# Patient Record
Sex: MALE | Race: WHITE | NOT HISPANIC OR LATINO | Employment: FULL TIME | URBAN - METROPOLITAN AREA
[De-identification: names, ages, dates, MRNs, and addresses within clinical notes are randomized per-mention and may not be internally consistent; named-entity substitution may affect disease eponyms.]

---

## 2017-05-04 ENCOUNTER — HOSPITAL ENCOUNTER (OUTPATIENT)
Dept: RADIOLOGY | Facility: MEDICAL CENTER | Age: 72
End: 2017-05-04
Attending: NEUROLOGICAL SURGERY
Payer: COMMERCIAL

## 2017-05-04 ENCOUNTER — HOSPITAL ENCOUNTER (OUTPATIENT)
Dept: RADIOLOGY | Facility: MEDICAL CENTER | Age: 72
End: 2017-05-04
Attending: NURSE PRACTITIONER
Payer: COMMERCIAL

## 2017-05-04 DIAGNOSIS — M54.5 LOW BACK PAIN, UNSPECIFIED BACK PAIN LATERALITY, UNSPECIFIED CHRONICITY, WITH SCIATICA PRESENCE UNSPECIFIED: ICD-10-CM

## 2017-05-04 PROCEDURE — 72148 MRI LUMBAR SPINE W/O DYE: CPT

## 2017-05-04 PROCEDURE — 72110 X-RAY EXAM L-2 SPINE 4/>VWS: CPT

## 2017-08-25 ENCOUNTER — HOSPITAL ENCOUNTER (OUTPATIENT)
Dept: LAB | Facility: MEDICAL CENTER | Age: 72
End: 2017-08-25
Attending: FAMILY MEDICINE
Payer: COMMERCIAL

## 2017-08-25 ENCOUNTER — OFFICE VISIT (OUTPATIENT)
Dept: INTERNAL MEDICINE | Facility: MEDICAL CENTER | Age: 72
End: 2017-08-25
Payer: COMMERCIAL

## 2017-08-25 VITALS
OXYGEN SATURATION: 98 % | WEIGHT: 189.9 LBS | BODY MASS INDEX: 27.19 KG/M2 | HEART RATE: 72 BPM | DIASTOLIC BLOOD PRESSURE: 92 MMHG | HEIGHT: 70 IN | TEMPERATURE: 97.5 F | SYSTOLIC BLOOD PRESSURE: 128 MMHG

## 2017-08-25 DIAGNOSIS — I10 ESSENTIAL HYPERTENSION: ICD-10-CM

## 2017-08-25 DIAGNOSIS — E78.5 DYSLIPIDEMIA: ICD-10-CM

## 2017-08-25 DIAGNOSIS — M48.061 BILATERAL STENOSIS OF LATERAL RECESS OF LUMBAR SPINE: ICD-10-CM

## 2017-08-25 DIAGNOSIS — M54.16 CHRONIC LUMBAR RADICULOPATHY: ICD-10-CM

## 2017-08-25 DIAGNOSIS — E06.3 HYPOTHYROIDISM DUE TO HASHIMOTO'S THYROIDITIS: ICD-10-CM

## 2017-08-25 DIAGNOSIS — E03.8 HYPOTHYROIDISM DUE TO HASHIMOTO'S THYROIDITIS: ICD-10-CM

## 2017-08-25 DIAGNOSIS — N52.9 IMPOTENCE: ICD-10-CM

## 2017-08-25 DIAGNOSIS — E78.00 PURE HYPERCHOLESTEROLEMIA: ICD-10-CM

## 2017-08-25 DIAGNOSIS — E03.9 ACQUIRED HYPOTHYROIDISM: ICD-10-CM

## 2017-08-25 DIAGNOSIS — Z12.5 SCREENING FOR PROSTATE CANCER: ICD-10-CM

## 2017-08-25 DIAGNOSIS — N28.1 RENAL CYST: ICD-10-CM

## 2017-08-25 DIAGNOSIS — Z00.00 PREVENTATIVE HEALTH CARE: ICD-10-CM

## 2017-08-25 LAB
25(OH)D3 SERPL-MCNC: 35 NG/ML (ref 30–100)
APPEARANCE UR: CLEAR
BASOPHILS # BLD AUTO: 0.7 % (ref 0–1.8)
BASOPHILS # BLD: 0.05 K/UL (ref 0–0.12)
BILIRUB UR QL STRIP.AUTO: NEGATIVE
COLOR UR: YELLOW
EOSINOPHIL # BLD AUTO: 0.09 K/UL (ref 0–0.51)
EOSINOPHIL NFR BLD: 1.2 % (ref 0–6.9)
ERYTHROCYTE [DISTWIDTH] IN BLOOD BY AUTOMATED COUNT: 39.8 FL (ref 35.9–50)
GLUCOSE UR STRIP.AUTO-MCNC: NEGATIVE MG/DL
HCT VFR BLD AUTO: 49.4 % (ref 42–52)
HGB BLD-MCNC: 17 G/DL (ref 14–18)
IMM GRANULOCYTES # BLD AUTO: 0.04 K/UL (ref 0–0.11)
IMM GRANULOCYTES NFR BLD AUTO: 0.6 % (ref 0–0.9)
KETONES UR STRIP.AUTO-MCNC: NEGATIVE MG/DL
LEUKOCYTE ESTERASE UR QL STRIP.AUTO: NEGATIVE
LYMPHOCYTES # BLD AUTO: 1.81 K/UL (ref 1–4.8)
LYMPHOCYTES NFR BLD: 24.9 % (ref 22–41)
MCH RBC QN AUTO: 30 PG (ref 27–33)
MCHC RBC AUTO-ENTMCNC: 34.4 G/DL (ref 33.7–35.3)
MCV RBC AUTO: 87.3 FL (ref 81.4–97.8)
MICRO URNS: NORMAL
MONOCYTES # BLD AUTO: 0.57 K/UL (ref 0–0.85)
MONOCYTES NFR BLD AUTO: 7.9 % (ref 0–13.4)
NEUTROPHILS # BLD AUTO: 4.7 K/UL (ref 1.82–7.42)
NEUTROPHILS NFR BLD: 64.7 % (ref 44–72)
NITRITE UR QL STRIP.AUTO: NEGATIVE
NRBC # BLD AUTO: 0 K/UL
NRBC BLD AUTO-RTO: 0 /100 WBC
PH UR STRIP.AUTO: 6 [PH]
PLATELET # BLD AUTO: 283 K/UL (ref 164–446)
PMV BLD AUTO: 10.1 FL (ref 9–12.9)
PROT UR QL STRIP: NEGATIVE MG/DL
PSA SERPL-MCNC: 2.86 NG/ML (ref 0–4)
RBC # BLD AUTO: 5.66 M/UL (ref 4.7–6.1)
RBC UR QL AUTO: NEGATIVE
SP GR UR STRIP.AUTO: 1.03
TSH SERPL DL<=0.005 MIU/L-ACNC: 2.07 UIU/ML (ref 0.3–3.7)
UROBILINOGEN UR STRIP.AUTO-MCNC: 0.2 MG/DL
WBC # BLD AUTO: 7.3 K/UL (ref 4.8–10.8)

## 2017-08-25 PROCEDURE — 80053 COMPREHEN METABOLIC PANEL: CPT

## 2017-08-25 PROCEDURE — 84443 ASSAY THYROID STIM HORMONE: CPT

## 2017-08-25 PROCEDURE — 81003 URINALYSIS AUTO W/O SCOPE: CPT

## 2017-08-25 PROCEDURE — 36415 COLL VENOUS BLD VENIPUNCTURE: CPT

## 2017-08-25 PROCEDURE — 99214 OFFICE O/P EST MOD 30 MIN: CPT | Mod: GC | Performed by: INTERNAL MEDICINE

## 2017-08-25 PROCEDURE — 85025 COMPLETE CBC W/AUTO DIFF WBC: CPT

## 2017-08-25 PROCEDURE — 82306 VITAMIN D 25 HYDROXY: CPT

## 2017-08-25 PROCEDURE — 84153 ASSAY OF PSA TOTAL: CPT

## 2017-08-25 RX ORDER — HYDROCHLOROTHIAZIDE 12.5 MG/1
12.5 TABLET ORAL DAILY
Qty: 90 TAB | Refills: 3 | Status: SHIPPED | OUTPATIENT
Start: 2017-08-25 | End: 2018-10-07 | Stop reason: SDUPTHER

## 2017-08-25 RX ORDER — ATORVASTATIN CALCIUM 10 MG/1
10 TABLET, FILM COATED ORAL DAILY
Qty: 90 TAB | Refills: 3 | Status: SHIPPED | OUTPATIENT
Start: 2017-08-25 | End: 2018-08-06

## 2017-08-25 RX ORDER — TADALAFIL 20 MG/1
20 TABLET ORAL PRN
Qty: 10 TAB | Refills: 11 | Status: SHIPPED | OUTPATIENT
Start: 2017-08-25 | End: 2017-09-12 | Stop reason: SDUPTHER

## 2017-08-25 RX ORDER — LEVOTHYROXINE SODIUM 137 UG/1
137 TABLET ORAL
Qty: 90 TAB | Refills: 3 | Status: SHIPPED | OUTPATIENT
Start: 2017-08-25 | End: 2018-10-10 | Stop reason: SDUPTHER

## 2017-08-25 ASSESSMENT — ENCOUNTER SYMPTOMS
WHEEZING: 0
BLURRED VISION: 0
HEADACHES: 0
BACK PAIN: 1
WEAKNESS: 0
COUGH: 0
NAUSEA: 0
DIAPHORESIS: 0
DEPRESSION: 0
FALLS: 0
EYE PAIN: 0
PHOTOPHOBIA: 0
SHORTNESS OF BREATH: 0
CONSTIPATION: 0
VOMITING: 0
DIARRHEA: 0
SORE THROAT: 0
INSOMNIA: 0
FOCAL WEAKNESS: 0
CHILLS: 0
DOUBLE VISION: 0
MYALGIAS: 0
FEVER: 0
NERVOUS/ANXIOUS: 0
WEIGHT LOSS: 0
DIZZINESS: 0
PALPITATIONS: 0

## 2017-08-25 NOTE — MR AVS SNAPSHOT
"        Amadou Graves Iglesia   2017 8:00 AM   Office Visit   MRN: [id number]    Department:  [facility]   Dept Phone:  [phone]    Description:  Male : 1945   Provider:  Rodger Lau D.O.           Reason for Visit     Annual Exam Refill all meds       Allergies as of 2017     No Known Allergies      You were diagnosed with     Bilateral stenosis of lateral recess of lumbar spine   [6784653]       Chronic lumbar radiculopathy   [335426]       Essential hypertension   [2223391]       Pure hypercholesterolemia   [272.0.ICD-9-CM]       Preventative health care   [823091]       Acquired hypothyroidism   [3575828]       Hypothyroidism due to Hashimoto's thyroiditis   [8555152]       Dyslipidemia   [379580]       Impotence   [452730]       Screening for prostate cancer   [293884]       Renal cyst   [113226]         Vital Signs     Blood Pressure Pulse Temperature Height Weight Body Mass Index    128/92 mmHg 72 36.4 °C (97.5 °F) 1.778 m (5' 10\") 86.138 kg (189 lb 14.4 oz) 27.25 kg/m2    Oxygen Saturation Smoking Status                98% Never Smoker           Basic Information     Date Of Birth Sex Race Ethnicity Preferred Language    1945 Male White Non- English      Problem List              ICD-10-CM Priority Class Noted - Resolved    Bilateral stenosis of lateral recess of lumbar spine M48.06   3/12/2016 - Present    Chronic infection of sinus J32.9   2016 - Present    Essential hypertension I10   2016 - Present    Thyroid activity decreased E03.9   2016 - Present    Hyperlipidemia E78.5   2016 - Present    Impotence N52.9   2016 - Present    Impaired renal function N28.9   2016 - Present    Chronic lumbar radiculopathy M54.16   2016 - Present    Lumbar radiculopathy M54.16   10/6/2016 - Present      Health Maintenance        Date Due Completion Dates    COLONOSCOPY 1995 ---    IMM ZOSTER VACCINE 2005 ---    IMM PNEUMOCOCCAL 65+ " (ADULT) LOW/MEDIUM RISK SERIES (2 of 2 - PPSV23) 10/7/2016 10/7/2015, 2/26/2015    IMM INFLUENZA (1) 9/1/2017 9/20/2016    IMM DTaP/Tdap/Td Vaccine (2 - Td) 2/26/2025 2/26/2015            Current Immunizations     13-VALENT PCV PREVNAR 10/7/2015, 2/26/2015    Influenza Vaccine Quad Inj (Preserved) 9/20/2016    Tdap Vaccine 2/26/2015      Below and/or attached are the medications your provider expects you to take. Review all of your home medications and newly ordered medications with your provider and/or pharmacist. Follow medication instructions as directed by your provider and/or pharmacist. Please keep your medication list with you and share with your provider. Update the information when medications are discontinued, doses are changed, or new medications (including over-the-counter products) are added; and carry medication information at all times in the event of emergency situations     Allergies:  No Known Allergies          Medications  Valid as of: August 25, 2017 - 10:33 AM    Generic Name Brand Name Tablet Size Instructions for use    Aspirin (Tablet Delayed Response) aspirin 81 MG Take  by mouth.        Atorvastatin Calcium (Tab) LIPITOR 10 MG Take 1 Tab by mouth every day.        B Complex Vitamins (Tab) b complex vitamins  Take 1 Tab by mouth every day.        Calcium Citrate-Vitamin D   Take  by mouth every day.        HydroCHLOROthiazide (Tab) HYDRODIURIL 12.5 MG Take 1 Tab by mouth every day.        Ibuprofen (Tab) MOTRIN 200 MG Take 200 mg by mouth every 6 hours as needed.        Levothyroxine Sodium (Tab) SYNTHROID 137 MCG Take 1 Tab by mouth Every morning on an empty stomach.        Multiple Vitamin (Tab) THERAGRAN  Take 1 Tab by mouth every day.        Omega-3 Fatty Acids (Cap) Omega 3 1000 MG Take  by mouth every day.        Tadalafil (Tab) CIALIS 20 MG Take 1 Tab by mouth as needed for Erectile Dysfunction.        .                 Medicines prescribed today were sent to:     [facility] #  [address] [place] [address], [place] - [id number] [facility] [place] [address]    Phone: [phone] Fax: [phone]    Open 24 Hours?: No      Medication refill instructions:       If your prescription bottle indicates you have medication refills left, it is not necessary to call your provider’s office. Please contact your pharmacy and they will refill your medication.    If your prescription bottle indicates you do not have any refills left, you may request refills at any time through one of the following ways: The online [employer] system (except Urgent Care), by calling your provider’s office, or by asking your pharmacy to contact your provider’s office with a refill request. Medication refills are processed only during regular business hours and may not be available until the next business day. Your provider may request additional information or to have a follow-up visit with you prior to refilling your medication.   *Please Note: Medication refills are assigned a new Rx number when refilled electronically. Your pharmacy may indicate that no refills were authorized even though a new prescription for the same medication is available at the pharmacy. Please request the medicine by name with the pharmacy before contacting your provider for a refill.        Your To Do List     Future Labs/Procedures Complete By Expires    CBC WITH DIFFERENTIAL  As directed 8/25/2018    COMP METABOLIC PANEL  As directed 8/25/2018    TSH WITH REFLEX TO FT4  As directed 8/25/2018    URINALYSIS  As directed 8/25/2018    VITAMIN D,25 HYDROXY  As directed 8/25/2018         [employer] Access Code: [email or web address]  Expires: 9/16/2017  9:40 AM    [employer]  A secure, online tool to manage your health information     [employer]® is a secure, online tool that connects you to your personalized health information from the privacy of your home -- day or night - making it very easy for you to manage your healthcare. Once the  activation process is completed, you can even access your medical information using the [employer] celso, which is available for free in the [employer] store or Google Play store.     [employer] provides the following levels of access (as shown below):   My Chart Features   [facility] Doctor [facility]  Specialists [facility]  Urgent  Care Non-[facility]  Primary Care  Doctor   Email your healthcare team securely and privately 24/7 X X X    Manage appointments: schedule your next appointment; view details of past/upcoming appointments X      Request prescription refills. X      View recent personal medical records, including lab and immunizations X X X X   View health record, including health history, allergies, medications X X X X   Read reports about your outpatient visits, procedures, consult and ER notes X X X X   See your discharge summary, which is a recap of your hospital and/or ER visit that includes your diagnosis, lab results, and care plan. X X       How to register for [employer]:  1. Go to  [email or web address].  2. Click on the Sign Up Now box, which takes you to the New Member Sign Up page. You will need to provide the following information:  a. Enter your [employer] Access Code exactly as it appears at the top of this page. (You will not need to use this code after you’ve completed the sign-up process. If you do not sign up before the expiration date, you must request a new code.)   b. Enter your date of birth.   c. Enter your home email address.   d. Click Submit, and follow the next screen’s instructions.  3. Create a [employer] ID. This will be your [employer] login ID and cannot be changed, so think of one that is secure and easy to remember.  4. Create a [employer] password. You can change your password at any time.  5. Enter your Password Reset Question and Answer. This can be used at a later time if you forget your password.   6. Enter your e-mail address. This allows you to receive e-mail notifications when new  information is available in "[employer].  7. Click Sign Up. You can now view your health information.    For assistance activating your "[employer] account, call [phone]

## 2017-08-25 NOTE — PATIENT INSTRUCTIONS
"Prostate Screening  The prostate gland is part of the reproductive system of men. A normal prostate is about the size and shape of a walnut. The prostate may grow as a man ages. The gland makes a fluid that is mixed with sperm to make semen. This gland is located in front of the rectum and just below the bladder, where urine is stored. The prostate surrounds the urethra. The urethra is the tube through which urine passes out of the body.  COMMON PROSTATE PROBLEMS  · Prostatitis   · The most common prostate problem in men under 50 is inflammation of the prostate gland (prostatitis).   · This is generally an infection that enters the prostate from the urethra.   · It may be sexually transmitted.   · It could be caused by a slow growing cancer.   · If not caused by cancer, treatment with antibiotics is usually very effective. In some cases, symptoms may be slow to go away and may come back. This condition is commonly called chronic prostatitis.   · Benign Prostatic Hypertrophy (BPH)   · BPH is an enlarged prostate.   · There is no cancer present with this condition.   · The exact cause is not known; but it is one of the most common problems for men over age 50.   · If your caregiver finds BPH, but there are no symptoms or mild symptoms, you may need examinations once or twice a year.   · Prostate cancer   · Symptoms of prostate cancer will vary depending on how big the tumor is and whether it has spread beyond the prostate. If it has spread, your caregiver must find out how far it has spread.   · Prostate cancer is treated by various combinations of surgery, radiation therapy, hormonal therapy, and chemotherapy. Sometimes the prostate cancer is just observed to determine the need for treatment.   · Some men with enlarged prostates have no symptoms at all.   · Symptoms vary a lot. They are usually referred to as \"lower urinary tract symptoms\" (LUTS).   SYMPTOMS   · Frequent urination.   · Getting up often during the " night to urinate.   · A feeling that you still have a full bladder after passing your urine.   · A weak stream or dribbling after urination.   · Having to push or strain to pass your urine.   · Fever.   · Pain in the low back or groin.   · Blood in the urine.   · Discharge from the penis.   · Weight loss.   · There may be visible enlargement of the bladder.   · In severe cases, you may not be able to empty your bladder and there is severe pain. This is an emergency and requires immediate medical care. If this occurs many times, you can develop permanent damage to the bladder and kidneys.   · Different prostate problems may have similar symptoms. In the early stages, there may be no symptoms at all.   DIAGNOSIS   · If you have urination problems, or a digital rectal exam (ANNAMARIE) or prostate-specific antigen (PSA) test indicates that you might have a problem, additional tests will be suggested.   · Ask your caregiver if any special preparations are needed before your diagnostic tests.   TREATMENT   · If your caregiver finds BPH and you have bothersome symptoms, medications can be taken by mouth to help.   · If medications are not helpful, surgery may be advised. Different procedures use different methods to heat, destroy, and remove a small amount of the prostate tissue. These methods include the use of:   · Microwaves.   · High frequency sound waves.   · A laser.   · An electric charge.   · Other surgeries are available. All of these are preceded by appropriate anesthesia.   · The surgeon scrapes away part of the inside of the prostate using a small scope put into the urethra. This reduces the squeezing on the urethra.   · The surgeon makes small cuts in the prostate to reduce the squeezing pressure on the urethra.   · Removal of the entire prostate is carried out through a small incision.   · Removal of the entire prostate through a larger incision may occur in situations where the surgeon feels the other operations  are not appropriate.   TYPES OF TESTS  ANNAMARIE  · You may be asked to bend over a table or to lie on your side holding your knees close to your chest. Your caregiver advances a gloved, lubricated finger into the rectum and feels the part of the prostate that lies next to it. You may find the ANNAMARIE slightly uncomfortable, but it is very brief.   · This exam tells the caregiver whether the gland has any bumps, irregularities, or soft or hard spots that require additional tests.   · If a prostate infection is suspected, the caregiver might press the prostate during the ANNAMARIE to obtain fluid for examination under a microscope.   PSA Blood Test  · The amount of PSA, a protein produced by prostate cells, is often higher in the blood of men who have prostate cancer. However, an elevated level of PSA does not necessarily mean you have cancer.   · Healthy men should no longer receive PSA blood tests as part of routine cancer screening. Consult with your caregiver about prostate cancer screening.   Urinalysis  · This can help find an infection if one is present.   Transrectal Ultrasound and Prostate Biopsy  · If prostate cancer is suspected, your caregiver may recommend a transrectal ultrasound.   · A probe is inserted into the rectum. The probe directs high frequency sound waves at the prostate, and the created image is visible on a monitor screen.   · The image shows the size of the prostate and any abnormalities. This cannot clearly identify tumors.   · To determine whether an abnormality is a tumor, the caregiver may use the probe and the ultrasound images to guide a biopsy needle to the abnormality. Prostate tissue samples will be collected for examination under a microscope. A specialist will look at the tissue samples to see if cancer is present.   MRI and CT Scans  · MRI and CT scans both use computers to create images of internal organs.   · These tests can help identify abnormal structures.   · They cannot show a difference  between cancerous tumors and noncancerous growths.   · If a biopsy confirms cancer, your caregiver might use these imaging techniques to determine how far the cancer has spread. In most cases, these tests are not required. Your caregiver will discuss the need for these tests if he or she feels they are indicated.   Urodynamic Tests  · A weak stream of urine and difficulty emptying the bladder fully may be signs of urine blockage caused by an enlarged prostate that is squeezing the urethra.   · If your problem appears to be related to a blockage, your caregiver may recommend tests that measure bladder pressure and urine flow rate.   · You may be asked to urinate into a device that measures how quickly the urine is flowing. It will record how many seconds it takes for the peak flow rate to be reached.   · Another test measures post-void residual. This is the amount of urine left in your bladder when you have finished passing urine.   Intravenous Pyelogram (IVP)   · IVP is an X-ray of the urinary tract.   · In this test, a fluid (contrast) is injected into a vein. X-ray pictures are taken at different times to see the progression of contrast through the kidney and ureter.   · The contrast makes the urine visible on the X-ray and shows any narrowing or blockage in the urinary tract.   · This procedure can help show problems in the kidneys, ureters, or bladder that may have come from urine retention or backup.   Abdominal Ultrasound  · For an abdominal ultrasound exam, gel will be applied to your lower abdomen. A handheld device will be moved across the lower abdomen to record a picture of your entire urinary tract.   · An abdominal ultrasound can show damage in the upper urinary tract that comes from urine blockage.   Cystoscopy  · A solution will numb the inside of the penis. A small tube (cystoscope) is inserted through the urethral opening at the tip of the penis.   · The tube allows your caregiver to see the inside  of the urethra and bladder.   · The caregiver can determine the location and amount of the obstruction causing problems.   Finding out the results of your test  Not all test results are available during your visit. If your test results are not back during the visit, make an appointment with your caregiver to find out the results. Do not assume everything is normal if you have not heard from your caregiver or the medical facility. It is important for you to follow up on all of your test results.  HOME CARE INSTRUCTIONS   Home care instructions after diagnostic testing will vary dependent upon the procedure performed.  · Care after urodynamic tests or a cystoscopy:   · You may have mild discomfort for a few hours.   · Drinking two, 8 ounce (240 mL) glasses of water each hour, for 2 hours should help.   · Ask your caregiver whether you can take a warm bath. If not, you may be able to hold a warm, damp washcloth over the urethral opening to relieve the discomfort.   · Care after a prostate biopsy:   · A prostate biopsy may produce pain in the area of the rectum and the area between the rectum and the scrotum (the perineum).   · Only take over-the-counter or prescription medications for pain, discomfort, or fever as directed by your caregiver.   · You may be given antibiotics to prevent an infection.   SEEK MEDICAL CARE IF:  · You have any sign of an infection including pain with urination, chills, or fever.   FOR MORE INFORMATION  American Foundation for Urologic Disease: www.urologyhealth.org  National Cancer [place] (NCI): www.nci.nih.gov  The National [place] of Diabetes and Digestive and Kidney Diseases (NIDDK): www.niddk.nih.gov  The Prostatitis Foundation: www.prostatitis.org  Document Released: 10/14/2008 Document Revised: 03/11/2013 Document Reviewed: 07/02/2010  ExitCare® Patient Information ©2013 [employer].

## 2017-08-26 LAB
ALBUMIN SERPL BCP-MCNC: 4.3 G/DL (ref 3.2–4.9)
ALBUMIN/GLOB SERPL: 1.6 G/DL
ALP SERPL-CCNC: 60 U/L (ref 30–99)
ALT SERPL-CCNC: 32 U/L (ref 2–50)
ANION GAP SERPL CALC-SCNC: 11 MMOL/L (ref 0–11.9)
AST SERPL-CCNC: 25 U/L (ref 12–45)
BILIRUB SERPL-MCNC: 0.7 MG/DL (ref 0.1–1.5)
BUN SERPL-MCNC: 27 MG/DL (ref 8–22)
CALCIUM SERPL-MCNC: 9.6 MG/DL (ref 8.5–10.5)
CHLORIDE SERPL-SCNC: 104 MMOL/L (ref 96–112)
CO2 SERPL-SCNC: 26 MMOL/L (ref 20–33)
CREAT SERPL-MCNC: 1.21 MG/DL (ref 0.5–1.4)
GFR SERPL CREATININE-BSD FRML MDRD: 59 ML/MIN/1.73 M 2
GLOBULIN SER CALC-MCNC: 2.7 G/DL (ref 1.9–3.5)
GLUCOSE SERPL-MCNC: 98 MG/DL (ref 65–99)
POTASSIUM SERPL-SCNC: 4.6 MMOL/L (ref 3.6–5.5)
PROT SERPL-MCNC: 7 G/DL (ref 6–8.2)
SODIUM SERPL-SCNC: 141 MMOL/L (ref 135–145)

## 2017-09-08 ENCOUNTER — TELEPHONE (OUTPATIENT)
Dept: INTERNAL MEDICINE | Facility: MEDICAL CENTER | Age: 72
End: 2017-09-08

## 2017-09-08 NOTE — TELEPHONE ENCOUNTER
1. Caller Name: Pt                      Call Back Number: [phone] (home)     2. Message: Patient called and left a message stating he was told by Dr. Lau if his rx for Cialis didn't go through with his insurance Dr. Lau would re-write order in a different way.    3. Patient approves office to leave a detailed voicemail/MyChart message: N\A

## 2017-09-12 DIAGNOSIS — N52.9 IMPOTENCE: ICD-10-CM

## 2017-09-12 RX ORDER — TADALAFIL 20 MG/1
20 TABLET ORAL PRN
Qty: 96 TAB | Refills: 0 | Status: SHIPPED
Start: 2017-09-12 | End: 2017-09-19

## 2017-09-19 ENCOUNTER — HOSPITAL ENCOUNTER (OUTPATIENT)
Dept: RADIOLOGY | Facility: MEDICAL CENTER | Age: 72
DRG: 455 | End: 2017-09-19
Attending: NEUROLOGICAL SURGERY | Admitting: NEUROLOGICAL SURGERY
Payer: COMMERCIAL

## 2017-09-19 DIAGNOSIS — Z01.810 PRE-OPERATIVE CARDIOVASCULAR EXAMINATION: ICD-10-CM

## 2017-09-19 DIAGNOSIS — Z01.812 PRE-OPERATIVE LABORATORY EXAMINATION: ICD-10-CM

## 2017-09-19 DIAGNOSIS — Z01.811 PRE-OPERATIVE RESPIRATORY EXAMINATION: ICD-10-CM

## 2017-09-19 LAB
ANION GAP SERPL CALC-SCNC: 8 MMOL/L (ref 0–11.9)
APPEARANCE UR: CLEAR
APTT PPP: 22.8 SEC (ref 24.7–36)
BASOPHILS # BLD AUTO: 0.7 % (ref 0–1.8)
BASOPHILS # BLD: 0.05 K/UL (ref 0–0.12)
BILIRUB UR QL STRIP.AUTO: NEGATIVE
BUN SERPL-MCNC: 25 MG/DL (ref 8–22)
CALCIUM SERPL-MCNC: 9.3 MG/DL (ref 8.5–10.5)
CHLORIDE SERPL-SCNC: 104 MMOL/L (ref 96–112)
CO2 SERPL-SCNC: 25 MMOL/L (ref 20–33)
COLOR UR: YELLOW
CREAT SERPL-MCNC: 1.09 MG/DL (ref 0.5–1.4)
CULTURE IF INDICATED INDCX: NO UA CULTURE
EKG IMPRESSION: NORMAL
EOSINOPHIL # BLD AUTO: 0.11 K/UL (ref 0–0.51)
EOSINOPHIL NFR BLD: 1.5 % (ref 0–6.9)
ERYTHROCYTE [DISTWIDTH] IN BLOOD BY AUTOMATED COUNT: 39.2 FL (ref 35.9–50)
GFR SERPL CREATININE-BSD FRML MDRD: >60 ML/MIN/1.73 M 2
GLUCOSE SERPL-MCNC: 101 MG/DL (ref 65–99)
GLUCOSE UR STRIP.AUTO-MCNC: NEGATIVE MG/DL
HCT VFR BLD AUTO: 47.1 % (ref 42–52)
HGB BLD-MCNC: 15.9 G/DL (ref 14–18)
IMM GRANULOCYTES # BLD AUTO: 0.04 K/UL (ref 0–0.11)
IMM GRANULOCYTES NFR BLD AUTO: 0.5 % (ref 0–0.9)
INR PPP: 0.98 (ref 0.87–1.13)
KETONES UR STRIP.AUTO-MCNC: NEGATIVE MG/DL
LEUKOCYTE ESTERASE UR QL STRIP.AUTO: NEGATIVE
LYMPHOCYTES # BLD AUTO: 1.85 K/UL (ref 1–4.8)
LYMPHOCYTES NFR BLD: 24.6 % (ref 22–41)
MCH RBC QN AUTO: 29 PG (ref 27–33)
MCHC RBC AUTO-ENTMCNC: 33.8 G/DL (ref 33.7–35.3)
MCV RBC AUTO: 85.9 FL (ref 81.4–97.8)
MICRO URNS: NORMAL
MONOCYTES # BLD AUTO: 0.5 K/UL (ref 0–0.85)
MONOCYTES NFR BLD AUTO: 6.6 % (ref 0–13.4)
NEUTROPHILS # BLD AUTO: 4.98 K/UL (ref 1.82–7.42)
NEUTROPHILS NFR BLD: 66.1 % (ref 44–72)
NITRITE UR QL STRIP.AUTO: NEGATIVE
NRBC # BLD AUTO: 0 K/UL
NRBC BLD AUTO-RTO: 0 /100 WBC
PH UR STRIP.AUTO: 5 [PH]
PLATELET # BLD AUTO: 260 K/UL (ref 164–446)
PMV BLD AUTO: 9.9 FL (ref 9–12.9)
POTASSIUM SERPL-SCNC: 4.1 MMOL/L (ref 3.6–5.5)
PROT UR QL STRIP: NEGATIVE MG/DL
PROTHROMBIN TIME: 13.3 SEC (ref 12–14.6)
RBC # BLD AUTO: 5.48 M/UL (ref 4.7–6.1)
RBC UR QL AUTO: NEGATIVE
SODIUM SERPL-SCNC: 137 MMOL/L (ref 135–145)
SP GR UR STRIP.AUTO: 1.02
UROBILINOGEN UR STRIP.AUTO-MCNC: 0.2 MG/DL
WBC # BLD AUTO: 7.5 K/UL (ref 4.8–10.8)

## 2017-09-19 PROCEDURE — 93005 ELECTROCARDIOGRAM TRACING: CPT

## 2017-09-19 PROCEDURE — 71020 DX-CHEST-2 VIEWS: CPT

## 2017-09-19 PROCEDURE — 85730 THROMBOPLASTIN TIME PARTIAL: CPT

## 2017-09-19 PROCEDURE — 93010 ELECTROCARDIOGRAM REPORT: CPT | Performed by: INTERNAL MEDICINE

## 2017-09-19 PROCEDURE — 85025 COMPLETE CBC W/AUTO DIFF WBC: CPT

## 2017-09-19 PROCEDURE — 80048 BASIC METABOLIC PNL TOTAL CA: CPT

## 2017-09-19 PROCEDURE — 85610 PROTHROMBIN TIME: CPT

## 2017-09-19 PROCEDURE — 81003 URINALYSIS AUTO W/O SCOPE: CPT

## 2017-09-19 PROCEDURE — 36415 COLL VENOUS BLD VENIPUNCTURE: CPT

## 2017-10-03 ENCOUNTER — OFFICE VISIT (OUTPATIENT)
Dept: BEHAVIORAL HEALTH | Facility: PHYSICIAN GROUP | Age: 72
End: 2017-10-03
Payer: COMMERCIAL

## 2017-10-03 DIAGNOSIS — Z63.0 RELATIONSHIP PROBLEM BETWEEN PARTNERS: ICD-10-CM

## 2017-10-03 DIAGNOSIS — F41.8 SITUATIONAL ANXIETY: ICD-10-CM

## 2017-10-03 PROCEDURE — 90791 PSYCH DIAGNOSTIC EVALUATION: CPT | Performed by: SOCIAL WORKER

## 2017-10-03 RX ORDER — TADALAFIL 20 MG/1
20 TABLET ORAL PRN
Status: ON HOLD | COMMUNITY
End: 2017-10-05

## 2017-10-03 SDOH — SOCIAL STABILITY - SOCIAL INSECURITY: PROBLEMS IN RELATIONSHIP WITH SPOUSE OR PARTNER: Z63.0

## 2017-10-03 NOTE — BH THERAPY
"[facility]  INITIAL ASSESSMENT    Name: Amadou Parekh  MRN: [id number]  : 1945  Age: 72 y.o.  Date of assessment: 10/3/2017  PCP: Rodger Lau D.O.  Persons in attendance: Patient  Total session time: 40 minutes      CHIEF COMPLAINT AND HISTORY OF PRESENTING PROBLEM:  (as stated by Patient):  Amadou Parekh is a 72 y.o., White male referred for assessment by No ref. provider found.  Primary presenting issue includes   Chief Complaint   Patient presents with   • Anxiety     Situational surrounding relationship issues   Client reported he was generally very high functioning. However, in the past, he has engaged in two extra-marital affairs, with the same woman. He indicated the affairs were over, and he has been attending couples counseling with his wife. He feels their relationship is strained, has some anxiety around the future of his relationship. He would like to use counseling to address his concerns about himself,\"I am the fly in the ointment of my relationship,\" and manage his relationship concerns in a healthy manner.    FAMILY/SOCIAL HISTORY  Current living situation/household members: Client lives with his wife.  Relevant family history/structure/dynamics: Client had an affair, and is working to repair his relationship with his wife.  Current family/social stressors: Working through situation with his wife.  Quality/quantity of current family and/or social support: Client endorses good family support.  Does patient/parent report a family history of behavioral health issues, diagnoses, or treatment? No  Family History   Problem Relation Age of Onset   • Cancer Father      Leukemia    • Heart Disease Father    • Diabetes Father    • Stroke Father    • Cancer Mother      Liver   • Heart Disease Mother    • Diabetes Mother         BEHAVIORAL HEALTH TREATMENT HISTORY  Does patient/parent report a history of prior behavioral health treatment for patient? Had couples counseling x2    History " of untreated behavioral health issues identified? No    MEDICAL HISTORY  Primary care behavioral health screenings: Patient Health Questionaire    If depressive symptoms identified deferred to follow up visit unless specifically addressed in assesment and plan.    Interpretation of PHQ-9 Total Score   Score Severity   1-4 No Depression   5-9 Mild Depression   10-14 Moderate Depression   15-19 Moderately Severe Depression   20-27 Severe Depression       Past medical/surgical history:   Past Medical History:   Diagnosis Date   • Decreased renal function    • Disorder of thyroid    • High cholesterol    • Hyperlipidemia, mixed    • Hypertension    • Hypertension, essential    • Hypothyroidism    • Impotence    • Lower back pain    • Preventative health care       Past Surgical History:   Procedure Laterality Date   • PB INJ DX/THER AGNT PARAVERT FACET JOINT, BRITT* Right 10/6/2016    Procedure: INJ PARA FACET L/S 1 LVL W/IG - L3-4, L4-5, L5-S1;  Surgeon: Eugenio Camara M.D.;  Location: [facility];  Service: Pain Management   • PB INJ DX/THER AGNT PARAVERT FACET JOINT, BRITT*  10/6/2016    Procedure: INJ PARA FACET L/S 2D LVL W/IG;  Surgeon: Eugenio Camara M.D.;  Location: [facility];  Service: Pain Management   • PB INJ DX/THER AGNT PARAVERT FACET JOINT, BRITT*  10/6/2016    Procedure: INJ PARA FACET L/S 3D LVL W/IG;  Surgeon: Eugenio Camara M.D.;  Location: [facility];  Service: Pain Management   • PB INJ,FORAMEN,L/S,1 LEVEL Right 8/18/2016    Procedure: INJ-FORAMEN EPI LUM/SAC SNGL - L5-S1;  Surgeon: Eugenio Camara M.D.;  Location: [facility];  Service: Pain Management   • LUMBAR LAMINECTOMY DISKECTOMY Right 3/12/2016    Procedure: LUMBAR HemiLAMINECTOMY Micro DISKECTOMY posterior Redo L3-4 ;  Surgeon: Be Webber M.D.;  Location: [facility];  Service:    • FORAMINOTOMY Right 3/12/2016    Procedure: FORAMINOTOMY;  Surgeon: Be Webber M.D.;   Location: [facility];  Service:    • CERVICAL LAMINECTOMY POSTERIOR  2011   • OTHER      nasal surgery 2015   • OTHER NEUROLOGICAL SURG  2006, 2010, 2012, 2014, 2016    low back surgery x 5        Medication Allergies:  Review of patient's allergies indicates no known allergies.   Medical history provided by patient during current evaluation: Back pain, going in for surgery in two days.    Patient reports last physical exam: 8/17  Does patient/parent report any history of or current developmental concerns? No  Does patient/parent report nutritional concerns? No  Does patient/parent report change in appetite or weight loss/gain? No  Does patient/parent report history of eating disorder symptoms? No  Does patient/parent report dental problem? No  Does patient/parent report physical pain? Yes   Indicate if pain is acute or chronic, and location: Back pain, has had several surgeries   Pain scale rating:       Does patient/parent report functional impact of medical, developmental, or pain issues?   no    EDUCATIONAL/LEARNING HISTORY  Is patient currently enrolled in a school/educational program?   Yes:   Current grade level/year: Master's Degree  School:  [employer]  Typical grades/performance:  Above Average  Does the patient/parent identify impact of presenting issue on school functioning?  no  Special Education services/IEP/504 Plan past or current? no  Other relevant school functioning:  n/a        EMPLOYMENT/RESOURCES  Is the patient currently employed? Yes, he is an  and   Does the patient/parent report adequate financial resources? Yes  Does patient identify impact of presenting issue on work functioning? No  Work or income-related stressors:  n/a     HISTORY:  Does patient report current or past enlistment? Yes, years during [place] Era    [If yes, complete below items]  Does patient report history of exposure to combat? No  Does patient report history of   sexual trauma? No  Does patient report other -related stressors? Yes, was medic at [employer]    SPIRITUAL/CULTURAL/IDENTITY:  What are the patient’s/family’s spiritual beliefs or practices? Bahai  What is the patient’s cultural or ethnic background/identity? White  How does the patient identify their sexual orientation? Straight  How does the patient identify their gender? Male  Does the patient identify any spiritual/cultural/identity factors as relevant to the presenting issue? No    LEGAL HISTORY  Has the patient ever been involved with juvenile, adult, or family legal systems? No   [If yes, trigger section below:]  Does patient report ever being a victim of a crime?  No  Does patient report involvement in any current legal issues?  No  Does patient report ever being arrested or committing a crime? No  Does patient report any current agency (parole/probation/CPS/) involvement? No    ABUSE/NEGLECT/TRAUMA SCREENING  Does patient report feeling “unsafe” in his/her home, or afraid of anyone? No  Does patient report any history of physical, sexual, or emotional abuse? No  Does parent or significant other report any of the above? n/a  Is there evidence of neglect by self? No  Is there evidence of neglect by a caregiver? No  Does the patient report any history of CPS/APS/police involvement related to suspected abuse/neglect or domestic violence? No  Does the patient report any other history of potentially traumatic life events? No  Based on the information provided during the current assessment, is a mandated report of suspected abuse/neglect being made?  No     SAFETY ASSESSMENT - SELF  Does patient acknowledge current or past symptoms of dangerousness to self? No  Does parent/significant other report patient has current or past symptoms of dangerousness to self? No      Recent change in frequency/specificity/intensity of suicidal thoughts or self-harm behavior? No  Current access to  firearms, medications, or other identified means of suicide/self-harm? n/a  If yes, willing to restrict access to means of suicide/self-harm? n/a  Protective factors present: Future-oriented, Good impulse control, Hopefulness, Optimism, Positive coping skills, Strong family connections and Strong socia/community connections    Current Suicide Risk: Low  Crisis Safety Plan completed and copy given to patient: No    SAFETY ASSESSMENT - OTHERS  Does paor past symptoms of aggressive behavior or risk to others? No  Does parent/significant othtient acknowledge current or past symptoms of aggressive behavior or risk to others? No  Does parent/significant other report patient has current or past symptoms of aggressive behavior or risk to others? n/a    Recent change in frequency/specificity/intensity of thoughts or threats to harm others? No  Current access to firearms/other identified means of harm? No  If yes, willing to restrict access to weapons/means of harm? n/a  Protective factors present: Good frustration tolerance, Moral/spiritual prohibition, Well-developed sense of empathy, Positive impulse-control, Stable relationships, Stable employment and Low rumination/obsession    Current Homicide Risk:  Not applicable  Crisis Safety Plan completed and copy given to patient? No  Based on information provided during the current assessment, is a mandated “duty to warn” being exercised? No    SUBSTANCE USE/ADDICTION HISTORY  [] Not applicable - patient 10 years of age or younger    Is there a family history of substance use/addiction? No  Does patient acknowledge or parent/significant other report use of/dependence on substances? No  Last time patient used alcohol: n/a  Within the past week? No  Last time patient used marijuana: n/a  Within the past month? No  Any other street drugs ever tried even once? No  Any use of prescription medications/pills without a prescription, or for reasons others than originally prescribed?   No  Any other addictive behavior reported (gambling, shopping, sex)? no    Drug History:  Amphetamine: Denied    Cannibis: Denied    Cocaine: Denied    Ecstasy: Denied    Hallucinogen: Denied    Inhalant: Denied    Opiate: Denied    Other: Denied    Sedative: Denied      What consequences does the patient associate with any of the above substance use and or addictive behaviors? None    Patient’s motivation/readiness for change: No substance use reported.     [x] Patient denies use of any substance/addictive behaviors    STRENGTHS/ASSETS  Strengths Identified by interviewer: Self-awareness, Family suppport, Social support, Stable relationships, Problem-solving skills and Social skills  Strengths Identified by patient: Successful in career, does well in school, proud father    MENTAL STATUS/OBSERVATIONS   Participation: Active verbal participation, Engaged and Guarded  Grooming: Good and Neat  Orientation:Alert and Fully Oriented   Behavior: Tense  Eye contact: Indirect   Mood:Anxious  Affect:Anxious  Thought process: Logical and Goal-directed  Thought content:  Within normal limits  Speech: Rate within normal limits and Volume within normal limits  Perception: Within normal limits  Memory: No gross evidence of memory deficits  Insight: Limited  Judgment:  Adequate      RESULTS OF SCREENING MEASURES:  [x] Not applicable  Measure:   Score:     Measure:   Score:       CLINICAL FORMULATION: Client, Amadou Parekh, presented for an initial behavioral health evaluation to address concerns he has about his marriage. He has been in couple counseling twice, with Dr. Melisa Lyons and Dr. Lacy Noriega. Hari Olivas suggested client get individual counseling to help him understand why he continues to have affairs (2 thus far, with the same woman), and to help him process what is occurring in his relationship. Client seemed nervous to be in session, he had limited eye contact and was slightly jumpy at noises in the hallway. He  seemed open in session. Endorsed worry about his relationship and what may happen if he doesn't get help. Client reported he does want to maintain his marriage and stop his affairs. May benefit from ACT, supportive talk therapy, and uncovering motivation for affairs.       DIAGNOSTIC IMPRESSION(S):  1. Relationship problem between partners    2. Situational anxiety          IDENTIFIED NEEDS/PLAN:  [If any of these marked, trigger DISPOSITION list]  Mood/anxiety and Family/Couples conflict  Refer to [facility]: Outpatient Therapy    Does patient express agreement with the above plan? Yes     Referral appointment(s) scheduled? Yes       Andria Cisneros

## 2017-10-04 NOTE — OR NURSING
History and Physical reviewed by , incomplete Dated:9/22/17, needs MD signature, conservative therapy inadequate

## 2017-10-05 ENCOUNTER — APPOINTMENT (OUTPATIENT)
Dept: RADIOLOGY | Facility: MEDICAL CENTER | Age: 72
DRG: 455 | End: 2017-10-05
Attending: NEUROLOGICAL SURGERY
Payer: COMMERCIAL

## 2017-10-05 ENCOUNTER — HOSPITAL ENCOUNTER (INPATIENT)
Facility: MEDICAL CENTER | Age: 72
LOS: 2 days | DRG: 455 | End: 2017-10-07
Attending: NEUROLOGICAL SURGERY | Admitting: NEUROLOGICAL SURGERY
Payer: COMMERCIAL

## 2017-10-05 DIAGNOSIS — M48.061 BILATERAL STENOSIS OF LATERAL RECESS OF LUMBAR SPINE: ICD-10-CM

## 2017-10-05 DIAGNOSIS — M43.06 SPONDYLOLYSIS, LUMBAR REGION: ICD-10-CM

## 2017-10-05 PROCEDURE — 700111 HCHG RX REV CODE 636 W/ 250 OVERRIDE (IP)

## 2017-10-05 PROCEDURE — 4A10X4G MONITORING OF CENTRAL NERVOUS ELECTRICAL ACTIVITY, INTRAOPERATIVE, EXTERNAL APPROACH: ICD-10-PCS | Performed by: NEUROLOGICAL SURGERY

## 2017-10-05 PROCEDURE — A9270 NON-COVERED ITEM OR SERVICE: HCPCS

## 2017-10-05 PROCEDURE — 160002 HCHG RECOVERY MINUTES (STAT): Performed by: NEUROLOGICAL SURGERY

## 2017-10-05 PROCEDURE — 160036 HCHG PACU - EA ADDL 30 MINS PHASE I: Performed by: NEUROLOGICAL SURGERY

## 2017-10-05 PROCEDURE — 0QS004Z REPOSITION LUMBAR VERTEBRA WITH INTERNAL FIXATION DEVICE, OPEN APPROACH: ICD-10-PCS | Performed by: NEUROLOGICAL SURGERY

## 2017-10-05 PROCEDURE — 160048 HCHG OR STATISTICAL LEVEL 1-5: Performed by: NEUROLOGICAL SURGERY

## 2017-10-05 PROCEDURE — A4314 CATH W/DRAINAGE 2-WAY LATEX: HCPCS | Performed by: NEUROLOGICAL SURGERY

## 2017-10-05 PROCEDURE — 502240 HCHG MISC OR SUPPLY RC 0272: Performed by: NEUROLOGICAL SURGERY

## 2017-10-05 PROCEDURE — 72100 X-RAY EXAM L-S SPINE 2/3 VWS: CPT

## 2017-10-05 PROCEDURE — 700102 HCHG RX REV CODE 250 W/ 637 OVERRIDE(OP)

## 2017-10-05 PROCEDURE — 0SG1071 FUSION OF 2 OR MORE LUMBAR VERTEBRAL JOINTS WITH AUTOLOGOUS TISSUE SUBSTITUTE, POSTERIOR APPROACH, POSTERIOR COLUMN, OPEN APPROACH: ICD-10-PCS | Performed by: NEUROLOGICAL SURGERY

## 2017-10-05 PROCEDURE — 160035 HCHG PACU - 1ST 60 MINS PHASE I: Performed by: NEUROLOGICAL SURGERY

## 2017-10-05 PROCEDURE — 700111 HCHG RX REV CODE 636 W/ 250 OVERRIDE (IP): Performed by: NURSE PRACTITIONER

## 2017-10-05 PROCEDURE — 95940 IONM IN OPERATNG ROOM 15 MIN: CPT | Performed by: NEUROLOGICAL SURGERY

## 2017-10-05 PROCEDURE — A6222 GAUZE <=16 IN NO W/SAL W/O B: HCPCS | Performed by: NEUROLOGICAL SURGERY

## 2017-10-05 PROCEDURE — 0SB20ZZ EXCISION OF LUMBAR VERTEBRAL DISC, OPEN APPROACH: ICD-10-PCS | Performed by: NEUROLOGICAL SURGERY

## 2017-10-05 PROCEDURE — A9270 NON-COVERED ITEM OR SERVICE: HCPCS | Performed by: NURSE PRACTITIONER

## 2017-10-05 PROCEDURE — 0SG00AJ FUSION OF LUMBAR VERTEBRAL JOINT WITH INTERBODY FUSION DEVICE, POSTERIOR APPROACH, ANTERIOR COLUMN, OPEN APPROACH: ICD-10-PCS | Performed by: NEUROLOGICAL SURGERY

## 2017-10-05 PROCEDURE — 500105 HCHG BONE MILL BM200: Performed by: NEUROLOGICAL SURGERY

## 2017-10-05 PROCEDURE — 4A11X4G MONITORING OF PERIPHERAL NERVOUS ELECTRICAL ACTIVITY, INTRAOPERATIVE, EXTERNAL APPROACH: ICD-10-PCS | Performed by: NEUROLOGICAL SURGERY

## 2017-10-05 PROCEDURE — 500367 HCHG DRAIN KIT, HEMOVAC: Performed by: NEUROLOGICAL SURGERY

## 2017-10-05 PROCEDURE — 501838 HCHG SUTURE GENERAL: Performed by: NEUROLOGICAL SURGERY

## 2017-10-05 PROCEDURE — 700112 HCHG RX REV CODE 229: Performed by: NURSE PRACTITIONER

## 2017-10-05 PROCEDURE — 700101 HCHG RX REV CODE 250

## 2017-10-05 PROCEDURE — 770001 HCHG ROOM/CARE - MED/SURG/GYN PRIV*

## 2017-10-05 PROCEDURE — 00NY0ZZ RELEASE LUMBAR SPINAL CORD, OPEN APPROACH: ICD-10-PCS | Performed by: NEUROLOGICAL SURGERY

## 2017-10-05 PROCEDURE — 700102 HCHG RX REV CODE 250 W/ 637 OVERRIDE(OP): Performed by: NURSE PRACTITIONER

## 2017-10-05 PROCEDURE — 502000 HCHG MISC OR IMPLANTS RC 0278: Performed by: NEUROLOGICAL SURGERY

## 2017-10-05 PROCEDURE — 500885 HCHG PACK, JACKSON TABLE: Performed by: NEUROLOGICAL SURGERY

## 2017-10-05 PROCEDURE — 160042 HCHG SURGERY MINUTES - EA ADDL 1 MIN LEVEL 5: Performed by: NEUROLOGICAL SURGERY

## 2017-10-05 PROCEDURE — L0637 LSO SC R ANT/POS PNL PRE CST: HCPCS

## 2017-10-05 PROCEDURE — 502626 HCHG SURGIFLO HEMOSTATIC MATRIX 6ML: Performed by: NEUROLOGICAL SURGERY

## 2017-10-05 PROCEDURE — 160031 HCHG SURGERY MINUTES - 1ST 30 MINS LEVEL 5: Performed by: NEUROLOGICAL SURGERY

## 2017-10-05 PROCEDURE — A4450 NON-WATERPROOF TAPE: HCPCS | Performed by: NEUROLOGICAL SURGERY

## 2017-10-05 PROCEDURE — 700101 HCHG RX REV CODE 250: Performed by: NURSE PRACTITIONER

## 2017-10-05 PROCEDURE — 500114 HCHG BONE, CHIPS CANCELLOUS 30CC: Performed by: NEUROLOGICAL SURGERY

## 2017-10-05 PROCEDURE — 01NB0ZZ RELEASE LUMBAR NERVE, OPEN APPROACH: ICD-10-PCS | Performed by: NEUROLOGICAL SURGERY

## 2017-10-05 PROCEDURE — 160009 HCHG ANES TIME/MIN: Performed by: NEUROLOGICAL SURGERY

## 2017-10-05 DEVICE — SCREW MAS  SOLERA 6.5 X 45MM (1TCX16+3TCX8=40): Type: IMPLANTABLE DEVICE | Status: FUNCTIONAL

## 2017-10-05 DEVICE — SCREW MAS  SOLERA 6.5 X 50MM (1TCX16+3TCX8=40): Type: IMPLANTABLE DEVICE | Status: FUNCTIONAL

## 2017-10-05 DEVICE — BONE CHIPS CANC 4-10MM 30CC - CRUSHED  FREEZE DRIED ONLY: Type: IMPLANTABLE DEVICE | Status: FUNCTIONAL

## 2017-10-05 DEVICE — SCREW SOLERA SET SCREW (1TCX40+3TCX21+2TCX10=123): Type: IMPLANTABLE DEVICE | Status: FUNCTIONAL

## 2017-10-05 DEVICE — IMPLANTABLE DEVICE: Type: IMPLANTABLE DEVICE | Status: FUNCTIONAL

## 2017-10-05 RX ORDER — LIDOCAINE AND PRILOCAINE 25; 25 MG/G; MG/G
1 CREAM TOPICAL
Status: COMPLETED | OUTPATIENT
Start: 2017-10-05 | End: 2017-10-05

## 2017-10-05 RX ORDER — GABAPENTIN 300 MG/1
CAPSULE ORAL
Status: COMPLETED
Start: 2017-10-05 | End: 2017-10-05

## 2017-10-05 RX ORDER — BACITRACIN 50000 [IU]/1
INJECTION, POWDER, FOR SOLUTION INTRAMUSCULAR
Status: DISCONTINUED | OUTPATIENT
Start: 2017-10-05 | End: 2017-10-05 | Stop reason: HOSPADM

## 2017-10-05 RX ORDER — POLYETHYLENE GLYCOL 3350 17 G/17G
1 POWDER, FOR SOLUTION ORAL 2 TIMES DAILY PRN
Status: DISCONTINUED | OUTPATIENT
Start: 2017-10-05 | End: 2017-10-07 | Stop reason: HOSPADM

## 2017-10-05 RX ORDER — ACETAMINOPHEN 500 MG
TABLET ORAL
Status: DISPENSED
Start: 2017-10-05 | End: 2017-10-06

## 2017-10-05 RX ORDER — SODIUM CHLORIDE AND POTASSIUM CHLORIDE 150; 900 MG/100ML; MG/100ML
INJECTION, SOLUTION INTRAVENOUS CONTINUOUS
Status: DISCONTINUED | OUTPATIENT
Start: 2017-10-05 | End: 2017-10-07 | Stop reason: HOSPADM

## 2017-10-05 RX ORDER — ENEMA 19; 7 G/133ML; G/133ML
1 ENEMA RECTAL
Status: DISCONTINUED | OUTPATIENT
Start: 2017-10-05 | End: 2017-10-07 | Stop reason: HOSPADM

## 2017-10-05 RX ORDER — VANCOMYCIN HYDROCHLORIDE 500 MG/10ML
INJECTION, POWDER, LYOPHILIZED, FOR SOLUTION INTRAVENOUS
Status: DISCONTINUED | OUTPATIENT
Start: 2017-10-05 | End: 2017-10-05 | Stop reason: HOSPADM

## 2017-10-05 RX ORDER — CEFAZOLIN SODIUM 2 G/100ML
2 INJECTION, SOLUTION INTRAVENOUS EVERY 8 HOURS
Status: COMPLETED | OUTPATIENT
Start: 2017-10-05 | End: 2017-10-06

## 2017-10-05 RX ORDER — LIDOCAINE HYDROCHLORIDE 10 MG/ML
INJECTION, SOLUTION INFILTRATION; PERINEURAL
Status: COMPLETED
Start: 2017-10-05 | End: 2017-10-05

## 2017-10-05 RX ORDER — OXYCODONE HCL 5 MG/5 ML
SOLUTION, ORAL ORAL
Status: COMPLETED
Start: 2017-10-05 | End: 2017-10-05

## 2017-10-05 RX ORDER — DIPHENHYDRAMINE HCL 25 MG
25 TABLET ORAL EVERY 6 HOURS PRN
Status: DISCONTINUED | OUTPATIENT
Start: 2017-10-05 | End: 2017-10-07 | Stop reason: HOSPADM

## 2017-10-05 RX ORDER — ATORVASTATIN CALCIUM 10 MG/1
10 TABLET, FILM COATED ORAL
Status: DISCONTINUED | OUTPATIENT
Start: 2017-10-05 | End: 2017-10-07 | Stop reason: HOSPADM

## 2017-10-05 RX ORDER — BISACODYL 10 MG
10 SUPPOSITORY, RECTAL RECTAL
Status: DISCONTINUED | OUTPATIENT
Start: 2017-10-05 | End: 2017-10-07 | Stop reason: HOSPADM

## 2017-10-05 RX ORDER — ONDANSETRON 4 MG/1
4 TABLET, ORALLY DISINTEGRATING ORAL EVERY 4 HOURS PRN
Status: DISCONTINUED | OUTPATIENT
Start: 2017-10-05 | End: 2017-10-07 | Stop reason: HOSPADM

## 2017-10-05 RX ORDER — ONDANSETRON 2 MG/ML
4 INJECTION INTRAMUSCULAR; INTRAVENOUS EVERY 4 HOURS PRN
Status: DISCONTINUED | OUTPATIENT
Start: 2017-10-05 | End: 2017-10-07 | Stop reason: HOSPADM

## 2017-10-05 RX ORDER — OXYCODONE HCL 10 MG/1
TABLET, FILM COATED, EXTENDED RELEASE ORAL
Status: DISPENSED
Start: 2017-10-05 | End: 2017-10-06

## 2017-10-05 RX ORDER — HYDROCHLOROTHIAZIDE 25 MG/1
12.5 TABLET ORAL DAILY
Status: DISCONTINUED | OUTPATIENT
Start: 2017-10-06 | End: 2017-10-07 | Stop reason: HOSPADM

## 2017-10-05 RX ORDER — TIZANIDINE 4 MG/1
2 TABLET ORAL 3 TIMES DAILY PRN
Status: DISCONTINUED | OUTPATIENT
Start: 2017-10-05 | End: 2017-10-06

## 2017-10-05 RX ORDER — OXYCODONE HCL 10 MG/1
TABLET, FILM COATED, EXTENDED RELEASE ORAL
Status: COMPLETED
Start: 2017-10-05 | End: 2017-10-05

## 2017-10-05 RX ORDER — GABAPENTIN 300 MG/1
CAPSULE ORAL
Status: DISPENSED
Start: 2017-10-05 | End: 2017-10-06

## 2017-10-05 RX ORDER — LEVOTHYROXINE SODIUM 137 UG/1
137 TABLET ORAL
Status: DISCONTINUED | OUTPATIENT
Start: 2017-10-06 | End: 2017-10-07 | Stop reason: HOSPADM

## 2017-10-05 RX ORDER — ACETAMINOPHEN 500 MG
TABLET ORAL
Status: COMPLETED
Start: 2017-10-05 | End: 2017-10-05

## 2017-10-05 RX ORDER — DIPHENHYDRAMINE HYDROCHLORIDE 50 MG/ML
25 INJECTION INTRAMUSCULAR; INTRAVENOUS EVERY 6 HOURS PRN
Status: DISCONTINUED | OUTPATIENT
Start: 2017-10-05 | End: 2017-10-07 | Stop reason: HOSPADM

## 2017-10-05 RX ORDER — SODIUM CHLORIDE, SODIUM LACTATE, POTASSIUM CHLORIDE, AND CALCIUM CHLORIDE .6; .31; .03; .02 G/100ML; G/100ML; G/100ML; G/100ML
IRRIGANT IRRIGATION
Status: DISCONTINUED | OUTPATIENT
Start: 2017-10-05 | End: 2017-10-05 | Stop reason: HOSPADM

## 2017-10-05 RX ORDER — DOCUSATE SODIUM 100 MG/1
100 CAPSULE, LIQUID FILLED ORAL 2 TIMES DAILY
Status: DISCONTINUED | OUTPATIENT
Start: 2017-10-05 | End: 2017-10-07 | Stop reason: HOSPADM

## 2017-10-05 RX ORDER — SODIUM CHLORIDE, SODIUM LACTATE, POTASSIUM CHLORIDE, CALCIUM CHLORIDE 600; 310; 30; 20 MG/100ML; MG/100ML; MG/100ML; MG/100ML
INJECTION, SOLUTION INTRAVENOUS CONTINUOUS
Status: DISCONTINUED | OUTPATIENT
Start: 2017-10-05 | End: 2017-10-07 | Stop reason: HOSPADM

## 2017-10-05 RX ORDER — LIDOCAINE HYDROCHLORIDE 10 MG/ML
0.5 INJECTION, SOLUTION INFILTRATION; PERINEURAL
Status: COMPLETED | OUTPATIENT
Start: 2017-10-05 | End: 2017-10-05

## 2017-10-05 RX ORDER — BUPIVACAINE HYDROCHLORIDE AND EPINEPHRINE 5; 5 MG/ML; UG/ML
INJECTION, SOLUTION EPIDURAL; INTRACAUDAL; PERINEURAL
Status: DISCONTINUED | OUTPATIENT
Start: 2017-10-05 | End: 2017-10-05 | Stop reason: HOSPADM

## 2017-10-05 RX ORDER — AMOXICILLIN 250 MG
1 CAPSULE ORAL
Status: DISCONTINUED | OUTPATIENT
Start: 2017-10-05 | End: 2017-10-07 | Stop reason: HOSPADM

## 2017-10-05 RX ADMIN — FENTANYL CITRATE 50 MCG: 50 INJECTION, SOLUTION INTRAMUSCULAR; INTRAVENOUS at 18:00

## 2017-10-05 RX ADMIN — ATORVASTATIN CALCIUM 10 MG: 10 TABLET, FILM COATED ORAL at 22:34

## 2017-10-05 RX ADMIN — GABAPENTIN 300 MG: 300 CAPSULE ORAL at 13:45

## 2017-10-05 RX ADMIN — Medication: at 19:13

## 2017-10-05 RX ADMIN — POTASSIUM CHLORIDE AND SODIUM CHLORIDE: 900; 150 INJECTION, SOLUTION INTRAVENOUS at 21:15

## 2017-10-05 RX ADMIN — Medication: at 20:42

## 2017-10-05 RX ADMIN — OXYCODONE HYDROCHLORIDE 10 MG: 10 TABLET, FILM COATED, EXTENDED RELEASE ORAL at 13:45

## 2017-10-05 RX ADMIN — ACETAMINOPHEN 1000 MG: 500 TABLET, FILM COATED ORAL at 13:45

## 2017-10-05 RX ADMIN — DOCUSATE SODIUM 100 MG: 100 CAPSULE ORAL at 22:34

## 2017-10-05 RX ADMIN — LIDOCAINE HYDROCHLORIDE 0.5 ML: 10 INJECTION, SOLUTION INFILTRATION; PERINEURAL at 11:50

## 2017-10-05 RX ADMIN — CEFAZOLIN SODIUM 2 G: 2 INJECTION, SOLUTION INTRAVENOUS at 22:34

## 2017-10-05 RX ADMIN — OXYCODONE HYDROCHLORIDE 10 MG: 5 SOLUTION ORAL at 18:00

## 2017-10-05 RX ADMIN — SODIUM CHLORIDE, SODIUM LACTATE, POTASSIUM CHLORIDE, CALCIUM CHLORIDE: 600; 310; 30; 20 INJECTION, SOLUTION INTRAVENOUS at 11:50

## 2017-10-05 ASSESSMENT — PAIN SCALES - GENERAL
PAINLEVEL_OUTOF10: 8
PAINLEVEL_OUTOF10: 0
PAINLEVEL_OUTOF10: 3
PAINLEVEL_OUTOF10: 6
PAINLEVEL_OUTOF10: ASSUMED PAIN PRESENT
PAINLEVEL_OUTOF10: 0

## 2017-10-06 LAB
ANION GAP SERPL CALC-SCNC: 7 MMOL/L (ref 0–11.9)
BUN SERPL-MCNC: 22 MG/DL (ref 8–22)
CALCIUM SERPL-MCNC: 8.1 MG/DL (ref 8.5–10.5)
CHLORIDE SERPL-SCNC: 104 MMOL/L (ref 96–112)
CO2 SERPL-SCNC: 26 MMOL/L (ref 20–33)
CREAT SERPL-MCNC: 1.1 MG/DL (ref 0.5–1.4)
ERYTHROCYTE [DISTWIDTH] IN BLOOD BY AUTOMATED COUNT: 39.7 FL (ref 35.9–50)
GFR SERPL CREATININE-BSD FRML MDRD: >60 ML/MIN/1.73 M 2
GLUCOSE SERPL-MCNC: 137 MG/DL (ref 65–99)
HCT VFR BLD AUTO: 38.1 % (ref 42–52)
HGB BLD-MCNC: 13.3 G/DL (ref 14–18)
MCH RBC QN AUTO: 30.2 PG (ref 27–33)
MCHC RBC AUTO-ENTMCNC: 34.9 G/DL (ref 33.7–35.3)
MCV RBC AUTO: 86.6 FL (ref 81.4–97.8)
PLATELET # BLD AUTO: 210 K/UL (ref 164–446)
PMV BLD AUTO: 10.1 FL (ref 9–12.9)
POTASSIUM SERPL-SCNC: 4.6 MMOL/L (ref 3.6–5.5)
RBC # BLD AUTO: 4.4 M/UL (ref 4.7–6.1)
SODIUM SERPL-SCNC: 137 MMOL/L (ref 135–145)
WBC # BLD AUTO: 11.3 K/UL (ref 4.8–10.8)

## 2017-10-06 PROCEDURE — 700112 HCHG RX REV CODE 229: Performed by: NURSE PRACTITIONER

## 2017-10-06 PROCEDURE — 80048 BASIC METABOLIC PNL TOTAL CA: CPT

## 2017-10-06 PROCEDURE — A9270 NON-COVERED ITEM OR SERVICE: HCPCS | Performed by: NURSE PRACTITIONER

## 2017-10-06 PROCEDURE — 97162 PT EVAL MOD COMPLEX 30 MIN: CPT

## 2017-10-06 PROCEDURE — G8988 SELF CARE GOAL STATUS: HCPCS | Mod: CI

## 2017-10-06 PROCEDURE — 700102 HCHG RX REV CODE 250 W/ 637 OVERRIDE(OP): Performed by: NURSE PRACTITIONER

## 2017-10-06 PROCEDURE — 36415 COLL VENOUS BLD VENIPUNCTURE: CPT

## 2017-10-06 PROCEDURE — 700111 HCHG RX REV CODE 636 W/ 250 OVERRIDE (IP): Performed by: NURSE PRACTITIONER

## 2017-10-06 PROCEDURE — 770001 HCHG ROOM/CARE - MED/SURG/GYN PRIV*

## 2017-10-06 PROCEDURE — G8978 MOBILITY CURRENT STATUS: HCPCS | Mod: CJ

## 2017-10-06 PROCEDURE — 85027 COMPLETE CBC AUTOMATED: CPT

## 2017-10-06 PROCEDURE — 97165 OT EVAL LOW COMPLEX 30 MIN: CPT

## 2017-10-06 PROCEDURE — 51798 US URINE CAPACITY MEASURE: CPT

## 2017-10-06 PROCEDURE — 700101 HCHG RX REV CODE 250: Performed by: NURSE PRACTITIONER

## 2017-10-06 PROCEDURE — 97535 SELF CARE MNGMENT TRAINING: CPT

## 2017-10-06 PROCEDURE — G8979 MOBILITY GOAL STATUS: HCPCS | Mod: CI

## 2017-10-06 PROCEDURE — G8987 SELF CARE CURRENT STATUS: HCPCS | Mod: CJ

## 2017-10-06 RX ORDER — TIZANIDINE 4 MG/1
2 TABLET ORAL EVERY 6 HOURS
Status: DISCONTINUED | OUTPATIENT
Start: 2017-10-06 | End: 2017-10-07 | Stop reason: HOSPADM

## 2017-10-06 RX ADMIN — POTASSIUM CHLORIDE AND SODIUM CHLORIDE: 900; 150 INJECTION, SOLUTION INTRAVENOUS at 08:33

## 2017-10-06 RX ADMIN — LEVOTHYROXINE SODIUM 137 MCG: 137 TABLET ORAL at 05:06

## 2017-10-06 RX ADMIN — POTASSIUM CHLORIDE AND SODIUM CHLORIDE: 900; 150 INJECTION, SOLUTION INTRAVENOUS at 17:46

## 2017-10-06 RX ADMIN — ATORVASTATIN CALCIUM 10 MG: 10 TABLET, FILM COATED ORAL at 20:06

## 2017-10-06 RX ADMIN — Medication: at 15:24

## 2017-10-06 RX ADMIN — CEFAZOLIN SODIUM 2 G: 2 INJECTION, SOLUTION INTRAVENOUS at 05:06

## 2017-10-06 RX ADMIN — DOCUSATE SODIUM 100 MG: 100 CAPSULE ORAL at 20:06

## 2017-10-06 ASSESSMENT — LIFESTYLE VARIABLES
HAVE PEOPLE ANNOYED YOU BY CRITICIZING YOUR DRINKING: NO
ALCOHOL_USE: YES
EVER_SMOKED: NEVER
HOW MANY TIMES IN THE PAST YEAR HAVE YOU HAD 5 OR MORE DRINKS IN A DAY: 0
TOTAL SCORE: 0
ON A TYPICAL DAY WHEN YOU DRINK ALCOHOL HOW MANY DRINKS DO YOU HAVE: 1
HAVE YOU EVER FELT YOU SHOULD CUT DOWN ON YOUR DRINKING: NO
TOTAL SCORE: 0
EVER FELT BAD OR GUILTY ABOUT YOUR DRINKING: NO
TOTAL SCORE: 0
EVER HAD A DRINK FIRST THING IN THE MORNING TO STEADY YOUR NERVES TO GET RID OF A HANGOVER: NO
AVERAGE NUMBER OF DAYS PER WEEK YOU HAVE A DRINK CONTAINING ALCOHOL: 0
CONSUMPTION TOTAL: NEGATIVE

## 2017-10-06 ASSESSMENT — COGNITIVE AND FUNCTIONAL STATUS - GENERAL
SUGGESTED CMS G CODE MODIFIER MOBILITY: CK
DAILY ACTIVITIY SCORE: 22
CLIMB 3 TO 5 STEPS WITH RAILING: A LITTLE
DRESSING REGULAR LOWER BODY CLOTHING: A LITTLE
TURNING FROM BACK TO SIDE WHILE IN FLAT BAD: A LITTLE
MOBILITY SCORE: 18
MOVING FROM LYING ON BACK TO SITTING ON SIDE OF FLAT BED: A LITTLE
HELP NEEDED FOR BATHING: A LITTLE
MOVING TO AND FROM BED TO CHAIR: A LITTLE
STANDING UP FROM CHAIR USING ARMS: A LITTLE
WALKING IN HOSPITAL ROOM: A LITTLE
SUGGESTED CMS G CODE MODIFIER DAILY ACTIVITY: CJ

## 2017-10-06 ASSESSMENT — PAIN SCALES - GENERAL
PAINLEVEL_OUTOF10: 7
PAINLEVEL_OUTOF10: 6
PAINLEVEL_OUTOF10: 6
PAINLEVEL_OUTOF10: 5
PAINLEVEL_OUTOF10: 4
PAINLEVEL_OUTOF10: 4

## 2017-10-06 ASSESSMENT — PATIENT HEALTH QUESTIONNAIRE - PHQ9
SUM OF ALL RESPONSES TO PHQ9 QUESTIONS 1 AND 2: 0
1. LITTLE INTEREST OR PLEASURE IN DOING THINGS: NOT AT ALL
2. FEELING DOWN, DEPRESSED, IRRITABLE, OR HOPELESS: NOT AT ALL
SUM OF ALL RESPONSES TO PHQ QUESTIONS 1-9: 0

## 2017-10-06 ASSESSMENT — GAIT ASSESSMENTS
GAIT LEVEL OF ASSIST: STAND BY ASSIST
DEVIATION: OTHER (COMMENT);DECREASED BASE OF SUPPORT
DISTANCE (FEET): 150
ASSISTIVE DEVICE: FRONT WHEEL WALKER

## 2017-10-06 ASSESSMENT — ACTIVITIES OF DAILY LIVING (ADL): TOILETING: INDEPENDENT

## 2017-10-06 NOTE — CARE PLAN
Problem: Safety  Goal: Will remain free from falls  Amadou Graves Iglesia educated about fall risk. Amadou will remain free from injury or falls. Appropriate side rails up. Bed in low position, call light and possions within reach, bed alarm in use. Hourly rounding in place.      Problem: Pain Management  Goal: Pain level will decrease to patient's comfort goal  Outcome: PROGRESSING AS EXPECTED  Following pain management plan, patient reports pain on 0-10 scale  Patient utilizing PCA

## 2017-10-06 NOTE — PROGRESS NOTES
LSO brace delivered and placed at bedside in PACU..If you have any questions or if equipment adjustments are needed please call the traction department at ext [phone].

## 2017-10-06 NOTE — OP REPORT
DATE OF SERVICE:  10/05/2017    PREOPERATIVE DIAGNOSES:  1.  Status post L3-L4, L4-L5 hemilaminectomy, medial facetectomy and   foraminotomy - remote.  2.  Recurrent lumbar stenosis at L3-L4, L4-L5 with severe L3-L4 radiculopathy.  3.  L4-L5 spondylolisthesis with again recurrent foraminal stenosis and   central canal stenosis.    POSTOPERATIVE DIAGNOSES:  1.  Status post L3-L4, L4-L5 hemilaminectomy, medial facetectomy and   foraminotomy - remote.  2.  Recurrent lumbar stenosis at L3-L4, L4-L5 with severe L3-L4 radiculopathy.  3.  L4-L5 spondylolisthesis with again recurrent foraminal stenosis and   central canal stenosis.    PROCEDURES PERFORMED:  1.  Decompressive lumbar laminectomy at L3, L4, L5.  2.  Medial facetectomy and foraminotomies at L3-L4, L4-L5 with decompressing   respectively of L3, L4, and L5 nerve roots.  3.  Left L4-L5 transforaminal lumbar interbody fusion with use of Globus Rise   cage 10-17 mm with 15-degree lordosis.  4.  Instrumented stabilization with reduction of spondylolisthesis at L3-L4,   L4-L5 with use of [employer] instrumentation.  5.  Posterolateral arthrodesis with use of locally harvested bone graft and   allograft chips reconstituted in the patient's own blood.  6.  Utilization of the [employer] navigational system, for placement of pedicle   screws.    SURGEON:  Be Webber MD    ASSISTANT:  NOREEN Johnson    ANESTHESIA:  General anesthetic.    ANESTHESIOLOGIST:  Dr. Leonides Carpenter.    PREPARATION:  Routine.    DESCRIPTION OF PROCEDURE:  Patient was brought to the operating room and   following induction, patient was intubated and placed under general   anesthetic.  Tate catheter was sterilely placed.  Patient was rolled prone   onto the operating room table on chest, hip, thigh pads.  All pressure points   were padded.  Sequential stockings were started.    After sterile prep and drape of the back, proposed incision was marked out and   time out was  done.    We injected with Marcaine 0.5% with epinephrine 20 mL and made a midline   incision from L2 down through L5 and carried it down through the subcutaneous   tissue.  We were able to dissect over the L2 lamina, but over L3, L4, and L5,   it was somewhat tenuous as we had a lot of scar tissue.  We were able to   dissect down to the facet joints and then the transverse processes of 3, 4,   and 5 were dissected free and decorticated.  We collected blood from the   decortication here at 3, 4, and 5 transverse processes and reconstituted 30 mL   of allograft chips.  These areas were packed off.  On the left hand side, the   facet joint at L4-L5 was about 3x the normal size.  We had to drill this   hypertrophied facet down, to get out to the transverse process and also to   further decompression.    We took the spinous processes of 3, 4, and 5.  We were then able to identify   the remaining lamina of 3, 4, and 5 and drilled the junction of the lamina   facet to a thin shelf.  Center portion of the bone was removed in piecemeal   fashion with _____.  We were able to curette the thecal sac free at L4 and   perform a complete laminectomy here.  Dissecting the thecal sac free at 3, 4,   we were able to undercut the facet joints and carry out a decompression to L2.    The bone was extremely thickened, and hypertrophied especially in the   midline.  We had to drill this to a thin shelf for common use by 4 and 5   Kerrisons.  We decompressed widely around the L3 pedicle, and at L3-L4 on the   right, there was marked facet hypertrophy and marked ligamentum enfolding with   calcification.  This was removed to decompress the lateral recess at L4-L5;   again, there was a spondylolisthesis and the foramen was tight.  We were able   to decompress this widely.  On the left hand side, again the facet joint was   markedly hypertrophied.  We were able to decompress down to the facet, but the   superior facet of L5 was in-driven into  the nerve root and surprisingly he   was not more symptomatic here.    I was able to open the disk space at this time by drilling the medial facet to   a thin shelf on the left identifying the disk space and the subluxation in   opening the disk with 11 bladed scalpel after coagulating epidural vessels.    We prepared the endplates with paddle yanelis starting with a #7, moving to a   #12.  We were able to release the anterolisthesis.    At this time, locally harvested bone graft and allograft chips ground together   were then put into the lateral gutters.  Done bilaterally, we had good solid   purchase and good contact with the decorticated margins.  On the left, we were   able to identify the superior, inferior, and medial aspect of each L3, L4,   and L5 pedicle.  We drilled down the axis of the pedicle with the use of the   Midas Ten followed by the [employer] navigation system and 5.5 tap.  We were   able to place a 6.5x45 mm screw into L3 and a 6.5x50 mm screw into L4 followed   by a 6.5x45 mm screw into L5, 70 mm baldev was placed into the multiaxial heads   of L3 and L5 due to the anterolisthesis of L4.  Securing these into the   multiaxial heads tightly, we were able to use the [place] power to reduce L4   into the baldev and reduced the spondylolisthesis further.  Preparing the   endplates, we were able to use locally harvested bone graft, packed this   anteriorly followed by the placement of the Globus Rise cage measuring 10-17   mm and 30 mm in length with 15-degree lordosis, placing this very anterior in   the disk space, we were able to open this to approximately 15 mm.    Going to the contralateral side, we were able to again identify the superior,   inferior, medial aspect of the pedicles.  We drilled down the pedicle with the   Midas Ten followed by the [employer] navigational system and a 5.5 tap with   placement of 6.5x50 mm screws in the side with medial placement.  We bent the   baldev into lordosis,  measuring 70 mm and then placed into the multiaxial heads   securing them with appropriate torque.  Intraoperative x-ray confirmed good   location in the AP and lateral position and also confirmed good reduction of   the spondylolisthesis.    At this time, meticulous hemostasis was obtained.  We were able to place a   medium size Hemovac and closed the fascia with #1 Vicryl suture, subcutaneous   tissue with 2-0 Vicryl, subcuticular with 3-0 Vicryl, skin with Steri-Strips.    All sponge and needle counts were correct.    ESTIMATED BLOOD LOSS:  200 mL       ____________________________________     MD GANGA JEREZ / JUANA    DD:  10/05/2017 19:44:34  DT:  10/05/2017 20:35:28    D#:  [id number]  Job#:  [id number]

## 2017-10-06 NOTE — PROGRESS NOTES
Last documented VS: /80   Pulse (!) 101   Temp 36.3 °C (97.3 °F)   Resp 16   Ht 1.829 m (6')   Wt 88.3 kg (194 lb 11.2 oz)   SpO2 97%   BMI 26.41 kg/m²       no signs of acute distress, patient appears to be in stable condition. Amadou Parekh assessed after assuming care.       Fall precautions in place. Hourly rounding in place and explained to Amadou. Educated Amadou on call light use as well as phone instructions to call RN or CNA directly. Possessions within patient's reach, fall precautions in place. Pressure ulcer prevention techniques in use, pressure points assessed and pressure relieved from vulnerable areas. Pillows uses copiously for support and positioning where applicable.    All prudent patient safety measures and applicable nursing interventions taken.    Amadou educated on Pain, Position, Potty, Priorities and instructed to notify RN if anything additional can be done to make stay more comfortable including bathing options, linen change, and toiletries.    PMHx on file (click to expand)  Past Medical History:   Diagnosis Date   • Decreased renal function    • Disorder of thyroid    • High cholesterol    • Hyperlipidemia, mixed    • Hypertension    • Hypertension, essential    • Hypothyroidism    • Impotence    • Lower back pain    • Preventative health care           Care plan:     Problem: Safety   Goal: Will remain free from falls   Outcome: PROGRESSING as EXPECTED   Amadou educated about fall risk. Will remain free from injury or falls.  Appropriate side rails up. Bed in low position, call light and possions within  reach, bed alarm in use. Hourly rounding in place.     Problem: Pain Management   Goal: Pain level will decrease to patient’s comfort goal   Outcome: PROGRESSING as EXPECTED   Following pain management plan, Amadou reports pain on 0-10 scale    *addendum to follow below at end or throughout shift if significant events occurred: if blank n/a    OVERNIGHT SIGNIFICANT  EVENTS:    0540 Patient straight cath #1 of 2 per active orders. Bladder scan showed >300 present in bladder pre-procedure. 500 drained. Patient tolerated well. Patient must void by 1145  .

## 2017-10-06 NOTE — THERAPY
"Occupational Therapy Evaluation completed.   Functional Status:  SBA supine>sit with use of guard rail, pt c/o significant dizziness once seated EOB. Min A for LB dressing and CGA sit>stand w/FWW. Seated EOB, max A to don LSO. Spv sit>stand second trial, reamained standing for approx 5 minutes w/ pt self report of decreased dizziness. Min A with verbal cues to doff LSO seated EOB, SBA BTB. Pt in bed with bed alarm on, call light and tray table in reach and RN aware of session.  Plan of Care: Will benefit from Occupational Therapy 4 times per week  Discharge Recommendations:  Equipment: Will Continue to Assess for Equipment Needs. Post-acute therapy TBD    73 y/o male s/p lumbar laminectomy and fusion d/t BLE numbness and pain, but mostly in RLE. Pt has PMH of several spinal surgeries. Pt self reports he experiences dizziness when on pain medications, and c/o dizziness and light-headedness when changing positions such as supine to sitting EOB. Also demonstrates some difficulty with balance and weight shift when initially standing. Pt requires mostly min A for seated ADLs, and is up to CGA for fxl transfers d/t dizziness. Pt could benefit from OT to practice completing txf's and ADLs safely, and to review donning/doffing LSO.    See \"Rehab Therapy-Acute\" Patient Summary Report for complete documentation.    "

## 2017-10-06 NOTE — PROGRESS NOTES
Neurosurgery Progress Note    Subjective:  Pt in bed, still having similar symptoms from preop but thinks they may be a little better.  +pca, rios removed? And had to be str cathed x1, poor appetitie    Exam:  AAO, cooperative, df/pf- 5/5, incision with drsg, hvac- 240ml    BP  Min: 96/59  Max: 163/78  Pulse  Av.5  Min: 72  Max: 116  Resp  Avg: 15.1  Min: 11  Max: 18  Temp  Av.4 °C (97.5 °F)  Min: 36.2 °C (97.1 °F)  Max: 36.8 °C (98.3 °F)  SpO2  Av %  Min: 95 %  Max: 99 %    No Data Recorded    Recent Labs      10/06/17   0402   WBC  11.3*   RBC  4.40*   HEMOGLOBIN  13.3*   HEMATOCRIT  38.1*   MCV  86.6   MCH  30.2   MCHC  34.9   RDW  39.7   PLATELETCT  210   MPV  10.1     Recent Labs      10/06/17   0402   SODIUM  137   POTASSIUM  4.6   CHLORIDE  104   CO2  26   GLUCOSE  137*   BUN  22               Intake/Output       10/05/17 0700 - 10/06/17 0659 10/06/17 0700 - 10/07/17 0659      0283-9067 2599-2748 Total 9016-5812 9671-6087 Total       Intake    P.O.  15  60 75  --  -- --    P.O. 15 60 75 -- -- --    I.V.  1500  28.3 1528.3  --  -- --    Crystalloid Intake 1500 -- 1500 -- -- --    PCA End of Shift Total Volume (ml) -- 28.3 28.3 -- -- --    Other  10  -- 10  --  -- --    Medications (P.O./ Enteral Liquids) 10 - 10 -- -- --    Total Intake 1525 88.3 1613.3 -- -- --       Output    Urine  --  125 125  --  -- --    Indwelling Cathether -- 125 125 -- -- --    Drains  --  305 305  --  -- --    Hemovac 1 -- 305 305 -- -- --    Blood  200  -- 200  --  -- --    Est. Blood Loss (mL) 200 -- 200 -- -- --    Total Output 200 430 630 -- -- --       Net I/O     1325 -341.7 983.3 -- -- --            Intake/Output Summary (Last 24 hours) at 10/06/17 0835  Last data filed at 10/06/17 0655   Gross per 24 hour   Intake           1613.3 ml   Output              630 ml   Net            983.3 ml       Bladder Scan Results (ml): 538    • LR   Continuous   • atorvastatin  10 mg QHS   • hydrochlorothiazide  12.5 mg  DAILY   • levothyroxine  137 mcg AM ES   • MD ALERT...Do not administer NSAIDS or ASPIRIN unless ORDERED By Neurosurgery  1 Each PRN   • docusate sodium  100 mg BID   • senna-docusate  1 Tab Q24HRS PRN   • polyethylene glycol/lytes  1 Packet BID PRN   • magnesium hydroxide  30 mL QDAY PRN   • bisacodyl  10 mg Q24HRS PRN   • fleet  1 Each Once PRN   • 0.9 % NaCl with KCl 20 mEq 1,000 mL   Continuous   • HYDROmorphone   Continuous   • diphenhydrAMINE  25 mg Q6HRS PRN    Or   • diphenhydrAMINE  25 mg Q6HRS PRN   • ondansetron  4 mg Q4HRS PRN   • ondansetron  4 mg Q4HRS PRN   • tizanidine  2 mg TID PRN       Assessment and Plan:  POD #1 s/p L3-5 decompression and TLIF  Prophylactic anticoagulation: no         Start date/time: n/a  NM as above  Continue pca- hx of problems with narcotics, consider tramadol after pca  Bladder protocol- may need cath placed if voiding remains problem  Unsure when rios came out- no orders or notes  Monitor drain  PT/OT- brace on when oob >5 min

## 2017-10-06 NOTE — OR SURGEON
Operative Report    PreOp Diagnosis: sp L34, L45 hemilaminectomies and foraminotomies- remote  Recurrent L34, L45 stenosis with spondylolisthesis at the L45 leve.      PostOp Diagnosis: same    Procedure(s):  POSTERIOR TRANSFORAMINAL INTERBODY FUSION AT LUMBAR 4 - 5 - Wound Class: Clean with Drain  POSTERIOR LUMBAR 3-4,  4-5 DECOMPRESSION AND FUSION - Wound Class: Clean with Drain    Surgeon(s):  Be Webber M.D.    Anesthesiologist/Type of Anesthesia:  Anesthesiologist: Leonides Carpenter D.O./General    Surgical Staff:  Assistant: ALAN Armstrong  Circulator: Sirisha Gorman R.N.  Relief Circulator: Madeline Murphy R.N.  Scrub Person: Leilani Mendoza  Radiology Technologist: Karen Church    Specimens:  * No specimens in log *    Estimated Blood Loss: 200cc    Findings: as above. See dictation for further information    Complications:  none        10/5/2017 7:32 PM Be Webber

## 2017-10-06 NOTE — CARE PLAN
Problem: Communication  Goal: The ability to communicate needs accurately and effectively will improve  Outcome: PROGRESSING AS EXPECTED  Call light within reach; uses appropriately. Will continue to monitor.     Problem: Urinary Elimination:  Goal: Ability to reestablish a normal urinary elimination pattern will improve  Outcome: PROGRESSING SLOWER THAN EXPECTED  Patient straight cath this AM. Will try to void after meal. Will continue to monitor.

## 2017-10-07 VITALS
BODY MASS INDEX: 26.37 KG/M2 | RESPIRATION RATE: 18 BRPM | TEMPERATURE: 97.3 F | HEIGHT: 72 IN | OXYGEN SATURATION: 96 % | HEART RATE: 72 BPM | DIASTOLIC BLOOD PRESSURE: 54 MMHG | SYSTOLIC BLOOD PRESSURE: 91 MMHG | WEIGHT: 194.7 LBS

## 2017-10-07 LAB
ANION GAP SERPL CALC-SCNC: 4 MMOL/L (ref 0–11.9)
BUN SERPL-MCNC: 16 MG/DL (ref 8–22)
CALCIUM SERPL-MCNC: 8.2 MG/DL (ref 8.5–10.5)
CHLORIDE SERPL-SCNC: 105 MMOL/L (ref 96–112)
CO2 SERPL-SCNC: 28 MMOL/L (ref 20–33)
CREAT SERPL-MCNC: 0.98 MG/DL (ref 0.5–1.4)
ERYTHROCYTE [DISTWIDTH] IN BLOOD BY AUTOMATED COUNT: 41.7 FL (ref 35.9–50)
GFR SERPL CREATININE-BSD FRML MDRD: >60 ML/MIN/1.73 M 2
GLUCOSE SERPL-MCNC: 98 MG/DL (ref 65–99)
HCT VFR BLD AUTO: 34.5 % (ref 42–52)
HGB BLD-MCNC: 11.5 G/DL (ref 14–18)
MCH RBC QN AUTO: 29.6 PG (ref 27–33)
MCHC RBC AUTO-ENTMCNC: 33.3 G/DL (ref 33.7–35.3)
MCV RBC AUTO: 88.7 FL (ref 81.4–97.8)
PLATELET # BLD AUTO: 183 K/UL (ref 164–446)
PMV BLD AUTO: 10.1 FL (ref 9–12.9)
POTASSIUM SERPL-SCNC: 4.4 MMOL/L (ref 3.6–5.5)
RBC # BLD AUTO: 3.89 M/UL (ref 4.7–6.1)
SODIUM SERPL-SCNC: 137 MMOL/L (ref 135–145)
WBC # BLD AUTO: 8.9 K/UL (ref 4.8–10.8)

## 2017-10-07 PROCEDURE — A9270 NON-COVERED ITEM OR SERVICE: HCPCS | Performed by: NURSE PRACTITIONER

## 2017-10-07 PROCEDURE — 700102 HCHG RX REV CODE 250 W/ 637 OVERRIDE(OP): Performed by: NURSE PRACTITIONER

## 2017-10-07 PROCEDURE — 85027 COMPLETE CBC AUTOMATED: CPT

## 2017-10-07 PROCEDURE — A9270 NON-COVERED ITEM OR SERVICE: HCPCS | Performed by: NEUROLOGICAL SURGERY

## 2017-10-07 PROCEDURE — 700102 HCHG RX REV CODE 250 W/ 637 OVERRIDE(OP): Performed by: NEUROLOGICAL SURGERY

## 2017-10-07 PROCEDURE — 36415 COLL VENOUS BLD VENIPUNCTURE: CPT

## 2017-10-07 PROCEDURE — 700112 HCHG RX REV CODE 229: Performed by: NURSE PRACTITIONER

## 2017-10-07 PROCEDURE — 80048 BASIC METABOLIC PNL TOTAL CA: CPT

## 2017-10-07 RX ORDER — TRAMADOL HYDROCHLORIDE 50 MG/1
50 TABLET ORAL EVERY 6 HOURS PRN
Status: DISCONTINUED | OUTPATIENT
Start: 2017-10-07 | End: 2017-10-07

## 2017-10-07 RX ORDER — TRAMADOL HYDROCHLORIDE 50 MG/1
50 TABLET ORAL EVERY 4 HOURS PRN
Status: DISCONTINUED | OUTPATIENT
Start: 2017-10-07 | End: 2017-10-07 | Stop reason: HOSPADM

## 2017-10-07 RX ORDER — TRAMADOL HYDROCHLORIDE 50 MG/1
50 TABLET ORAL EVERY 4 HOURS PRN
Qty: 60 TAB | Refills: 0 | Status: SHIPPED | OUTPATIENT
Start: 2017-10-07 | End: 2018-06-27

## 2017-10-07 RX ORDER — TIZANIDINE 2 MG/1
2 TABLET ORAL EVERY 6 HOURS PRN
Qty: 40 TAB | Refills: 0 | Status: SHIPPED | OUTPATIENT
Start: 2017-10-07 | End: 2018-06-27

## 2017-10-07 RX ADMIN — TRAMADOL HYDROCHLORIDE 50 MG: 50 TABLET, COATED ORAL at 09:36

## 2017-10-07 RX ADMIN — HYDROCHLOROTHIAZIDE 12.5 MG: 25 TABLET ORAL at 08:49

## 2017-10-07 RX ADMIN — LEVOTHYROXINE SODIUM 137 MCG: 137 TABLET ORAL at 05:32

## 2017-10-07 RX ADMIN — TIZANIDINE 2 MG: 4 TABLET ORAL at 05:32

## 2017-10-07 RX ADMIN — DOCUSATE SODIUM 100 MG: 100 CAPSULE ORAL at 08:49

## 2017-10-07 RX ADMIN — TIZANIDINE 2 MG: 4 TABLET ORAL at 11:14

## 2017-10-07 ASSESSMENT — PAIN SCALES - GENERAL
PAINLEVEL_OUTOF10: 4
PAINLEVEL_OUTOF10: 4
PAINLEVEL_OUTOF10: 5
PAINLEVEL_OUTOF10: 4

## 2017-10-07 NOTE — PROGRESS NOTES
Pt. A/Ox4, states pain is a 4/10. PCA in place and verified. Incision clean and dry. IV intact. Pt. O2 saturation dropping to 89% when resting in bed, pt. Given incentive spirometer and educated on use. Poc discussed, bed alarm on, call light within reach.

## 2017-10-07 NOTE — CARE PLAN
"Problem: Safety  Goal: Will remain free from injury  Outcome: PROGRESSING AS EXPECTED  Pt. States he fells as though his pain medication makes him \"a little groggy\", and has weakness in his legs from this. While mobilizing to the bathroom pt. [place] as though he would drop to his knees from leg weakness. Pt. Instructed on sitting at edge of bed before standing and on use of the call light when assistance is needed so that staff is present for his ambulation.     Problem: Pain Management  Goal: Pain level will decrease to patient's comfort goal  Outcome: PROGRESSING AS EXPECTED  Pt. States that pain is well managed with PCA. Asked about side effects of the scheduled muscle relaxer and refused this medication at this time, pt. Doesn't think he needs another pain medication.     Problem: Mobility  Goal: Risk for activity intolerance will decrease  Outcome: PROGRESSING AS EXPECTED  Pt. Educated on use of brace and instructed against bending, lifting, or twisting until the physician clears him to do so. Pt. Continues to have weakness in lower extremities intermittently while mobilizing.       "

## 2017-10-07 NOTE — DISCHARGE INSTRUCTIONS
Ambulate as much as comfortable   Ok to shower tomorrow, pat incision dry, leave open to air- no dressing   No Aspirin or non-steroidal anti-inflammatory medications (aleve, motrin, ibuprofen, celebrex)   No driving for 2 weeks/ No driving while on narcotic medication   Over the counter stool softeners daily while on narcotics   No lifting greater than 15 pounds, no repetitive bending or twisting   Follow up at [facility] 2 weeks after surgery    Discharge Instructions    Discharged to home by car with relative. Discharged via wheelchair, hospital escort: Yes.  Special equipment needed: TLSO    Be sure to schedule a follow-up appointment with your primary care doctor or any specialists as instructed.     Discharge Plan:   Influenza Vaccine Indication: Patient Refuses    I understand that a diet low in cholesterol, fat, and sodium is recommended for good health. Unless I have been given specific instructions below for another diet, I accept this instruction as my diet prescription.   Other diet: regular      Special Instructions: None    · Is patient discharged on Warfarin / Coumadin?   No     · Is patient Post Blood Transfusion?  No    Depression / Suicide Risk    As you are discharged from this [facility] facility, it is important to learn how to keep safe from harming yourself.    Recognize the warning signs:  · Abrupt changes in personality, positive or negative- including increase in energy   · Giving away possessions  · Change in eating patterns- significant weight changes-  positive or negative  · Change in sleeping patterns- unable to sleep or sleeping all the time   · Unwillingness or inability to communicate  · Depression  · Unusual sadness, discouragement and loneliness  · Talk of wanting to die  · Neglect of personal appearance   · Rebelliousness- reckless behavior  · Withdrawal from people/activities they love  · Confusion- inability to concentrate     If you or a loved one observes any of  these behaviors or has concerns about self-harm, here's what you can do:  · Talk about it- your feelings and reasons for harming yourself  · Remove any means that you might use to hurt yourself (examples: pills, rope, extension cords, firearm)  · Get professional help from the community (Mental Health, Substance Abuse, psychological counseling)  · Do not be alone:Call your Safe Contact- someone whom you trust who will be there for you.  · Call your local CRISIS HOTLINE 144-3641 or 270-331-7466  · Call your local Children's Mobile Crisis Response Team [place] (923) 138-9529 or www.[facility] [place] ([place]: [place])  · Call the toll free National Suicide Prevention Hotlines   · National Suicide Prevention Lifeline 251-778-SVKQ (9860)  · National Hope Line Network 800-SUICIDE (264-2080)

## 2017-10-07 NOTE — THERAPY
"Physical Therapy Evaluation completed.   Bed Mobility:  Supine to Sit: Modified Independent  Transfers: Sit to Stand: Stand by Assist  Gait: Level Of Assist: Stand by Assist with Front-Wheel Walker       Plan of Care: Will benefit from Physical Therapy 3 times per week while here; anticipate only 1 visit prior to d/c to home; see below  Discharge Recommendations: Equipment: Front-Wheel Walker. See below    Pt presents to PT s/p lumbar decompression and fusion. He was able to demonstrate hallway ambulation with FWW with no cheryl LOB with SBA. However he is unsteady at times, though anticiapte that his current balance and gait deficits are exacerbated 2' to wooziness from pain med side effects. Pt will benefit from continued skilled acute PT while here though anticiapte that pt will be functionally capable of d/c to home once medically cleared. Anticipate no immediate skilled PT needs after medical d/c to home until precuations are lifted.     See \"Rehab Therapy-Acute\" Patient Summary Report for complete documentation.     "

## 2017-10-07 NOTE — FACE TO FACE
Face to Face Note  -  Durable Medical Equipment    ALAN Armstrong - NPI: [id number]  I certify that this patient is under my care and that they had a durable medical equipment(DME)face to face encounter by myself that meets the physician DME face-to-face encounter requirements with this patient on:    Date of encounter:   Patient:                    MRN:                       YOB: 2017  Amadou Graves Spoo  [id number]  1945     The encounter with the patient was in whole, or in part, for the following medical condition, which is the primary reason for durable medical equipment:  Post-Op Surgery    I certify that, based on my findings, the following durable medical equipment is medically necessary:  Walkers.    HOME O2 Saturation Measurements:(Values must be present for Home Oxygen orders)         ,     ,         My Clinical findings support the need for the above equipment due to:  Abnormal Gait    Supporting Symptoms: postoperative weakness and imbalance from lumbar fusion

## 2017-10-07 NOTE — PROGRESS NOTES
Neurosurgery Progress Note    Subjective:  Pt in bed, still having pain, but a little better this am, stopping pca, will begin tramadol as he does not do well with narcotics, did not get much sleep last night and would like to go home today    Exam:  AAO, cooperative, df/pf- 5/5, incision with drsg, hvac- 60ml    BP  Min: 91/54  Max: 118/65  Pulse  Av.7  Min: 70  Max: 79  Resp  Av.7  Min: 16  Max: 18  Temp  Av.5 °C (97.7 °F)  Min: 36.3 °C (97.3 °F)  Max: 36.9 °C (98.4 °F)  SpO2  Av.3 %  Min: 94 %  Max: 98 %    No Data Recorded    Recent Labs      10/06/17   0402  10/07/17   0136   WBC  11.3*  8.9   RBC  4.40*  3.89*   HEMOGLOBIN  13.3*  11.5*   HEMATOCRIT  38.1*  34.5*   MCV  86.6  88.7   MCH  30.2  29.6   MCHC  34.9  33.3*   RDW  39.7  41.7   PLATELETCT  210  183   MPV  10.1  10.1     Recent Labs      10/06/17   0402  10/07/17   0136   SODIUM  137  137   POTASSIUM  4.6  4.4   CHLORIDE  104  105   CO2  26  28   GLUCOSE  137*  98   BUN  22  16               Intake/Output       10/06/17 07 - 10/07/17 0659 10/07/17 0700 - 10/08/17 0659       Total  Total       Intake    P.O.  --  100 100  --  -- --    P.O. -- 100 100 -- -- --    I.V.  24.2  1235.7 1259.9  3.5  -- 3.5    PCA End of Shift Total Volume (ml) 24.2 35.7 59.9 3.5 -- 3.5    IV Volume -- 1200 1200 -- -- --    Other  --  30 30  --  -- --    Medications (P.O./ Enteral Liquids) -- 30 30 -- -- --    Total Intake 24.2 1365.7 1389.9 3.5 -- 3.5       Output    Urine  --  -- --  --  -- --    Number of Times Voided -- 2 x 2 x -- -- --    Drains  100  -- 100  60  -- 60    Hemovac 1 100 -- 100 60 -- 60    Total Output 100 -- 100 60 -- 60       Net I/O     -75.8 1365.7 1289.9 -56.6 -- -56.6            Intake/Output Summary (Last 24 hours) at 10/07/17 1009  Last data filed at 10/07/17 0840   Gross per 24 hour   Intake          1393.35 ml   Output              160 ml   Net          1233.35 ml            • tramadol   50 mg Q6HRS PRN   • tramadol  50 mg Q4HRS PRN   • tizanidine  2 mg Q6HRS   • LR   Continuous   • atorvastatin  10 mg QHS   • hydrochlorothiazide  12.5 mg DAILY   • levothyroxine  137 mcg AM ES   • MD ALERT...Do not administer NSAIDS or ASPIRIN unless ORDERED By Neurosurgery  1 Each PRN   • docusate sodium  100 mg BID   • senna-docusate  1 Tab Q24HRS PRN   • polyethylene glycol/lytes  1 Packet BID PRN   • magnesium hydroxide  30 mL QDAY PRN   • bisacodyl  10 mg Q24HRS PRN   • fleet  1 Each Once PRN   • 0.9 % NaCl with KCl 20 mEq 1,000 mL   Continuous   • diphenhydrAMINE  25 mg Q6HRS PRN    Or   • diphenhydrAMINE  25 mg Q6HRS PRN   • ondansetron  4 mg Q4HRS PRN   • ondansetron  4 mg Q4HRS PRN       Assessment and Plan:  POD #2 s/p L3-5 decompression and TLIF  Prophylactic anticoagulation: no         Start date/time: n/a  NM as above  Dc pca- begin tramadol, poss dilaudid po if not controlling pain  Dc drain  PT/OT- brace on when oob >5 min  Poss home today

## 2017-10-18 ENCOUNTER — OFFICE VISIT (OUTPATIENT)
Dept: BEHAVIORAL HEALTH | Facility: PHYSICIAN GROUP | Age: 72
End: 2017-10-18
Payer: COMMERCIAL

## 2017-10-18 DIAGNOSIS — F41.8 SITUATIONAL ANXIETY: ICD-10-CM

## 2017-10-18 DIAGNOSIS — Z63.0 RELATIONSHIP PROBLEM BETWEEN PARTNERS: ICD-10-CM

## 2017-10-18 PROCEDURE — 90834 PSYTX W PT 45 MINUTES: CPT | Performed by: SOCIAL WORKER

## 2017-10-18 SDOH — SOCIAL STABILITY - SOCIAL INSECURITY: PROBLEMS IN RELATIONSHIP WITH SPOUSE OR PARTNER: Z63.0

## 2017-10-19 NOTE — BH THERAPY
" [facility]  Therapy Progress Note    Patient Name: Amadou Parekh  Patient MRN: [id number]  Today's Date: 10/18/2017     Type of session:Individual psychotherapy  Length of session: 45 minutes  Persons in attendance:Patient    Subjective/New Info: Client reported things have been \"status quo\" at home. Discussed more motivation behind the affair, and how this counseling could be helpful toward forward movement in the relationship. Discussed client's possible defensive energy in the home, and slight clinical approach. He acknowledged he is less emotive than his wife, and this can be an issue in the marriage.  Discussed repair work. Next session will address possible thought distortions.     Objective/Observations:   Participation: Active verbal participation and Guarded   Grooming: Good and Neat   Cognition: Alert and Fully Oriented   Eye contact: Limited   Mood: Anxious   Affect: Anxious, jumpy, nervous laughter   Thought process: Logical and Goal-directed   Speech: Rate within normal limits and Volume within normal limits   Other:     Diagnoses:   1. Situational anxiety    2. Relationship problem between partners         Current risk:   SUICIDE: Not applicable   Homicide: Not applicable   Self-harm: Not applicable   Relapse: Not applicable   Other:    Safety Plan reviewed? Not Indicated   If evidence of imminent risk is present, intervention/plan:     Therapeutic Intervention(s): Stressors assessed    Treatment Goal(s)/Objective(s) addressed: Treatment plan goals addressed today:        - Learn to successfully challenge & change distortions in thinking           Progress toward Treatment Goals: No change    Plan:  - Continue Individual therapy  - Next appointment scheduled:  11/7/2017    Andria Cisneros  10/18/2017                                 "

## 2017-10-25 NOTE — DISCHARGE SUMMARY
DATE OF ADMISSION:  10/05/2017    DATE OF DISCHARGE:  10/07/2017    SURGEON:  Be Webber MD    PROCEDURES PERFORMED:  Decompressive lumbar laminectomy at L3, L4, and L5 with   medial facetectomies and foraminotomies, as well as transforaminal lumbar   interbody fusion and instrumented stabilization of L3-L4 and L4-L5.    POSTOPERATIVE DIAGNOSES:  Status post L3-L4, L4-L5 hemilaminectomy in the past   with recurrent lumbar stenosis, severe radiculopathy, and L4-L5   spondylolisthesis.    HISTORY OF PRESENT ILLNESS:  Has been previously dictated.    HOSPITAL COURSE:  The patient was seen on postoperative day #1 in bed, still   complaining of similar symptoms from before surgery, but thinks they may be a   little bit better.  He remains on a PCA.  The [place] was removed, but he does   have urinary retention and a poor appetite.  On exam, his strength to   dorsiflexion and plantar flexion is strong at 5/5.  Incision with a dressing.    Hemovac has produced 240 mL at the last check.  Vital signs and labs were   stable.  The PCA was kept at that point in time due to his history of problems   tolerating narcotics.  Bowel protocol was instituted.  Drain will be   monitored and he will work with PT and OT.  On postoperative day #2, he was   seen in bed, still complaining of pain, but states it is a little bit better   this morning.  PCA was stopped and tramadol was started as he does not do   while wit h narcotics, but does state he did not get much sleep the previous   night due to being in the hospital.  Exam remains unchanged.  Hemovac has   produced 60 mL and was subsequently removed.  He will continue to mobilize.    Pain medications were adjusted and he will be discharged home.    DISCHARGE INSTRUCTIONS:  He should ambulate as much as comfortable for him.    He should wear his brace whenever he is out of bed.  He may shower tomorrow,   pat the incision dry and leave open to air.  No dressing will be needed after    that point.  He is not to take aspirin or nonsteroidal anti-inflammatory   medications.  He is not to drive for 2 weeks or while on narcotic medications   and he should continue over-the-counter stool softeners while he takes these   medications.  He is not to lift greater than 15 pounds, no repetitive bending   or twisting, and he is to follow up in the office 2 weeks after surgery.    DISCHARGE MEDICATIONS:  He has been given tramadol 50 mg every 4 hours as   needed for pain, #60; tizanidine 2 mg every 6 hours as needed for spasms, #40.    He was instructed to stop his aspirin, but can continue taking his Lipitor,   hydrochlorothiazide, calcium, vitamin D, vitamin B, multivitamin, omega-3, and   Synthroid.       ____________________________________     NOREEN LAFLEUR / JUANA    DD:  10/24/2017 10:05:41  DT:  10/24/2017 18:08:16    D#:  [id number]  Job#:  [id number]    cc: [facility]

## 2017-11-07 ENCOUNTER — OFFICE VISIT (OUTPATIENT)
Dept: BEHAVIORAL HEALTH | Facility: PHYSICIAN GROUP | Age: 72
End: 2017-11-07
Payer: COMMERCIAL

## 2017-11-07 DIAGNOSIS — Z63.0 RELATIONSHIP PROBLEM BETWEEN PARTNERS: ICD-10-CM

## 2017-11-07 DIAGNOSIS — F41.8 SITUATIONAL ANXIETY: ICD-10-CM

## 2017-11-07 PROCEDURE — 90834 PSYTX W PT 45 MINUTES: CPT | Performed by: SOCIAL WORKER

## 2017-11-07 SDOH — SOCIAL STABILITY - SOCIAL INSECURITY: PROBLEMS IN RELATIONSHIP WITH SPOUSE OR PARTNER: Z63.0

## 2017-11-07 NOTE — BH THERAPY
[facility]  Therapy Progress Note    Patient Name: Amadou Parekh  Patient MRN: [id number]  Today's Date: 11/7/2017     Type of session:Individual psychotherapy  Length of session: 50 minutes  Persons in attendance:Patient    Subjective/New Info: Reviewed thought distortions. Client discussed his relationship with risk, and areas in his life risk has manifested. Discussed client's general insights and self-awareness, and his affair as a personal blind spot. Looked at role of rebellion and risk as personal drivers.     Objective/Observations:   Participation: Active verbal participation, Attentive and Engaged   Grooming: Good and Neat   Cognition: Alert and Fully Oriented   Eye contact: Good   Mood: Euthymic and Anxious   Affect: Flexible   Thought process: Logical and Goal-directed   Speech: Rate within normal limits and Volume within normal limits   Other:     Diagnoses:   1. Situational anxiety    2. Relationship problem between partners         Current risk:   SUICIDE: Not applicable   Homicide: Not applicable   Self-harm: Not applicable   Relapse: Not applicable   Other:    Safety Plan reviewed? Not Indicated   If evidence of imminent risk is present, intervention/plan:     Therapeutic Intervention(s): Behavior:  Behavior modification, Clarify:  Clarify feelings and Clarify thoughts and Stressors assessed    Treatment Goal(s)/Objective(s) addressed: Treatment plan goals addressed today:        - Learn to successfully challenge & change distortions in thinking         Progress toward Treatment Goals: Mild improvement    Plan:  - Continue Individual therapy  - Next appointment scheduled:  11/28/2017    Andria Cisneros  11/7/2017

## 2017-11-08 ENCOUNTER — HOSPITAL ENCOUNTER (OUTPATIENT)
Dept: RADIOLOGY | Facility: MEDICAL CENTER | Age: 72
End: 2017-11-08
Attending: NURSE PRACTITIONER
Payer: COMMERCIAL

## 2017-11-08 DIAGNOSIS — M54.5 LOW BACK PAIN, UNSPECIFIED BACK PAIN LATERALITY, UNSPECIFIED CHRONICITY, WITH SCIATICA PRESENCE UNSPECIFIED: ICD-10-CM

## 2017-11-08 PROCEDURE — 72100 X-RAY EXAM L-S SPINE 2/3 VWS: CPT

## 2017-11-14 ENCOUNTER — HOSPITAL ENCOUNTER (OUTPATIENT)
Dept: RADIOLOGY | Facility: MEDICAL CENTER | Age: 72
End: 2017-11-14
Attending: NURSE PRACTITIONER
Payer: COMMERCIAL

## 2017-11-14 DIAGNOSIS — M43.16 SPONDYLOLISTHESIS OF LUMBAR REGION: ICD-10-CM

## 2017-11-14 PROCEDURE — 72148 MRI LUMBAR SPINE W/O DYE: CPT

## 2017-11-28 ENCOUNTER — OFFICE VISIT (OUTPATIENT)
Dept: BEHAVIORAL HEALTH | Facility: PHYSICIAN GROUP | Age: 72
End: 2017-11-28
Payer: COMMERCIAL

## 2017-11-28 DIAGNOSIS — F41.8 SITUATIONAL ANXIETY: ICD-10-CM

## 2017-11-28 PROCEDURE — 90834 PSYTX W PT 45 MINUTES: CPT | Performed by: SOCIAL WORKER

## 2017-11-28 NOTE — BH THERAPY
[facility]  Therapy Progress Note    Patient Name: Amadou Parekh  Patient MRN: [id number]  Today's Date: 11/28/2017     Type of session:Individual psychotherapy  Length of session: 45 minutes  Persons in attendance:Patient    Subjective/New Info: Client reported he took a cruise with his wife and some friends, and they took some time to connect again. Discussed values, and ways client can use values as a guide to making choices.     Objective/Observations:   Participation: Active verbal participation and Guarded   Grooming: Good and Neat   Cognition: Alert and Fully Oriented   Eye contact: Poor   Mood: Anxious   Affect: Anxious   Thought process: Logical and Goal-directed   Speech: Rate within normal limits and Volume within normal limits   Other:     Diagnoses:   1. Situational anxiety         Current risk:   SUICIDE: Not applicable   Homicide: Not applicable   Self-harm: Not applicable   Relapse: Not applicable   Other:    Safety Plan reviewed? No   If evidence of imminent risk is present, intervention/plan:     Therapeutic Intervention(s): Clarify:  Clarify values    Treatment Goal(s)/Objective(s) addressed: Treatment plan goals addressed today:          - Identify goals/values and cultivate a meaningful life         Progress toward Treatment Goals: Mild improvement    Plan:  - Continue Individual therapy  - Next appointment scheduled:  12/12/2017    Andria Cisneros  11/28/2017

## 2017-12-01 DIAGNOSIS — Z01.812 PRE-PROCEDURAL LABORATORY EXAMINATION: ICD-10-CM

## 2017-12-01 LAB
ANION GAP SERPL CALC-SCNC: 9 MMOL/L (ref 0–11.9)
APPEARANCE UR: CLEAR
APTT PPP: 22.6 SEC (ref 24.7–36)
BASOPHILS # BLD AUTO: 0.9 % (ref 0–1.8)
BASOPHILS # BLD: 0.08 K/UL (ref 0–0.12)
BILIRUB UR QL STRIP.AUTO: NEGATIVE
BUN SERPL-MCNC: 37 MG/DL (ref 8–22)
CALCIUM SERPL-MCNC: 9.6 MG/DL (ref 8.5–10.5)
CHLORIDE SERPL-SCNC: 106 MMOL/L (ref 96–112)
CO2 SERPL-SCNC: 27 MMOL/L (ref 20–33)
COLOR UR: NORMAL
CREAT SERPL-MCNC: 1.06 MG/DL (ref 0.5–1.4)
CULTURE IF INDICATED INDCX: NO UA CULTURE
EOSINOPHIL # BLD AUTO: 0.2 K/UL (ref 0–0.51)
EOSINOPHIL NFR BLD: 2.3 % (ref 0–6.9)
ERYTHROCYTE [DISTWIDTH] IN BLOOD BY AUTOMATED COUNT: 39.7 FL (ref 35.9–50)
GFR SERPL CREATININE-BSD FRML MDRD: >60 ML/MIN/1.73 M 2
GLUCOSE SERPL-MCNC: 92 MG/DL (ref 65–99)
GLUCOSE UR STRIP.AUTO-MCNC: NEGATIVE MG/DL
HCT VFR BLD AUTO: 45.9 % (ref 42–52)
HGB BLD-MCNC: 15.2 G/DL (ref 14–18)
IMM GRANULOCYTES # BLD AUTO: 0.03 K/UL (ref 0–0.11)
IMM GRANULOCYTES NFR BLD AUTO: 0.4 % (ref 0–0.9)
INR PPP: 0.99 (ref 0.87–1.13)
KETONES UR STRIP.AUTO-MCNC: NEGATIVE MG/DL
LEUKOCYTE ESTERASE UR QL STRIP.AUTO: NEGATIVE
LYMPHOCYTES # BLD AUTO: 1.65 K/UL (ref 1–4.8)
LYMPHOCYTES NFR BLD: 19.3 % (ref 22–41)
MCH RBC QN AUTO: 28.9 PG (ref 27–33)
MCHC RBC AUTO-ENTMCNC: 33.1 G/DL (ref 33.7–35.3)
MCV RBC AUTO: 87.3 FL (ref 81.4–97.8)
MICRO URNS: NORMAL
MONOCYTES # BLD AUTO: 0.75 K/UL (ref 0–0.85)
MONOCYTES NFR BLD AUTO: 8.8 % (ref 0–13.4)
NEUTROPHILS # BLD AUTO: 5.83 K/UL (ref 1.82–7.42)
NEUTROPHILS NFR BLD: 68.3 % (ref 44–72)
NITRITE UR QL STRIP.AUTO: NEGATIVE
NRBC # BLD AUTO: 0 K/UL
NRBC BLD AUTO-RTO: 0 /100 WBC
PH UR STRIP.AUTO: 5.5 [PH]
PLATELET # BLD AUTO: 284 K/UL (ref 164–446)
PMV BLD AUTO: 10.2 FL (ref 9–12.9)
POTASSIUM SERPL-SCNC: 4.8 MMOL/L (ref 3.6–5.5)
PROT UR QL STRIP: NEGATIVE MG/DL
PROTHROMBIN TIME: 12.8 SEC (ref 12–14.6)
RBC # BLD AUTO: 5.26 M/UL (ref 4.7–6.1)
RBC UR QL AUTO: NEGATIVE
SODIUM SERPL-SCNC: 142 MMOL/L (ref 135–145)
SP GR UR STRIP.AUTO: 1.02
UROBILINOGEN UR STRIP.AUTO-MCNC: 0.2 MG/DL
WBC # BLD AUTO: 8.5 K/UL (ref 4.8–10.8)

## 2017-12-01 PROCEDURE — 81003 URINALYSIS AUTO W/O SCOPE: CPT

## 2017-12-01 PROCEDURE — 80048 BASIC METABOLIC PNL TOTAL CA: CPT

## 2017-12-01 PROCEDURE — 85610 PROTHROMBIN TIME: CPT

## 2017-12-01 PROCEDURE — 85025 COMPLETE CBC W/AUTO DIFF WBC: CPT

## 2017-12-01 PROCEDURE — 85730 THROMBOPLASTIN TIME PARTIAL: CPT

## 2017-12-01 PROCEDURE — 36415 COLL VENOUS BLD VENIPUNCTURE: CPT

## 2017-12-06 ENCOUNTER — HOSPITAL ENCOUNTER (OUTPATIENT)
Facility: MEDICAL CENTER | Age: 72
End: 2017-12-07
Attending: NEUROLOGICAL SURGERY | Admitting: NEUROLOGICAL SURGERY
Payer: COMMERCIAL

## 2017-12-06 ENCOUNTER — APPOINTMENT (OUTPATIENT)
Dept: RADIOLOGY | Facility: MEDICAL CENTER | Age: 72
End: 2017-12-06
Attending: NEUROLOGICAL SURGERY
Payer: COMMERCIAL

## 2017-12-06 PROCEDURE — 700102 HCHG RX REV CODE 250 W/ 637 OVERRIDE(OP): Performed by: NURSE PRACTITIONER

## 2017-12-06 PROCEDURE — 160036 HCHG PACU - EA ADDL 30 MINS PHASE I: Performed by: NEUROLOGICAL SURGERY

## 2017-12-06 PROCEDURE — 700101 HCHG RX REV CODE 250

## 2017-12-06 PROCEDURE — 160041 HCHG SURGERY MINUTES - EA ADDL 1 MIN LEVEL 4: Performed by: NEUROLOGICAL SURGERY

## 2017-12-06 PROCEDURE — A9270 NON-COVERED ITEM OR SERVICE: HCPCS | Performed by: NURSE PRACTITIONER

## 2017-12-06 PROCEDURE — 160035 HCHG PACU - 1ST 60 MINS PHASE I: Performed by: NEUROLOGICAL SURGERY

## 2017-12-06 PROCEDURE — 700111 HCHG RX REV CODE 636 W/ 250 OVERRIDE (IP)

## 2017-12-06 PROCEDURE — 500367 HCHG DRAIN KIT, HEMOVAC: Performed by: NEUROLOGICAL SURGERY

## 2017-12-06 PROCEDURE — 700112 HCHG RX REV CODE 229: Performed by: NURSE PRACTITIONER

## 2017-12-06 PROCEDURE — G0378 HOSPITAL OBSERVATION PER HR: HCPCS

## 2017-12-06 PROCEDURE — 160048 HCHG OR STATISTICAL LEVEL 1-5: Performed by: NEUROLOGICAL SURGERY

## 2017-12-06 PROCEDURE — 160009 HCHG ANES TIME/MIN: Performed by: NEUROLOGICAL SURGERY

## 2017-12-06 PROCEDURE — 501838 HCHG SUTURE GENERAL: Performed by: NEUROLOGICAL SURGERY

## 2017-12-06 PROCEDURE — 96375 TX/PRO/DX INJ NEW DRUG ADDON: CPT

## 2017-12-06 PROCEDURE — 96374 THER/PROPH/DIAG INJ IV PUSH: CPT

## 2017-12-06 PROCEDURE — 110454 HCHG SHELL REV 250: Performed by: NEUROLOGICAL SURGERY

## 2017-12-06 PROCEDURE — 72020 X-RAY EXAM OF SPINE 1 VIEW: CPT

## 2017-12-06 PROCEDURE — 700111 HCHG RX REV CODE 636 W/ 250 OVERRIDE (IP): Performed by: NURSE PRACTITIONER

## 2017-12-06 PROCEDURE — 96376 TX/PRO/DX INJ SAME DRUG ADON: CPT

## 2017-12-06 PROCEDURE — 160029 HCHG SURGERY MINUTES - 1ST 30 MINS LEVEL 4: Performed by: NEUROLOGICAL SURGERY

## 2017-12-06 PROCEDURE — 160002 HCHG RECOVERY MINUTES (STAT): Performed by: NEUROLOGICAL SURGERY

## 2017-12-06 PROCEDURE — 700101 HCHG RX REV CODE 250: Performed by: NURSE PRACTITIONER

## 2017-12-06 RX ORDER — HYDROMORPHONE HYDROCHLORIDE 4 MG/1
4 TABLET ORAL
Status: DISCONTINUED | OUTPATIENT
Start: 2017-12-06 | End: 2017-12-07 | Stop reason: HOSPADM

## 2017-12-06 RX ORDER — DIPHENHYDRAMINE HYDROCHLORIDE 50 MG/ML
25 INJECTION INTRAMUSCULAR; INTRAVENOUS EVERY 6 HOURS PRN
Status: DISCONTINUED | OUTPATIENT
Start: 2017-12-06 | End: 2017-12-07 | Stop reason: HOSPADM

## 2017-12-06 RX ORDER — HYDROMORPHONE HYDROCHLORIDE 2 MG/1
2 TABLET ORAL
Status: DISCONTINUED | OUTPATIENT
Start: 2017-12-06 | End: 2017-12-07 | Stop reason: HOSPADM

## 2017-12-06 RX ORDER — BUPIVACAINE HYDROCHLORIDE AND EPINEPHRINE 2.5; 5 MG/ML; UG/ML
INJECTION, SOLUTION EPIDURAL; INFILTRATION; INTRACAUDAL; PERINEURAL
Status: DISCONTINUED | OUTPATIENT
Start: 2017-12-06 | End: 2017-12-06 | Stop reason: HOSPADM

## 2017-12-06 RX ORDER — LIDOCAINE HYDROCHLORIDE 10 MG/ML
0.5 INJECTION, SOLUTION INFILTRATION; PERINEURAL
Status: ACTIVE | OUTPATIENT
Start: 2017-12-06 | End: 2017-12-07

## 2017-12-06 RX ORDER — LIDOCAINE AND PRILOCAINE 25; 25 MG/G; MG/G
1 CREAM TOPICAL
Status: ACTIVE | OUTPATIENT
Start: 2017-12-06 | End: 2017-12-07

## 2017-12-06 RX ORDER — LEVOTHYROXINE SODIUM 137 UG/1
137 TABLET ORAL
Status: DISCONTINUED | OUTPATIENT
Start: 2017-12-07 | End: 2017-12-07 | Stop reason: HOSPADM

## 2017-12-06 RX ORDER — ATORVASTATIN CALCIUM 10 MG/1
10 TABLET, FILM COATED ORAL EVERY EVENING
Status: DISCONTINUED | OUTPATIENT
Start: 2017-12-06 | End: 2017-12-07 | Stop reason: HOSPADM

## 2017-12-06 RX ORDER — HYDROMORPHONE HYDROCHLORIDE 2 MG/1
2-4 TABLET ORAL
Status: DISCONTINUED | OUTPATIENT
Start: 2017-12-06 | End: 2017-12-06

## 2017-12-06 RX ORDER — TRAMADOL HYDROCHLORIDE 50 MG/1
50 TABLET ORAL EVERY 4 HOURS PRN
Status: DISCONTINUED | OUTPATIENT
Start: 2017-12-06 | End: 2017-12-07 | Stop reason: HOSPADM

## 2017-12-06 RX ORDER — AMOXICILLIN 250 MG
1 CAPSULE ORAL NIGHTLY
Status: DISCONTINUED | OUTPATIENT
Start: 2017-12-06 | End: 2017-12-07 | Stop reason: HOSPADM

## 2017-12-06 RX ORDER — SODIUM CHLORIDE AND POTASSIUM CHLORIDE 150; 900 MG/100ML; MG/100ML
INJECTION, SOLUTION INTRAVENOUS CONTINUOUS
Status: DISCONTINUED | OUTPATIENT
Start: 2017-12-06 | End: 2017-12-07 | Stop reason: HOSPADM

## 2017-12-06 RX ORDER — SODIUM CHLORIDE, SODIUM LACTATE, POTASSIUM CHLORIDE, CALCIUM CHLORIDE 600; 310; 30; 20 MG/100ML; MG/100ML; MG/100ML; MG/100ML
INJECTION, SOLUTION INTRAVENOUS CONTINUOUS
Status: DISCONTINUED | OUTPATIENT
Start: 2017-12-06 | End: 2017-12-07 | Stop reason: HOSPADM

## 2017-12-06 RX ORDER — DIPHENHYDRAMINE HCL 25 MG
25 TABLET ORAL EVERY 6 HOURS PRN
Status: DISCONTINUED | OUTPATIENT
Start: 2017-12-06 | End: 2017-12-07 | Stop reason: HOSPADM

## 2017-12-06 RX ORDER — ONDANSETRON 4 MG/1
4 TABLET, ORALLY DISINTEGRATING ORAL EVERY 4 HOURS PRN
Status: DISCONTINUED | OUTPATIENT
Start: 2017-12-06 | End: 2017-12-07 | Stop reason: HOSPADM

## 2017-12-06 RX ORDER — TIZANIDINE 4 MG/1
2 TABLET ORAL EVERY 6 HOURS PRN
Status: DISCONTINUED | OUTPATIENT
Start: 2017-12-06 | End: 2017-12-07 | Stop reason: HOSPADM

## 2017-12-06 RX ORDER — POLYETHYLENE GLYCOL 3350 17 G/17G
1 POWDER, FOR SOLUTION ORAL 2 TIMES DAILY PRN
Status: DISCONTINUED | OUTPATIENT
Start: 2017-12-06 | End: 2017-12-07 | Stop reason: HOSPADM

## 2017-12-06 RX ORDER — HYDROCHLOROTHIAZIDE 25 MG/1
12.5 TABLET ORAL DAILY
Status: DISCONTINUED | OUTPATIENT
Start: 2017-12-06 | End: 2017-12-07 | Stop reason: HOSPADM

## 2017-12-06 RX ORDER — ONDANSETRON 2 MG/ML
4 INJECTION INTRAMUSCULAR; INTRAVENOUS EVERY 4 HOURS PRN
Status: DISCONTINUED | OUTPATIENT
Start: 2017-12-06 | End: 2017-12-07 | Stop reason: HOSPADM

## 2017-12-06 RX ORDER — BISACODYL 10 MG
10 SUPPOSITORY, RECTAL RECTAL
Status: DISCONTINUED | OUTPATIENT
Start: 2017-12-06 | End: 2017-12-07 | Stop reason: HOSPADM

## 2017-12-06 RX ORDER — AMOXICILLIN 250 MG
1 CAPSULE ORAL
Status: DISCONTINUED | OUTPATIENT
Start: 2017-12-06 | End: 2017-12-07 | Stop reason: HOSPADM

## 2017-12-06 RX ORDER — DOCUSATE SODIUM 100 MG/1
100 CAPSULE, LIQUID FILLED ORAL 2 TIMES DAILY
Status: DISCONTINUED | OUTPATIENT
Start: 2017-12-06 | End: 2017-12-07 | Stop reason: HOSPADM

## 2017-12-06 RX ORDER — HYDRALAZINE HYDROCHLORIDE 20 MG/ML
10 INJECTION INTRAMUSCULAR; INTRAVENOUS
Status: DISCONTINUED | OUTPATIENT
Start: 2017-12-06 | End: 2017-12-07 | Stop reason: HOSPADM

## 2017-12-06 RX ORDER — BACITRACIN 50000 [IU]/1
INJECTION, POWDER, FOR SOLUTION INTRAMUSCULAR
Status: DISCONTINUED | OUTPATIENT
Start: 2017-12-06 | End: 2017-12-06 | Stop reason: HOSPADM

## 2017-12-06 RX ORDER — MORPHINE SULFATE 4 MG/ML
2 INJECTION, SOLUTION INTRAMUSCULAR; INTRAVENOUS EVERY 4 HOURS PRN
Status: DISCONTINUED | OUTPATIENT
Start: 2017-12-06 | End: 2017-12-07 | Stop reason: HOSPADM

## 2017-12-06 RX ORDER — LIDOCAINE HYDROCHLORIDE 10 MG/ML
INJECTION, SOLUTION INFILTRATION; PERINEURAL
Status: COMPLETED
Start: 2017-12-06 | End: 2017-12-06

## 2017-12-06 RX ORDER — DIAZEPAM 2 MG/1
5 TABLET ORAL EVERY 8 HOURS PRN
Status: DISCONTINUED | OUTPATIENT
Start: 2017-12-06 | End: 2017-12-07 | Stop reason: HOSPADM

## 2017-12-06 RX ORDER — ENEMA 19; 7 G/133ML; G/133ML
1 ENEMA RECTAL
Status: DISCONTINUED | OUTPATIENT
Start: 2017-12-06 | End: 2017-12-07 | Stop reason: HOSPADM

## 2017-12-06 RX ADMIN — FENTANYL CITRATE 25 MCG: 50 INJECTION, SOLUTION INTRAMUSCULAR; INTRAVENOUS at 11:06

## 2017-12-06 RX ADMIN — BENZOCAINE AND MENTHOL 1 LOZENGE: 15; 3.6 LOZENGE ORAL at 23:56

## 2017-12-06 RX ADMIN — HYDROMORPHONE HYDROCHLORIDE 4 MG: 4 TABLET ORAL at 19:53

## 2017-12-06 RX ADMIN — HYDROCHLOROTHIAZIDE 12.5 MG: 25 TABLET ORAL at 13:44

## 2017-12-06 RX ADMIN — ATORVASTATIN CALCIUM 10 MG: 10 TABLET, FILM COATED ORAL at 19:50

## 2017-12-06 RX ADMIN — SODIUM CHLORIDE, SODIUM LACTATE, POTASSIUM CHLORIDE, CALCIUM CHLORIDE: 600; 310; 30; 20 INJECTION, SOLUTION INTRAVENOUS at 06:15

## 2017-12-06 RX ADMIN — DOCUSATE SODIUM 100 MG: 100 CAPSULE ORAL at 13:44

## 2017-12-06 RX ADMIN — WATER 2 G: 1 INJECTION INTRAMUSCULAR; INTRAVENOUS; SUBCUTANEOUS at 23:58

## 2017-12-06 RX ADMIN — TRAMADOL HYDROCHLORIDE 50 MG: 50 TABLET, COATED ORAL at 18:02

## 2017-12-06 RX ADMIN — DOCUSATE SODIUM 100 MG: 100 CAPSULE ORAL at 19:50

## 2017-12-06 RX ADMIN — TRAMADOL HYDROCHLORIDE 50 MG: 50 TABLET, COATED ORAL at 23:55

## 2017-12-06 RX ADMIN — LIDOCAINE HYDROCHLORIDE 0.5 ML: 10 INJECTION, SOLUTION INFILTRATION; PERINEURAL at 06:15

## 2017-12-06 RX ADMIN — STANDARDIZED SENNA CONCENTRATE AND DOCUSATE SODIUM 1 TABLET: 8.6; 5 TABLET, FILM COATED ORAL at 19:51

## 2017-12-06 RX ADMIN — WATER 2 G: 1 INJECTION INTRAMUSCULAR; INTRAVENOUS; SUBCUTANEOUS at 14:55

## 2017-12-06 RX ADMIN — POTASSIUM CHLORIDE AND SODIUM CHLORIDE: 900; 150 INJECTION, SOLUTION INTRAVENOUS at 15:19

## 2017-12-06 RX ADMIN — FENTANYL CITRATE 50 MCG: 50 INJECTION, SOLUTION INTRAMUSCULAR; INTRAVENOUS at 10:30

## 2017-12-06 RX ADMIN — FENTANYL CITRATE 25 MCG: 50 INJECTION, SOLUTION INTRAMUSCULAR; INTRAVENOUS at 10:07

## 2017-12-06 RX ADMIN — HYDROMORPHONE HYDROCHLORIDE: 2 INJECTION INTRAMUSCULAR; INTRAVENOUS; SUBCUTANEOUS at 09:45

## 2017-12-06 ASSESSMENT — PAIN SCALES - GENERAL
PAINLEVEL_OUTOF10: 2
PAINLEVEL_OUTOF10: 4
PAINLEVEL_OUTOF10: 5
PAINLEVEL_OUTOF10: 4
PAINLEVEL_OUTOF10: 5
PAINLEVEL_OUTOF10: 2
PAINLEVEL_OUTOF10: 4
PAINLEVEL_OUTOF10: 4
PAINLEVEL_OUTOF10: 6
PAINLEVEL_OUTOF10: 2
PAINLEVEL_OUTOF10: 5
PAINLEVEL_OUTOF10: 4
PAINLEVEL_OUTOF10: 0

## 2017-12-06 ASSESSMENT — PATIENT HEALTH QUESTIONNAIRE - PHQ9
2. FEELING DOWN, DEPRESSED, IRRITABLE, OR HOPELESS: NOT AT ALL
SUM OF ALL RESPONSES TO PHQ QUESTIONS 1-9: 0
1. LITTLE INTEREST OR PLEASURE IN DOING THINGS: NOT AT ALL
SUM OF ALL RESPONSES TO PHQ9 QUESTIONS 1 AND 2: 0

## 2017-12-06 ASSESSMENT — LIFESTYLE VARIABLES
EVER_SMOKED: NEVER
ALCOHOL_USE: NO

## 2017-12-06 NOTE — OR SURGEON
Immediate Post OP Note    PreOp Diagnosis: Recurrent L5 radiculopathy with drop foot    PostOp Diagnosis: same    Procedure(s):  LUMBAR LAMINECTOMY DISKECTOMY - POSTERIOR REDO LEFT  L4-S1 KAILA - Wound Class: Clean with Drain    Surgeon(s):  Be Webber M.D.    Anesthesiologist/Type of Anesthesia:  Anesthesiologist: Lcua Winn M.D./General    Surgical Staff:  Assistant: MIRANDA Jordan  Circulator: Sirisha Gorman R.N.  Scrub Person: Sherly Walsh; Deborah Kramer  Radiology Technologist: Sirisha Duval    Specimens:  * No specimens in log *    Estimated Blood Loss: 100    Findings: lateral recess stenosis    Complications: none        12/6/2017 8:58 AM Be Webber

## 2017-12-06 NOTE — OP REPORT
DATE OF SERVICE:  12/06/2017    PREOPERATIVE DIAGNOSES:  1.  Status post L3-L4, L4-L5 lumbar fusion.  2.  Postoperative L5 radiculopathy with dorsiflexion weakness.    POSTOPERATIVE DIAGNOSES:  1.  Status post L3-L4, L4-L5 lumbar fusion.  2.  Postoperative L5 radiculopathy with dorsiflexion weakness.    PROCEDURES PERFORMED:  Redo L4-L5, L5-S1 hemilaminectomy, medial facetectomy,   and foraminotomy.    SURGEON:  Be Webber MD    ASSISTANT:  FARIBA Jordan    ANESTHESIOLOGIST:  Kal Winn MD    PREPARATION:  Routine.    DESCRIPTION OF PROCEDURE:  Patient was brought to the operating room and   following induction, he was intubated and placed under general anesthetic,   rolled prone onto the operating table.  All pressure points were padded.    Sequential stockings were in place and time-out was performed.    The back was prepped and draped in a sterile fashion.  Proposed incision site   was injected with 20 mL of Marcaine 0.5% with epinephrine.  Making an incision   through the former incision site, we carried this down to the subcutaneous   tissue and did a subperiosteal dissection at L2, and then carried it out over   the instrumentation at L3-L4 and L4-L5.  This opened up very easily, there was   a seroma that we evacuated.    We dissected around the pedicle of L4, we were able to dissect out the nerve   root and then find a disk space, which was explored.  There was minimal amount   of degenerative disk material here.  Going out laterally, there was some bone   that was compressing on the L4 nerve root and this was removed with   Kerrisons.  Decompression was minor, but there was still some lateral recess   stenosis here.  Going inferiorly around the L5 nerve root, we dissected the   nerve root free, and then went inferiorly to this facet joint.  It was   markedly hypertrophied and I think this is probably where the lateral recess   stenosis was catching the L5 and the S1 nerve root.  We  drilled the lateral   recess to a very thin shelf and used Kerrisons to decompress further the L5   nerve root.  The superior facet of S1 was compressing into the nerve root.    This was dissected free and removed with #3 Kerrison's.  Going inferiorly, we   opened up the foramen over S1 nerve root and decompressed here widely.    The area was copiously irrigated.  Meticulous hemostasis was obtained.  L4 and   5 were completely free.  We dissected up to the L3 pedicle, and found no   evidence of stenosis.  We did drill a little bit of bone here, for further   inspection, but found no compression of the nerve root.  We were able to pass   the Santizo freely up to the 2-3 level.    Again, the area was copiously irrigated.  Meticulous hemostasis was obtained.    We closed the fascia with #1 Vicryl sutures, subcutaneous tissue with 2-0   Vicryl, subcuticular with 3-0 Vicryl, and skin with Steri-Strips.  All sponge   and needle counts were correct.  Estimated blood loss was negligible.       ____________________________________     MD GANGA JEREZ / JUANA    DD:  12/06/2017 09:14:10  DT:  12/06/2017 09:50:09    D#:  [id number]  Job#:  [id number]

## 2017-12-07 VITALS
WEIGHT: 195.99 LBS | BODY MASS INDEX: 26.55 KG/M2 | HEART RATE: 67 BPM | RESPIRATION RATE: 18 BRPM | DIASTOLIC BLOOD PRESSURE: 60 MMHG | SYSTOLIC BLOOD PRESSURE: 120 MMHG | OXYGEN SATURATION: 94 % | HEIGHT: 72 IN | TEMPERATURE: 97.4 F

## 2017-12-07 PROCEDURE — G8980 MOBILITY D/C STATUS: HCPCS | Mod: CI

## 2017-12-07 PROCEDURE — G8987 SELF CARE CURRENT STATUS: HCPCS | Mod: CI

## 2017-12-07 PROCEDURE — A9270 NON-COVERED ITEM OR SERVICE: HCPCS | Performed by: NURSE PRACTITIONER

## 2017-12-07 PROCEDURE — 700102 HCHG RX REV CODE 250 W/ 637 OVERRIDE(OP): Performed by: NURSE PRACTITIONER

## 2017-12-07 PROCEDURE — 97162 PT EVAL MOD COMPLEX 30 MIN: CPT

## 2017-12-07 PROCEDURE — G8988 SELF CARE GOAL STATUS: HCPCS | Mod: CI

## 2017-12-07 PROCEDURE — G0378 HOSPITAL OBSERVATION PER HR: HCPCS

## 2017-12-07 PROCEDURE — 97165 OT EVAL LOW COMPLEX 30 MIN: CPT

## 2017-12-07 PROCEDURE — G8978 MOBILITY CURRENT STATUS: HCPCS | Mod: CI

## 2017-12-07 PROCEDURE — G8979 MOBILITY GOAL STATUS: HCPCS | Mod: CI

## 2017-12-07 PROCEDURE — 700112 HCHG RX REV CODE 229: Performed by: NURSE PRACTITIONER

## 2017-12-07 PROCEDURE — G8989 SELF CARE D/C STATUS: HCPCS | Mod: CI

## 2017-12-07 RX ORDER — HYDROMORPHONE HYDROCHLORIDE 2 MG/1
2 TABLET ORAL
Qty: 30 TAB | Refills: 0 | Status: SHIPPED | OUTPATIENT
Start: 2017-12-07 | End: 2018-06-27

## 2017-12-07 RX ORDER — GABAPENTIN 300 MG/1
300-600 CAPSULE ORAL EVERY 4 HOURS PRN
Qty: 120 CAP | Refills: 1 | Status: SHIPPED | OUTPATIENT
Start: 2017-12-07 | End: 2018-08-06

## 2017-12-07 RX ADMIN — HYDROCHLOROTHIAZIDE 12.5 MG: 25 TABLET ORAL at 08:07

## 2017-12-07 RX ADMIN — TRAMADOL HYDROCHLORIDE 50 MG: 50 TABLET, COATED ORAL at 10:52

## 2017-12-07 RX ADMIN — LEVOTHYROXINE SODIUM 137 MCG: 137 TABLET ORAL at 05:55

## 2017-12-07 RX ADMIN — TRAMADOL HYDROCHLORIDE 50 MG: 50 TABLET, COATED ORAL at 05:55

## 2017-12-07 RX ADMIN — DOCUSATE SODIUM 100 MG: 100 CAPSULE ORAL at 08:07

## 2017-12-07 ASSESSMENT — COGNITIVE AND FUNCTIONAL STATUS - GENERAL
SUGGESTED CMS G CODE MODIFIER MOBILITY: CJ
SUGGESTED CMS G CODE MODIFIER DAILY ACTIVITY: CH
DAILY ACTIVITIY SCORE: 24
CLIMB 3 TO 5 STEPS WITH RAILING: A LITTLE
MOBILITY SCORE: 22
WALKING IN HOSPITAL ROOM: A LITTLE

## 2017-12-07 ASSESSMENT — GAIT ASSESSMENTS
DISTANCE (FEET): 200
DEVIATION: TRENDELENBERG;OTHER (COMMENT)
GAIT LEVEL OF ASSIST: STAND BY ASSIST
ASSISTIVE DEVICE: FRONT WHEEL WALKER

## 2017-12-07 ASSESSMENT — LIFESTYLE VARIABLES: DO YOU DRINK ALCOHOL: NO

## 2017-12-07 ASSESSMENT — PAIN SCALES - GENERAL
PAINLEVEL_OUTOF10: 4
PAINLEVEL_OUTOF10: 4

## 2017-12-07 ASSESSMENT — ACTIVITIES OF DAILY LIVING (ADL): TOILETING: INDEPENDENT

## 2017-12-07 NOTE — THERAPY
"Occupational Therapy Evaluation completed.   Functional Status:  OT eval completed on 73 YO M s/p L4-5, L5-S1 redo lami. Pt demonstrated ADLs and functional transfers with SBA/SPV. Pt provided with handout and verbalized understanding of spinal precautions. Pt reports SO is available to assist as needed upon d/c. Pt does not have further concerns at this time. Pt does not require further acute OT services.  Plan of Care: Patient with no further skilled OT needs in the acute care setting at this time  Discharge Recommendations:  Equipment: No Equipment Needed. Post-acute therapy Discharge to home with outpatient or home health for additional skilled therapy services.    See \"Rehab Therapy-Acute\" Patient Summary Report for complete documentation.    "

## 2017-12-07 NOTE — DISCHARGE INSTRUCTIONS
Discharge Instructions    Discharged to home by car with relative. Discharged via wheelchair, hospital escort: Yes.  Special equipment needed: Not Applicable    Be sure to schedule a follow-up appointment with your primary care doctor or any specialists as instructed.     Discharge Plan:   Diet Plan: Discussed  Activity Level: Discussed  Confirmed Follow up Appointment: Appointment Scheduled  Confirmed Symptoms Management: Discussed  Medication Reconciliation Updated: Yes  Influenza Vaccine Indication: Not indicated: Previously immunized this influenza season and > 8 years of age    I understand that a diet low in cholesterol, fat, and sodium is recommended for good health. Unless I have been given specific instructions below for another diet, I accept this instruction as my diet prescription.   Other diet: Regular      Special Instructions:  Ambulate as much as comfortable   Ok to shower 12/9, pat incision dry, leave open to air- no dressing   No Aspirin for 1 week after surgery   No driving for 2 weeks/ No driving while on narcotic medication   Over the counter stool softeners daily while on narcotics   No lifting greater than 15 pounds, no repetitive bending or twisting   Follow up at [facility] 2 weeks after surgery     · Is patient discharged on Warfarin / Coumadin?   No     · Is patient Post Blood Transfusion?  No    Depression / Suicide Risk    As [facility] are [facility], it is important to learn how to keep safe from harming yourself.    Recognize the warning signs:  · Abrupt changes in personality, positive or negative- including increase in energy   · Giving away possessions  · Change in eating patterns- significant weight changes-  positive or negative  · Change in sleeping patterns- unable to sleep or sleeping all the time   · Unwillingness or inability to communicate  · Depression  · Unusual sadness, discouragement and loneliness  · Talk of wanting to die  · Neglect  of personal appearance   · Rebelliousness- reckless behavior  · Withdrawal from people/activities they love  · Confusion- inability to concentrate     If you or a loved one observes any of these behaviors or has concerns about self-harm, here's what you can do:  · Talk about it- your feelings and reasons for harming yourself  · Remove any means that you might use to hurt yourself (examples: pills, rope, extension cords, firearm)  · Get professional help from the community (Mental Health, Substance Abuse, psychological counseling)  · Do not be alone:Call your Safe Contact- someone whom you trust who will be there for you.  · Call your local CRISIS HOTLINE 038-8214 or 034-701-1254  · Call your local Children's Mobile Crisis Response Team [place] (536) 508-4384 or www.[employer]ine  · Call the toll free National Suicide Prevention Hotlines   · National Suicide Prevention Lifeline 311-699-JWFX (8522)  · [employer] Line Network 800-TKAJJLS (600-6036)      Laminectomy  During a laminectomy, small pieces of bone in the spine called lamina are removed. The ligaments underneath the lamina and parts of the joints that have grown too large are also removed. This takes pressure off the nerves.   LET YOUR HEALTH CARE PROVIDER KNOW ABOUT:  · Any allergies you have.  · All medicines you are taking, including vitamins, herbs, eye drops, creams, and over-the-counter medicines.  · Previous problems you or members of your family have had with the use of anesthetics.  · Any blood disorders you have.  · Previous surgeries you have had.  · Medical conditions you have.  RISKS AND COMPLICATIONS   Generally, laminectomy is a safe procedure. However, as with any procedure, complications can occur. Possible complications include:  · Infection near the incision.  · Nerve damage. Signs of this can be pain, weakness, or numbness.  · Leaking of spinal fluid.  · Blood clot in a leg. The clot can move to the lungs. This can be very  serious.  · Bowel or bladder incontinence (rare).  BEFORE THE PROCEDURE   · You will need to stop taking certain medicines as directed by your health care provider.  · If you smoke, stop at least 2 weeks before the procedure. Smoking can slow down the healing process and increase the risk of complications.  · Do not eat or drink anything for at least 8 hours before the procedure. Take any medicines that your health care provider tells you to keep taking with a sip of water.  · Do not drink alcohol the day before your surgery.  · Tell your health care provider if you develop a cold or any infection before your surgery.  · Arrange for someone to drive you home after the procedure or after your hospital stay. Also arrange for someone to help you with activities during recovery.  PROCEDURE  · Small monitors will be placed on your body. They are used to check your heart, blood pressure, and oxygen level.  · An IV tube will be inserted into one of your veins. Medicine will flow directly into your body through the IV tube.  · You might be given a sedative. This will help you relax.  · You will be given a medicine to make you sleep (general anesthetic), and a breathing tube will be placed into your lungs. During general anesthesia, you are unaware of the procedure and do not feel any pain.  · Your back will be cleaned with a special solution to kill germs on your skin.  · Once you are asleep, the surgeon will make a 2-inch to 5-inch cut (incision) in your back. The length of the incision will depend on how many spinal bones (vertebrae) are being operated on.  · Muscles in the back will be moved away from the vertebrae and pulled to the side.  · Pieces of lamina will be removed.  · The ligament that lies under the lamina and connects your vertebrae will be removed.  · Enough ligaments and thickened joints will be removed to take pressure off your nerves.  · Your nerves will be identified, and their passage will be tracked  and assessed for excessive tightness.  · Your back muscles will be moved back into their normal position.  · The area under your skin will be closed with small, absorbable stitches. These stitches do not need to be removed.  · Your skin will be closed with small absorbable stitches or staples.  · A dressing will be put over your incision.  · The procedure may take 1-3 hours.  AFTER THE PROCEDURE   · You will stay in a recovery area until the anesthesia has worn off. Your blood pressure and pulse will be checked every so often. Then you will be taken to a hospital room.  · You may continue to get fluids through the IV tube for a while.  · Some pain is normal. You may be given pain medicine while still in the recovery area.  · It is important to be up and moving as soon as possible after a surgery. Physical therapists will help you start walking.  · To prevent blood clots in your legs:  ¨ You may be given special stockings to wear.  ¨ You may need to take medicine to prevent clots.  · You may be asked to do special breathing exercises to re-expand your lungs. This is to prevent a lung infection.  · Most people stay in the hospital for 1-3 days after a laminectomy.     This information is not intended to replace advice given to you by your health care provider. Make sure you discuss any questions you have with your health care provider.     Document Released: 12/06/2010 Document Revised: 10/08/2014 Document Reviewed: 07/30/2014  [employer] Interactive Patient Education ©2016 [employer]

## 2017-12-07 NOTE — PROGRESS NOTES
Pt. Given discharge instructions & discharge prescriptions  All questions answered  Pt. Discharging to home with wife  Pt. Stated that he already had a front wheel walker and didn't need another one.  All belongings with pt.

## 2017-12-07 NOTE — PROGRESS NOTES
Pt educated about time of pain meds and pain scale. Pt complained of pain 7/10 with movement and 5/10 when still. Brought pt dilaudid 4mg so pt would have less discomfort getting up to restroom. Pt asked why he cant have more tramadol and I explained tramadol is allowed every 4 hours and it was given at 1802 so he wouldn't be allowed another dose until 2202. Pt was angry that he can't control the time of pain meds and that he has no say in his care. This RN educated pt on importance of following what the doctor has laid out for medication gaps to prevent overdose or adverse effect. Pt agreed to take dilaudid but stated this RN would catch hell later if he felt light headed or off at all. Pt had already taken the dilaudid. Pt was continuing to get angry. This RN reported pt concerns to supervisor.

## 2017-12-07 NOTE — THERAPY
"Physical Therapy Evaluation completed.   Bed Mobility:  Supine to Sit:  (NT pt up self in room upon entry)  Transfers: Sit to Stand: Supervised  Gait: Level Of Assist: Stand by Assist with Front-Wheel Walker       Plan of Care: Patient with no further skilled PT needs in the acute care setting at this time  Discharge Recommendations: Equipment: No Equipment Needed    Pt is a 72. y.o. male s/p L4-5, L5-S1 redo lami. Pt demonstrates some limitations with L LE weakness in glut and ankle from prior, but is able to currently demonstrate gait at SBA level. Pt demonstrates functional mobility for return to home. PT anticipates d/c home with [facility] of [facility] and follow-up with outpatient PT services when appropriate in stage of healing. No further acute PT needs at this time.     See \"Rehab Therapy-Acute\" Patient Summary Report for complete documentation.     "

## 2017-12-07 NOTE — PROGRESS NOTES
Pt. Is AAOx4  Pt. Moves all extremeties,  Pt. Reports tingling in LLE  Complains of pain at lower back  Dilaudid PCA given for pain  Pt. Up with Standby assistance  18 ga in LFA Patent, site CDI  Bed alarm on  SCD's on  Treaded socks in place  Call light within reach

## 2017-12-07 NOTE — PROGRESS NOTES
Neurosurgery Progress Note    Subjective:  Pt in bed, states he is doing ok, feels some improvement in his legs and some increase in his strength, has been oob to br    Exam:  AAO, cooperative, right df/pf- 5/5, left df 5-, pf 5/5, incision with drsg, hvac-40ml    BP  Min: 113/69  Max: 157/75  Pulse  Av.6  Min: 62  Max: 79  Resp  Av.1  Min: 11  Max: 18  Temp  Av.3 °C (97.3 °F)  Min: 36.2 °C (97.1 °F)  Max: 36.4 °C (97.6 °F)  SpO2  Av.1 %  Min: 93 %  Max: 100 %    No Data Recorded        No results for input(s): SODIUM, POTASSIUM, CHLORIDE, CO2, GLUCOSE, BUN, CPKTOTAL in the last 72 hours.            Intake/Output       17 07 - 17 0659 17 - 1759       Total  Total       Intake    I.V.  1400  -- 1400  --  -- --    Crystalloid Intake 1100 -- 1100 -- -- --    IV Volume (IV Lactated Ringers) 300 -- 300 -- -- --    Total Intake 1400 -- 1400 -- -- --       Output    Urine  --  -- --  --  -- --    Number of Times Voided 1 x -- 1 x 1 x -- 1 x    Drains  100  40 140  10  -- 10    Hemovac 1 100 40 140 10 -- 10    Stool  --  -- --  --  -- --    Number of Times Stooled 0 x 0 x 0 x 0 x -- 0 x    Blood  100  -- 100  --  -- --    Est. Blood Loss (mL) 100 -- 100 -- -- --    Total Output 200 40 240 10 -- 10       Net I/O     1200 -40 1160 -10 -- -10            Intake/Output Summary (Last 24 hours) at 17 0911  Last data filed at 17 0739   Gross per 24 hour   Intake              300 ml   Output              150 ml   Net              150 ml            • LR   Continuous   • atorvastatin  10 mg Q EVENING   • hydrochlorothiazide  12.5 mg DAILY   • levothyroxine  137 mcg AM ES   • tizanidine  2 mg Q6HRS PRN   • tramadol  50 mg Q4HRS PRN   • Pharmacy Consult Request  1 Each PRN   • MD ALERT...Do not administer NSAIDS or ASPIRIN unless ORDERED By Neurosurgery  1 Each PRN   • docusate sodium  100 mg BID   • senna-docusate  1 Tab Nightly   •  senna-docusate  1 Tab Q24HRS PRN   • polyethylene glycol/lytes  1 Packet BID PRN   • magnesium hydroxide  30 mL QDAY PRN   • bisacodyl  10 mg Q24HRS PRN   • fleet  1 Each Once PRN   • 0.9 % NaCl with KCl 20 mEq 1,000 mL   Continuous   • diphenhydrAMINE  25 mg Q6HRS PRN    Or   • diphenhydrAMINE  25 mg Q6HRS PRN   • ondansetron  4 mg Q4HRS PRN   • ondansetron  4 mg Q4HRS PRN   • hydrALAZINE  10 mg Q HOUR PRN   • benzocaine-menthol  1 Lozenge Q2HRS PRN   • HYDROmorphone  2 mg Q3HRS PRN    Or   • HYDROmorphone  4 mg Q3HRS PRN   • morphine injection  2 mg Q4HRS PRN   • diazepam  5 mg Q8HRS PRN       Assessment and Plan:  POD #1 s/p redo L4-5, L5-S1 hemilam, mf/f  Prophylactic anticoagulation: no         Start date/time: n/a  Continue pain control  Mobilize  Dc drain   Dc home

## 2017-12-12 ENCOUNTER — OFFICE VISIT (OUTPATIENT)
Dept: BEHAVIORAL HEALTH | Facility: PHYSICIAN GROUP | Age: 72
End: 2017-12-12
Payer: COMMERCIAL

## 2017-12-12 DIAGNOSIS — F41.8 SITUATIONAL ANXIETY: ICD-10-CM

## 2017-12-12 PROCEDURE — 90834 PSYTX W PT 45 MINUTES: CPT | Performed by: SOCIAL WORKER

## 2017-12-12 NOTE — BH THERAPY
[facility]  Therapy Progress Note    Patient Name: Amadou Parekh  Patient MRN: [id number]  Today's Date: 12/12/2017     Type of session:Individual psychotherapy  Length of session: 45 minutes  Persons in attendance:Patient    Subjective/New Info: Client relayed he and his wife continue their conversations around moving forward and repairing their relationship. He is looking for concrete tools to measure resolution; discussed how it is more cyclical than that. Used Miracle Question with him. Asked him to consider out comes. Directed back to Gottman works as well as Love Languages.    Objective/Observations:   Participation: Active verbal participation   Grooming: Good and Neat   Cognition: Alert and Fully Oriented   Eye contact: Limited   Mood: Anxious   Affect: Flexible   Thought process: Logical and Goal-directed   Speech: Rate within normal limits and Volume within normal limits   Other:     Diagnoses:   1. Situational anxiety         Current risk:   SUICIDE: Not applicable   Homicide: Not applicable   Self-harm: Not applicable   Relapse: Not applicable   Other:    Safety Plan reviewed? Not Indicated   If evidence of imminent risk is present, intervention/plan:     Therapeutic Intervention(s): Clarify:  Clarify feelings    Treatment Goal(s)/Objective(s) addressed:     · Identify goals/values and cultivate a meaningful life    Progress toward Treatment Goals: Mild improvement    Plan:  - Continue Individual therapy    Andria Cisneros  12/12/2017

## 2017-12-26 ENCOUNTER — OFFICE VISIT (OUTPATIENT)
Dept: BEHAVIORAL HEALTH | Facility: PHYSICIAN GROUP | Age: 72
End: 2017-12-26
Payer: COMMERCIAL

## 2017-12-26 DIAGNOSIS — F41.8 SITUATIONAL ANXIETY: ICD-10-CM

## 2017-12-26 PROCEDURE — 90834 PSYTX W PT 45 MINUTES: CPT | Performed by: SOCIAL WORKER

## 2017-12-26 NOTE — BH THERAPY
" [facility]  Therapy Progress Note    Patient Name: Amadou Parekh  Patient MRN: [id number]  Today's Date: 12/26/2017     Type of session:Individual psychotherapy  Length of session: 45 minutes  Persons in attendance:Patient    Subjective/New Info: Client reported the holidays went well. He is still recovering from surgery, and has a small limp. Discussed \"Pain vs. Suffering\" for both emotional and physical pain. Discussed progress client feels he is making in his relationships. \"Building the wall brick by brick.\" He continues to be a little reserved, and slightly hesitant in the sessions.     Objective/Observations:   Participation: Active verbal participation and Guarded   Grooming: Good and Neat   Cognition: Alert and Fully Oriented   Eye contact: Limited   Mood: Anxious   Affect: Flexible   Thought process: Logical and Goal-directed   Speech: Rate within normal limits and Volume within normal limits   Other:     Diagnoses:   1. Situational anxiety         Current risk:   SUICIDE: Not applicable   Homicide: Not applicable   Self-harm: Not applicable   Relapse: Not applicable   Other:    Safety Plan reviewed? Not Indicated   If evidence of imminent risk is present, intervention/plan:     Therapeutic Intervention(s): Therapeutic metaphor    Treatment Goal(s)/Objective(s) addressed:     · Identify goals/values and cultivate a meaningful life    Progress toward Treatment Goals: Mild improvement    Plan:  - Continue Individual therapy  - Decrease frequency of sessions  - Patient is in agreement with the above plan:  YES    Andria Cisneros  12/26/2017                                 "

## 2018-01-11 ENCOUNTER — OFFICE VISIT (OUTPATIENT)
Dept: BEHAVIORAL HEALTH | Facility: PHYSICIAN GROUP | Age: 73
End: 2018-01-11
Payer: COMMERCIAL

## 2018-01-11 DIAGNOSIS — Z63.0 RELATIONSHIP PROBLEM BETWEEN PARTNERS: ICD-10-CM

## 2018-01-11 DIAGNOSIS — F41.8 SITUATIONAL ANXIETY: ICD-10-CM

## 2018-01-11 PROCEDURE — 90834 PSYTX W PT 45 MINUTES: CPT | Performed by: SOCIAL WORKER

## 2018-01-11 SDOH — SOCIAL STABILITY - SOCIAL INSECURITY: PROBLEMS IN RELATIONSHIP WITH SPOUSE OR PARTNER: Z63.0

## 2018-01-11 NOTE — BH THERAPY
" [facility]  Therapy Progress Note    Patient Name: Amadou Parekh  Patient MRN: [id number]  Today's Date: 1/11/2018     Type of session:Individual psychotherapy  Length of session: 45 minutes  Persons in attendance:Patient    Subjective/New Info: Client reported he and his wife continue to move forward. He stated, \"I think I have turned a corner, and I think she can sense it too.\" Reported he continues to read, and put new knowledge into practice. Discussed moving forward brink by brandon. Had client reflect on his personal growth.     Objective/Observations:   Participation: Active verbal participation   Grooming: Good and Neat   Cognition: Alert and Fully Oriented   Eye contact: Limited   Mood: Euthymic   Affect: Flexible and Full range   Thought process: Logical and Goal-directed   Speech: Rate within normal limits and Volume within normal limits   Other:     Diagnoses:   1. Situational anxiety    2. Relationship problem between partners         Current risk:   SUICIDE: Not applicable   Homicide: Not applicable   Self-harm: Not applicable   Relapse: Not applicable   Other:    Safety Plan reviewed? Not Indicated   If evidence of imminent risk is present, intervention/plan:     Therapeutic Intervention(s): Supportive psychotherapy    Treatment Goal(s)/Objective(s) addressed: Treatment plan goals addressed today:        - Identify goals/values and cultivate a meaningful life          Progress toward Treatment Goals: Mild improvement    Plan:  - Continue Individual therapy  - Decrease frequency of sessions  - Patient is in agreement with the above plan:  YES    Andria Cisneros  1/11/2018                                   "

## 2018-01-30 ENCOUNTER — OFFICE VISIT (OUTPATIENT)
Dept: BEHAVIORAL HEALTH | Facility: PHYSICIAN GROUP | Age: 73
End: 2018-01-30
Payer: COMMERCIAL

## 2018-01-30 DIAGNOSIS — Z63.0 RELATIONSHIP PROBLEM BETWEEN PARTNERS: ICD-10-CM

## 2018-01-30 DIAGNOSIS — F41.8 SITUATIONAL ANXIETY: ICD-10-CM

## 2018-01-30 PROCEDURE — 90834 PSYTX W PT 45 MINUTES: CPT | Performed by: SOCIAL WORKER

## 2018-01-30 SDOH — SOCIAL STABILITY - SOCIAL INSECURITY: PROBLEMS IN RELATIONSHIP WITH SPOUSE OR PARTNER: Z63.0

## 2018-01-30 NOTE — BH THERAPY
[facility]  Therapy Progress Note    Patient Name: Amadou Parekh  Patient MRN: [id number]  Today's Date: 1/30/2018     Type of session:Individual psychotherapy  Length of session: 45 minutes  Persons in attendance:Patient    Subjective/New Info: Client reported he feels things are progressing well. Discussed the concept of wise mind, and how it can apply to his relationship. Discussed crisis as opportunity and danger. He stated he continues to work on his shortcomings and be aware of things that need to in place to move forward.     Objective/Observations:   Participation: Active verbal participation   Grooming: Good and Neat   Cognition: Alert and Fully Oriented   Eye contact: Limited   Mood: Anxious   Affect: Flexible   Thought process: Logical and Goal-directed   Speech: Rate within normal limits and Volume within normal limits   Other:     Diagnoses:   1. Situational anxiety    2. Relationship problem between partners         Current risk:   SUICIDE: Not applicable   Homicide: Not applicable   Self-harm: Not applicable   Relapse: Not applicable   Other:    Safety Plan reviewed? Not Indicated   If evidence of imminent risk is present, intervention/plan:     Therapeutic Intervention(s): Stressors assessed and Supportive psychotherapy    Treatment Goal(s)/Objective(s) addressed:     · Identify goals/values and cultivate a meaningful life    Progress toward Treatment Goals: Mild improvement    Plan:  - Continue Individual therapy  - Decrease frequency of sessions  - Patient is in agreement with the above plan:  YES    Andria Cisneros  1/30/2018

## 2018-03-01 ENCOUNTER — OFFICE VISIT (OUTPATIENT)
Dept: BEHAVIORAL HEALTH | Facility: PHYSICIAN GROUP | Age: 73
End: 2018-03-01
Payer: COMMERCIAL

## 2018-03-01 DIAGNOSIS — F41.8 SITUATIONAL ANXIETY: ICD-10-CM

## 2018-03-01 DIAGNOSIS — Z63.0 RELATIONSHIP PROBLEM BETWEEN PARTNERS: ICD-10-CM

## 2018-03-01 PROCEDURE — 90834 PSYTX W PT 45 MINUTES: CPT | Performed by: SOCIAL WORKER

## 2018-03-01 SDOH — SOCIAL STABILITY - SOCIAL INSECURITY: PROBLEMS IN RELATIONSHIP WITH SPOUSE OR PARTNER: Z63.0

## 2018-03-01 NOTE — BH THERAPY
[facility]  Therapy Progress Note    Patient Name: Amadou Parekh  Patient MRN: [id number]  Today's Date: 3/1/2018     Type of session:Individual psychotherapy  Length of session: 45 minutes  Persons in attendance:Patient    Subjective/New Info: Client reported things are going well. He and his wife continue to make forward progress. He is making nice progress on his master's, and thinking about the possibility of getting his PhD. He showed deference to his wife, and seems to respect her needs and her pace.     Objective/Observations:   Participation: Active verbal participation   Grooming: Good and Neat   Cognition: Alert and Fully Oriented   Eye contact: Limited   Mood: Anxious   Affect: Congruent with content   Thought process: Logical and Goal-directed   Speech: Rate within normal limits and Loud   Other:     Diagnoses:   1. Situational anxiety    2. Relationship problem between partners         Current risk:   SUICIDE: Not applicable   Homicide: Not applicable   Self-harm: Not applicable   Relapse: Not applicable   Other:    Safety Plan reviewed? Not Indicated   If evidence of imminent risk is present, intervention/plan:     Therapeutic Intervention(s): Supportive psychotherapy    Treatment Goal(s)/Objective(s) addressed: Treatment plan goals addressed today:        - Identify goals/values and cultivate a meaningful life        Progress toward Treatment Goals: Mild improvement    Plan:  - Continue Individual therapy  - Decrease frequency of sessions  - Patient is in agreement with the above plan:  YES    Andria Cisneros  3/1/2018

## 2018-03-29 ENCOUNTER — APPOINTMENT (OUTPATIENT)
Dept: BEHAVIORAL HEALTH | Facility: PHYSICIAN GROUP | Age: 73
End: 2018-03-29
Payer: COMMERCIAL

## 2018-03-30 ENCOUNTER — OFFICE VISIT (OUTPATIENT)
Dept: BEHAVIORAL HEALTH | Facility: PHYSICIAN GROUP | Age: 73
End: 2018-03-30
Payer: COMMERCIAL

## 2018-03-30 DIAGNOSIS — F41.8 SITUATIONAL ANXIETY: ICD-10-CM

## 2018-03-30 PROCEDURE — 90832 PSYTX W PT 30 MINUTES: CPT | Performed by: SOCIAL WORKER

## 2018-03-30 NOTE — BH THERAPY
[facility]  Therapy Progress Note    Patient Name: Amadou Parekh  Patient MRN: [id number]  Today's Date: 3/30/2018     Type of session:Individual psychotherapy  Length of session: 30 minutes  Persons in attendance:Patient    Subjective/New Info: Client reported things were going well. He has his wife continued to make forward progress. He is busy with work and school. Discussed goals. Client feels he has made good progress on his initial goals, and is in agreement with clinician that increasing time between visits would be helpful. He has several trips planned for the next few months. Discussed self-care and relaxation. Ended session a bit early, as client saw his physical therapist, and was having some residual pain.     Objective/Observations:   Participation: Active verbal participation, Engaged and Open to feedback   Grooming: Good and Neat   Cognition: Alert and Fully Oriented   Eye contact: Limited   Mood: Euthymic   Affect: Congruent with content   Thought process: Logical and Goal-directed   Speech: Rate within normal limits and Volume within normal limits   Other:     Diagnoses:   1. Situational anxiety         Current risk:   SUICIDE: Not applicable   Homicide: Not applicable   Self-harm: Not applicable   Relapse: Not applicable   Other:    Safety Plan reviewed? Not Indicated   If evidence of imminent risk is present, intervention/plan:     Therapeutic Intervention(s): Self-care skills and Supportive psychotherapy    Treatment Goal(s)/Objective(s) addressed:  · Identify goals/values and cultivate a meaningful life    Progress toward Treatment Goals: Moderate improvement    Plan:  - Continue Individual therapy  - Decrease frequency of sessions    Andria Cisneros  3/30/2018

## 2018-05-31 ENCOUNTER — HOSPITAL ENCOUNTER (OUTPATIENT)
Dept: LAB | Facility: MEDICAL CENTER | Age: 73
End: 2018-05-31
Attending: NURSE PRACTITIONER
Payer: COMMERCIAL

## 2018-05-31 LAB
BUN SERPL-MCNC: 35 MG/DL (ref 8–22)
CREAT SERPL-MCNC: 1.14 MG/DL (ref 0.5–1.4)

## 2018-05-31 PROCEDURE — 36415 COLL VENOUS BLD VENIPUNCTURE: CPT

## 2018-05-31 PROCEDURE — 82565 ASSAY OF CREATININE: CPT

## 2018-05-31 PROCEDURE — 84520 ASSAY OF UREA NITROGEN: CPT

## 2018-06-01 ENCOUNTER — OFFICE VISIT (OUTPATIENT)
Dept: BEHAVIORAL HEALTH | Facility: PHYSICIAN GROUP | Age: 73
End: 2018-06-01
Payer: COMMERCIAL

## 2018-06-01 DIAGNOSIS — Z63.0 RELATIONSHIP PROBLEM BETWEEN PARTNERS: ICD-10-CM

## 2018-06-01 DIAGNOSIS — F41.8 SITUATIONAL ANXIETY: ICD-10-CM

## 2018-06-01 PROCEDURE — 90834 PSYTX W PT 45 MINUTES: CPT | Performed by: SOCIAL WORKER

## 2018-06-01 SDOH — SOCIAL STABILITY - SOCIAL INSECURITY: PROBLEMS IN RELATIONSHIP WITH SPOUSE OR PARTNER: Z63.0

## 2018-06-01 NOTE — BH THERAPY
[facility]  Therapy Progress Note    Patient Name: Amadou Parekh  Patient MRN: [id number]  Today's Date: 6/1/2018     Type of session:Individual psychotherapy  Length of session: 45 minutes  Persons in attendance:Patient    Subjective/New Info: Client reported he was doing well. He is continuing his studies, and is currently taking a time intensive class. He brought in a book her read, [employer], which discussed being more values focused. Discussion on values, and how client can incorporate values more. Played H[employer] video on goals vs values. Values clarification with client.     Objective/Observations:   Participation: Active verbal participation and Engaged   Grooming: Good and Neat   Cognition: Alert and Fully Oriented   Eye contact: Good   Mood: Euthymic   Affect: Flexible   Thought process: Logical and Goal-directed   Speech: Rate within normal limits and Volume within normal limits   Other:     Diagnoses:   1. Situational anxiety    2. Relationship problem between partners         Current risk:   SUICIDE: Not applicable   Homicide: Not applicable   Self-harm: Not applicable   Relapse: Not applicable   Other:    Safety Plan reviewed? Not Indicated   If evidence of imminent risk is present, intervention/plan:     Therapeutic Intervention(s): Clarify:  Clarify values    Treatment Goal(s)/Objective(s) addressed:   · Identify goals/values and cultivate a meaningful life    Progress toward Treatment Goals: Moderate improvement    Plan:  - Continue Individual therapy, reduce frequency of sessions  - Patient is in agreement with the above plan:  YES    Andria Cisneros  6/1/2018

## 2018-06-02 ENCOUNTER — HOSPITAL ENCOUNTER (OUTPATIENT)
Dept: RADIOLOGY | Facility: MEDICAL CENTER | Age: 73
End: 2018-06-02
Attending: NURSE PRACTITIONER
Payer: COMMERCIAL

## 2018-06-02 DIAGNOSIS — G57.32 LESION OF LEFT LATERAL POPLITEAL NERVE: ICD-10-CM

## 2018-06-02 DIAGNOSIS — M54.16 LUMBAR RADICULOPATHY: ICD-10-CM

## 2018-06-02 PROCEDURE — 72158 MRI LUMBAR SPINE W/O & W/DYE: CPT

## 2018-06-02 PROCEDURE — 72110 X-RAY EXAM L-2 SPINE 4/>VWS: CPT

## 2018-06-02 PROCEDURE — 700117 HCHG RX CONTRAST REV CODE 255: Performed by: NURSE PRACTITIONER

## 2018-06-02 PROCEDURE — 73721 MRI JNT OF LWR EXTRE W/O DYE: CPT | Mod: LT

## 2018-06-02 PROCEDURE — A9579 GAD-BASE MR CONTRAST NOS,1ML: HCPCS | Performed by: NURSE PRACTITIONER

## 2018-06-02 RX ADMIN — GADODIAMIDE 20 ML: 287 INJECTION INTRAVENOUS at 08:16

## 2018-06-27 ENCOUNTER — OFFICE VISIT (OUTPATIENT)
Dept: MEDICAL GROUP | Facility: MEDICAL CENTER | Age: 73
End: 2018-06-27
Payer: COMMERCIAL

## 2018-06-27 VITALS
RESPIRATION RATE: 16 BRPM | WEIGHT: 198.85 LBS | OXYGEN SATURATION: 92 % | HEART RATE: 75 BPM | DIASTOLIC BLOOD PRESSURE: 70 MMHG | SYSTOLIC BLOOD PRESSURE: 116 MMHG | TEMPERATURE: 98.7 F | BODY MASS INDEX: 28.47 KG/M2 | HEIGHT: 70 IN

## 2018-06-27 DIAGNOSIS — M54.16 CHRONIC RADICULAR PAIN OF LOWER BACK: ICD-10-CM

## 2018-06-27 DIAGNOSIS — E78.2 MIXED HYPERLIPIDEMIA: ICD-10-CM

## 2018-06-27 DIAGNOSIS — G89.29 CHRONIC RADICULAR PAIN OF LOWER BACK: ICD-10-CM

## 2018-06-27 DIAGNOSIS — I10 ESSENTIAL HYPERTENSION: ICD-10-CM

## 2018-06-27 DIAGNOSIS — Z00.00 HEALTHCARE MAINTENANCE: ICD-10-CM

## 2018-06-27 DIAGNOSIS — E03.4 HYPOTHYROIDISM DUE TO ACQUIRED ATROPHY OF THYROID: ICD-10-CM

## 2018-06-27 DIAGNOSIS — N52.8 OTHER MALE ERECTILE DYSFUNCTION: ICD-10-CM

## 2018-06-27 PROCEDURE — 99204 OFFICE O/P NEW MOD 45 MIN: CPT | Performed by: FAMILY MEDICINE

## 2018-06-27 NOTE — PROGRESS NOTES
CC: new patient ( HTN, HLD, Thyroid disorder, Back pain, abnormal erection)    HPI:  Amadou presents today to establish a new PCP.    Patient has been active, and independent with all ADLs, has the following medical issues:    Essential hypertension  BP has been adequately controlled on current medication. Denies headache, chest pain, and SOB.Has been on HCTZ 12.5 mg daily.no side effects.    Mixed hyperlipidemia  He has been tolerating the statin. Denies muscle pain LFTs has been normal, has been  on Lipitor 10 mg daily.    Hypothyroidism due to acquired atrophy of thyroid  He has been tolerating Levothyroxine, no palpitation, no cold or heat intolerance, has been on Levothyroxine 137 mcg daily. Last TSH was checked in 8/2017    Chronic radicular pain of lower back  Patient underwent multiple back surgeries ( laminectomies and fusions) total of about 5-6 surgeries.Has been having numbess an tingling of both gels and feet, was put on Gabapentin , he follows up with neurosurgery.    Other male erectile dysfunction  Has been having erectile dysfunction ( has been having an abnormal erection, patient stated he has an erection put his penis bends so the sexual intercourse is not successful. Has been using Cialis for many years.    Pneumonia is UTD, had colonoscopy in 2014 he is in 5 yrs schedule.    Patient Active Problem List    Diagnosis Date Noted   • Spondylolysis, lumbar region 10/05/2017   • Lumbar radiculopathy 10/06/2016   • Chronic lumbar radiculopathy 08/18/2016   • Chronic infection of sinus 05/17/2016   • Essential hypertension 05/17/2016   • Thyroid activity decreased 05/17/2016   • Hyperlipidemia 05/17/2016   • Impotence 05/17/2016   • Impaired renal function 05/17/2016   • Bilateral stenosis of lateral recess of lumbar spine 03/12/2016       Current Outpatient Prescriptions   Medication Sig Dispense Refill   • gabapentin (NEURONTIN) 300 MG Cap Take 1-2 Caps by mouth every four hours as needed. 120 Cap 1    • levothyroxine (SYNTHROID) 137 MCG Tab Take 1 Tab by mouth Every morning on an empty stomach. 90 Tab 3   • atorvastatin (LIPITOR) 10 MG Tab Take 1 Tab by mouth every day. 90 Tab 3   • hydrochlorothiazide (HYDRODIURIL) 12.5 MG tablet Take 1 Tab by mouth every day. 90 Tab 3   • multivitamin (THERAGRAN) Tab Take 1 Tab by mouth every day.     • Omega 3 1000 MG Cap Take 1 Tab by mouth every day.     • b complex vitamins tablet Take 1 Tab by mouth every day.     • Calcium Carbonate-Vitamin D (CALCIUM + D PO) Take 1 Cap by mouth every day.       No current facility-administered medications for this visit.          Allergies as of 06/27/2018   • (No Known Allergies)        Social History     Social History   • Marital status:      Spouse name: N/A   • Number of children: N/A   • Years of education: N/A     Occupational History   • Not on file.     Social History Main Topics   • Smoking status: Never Smoker   • Smokeless tobacco: Never Used   • Alcohol use No   • Drug use: No   • Sexual activity: Yes     Other Topics Concern   • Not on file     Social History Narrative   • No narrative on file       Family History   Problem Relation Age of Onset   • Cancer Father      Leukemia    • Heart Disease Father    • Diabetes Father    • Stroke Father    • Cancer Mother      Liver   • Heart Disease Mother    • Diabetes Mother        Past Surgical History:   Procedure Laterality Date   • LUMBAR LAMINECTOMY DISKECTOMY Left 12/6/2017    Procedure: LUMBAR LAMINECTOMY DISKECTOMY - POSTERIOR REDO LEFT  L4-S1 KAILA;  Surgeon: Be Webber M.D.;  Location: SURGERY [facility];  Service: Neurosurgery   • LUMBAR FUSION POSTERIOR  10/5/2017    Procedure: POSTERIOR TRANSFORAMINAL INTERBODY FUSION AT LUMBAR 4 - 5;  Surgeon: Be Webber M.D.;  Location: SURGERY [facility];  Service: Neurosurgery   • LUMBAR DECOMPRESSION  10/5/2017    Procedure: POSTERIOR LUMBAR 3-4,  4-5 DECOMPRESSION AND FUSION;  Surgeon: Be Webber M.D.;   "Location: [facility];  Service: Neurosurgery   • PB INJ DX/THER AGNT PARAVERT FACET JOINT, BRITT* Right 10/6/2016    Procedure: INJ PARA FACET L/S 1 LVL W/IG - L3-4, L4-5, L5-S1;  Surgeon: Eugenio Camara M.D.;  Location: [facility];  Service: Pain Management   • PB INJ DX/THER AGNT PARAVERT FACET JOINT, BRITT*  10/6/2016    Procedure: INJ PARA FACET L/S 2D LVL W/IG;  Surgeon: Eugenio Camara M.D.;  Location: [facility];  Service: Pain Management   • PB INJ DX/THER AGNT PARAVERT FACET JOINT, BRITT*  10/6/2016    Procedure: INJ PARA FACET L/S 3D LVL W/IG;  Surgeon: Eugenio Camara M.D.;  Location: [facility];  Service: Pain Management   • PB INJ,FORAMEN,L/S,1 LEVEL Right 8/18/2016    Procedure: INJ-FORAMEN EPI LUM/SAC SNGL - L5-S1;  Surgeon: Eugenio Camara M.D.;  Location: [facility];  Service: Pain Management   • LUMBAR LAMINECTOMY DISKECTOMY Right 3/12/2016    Procedure: LUMBAR HemiLAMINECTOMY Micro DISKECTOMY posterior Redo L3-4 ;  Surgeon: Be Webber M.D.;  Location: [facility];  Service:    • FORAMINOTOMY Right 3/12/2016    Procedure: FORAMINOTOMY;  Surgeon: Be Webber M.D.;  Location: [facility];  Service:    • CERVICAL LAMINECTOMY POSTERIOR  2011   • OTHER      nasal surgery 2015   • OTHER NEUROLOGICAL SURG  2006, 2010, 2012, 2014, 2016, 2017    low back surgery x 5       ROS:  Denies any Headache, Blurred Vision, Confusion Chest pain,  Shortness of breath,  Abdominal pain, Changes of bowel or bladder, Lower ext edema, Fevers, Nights sweats, Weight Changes, Focal weakness or numbness.  All other systems are negative.    /70   Pulse 75   Temp 37.1 °C (98.7 °F)   Resp 16   Ht 1.778 m (5' 10\")   Wt 90.2 kg (198 lb 13.7 oz)   SpO2 92%   BMI 28.53 kg/m²     Physical Exam:  Gen:         Alert and oriented, No apparent distress.  HEENT:   Perrla, TM clear,  Oralpharynx no erythema or exudates.  Neck:      "  No Jugular venous distension, Lymphadenopathy, Thyromegaly, Bruits.  Lungs:     Clear to auscultation bilaterally  CV:          Regular rate and rhythm. No murmurs, rubs or gallops.  Abd:         Soft non tender, non distended. Normal active bowel sounds. No hepatosplenomegaly, No pulsatile masses.  Ext:          No clubbing, cyanosis, edema.      Assessment and Plan.   72 y.o. male     1. Essential hypertension  Has been adequately controlled on current medication. Denies headache, chest pain, and SOB.  Continue on HCTZ 12.5 mg daily.    2. Mixed hyperlipidemia  He has been tolerating the statin. Denies muscle pain LFTs has been normal  Continue on Lipitor 10 mg daily.    3. Hypothyroidism due to acquired atrophy of thyroid  He has been tolerating Levothyroxine, no palpitation, no cold or heat intolerance  Continue on Levothyroxine 137 mcg daily.    - LIPID PANEL  - TSH+FREE T4    4. Chronic radicular pain of lower back  S/P multiple surgeries ( 5-6 surgeries).  Continue on Gabapentin for peripheral neuropathy.  Continue follow up with neurosurgery.    5. Healthcare maintenance  Pneumonia is UTD, had colonoscopy in 2014 he is in 5 yrs schedule.    6. Other male erectile dysfunction  Has been having erectile dysfunction ( has been having an abnormal erection, patient stated he has an erection put his penis bends so the sexual intercourse is not successful. Has been using Cialis for many years.    - REFERRAL TO UROLOGY

## 2018-07-05 ENCOUNTER — HOSPITAL ENCOUNTER (OUTPATIENT)
Dept: LAB | Facility: MEDICAL CENTER | Age: 73
End: 2018-07-05
Attending: SPECIALIST
Payer: COMMERCIAL

## 2018-07-05 ENCOUNTER — HOSPITAL ENCOUNTER (OUTPATIENT)
Dept: LAB | Facility: MEDICAL CENTER | Age: 73
End: 2018-07-05
Attending: FAMILY MEDICINE
Payer: COMMERCIAL

## 2018-07-05 DIAGNOSIS — E03.4 HYPOTHYROIDISM DUE TO ACQUIRED ATROPHY OF THYROID: ICD-10-CM

## 2018-07-05 LAB
BASOPHILS # BLD AUTO: 0.7 % (ref 0–1.8)
BASOPHILS # BLD: 0.05 K/UL (ref 0–0.12)
EOSINOPHIL # BLD AUTO: 0.16 K/UL (ref 0–0.51)
EOSINOPHIL NFR BLD: 2.3 % (ref 0–6.9)
ERYTHROCYTE [DISTWIDTH] IN BLOOD BY AUTOMATED COUNT: 39.4 FL (ref 35.9–50)
ERYTHROCYTE [SEDIMENTATION RATE] IN BLOOD BY WESTERGREN METHOD: 4 MM/HOUR (ref 0–20)
HCT VFR BLD AUTO: 49 % (ref 42–52)
HGB BLD-MCNC: 16.8 G/DL (ref 14–18)
IMM GRANULOCYTES # BLD AUTO: 0.02 K/UL (ref 0–0.11)
IMM GRANULOCYTES NFR BLD AUTO: 0.3 % (ref 0–0.9)
LYMPHOCYTES # BLD AUTO: 1.82 K/UL (ref 1–4.8)
LYMPHOCYTES NFR BLD: 25.9 % (ref 22–41)
MCH RBC QN AUTO: 28.8 PG (ref 27–33)
MCHC RBC AUTO-ENTMCNC: 34.3 G/DL (ref 33.7–35.3)
MCV RBC AUTO: 83.9 FL (ref 81.4–97.8)
MONOCYTES # BLD AUTO: 0.66 K/UL (ref 0–0.85)
MONOCYTES NFR BLD AUTO: 9.4 % (ref 0–13.4)
NEUTROPHILS # BLD AUTO: 4.31 K/UL (ref 1.82–7.42)
NEUTROPHILS NFR BLD: 61.4 % (ref 44–72)
NRBC # BLD AUTO: 0 K/UL
NRBC BLD-RTO: 0 /100 WBC
PLATELET # BLD AUTO: 256 K/UL (ref 164–446)
PMV BLD AUTO: 10.7 FL (ref 9–12.9)
RBC # BLD AUTO: 5.84 M/UL (ref 4.7–6.1)
T4 FREE SERPL-MCNC: 1.3 NG/DL (ref 0.53–1.43)
TSH SERPL DL<=0.005 MIU/L-ACNC: 0.27 UIU/ML (ref 0.38–5.33)
WBC # BLD AUTO: 7 K/UL (ref 4.8–10.8)

## 2018-07-05 PROCEDURE — 80061 LIPID PANEL: CPT

## 2018-07-05 PROCEDURE — 80053 COMPREHEN METABOLIC PANEL: CPT

## 2018-07-05 PROCEDURE — 84443 ASSAY THYROID STIM HORMONE: CPT

## 2018-07-05 PROCEDURE — 84439 ASSAY OF FREE THYROXINE: CPT

## 2018-07-05 PROCEDURE — 82550 ASSAY OF CK (CPK): CPT

## 2018-07-05 PROCEDURE — 85025 COMPLETE CBC W/AUTO DIFF WBC: CPT

## 2018-07-05 PROCEDURE — 36415 COLL VENOUS BLD VENIPUNCTURE: CPT

## 2018-07-05 PROCEDURE — 85652 RBC SED RATE AUTOMATED: CPT

## 2018-07-06 LAB
ALBUMIN SERPL BCP-MCNC: 4.6 G/DL (ref 3.2–4.9)
ALBUMIN/GLOB SERPL: 1.6 G/DL
ALP SERPL-CCNC: 77 U/L (ref 30–99)
ALT SERPL-CCNC: 38 U/L (ref 2–50)
ANION GAP SERPL CALC-SCNC: 14 MMOL/L (ref 0–11.9)
AST SERPL-CCNC: 35 U/L (ref 12–45)
BILIRUB SERPL-MCNC: 0.7 MG/DL (ref 0.1–1.5)
BUN SERPL-MCNC: 27 MG/DL (ref 8–22)
CALCIUM SERPL-MCNC: 10 MG/DL (ref 8.5–10.5)
CHLORIDE SERPL-SCNC: 105 MMOL/L (ref 96–112)
CHOLEST SERPL-MCNC: 162 MG/DL (ref 100–199)
CK SERPL-CCNC: 322 U/L (ref 0–154)
CO2 SERPL-SCNC: 21 MMOL/L (ref 20–33)
CREAT SERPL-MCNC: 1.13 MG/DL (ref 0.5–1.4)
GLOBULIN SER CALC-MCNC: 2.8 G/DL (ref 1.9–3.5)
GLUCOSE SERPL-MCNC: 82 MG/DL (ref 65–99)
HDLC SERPL-MCNC: 51 MG/DL
LDLC SERPL CALC-MCNC: 89 MG/DL
POTASSIUM SERPL-SCNC: 4.6 MMOL/L (ref 3.6–5.5)
PROT SERPL-MCNC: 7.4 G/DL (ref 6–8.2)
SODIUM SERPL-SCNC: 140 MMOL/L (ref 135–145)
TRIGL SERPL-MCNC: 108 MG/DL (ref 0–149)

## 2018-07-11 ENCOUNTER — OFFICE VISIT (OUTPATIENT)
Dept: URGENT CARE | Facility: PHYSICIAN GROUP | Age: 73
End: 2018-07-11
Payer: COMMERCIAL

## 2018-07-11 VITALS
HEART RATE: 64 BPM | DIASTOLIC BLOOD PRESSURE: 80 MMHG | OXYGEN SATURATION: 96 % | HEIGHT: 70 IN | WEIGHT: 190 LBS | TEMPERATURE: 97.5 F | SYSTOLIC BLOOD PRESSURE: 120 MMHG | BODY MASS INDEX: 27.2 KG/M2

## 2018-07-11 DIAGNOSIS — L08.9 INFECTED LESION OF SKIN: ICD-10-CM

## 2018-07-11 PROCEDURE — 99203 OFFICE O/P NEW LOW 30 MIN: CPT | Performed by: NURSE PRACTITIONER

## 2018-07-11 RX ORDER — TIZANIDINE 2 MG/1
TABLET ORAL
COMMUNITY
Start: 2018-05-21 | End: 2018-08-06

## 2018-07-11 RX ORDER — DOXYCYCLINE HYCLATE 100 MG
100 TABLET ORAL 2 TIMES DAILY
Qty: 20 TAB | Refills: 0 | Status: SHIPPED | OUTPATIENT
Start: 2018-07-11 | End: 2018-07-21

## 2018-07-12 NOTE — PROGRESS NOTES
Chief Complaint   Patient presents with   • Wound Infection     infection on r hand        HISTORY OF PRESENT ILLNESS: Patient is a 72 y.o. male who presents to urgent care today with complaints of a wound to his right hand. States he has had a wound to the distal aspect of his right third finger for approx two months. The wound is mildly painful with surrounding redness and scant discharge. He denies fever, chills, malaise, or joint pain. He cannot recall injury or precipitating event. He has tried some of his wife's Bactroban with some improvement but has been not consistent with the medication.     Patient Active Problem List    Diagnosis Date Noted   • Spondylolysis, lumbar region 10/05/2017   • Lumbar radiculopathy 10/06/2016   • Chronic lumbar radiculopathy 08/18/2016   • Chronic infection of sinus 05/17/2016   • Essential hypertension 05/17/2016   • Thyroid activity decreased 05/17/2016   • Hyperlipidemia 05/17/2016   • Impotence 05/17/2016   • Impaired renal function 05/17/2016   • Bilateral stenosis of lateral recess of lumbar spine 03/12/2016       Allergies:Patient has no known allergies.    Current Outpatient Prescriptions Ordered in [facility]   Medication Sig Dispense Refill   • tizanidine (ZANAFLEX) 2 MG tablet tizanidine 2 mg tablet   take 1-2 tablets by mouth every 8 hrs as needed for spasms     • doxycycline (VIBRAMYCIN) 100 MG Tab Take 1 Tab by mouth 2 times a day for 10 days. 20 Tab 0   • gabapentin (NEURONTIN) 300 MG Cap Take 1-2 Caps by mouth every four hours as needed. 120 Cap 1   • levothyroxine (SYNTHROID) 137 MCG Tab Take 1 Tab by mouth Every morning on an empty stomach. 90 Tab 3   • atorvastatin (LIPITOR) 10 MG Tab Take 1 Tab by mouth every day. 90 Tab 3   • hydrochlorothiazide (HYDRODIURIL) 12.5 MG tablet Take 1 Tab by mouth every day. 90 Tab 3   • multivitamin (THERAGRAN) Tab Take 1 Tab by mouth every day.     • Omega 3 1000 MG Cap Take 1 Tab by mouth every day.     • b complex vitamins tablet  "Take 1 Tab by mouth every day.     • Calcium Carbonate-Vitamin D (CALCIUM + D PO) Take 1 Cap by mouth every day.       No current [facility]-ordered facility-administered medications on file.        Past Medical History:   Diagnosis Date   • Decreased renal function     \"From lab work\"   • High cholesterol    • Hyperlipidemia, mixed    • Hypertension    • Hypothyroidism    • Impotence    • Lower back pain 2017    \"Chronic lower back from back surgery on 10-5-17\"   • Preventative health care        Social History   Substance Use Topics   • Smoking status: Never Smoker   • Smokeless tobacco: Never Used   • Alcohol use No       Family Status   Relation Status   • Father    • Mother      Family History   Problem Relation Age of Onset   • Cancer Father      Leukemia    • Heart Disease Father    • Diabetes Father    • Stroke Father    • Cancer Mother      Liver   • Heart Disease Mother    • Diabetes Mother        ROS:  Review of Systems   Constitutional: Negative for fever, chills, weight loss, malaise, and fatigue.   HENT: Negative for ear pain, nosebleeds, congestion, sore throat and neck pain.    Eyes: Negative for vision changes.   Neuro: Negative for headache, sensory changes, weakness, seizure, LOC.   Cardiovascular: Negative for chest pain, palpitations, orthopnea and leg swelling.   Respiratory: Negative for cough, sputum production, shortness of breath and wheezing.   Gastrointestinal: Negative for abdominal pain, nausea, vomiting or diarrhea.   Genitourinary: Negative for dysuria, urgency and frequency.  Musculoskeletal: Negative for falls, neck pain, back pain, joint pain, myalgias.   Skin: Positive for lesion to finger. Negative for rash, diaphoresis.     Exam:  Blood pressure 120/80, pulse 64, temperature 36.4 °C (97.5 °F), height 1.778 m (5' 10\"), weight 86.2 kg (190 lb), SpO2 96 %.  General: well-nourished, well-developed male in NAD  Head: normocephalic, atraumatic  Eyes: PERRLA, no conjunctival " injection, acuity grossly intact, lids normal.  Ears: normal shape and symmetry, no tenderness, no discharge. External canals are without any significant edema or erythema. Tympanic membranes are without any inflammation, no effusion. Gross auditory acuity is intact.  Nose: symmetrical without tenderness, no discharge.  Mouth/Throat: reasonable hygiene, no erythema, exudates or tonsillar enlargement.  Neck: no masses, range of motion within normal limits, no tracheal deviation. No obvious thyroid enlargement.   Lymph: no cervical adenopathy. No supraclavicular adenopathy.   Neuro: alert and oriented. Cranial nerves 1-12 grossly intact. No sensory deficit.   Cardiovascular: regular rate and rhythm. No edema.  Pulmonary: no distress. Chest is symmetrical with respiration, no wheezes, crackles, or rhonchi.   Musculoskeletal: no clubbing, appropriate muscle tone, gait is stable.  Skin: intact, no clubbing, no cyanosis, no rashes. There is a circular, approx 2mm, open lesion to distal aspect of third right finger just proximal to nail base. Mild surrounding erythema. No streaking or drainage. Distal N/V intact.   Psych: appropriate mood, affect, judgement.         Assessment/Plan:  1. Infected lesion of skin  doxycycline (VIBRAMYCIN) 100 MG Tab       Warm epsom salt soaks. Wound care. Doxy as directed. Discussed potential for fungal etiology as well but will try doxy first as he was somewhat responsive to topical antibacterial treatment. If not improvement in one week, RTC or follow up with PCP for additional care and re-evaluation.   Supportive care, differential diagnoses, and indications for immediate follow-up discussed with patient.   Pathogenesis of diagnosis discussed including typical length and natural progression.   Instructed to return to clinic or nearest emergency department for any change in condition, further concerns, or worsening of symptoms.  Patient states understanding of the plan of care and  discharge instructions.  Instructed to make an appointment, for follow up, with his primary care provider.        Please note that this dictation was created using voice recognition software. I have made every reasonable attempt to correct obvious errors, but I expect that there are errors of grammar and possibly content that I did not discover before finalizing the note.      ROSAS Brown.

## 2018-08-06 ENCOUNTER — OFFICE VISIT (OUTPATIENT)
Dept: INTERNAL MEDICINE | Facility: MEDICAL CENTER | Age: 73
End: 2018-08-06
Payer: COMMERCIAL

## 2018-08-06 VITALS
OXYGEN SATURATION: 93 % | HEIGHT: 70 IN | SYSTOLIC BLOOD PRESSURE: 110 MMHG | DIASTOLIC BLOOD PRESSURE: 66 MMHG | BODY MASS INDEX: 28.42 KG/M2 | HEART RATE: 70 BPM | WEIGHT: 198.5 LBS | TEMPERATURE: 98.7 F

## 2018-08-06 DIAGNOSIS — R74.8 ELEVATED CK: ICD-10-CM

## 2018-08-06 DIAGNOSIS — Z23 NEED FOR 23-POLYVALENT PNEUMOCOCCAL POLYSACCHARIDE VACCINE: ICD-10-CM

## 2018-08-06 DIAGNOSIS — H91.93 BILATERAL HEARING LOSS, UNSPECIFIED HEARING LOSS TYPE: ICD-10-CM

## 2018-08-06 DIAGNOSIS — G62.9 NEUROPATHY: ICD-10-CM

## 2018-08-06 DIAGNOSIS — N52.9 ERECTILE DYSFUNCTION, UNSPECIFIED ERECTILE DYSFUNCTION TYPE: ICD-10-CM

## 2018-08-06 DIAGNOSIS — E03.9 HYPOTHYROIDISM, UNSPECIFIED TYPE: ICD-10-CM

## 2018-08-06 DIAGNOSIS — L08.9 FINGER INFECTION: ICD-10-CM

## 2018-08-06 DIAGNOSIS — M54.50 CHRONIC RIGHT-SIDED LOW BACK PAIN WITHOUT SCIATICA: ICD-10-CM

## 2018-08-06 DIAGNOSIS — Z12.5 SCREENING FOR MALIGNANT NEOPLASM OF PROSTATE: ICD-10-CM

## 2018-08-06 DIAGNOSIS — E78.5 DYSLIPIDEMIA: ICD-10-CM

## 2018-08-06 DIAGNOSIS — I10 BENIGN ESSENTIAL HTN: ICD-10-CM

## 2018-08-06 DIAGNOSIS — G89.29 CHRONIC RIGHT-SIDED LOW BACK PAIN WITHOUT SCIATICA: ICD-10-CM

## 2018-08-06 DIAGNOSIS — Z71.89 ACP (ADVANCE CARE PLANNING): ICD-10-CM

## 2018-08-06 PROCEDURE — 90471 IMMUNIZATION ADMIN: CPT | Performed by: INTERNAL MEDICINE

## 2018-08-06 PROCEDURE — 90732 PPSV23 VACC 2 YRS+ SUBQ/IM: CPT | Performed by: INTERNAL MEDICINE

## 2018-08-06 PROCEDURE — 99215 OFFICE O/P EST HI 40 MIN: CPT | Mod: 25 | Performed by: INTERNAL MEDICINE

## 2018-08-06 RX ORDER — ATORVASTATIN CALCIUM 10 MG/1
10 TABLET, FILM COATED ORAL
Status: SHIPPED | DISCHARGE
Start: 2018-08-06 | End: 2018-10-29 | Stop reason: SDUPTHER

## 2018-08-06 RX ORDER — GABAPENTIN 300 MG/1
300 CAPSULE ORAL 4 TIMES DAILY
Status: SHIPPED | DISCHARGE
Start: 2018-08-06 | End: 2019-02-01 | Stop reason: SDUPTHER

## 2018-08-06 NOTE — PATIENT INSTRUCTIONS
Consider advance directive.     Pneumovax today.   I recommend the Shingrix vaccine series to prevent shingles.  Please get the vaccines at a local pharmacy.        Get your thyroid checked 6 weeks after making med change.    Stop cholesterol med for one month and repeat levels (CK and cholesterol).

## 2018-08-06 NOTE — NON-PROVIDER
"Amadou Parekh is a 73 y.o. male here for a non-provider visit for:   PNEUMOVAX (PPSV23)    Reason for immunization: Overdue/Provider Recommended  Immunization records indicate need for vaccine: Yes, confirmed with Epic  Minimum interval has been met for this vaccine: Yes  ABN completed: Not Indicated    Order and dose verified by: RONALD BRICENO Dated  04/24/15 was given to patient: Yes  All IAC Questionnaire questions were answered \"No.\"    Patient tolerated injection and no adverse effects were observed or reported: Yes    Pt scheduled for next dose in series: Not Indicated    "

## 2018-08-06 NOTE — PROGRESS NOTES
New Patient to Establish    Reason to establish: PCP switch    Chief Complaint   Patient presents with   • Establish Care   • Hypothyroidism       HPI  History was obtained from the patient and a medical record review.     Needs a new doctor as previous one (Sid) graduated.     Doing well.     Biggest issue has to do with L leg numbness and tingling he experience.  Seen by Matty.  Started on martha with some relief.  Also has R leg numbness.  Notes that L foot drops and that he gets more severe pain in leg with walking.  Started after a surgery for R back pain with sciatica -- R back pain is nearly resolved.  No b/b incont.  No f/c.  No WL.  To go to [place] next week.  Seeing Dr. Webber here too.     Notes chronic low grade R 4th finger nail bed infection for last 2+ months.  Took oral antibiotics.  Was also apply Neosporin and doing Epsom salts.  Got better last week.  Stopped Neosporin and Epsom and sxs returned over the week.  Minimal clear drainage and redness.  To see derm next week.     LT4 adjusted after TSH low in July.      CK high.  Notes some leg soreness with walking.      Seeing urology for Peyronie's -- has not used ED meds recently.     Wants to get PSA checked.  Uncle  from [place] prostate in his 70s.      THREE CHRONIC CONDITIONS  HTN, stable  DL, stable  OA, worse    Past Medical History:   Diagnosis Date   • Actinic keratoses     sees derm - Dr. Everett   • Bilateral hearing loss 2018    wears aids   • Chronic right-sided low back pain without sciatica 2018    s/p 5 surgeries; sees Akbar   • ED (erectile dysfunction)    • Hyperlipidemia, mixed    • Hypertension    • Hypothyroidism    • Neuropathy (HCC) 2018   • Peyronie disease     sees [place] urology       Past Surgical History:   Procedure Laterality Date   • LUMBAR LAMINECTOMY DISKECTOMY Left 2017    Procedure: LUMBAR LAMINECTOMY DISKECTOMY - POSTERIOR REDO LEFT  L4-S1 KAILA;  Surgeon: Be Webber M.D.;  Location: SURGERY  [facility];  Service: Neurosurgery   • LUMBAR FUSION POSTERIOR  10/5/2017    Procedure: POSTERIOR TRANSFORAMINAL INTERBODY FUSION AT LUMBAR 4 - 5;  Surgeon: Be Webber M.D.;  Location: [facility];  Service: Neurosurgery   • LUMBAR DECOMPRESSION  10/5/2017    Procedure: POSTERIOR LUMBAR 3-4,  4-5 DECOMPRESSION AND FUSION;  Surgeon: Be Webber M.D.;  Location: [facility];  Service: Neurosurgery   • PB INJ DX/THER AGNT PARAVERT FACET JOINT, BRITT* Right 10/6/2016    Procedure: INJ PARA FACET L/S 1 LVL W/IG - L3-4, L4-5, L5-S1;  Surgeon: Eugenio Camara M.D.;  Location: [facility];  Service: Pain Management   • PB INJ DX/THER AGNT PARAVERT FACET JOINT, BRITT*  10/6/2016    Procedure: INJ PARA FACET L/S 2D LVL W/IG;  Surgeon: Eugenio Camara M.D.;  Location: [facility];  Service: Pain Management   • PB INJ DX/THER AGNT PARAVERT FACET JOINT, BRITT*  10/6/2016    Procedure: INJ PARA FACET L/S 3D LVL W/IG;  Surgeon: Eugenio Camara M.D.;  Location: [facility];  Service: Pain Management   • PB INJ,FORAMEN,L/S,1 LEVEL Right 8/18/2016    Procedure: INJ-FORAMEN EPI LUM/SAC SNGL - L5-S1;  Surgeon: Eugenio Camara M.D.;  Location: [facility];  Service: Pain Management   • LUMBAR LAMINECTOMY DISKECTOMY Right 3/12/2016    Procedure: LUMBAR HemiLAMINECTOMY Micro DISKECTOMY posterior Redo L3-4 ;  Surgeon: Be Webber M.D.;  Location: [facility];  Service:    • FORAMINOTOMY Right 3/12/2016    Procedure: FORAMINOTOMY;  Surgeon: Be Webber M.D.;  Location: [facility];  Service:    • CERVICAL LAMINECTOMY POSTERIOR  2011   • OTHER      nasal surgery 2015   • OTHER NEUROLOGICAL SURG  2006, 2010, 2012, 2014, 2016, 2017    low back surgery x 5   • TONSILLECTOMY      child       Current Outpatient Prescriptions   Medication Sig Dispense Refill   • gabapentin (NEURONTIN) 300 MG Cap Take 1 Cap by mouth 4 times a day.     •  "atorvastatin (LIPITOR) 10 MG Tab Take 1 Tab by mouth every bedtime.     • aspirin EC (ECOTRIN) 81 MG Tablet Delayed Response Take 1 Tab by mouth every day.     • levothyroxine (SYNTHROID) 137 MCG Tab Take 1 Tab by mouth Every morning on an empty stomach. 90 Tab 3   • hydrochlorothiazide (HYDRODIURIL) 12.5 MG tablet Take 1 Tab by mouth every day. 90 Tab 3   • multivitamin (THERAGRAN) Tab Take 1 Tab by mouth every day.     • Omega 3 1000 MG Cap Take 1 Tab by mouth every day.     • b complex vitamins tablet Take 1 Tab by mouth every day.     • Calcium Carbonate-Vitamin D (CALCIUM + D PO) Take 1 Cap by mouth every day.       No current facility-administered medications for this visit.        Allergies as of 08/06/2018   • (No Known Allergies)       Social History     Social History   • Marital status:      Spouse name: N/A   • Number of children: N/A   • Years of education: N/A     Occupational History   • Not on file.     Social History Main Topics   • Smoking status: Never Smoker   • Smokeless tobacco: Never Used   • Alcohol use No      Comment: very occ   • Drug use: No   • Sexual activity: Yes     Other Topics Concern   • Not on file     Social History Narrative    Lives with wife in 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      /66   Pulse 70   Temp 37.1 °C (98.7 °F)   Ht 1.778 m (5' 10\")   Wt 90 kg (198 lb 8 oz)   SpO2 93%   BMI 28.48 kg/m²     Physical Exam  General:  Alert and oriented.  No apparent distress.    Eyes: Pupils equal and reactive " to light. No scleral icterus. No conjunctival injection.      Ears:  Ear canals patent. Tympanic membranes visualized.  Hearing to finger rub impaired    ENMT: Moist mucous membranes. Oropharynx clear. No erythema or exudates noted.     Neck: Supple. Trachea midline.  No masses.     Resp: Clear to auscultation bilaterally. No rales, rhonchi, or wheezes.    Cardiovascular: Regular rate and normal rhythm. No murmurs, rubs or gallops. 2+ radial and dorsalis pedis pulses.     Abdomen: Soft, nontender, nondistended. Positive bowel sounds.     Musculoskeletal: No clubbing, cyanosis, edema.    Skin:   Toe nails a little bit thickened  R fourth finger base with minimal crusting and redness; no fluctuance or induration    Lymph: No cervical or submandibular lymphadenopathy appreciated.    Neuro: Cranial nerves II through XII intact.  Could not elicit reflexes in either LE  Decreased strength (4+) toe flexion and knee flexion and extension     Psych: Mood euthymic. Affect congruent.    Other:     Labs/Studies  Hospital Outpatient Visit on 07/05/2018   Component Date Value Ref Range Status   • Sodium 07/05/2018 140  135 - 145 mmol/L Final   • Potassium 07/05/2018 4.6  3.6 - 5.5 mmol/L Final   • Chloride 07/05/2018 105  96 - 112 mmol/L Final   • Co2 07/05/2018 21  20 - 33 mmol/L Final   • Anion Gap 07/05/2018 14.0* 0.0 - 11.9 Final   • Glucose 07/05/2018 82  65 - 99 mg/dL Final   • Bun 07/05/2018 27* 8 - 22 mg/dL Final   • Creatinine 07/05/2018 1.13  0.50 - 1.40 mg/dL Final   • Calcium 07/05/2018 10.0  8.5 - 10.5 mg/dL Final   • AST(SGOT) 07/05/2018 35  12 - 45 U/L Final   • ALT(SGPT) 07/05/2018 38  2 - 50 U/L Final   • Alkaline Phosphatase 07/05/2018 77  30 - 99 U/L Final   • Total Bilirubin 07/05/2018 0.7  0.1 - 1.5 mg/dL Final   • Albumin 07/05/2018 4.6  3.2 - 4.9 g/dL Final   • Total Protein 07/05/2018 7.4  6.0 - 8.2 g/dL Final   • Globulin 07/05/2018 2.8  1.9 - 3.5 g/dL Final   • A-G Ratio 07/05/2018 1.6  g/dL Final   •  CPK Total 07/05/2018 322* 0 - 154 U/L Final   • WBC 07/05/2018 7.0  4.8 - 10.8 K/uL Final   • RBC 07/05/2018 5.84  4.70 - 6.10 M/uL Final   • Hemoglobin 07/05/2018 16.8  14.0 - 18.0 g/dL Final   • Hematocrit 07/05/2018 49.0  42.0 - 52.0 % Final   • MCV 07/05/2018 83.9  81.4 - 97.8 fL Final   • MCH 07/05/2018 28.8  27.0 - 33.0 pg Final   • MCHC 07/05/2018 34.3  33.7 - 35.3 g/dL Final   • RDW 07/05/2018 39.4  35.9 - 50.0 fL Final   • Platelet Count 07/05/2018 256  164 - 446 K/uL Final   • MPV 07/05/2018 10.7  9.0 - 12.9 fL Final   • Neutrophils-Polys 07/05/2018 61.40  44.00 - 72.00 % Final   • Lymphocytes 07/05/2018 25.90  22.00 - 41.00 % Final   • Monocytes 07/05/2018 9.40  0.00 - 13.40 % Final   • Eosinophils 07/05/2018 2.30  0.00 - 6.90 % Final   • Basophils 07/05/2018 0.70  0.00 - 1.80 % Final   • Immature Granulocytes 07/05/2018 0.30  0.00 - 0.90 % Final   • Nucleated RBC 07/05/2018 0.00  /100 WBC Final   • Neutrophils (Absolute) 07/05/2018 4.31  1.82 - 7.42 K/uL Final    Includes immature neutrophils, if present.   • Lymphs (Absolute) 07/05/2018 1.82  1.00 - 4.80 K/uL Final   • Monos (Absolute) 07/05/2018 0.66  0.00 - 0.85 K/uL Final   • Eos (Absolute) 07/05/2018 0.16  0.00 - 0.51 K/uL Final   • Baso (Absolute) 07/05/2018 0.05  0.00 - 0.12 K/uL Final   • Immature Granulocytes (abs) 07/05/2018 0.02  0.00 - 0.11 K/uL Final   • NRBC (Absolute) 07/05/2018 0.00  K/uL Final   • Sed Rate Westergren 07/05/2018 4  0 - 20 mm/hour Final   • GFR If  07/05/2018 >60  >60 mL/min/1.73 m 2 Final   • GFR If Non  07/05/2018 >60  >60 mL/min/1.73 m 2 Final   Hospital Outpatient Visit on 07/05/2018   Component Date Value Ref Range Status   • Cholesterol,Tot 07/05/2018 162  100 - 199 mg/dL Final   • Triglycerides 07/05/2018 108  0 - 149 mg/dL Final   • HDL 07/05/2018 51  >=40 mg/dL Final   • LDL 07/05/2018 89  <100 mg/dL Final   • TSH 07/05/2018 0.270* 0.380 - 5.330 uIU/mL Final    Comment: Please note  new reference ranges effective 12/14/2017 10:00 AM  Pregnant Females, 1st Trimester  0.050-3.700  Pregnant Females, 2nd Trimester  0.310-4.350  Pregnant Females, 3rd Trimester  0.410-5.180     • Free T-4 07/05/2018 1.30  0.53 - 1.43 ng/dL Final       Assessment and Plan  1. Benign essential HTN  Ok on hctz  - aspirin EC (ECOTRIN) 81 MG Tablet Delayed Response; Take 1 Tab by mouth every day.    2. Dyslipidemia  Ok on statin   However, stopping statin as CK is high  Will repeat levels in 1 month to see if he needs statin and to see if CK comes down and muscle aches are better  - atorvastatin (LIPITOR) 10 MG Tab; Take 1 Tab by mouth every bedtime.  - aspirin EC (ECOTRIN) 81 MG Tablet Delayed Response; Take 1 Tab by mouth every day.  - LIPID PROFILE; Future    3. Erectile dysfunction, unspecified erectile dysfunction type  Not using PDE I at this time given Rashmi's  Working with urology re: ED and Rashmi's    4. Hypothyroidism, unspecified type  tsh was low  Repeat levels on new dose of LT4 (taking 6 days a week instead of 7)  - TSH WITH REFLEX TO FT4; Future    5. Chronic right-sided low back pain without sciatica  6. Neuropathy (HCC)  Back pain ok since surgery  Left with numbness and tingling in legs and L leg pain and numbness with walking  Pt with bounding pulses making PVD less likely   Seen by Matty who did at [facility] -- requesting records  To see [place] next week  Add on   - VITAMIN B12; Future  - FOLATE; Future    7. Elevated CK  Finding from Matty's labs  Will stop statin to see if CK comes down and leg pain improves  - CREATINE KINASE; Future    8. Bilateral hearing loss, unspecified hearing loss type  Uses hearing aids    9. Finger infection  Recurrent in last week  Low grade  Rec cont'd Neosporin and Epsom salt baths  To see derm next week  Return ppx discussed     10. ACP (advance care planning)  AD provided for him to complete  - NURSING COMMUNICATION    11. Need for 23-polyvalent pneumococcal  polysaccharide vaccine  - PneumoVax PPV23 =>1yo    12. Screening for malignant neoplasm of prostate  + fam history in uncle  + minor sxs (nocturia)  - PROSTATE SPECIFIC AG SCREENING; Future    HM  Checking PSA  [place] done in 2014; one 3 mm sessile polyp (mucosal prolapse polyp on path) found -- discuss when to repeat at later date  Pneumovax today  Prevnar 2015  TDAP 2015  Needs Shingrix  Flu annually    Patient Instructions     Consider advance directive.     Pneumovax today.   I recommend the Shingrix vaccine series to prevent shingles.  Please get the vaccines at a local pharmacy.        Get your thyroid checked 6 weeks after making med change.    Stop cholesterol med for one month and repeat levels (CK and cholesterol).      I spent over 50 minutes in face-to-face time with this patient and/or family and/or friends of which > 50% was devoted to counseling.  Topics included back and leg issues, meds, HTN, low thyroid and finger.    Follow-up  Return in about 6 weeks (around 9/17/2018).    Signed by: Kindra Muñoz M.D.

## 2018-08-06 NOTE — LETTER
[facility]  Dane aCstellano M.D.  [address] [facility] [address]  [place] [address]  Fax: [phone]   Authorization for Release/Disclosure of   Protected Health Information   Name: RINA PAREKH : 1945 SSN: [id number]   Address: [address] [place] [address] [place] [address] Phone:    [phone] (home)    I authorize the entity listed below to release/disclose the PHI below to:   [facility]/Dane Castellano M.D. and Kindra Muñoz M.D.   Provider or Entity Name: Dr. Starr     Address   [facility], [facility], [facility]   Phone:      Fax:     Reason for request: continuity of care   Information to be released:    [  ] LAST COLONOSCOPY,  including any PATH REPORT and follow-up  [  ] LAST FIT/COLOGUARD RESULT [  ] LAST DEXA  [  ] LAST MAMMOGRAM  [  ] LAST PAP  [  ] LAST LABS [  ] RETINA EXAM REPORT  [  ] IMMUNIZATION RECORDS  [ x ] Release all info      [  ] Check here and initial the line next to each item to release ALL health information INCLUDING  _____ Care and treatment for drug and / or alcohol abuse  _____ HIV testing, infection status, or AIDS  _____ Genetic Testing    DATES OF SERVICE OR TIME PERIOD TO BE DISCLOSED: _____________  I understand and acknowledge that:  * This Authorization may be revoked at any time by you in writing, except if your health information has already been used or disclosed.  * Your health information that will be used or disclosed as a result of you signing this authorization could be re-disclosed by the recipient. If this occurs, your re-disclosed health information may no longer be protected by State or Federal laws.  * You may refuse to sign this Authorization. Your refusal will not affect your ability to obtain treatment.  * This Authorization becomes effective upon signing and will  on (date) __________.      If no date is indicated, this Authorization will  one (1) year from the signature date.    Name: Rina Parekh    Signature:   Date:     2018       PLEASE FAX REQUESTED  RECORDS BACK TO: [phone]

## 2018-09-11 ENCOUNTER — HOSPITAL ENCOUNTER (OUTPATIENT)
Dept: LAB | Facility: MEDICAL CENTER | Age: 73
End: 2018-09-11
Attending: SPECIALIST
Payer: COMMERCIAL

## 2018-09-11 ENCOUNTER — HOSPITAL ENCOUNTER (OUTPATIENT)
Dept: LAB | Facility: MEDICAL CENTER | Age: 73
End: 2018-09-11
Attending: INTERNAL MEDICINE
Payer: COMMERCIAL

## 2018-09-11 DIAGNOSIS — E03.9 HYPOTHYROIDISM, UNSPECIFIED TYPE: ICD-10-CM

## 2018-09-11 DIAGNOSIS — R74.8 ELEVATED CK: ICD-10-CM

## 2018-09-11 DIAGNOSIS — G62.9 NEUROPATHY: ICD-10-CM

## 2018-09-11 DIAGNOSIS — Z12.5 SCREENING FOR MALIGNANT NEOPLASM OF PROSTATE: ICD-10-CM

## 2018-09-11 DIAGNOSIS — E78.5 DYSLIPIDEMIA: ICD-10-CM

## 2018-09-11 LAB
CHOLEST SERPL-MCNC: 228 MG/DL (ref 100–199)
CK SERPL-CCNC: 315 U/L (ref 0–154)
CK SERPL-CCNC: 315 U/L (ref 0–154)
FASTING STATUS PATIENT QL REPORTED: NORMAL
FOLATE SERPL-MCNC: >24 NG/ML
HDLC SERPL-MCNC: 45 MG/DL
LDLC SERPL CALC-MCNC: 156 MG/DL
PSA SERPL-MCNC: 3.47 NG/ML (ref 0–4)
TRIGL SERPL-MCNC: 136 MG/DL (ref 0–149)
TSH SERPL DL<=0.005 MIU/L-ACNC: 3.79 UIU/ML (ref 0.38–5.33)
VIT B12 SERPL-MCNC: 774 PG/ML (ref 211–911)

## 2018-09-11 PROCEDURE — 82550 ASSAY OF CK (CPK): CPT

## 2018-09-11 PROCEDURE — 82607 VITAMIN B-12: CPT

## 2018-09-11 PROCEDURE — 82746 ASSAY OF FOLIC ACID SERUM: CPT

## 2018-09-11 PROCEDURE — 84443 ASSAY THYROID STIM HORMONE: CPT

## 2018-09-11 PROCEDURE — 36415 COLL VENOUS BLD VENIPUNCTURE: CPT

## 2018-09-11 PROCEDURE — 84153 ASSAY OF PSA TOTAL: CPT

## 2018-09-11 PROCEDURE — 80061 LIPID PANEL: CPT

## 2018-09-11 PROCEDURE — 82550 ASSAY OF CK (CPK): CPT | Mod: 91

## 2018-09-18 DIAGNOSIS — Z01.810 PRE-OPERATIVE CARDIOVASCULAR EXAMINATION: ICD-10-CM

## 2018-09-18 DIAGNOSIS — Z01.812 PRE-OPERATIVE LABORATORY EXAMINATION: ICD-10-CM

## 2018-09-18 LAB
ANION GAP SERPL CALC-SCNC: 5 MMOL/L (ref 0–11.9)
BUN SERPL-MCNC: 20 MG/DL (ref 8–22)
CALCIUM SERPL-MCNC: 9.6 MG/DL (ref 8.5–10.5)
CHLORIDE SERPL-SCNC: 103 MMOL/L (ref 96–112)
CO2 SERPL-SCNC: 30 MMOL/L (ref 20–33)
CREAT SERPL-MCNC: 1.2 MG/DL (ref 0.5–1.4)
EKG IMPRESSION: NORMAL
GLUCOSE SERPL-MCNC: 83 MG/DL (ref 65–99)
POTASSIUM SERPL-SCNC: 4.9 MMOL/L (ref 3.6–5.5)
SODIUM SERPL-SCNC: 138 MMOL/L (ref 135–145)

## 2018-09-18 PROCEDURE — 93010 ELECTROCARDIOGRAM REPORT: CPT | Performed by: INTERNAL MEDICINE

## 2018-09-18 PROCEDURE — 36415 COLL VENOUS BLD VENIPUNCTURE: CPT

## 2018-09-18 PROCEDURE — 80048 BASIC METABOLIC PNL TOTAL CA: CPT

## 2018-09-18 PROCEDURE — 93005 ELECTROCARDIOGRAM TRACING: CPT

## 2018-09-25 ENCOUNTER — OFFICE VISIT (OUTPATIENT)
Dept: INTERNAL MEDICINE | Facility: MEDICAL CENTER | Age: 73
End: 2018-09-25
Payer: COMMERCIAL

## 2018-09-25 VITALS
WEIGHT: 201 LBS | BODY MASS INDEX: 28.77 KG/M2 | HEIGHT: 70 IN | SYSTOLIC BLOOD PRESSURE: 124 MMHG | DIASTOLIC BLOOD PRESSURE: 84 MMHG | TEMPERATURE: 96.7 F | HEART RATE: 60 BPM | OXYGEN SATURATION: 96 %

## 2018-09-25 DIAGNOSIS — E53.8 B12 DEFICIENCY: ICD-10-CM

## 2018-09-25 DIAGNOSIS — R74.8 ELEVATED CPK: ICD-10-CM

## 2018-09-25 DIAGNOSIS — M71.30 MYXOID CYST: ICD-10-CM

## 2018-09-25 DIAGNOSIS — E78.5 DYSLIPIDEMIA: ICD-10-CM

## 2018-09-25 DIAGNOSIS — G62.9 NEUROPATHY: ICD-10-CM

## 2018-09-25 PROCEDURE — 99214 OFFICE O/P EST MOD 30 MIN: CPT | Performed by: INTERNAL MEDICINE

## 2018-09-25 ASSESSMENT — PATIENT HEALTH QUESTIONNAIRE - PHQ9: CLINICAL INTERPRETATION OF PHQ2 SCORE: 0

## 2018-09-25 NOTE — PATIENT INSTRUCTIONS
Follow up with Akbar re: Wilder recs.   Wilder recs [facility] (nerve conduction study) and CT low back at the 1 year [facility].    See rheumatology.   Referral to acupuncture done.   I recommend the Shingrix vaccine series to prevent shingles.  Please get the vaccines at a local pharmacy.      Consider advance directive.  Come back in 3-4 months.  Come back sooner if needed.

## 2018-09-25 NOTE — PROGRESS NOTES
Follow up    Chief Complaint   Patient presents with   • Results     Labs   • Hyperlipidemia       HPI  History was obtained from the patient and a medical record review.   Back on statin.   No change in L leg pain with walking or with neuropathy.   Notes that R leg pain coming back with sleeping.   Interested in seeing rheum and doing TCM.  To get myxoid cyst of R 4th finger removed.    THREE CHRONIC CONDITIONS  HTN, stable  DL, stable  OA, worse    Past Medical History:   Diagnosis Date   • Actinic keratoses     sees [facility] - Dr. Everett   • Bilateral hearing loss 08/06/2018    wears aids   • Chronic right-sided low back pain without sciatica 08/06/2018    s/p 5 surgeries; sees Akbar   • ED (erectile dysfunction)    • High cholesterol    • Hyperlipidemia, mixed    • Hypertension    • Hypothyroidism    • Neuropathy (HCC) 8/6/2018   • Peyronie disease     sees [facility]       Past Surgical History:   Procedure Laterality Date   • LUMBAR LAMINECTOMY DISKECTOMY Left 12/6/2017    Procedure: LUMBAR LAMINECTOMY DISKECTOMY - POSTERIOR REDO LEFT  L4-S1 KAILA;  Surgeon: Be Webber M.D.;  Location: [facility];  Service: Neurosurgery   • LUMBAR FUSION POSTERIOR  10/5/2017    Procedure: POSTERIOR TRANSFORAMINAL INTERBODY FUSION AT LUMBAR 4 - 5;  Surgeon: Be Webber M.D.;  Location: [facility];  Service: Neurosurgery   • LUMBAR DECOMPRESSION  10/5/2017    Procedure: POSTERIOR LUMBAR 3-4,  4-5 DECOMPRESSION AND FUSION;  Surgeon: Be Webber M.D.;  Location: [facility];  Service: Neurosurgery   • PB INJ DX/THER AGNT PARAVERT FACET JOINT, BRITT* Right 10/6/2016    Procedure: INJ PARA FACET L/S 1 LVL W/IG - L3-4, L4-5, L5-S1;  Surgeon: Eugenio Camara M.D.;  Location: [facility];  Service: Pain Management   • PB INJ DX/THER AGNT PARAVERT FACET JOINT, BRITT*  10/6/2016    Procedure: INJ PARA FACET L/S 2D LVL W/IG;  Surgeon: Eugenio Camara M.D.;  Location: [facility];   Service: Pain Management   • PB INJ DX/THER AGNT PARAVERT FACET JOINT, BRITT*  10/6/2016    Procedure: INJ PARA FACET L/S 3D LVL W/IG;  Surgeon: Eugenio Camara M.D.;  Location: [facility];  Service: Pain Management   • PB INJ,FORAMEN,L/S,1 LEVEL Right 8/18/2016    Procedure: INJ-FORAMEN EPI LUM/SAC SNGL - L5-S1;  Surgeon: Eugenio Camara M.D.;  [facility];  Service: Pain Management   • LUMBAR LAMINECTOMY DISKECTOMY Right 3/12/2016    Procedure: LUMBAR HemiLAMINECTOMY Micro DISKECTOMY posterior Redo L3-4 ;  Surgeon: Be Webber M.D.;  Location: SURGERY [facility];  Service:    • FORAMINOTOMY Right 3/12/2016    Procedure: FORAMINOTOMY;  Surgeon: Be Webber M.D.;  Location: [facility];  Service:    • CERVICAL LAMINECTOMY POSTERIOR  2011   • OTHER      nasal surgery 2015   • OTHER NEUROLOGICAL SURG  2006, 2010, 2012, 2014, 2016, 2017    low back surgery x 5   • TONSILLECTOMY      child       Current Outpatient Prescriptions   Medication Sig Dispense Refill   • gabapentin (NEURONTIN) 300 MG Cap Take 1 Cap by mouth 4 times a day.     • atorvastatin (LIPITOR) 10 MG Tab Take 1 Tab by mouth every bedtime.     • aspirin EC (ECOTRIN) 81 MG Tablet Delayed Response Take 1 Tab by mouth every day.     • levothyroxine (SYNTHROID) 137 MCG Tab Take 1 Tab by mouth Every morning on an empty stomach. 90 Tab 3   • hydrochlorothiazide (HYDRODIURIL) 12.5 MG tablet Take 1 Tab by mouth every day. 90 Tab 3   • multivitamin (THERAGRAN) Tab Take 1 Tab by mouth every day.     • Omega 3 1000 MG Cap Take 1 Tab by mouth every day.     • b complex vitamins tablet Take 1 Tab by mouth every day.     • Calcium Carbonate-Vitamin D (CALCIUM + D PO) Take 1 Cap by mouth every day.       No current facility-administered medications for this visit.        Allergies as of 09/25/2018   • (No Known Allergies)       Social History     Social History   • Marital status:      Spouse name: N/A  "  • Number of children: N/A   • Years of education: N/A     Occupational History   • Not on file.     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  No apparent distress.    Eyes: No scleral icterus. No conjunctival injection.      Ears:    ENMT: Moist mucous membranes. Oropharynx clear. No erythema or exudates noted.     Neck: Supple. Trachea midline.  No masses.     Resp: Clear to auscultation bilaterally. No rales, rhonchi, or wheezes.    Cardiovascular: Regular rate and normal rhythm. No murmurs, rubs or gallops. 2+ radial pulses.     Abdomen:     Musculoskeletal: No clubbing, cyanosis, edema.    Skin:   R fourth finger base with post inflam changes, few mm scab; no fluctuance or induration    Lymph:    Neuro:     Psych: Mood euthymic. Affect congruent.    Other:     Labs/Studies   Sodium 09/18/2018 138  135 - 145 mmol/L Final   • Potassium 09/18/2018 4.9  3.6 - 5.5 mmol/L Final   • Chloride 09/18/2018 103  96 - 112 mmol/L Final   • Co2 09/18/2018 30  20 - 33 mmol/L Final   • Glucose 09/18/2018 83  65 - 99 mg/dL Final   • Bun 09/18/2018 20  8 - 22 mg/dL Final   • Creatinine " 09/18/2018 1.20  0.50 - 1.40 mg/dL Final   • Calcium 09/18/2018 9.6  8.5 - 10.5 mg/dL Final   • Anion Gap 09/18/2018 5.0  0.0 - 11.9 Final   • GFR If  09/18/2018 >60  >60 mL/min/1.73 m 2 Final   • GFR If Non  09/18/2018 59* >60 mL/min/1.73 m 2 Final   Hospital Outpatient Visit on 09/11/2018   Component Date Value Ref Range Status   • CPK Total 09/11/2018 315* 0 - 154 U/L Final   Hospital Outpatient Visit on 09/11/2018   Component Date Value Ref Range Status   • TSH 09/11/2018 3.790  0.380 - 5.330 uIU/mL Final    Comment: Please note new reference ranges effective 12/14/2017 10:00 AM  Pregnant Females, 1st Trimester  0.050-3.700  Pregnant Females, 2nd Trimester  0.310-4.350  Pregnant Females, 3rd Trimester  0.410-5.180     • Cholesterol,Tot 09/11/2018 228* 100 - 199 mg/dL Final   • Triglycerides 09/11/2018 136  0 - 149 mg/dL Final   • HDL 09/11/2018 45  >=40 mg/dL Final   • LDL 09/11/2018 156* <100 mg/dL Final   • CPK Total 09/11/2018 315* 0 - 154 U/L Final   • Prostatic Specific Antigen Tot 09/11/2018 3.47  0.00 - 4.00 ng/mL Final    Comment: The [employer] PSA assay is a paramagnetic particle,  chemiluminescent immunoassay for the quantitative determination  of total prostate specific antigen (PSA) levels using the  [employer] Immunoassay System. Values obtained with different  methods cannot be used interchangeably for patient monitoring.     • Vitamin B12 -True Cobalamin 09/11/2018 774  211 - 911 pg/mL Final   • Folate -Folic Acid 09/11/2018 >24.0  >4.0 ng/mL Final   • Fasting Status 09/11/2018 Fasting   Final         Hospital Outpatient Visit on 07/05/2018   Component Date Value Ref Range Status   • Sodium 07/05/2018 140  135 - 145 mmol/L Final   • Potassium 07/05/2018 4.6  3.6 - 5.5 mmol/L Final   • Chloride 07/05/2018 105  96 - 112 mmol/L Final   • Co2 07/05/2018 21  20 - 33 mmol/L Final   • Anion Gap 07/05/2018 14.0* 0.0 - 11.9 Final   • Glucose 07/05/2018 82  65 - 99 mg/dL  Final   • Bun 07/05/2018 27* 8 - 22 mg/dL Final   • Creatinine 07/05/2018 1.13  0.50 - 1.40 mg/dL Final   • Calcium 07/05/2018 10.0  8.5 - 10.5 mg/dL Final   • AST(SGOT) 07/05/2018 35  12 - 45 U/L Final   • ALT(SGPT) 07/05/2018 38  2 - 50 U/L Final   • Alkaline Phosphatase 07/05/2018 77  30 - 99 U/L Final   • Total Bilirubin 07/05/2018 0.7  0.1 - 1.5 mg/dL Final   • Albumin 07/05/2018 4.6  3.2 - 4.9 g/dL Final   • Total Protein 07/05/2018 7.4  6.0 - 8.2 g/dL Final   • Globulin 07/05/2018 2.8  1.9 - 3.5 g/dL Final   • A-G Ratio 07/05/2018 1.6  g/dL Final   • CPK Total 07/05/2018 322* 0 - 154 U/L Final   • WBC 07/05/2018 7.0  4.8 - 10.8 K/uL Final   • RBC 07/05/2018 5.84  4.70 - 6.10 M/uL Final   • Hemoglobin 07/05/2018 16.8  14.0 - 18.0 g/dL Final   • Hematocrit 07/05/2018 49.0  42.0 - 52.0 % Final   • MCV 07/05/2018 83.9  81.4 - 97.8 fL Final   • MCH 07/05/2018 28.8  27.0 - 33.0 pg Final   • MCHC 07/05/2018 34.3  33.7 - 35.3 g/dL Final   • RDW 07/05/2018 39.4  35.9 - 50.0 fL Final   • Platelet Count 07/05/2018 256  164 - 446 K/uL Final   • MPV 07/05/2018 10.7  9.0 - 12.9 fL Final   • Neutrophils-Polys 07/05/2018 61.40  44.00 - 72.00 % Final   • Lymphocytes 07/05/2018 25.90  22.00 - 41.00 % Final   • Monocytes 07/05/2018 9.40  0.00 - 13.40 % Final   • Eosinophils 07/05/2018 2.30  0.00 - 6.90 % Final   • Basophils 07/05/2018 0.70  0.00 - 1.80 % Final   • Immature Granulocytes 07/05/2018 0.30  0.00 - 0.90 % Final   • Nucleated RBC 07/05/2018 0.00  /100 WBC Final   • Neutrophils (Absolute) 07/05/2018 4.31  1.82 - 7.42 K/uL Final    Includes immature neutrophils, if present.   • Lymphs (Absolute) 07/05/2018 1.82  1.00 - 4.80 K/uL Final   • Monos (Absolute) 07/05/2018 0.66  0.00 - 0.85 K/uL Final   • Eos (Absolute) 07/05/2018 0.16  0.00 - 0.51 K/uL Final   • Baso (Absolute) 07/05/2018 0.05  0.00 - 0.12 K/uL Final   • Immature Granulocytes (abs) 07/05/2018 0.02  0.00 - 0.11 K/uL Final   • NRBC (Absolute) 07/05/2018 0.00   K/uL Final   • Sed Rate Westergren 07/05/2018 4  0 - 20 mm/hour Final   • GFR If  07/05/2018 >60  >60 mL/min/1.73 m 2 Final   • GFR If Non  07/05/2018 >60  >60 mL/min/1.73 m 2 Final   Hospital Outpatient Visit on 07/05/2018   Component Date Value Ref Range Status   • Cholesterol,Tot 07/05/2018 162  100 - 199 mg/dL Final   • Triglycerides 07/05/2018 108  0 - 149 mg/dL Final   • HDL 07/05/2018 51  >=40 mg/dL Final   • LDL 07/05/2018 89  <100 mg/dL Final   • TSH 07/05/2018 0.270* 0.380 - 5.330 uIU/mL Final    Comment: Please note new reference ranges effective 12/14/2017 10:00 AM  Pregnant Females, 1st Trimester  0.050-3.700  Pregnant Females, 2nd Trimester  0.310-4.350  Pregnant Females, 3rd Trimester  0.410-5.180     • Free T-4 07/05/2018 1.30  0.53 - 1.43 ng/dL Final       Assessment and Plan  1. Dyslipidemia  Back on statin and doing ok     2. Myxoid cyst  To get removed    3. Elevated CPK  High whether on or off statin  Rec referral to rheum given leg pain and neuropathy at times   - REFERRAL TO RHEUMATOLOGY    4. Neuropathy (HCC)  Seeing neurosurg here and at [facility] and neurology here  Getting some relief with gabapentin   Rec referral to rheum given leg pain and neuropathy at times   Also open to TCM  - REFERRAL TO RHEUMATOLOGY  - REFERRAL FOR ACUPUNCTURE    5. B12 deficiency  Check levels in few months     Benign essential HTN  Ok on hctz  - aspirin EC (ECOTRIN) 81 MG Tablet Delayed Response; Take 1 Tab by mouth every day.    Erectile dysfunction, unspecified erectile dysfunction type  Not using PDE I at this time given Rashmi's  Working with urology re: ED and Rashmi's    Hypothyroidism, unspecified type  On on LT4 6 days a week    Chronic right-sided low back pain without sciatica  May be recurring   Rec f/u with neurosurg here  [facility] said consider EMG, CT 1 year after surgery     Bilateral hearing loss, unspecified hearing loss type  Uses hearing aids    ACP (advance  care planning)  AD provided for him to complete  - NURSING COMMUNICATION    Screening for malignant neoplasm of prostate  + fam history in uncle  + minor sxs (nocturia)  - PROSTATE SPECIFIC AG SCREENING; Future yearly     HM  Checking PSA  [place] done in 2014; one 3 mm sessile polyp (mucosal prolapse polyp on path) found -- pt told to repeat in 5 year (2019)  Pneumovax 2018  Prevnar 2015  TDAP 2015  Needs Shingrix  Flu annually    Patient Instructions   Follow up with Akbar re: Sheng recs.   [facility] recs EMG (nerve conduction study) and CT low back at the 1 year [facility].    See rheumatology.   Referral to acupuncture done.   I recommend the Shingrix vaccine series to prevent shingles.  Please get the vaccines at a local pharmacy.      Consider advance directive.  Come back in 3-4 months.  Come back sooner if needed.     Follow-up  Return in about 3 months (around 12/25/2018).    Signed by: Kindra Muñoz M.D.

## 2018-10-07 DIAGNOSIS — I10 ESSENTIAL HYPERTENSION: ICD-10-CM

## 2018-10-08 ENCOUNTER — TELEPHONE (OUTPATIENT)
Dept: INTERNAL MEDICINE | Facility: MEDICAL CENTER | Age: 73
End: 2018-10-08

## 2018-10-08 DIAGNOSIS — R74.8 ELEVATED CK: ICD-10-CM

## 2018-10-08 DIAGNOSIS — R20.0 LEG NUMBNESS: ICD-10-CM

## 2018-10-08 RX ORDER — HYDROCHLOROTHIAZIDE 12.5 MG/1
TABLET ORAL
Qty: 90 TAB | Refills: 1 | Status: SHIPPED | OUTPATIENT
Start: 2018-10-08 | End: 2019-03-27 | Stop reason: SDUPTHER

## 2018-10-08 NOTE — TELEPHONE ENCOUNTER
Last seen: 09/25/18 by Dr. Muñoz  Next appt: 02/01/19 with Dr. Muñoz    Was the patient seen in the last year in this department? Yes   Does patient have an active prescription for medications requested? No   Received Request Via: Pharmacy

## 2018-10-09 NOTE — TELEPHONE ENCOUNTER
MAs -   Please call pt and let him know that rheum wants him to get one lab test to make sure he doesn't have lupus prior to seeing him   They are also rec'ing an EMG -- I believe that his neurologist did this test.   Could he get us the test or let us know who we should contact for the report.

## 2018-10-09 NOTE — TELEPHONE ENCOUNTER
Called pt and l/m. Asking pt to return our call and if it's okay to leave a detail message on his v/m.

## 2018-10-10 ENCOUNTER — TELEPHONE (OUTPATIENT)
Dept: INTERNAL MEDICINE | Facility: MEDICAL CENTER | Age: 73
End: 2018-10-10

## 2018-10-10 DIAGNOSIS — E06.3 HYPOTHYROIDISM DUE TO HASHIMOTO'S THYROIDITIS: ICD-10-CM

## 2018-10-10 DIAGNOSIS — E03.8 HYPOTHYROIDISM DUE TO HASHIMOTO'S THYROIDITIS: ICD-10-CM

## 2018-10-10 RX ORDER — LEVOTHYROXINE SODIUM 137 UG/1
137 TABLET ORAL
Qty: 90 TAB | Refills: 3 | Status: SHIPPED | OUTPATIENT
Start: 2018-10-10 | End: 2018-10-11

## 2018-10-10 NOTE — TELEPHONE ENCOUNTER
Called and spoke with pt. Notified pt I did receive his message and I would forward this to Dr Muñoz. He understood.

## 2018-10-10 NOTE — TELEPHONE ENCOUNTER
Called pt and l/m. Notifying pt of this. He understood and he will ask his neurologist about the report.

## 2018-10-10 NOTE — TELEPHONE ENCOUNTER
Was the patient seen in the last year in this department? Yes   Last seen: 09/25/18 by Dr. Muñoz  Next appt: 02/0/19 with Dr. Muñoz      Does patient have an active prescription for medications requested? No     Received Request Via: Patient -Pt called and l/m. Requesting refill on his synthroid. Going on 3 days without it. Pt said he is irritated that he is unable to reach or communicate with me.

## 2018-10-11 RX ORDER — LEVOTHYROXINE SODIUM 137 MCG
137 TABLET ORAL
Qty: 90 TAB | Refills: 3 | Status: SHIPPED | OUTPATIENT
Start: 2018-10-11 | End: 2019-03-27 | Stop reason: SDUPTHER

## 2018-10-11 NOTE — TELEPHONE ENCOUNTER
1. Caller Name: Neftaly Spoo-spouse                     Call Back Number:[phone] (home)       2. Message: Neftaly called and l/m. Requesting the brand name Synthroid for pt. They said pt tried the generic and didn't work well for him.     3. Patient approves office to leave a detailed voicemail/MyChart message: N\A

## 2018-10-12 ENCOUNTER — HOSPITAL ENCOUNTER (OUTPATIENT)
Facility: MEDICAL CENTER | Age: 73
End: 2018-10-12
Attending: ORTHOPAEDIC SURGERY | Admitting: ORTHOPAEDIC SURGERY
Payer: COMMERCIAL

## 2018-10-12 VITALS
RESPIRATION RATE: 17 BRPM | TEMPERATURE: 97.7 F | SYSTOLIC BLOOD PRESSURE: 116 MMHG | OXYGEN SATURATION: 93 % | HEIGHT: 71 IN | WEIGHT: 204.59 LBS | HEART RATE: 61 BPM | BODY MASS INDEX: 28.64 KG/M2 | DIASTOLIC BLOOD PRESSURE: 72 MMHG

## 2018-10-12 LAB — PATHOLOGY CONSULT NOTE: NORMAL

## 2018-10-12 PROCEDURE — 160036 HCHG PACU - EA ADDL 30 MINS PHASE I: Performed by: ORTHOPAEDIC SURGERY

## 2018-10-12 PROCEDURE — 160048 HCHG OR STATISTICAL LEVEL 1-5: Performed by: ORTHOPAEDIC SURGERY

## 2018-10-12 PROCEDURE — 501838 HCHG SUTURE GENERAL: Performed by: ORTHOPAEDIC SURGERY

## 2018-10-12 PROCEDURE — 160035 HCHG PACU - 1ST 60 MINS PHASE I: Performed by: ORTHOPAEDIC SURGERY

## 2018-10-12 PROCEDURE — 160002 HCHG RECOVERY MINUTES (STAT): Performed by: ORTHOPAEDIC SURGERY

## 2018-10-12 PROCEDURE — 700101 HCHG RX REV CODE 250

## 2018-10-12 PROCEDURE — 160039 HCHG SURGERY MINUTES - EA ADDL 1 MIN LEVEL 3: Performed by: ORTHOPAEDIC SURGERY

## 2018-10-12 PROCEDURE — 88304 TISSUE EXAM BY PATHOLOGIST: CPT

## 2018-10-12 PROCEDURE — 160028 HCHG SURGERY MINUTES - 1ST 30 MINS LEVEL 3: Performed by: ORTHOPAEDIC SURGERY

## 2018-10-12 PROCEDURE — 700111 HCHG RX REV CODE 636 W/ 250 OVERRIDE (IP)

## 2018-10-12 PROCEDURE — 500881 HCHG PACK, EXTREMITY: Performed by: ORTHOPAEDIC SURGERY

## 2018-10-12 PROCEDURE — 160009 HCHG ANES TIME/MIN: Performed by: ORTHOPAEDIC SURGERY

## 2018-10-12 RX ORDER — ONDANSETRON 2 MG/ML
4 INJECTION INTRAMUSCULAR; INTRAVENOUS
Status: DISCONTINUED | OUTPATIENT
Start: 2018-10-12 | End: 2018-10-12 | Stop reason: HOSPADM

## 2018-10-12 RX ORDER — ONDANSETRON 2 MG/ML
4 INJECTION INTRAMUSCULAR; INTRAVENOUS EVERY 4 HOURS PRN
Status: DISCONTINUED | OUTPATIENT
Start: 2018-10-12 | End: 2018-10-12 | Stop reason: HOSPADM

## 2018-10-12 RX ORDER — OXYCODONE HYDROCHLORIDE 5 MG/1
5 TABLET ORAL
Status: DISCONTINUED | OUTPATIENT
Start: 2018-10-12 | End: 2018-10-12 | Stop reason: HOSPADM

## 2018-10-12 RX ORDER — SODIUM CHLORIDE, SODIUM LACTATE, POTASSIUM CHLORIDE, CALCIUM CHLORIDE 600; 310; 30; 20 MG/100ML; MG/100ML; MG/100ML; MG/100ML
INJECTION, SOLUTION INTRAVENOUS ONCE
Status: COMPLETED | OUTPATIENT
Start: 2018-10-12 | End: 2018-10-12

## 2018-10-12 RX ORDER — OXYCODONE HCL 5 MG/5 ML
5 SOLUTION, ORAL ORAL
Status: DISCONTINUED | OUTPATIENT
Start: 2018-10-12 | End: 2018-10-12 | Stop reason: HOSPADM

## 2018-10-12 RX ORDER — OXYCODONE HCL 5 MG/5 ML
10 SOLUTION, ORAL ORAL
Status: DISCONTINUED | OUTPATIENT
Start: 2018-10-12 | End: 2018-10-12 | Stop reason: HOSPADM

## 2018-10-12 RX ORDER — BACITRACIN 50000 [IU]/1
INJECTION, POWDER, FOR SOLUTION INTRAMUSCULAR
Status: DISCONTINUED | OUTPATIENT
Start: 2018-10-12 | End: 2018-10-12 | Stop reason: HOSPADM

## 2018-10-12 RX ORDER — OXYCODONE HYDROCHLORIDE 5 MG/1
10 TABLET ORAL
Status: DISCONTINUED | OUTPATIENT
Start: 2018-10-12 | End: 2018-10-12 | Stop reason: HOSPADM

## 2018-10-12 RX ORDER — DIPHENHYDRAMINE HYDROCHLORIDE 50 MG/ML
12.5 INJECTION INTRAMUSCULAR; INTRAVENOUS
Status: DISCONTINUED | OUTPATIENT
Start: 2018-10-12 | End: 2018-10-12 | Stop reason: HOSPADM

## 2018-10-12 RX ORDER — BACITRACIN 50000 [IU]/1
INJECTION, POWDER, FOR SOLUTION INTRAMUSCULAR
Status: DISCONTINUED
Start: 2018-10-12 | End: 2018-10-12 | Stop reason: HOSPADM

## 2018-10-12 RX ORDER — HALOPERIDOL 5 MG/ML
1 INJECTION INTRAMUSCULAR
Status: DISCONTINUED | OUTPATIENT
Start: 2018-10-12 | End: 2018-10-12 | Stop reason: HOSPADM

## 2018-10-12 RX ORDER — BUPIVACAINE HYDROCHLORIDE AND EPINEPHRINE 5; 5 MG/ML; UG/ML
INJECTION, SOLUTION EPIDURAL; INTRACAUDAL; PERINEURAL
Status: DISCONTINUED | OUTPATIENT
Start: 2018-10-12 | End: 2018-10-12 | Stop reason: HOSPADM

## 2018-10-12 RX ADMIN — SODIUM CHLORIDE, SODIUM LACTATE, POTASSIUM CHLORIDE, CALCIUM CHLORIDE: 600; 310; 30; 20 INJECTION, SOLUTION INTRAVENOUS at 08:00

## 2018-10-12 ASSESSMENT — PAIN SCALES - GENERAL
PAINLEVEL_OUTOF10: 0

## 2018-10-12 NOTE — OR SURGEON
Immediate Post OP Note    PreOp Diagnosis: R rg f mucous cyst    PostOp Diagnosis: same    Procedure(s):  CYST EXCISION - RING FINGER MUCOUS CYST, DISTAL INTERPHALANGEAL JOINT - Wound Class: Clean    Surgeon(s):  Amadou Bowden M.D.    Anesthesiologist/Type of Anesthesia:  Anesthesiologist: Manny Oden M.D./General    Surgical Staff:  Circulator: Aurelia Tsai R.N.  Scrub Person: Jeane Morfin; Leslie Rodriguez    Specimens removed if any:  ID Type Source Tests Collected by Time Destination   A : Right Ring Finger Cyst  Tissue Finger HISTOLOGY REQUEST Amadou Bowden M.D. 10/12/2018  8:57 AM        Estimated Blood Loss: 0    Findings: 0    Complications: 0        10/12/2018 9:26 AM Amadou Bowden M.D.

## 2018-10-12 NOTE — DISCHARGE INSTRUCTIONS
ACTIVITY: Rest and take it easy for the first 24 hours.  A responsible adult is recommended to remain with you during that time.  It is normal to feel sleepy.  We encourage you to not do anything that requires balance, judgment or coordination.    MILD FLU-LIKE SYMPTOMS ARE NORMAL. YOU MAY EXPERIENCE GENERALIZED MUSCLE ACHES, THROAT IRRITATION, HEADACHE AND/OR SOME NAUSEA.    FOR 24 HOURS DO NOT:  Drive, operate machinery or run household appliances.  Drink beer or alcoholic beverages.   Make important decisions or sign legal documents.    SPECIAL INSTRUCTIONS: see hand-out    DIET: To avoid nausea, slowly advance diet as tolerated, avoiding spicy or greasy foods for the first day.  Add more substantial food to your diet according to your physician's instructions.  Babies can be fed formula or breast milk as soon as they are hungry.  INCREASE FLUIDS AND FIBER TO AVOID CONSTIPATION.    SURGICAL DRESSING/BATHING: May shower tomorrow, keep dressing clean and dry, may cover dressing with plastic to keep it dry when showering    FOLLOW-UP APPOINTMENT:  A follow-up appointment should be arranged with your doctor; call to schedule.    You should CALL YOUR PHYSICIAN if you develop:  Fever greater than 101 degrees F.  Pain not relieved by medication, or persistent nausea or vomiting.  Excessive bleeding (blood soaking through dressing) or unexpected drainage from the wound.  Extreme redness or swelling around the incision site, drainage of pus or foul smelling drainage.  Inability to urinate or empty your bladder within 8 hours.  Problems with breathing or chest pain.    You should call 911 if you develop problems with breathing or chest pain.  If you are unable to contact your doctor or surgical center, you should go to the nearest emergency room or urgent care center.  Physician's telephone #: DR. Bowden [phone]    If any questions arise, call your doctor.  If your doctor is not available, please feel free to  call the Surgical Center at [phone].  The Center is open Monday through Friday from 7AM to 7PM.  You can also call the HEALTH HOTLINE open 24 hours/day, 7 days/week and speak to a nurse at [phone], or toll free at [phone].    A registered nurse may call you a few days after your surgery to see how you are doing after your procedure.    MEDICATIONS: Resume taking daily medication.  Take prescribed pain medication with food.  If no medication is prescribed, you may take non-aspirin pain medication if needed.  PAIN MEDICATION CAN BE VERY CONSTIPATING.  Take a stool softener or laxative such as senokot, pericolace, or milk of magnesia if needed.    Prescription given for percocet.  Last pain medication given at ________.    If your physician has prescribed pain medication that includes Acetaminophen (Tylenol), do not take additional Acetaminophen (Tylenol) while taking the prescribed medication.    Depression / Suicide Risk    As you are discharged from this [facility] facility, it is important to learn how to keep safe from harming yourself.    Recognize the warning signs:  · Abrupt changes in personality, positive or negative- including increase in energy   · Giving away possessions  · Change in eating patterns- significant weight changes-  positive or negative  · Change in sleeping patterns- unable to sleep or sleeping all the time   · Unwillingness or inability to communicate  · Depression  · Unusual sadness, discouragement and loneliness  · Talk of wanting to die  · Neglect of personal appearance   · Rebelliousness- reckless behavior  · Withdrawal from people/activities they love  · Confusion- inability to concentrate     If you or a loved one observes any of these behaviors or has concerns about self-harm, here's what you can do:  · Talk about it- your feelings and reasons for harming yourself  · Remove any means that you might use to hurt yourself (examples: pills, rope, extension cords,  firearm)  · Get professional help from the community (Mental Health, Substance Abuse, psychological counseling)  · Do not be alone:Call your Safe Contact- someone whom you trust who will be there for you.  · Call your local CRISIS HOTLINE 533-0224 or 873-735-1801  · Call your local Children's Mobile Crisis Response Team [place] (349) 163-3284 or www.ShareWithU  · Call the toll free National Suicide Prevention Hotlines   · National Suicide Prevention Lifeline 457-104-LWNN (4088)  · National Hope Line Network 800-SUICIDE (155-6801)

## 2018-10-12 NOTE — OR NURSING
0921  RECEIVED PATIENT FROM OR.  REPORT FROM DR. HUSAIN.  LMA IN PLACE.  RESPIRATIONS ARE EVEN AND UNLABORED.  GAUZE TO RIGHT RING FINGER IS CDI.     0953  REPORT TO YAMILETH BARRETT.

## 2018-10-12 NOTE — OR NURSING
0939 pt woke up, LMA out. Able to move fingers, denies pain/nausea.   1034 Patient still very sleepy, declines water/drinks at the moment  1115 Pt verbalized he is ready to go home. Tolerating water, VSS. Spouse Neftaly called for ride. Patient dressed.   1125 After going to the restroom, two  spots of blood was noted to have seeped through the dressing. Area does not seem to be growing. Called DR. Bowden's office. Per office pt may go home, instructed RN to reinforce dressing.   1145 Dressing reinforced. Instructed pt to call DR. Bowden if he notices more blood seeping hhrough dressing.   1210 Discharge instructions discussed with patient and family, copy given. Pt verbalized understanding to instructions. Precriptions with wife .  Belongings with patient.   1214 Discharge criteria met. PIV out, Discharged ambulatory with wife .

## 2018-10-15 NOTE — OP REPORT
DATE OF SERVICE:  10/12/2018    PREOPERATIVE DIAGNOSIS:  Mucous cyst, right ring finger distal joint.    POSTOPERATIVE DIAGNOSIS:  Mucous cyst, right ring finger distal joint.    PROCEDURE:  Arthrotomy of the right ring finger distal joint with excision of   mucous cyst and debridement of osteophytes.    ANESTHESIA:  General.    SURGEON:  Amadou Bowden MD    OPERATIVE PROCEDURE:  Patient was taken to the operating room following   induction of general anesthesia.  Right upper extremity was prepped and draped   in routine fashion.  Limb was exsanguinated with an elastic bandage.  The   tourniquet inflated to 250 mmHg.  Right ring finger was exposed through a   dorsal L-shaped incision.  Alternating sharp and blunt dissection carried down   through the skin and subcutaneous tissue.  There was a mucous cyst   encountered.  A mucous cyst was excised.  It went down to the distal joint,   carefully protecting the extensor tendon.  The mucous cyst was excised, the   joint was debrided, and some dorsal osteophytes on both sides of the joint   were removed with a small rongeur and curette.  Finger blocked with 0.5%   Marcaine, tourniquet released, hemostasis obtained with electrocautery, wound   was irrigated copiously, skin closed with 4-0 nylon.  Compressive dressing   applied to the finger.  The arm was elevated and the patient was taken to the   recovery room in satisfactory condition.       ____________________________________     MD JOHN DUKE / JUANA    DD:  10/15/2018 08:15:26  DT:  10/15/2018 09:11:37    D#:  [id number]  Job#:  [id number]

## 2018-10-29 DIAGNOSIS — E78.5 DYSLIPIDEMIA: ICD-10-CM

## 2018-10-30 RX ORDER — ATORVASTATIN CALCIUM 10 MG/1
TABLET, FILM COATED ORAL
Qty: 90 TAB | Refills: 1 | Status: SHIPPED | OUTPATIENT
Start: 2018-10-30 | End: 2019-03-27 | Stop reason: SDUPTHER

## 2018-12-18 ENCOUNTER — APPOINTMENT (OUTPATIENT)
Dept: ADMISSIONS | Facility: MEDICAL CENTER | Age: 73
End: 2018-12-18
Attending: NEUROLOGICAL SURGERY
Payer: COMMERCIAL

## 2018-12-18 DIAGNOSIS — Z01.812 PRE-PROCEDURAL LABORATORY EXAMINATION: ICD-10-CM

## 2018-12-18 LAB
INR PPP: 0.94 (ref 0.87–1.13)
PLATELET # BLD AUTO: 227 K/UL (ref 164–446)
PROTHROMBIN TIME: 12.7 SEC (ref 12–14.6)

## 2018-12-18 PROCEDURE — 85049 AUTOMATED PLATELET COUNT: CPT

## 2018-12-18 PROCEDURE — 36415 COLL VENOUS BLD VENIPUNCTURE: CPT

## 2018-12-18 PROCEDURE — 85610 PROTHROMBIN TIME: CPT

## 2018-12-21 ENCOUNTER — APPOINTMENT (OUTPATIENT)
Dept: RADIOLOGY | Facility: MEDICAL CENTER | Age: 73
End: 2018-12-21
Attending: NEUROLOGICAL SURGERY
Payer: COMMERCIAL

## 2018-12-21 ENCOUNTER — HOSPITAL ENCOUNTER (OUTPATIENT)
Facility: MEDICAL CENTER | Age: 73
End: 2018-12-21
Attending: NEUROLOGICAL SURGERY | Admitting: NEUROLOGICAL SURGERY
Payer: COMMERCIAL

## 2018-12-21 VITALS
HEIGHT: 72 IN | DIASTOLIC BLOOD PRESSURE: 72 MMHG | RESPIRATION RATE: 16 BRPM | OXYGEN SATURATION: 95 % | SYSTOLIC BLOOD PRESSURE: 148 MMHG | HEART RATE: 76 BPM | TEMPERATURE: 98 F | WEIGHT: 209 LBS | BODY MASS INDEX: 28.31 KG/M2

## 2018-12-21 DIAGNOSIS — M47.896 OTHER SPONDYLOSIS, LUMBAR REGION: ICD-10-CM

## 2018-12-21 PROCEDURE — 160002 HCHG RECOVERY MINUTES (STAT)

## 2018-12-21 PROCEDURE — 72132 CT LUMBAR SPINE W/DYE: CPT

## 2018-12-21 PROCEDURE — 62284 INJECTION FOR MYELOGRAM: CPT

## 2018-12-21 PROCEDURE — 700117 HCHG RX CONTRAST REV CODE 255: Performed by: NEUROLOGICAL SURGERY

## 2018-12-21 RX ADMIN — IOHEXOL 20 ML: 180 INJECTION INTRAVENOUS at 09:15

## 2018-12-21 ASSESSMENT — PAIN SCALES - GENERAL
PAINLEVEL_OUTOF10: 0

## 2018-12-21 NOTE — OR NURSING
1015: Patient from radiology to [facility] via [facility] s/p lumbar myelogram. Patient is awake and on bedrest/flat X 2 hours. VSS. Mid lower back incision site soft and band aid clean, dry and intact. No c/o pain or nausea at this time. Will monitor closely. Vital signs per MD order.   1130: Patient tolerate po intake well. DC instructions given. Questions answered.  Mid lower back site soft,CDI. No c/o pain or nausea. Patient wide awake. VSS. Patient met criteria for discharge.

## 2018-12-21 NOTE — OR SURGEON
Immediate Post- Operative Note        PostOp Diagnosis: spinal stenosis      Procedure(s): fluopro guided lumbar puncture      Estimated Blood Loss: Less than 5 ml        Complications: None            12/21/2018     10:25 AM     Robby Cody

## 2018-12-21 NOTE — DISCHARGE INSTRUCTIONS
ACTIVITY: Rest and take it easy for the first 24 hours.  A responsible adult is recommended to remain with you during that time.  It is normal to feel sleepy.  We encourage you to not do anything that requires balance, judgment or coordination.    MILD FLU-LIKE SYMPTOMS ARE NORMAL. YOU MAY EXPERIENCE GENERALIZED MUSCLE ACHES, THROAT IRRITATION, HEADACHE AND/OR SOME NAUSEA.    FOR 24 HOURS DO NOT:  Drive, operate machinery or run household appliances.  Drink beer or alcoholic beverages.   Make important decisions or sign legal documents.      DIET: To avoid nausea, slowly advance diet as tolerated, avoiding spicy or greasy foods for the first day.  Add more substantial food to your diet according to your physician's instructions.  Babies can be fed formula or breast milk as soon as they are hungry.  INCREASE FLUIDS AND FIBER TO AVOID CONSTIPATION.    SURGICAL DRESSING/BATHING:     Keep the dressing clean and dry for 2 days.  May remove dressing  In 2 days  and can shower.  Do not submerge site under water for 1 week.  No heavy lifting and limit movement for 2 days.      FOLLOW-UP APPOINTMENT:  A follow-up appointment should be arranged with your doctor ; call to schedule.    You should CALL YOUR PHYSICIAN if you develop:  Fever greater than 101 degrees F.  Pain not relieved by medication, or persistent nausea or vomiting.  Excessive bleeding (blood soaking through dressing) or unexpected drainage from the wound.  Extreme redness or swelling around the incision site, drainage of pus or foul smelling drainage.  Inability to urinate or empty your bladder within 8 hours.  Problems with breathing or chest pain.    You should call 911 if you develop problems with breathing or chest pain.  If you are unable to contact your doctor or surgical center, you should go to the nearest emergency room or urgent care center.      Physician's telephone #: [phone]    If any questions arise, call your doctor.  If your doctor is not  available, please feel free to call the Surgical Center at [phone].  The Center is open Monday through Friday from 7AM to 7PM.  You can also call the HEALTH HOTLINE open 24 hours/day, 7 days/week and speak to a nurse at [phone], or toll free at [phone].    A registered nurse may call you a few days after your surgery to see how you are doing after your procedure.    MEDICATIONS: Resume taking daily medication.  Take prescribed pain medication with food.  If no medication is prescribed, you may take non-aspirin pain medication if needed.  PAIN MEDICATION CAN BE VERY CONSTIPATING.  Take a stool softener or laxative such as senokot, pericolace, or milk of magnesia if needed.      If your physician has prescribed pain medication that includes Acetaminophen (Tylenol), do not take additional Acetaminophen (Tylenol) while taking the prescribed medication.    Depression / Suicide Risk    As you are discharged from this [facility], it is important to learn how to keep safe from harming yourself.    Recognize the warning signs:  · Abrupt changes in personality, positive or negative- including increase in energy   · Giving away possessions  · Change in eating patterns- significant weight changes-  positive or negative  · Change in sleeping patterns- unable to sleep or sleeping all the time   · Unwillingness or inability to communicate  · Depression  · Unusual sadness, discouragement and loneliness  · Talk of wanting to die  · Neglect of personal appearance   · Rebelliousness- reckless behavior  · Withdrawal from people/activities they love  · Confusion- inability to concentrate     If you or a loved one observes any of these behaviors or has concerns about self-harm, here's what you can do:  · Talk about it- your feelings and reasons for harming yourself  · Remove any means that you might use to hurt yourself (examples: pills, rope, extension cords, firearm)  · Get professional help from the  community (Mental Health, Substance Abuse, psychological counseling)  · Do not be alone:Call your Safe Contact- someone whom you trust who will be there for you.  · Call your local CRISIS HOTLINE 483-9757 or 182-458-9263  · Call your local Children's Mobile Crisis Response Team [place] (152) 928-1138 or www.Lockitron  · Call the toll free National Suicide Prevention Hotlines   · National Suicide Prevention Lifeline 387-011-UULO (6389)  · [employer] Line Network 800-SUICIDE (735-2423)                Myelography    Myelography is an X-ray test that uses a special dye to look at your spine or neck. This test is usually done to look for:  · Spinal cord injury.  · Disk ruptures.  · Fluid-filled pockets of tissue (cysts) on your spinal cord or nerve roots.  · Tumors on your spinal cord or nerve roots.  BEFORE THE PROCEDURE  Arrange for someone to drive you home after the test.   PROCEDURE  · You will lie on your stomach during the procedure.  · Medicine may be given to you to help you relax.  · A numbing medicine will be applied to area that they will be injecting you with a needle.  · A needle will be inserted between two of your backbones.  · A special machine will be used to help your doctor guide the needle into the sac that surrounds your spinal cord and nerves. A special dye will be injected into this sac.  · The table you lie on may be moved around to make sure the dye moves all around your spinal cord and nerves.  · Pictures of the area will be taken by X-ray or CT.  AFTER THE PROCEDURE  · You will be taken to a recovery area.  · You will lie flat with your head in a raised position. This is to prevent a severe headache.  This information is not intended to replace advice given to you by your health care provider. Make sure you discuss any questions you have with your health care provider.  Document Released: 09/26/2009 Document Revised: 01/08/2016 Document Reviewed: 09/29/2016  [employer]  Interactive Patient Education © 2017 [employer]

## 2019-01-22 ENCOUNTER — HOSPITAL ENCOUNTER (OUTPATIENT)
Dept: RADIOLOGY | Facility: MEDICAL CENTER | Age: 74
End: 2019-01-22
Attending: NURSE PRACTITIONER
Payer: COMMERCIAL

## 2019-01-22 DIAGNOSIS — M48.061 SPINAL STENOSIS, LUMBAR REGION, WITHOUT NEUROGENIC CLAUDICATION: ICD-10-CM

## 2019-01-22 PROCEDURE — 72148 MRI LUMBAR SPINE W/O DYE: CPT

## 2019-02-01 ENCOUNTER — OFFICE VISIT (OUTPATIENT)
Dept: INTERNAL MEDICINE | Facility: MEDICAL CENTER | Age: 74
End: 2019-02-01
Payer: COMMERCIAL

## 2019-02-01 VITALS
WEIGHT: 208.38 LBS | HEART RATE: 70 BPM | BODY MASS INDEX: 29.83 KG/M2 | OXYGEN SATURATION: 93 % | DIASTOLIC BLOOD PRESSURE: 70 MMHG | HEIGHT: 70 IN | SYSTOLIC BLOOD PRESSURE: 126 MMHG | TEMPERATURE: 97.4 F

## 2019-02-01 DIAGNOSIS — I10 BENIGN ESSENTIAL HTN: ICD-10-CM

## 2019-02-01 DIAGNOSIS — Z71.89 ADVANCE CARE PLANNING: ICD-10-CM

## 2019-02-01 DIAGNOSIS — E78.5 DYSLIPIDEMIA: ICD-10-CM

## 2019-02-01 DIAGNOSIS — R74.8 ELEVATED CPK: ICD-10-CM

## 2019-02-01 PROCEDURE — 99214 OFFICE O/P EST MOD 30 MIN: CPT | Performed by: INTERNAL MEDICINE

## 2019-02-01 RX ORDER — GABAPENTIN 300 MG/1
300 CAPSULE ORAL
Status: ON HOLD | DISCHARGE
Start: 2019-02-01 | End: 2021-12-31

## 2019-02-01 ASSESSMENT — PATIENT HEALTH QUESTIONNAIRE - PHQ9: CLINICAL INTERPRETATION OF PHQ2 SCORE: 0

## 2019-02-01 NOTE — PROGRESS NOTES
"Follow up    Chief Complaint   Patient presents with   • Hyperlipidemia   • Hypertension       HPI  History was obtained from the patient and a medical record review.   Doing ok.   Leg pain tolerable with Neurontin 6 times a day.   Sees Akbar for this.   Tried acupuncture -- no relief.   Did not see rheum.     Got myxoid cyst of R 4th finger removed.  Now can't fully extend fourth finger.  To see Kal later this month    To got to [facility] and [facility] Spring 2019.    THREE CHRONIC CONDITIONS  HTN, stable  DL, stable  OA, worse    Past Medical History:   Diagnosis Date   • Actinic keratoses     sees [facility] - Dr. Everett   • Bilateral hearing loss 08/06/2018    wears aids   • Chronic right-sided low back pain without sciatica 08/06/2018    s/p 5 surgeries; sees Akbar   • ED (erectile dysfunction)    • High cholesterol    • Hyperlipidemia, mixed    • Hypertension    • Hypothyroidism    • Neuropathy (HCC) 8/6/2018   • Pain 12/18/2018    \"Nerve Pain-Low back/legs\".   • Peyronie disease     sees [facility]       Past Surgical History:   Procedure Laterality Date   • OTHER NEUROLOGICAL SURG  12/12/2018    \"Low Back Surgery'sx6 between 2006 & 2017\".   • CYST EXCISION Right 10/12/2018    Procedure: CYST EXCISION - RING FINGER MUCOUS CYST, DISTAL INTERPHALANGEAL JOINT;  Surgeon: Amadou Bowden M.D.;  Location: SURGERY SAME DAY [facility];  Service: Orthopedics   • LUMBAR LAMINECTOMY DISKECTOMY Left 12/6/2017    Procedure: LUMBAR LAMINECTOMY DISKECTOMY - POSTERIOR REDO LEFT  L4-S1 KAILA;  Surgeon: Be Webber M.D.;  Location: SURGERY [facility];  Service: Neurosurgery   • LUMBAR FUSION POSTERIOR  10/5/2017    Procedure: POSTERIOR TRANSFORAMINAL INTERBODY FUSION AT LUMBAR 4 - 5;  Surgeon: Be Webber M.D.;  Location: SURGERY [facility];  Service: Neurosurgery   • LUMBAR DECOMPRESSION  10/5/2017    Procedure: POSTERIOR LUMBAR 3-4,  4-5 DECOMPRESSION AND FUSION;  Surgeon: Be Webber M.D.;  Location: SURGERY [facility];  Service: Neurosurgery   • PB INJ DX/THER AGNT PARAVERT FACET JOINT, BRITT* Right 10/6/2016    Procedure: INJ PARA FACET L/S 1 LVL W/IG - L3-4, L4-5, L5-S1;  Surgeon: Eugenio Camara M.D.;  Location: [facility];  Service: Pain Management   • PB INJ DX/THER AGNT PARAVERT FACET JOINT, BRITT*  10/6/2016    Procedure: INJ PARA FACET L/S 2D LVL W/IG;  Surgeon: Eugenio Camara M.D.;  Location: [facility];  Service: Pain Management   • PB INJ DX/THER AGNT PARAVERT FACET JOINT, BRITT*  10/6/2016    Procedure: INJ PARA FACET L/S 3D LVL W/IG;  Surgeon: Eugenio Camara M.D.;  Location: [facility];  Service: Pain Management   • PB INJ,FORAMEN,L/S,1 LEVEL Right 8/18/2016    Procedure: INJ-FORAMEN EPI LUM/SAC SNGL - L5-S1;  Surgeon: Eugenio Camara M.D.;  Location: [facility];  Service: Pain Management   • LUMBAR LAMINECTOMY DISKECTOMY Right 3/12/2016    Procedure: LUMBAR HemiLAMINECTOMY Micro DISKECTOMY posterior Redo L3-4 ;  Surgeon: Be Webber M.D.;  Location: [facility];  Service:    • FORAMINOTOMY Right 3/12/2016    Procedure: FORAMINOTOMY;  Surgeon: Be Webber M.D.;  Location: [facility];  Service:    • CERVICAL LAMINECTOMY POSTERIOR  2011   • OTHER      nasal surgery 2015   • OTHER NEUROLOGICAL SURG  2006, 2010, 2012, 2014, 2016, 2017    low back surgery x 5   • TONSILLECTOMY      child       Current Outpatient Prescriptions   Medication Sig Dispense Refill   • gabapentin (NEURONTIN) 300 MG Cap Take 1 Cap by mouth 6 Times a Day.     • atorvastatin (LIPITOR) 10 MG Tab TAKE 1 TABLET BY MOUTH EVERY DAY 90 Tab 1   • SYNTHROID 137 MCG Tab Take 1 Tab by mouth Every morning on an empty stomach. 90 Tab 3   • hydroCHLOROthiazide (HYDRODIURIL) 12.5 MG tablet TAKE 1 TAB BY MOUTH EVERYDAY. 90 Tab 1   • aspirin EC (ECOTRIN) 81 MG Tablet Delayed Response Take 1 Tab by mouth every day.     • multivitamin (THERAGRAN) Tab Take 1 Tab by mouth  "every day.     • Omega 3 1000 MG Cap Take 1 Tab by mouth every day.     • b complex vitamins tablet Take 1 Tab by mouth every day.     • Calcium Carbonate-Vitamin D (CALCIUM + D PO) Take 1 Cap by mouth every day.       No current facility-administered medications for this visit.        Allergies as of 02/01/2019   • (No Known Allergies)       Social History     Social History   • Marital status:      Spouse name: N/A   • Number of children: N/A   • Years of education: N/A     Occupational History   • Not on file.     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  No apparent distress.    Eyes: No scleral icterus. No conjunctival injection.      Ears:    ENMT: Moist mucous membranes. Oropharynx clear. No erythema or exudates noted.     Neck: Supple. Trachea midline.  No masses.     Resp: Clear to auscultation bilaterally. No rales, rhonchi, or wheezes.    Cardiovascular: Regular rate and normal rhythm. No murmurs, rubs or gallops. 2+ radial pulses.     Abdomen:     Musculoskeletal: No clubbing, cyanosis, edema.    Skin:   R fourth finger base cyst healed  Unable to extend finger at " DIP    Lymph:    Neuro:     Psych: Mood euthymic. Affect congruent.    Other:     Labs/Studies   Sodium 09/18/2018 138  135 - 145 mmol/L Final   • Potassium 09/18/2018 4.9  3.6 - 5.5 mmol/L Final   • Chloride 09/18/2018 103  96 - 112 mmol/L Final   • Co2 09/18/2018 30  20 - 33 mmol/L Final   • Glucose 09/18/2018 83  65 - 99 mg/dL Final   • Bun 09/18/2018 20  8 - 22 mg/dL Final   • Creatinine 09/18/2018 1.20  0.50 - 1.40 mg/dL Final   • Calcium 09/18/2018 9.6  8.5 - 10.5 mg/dL Final   • Anion Gap 09/18/2018 5.0  0.0 - 11.9 Final   • GFR If  09/18/2018 >60  >60 mL/min/1.73 m 2 Final   • GFR If Non  09/18/2018 59* >60 mL/min/1.73 m 2 Final   Hospital Outpatient Visit on 09/11/2018   Component Date Value Ref Range Status   • CPK Total 09/11/2018 315* 0 - 154 U/L Final   Hospital Outpatient Visit on 09/11/2018   Component Date Value Ref Range Status   • TSH 09/11/2018 3.790  0.380 - 5.330 uIU/mL Final    Comment: Please note new reference ranges effective 12/14/2017 10:00 AM  Pregnant Females, 1st Trimester  0.050-3.700  Pregnant Females, 2nd Trimester  0.310-4.350  Pregnant Females, 3rd Trimester  0.410-5.180     • Cholesterol,Tot 09/11/2018 228* 100 - 199 mg/dL Final   • Triglycerides 09/11/2018 136  0 - 149 mg/dL Final   • HDL 09/11/2018 45  >=40 mg/dL Final   • LDL 09/11/2018 156* <100 mg/dL Final   • CPK Total 09/11/2018 315* 0 - 154 U/L Final   • Prostatic Specific Antigen Tot 09/11/2018 3.47  0.00 - 4.00 ng/mL Final    Comment: The Access InaayabrWattblockch PSA assay is a paramagnetic particle,  chemiluminescent immunoassay for the quantitative determination  of total prostate specific antigen (PSA) levels using the  Access Immunoassay System. Values obtained with different  methods cannot be used interchangeably for patient monitoring.     • Vitamin B12 -True Cobalamin 09/11/2018 774  211 - 911 pg/mL Final   • Folate -Folic Acid 09/11/2018 >24.0  >4.0 ng/mL Final   • Fasting Status  09/11/2018 Fasting   Final         Hospital Outpatient Visit on 07/05/2018   Component Date Value Ref Range Status   • Sodium 07/05/2018 140  135 - 145 mmol/L Final   • Potassium 07/05/2018 4.6  3.6 - 5.5 mmol/L Final   • Chloride 07/05/2018 105  96 - 112 mmol/L Final   • Co2 07/05/2018 21  20 - 33 mmol/L Final   • Anion Gap 07/05/2018 14.0* 0.0 - 11.9 Final   • Glucose 07/05/2018 82  65 - 99 mg/dL Final   • Bun 07/05/2018 27* 8 - 22 mg/dL Final   • Creatinine 07/05/2018 1.13  0.50 - 1.40 mg/dL Final   • Calcium 07/05/2018 10.0  8.5 - 10.5 mg/dL Final   • AST(SGOT) 07/05/2018 35  12 - 45 U/L Final   • ALT(SGPT) 07/05/2018 38  2 - 50 U/L Final   • Alkaline Phosphatase 07/05/2018 77  30 - 99 U/L Final   • Total Bilirubin 07/05/2018 0.7  0.1 - 1.5 mg/dL Final   • Albumin 07/05/2018 4.6  3.2 - 4.9 g/dL Final   • Total Protein 07/05/2018 7.4  6.0 - 8.2 g/dL Final   • Globulin 07/05/2018 2.8  1.9 - 3.5 g/dL Final   • A-G Ratio 07/05/2018 1.6  g/dL Final   • CPK Total 07/05/2018 322* 0 - 154 U/L Final   • WBC 07/05/2018 7.0  4.8 - 10.8 K/uL Final   • RBC 07/05/2018 5.84  4.70 - 6.10 M/uL Final   • Hemoglobin 07/05/2018 16.8  14.0 - 18.0 g/dL Final   • Hematocrit 07/05/2018 49.0  42.0 - 52.0 % Final   • MCV 07/05/2018 83.9  81.4 - 97.8 fL Final   • MCH 07/05/2018 28.8  27.0 - 33.0 pg Final   • MCHC 07/05/2018 34.3  33.7 - 35.3 g/dL Final   • RDW 07/05/2018 39.4  35.9 - 50.0 fL Final   • Platelet Count 07/05/2018 256  164 - 446 K/uL Final   • MPV 07/05/2018 10.7  9.0 - 12.9 fL Final   • Neutrophils-Polys 07/05/2018 61.40  44.00 - 72.00 % Final   • Lymphocytes 07/05/2018 25.90  22.00 - 41.00 % Final   • Monocytes 07/05/2018 9.40  0.00 - 13.40 % Final   • Eosinophils 07/05/2018 2.30  0.00 - 6.90 % Final   • Basophils 07/05/2018 0.70  0.00 - 1.80 % Final   • Immature Granulocytes 07/05/2018 0.30  0.00 - 0.90 % Final   • Nucleated RBC 07/05/2018 0.00  /100 WBC Final   • Neutrophils (Absolute) 07/05/2018 4.31  1.82 - 7.42 K/uL  Final    Includes immature neutrophils, if present.   • Lymphs (Absolute) 07/05/2018 1.82  1.00 - 4.80 K/uL Final   • Monos (Absolute) 07/05/2018 0.66  0.00 - 0.85 K/uL Final   • Eos (Absolute) 07/05/2018 0.16  0.00 - 0.51 K/uL Final   • Baso (Absolute) 07/05/2018 0.05  0.00 - 0.12 K/uL Final   • Immature Granulocytes (abs) 07/05/2018 0.02  0.00 - 0.11 K/uL Final   • NRBC (Absolute) 07/05/2018 0.00  K/uL Final   • Sed Rate Westergren 07/05/2018 4  0 - 20 mm/hour Final   • GFR If  07/05/2018 >60  >60 mL/min/1.73 m 2 Final   • GFR If Non  07/05/2018 >60  >60 mL/min/1.73 m 2 Final   Hospital Outpatient Visit on 07/05/2018   Component Date Value Ref Range Status   • Cholesterol,Tot 07/05/2018 162  100 - 199 mg/dL Final   • Triglycerides 07/05/2018 108  0 - 149 mg/dL Final   • HDL 07/05/2018 51  >=40 mg/dL Final   • LDL 07/05/2018 89  <100 mg/dL Final   • TSH 07/05/2018 0.270* 0.380 - 5.330 uIU/mL Final    Comment: Please note new reference ranges effective 12/14/2017 10:00 AM  Pregnant Females, 1st Trimester  0.050-3.700  Pregnant Females, 2nd Trimester  0.310-4.350  Pregnant Females, 3rd Trimester  0.410-5.180     • Free T-4 07/05/2018 1.30  0.53 - 1.43 ng/dL Final       Assessment and Plan  1. Benign essential HTN  Ok on current med  Watch lytes q6-12m  - BASIC METABOLIC PANEL; Future    2. Advance care planning  rec'd doing AD  Paperwork given to pt  - Nursing Comelmunication      3. Dyslipidemia  Back on statin and doing ok     Myxoid cyst  Got removed  Now unable to fully extend finger  To f/u with hand ortho this month     4. Elevated CPK  High whether on or off statin  Rec referral to rheum given leg pain and neuropathy at times   - REFERRAL TO RHEUMATOLOGY    Neuropathy (HCC)  Chronic right-sided low back pain without sciatica  Seeing neurosurg here and at [place] and neurology here  Getting some relief with gabapentin   Rec referral to rheum given leg pain and neuropathy at  times   Tried TCM without relief  To f/u with Akbar this month -- got MRI in Jan; results here    B12 deficiency  Ok on B12    Erectile dysfunction, unspecified erectile dysfunction type  Not using PDE I at this time given Toreys  Working with urology re: ED and Rashmi's    Hypothyroidism, unspecified type  On on LT4 6 days a week    Bilateral hearing loss, unspecified hearing loss type  Uses hearing aids    Screening for malignant neoplasm of prostate  + fam history in uncle  + minor sxs (nocturia)  - PROSTATE SPECIFIC AG SCREENING; Future yearly       Checking PSA  [place] done in 2014; one 3 mm sessile polyp (mucosal prolapse polyp on path) found -- pt told to repeat in 5 year (2019)  Pneumovax 2018  Prevnar 2015  TDAP 2015  Needs Shingrix  Flu annually    Patient Instructions   Consider Hep A vaccine for travels out of country.   I recommend the Shingrix vaccine series to prevent shingles.  Please get the vaccines at a local pharmacy.      Consider advance directive.    Consider [facility]:   [facility]  [address]  [place] [address]   [phone]     Consider an advance directive.     Come back in 6 months.   Call prior and make sure you don't need labs prior.       Follow-up  Return in about 6 months (around 8/1/2019).    Signed by: Kindra Muñoz M.D.

## 2019-02-01 NOTE — PATIENT INSTRUCTIONS
Consider Hep A vaccine for travels out of country.   I recommend the Shingrix vaccine series to prevent shingles.  Please get the vaccines at a local pharmacy.      Consider advance directive.    Consider [facility]:   [facility]  [address]  [place] [address]   [phone]     Consider an advance directive.     Come back in 6 months.   Call prior and make sure you don't need labs prior.

## 2019-02-07 DIAGNOSIS — Z01.812 PRE-PROCEDURAL LABORATORY EXAMINATION: ICD-10-CM

## 2019-02-07 LAB
ANION GAP SERPL CALC-SCNC: 9 MMOL/L (ref 0–11.9)
BUN SERPL-MCNC: 23 MG/DL (ref 8–22)
CALCIUM SERPL-MCNC: 9.6 MG/DL (ref 8.5–10.5)
CHLORIDE SERPL-SCNC: 103 MMOL/L (ref 96–112)
CO2 SERPL-SCNC: 26 MMOL/L (ref 20–33)
CREAT SERPL-MCNC: 1.25 MG/DL (ref 0.5–1.4)
GLUCOSE SERPL-MCNC: 85 MG/DL (ref 65–99)
POTASSIUM SERPL-SCNC: 4 MMOL/L (ref 3.6–5.5)
SODIUM SERPL-SCNC: 138 MMOL/L (ref 135–145)

## 2019-02-07 PROCEDURE — 80048 BASIC METABOLIC PNL TOTAL CA: CPT

## 2019-02-22 ENCOUNTER — APPOINTMENT (OUTPATIENT)
Dept: RADIOLOGY | Facility: MEDICAL CENTER | Age: 74
End: 2019-02-22
Attending: ORTHOPAEDIC SURGERY
Payer: COMMERCIAL

## 2019-02-22 ENCOUNTER — HOSPITAL ENCOUNTER (OUTPATIENT)
Facility: MEDICAL CENTER | Age: 74
End: 2019-02-22
Attending: ORTHOPAEDIC SURGERY | Admitting: ORTHOPAEDIC SURGERY
Payer: COMMERCIAL

## 2019-02-22 VITALS
SYSTOLIC BLOOD PRESSURE: 133 MMHG | OXYGEN SATURATION: 95 % | TEMPERATURE: 98.4 F | RESPIRATION RATE: 16 BRPM | WEIGHT: 206.35 LBS | DIASTOLIC BLOOD PRESSURE: 76 MMHG | BODY MASS INDEX: 27.95 KG/M2 | HEART RATE: 71 BPM | HEIGHT: 72 IN

## 2019-02-22 PROCEDURE — 160036 HCHG PACU - EA ADDL 30 MINS PHASE I: Performed by: ORTHOPAEDIC SURGERY

## 2019-02-22 PROCEDURE — 160002 HCHG RECOVERY MINUTES (STAT): Performed by: ORTHOPAEDIC SURGERY

## 2019-02-22 PROCEDURE — 160028 HCHG SURGERY MINUTES - 1ST 30 MINS LEVEL 3: Performed by: ORTHOPAEDIC SURGERY

## 2019-02-22 PROCEDURE — 501838 HCHG SUTURE GENERAL: Performed by: ORTHOPAEDIC SURGERY

## 2019-02-22 PROCEDURE — 73140 X-RAY EXAM OF FINGER(S): CPT | Mod: RT

## 2019-02-22 PROCEDURE — 700111 HCHG RX REV CODE 636 W/ 250 OVERRIDE (IP)

## 2019-02-22 PROCEDURE — 160009 HCHG ANES TIME/MIN: Performed by: ORTHOPAEDIC SURGERY

## 2019-02-22 PROCEDURE — A9270 NON-COVERED ITEM OR SERVICE: HCPCS

## 2019-02-22 PROCEDURE — 160046 HCHG PACU - 1ST 60 MINS PHASE II: Performed by: ORTHOPAEDIC SURGERY

## 2019-02-22 PROCEDURE — 700101 HCHG RX REV CODE 250

## 2019-02-22 PROCEDURE — A6222 GAUZE <=16 IN NO W/SAL W/O B: HCPCS | Performed by: ORTHOPAEDIC SURGERY

## 2019-02-22 PROCEDURE — 500881 HCHG PACK, EXTREMITY: Performed by: ORTHOPAEDIC SURGERY

## 2019-02-22 PROCEDURE — 160025 RECOVERY II MINUTES (STATS): Performed by: ORTHOPAEDIC SURGERY

## 2019-02-22 PROCEDURE — 700102 HCHG RX REV CODE 250 W/ 637 OVERRIDE(OP)

## 2019-02-22 PROCEDURE — 160048 HCHG OR STATISTICAL LEVEL 1-5: Performed by: ORTHOPAEDIC SURGERY

## 2019-02-22 PROCEDURE — 160035 HCHG PACU - 1ST 60 MINS PHASE I: Performed by: ORTHOPAEDIC SURGERY

## 2019-02-22 PROCEDURE — C1713 ANCHOR/SCREW BN/BN,TIS/BN: HCPCS | Performed by: ORTHOPAEDIC SURGERY

## 2019-02-22 PROCEDURE — 160039 HCHG SURGERY MINUTES - EA ADDL 1 MIN LEVEL 3: Performed by: ORTHOPAEDIC SURGERY

## 2019-02-22 PROCEDURE — 500431 HCHG DRESSING, KLING 2: Performed by: ORTHOPAEDIC SURGERY

## 2019-02-22 DEVICE — WIRE K- SMTH .054 4 (6TX6=36) ---MIN ORDER $50---: Type: IMPLANTABLE DEVICE | Site: HAND | Status: FUNCTIONAL

## 2019-02-22 RX ORDER — HALOPERIDOL 5 MG/ML
1 INJECTION INTRAMUSCULAR
Status: DISCONTINUED | OUTPATIENT
Start: 2019-02-22 | End: 2019-02-22 | Stop reason: HOSPADM

## 2019-02-22 RX ORDER — DIPHENHYDRAMINE HYDROCHLORIDE 50 MG/ML
12.5 INJECTION INTRAMUSCULAR; INTRAVENOUS
Status: DISCONTINUED | OUTPATIENT
Start: 2019-02-22 | End: 2019-02-22 | Stop reason: HOSPADM

## 2019-02-22 RX ORDER — SODIUM CHLORIDE, SODIUM LACTATE, POTASSIUM CHLORIDE, CALCIUM CHLORIDE 600; 310; 30; 20 MG/100ML; MG/100ML; MG/100ML; MG/100ML
INJECTION, SOLUTION INTRAVENOUS CONTINUOUS
Status: DISCONTINUED | OUTPATIENT
Start: 2019-02-22 | End: 2019-02-22 | Stop reason: HOSPADM

## 2019-02-22 RX ORDER — OXYCODONE HCL 5 MG/5 ML
5 SOLUTION, ORAL ORAL
Status: DISCONTINUED | OUTPATIENT
Start: 2019-02-22 | End: 2019-02-22 | Stop reason: HOSPADM

## 2019-02-22 RX ORDER — ACETAMINOPHEN 500 MG
TABLET ORAL
Status: DISCONTINUED
Start: 2019-02-22 | End: 2019-02-22 | Stop reason: HOSPADM

## 2019-02-22 RX ORDER — GABAPENTIN 300 MG/1
300 CAPSULE ORAL ONCE
Status: DISCONTINUED | OUTPATIENT
Start: 2019-02-22 | End: 2019-02-22 | Stop reason: HOSPADM

## 2019-02-22 RX ORDER — SODIUM CHLORIDE, SODIUM LACTATE, POTASSIUM CHLORIDE, CALCIUM CHLORIDE 600; 310; 30; 20 MG/100ML; MG/100ML; MG/100ML; MG/100ML
INJECTION, SOLUTION INTRAVENOUS ONCE
Status: COMPLETED | OUTPATIENT
Start: 2019-02-22 | End: 2019-02-22

## 2019-02-22 RX ORDER — OXYCODONE HCL 5 MG/5 ML
10 SOLUTION, ORAL ORAL
Status: DISCONTINUED | OUTPATIENT
Start: 2019-02-22 | End: 2019-02-22 | Stop reason: HOSPADM

## 2019-02-22 RX ORDER — ONDANSETRON 2 MG/ML
4 INJECTION INTRAMUSCULAR; INTRAVENOUS
Status: DISCONTINUED | OUTPATIENT
Start: 2019-02-22 | End: 2019-02-22 | Stop reason: HOSPADM

## 2019-02-22 RX ORDER — BACITRACIN 50000 [IU]/1
INJECTION, POWDER, FOR SOLUTION INTRAMUSCULAR
Status: DISCONTINUED
Start: 2019-02-22 | End: 2019-02-22 | Stop reason: HOSPADM

## 2019-02-22 RX ORDER — CELECOXIB 200 MG/1
CAPSULE ORAL
Status: DISCONTINUED
Start: 2019-02-22 | End: 2019-02-22 | Stop reason: HOSPADM

## 2019-02-22 RX ORDER — BACITRACIN 50000 [IU]/1
INJECTION, POWDER, FOR SOLUTION INTRAMUSCULAR
Status: DISCONTINUED | OUTPATIENT
Start: 2019-02-22 | End: 2019-02-22 | Stop reason: HOSPADM

## 2019-02-22 RX ORDER — BUPIVACAINE HYDROCHLORIDE AND EPINEPHRINE 5; 5 MG/ML; UG/ML
INJECTION, SOLUTION EPIDURAL; INTRACAUDAL; PERINEURAL
Status: DISCONTINUED | OUTPATIENT
Start: 2019-02-22 | End: 2019-02-22 | Stop reason: HOSPADM

## 2019-02-22 RX ORDER — ACETAMINOPHEN 500 MG
1000 TABLET ORAL ONCE
Status: DISCONTINUED | OUTPATIENT
Start: 2019-02-22 | End: 2019-02-22 | Stop reason: HOSPADM

## 2019-02-22 RX ORDER — CELECOXIB 200 MG/1
200 CAPSULE ORAL ONCE
Status: DISCONTINUED | OUTPATIENT
Start: 2019-02-22 | End: 2019-02-22 | Stop reason: HOSPADM

## 2019-02-22 RX ORDER — GABAPENTIN 300 MG/1
CAPSULE ORAL
Status: COMPLETED
Start: 2019-02-22 | End: 2019-02-22

## 2019-02-22 RX ORDER — MEPERIDINE HYDROCHLORIDE 25 MG/ML
6.25 INJECTION INTRAMUSCULAR; INTRAVENOUS; SUBCUTANEOUS
Status: DISCONTINUED | OUTPATIENT
Start: 2019-02-22 | End: 2019-02-22 | Stop reason: HOSPADM

## 2019-02-22 RX ADMIN — SODIUM CHLORIDE, SODIUM LACTATE, POTASSIUM CHLORIDE, CALCIUM CHLORIDE: 600; 310; 30; 20 INJECTION, SOLUTION INTRAVENOUS at 08:58

## 2019-02-22 RX ADMIN — GABAPENTIN 300 MG: 300 CAPSULE ORAL at 08:58

## 2019-02-22 NOTE — OR NURSING
1140  Pt arrived to PACU from OR with Dr. Swain and RN.  LMA in place; pt at 90% on RA - NC applied at 5L, administered orally through airway.  Right fourth digit observed with gauze wrap drsg in place; no drainage present.  Right hand elevated on pillows and ice pack applied.  BP low; anesthesia aware.  No orders rec'd at this time.  Will continue to monitor.  1150  LMA dc'd, HOB elevated; O2 titrated down to 3L.  BP WNL.  Pt waking up but drowsy.  He denies pain, nausea at this time.  1230  Pt in and out of sleep, resting comfortably.  No distress noted.  VSS.  Pt continues to deny pain, nausea.  1308  Pt awake and dressed.  C/o bleeding to incision.  Finger assessed; drsg saturated through.  OR RN notified.  MD left building.  1314  Dr. Bowden notified of bleeding by Nova BARRETT.  Per MD Nova states bleeding normal d/t type of procedure pt had.  Redress with gauze, no new orders rec'd.    1317  Incision redressed at bedside by OR RN.  1325  DC instructions discussed with pt and wife.  Pt meets DC criteria.  No active bleeding present at this time.  Pt agrees to monitor and notify MD if bleeding persists.    1329  PIV dc'd.  1331 Pt ambulated out with steady gait, refused WC.  Wife as .  His belongings went with him.

## 2019-02-22 NOTE — OR SURGEON
Immediate Post OP Note    PreOp Diagnosis: R rg f dip ext lag    PostOp Diagnosis: same    Procedure(s):  FINGER EXPLORATION - RING FINGER DISTAL INTERPHALANGEAL JOINT - Wound Class: Clean  PIP ARTHRODESIS - Wound Class: Clean    Surgeon(s):  Amadou Bowden M.D.    Anesthesiologist/Type of Anesthesia:  Anesthesiologist: Tank Swain M.D./General    Surgical Staff:  Circulator: Nathalia Bob R.N.  Scrub Person: Clemencia Almaguer  Radiology Technologist: Reagan Jacobs    Specimens removed if any:  * No specimens in log *    Estimated Blood Loss: 0    Findings: 0    Complications: 0        2/22/2019 11:53 AM Amadou Bowden M.D.

## 2019-02-22 NOTE — DISCHARGE INSTRUCTIONS
ACTIVITY: Rest and take it easy for the first 24 hours.  A responsible adult is recommended to remain with you during that time.  It is normal to feel sleepy.  We encourage you to not do anything that requires balance, judgment or coordination.    MILD FLU-LIKE SYMPTOMS ARE NORMAL. YOU MAY EXPERIENCE GENERALIZED MUSCLE ACHES, THROAT IRRITATION, HEADACHE AND/OR SOME NAUSEA.    FOR 24 HOURS DO NOT:  Drive, operate machinery or run household appliances.  Drink beer or alcoholic beverages.   Make important decisions or sign legal documents.    SPECIAL INSTRUCTIONS:      No heavy lifting with right hand.    Keep finger splint on until your physician says it's ok to remove.  Keep right hand elevated above your heart as much as possible.  Ice intermittently.    DIET: To avoid nausea, slowly advance diet as tolerated, avoiding spicy or greasy foods for the first day.  Add more substantial food to your diet according to your physician's instructions.  Babies can be fed formula or breast milk as soon as they are hungry.  INCREASE FLUIDS AND FIBER TO AVOID CONSTIPATION.    SURGICAL DRESSING/BATHING:  When showering, keep right hand covered.  Keep finger splint clean and dry.  NO SWIMMING or submerging in pools or tubs until approved by your physician.    FOLLOW-UP APPOINTMENT:  A follow-up appointment should be arranged with your doctor; as already scheduled.    You should CALL YOUR PHYSICIAN if you develop:  Fever greater than 101 degrees F.  Pain not relieved by medication, or persistent nausea or vomiting.  Excessive bleeding (blood soaking through dressing) or unexpected drainage from the wound.  Extreme redness or swelling around the incision site, drainage of pus or foul smelling drainage.  Inability to urinate or empty your bladder within 8 hours.  Problems with breathing or chest pain.    You should call 911 if you develop problems with breathing or chest pain.  If you are unable to contact your doctor or surgical  center, you should go to the nearest emergency room or urgent care center.  Physician's telephone #:  Dr. Bowden [phone]    If any questions arise, call your doctor.  If your doctor is not available, please feel free to call the Surgical Center at [phone].  The Center is open Monday through Friday from 7AM to 7PM.  You can also call the HEALTH HOTLINE open 24 hours/day, 7 days/week and speak to a nurse at [phone], or toll free at [phone].    A registered nurse may call you a few days after your surgery to see how you are doing after your procedure.    MEDICATIONS: Resume taking daily medication.  Take prescribed pain medication with food.  If no medication is prescribed, you may take non-aspirin pain medication if needed.  PAIN MEDICATION CAN BE VERY CONSTIPATING.  Take a stool softener or laxative such as senokot, pericolace, or milk of magnesia if needed.    Prescription given for Percocet.  Last pain medication given at _________________________.    If your physician has prescribed pain medication that includes Acetaminophen (Tylenol), do not take additional Acetaminophen (Tylenol) while taking the prescribed medication.    Depression / Suicide Risk    As you are discharged from this [facility] facility, it is important to learn how to keep safe from harming yourself.    Recognize the warning signs:  · Abrupt changes in personality, positive or negative- including increase in energy   · Giving away possessions  · Change in eating patterns- significant weight changes-  positive or negative  · Change in sleeping patterns- unable to sleep or sleeping all the time   · Unwillingness or inability to communicate  · Depression  · Unusual sadness, discouragement and loneliness  · Talk of wanting to die  · Neglect of personal appearance   · Rebelliousness- reckless behavior  · Withdrawal from people/activities they love  · Confusion- inability to concentrate     If you or a loved one  observes any of these behaviors or has concerns about self-harm, here's what you can do:  · Talk about it- your feelings and reasons for harming yourself  · Remove any means that you might use to hurt yourself (examples: pills, rope, extension cords, firearm)  · Get professional help from the community (Mental Health, Substance Abuse, psychological counseling)  · Do not be alone:Call your Safe Contact- someone whom you trust who will be there for you.  · Call your local CRISIS HOTLINE 795-2630 or 717-801-0507  · Call your local Children's Mobile Crisis Response Team [place] (498) 313-9734 or www.mana.bo  · Call the toll free National Suicide Prevention Hotlines   · National Suicide Prevention Lifeline 573-355-UXDJ (7529)  · National Hope Line Network 800-SUICIDE (722-4420)

## 2019-02-23 NOTE — OP REPORT
DATE OF SERVICE:  02/22/2019    PREOPERATIVE DIAGNOSIS:  Attenuated extensor tendon, right ring finger distal   joint.    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    OPERATION PERFORMED:  Arthrotomy of the right ring finger distal joint with   arthrodesis of the distal joint.    ANESTHESIA:  General.    SURGEON:  Amadou Bowden MD    OPERATIVE PROCEDURE:  The patient taken to the operating room, following   induction of general anesthesia, right upper extremity was prepped and draped   in routine fashion.  Limb exsanguinated with an elastic bandage.  The   tourniquet inflated to 250 mmHg.  Right ring finger was exposed through a   prior dorsal L-shaped incision.  Alternating sharp and blunt dissection,   carried down through the skin and subcutaneous tissue.  The skin was carefully   elevated off of the extensor tendon.  At the level of the middle phalanx, it   became extremely attenuated and it just was not much of any tendon at the   distal joint, nothing suitable for repair.  In addition, on carrying out an   arthrotomy, the base of the distal phalanx had fairly good articular   cartilage, but there was extremely poor cartilage on the head of the middle   phalanx.  I elected to go ahead with an arthrodesis.  The joint was exposed   with subperiosteal dissection, rongeur was used to decorticate the joint and   an arthrodesis was carried out with a threaded Zoila wire in about 10   degrees of flexion with a wire cut off and buried at the tip of the finger.    Good position was confirmed with fluoroscopy.  Finger blocked with 0.5%   Marcaine, tourniquet released, hemostasis obtained with electrocautery, wound   irrigated copiously, skin closed with 4-0 nylon, compressive dressing applied.    The arm was elevated and the patient was taken to the recovery room in   satisfactory condition.       ____________________________________      MD JOHN DUKE / JUANA    DD:  02/22/2019 14:20:18  DT:  02/22/2019 16:43:04    D#:  [id number]  Job#:  [id number]

## 2019-03-07 ENCOUNTER — OFFICE VISIT (OUTPATIENT)
Dept: URGENT CARE | Facility: PHYSICIAN GROUP | Age: 74
End: 2019-03-07
Payer: COMMERCIAL

## 2019-03-07 VITALS
DIASTOLIC BLOOD PRESSURE: 68 MMHG | RESPIRATION RATE: 14 BRPM | HEART RATE: 89 BPM | BODY MASS INDEX: 26.28 KG/M2 | OXYGEN SATURATION: 91 % | WEIGHT: 194 LBS | SYSTOLIC BLOOD PRESSURE: 128 MMHG | TEMPERATURE: 98.2 F | HEIGHT: 72 IN

## 2019-03-07 DIAGNOSIS — J22 LRTI (LOWER RESPIRATORY TRACT INFECTION): ICD-10-CM

## 2019-03-07 DIAGNOSIS — R05.9 COUGH: ICD-10-CM

## 2019-03-07 PROCEDURE — 99214 OFFICE O/P EST MOD 30 MIN: CPT | Performed by: NURSE PRACTITIONER

## 2019-03-07 RX ORDER — DOXYCYCLINE HYCLATE 100 MG
100 TABLET ORAL 2 TIMES DAILY
Qty: 14 TAB | Refills: 0 | Status: SHIPPED | OUTPATIENT
Start: 2019-03-07 | End: 2019-03-14

## 2019-03-07 RX ORDER — DEXTROMETHORPHAN HYDROBROMIDE AND PROMETHAZINE HYDROCHLORIDE 15; 6.25 MG/5ML; MG/5ML
5 SYRUP ORAL EVERY 4 HOURS PRN
Qty: 120 ML | Refills: 0 | Status: SHIPPED | OUTPATIENT
Start: 2019-03-07 | End: 2019-03-27

## 2019-03-07 ASSESSMENT — ENCOUNTER SYMPTOMS
HEADACHES: 0
SINUS PAIN: 0
NAUSEA: 0
FEVER: 1
DIARRHEA: 0
MYALGIAS: 1
RHINORRHEA: 0
COUGH: 1
SORE THROAT: 1
VOMITING: 0
CHILLS: 1

## 2019-03-07 NOTE — PROGRESS NOTES
Subjective:   Amadou Parekh is a 73 y.o. male who presents for Congestion (cough blood with mucous x5days )        URI    This is a new problem. The current episode started in the past 7 days. The problem has been unchanged. There has been no fever. Associated symptoms include congestion, coughing and a sore throat. Pertinent negatives include no diarrhea, ear pain, headaches, joint swelling, nausea, plugged ear sensation, rhinorrhea, sinus pain or vomiting. He has tried nothing for the symptoms. The treatment provided no relief.   Cough   This is a new problem. The current episode started in the past 7 days. The problem has been unchanged. The problem occurs constantly. The cough is productive of sputum. Associated symptoms include chills, a fever, myalgias and a sore throat. Pertinent negatives include no ear pain, headaches, nasal congestion, postnasal drip or rhinorrhea. Nothing aggravates the symptoms. He has tried nothing for the symptoms. The treatment provided no relief. There is no history of bronchitis or pneumonia.     Review of Systems   Constitutional: Positive for chills and fever.   HENT: Positive for congestion and sore throat. Negative for ear pain, postnasal drip, rhinorrhea and sinus pain.    Respiratory: Positive for cough.    Gastrointestinal: Negative for diarrhea, nausea and vomiting.   Musculoskeletal: Positive for myalgias.   Neurological: Negative for headaches.     No Known Allergies   Objective:   /68   Pulse 89   Temp 36.8 °C (98.2 °F) (Temporal)   Resp 14   Ht 1.829 m (6')   Wt 88 kg (194 lb)   SpO2 91%   BMI 26.31 kg/m²   Physical Exam   Constitutional: He is oriented to person, place, and time. He appears well-developed and well-nourished. No distress.   HENT:   Head: Normocephalic and atraumatic.   Right Ear: Tympanic membrane normal.   Left Ear: Tympanic membrane normal.   Nose: Nose normal. Right sinus exhibits no maxillary sinus tenderness and no frontal sinus  tenderness. Left sinus exhibits no maxillary sinus tenderness and no frontal sinus tenderness.   Mouth/Throat: Uvula is midline, oropharynx is clear and moist and mucous membranes are normal. No posterior oropharyngeal edema, posterior oropharyngeal erythema or tonsillar abscesses. No tonsillar exudate.   Eyes: Pupils are equal, round, and reactive to light. Conjunctivae and EOM are normal. Right eye exhibits no discharge. Left eye exhibits no discharge.   Cardiovascular: Normal rate and regular rhythm.    No murmur heard.  Pulmonary/Chest: Effort normal and breath sounds normal. No respiratory distress.   Abdominal: Soft. He exhibits no distension. There is no tenderness.   Neurological: He is alert and oriented to person, place, and time. He has normal reflexes. No sensory deficit.   Skin: Skin is warm, dry and intact.   Psychiatric: He has a normal mood and affect.         Assessment/Plan:   1. LRTI (lower respiratory tract infection)  - doxycycline (VIBRAMYCIN) 100 MG Tab; Take 1 Tab by mouth 2 times a day for 7 days.  Dispense: 14 Tab; Refill: 0    2. Cough  - promethazine-dextromethorphan (PROMETHAZINE-DM) 6.25-15 MG/5ML syrup; Take 5 mL by mouth every four hours as needed for Cough.  Dispense: 120 mL; Refill: 0  Advised to continue supportive care with Tylenol and/or ibuprofen for fevers and discomfort. Increased fluids and electrolytes.  Patient given precautionary s/sx that mandate immediate follow up and evaluation in the ED. Advised of risks of not doing so.    DDX, Supportive care, and indications for immediate follow-up discussed with patient.    Instructed to return to clinic or nearest emergency department if we are not available for any change in condition, further concerns, or worsening of symptoms.    The patient demonstrated a good understanding and agreed with the treatment plan.

## 2019-03-11 ENCOUNTER — APPOINTMENT (OUTPATIENT)
Dept: INTERNAL MEDICINE | Facility: MEDICAL CENTER | Age: 74
End: 2019-03-11
Payer: COMMERCIAL

## 2019-03-12 ENCOUNTER — OFFICE VISIT (OUTPATIENT)
Dept: INTERNAL MEDICINE | Facility: MEDICAL CENTER | Age: 74
End: 2019-03-12
Payer: COMMERCIAL

## 2019-03-12 VITALS
WEIGHT: 196.6 LBS | OXYGEN SATURATION: 93 % | HEART RATE: 77 BPM | SYSTOLIC BLOOD PRESSURE: 120 MMHG | TEMPERATURE: 98.6 F | HEIGHT: 72 IN | BODY MASS INDEX: 26.63 KG/M2 | DIASTOLIC BLOOD PRESSURE: 70 MMHG

## 2019-03-12 DIAGNOSIS — J22 LRTI (LOWER RESPIRATORY TRACT INFECTION): ICD-10-CM

## 2019-03-12 PROCEDURE — 99213 OFFICE O/P EST LOW 20 MIN: CPT | Mod: GE | Performed by: INTERNAL MEDICINE

## 2019-03-12 NOTE — PROGRESS NOTES
Established Patient    Amadou presents today with the following:    CC: Lower respiratory tract infection follow-up    HPI: 73-year-old male presents to clinic for lower respiratory tract infection follow-up.  Patient started noticing cough, chest congestion, general malaise on 3/4/19 which worsened and motivated him to present to urgent care on 3/7/19.  Patient was prescribed with a doxycycline regimen and cough suppressants which she has been compliant with.  Patient reports that up until yesterday his symptoms started to improve for which she is has less congestion, less mucus production, improvement with appetite and overall feeling better.  Patient denies chest pain or ever having such.  No fevers, chills, night sweats, dizziness or loss of consciousness.    Patient Active Problem List    Diagnosis Date Noted   • Chronic right-sided low back pain without sciatica 08/06/2018   • Neuropathy (HCC) 08/06/2018   • Bilateral hearing loss 08/06/2018   • Spondylolysis, lumbar region 10/05/2017   • Lumbar radiculopathy 10/06/2016   • Chronic lumbar radiculopathy 08/18/2016   • Chronic infection of sinus 05/17/2016   • Essential hypertension 05/17/2016   • Thyroid activity decreased 05/17/2016   • Hyperlipidemia 05/17/2016   • Impotence 05/17/2016   • Impaired renal function 05/17/2016   • Bilateral stenosis of lateral recess of lumbar spine 03/12/2016       Current Outpatient Prescriptions   Medication Sig Dispense Refill   • doxycycline (VIBRAMYCIN) 100 MG Tab Take 1 Tab by mouth 2 times a day for 7 days. 14 Tab 0   • promethazine-dextromethorphan (PROMETHAZINE-DM) 6.25-15 MG/5ML syrup Take 5 mL by mouth every four hours as needed for Cough. 120 mL 0   • gabapentin (NEURONTIN) 300 MG Cap Take 1 Cap by mouth 6 Times a Day.     • atorvastatin (LIPITOR) 10 MG Tab TAKE 1 TABLET BY MOUTH EVERY DAY 90 Tab 1   • SYNTHROID 137 MCG Tab Take 1 Tab by mouth Every morning on an empty stomach. 90 Tab 3   • hydroCHLOROthiazide  (HYDRODIURIL) 12.5 MG tablet TAKE 1 TAB BY MOUTH EVERYDAY. 90 Tab 1   • aspirin EC (ECOTRIN) 81 MG Tablet Delayed Response Take 1 Tab by mouth every day.     • multivitamin (THERAGRAN) Tab Take 1 Tab by mouth every day.     • Omega 3 1000 MG Cap Take 1 Tab by mouth every day.     • b complex vitamins tablet Take 1 Tab by mouth every day.     • Calcium Carbonate-Vitamin D (CALCIUM + D PO) Take 1 Cap by mouth every day.       No current facility-administered medications for this visit.        ROS: As per HPI. Additional pertinent symptoms as noted below.  Constitutional: no fevers, chills, weight change  Eyes: no blurred vision, discharge, eye pain  ENT: no rhinorrhea, sore throat, neck masses  Cardiovascular: no angina, palpitations, PND, orthopnea, edema  Respiratory: no cough, sputum, or dyspnea  GI: no nausea, vomiting, abdominal pain, constipation, or diarrhea  : no dysuria, hematuria, frequency   Musculo-skeletal: no joint or muscle pain  Skin: no rashes or open wounds  Neurological: no headaches, dizziness, motor/sensory loss  Psychological: no anxiety or depression      /70 (BP Location: Left arm, Patient Position: Sitting, BP Cuff Size: Adult)   Pulse 77   Temp 37 °C (98.6 °F) (Temporal)   Ht 1.829 m (6')   Wt 89.2 kg (196 lb 9.6 oz)   SpO2 93%   BMI 26.66 kg/m²     Physical Exam   Constitutional:  oriented to person, place, and time. No distress.   Eyes: Pupils are equal, round, and reactive to light. No scleral icterus.  Neck: Neck supple. No thyromegaly present.   Cardiovascular: Normal rate, regular rhythm and normal heart sounds.  Exam reveals no gallop and no friction rub.  No murmur heard.  Pulmonary/Chest: Crackles at left base  Musculoskeletal:   no edema.   Lymphadenopathy: no cervical adenopathy  Neurological: alert and oriented to person, place, and time.   Skin: No cyanosis. Nails show no clubbing.      Note: I have reviewed all pertinent labs and diagnostic tests associated with  this visit with specific comments listed under the assessment and plan below    Assessment and Plan    1. LRTI (lower respiratory tract infection)  First noticed coughing, and chest congestion on 3/4/19  Resented to urgent care on 3/7/19 for which she was started on a 7-day doxycycline regimen  Symptom improvement started yesterday for which he is noticed less chest congestion, increasing mucus production and overall feeling better  Noted for crackles at left base on exam today  Advised to keep hydrated  Conclude doxycycline (day 5) regimen  Follow-up in 2 weeks    Followup: Return in about 2 weeks (around 3/26/2019) for Other Resident in the afternoon.      Signed by: Christopher Hurley M.D.

## 2019-03-22 ENCOUNTER — HOSPITAL ENCOUNTER (OUTPATIENT)
Dept: RADIOLOGY | Facility: MEDICAL CENTER | Age: 74
End: 2019-03-22
Attending: STUDENT IN AN ORGANIZED HEALTH CARE EDUCATION/TRAINING PROGRAM
Payer: COMMERCIAL

## 2019-03-22 ENCOUNTER — OFFICE VISIT (OUTPATIENT)
Dept: INTERNAL MEDICINE | Facility: MEDICAL CENTER | Age: 74
End: 2019-03-22
Payer: COMMERCIAL

## 2019-03-22 VITALS
HEIGHT: 72 IN | SYSTOLIC BLOOD PRESSURE: 121 MMHG | TEMPERATURE: 98 F | OXYGEN SATURATION: 96 % | DIASTOLIC BLOOD PRESSURE: 74 MMHG | BODY MASS INDEX: 27.77 KG/M2 | HEART RATE: 67 BPM | WEIGHT: 205 LBS

## 2019-03-22 DIAGNOSIS — R05.9 COUGH: ICD-10-CM

## 2019-03-22 PROCEDURE — 71046 X-RAY EXAM CHEST 2 VIEWS: CPT

## 2019-03-22 PROCEDURE — 99213 OFFICE O/P EST LOW 20 MIN: CPT | Mod: GE | Performed by: INTERNAL MEDICINE

## 2019-03-22 ASSESSMENT — ENCOUNTER SYMPTOMS
EYES NEGATIVE: 1
PSYCHIATRIC NEGATIVE: 1
NEUROLOGICAL NEGATIVE: 1
CONSTITUTIONAL NEGATIVE: 1
GASTROINTESTINAL NEGATIVE: 1
WHEEZING: 0
SHORTNESS OF BREATH: 0
SPUTUM PRODUCTION: 1
CARDIOVASCULAR NEGATIVE: 1
MUSCULOSKELETAL NEGATIVE: 1
COUGH: 1
HEMOPTYSIS: 0

## 2019-03-22 NOTE — PROGRESS NOTES
Established Patient    Amadou presents today with the following:    CC: Cough and congestion    HPI: This is a 73 y.o. old patient presents to the clinic for lower respiratory infection follow-up.  He started noticing cough, congestion and generalized malaise on 3/4 which was getting worse and was seen at the urgent care on 3/he was prescribed doxycycline for 5 days along with cough suppressants.  He was again seen by Dr. Soliman on 3/12 and he was given 5 days of doxycycline.  His sputum is becoming clear now and he thinks that he is improving however still has some congestion and would like to know what is going on.    Patient Active Problem List    Diagnosis Date Noted   • Chronic right-sided low back pain without sciatica 08/06/2018   • Neuropathy (HCC) 08/06/2018   • Bilateral hearing loss 08/06/2018   • Spondylolysis, lumbar region 10/05/2017   • Lumbar radiculopathy 10/06/2016   • Chronic lumbar radiculopathy 08/18/2016   • Chronic infection of sinus 05/17/2016   • Essential hypertension 05/17/2016   • Thyroid activity decreased 05/17/2016   • Hyperlipidemia 05/17/2016   • Impotence 05/17/2016   • Impaired renal function 05/17/2016   • Bilateral stenosis of lateral recess of lumbar spine 03/12/2016       Current Outpatient Prescriptions   Medication Sig Dispense Refill   • promethazine-dextromethorphan (PROMETHAZINE-DM) 6.25-15 MG/5ML syrup Take 5 mL by mouth every four hours as needed for Cough. 120 mL 0   • gabapentin (NEURONTIN) 300 MG Cap Take 1 Cap by mouth 6 Times a Day.     • atorvastatin (LIPITOR) 10 MG Tab TAKE 1 TABLET BY MOUTH EVERY DAY 90 Tab 1   • SYNTHROID 137 MCG Tab Take 1 Tab by mouth Every morning on an empty stomach. 90 Tab 3   • hydroCHLOROthiazide (HYDRODIURIL) 12.5 MG tablet TAKE 1 TAB BY MOUTH EVERYDAY. 90 Tab 1   • aspirin EC (ECOTRIN) 81 MG Tablet Delayed Response Take 1 Tab by mouth every day.     • multivitamin (THERAGRAN) Tab Take 1 Tab by mouth every day.     • Omega 3 1000 MG Cap  Take 1 Tab by mouth every day.     • b complex vitamins tablet Take 1 Tab by mouth every day.     • Calcium Carbonate-Vitamin D (CALCIUM + D PO) Take 1 Cap by mouth every day.       No current facility-administered medications for this visit.        Review of Systems:     Review of Systems   Constitutional: Negative.    HENT: Negative.    Eyes: Negative.    Respiratory: Positive for cough and sputum production. Negative for hemoptysis, shortness of breath and wheezing.    Cardiovascular: Negative.    Gastrointestinal: Negative.    Genitourinary: Negative.    Musculoskeletal: Negative.    Skin: Negative.    Neurological: Negative.    Endo/Heme/Allergies: Negative.    Psychiatric/Behavioral: Negative.    All other systems reviewed and are negative.    Physical Exam   /74 (BP Location: Left arm, Patient Position: Sitting)   Pulse 67   Temp 36.7 °C (98 °F) (Temporal)   Ht 1.829 m (6')   Wt 93 kg (205 lb)   SpO2 96%   BMI 27.80 kg/m²   Body mass index is 27.8 kg/m².  Physical Exam   Constitutional: He is oriented to person, place, and time and well-developed, well-nourished, and in no distress. No distress.   HENT:   Head: Normocephalic and atraumatic.   Eyes: Pupils are equal, round, and reactive to light. EOM are normal.   Neck: Normal range of motion. Neck supple.   Cardiovascular: Normal rate, regular rhythm and normal heart sounds.    No murmur heard.  Pulmonary/Chest: Effort normal and breath sounds normal. No respiratory distress. He has no wheezes.   Abdominal: Soft. Bowel sounds are normal. He exhibits no distension. There is no tenderness.   Musculoskeletal: Normal range of motion. He exhibits no edema or tenderness.   Neurological: He is alert and oriented to person, place, and time.   Skin: Skin is warm and dry.   Vitals reviewed.    Note: I have reviewed all pertinent labs and diagnostic tests associated with this visit with specific comments listed under the assessment and plan  below    Assessment and Plan    1. Cough  -Completed 2 weeks of doxycycline treatment, he feels like he is improving and sputum is now becoming clear however still has congestion in the chest.  He does not have as much cough as well.  Unremarkable physical exam today.  Advised him to keep hydrated and get a chest x-ray which was not performed earlier.  He will follow-up with Dr. Bolanos on 3/25 who can further manage his condition based on chest x-ray results.  - DX-CHEST-2 VIEWS; Future    Amadou Parekh expressed understanding of this plan and agreed to the above mentioned plan.    Followup: Return for Has an appointment in 3 days.    Signed by: Kimberly Hester M.D.

## 2019-03-27 ENCOUNTER — OFFICE VISIT (OUTPATIENT)
Dept: INTERNAL MEDICINE | Facility: MEDICAL CENTER | Age: 74
End: 2019-03-27
Payer: COMMERCIAL

## 2019-03-27 VITALS
TEMPERATURE: 97.5 F | WEIGHT: 203.13 LBS | BODY MASS INDEX: 27.51 KG/M2 | SYSTOLIC BLOOD PRESSURE: 124 MMHG | OXYGEN SATURATION: 94 % | HEART RATE: 61 BPM | HEIGHT: 72 IN | DIASTOLIC BLOOD PRESSURE: 72 MMHG

## 2019-03-27 DIAGNOSIS — I10 ESSENTIAL HYPERTENSION: ICD-10-CM

## 2019-03-27 DIAGNOSIS — E78.5 DYSLIPIDEMIA: ICD-10-CM

## 2019-03-27 DIAGNOSIS — J22 LRTI (LOWER RESPIRATORY TRACT INFECTION): ICD-10-CM

## 2019-03-27 PROCEDURE — 99213 OFFICE O/P EST LOW 20 MIN: CPT | Mod: GE | Performed by: INTERNAL MEDICINE

## 2019-03-27 RX ORDER — LEVOTHYROXINE SODIUM 137 MCG
137 TABLET ORAL
Qty: 90 TAB | Refills: 3 | Status: SHIPPED | OUTPATIENT
Start: 2019-03-27 | End: 2021-08-30 | Stop reason: SDUPTHER

## 2019-03-27 RX ORDER — HYDROCHLOROTHIAZIDE 12.5 MG/1
12.5 TABLET ORAL
Qty: 90 TAB | Refills: 1 | Status: ON HOLD | OUTPATIENT
Start: 2019-03-27 | End: 2019-12-06

## 2019-03-27 RX ORDER — ATORVASTATIN CALCIUM 10 MG/1
10 TABLET, FILM COATED ORAL
Qty: 90 TAB | Refills: 1 | Status: ON HOLD | OUTPATIENT
Start: 2019-03-27 | End: 2019-12-06

## 2019-03-27 NOTE — PROGRESS NOTES
Established Patient    Amadou presents today with the following:    CC:   Follow up for lower respiratory tract infection     HPI:     73 year old with male presented to the clinic for a follow up visit after recovering from a lower respiratory tract infection earlier this month. Patient was treated with mucolytics, doxycyline course, and cough medication. Due to low improvement, CXR was performed recently which shows atelectasis without infiltrates. Overall, his condition is much improved with resolution of cough and near resolution of phlegm production. Report mild sensation of chest congestion. Denies fevers, chills, orthopnea, angina, pnds, and pedal edema.     Patient Active Problem List    Diagnosis Date Noted   • Chronic right-sided low back pain without sciatica 08/06/2018   • Neuropathy (HCC) 08/06/2018   • Bilateral hearing loss 08/06/2018   • Spondylolysis, lumbar region 10/05/2017   • Lumbar radiculopathy 10/06/2016   • Chronic lumbar radiculopathy 08/18/2016   • Chronic infection of sinus 05/17/2016   • Essential hypertension 05/17/2016   • Thyroid activity decreased 05/17/2016   • Hyperlipidemia 05/17/2016   • Impotence 05/17/2016   • Impaired renal function 05/17/2016   • Bilateral stenosis of lateral recess of lumbar spine 03/12/2016       Current Outpatient Prescriptions   Medication Sig Dispense Refill   • SYNTHROID 137 MCG Tab Take 1 Tab by mouth Every morning on an empty stomach. 90 Tab 3   • hydroCHLOROthiazide (HYDRODIURIL) 12.5 MG tablet Take 1 Tab by mouth every day. 90 Tab 1   • atorvastatin (LIPITOR) 10 MG Tab Take 1 Tab by mouth every day. 90 Tab 1   • gabapentin (NEURONTIN) 300 MG Cap Take 1 Cap by mouth 6 Times a Day.     • aspirin EC (ECOTRIN) 81 MG Tablet Delayed Response Take 1 Tab by mouth every day.     • multivitamin (THERAGRAN) Tab Take 1 Tab by mouth every day.     • Omega 3 1000 MG Cap Take 1 Tab by mouth every day.     • b complex vitamins tablet Take 1 Tab by mouth every day.    "  • Calcium Carbonate-Vitamin D (CALCIUM + D PO) Take 1 Cap by mouth every day.     • promethazine-dextromethorphan (PROMETHAZINE-DM) 6.25-15 MG/5ML syrup Take 5 mL by mouth every four hours as needed for Cough. (Patient not taking: Reported on 3/27/2019) 120 mL 0     No current facility-administered medications for this visit.        ROS: As per HPI. Additional pertinent symptoms as noted below.    As per HPI. All others reviewed and negative.     Family History   Problem Relation Age of Onset   • Cancer Father         Leukemia    • Heart Disease Father    • Diabetes Father    • Stroke Father    • Cancer Mother         breast met to liver   • Heart Disease Mother      Social History   Substance Use Topics   • Smoking status: Never Smoker   • Smokeless tobacco: Never Used   • Alcohol use No     Past Surgical History:   Procedure Laterality Date   • FINGER EXPLORATION Right 2/22/2019    Procedure: FINGER EXPLORATION - RING FINGER DISTAL INTERPHALANGEAL JOINT;  Surgeon: Amadou Bowden M.D.;  Location: SURGERY SAME DAY [facility];  Service: Orthopedics   • PIP ARTHRODESIS Right 2/22/2019    Procedure: PIP ARTHRODESIS;  Surgeon: Amadou Bowden M.D.;  Location: SURGERY SAME DAY [facility];  Service: Orthopedics   • OTHER NEUROLOGICAL SURG  12/12/2018    \"Low Back Surgery'sx6 between 2006 & 2017\".   • CYST EXCISION Right 10/12/2018    Procedure: CYST EXCISION - RING FINGER MUCOUS CYST, DISTAL INTERPHALANGEAL JOINT;  Surgeon: Amadou Bowden M.D.;  Location: SURGERY SAME DAY [facility];  Service: Orthopedics   • LUMBAR LAMINECTOMY DISKECTOMY Left 12/6/2017    Procedure: LUMBAR LAMINECTOMY DISKECTOMY - POSTERIOR REDO LEFT  L4-S1 KAILA;  Surgeon: Be Webber M.D.;  Location: SURGERY [facility];  Service: Neurosurgery   • LUMBAR FUSION POSTERIOR  10/5/2017    Procedure: POSTERIOR TRANSFORAMINAL INTERBODY FUSION AT LUMBAR 4 - 5;  Surgeon: Be Webber M.D.;  Location: SURGERY [facility];  " Service: Neurosurgery   • LUMBAR DECOMPRESSION  10/5/2017    Procedure: POSTERIOR LUMBAR 3-4,  4-5 DECOMPRESSION AND FUSION;  Surgeon: Be Webber M.D.;  Location: [facility];  Service: Neurosurgery   • PB INJ DX/THER AGNT PARAVERT FACET JOINT, BRITT* Right 10/6/2016    Procedure: INJ PARA FACET L/S 1 LVL W/IG - L3-4, L4-5, L5-S1;  Surgeon: Eugenio Camara M.D.;  Location: [facility];  Service: Pain Management   • PB INJ DX/THER AGNT PARAVERT FACET JOINT, BRITT*  10/6/2016    Procedure: INJ PARA FACET L/S 2D LVL W/IG;  Surgeon: Eugenio Camara M.D.;  Location: [facility];  Service: Pain Management   • PB INJ DX/THER AGNT PARAVERT FACET JOINT, BRITT*  10/6/2016    Procedure: INJ PARA FACET L/S 3D LVL W/IG;  Surgeon: Eugenio Camara M.D.;  Location: [facility];  Service: Pain Management   • PB INJ,FORAMEN,L/S,1 LEVEL Right 8/18/2016    Procedure: INJ-FORAMEN EPI LUM/SAC SNGL - L5-S1;  Surgeon: Eugenio Camara M.D.;  Location: [facility];  Service: Pain Management   • LUMBAR LAMINECTOMY DISKECTOMY Right 3/12/2016    Procedure: LUMBAR HemiLAMINECTOMY Micro DISKECTOMY posterior Redo L3-4 ;  Surgeon: Be Webber M.D.;  Location: [facility];  Service:    • FORAMINOTOMY Right 3/12/2016    Procedure: FORAMINOTOMY;  Surgeon: Be Webber M.D.;  Location: [facility];  Service:    • CERVICAL LAMINECTOMY POSTERIOR  2011   • OTHER      nasal surgery 2015   • OTHER NEUROLOGICAL SURG  2006, 2010, 2012, 2014, 2016, 2017    low back surgery x 5   • TONSILLECTOMY      child     Allergies: Patient has no known allergies.    /72 (BP Location: Left arm, Patient Position: Sitting, BP Cuff Size: Adult)   Pulse 61   Temp 36.4 °C (97.5 °F) (Temporal)   Ht 1.829 m (6')   Wt 92.1 kg (203 lb 2 oz)   SpO2 94%   BMI 27.55 kg/m²     Physical Exam   Constitutional:  oriented to person, place, and time. No distress.   Eyes: Pupils are equal,  round, and reactive to light. No scleral icterus.  Neck: Neck supple. No thyromegaly present.   Cardiovascular: Normal rate, regular rhythm and normal heart sounds.  Exam reveals no gallop and no friction rub.  No murmur heard.  Pulmonary/Chest: Breath sounds normal. Chest wall is not dull to percussion.   Musculoskeletal:   no edema.   Lymphadenopathy: no cervical adenopathy  Neurological: alert and oriented to person, place, and time.   Skin: No cyanosis. Nails show no clubbing.        Assessment and Plan    1. LRTI (lower respiratory tract infection)  - treated with 7 day course of doxycycline and cough syrups.  - CXR shows atelectasis.   - denies SOB, fevers, chills. Resolution of cough and phlegm production  - Recommend yearly flu shot. Up to date on pneumococcal immunization  - Recommend adequate hydration and resuming exercise regimen      2. Essential hypertension  - refills provided for medications    3. Dyslipidemia  - refills provided for lipitor.     Return in about 6 months (around 9/27/2019).   Signed by: Barrington Rutherford M.D.

## 2019-05-10 ENCOUNTER — TELEPHONE (OUTPATIENT)
Dept: INTERNAL MEDICINE | Facility: MEDICAL CENTER | Age: 74
End: 2019-05-10

## 2019-05-10 NOTE — TELEPHONE ENCOUNTER
1. Caller Name: Amadou                      Call Back Number: [phone]      2. Message: Pt called and l/m.  He would need to be see Dr Muñoz or be referred to ID specialist due to his recurring infections I have when I have a slight Coccy(?) abrasions.     3. Patient approves office to leave a detailed voicemail/MyChart message: N\A

## 2019-05-13 NOTE — TELEPHONE ENCOUNTER
Called and spoke with pt. Notified pt of this. Pt understood. Scheduled pt an appt on 05/14/19 at 8am with Dr Gordillo.

## 2019-05-14 ENCOUNTER — OFFICE VISIT (OUTPATIENT)
Dept: INTERNAL MEDICINE | Facility: MEDICAL CENTER | Age: 74
End: 2019-05-14
Payer: COMMERCIAL

## 2019-05-14 VITALS
RESPIRATION RATE: 14 BRPM | WEIGHT: 201 LBS | BODY MASS INDEX: 27.22 KG/M2 | OXYGEN SATURATION: 98 % | HEART RATE: 74 BPM | TEMPERATURE: 98 F | HEIGHT: 72 IN | DIASTOLIC BLOOD PRESSURE: 72 MMHG | SYSTOLIC BLOOD PRESSURE: 142 MMHG

## 2019-05-14 DIAGNOSIS — Z00.00 ENCOUNTER FOR PREVENTIVE CARE: ICD-10-CM

## 2019-05-14 DIAGNOSIS — B99.9 CHRONIC INFECTION: ICD-10-CM

## 2019-05-14 PROCEDURE — 99213 OFFICE O/P EST LOW 20 MIN: CPT | Mod: GE | Performed by: INTERNAL MEDICINE

## 2019-05-14 NOTE — PROGRESS NOTES
Established Patient    Amadou presents today with the following:    CC: Prone to infections    HPI: Mr. Parekh is a 72 yo man presenting with concern about infections. For years whenever he gets minor cuts he notices redness and pus at the site. He has had about 3 infections in the last few years, a knee infection did require short course of antibiotics that resolved. Applying neosporin and bandage usually resolves them. He does not currently have any active infections. He does not get fevers, chills or any systemic symptoms. He has never been hospitalized for infections. No history of immune deficiencies in the family. He does not feel that he is at risk for HIV.     Patient Active Problem List    Diagnosis Date Noted   • Chronic right-sided low back pain without sciatica 08/06/2018   • Neuropathy (HCC) 08/06/2018   • Bilateral hearing loss 08/06/2018   • Spondylolysis, lumbar region 10/05/2017   • Lumbar radiculopathy 10/06/2016   • Chronic lumbar radiculopathy 08/18/2016   • Chronic infection of sinus 05/17/2016   • Essential hypertension 05/17/2016   • Thyroid activity decreased 05/17/2016   • Hyperlipidemia 05/17/2016   • Impotence 05/17/2016   • Impaired renal function 05/17/2016   • Bilateral stenosis of lateral recess of lumbar spine 03/12/2016       Current Outpatient Prescriptions   Medication Sig Dispense Refill   • SYNTHROID 137 MCG Tab Take 1 Tab by mouth Every morning on an empty stomach. 90 Tab 3   • hydroCHLOROthiazide (HYDRODIURIL) 12.5 MG tablet Take 1 Tab by mouth every day. 90 Tab 1   • atorvastatin (LIPITOR) 10 MG Tab Take 1 Tab by mouth every day. 90 Tab 1   • gabapentin (NEURONTIN) 300 MG Cap Take 1 Cap by mouth 6 Times a Day.     • aspirin EC (ECOTRIN) 81 MG Tablet Delayed Response Take 1 Tab by mouth every day.     • multivitamin (THERAGRAN) Tab Take 1 Tab by mouth every day.     • Omega 3 1000 MG Cap Take 1 Tab by mouth every day.     • b complex vitamins tablet Take 1 Tab by mouth every  day.     • Calcium Carbonate-Vitamin D (CALCIUM + D PO) Take 1 Cap by mouth every day.       No current facility-administered medications for this visit.        ROS: As per HPI. Additional pertinent systems as noted below.    Constitutional:  no fevers/chills, no weakness/fatigue, no recent weight loss.   Eyes:  no changes in vision.  ENT:  no hearing loss/no changes in hearing.  No congestion.  No sore throat.  Cardiovascular:  no chest pain, no palpitations.  No orthopnea, no PND.  Respiratory:  no shortness of breath, no cough, no wheezing.  No sputum production.    GI:  no abdominal pain.  No nausea/vomiting.  No heartburn.  No diarrhea, no constipation.  No blood in stool.  :  no polyuria, no burning on urination.  No hematuria.    MSK:  no myalgias, no back pain, no joint pain.  Neurological: no headache, no numbness/tingling in extremities.  No dizziness.  No tremors. No changes in sensation.   Psych: no depression, no anxiety.  No SI/HI.     /72 (BP Location: Left arm, Patient Position: Sitting)   Pulse 74   Temp 36.7 °C (98 °F)   Resp 14   Ht 1.829 m (6')   Wt 91.2 kg (201 lb)   SpO2 98%   BMI 27.26 kg/m²     Physical Exam   Constitutional:  oriented to person, place, and time. No distress.   Eyes: Pupils are equal, round, and reactive to light. No scleral icterus.  Neck: Neck supple. No thyromegaly present.   Cardiovascular: Normal rate, regular rhythm and normal heart sounds.  Exam reveals no gallop and no friction rub.  No murmur heard.  Pulmonary/Chest: Breath sounds normal. Chest wall is not dull to percussion.   Abd: No hepatosplenomegaly  Musculoskeletal:   no edema.   Lymphadenopathy: no cervical adenopathy  Neurological: alert and oriented to person, place, and time.   Skin: No cyanosis. Nails show no clubbing.    Note: I have reviewed all pertinent labs and diagnostic tests associated with this visit with specific comments listed under the assessment and plan below    Assessment and  Plan    #Concern for infections  No concern for active infection at this time. Will check CBC, CMP to check for protein levels and calcium to rule out blood cancer, though remote possibility. Check MMR titers given his age. He is up to date on vaccinations, does need Shingrix, which was recommended.  Plan: CBC, CMP, MMR titers      Followup: Return if symptoms worsen or fail to improve.      Signed by: Andressa Gordillo M.D.

## 2019-05-16 ENCOUNTER — HOSPITAL ENCOUNTER (OUTPATIENT)
Dept: LAB | Facility: MEDICAL CENTER | Age: 74
End: 2019-05-16
Attending: STUDENT IN AN ORGANIZED HEALTH CARE EDUCATION/TRAINING PROGRAM
Payer: COMMERCIAL

## 2019-05-16 DIAGNOSIS — Z00.00 ENCOUNTER FOR PREVENTIVE CARE: ICD-10-CM

## 2019-05-16 LAB
ALBUMIN SERPL BCP-MCNC: 4.2 G/DL (ref 3.2–4.9)
ALBUMIN/GLOB SERPL: 1.6 G/DL
ALP SERPL-CCNC: 70 U/L (ref 30–99)
ALT SERPL-CCNC: 41 U/L (ref 2–50)
ANION GAP SERPL CALC-SCNC: 11 MMOL/L (ref 0–11.9)
AST SERPL-CCNC: 31 U/L (ref 12–45)
BASOPHILS # BLD AUTO: 1.4 % (ref 0–1.8)
BASOPHILS # BLD: 0.09 K/UL (ref 0–0.12)
BILIRUB SERPL-MCNC: 0.6 MG/DL (ref 0.1–1.5)
BUN SERPL-MCNC: 23 MG/DL (ref 8–22)
CALCIUM SERPL-MCNC: 9.2 MG/DL (ref 8.5–10.5)
CHLORIDE SERPL-SCNC: 103 MMOL/L (ref 96–112)
CO2 SERPL-SCNC: 27 MMOL/L (ref 20–33)
CREAT SERPL-MCNC: 1.31 MG/DL (ref 0.5–1.4)
EOSINOPHIL # BLD AUTO: 0.21 K/UL (ref 0–0.51)
EOSINOPHIL NFR BLD: 3.3 % (ref 0–6.9)
ERYTHROCYTE [DISTWIDTH] IN BLOOD BY AUTOMATED COUNT: 42.2 FL (ref 35.9–50)
GLOBULIN SER CALC-MCNC: 2.7 G/DL (ref 1.9–3.5)
GLUCOSE SERPL-MCNC: 89 MG/DL (ref 65–99)
HCT VFR BLD AUTO: 50 % (ref 42–52)
HGB BLD-MCNC: 16.2 G/DL (ref 14–18)
IMM GRANULOCYTES # BLD AUTO: 0.02 K/UL (ref 0–0.11)
IMM GRANULOCYTES NFR BLD AUTO: 0.3 % (ref 0–0.9)
LYMPHOCYTES # BLD AUTO: 1.92 K/UL (ref 1–4.8)
LYMPHOCYTES NFR BLD: 30.3 % (ref 22–41)
MCH RBC QN AUTO: 28.7 PG (ref 27–33)
MCHC RBC AUTO-ENTMCNC: 32.4 G/DL (ref 33.7–35.3)
MCV RBC AUTO: 88.7 FL (ref 81.4–97.8)
MONOCYTES # BLD AUTO: 0.54 K/UL (ref 0–0.85)
MONOCYTES NFR BLD AUTO: 8.5 % (ref 0–13.4)
NEUTROPHILS # BLD AUTO: 3.55 K/UL (ref 1.82–7.42)
NEUTROPHILS NFR BLD: 56.2 % (ref 44–72)
NRBC # BLD AUTO: 0 K/UL
NRBC BLD-RTO: 0 /100 WBC
PLATELET # BLD AUTO: 305 K/UL (ref 164–446)
PMV BLD AUTO: 10.6 FL (ref 9–12.9)
POTASSIUM SERPL-SCNC: 4.4 MMOL/L (ref 3.6–5.5)
PROT SERPL-MCNC: 6.9 G/DL (ref 6–8.2)
RBC # BLD AUTO: 5.64 M/UL (ref 4.7–6.1)
SODIUM SERPL-SCNC: 141 MMOL/L (ref 135–145)
WBC # BLD AUTO: 6.3 K/UL (ref 4.8–10.8)

## 2019-05-16 PROCEDURE — 86762 RUBELLA ANTIBODY: CPT

## 2019-05-16 PROCEDURE — 80053 COMPREHEN METABOLIC PANEL: CPT

## 2019-05-16 PROCEDURE — 86735 MUMPS ANTIBODY: CPT

## 2019-05-16 PROCEDURE — 36415 COLL VENOUS BLD VENIPUNCTURE: CPT

## 2019-05-16 PROCEDURE — 85025 COMPLETE CBC W/AUTO DIFF WBC: CPT

## 2019-05-17 LAB
MUV IGG SER IA-ACNC: 3.85
RUBV IGM SER IA-ACNC: <10 AU/ML

## 2019-05-22 ENCOUNTER — TELEPHONE (OUTPATIENT)
Dept: INTERNAL MEDICINE | Facility: MEDICAL CENTER | Age: 74
End: 2019-05-22

## 2019-05-22 DIAGNOSIS — L57.0 ACTINIC KERATOSIS: ICD-10-CM

## 2019-05-22 NOTE — TELEPHONE ENCOUNTER
Called and spoke with Amadou. He said for an Annual skin exams.  Time to time bx to make sure there is nothing.

## 2019-05-22 NOTE — TELEPHONE ENCOUNTER
1. Caller Name: Amadou                      Call Back Number: [phone] (home)       2. Message: Pt called and l/m. They were seeing [facility]. Pt they are no longer accepting his insurance.  Would like a referral to Dr Rodriguez.    3. Patient approves office to leave a detailed voicemail/MyChart message: N\A    Called and spoke with pt. Notified pt I received his message.  Told pt Dr Rodriguez is booked out until Oct/Nov. Pt understood.

## 2019-05-22 NOTE — TELEPHONE ENCOUNTER
Called and spoke with Amadou.  He said at one point he had precancerous situation. But usually just an annual skin check and bx on moles

## 2019-05-29 ENCOUNTER — APPOINTMENT (OUTPATIENT)
Dept: ADMISSIONS | Facility: MEDICAL CENTER | Age: 74
End: 2019-05-29
Attending: NEUROLOGICAL SURGERY
Payer: COMMERCIAL

## 2019-05-29 DIAGNOSIS — Z01.812 PRE-OPERATIVE LABORATORY EXAMINATION: ICD-10-CM

## 2019-05-29 DIAGNOSIS — Z01.810 PRE-OPERATIVE CARDIOVASCULAR EXAMINATION: ICD-10-CM

## 2019-05-29 LAB
APTT PPP: 23.6 SEC (ref 24.7–36)
EKG IMPRESSION: NORMAL
INR PPP: 0.97 (ref 0.87–1.13)
PROTHROMBIN TIME: 13 SEC (ref 12–14.6)

## 2019-05-29 PROCEDURE — 36415 COLL VENOUS BLD VENIPUNCTURE: CPT

## 2019-05-29 PROCEDURE — 85610 PROTHROMBIN TIME: CPT

## 2019-05-29 PROCEDURE — 85730 THROMBOPLASTIN TIME PARTIAL: CPT

## 2019-06-03 ENCOUNTER — HOSPITAL ENCOUNTER (OUTPATIENT)
Facility: MEDICAL CENTER | Age: 74
End: 2019-06-03
Attending: NEUROLOGICAL SURGERY | Admitting: NEUROLOGICAL SURGERY
Payer: COMMERCIAL

## 2019-06-03 ENCOUNTER — ANESTHESIA (OUTPATIENT)
Dept: SURGERY | Facility: MEDICAL CENTER | Age: 74
End: 2019-06-03
Payer: COMMERCIAL

## 2019-06-03 ENCOUNTER — ANESTHESIA EVENT (OUTPATIENT)
Dept: SURGERY | Facility: MEDICAL CENTER | Age: 74
End: 2019-06-03
Payer: COMMERCIAL

## 2019-06-03 VITALS
OXYGEN SATURATION: 92 % | WEIGHT: 201.72 LBS | RESPIRATION RATE: 14 BRPM | TEMPERATURE: 98 F | SYSTOLIC BLOOD PRESSURE: 135 MMHG | DIASTOLIC BLOOD PRESSURE: 77 MMHG | HEART RATE: 63 BPM | BODY MASS INDEX: 27.32 KG/M2 | HEIGHT: 72 IN

## 2019-06-03 PROCEDURE — 700101 HCHG RX REV CODE 250: Performed by: ANESTHESIOLOGY

## 2019-06-03 PROCEDURE — 160035 HCHG PACU - 1ST 60 MINS PHASE I: Performed by: NEUROLOGICAL SURGERY

## 2019-06-03 PROCEDURE — 501838 HCHG SUTURE GENERAL: Performed by: NEUROLOGICAL SURGERY

## 2019-06-03 PROCEDURE — A9270 NON-COVERED ITEM OR SERVICE: HCPCS | Performed by: ANESTHESIOLOGY

## 2019-06-03 PROCEDURE — 160048 HCHG OR STATISTICAL LEVEL 1-5: Performed by: NEUROLOGICAL SURGERY

## 2019-06-03 PROCEDURE — 160009 HCHG ANES TIME/MIN: Performed by: NEUROLOGICAL SURGERY

## 2019-06-03 PROCEDURE — 160002 HCHG RECOVERY MINUTES (STAT): Performed by: NEUROLOGICAL SURGERY

## 2019-06-03 PROCEDURE — 500440 HCHG DRESSING, STERILE ROLL (KERLIX): Performed by: NEUROLOGICAL SURGERY

## 2019-06-03 PROCEDURE — 700105 HCHG RX REV CODE 258: Performed by: ANESTHESIOLOGY

## 2019-06-03 PROCEDURE — 160041 HCHG SURGERY MINUTES - EA ADDL 1 MIN LEVEL 4: Performed by: NEUROLOGICAL SURGERY

## 2019-06-03 PROCEDURE — 160029 HCHG SURGERY MINUTES - 1ST 30 MINS LEVEL 4: Performed by: NEUROLOGICAL SURGERY

## 2019-06-03 PROCEDURE — 160025 RECOVERY II MINUTES (STATS): Performed by: NEUROLOGICAL SURGERY

## 2019-06-03 PROCEDURE — 700105 HCHG RX REV CODE 258: Performed by: NEUROLOGICAL SURGERY

## 2019-06-03 PROCEDURE — 500881 HCHG PACK, EXTREMITY: Performed by: NEUROLOGICAL SURGERY

## 2019-06-03 PROCEDURE — 700102 HCHG RX REV CODE 250 W/ 637 OVERRIDE(OP): Performed by: ANESTHESIOLOGY

## 2019-06-03 PROCEDURE — 160046 HCHG PACU - 1ST 60 MINS PHASE II: Performed by: NEUROLOGICAL SURGERY

## 2019-06-03 PROCEDURE — 700111 HCHG RX REV CODE 636 W/ 250 OVERRIDE (IP): Performed by: ANESTHESIOLOGY

## 2019-06-03 PROCEDURE — 500002 HCHG ADHESIVE, DERMABOND: Performed by: NEUROLOGICAL SURGERY

## 2019-06-03 RX ORDER — ACETAMINOPHEN 500 MG
TABLET ORAL
Status: COMPLETED
Start: 2019-06-03 | End: 2019-06-03

## 2019-06-03 RX ORDER — OXYCODONE HCL 5 MG/5 ML
5 SOLUTION, ORAL ORAL
Status: DISCONTINUED | OUTPATIENT
Start: 2019-06-03 | End: 2019-06-03 | Stop reason: HOSPADM

## 2019-06-03 RX ORDER — ONDANSETRON 2 MG/ML
INJECTION INTRAMUSCULAR; INTRAVENOUS PRN
Status: DISCONTINUED | OUTPATIENT
Start: 2019-06-03 | End: 2019-06-03 | Stop reason: SURG

## 2019-06-03 RX ORDER — HYDROMORPHONE HYDROCHLORIDE 1 MG/ML
0.1 INJECTION, SOLUTION INTRAMUSCULAR; INTRAVENOUS; SUBCUTANEOUS
Status: DISCONTINUED | OUTPATIENT
Start: 2019-06-03 | End: 2019-06-03 | Stop reason: HOSPADM

## 2019-06-03 RX ORDER — LABETALOL HYDROCHLORIDE 5 MG/ML
5 INJECTION, SOLUTION INTRAVENOUS
Status: DISCONTINUED | OUTPATIENT
Start: 2019-06-03 | End: 2019-06-03 | Stop reason: HOSPADM

## 2019-06-03 RX ORDER — HYDROMORPHONE HYDROCHLORIDE 1 MG/ML
0.2 INJECTION, SOLUTION INTRAMUSCULAR; INTRAVENOUS; SUBCUTANEOUS
Status: DISCONTINUED | OUTPATIENT
Start: 2019-06-03 | End: 2019-06-03 | Stop reason: HOSPADM

## 2019-06-03 RX ORDER — CELECOXIB 100 MG/1
CAPSULE ORAL PRN
Status: DISCONTINUED | OUTPATIENT
Start: 2019-06-03 | End: 2019-06-03 | Stop reason: SURG

## 2019-06-03 RX ORDER — BACITRACIN 50000 [IU]/1
INJECTION, POWDER, FOR SOLUTION INTRAMUSCULAR
Status: DISCONTINUED | OUTPATIENT
Start: 2019-06-03 | End: 2019-06-03 | Stop reason: HOSPADM

## 2019-06-03 RX ORDER — GABAPENTIN 300 MG/1
CAPSULE ORAL
Status: COMPLETED
Start: 2019-06-03 | End: 2019-06-03

## 2019-06-03 RX ORDER — CELECOXIB 200 MG/1
CAPSULE ORAL
Status: COMPLETED
Start: 2019-06-03 | End: 2019-06-03

## 2019-06-03 RX ORDER — HYDRALAZINE HYDROCHLORIDE 20 MG/ML
5 INJECTION INTRAMUSCULAR; INTRAVENOUS
Status: DISCONTINUED | OUTPATIENT
Start: 2019-06-03 | End: 2019-06-03 | Stop reason: HOSPADM

## 2019-06-03 RX ORDER — SODIUM CHLORIDE, SODIUM LACTATE, POTASSIUM CHLORIDE, CALCIUM CHLORIDE 600; 310; 30; 20 MG/100ML; MG/100ML; MG/100ML; MG/100ML
INJECTION, SOLUTION INTRAVENOUS
Status: DISCONTINUED | OUTPATIENT
Start: 2019-06-03 | End: 2019-06-03 | Stop reason: SURG

## 2019-06-03 RX ORDER — CEFAZOLIN SODIUM 1 G/3ML
INJECTION, POWDER, FOR SOLUTION INTRAMUSCULAR; INTRAVENOUS PRN
Status: DISCONTINUED | OUTPATIENT
Start: 2019-06-03 | End: 2019-06-03 | Stop reason: SURG

## 2019-06-03 RX ORDER — BUPIVACAINE HYDROCHLORIDE AND EPINEPHRINE 5; 5 MG/ML; UG/ML
INJECTION, SOLUTION EPIDURAL; INTRACAUDAL; PERINEURAL
Status: DISCONTINUED | OUTPATIENT
Start: 2019-06-03 | End: 2019-06-03 | Stop reason: HOSPADM

## 2019-06-03 RX ORDER — ONDANSETRON 2 MG/ML
4 INJECTION INTRAMUSCULAR; INTRAVENOUS
Status: DISCONTINUED | OUTPATIENT
Start: 2019-06-03 | End: 2019-06-03 | Stop reason: HOSPADM

## 2019-06-03 RX ORDER — ACETAMINOPHEN 325 MG/1
TABLET ORAL PRN
Status: DISCONTINUED | OUTPATIENT
Start: 2019-06-03 | End: 2019-06-03 | Stop reason: SURG

## 2019-06-03 RX ORDER — SODIUM CHLORIDE, SODIUM LACTATE, POTASSIUM CHLORIDE, CALCIUM CHLORIDE 600; 310; 30; 20 MG/100ML; MG/100ML; MG/100ML; MG/100ML
INJECTION, SOLUTION INTRAVENOUS CONTINUOUS
Status: DISCONTINUED | OUTPATIENT
Start: 2019-06-03 | End: 2019-06-03 | Stop reason: HOSPADM

## 2019-06-03 RX ORDER — HALOPERIDOL 5 MG/ML
1 INJECTION INTRAMUSCULAR
Status: DISCONTINUED | OUTPATIENT
Start: 2019-06-03 | End: 2019-06-03 | Stop reason: HOSPADM

## 2019-06-03 RX ORDER — DEXAMETHASONE SODIUM PHOSPHATE 4 MG/ML
INJECTION, SOLUTION INTRA-ARTICULAR; INTRALESIONAL; INTRAMUSCULAR; INTRAVENOUS; SOFT TISSUE PRN
Status: DISCONTINUED | OUTPATIENT
Start: 2019-06-03 | End: 2019-06-03 | Stop reason: SURG

## 2019-06-03 RX ORDER — HYDROMORPHONE HYDROCHLORIDE 1 MG/ML
0.4 INJECTION, SOLUTION INTRAMUSCULAR; INTRAVENOUS; SUBCUTANEOUS
Status: DISCONTINUED | OUTPATIENT
Start: 2019-06-03 | End: 2019-06-03 | Stop reason: HOSPADM

## 2019-06-03 RX ORDER — OXYCODONE HCL 5 MG/5 ML
10 SOLUTION, ORAL ORAL
Status: DISCONTINUED | OUTPATIENT
Start: 2019-06-03 | End: 2019-06-03 | Stop reason: HOSPADM

## 2019-06-03 RX ORDER — GABAPENTIN 300 MG/1
CAPSULE ORAL PRN
Status: DISCONTINUED | OUTPATIENT
Start: 2019-06-03 | End: 2019-06-03 | Stop reason: SURG

## 2019-06-03 RX ADMIN — EPHEDRINE SULFATE 10 MG: 50 INJECTION INTRAMUSCULAR; INTRAVENOUS; SUBCUTANEOUS at 07:41

## 2019-06-03 RX ADMIN — PROPOFOL 200 MG: 10 INJECTION, EMULSION INTRAVENOUS at 07:35

## 2019-06-03 RX ADMIN — FENTANYL CITRATE 100 MCG: 50 INJECTION, SOLUTION INTRAMUSCULAR; INTRAVENOUS at 07:35

## 2019-06-03 RX ADMIN — DEXAMETHASONE SODIUM PHOSPHATE 8 MG: 4 INJECTION, SOLUTION INTRA-ARTICULAR; INTRALESIONAL; INTRAMUSCULAR; INTRAVENOUS; SOFT TISSUE at 07:46

## 2019-06-03 RX ADMIN — CEFAZOLIN 2 G: 330 INJECTION, POWDER, FOR SOLUTION INTRAMUSCULAR; INTRAVENOUS at 07:44

## 2019-06-03 RX ADMIN — ONDANSETRON 4 MG: 2 INJECTION INTRAMUSCULAR; INTRAVENOUS at 08:07

## 2019-06-03 RX ADMIN — ACETAMINOPHEN 1000 MG: 325 TABLET, FILM COATED ORAL at 07:09

## 2019-06-03 RX ADMIN — GABAPENTIN 300 MG: 300 CAPSULE ORAL at 07:09

## 2019-06-03 RX ADMIN — SODIUM CHLORIDE, POTASSIUM CHLORIDE, SODIUM LACTATE AND CALCIUM CHLORIDE: 600; 310; 30; 20 INJECTION, SOLUTION INTRAVENOUS at 06:23

## 2019-06-03 RX ADMIN — CELECOXIB 200 MG: 100 CAPSULE ORAL at 07:09

## 2019-06-03 RX ADMIN — LIDOCAINE HYDROCHLORIDE 100 MG: 20 INJECTION, SOLUTION INTRAVENOUS at 07:35

## 2019-06-03 RX ADMIN — SODIUM CHLORIDE, POTASSIUM CHLORIDE, SODIUM LACTATE AND CALCIUM CHLORIDE: 600; 310; 30; 20 INJECTION, SOLUTION INTRAVENOUS at 07:30

## 2019-06-03 RX ADMIN — EPHEDRINE SULFATE 10 MG: 50 INJECTION INTRAMUSCULAR; INTRAVENOUS; SUBCUTANEOUS at 07:56

## 2019-06-03 ASSESSMENT — PAIN SCALES - GENERAL: PAIN_LEVEL: 0

## 2019-06-03 NOTE — ANESTHESIA QCDR
2019 [facility] Clinical Data Registry (for Quality Improvement)     Postoperative nausea/vomiting risk protocol (Adult = 18 yrs and Pediatric 3-17 yrs)- (430 and 463)  General inhalation anesthetic (NOT TIVA) with PONV risk factors: No  Provision of anti-emetic therapy with at least 2 different classes of agents: N/A  Patient DID NOT receive anti-emetic therapy and reason is documented in Medical Record: N/A    Multimodal Pain Management- (AQI59)  Patient undergoing Elective Surgery (i.e. Outpatient, or ASC, or Prescheduled Surgery prior to Hospital Admission): Yes  Use of Multimodal Pain Management, two or more drugs and/or interventions, NOT including systemic opioids: Yes   Exception: Documented allergy to multiple classes of analgesics:  N/A    PACU assessment of acute postoperative pain prior to Anesthesia Care End- Applies to Patients Age = 18- (ABG7)  Initial PACU pain score is which of the following: < 7/10  Patient unable to report pain score: N/A    Post-anesthetic transfer of care checklist/protocol to PACU/ICU- (426 and 427)  Upon conclusion of case, patient transferred to which of the following locations: PACU/Non-ICU  Use of transfer checklist/protocol: Yes  Exclusion: Service Performed in Patient Hospital Room (and thus did not require transfer): N/A    PACU Reintubation- (AQI31)  General anesthesia requiring endotracheal intubation (ETT) along with subsequent extubation in OR or PACU: No  Required reintubation in the PACU: N/A  Extubation was a planned trial documented in the medical record prior to removal of the original airway device: N/A    Unplanned admission to ICU related to anesthesia service up through end of PACU care- (MD51)  Unplanned admission to ICU (not initially anticipated at anesthesia start time): No

## 2019-06-03 NOTE — OR NURSING
Discharging Patient home per physician order.  Discharged with *wife**.  Demonstrated understanding of discharge instructions, follow up appointments, home medications, prescriptions, home care for surgical wound, and nursing care instructions for *at home care of surgical incision **.  Ambulating *without** assistance, voiding without difficulty, pain well controlled, tolerating oral medications, oxygen saturation greater than 90% , tolerating diet.   Educational handouts given and discussed.  Verbalized understanding of discharge instructions and educational handouts.  All questions answered.  Belongings with patient at time of discharge.

## 2019-06-03 NOTE — DISCHARGE INSTRUCTIONS
ACTIVITY: Rest and take it easy for the first 24 hours.  A responsible adult is recommended to remain with you during that time.  It is normal to feel sleepy.  We encourage you to not do anything that requires balance, judgment or coordination.    MILD FLU-LIKE SYMPTOMS ARE NORMAL. YOU MAY EXPERIENCE GENERALIZED MUSCLE ACHES, THROAT IRRITATION, HEADACHE AND/OR SOME NAUSEA.    FOR 24 HOURS DO NOT:  Drive, operate machinery or run household appliances.  Drink beer or alcoholic beverages.   Make important decisions or sign legal documents.      DIET: To avoid nausea, slowly advance diet as tolerated, avoiding spicy or greasy foods for the first day.  Add more substantial food to your diet according to your physician's instructions.  Babies can be fed formula or breast milk as soon as they are hungry.  INCREASE FLUIDS AND FIBER TO AVOID CONSTIPATION.    SURGICAL DRESSING/BATHING: Keep clean and dry with ace wrap for 48 hours. You may shower and leave open to air after that. Do not immerse in water. No hot tubs or baths  FOLLOW-UP APPOINTMENT:    A follow-up appointment should be arranged with your doctor in [phone]; call to schedule.    You should CALL YOUR PHYSICIAN if you develop:  Fever greater than 101 degrees F.  Pain not relieved by medication, or persistent nausea or vomiting.  Excessive bleeding (blood soaking through dressing) or unexpected drainage from the wound.  Extreme redness or swelling around the incision site, drainage of pus or foul smelling drainage.  Inability to urinate or empty your bladder within 8 hours.  Problems with breathing or chest pain.    You should call 911 if you develop problems with breathing or chest pain.  If you are unable to contact your doctor or surgical center, you should go to the nearest emergency room or urgent care center.  Physician's telephone #: [phone]    If any questions arise, call your doctor.  If your doctor is not available, please feel free to call the  Surgical Center at [phone].  The Center is open Monday through Friday from 7AM to 7PM.  You can also call the HEALTH HOTLINE open 24 hours/day, 7 days/week and speak to a nurse at [phone], or toll free at [phone].    A registered nurse may call you a few days after your surgery to see how you are doing after your procedure.    MEDICATIONS: Resume taking daily medication.  Take prescribed pain medication with food.  If no medication is prescribed, you may take non-aspirin pain medication if needed.  PAIN MEDICATION CAN BE VERY CONSTIPATING.  Take a stool softener or laxative such as senokot, pericolace, or milk of magnesia if needed.    Patient may take tylenol at home for discomfort.      If your physician has prescribed pain medication that includes Acetaminophen (Tylenol), do not take additional Acetaminophen (Tylenol) while taking the prescribed medication.    Depression / Suicide Risk    As you are discharged from this [facility] facility, it is important to learn how to keep safe from harming yourself.    Recognize the warning signs:  · Abrupt changes in personality, positive or negative- including increase in energy   · Giving away possessions  · Change in eating patterns- significant weight changes-  positive or negative  · Change in sleeping patterns- unable to sleep or sleeping all the time   · Unwillingness or inability to communicate  · Depression  · Unusual sadness, discouragement and loneliness  · Talk of wanting to die  · Neglect of personal appearance   · Rebelliousness- reckless behavior  · Withdrawal from people/activities they love  · Confusion- inability to concentrate     If you or a loved one observes any of these behaviors or has concerns about self-harm, here's what you can do:  · Talk about it- your feelings and reasons for harming yourself  · Remove any means that you might use to hurt yourself (examples: pills, rope, extension cords, firearm)  · Get professional help  from the community (Mental Health, Substance Abuse, psychological counseling)  · Do not be alone:Call your Safe Contact- someone whom you trust who will be there for you.  · Call your local CRISIS HOTLINE 156-7761 or 529-550-9742  · Call your local Children's Mobile Crisis Response Team [place] (425) 355-2236 or www.[employer]  · Call the toll free National Suicide Prevention Hotlines   · National Suicide Prevention Lifeline 418-803-WDUA (0029)  · National Hope Line Network 800-SUICIDE (087-7999)

## 2019-06-03 NOTE — OR NURSING
Patient is doing well in recovery. He denies pain and has good motion to his left foot per Dr Garcia assessment. His wife has been called and needs to be called again when he is ready for discharge.     0920 Wife has been called and she is on her way to pick him up

## 2019-06-03 NOTE — ANESTHESIA PROCEDURE NOTES
Airway  Date/Time: 6/3/2019 7:36 AM  Performed by: ESTRELLITA FRANKEL  Authorized by: ESTRELLITA FRANKEL     Location:  OR  Urgency:  Elective  Indications for Airway Management:  Anesthesia  Spontaneous Ventilation: absent    Sedation Level:  Deep  Preoxygenated: Yes    Final Airway Type:  Supraglottic airway  Final Supraglottic Airway:  Standard LMA  SGA Size:  5  Number of Attempts at Approach:  1

## 2019-06-03 NOTE — OR SURGEON
Immediate Post OP Note    PreOp Diagnosis: peroneal neuropathy    PostOp Diagnosis: same    Procedure(s):  EXPLORATION, NERVE- PERONEAL NERVE RELEASE AT THE FIBULAR HEAD   - Wound Class: Clean    Surgeon(s):  Raz Garcia M.D.    Anesthesiologist/Type of Anesthesia:  Anesthesiologist: Leonides Cuba M.D./General    Surgical Staff:  Circulator: Sindy Llanos R.N.  Scrub Person: Alma Concepcion    Specimens removed if any:  * No specimens in log *    Estimated Blood Loss: min    Findings: good decomp    Complications: none        6/3/2019 8:36 AM Raz Garcia M.D.

## 2019-06-03 NOTE — ANESTHESIA TIME REPORT
Anesthesia Start and Stop Event Times     Date Time Event    6/3/2019 0730 Anesthesia Start     0832 Anesthesia Stop        Responsible Staff  06/03/19    Name Role Begin End    Leonides Cuba M.D. Anesth 0730 0832        Preop Diagnosis (Free Text):  Pre-op Diagnosis     COMMON PERONEAL  NEUROPATHY        Preop Diagnosis (Codes):  Diagnosis Information     Diagnosis Code(s):         Post op Diagnosis  Common peroneal neuropathy      Premium Reason  Non-Premium    Comments:

## 2019-06-03 NOTE — ANESTHESIA POSTPROCEDURE EVALUATION
Patient: Amadou Parekh    Procedure Summary     Date:  06/03/19 Room / Location:  [facility] 05 / SURGERY [facility]    Anesthesia Start:  0730 Anesthesia Stop:  0832    Procedure:  EXPLORATION, NERVE- PERONEAL NERVE RELEASE AT THE FIBULAR HEAD   (Left Leg Lower) Diagnosis:  (COMMON PERONEAL  NEUROPATHY)    Surgeon:  Raz Garcia M.D. Responsible Provider:  Leonides Cuba M.D.    Anesthesia Type:  general ASA Status:  2          Final Anesthesia Type: general  Last vitals  BP   Blood Pressure : 135/77, NIBP: 139/76    Temp   36.6 °C (97.9 °F)    Pulse   Pulse: 63, Heart Rate (Monitored): 63   Resp   12    SpO2   96 %      Anesthesia Post Evaluation    Patient location during evaluation: PACU  Patient participation: complete - patient participated  Level of consciousness: awake and alert  Pain score: 0    Airway patency: patent  Anesthetic complications: no  Cardiovascular status: hemodynamically stable  Respiratory status: acceptable  Hydration status: euvolemic    PONV: none           Nurse Pain Score: 0 (NPRS)

## 2019-06-03 NOTE — ANESTHESIA PREPROCEDURE EVALUATION
Relevant Problems   (+) Chronic right-sided low back pain without sciatica   (+) Essential hypertension   (+) Impaired renal function   (+) Neuropathy   (+) Thyroid activity decreased       Physical Exam    Airway   Mallampati: II  TM distance: >3 FB  Neck ROM: full       Cardiovascular - normal exam  Rhythm: regular  Rate: normal  (-) murmur     Dental - normal exam         Pulmonary - normal exam  Breath sounds clear to auscultation     Abdominal    Neurological - normal exam                 Anesthesia Plan    ASA 2       Plan - general             Induction: intravenous    Postoperative Plan: Postoperative administration of opioids is intended.    Pertinent diagnostic labs and testing reviewed    Informed Consent:    Anesthetic plan and risks discussed with patient.

## 2019-06-03 NOTE — OP REPORT
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    PROCEDURE PERFORMED:  Left-sided peroneal nerve release of the fibular head.    SURGEON:  Raz Garcia MD    ASSISTANT:  None.    ANESTHESIA:  MAC.    COMPLICATIONS:  None.    ESTIMATED BLOOD LOSS:  Minimal.    DESCRIPTION OF PROCEDURE:  The patient was brought to the operating room and   identified in the usual fashion.  MAC anesthesia was induced by the anesthesia   team.  The patient was then placed in a decubitus position, left side up.    Axillary roll was placed under the patient's left shoulder.  Leg was gently   bent.  We marked the fibular head on the left side.  We then marked a   curvilinear incision caudally to that.  The patient was then prepped and   draped in the usual sterile fashion.  Local anesthesia was infiltrated in the   subcutaneous tissue.  A 15 blade was used to incise the skin.  Dissection was   carried down to muscle fascia using Bovie electrocautery.  Retractor was put   in place.  Bipolar electrocautery was used for some minor hemostasis.  We   identified the peroneal nerve just underneath the fibular head as it dove   underneath the tibialis anterior and peroneus muscles.  We then opened up the   muscle fascia sharply using Metzenbaum scissors.  We then  the muscle   from the fascia and then cut all the fascial planes compressing the nerve,   both laterally deep and cut the perpendicular fascial planes as well.  We were   then very easily able to pass Metzenbaum scissors laterally and medially.  We   had excellent decompression of the nerve.  Copious amounts of antibiotic   irrigation were used to wash out the wound.  We then checked proximally over   the fibular head to confirm that the nerve was very free and clear.  We   circumferentially freed it up as well  at the level of the fibular head using   right angle and Metzenbaum scissors and a minimal amount of bipolar   electrocautery.  We then closed the incision in layers including anterior   muscle fascia, then topped it with Dermabond, allowed that to dry, then placed   a compressive 4x4s and an Ace bandage.  There were no complications.  The   patient tolerated the procedure well and was transferred to the recovery room   in stable condition.       ____________________________________     AROLDO FERRARO MD    CPD / NTS    DD:  06/03/2019 08:48:08  DT:  06/03/2019 08:59:08    D#:  [id number]  Job#:  [id number]

## 2019-08-01 ENCOUNTER — APPOINTMENT (RX ONLY)
Dept: URBAN - METROPOLITAN AREA CLINIC 4 | Facility: CLINIC | Age: 74
Setting detail: DERMATOLOGY
End: 2019-08-01

## 2019-08-01 DIAGNOSIS — L81.4 OTHER MELANIN HYPERPIGMENTATION: ICD-10-CM

## 2019-08-01 DIAGNOSIS — L82.1 OTHER SEBORRHEIC KERATOSIS: ICD-10-CM

## 2019-08-01 DIAGNOSIS — D22 MELANOCYTIC NEVI: ICD-10-CM

## 2019-08-01 PROBLEM — D48.5 NEOPLASM OF UNCERTAIN BEHAVIOR OF SKIN: Status: ACTIVE | Noted: 2019-08-01

## 2019-08-01 PROBLEM — D22.5 MELANOCYTIC NEVI OF TRUNK: Status: ACTIVE | Noted: 2019-08-01

## 2019-08-01 PROBLEM — E78.5 HYPERLIPIDEMIA, UNSPECIFIED: Status: ACTIVE | Noted: 2019-08-01

## 2019-08-01 PROBLEM — E03.9 HYPOTHYROIDISM, UNSPECIFIED: Status: ACTIVE | Noted: 2019-08-01

## 2019-08-01 PROCEDURE — 11102 TANGNTL BX SKIN SINGLE LES: CPT

## 2019-08-01 PROCEDURE — 99203 OFFICE O/P NEW LOW 30 MIN: CPT | Mod: 25

## 2019-08-01 PROCEDURE — ? COUNSELING

## 2019-08-01 PROCEDURE — ? BIOPSY BY SHAVE METHOD

## 2019-08-01 ASSESSMENT — LOCATION ZONE DERM
LOCATION ZONE: ARM
LOCATION ZONE: TRUNK
LOCATION ZONE: FACE

## 2019-08-01 ASSESSMENT — LOCATION SIMPLE DESCRIPTION DERM
LOCATION SIMPLE: RIGHT SHOULDER
LOCATION SIMPLE: RIGHT UPPER BACK
LOCATION SIMPLE: LEFT CHEEK
LOCATION SIMPLE: ABDOMEN

## 2019-08-01 ASSESSMENT — LOCATION DETAILED DESCRIPTION DERM
LOCATION DETAILED: RIGHT POSTERIOR SHOULDER
LOCATION DETAILED: RIGHT INFERIOR MEDIAL UPPER BACK
LOCATION DETAILED: EPIGASTRIC SKIN
LOCATION DETAILED: LEFT INFERIOR CENTRAL MALAR CHEEK

## 2019-08-01 NOTE — PROCEDURE: BIOPSY BY SHAVE METHOD
Detail Level: Detailed
Render In Bullet Format When Appropriate: No
Hemostasis: Drysol
Biopsy Method: Dermablade
Was A Bandage Applied: Yes
Silver Nitrate Text: The wound bed was treated with silver nitrate after the biopsy was performed.
Dressing: bandage
Anesthesia Volume In Cc: 0.5
Electrodesiccation And Curettage Text: The wound bed was treated with electrodesiccation and curettage after the biopsy was performed.
Type Of Destruction Used: Curettage
Consent: Written consent was obtained and risks were reviewed including but not limited to scarring, infection, bleeding, scabbing, incomplete removal, nerve damage and allergy to anesthesia.
Biopsy Type: H and E
X Size Of Lesion In Cm: 0
Depth Of Biopsy: dermis
Notification Instructions: Patient will be notified of biopsy results. However, patient instructed to call the office if not contacted within 2 weeks.
Electrodesiccation Text: The wound bed was treated with electrodesiccation after the biopsy was performed.
Wound Care: Petrolatum
Anesthesia Type: 1% lidocaine with epinephrine
Lab Facility: 
Cryotherapy Text: The wound bed was treated with cryotherapy after the biopsy was performed.
Post-Care Instructions: I reviewed with the patient in detail post-care instructions. Patient is to keep the biopsy site dry overnight, and then apply bacitracin twice daily until healed. Patient may apply hydrogen peroxide soaks to remove any crusting.
Billing Type: Third-Party Bill
Lab: [id number]
Curettage Text: The wound bed was treated with curettage after the biopsy was performed.

## 2019-09-26 ENCOUNTER — HOSPITAL ENCOUNTER (OUTPATIENT)
Dept: RADIOLOGY | Facility: MEDICAL CENTER | Age: 74
End: 2019-09-26
Attending: NURSE PRACTITIONER
Payer: COMMERCIAL

## 2019-09-26 DIAGNOSIS — M43.26 FUSION OF SPINE OF LUMBAR REGION: ICD-10-CM

## 2019-09-26 PROCEDURE — 72131 CT LUMBAR SPINE W/O DYE: CPT

## 2019-11-06 ENCOUNTER — APPOINTMENT (OUTPATIENT)
Dept: RADIOLOGY | Facility: MEDICAL CENTER | Age: 74
End: 2019-11-06
Attending: NEUROLOGICAL SURGERY
Payer: COMMERCIAL

## 2019-11-06 DIAGNOSIS — M47.814 THORACIC SPONDYLOSIS WITHOUT MYELOPATHY: ICD-10-CM

## 2019-11-06 DIAGNOSIS — M47.896 OTHER SPONDYLOSIS, LUMBAR REGION: ICD-10-CM

## 2019-11-06 DIAGNOSIS — M47.812 CERVICAL SPONDYLOSIS WITHOUT MYELOPATHY: ICD-10-CM

## 2019-11-06 PROCEDURE — 72146 MRI CHEST SPINE W/O DYE: CPT

## 2019-11-06 PROCEDURE — 72141 MRI NECK SPINE W/O DYE: CPT

## 2019-11-06 PROCEDURE — 72148 MRI LUMBAR SPINE W/O DYE: CPT

## 2019-11-22 ENCOUNTER — APPOINTMENT (OUTPATIENT)
Dept: ADMISSIONS | Facility: MEDICAL CENTER | Age: 74
End: 2019-11-22
Attending: UROLOGY
Payer: COMMERCIAL

## 2019-11-22 DIAGNOSIS — Z01.812 PRE-OPERATIVE LABORATORY EXAMINATION: ICD-10-CM

## 2019-11-22 LAB
ALBUMIN SERPL BCP-MCNC: 3.9 G/DL (ref 3.2–4.9)
ALBUMIN/GLOB SERPL: 1.5 G/DL
ALP SERPL-CCNC: 63 U/L (ref 30–99)
ALT SERPL-CCNC: 31 U/L (ref 2–50)
ANION GAP SERPL CALC-SCNC: 9 MMOL/L (ref 0–11.9)
APPEARANCE UR: CLEAR
AST SERPL-CCNC: 21 U/L (ref 12–45)
BASOPHILS # BLD AUTO: 0.7 % (ref 0–1.8)
BASOPHILS # BLD: 0.05 K/UL (ref 0–0.12)
BILIRUB SERPL-MCNC: 0.4 MG/DL (ref 0.1–1.5)
BILIRUB UR QL STRIP.AUTO: NEGATIVE
BUN SERPL-MCNC: 23 MG/DL (ref 8–22)
CALCIUM SERPL-MCNC: 9.1 MG/DL (ref 8.5–10.5)
CHLORIDE SERPL-SCNC: 105 MMOL/L (ref 96–112)
CO2 SERPL-SCNC: 28 MMOL/L (ref 20–33)
COLOR UR: YELLOW
CREAT SERPL-MCNC: 1.16 MG/DL (ref 0.5–1.4)
EOSINOPHIL # BLD AUTO: 0.19 K/UL (ref 0–0.51)
EOSINOPHIL NFR BLD: 2.5 % (ref 0–6.9)
ERYTHROCYTE [DISTWIDTH] IN BLOOD BY AUTOMATED COUNT: 41.8 FL (ref 35.9–50)
GLOBULIN SER CALC-MCNC: 2.6 G/DL (ref 1.9–3.5)
GLUCOSE SERPL-MCNC: 77 MG/DL (ref 65–99)
GLUCOSE UR STRIP.AUTO-MCNC: NEGATIVE MG/DL
HCT VFR BLD AUTO: 49.6 % (ref 42–52)
HGB BLD-MCNC: 16.9 G/DL (ref 14–18)
IMM GRANULOCYTES # BLD AUTO: 0.06 K/UL (ref 0–0.11)
IMM GRANULOCYTES NFR BLD AUTO: 0.8 % (ref 0–0.9)
KETONES UR STRIP.AUTO-MCNC: NEGATIVE MG/DL
LEUKOCYTE ESTERASE UR QL STRIP.AUTO: NEGATIVE
LYMPHOCYTES # BLD AUTO: 1.98 K/UL (ref 1–4.8)
LYMPHOCYTES NFR BLD: 26.1 % (ref 22–41)
MCH RBC QN AUTO: 30.4 PG (ref 27–33)
MCHC RBC AUTO-ENTMCNC: 34.1 G/DL (ref 33.7–35.3)
MCV RBC AUTO: 89.2 FL (ref 81.4–97.8)
MICRO URNS: NORMAL
MONOCYTES # BLD AUTO: 0.71 K/UL (ref 0–0.85)
MONOCYTES NFR BLD AUTO: 9.4 % (ref 0–13.4)
NEUTROPHILS # BLD AUTO: 4.6 K/UL (ref 1.82–7.42)
NEUTROPHILS NFR BLD: 60.5 % (ref 44–72)
NITRITE UR QL STRIP.AUTO: NEGATIVE
NRBC # BLD AUTO: 0 K/UL
NRBC BLD-RTO: 0 /100 WBC
PH UR STRIP.AUTO: 6 [PH] (ref 5–8)
PLATELET # BLD AUTO: 245 K/UL (ref 164–446)
PMV BLD AUTO: 10.3 FL (ref 9–12.9)
POTASSIUM SERPL-SCNC: 4.6 MMOL/L (ref 3.6–5.5)
PROT SERPL-MCNC: 6.5 G/DL (ref 6–8.2)
PROT UR QL STRIP: NEGATIVE MG/DL
RBC # BLD AUTO: 5.56 M/UL (ref 4.7–6.1)
RBC UR QL AUTO: NEGATIVE
SODIUM SERPL-SCNC: 142 MMOL/L (ref 135–145)
SP GR UR STRIP.AUTO: 1.02
UROBILINOGEN UR STRIP.AUTO-MCNC: 0.2 MG/DL
WBC # BLD AUTO: 7.6 K/UL (ref 4.8–10.8)

## 2019-11-22 PROCEDURE — 36415 COLL VENOUS BLD VENIPUNCTURE: CPT

## 2019-11-22 PROCEDURE — 80053 COMPREHEN METABOLIC PANEL: CPT

## 2019-11-22 PROCEDURE — 87086 URINE CULTURE/COLONY COUNT: CPT

## 2019-11-22 PROCEDURE — 81003 URINALYSIS AUTO W/O SCOPE: CPT

## 2019-11-22 PROCEDURE — 85025 COMPLETE CBC W/AUTO DIFF WBC: CPT

## 2019-11-24 LAB
BACTERIA UR CULT: NORMAL
SIGNIFICANT IND 70042: NORMAL
SITE SITE: NORMAL
SOURCE SOURCE: NORMAL

## 2019-12-06 ENCOUNTER — ANESTHESIA EVENT (OUTPATIENT)
Dept: SURGERY | Facility: MEDICAL CENTER | Age: 74
End: 2019-12-06
Payer: COMMERCIAL

## 2019-12-06 ENCOUNTER — HOSPITAL ENCOUNTER (OUTPATIENT)
Facility: MEDICAL CENTER | Age: 74
End: 2019-12-06
Attending: UROLOGY | Admitting: UROLOGY
Payer: COMMERCIAL

## 2019-12-06 ENCOUNTER — ANESTHESIA (OUTPATIENT)
Dept: SURGERY | Facility: MEDICAL CENTER | Age: 74
End: 2019-12-06
Payer: COMMERCIAL

## 2019-12-06 VITALS
OXYGEN SATURATION: 92 % | TEMPERATURE: 97.4 F | RESPIRATION RATE: 16 BRPM | BODY MASS INDEX: 26.99 KG/M2 | WEIGHT: 199.3 LBS | HEIGHT: 72 IN | SYSTOLIC BLOOD PRESSURE: 155 MMHG | HEART RATE: 77 BPM | DIASTOLIC BLOOD PRESSURE: 81 MMHG

## 2019-12-06 LAB
EKG IMPRESSION: NORMAL
PATHOLOGY CONSULT NOTE: NORMAL

## 2019-12-06 PROCEDURE — 160046 HCHG PACU - 1ST 60 MINS PHASE II: Performed by: UROLOGY

## 2019-12-06 PROCEDURE — 160025 RECOVERY II MINUTES (STATS): Performed by: UROLOGY

## 2019-12-06 PROCEDURE — 160048 HCHG OR STATISTICAL LEVEL 1-5: Performed by: UROLOGY

## 2019-12-06 PROCEDURE — 501838 HCHG SUTURE GENERAL: Performed by: UROLOGY

## 2019-12-06 PROCEDURE — 93010 ELECTROCARDIOGRAM REPORT: CPT | Performed by: INTERNAL MEDICINE

## 2019-12-06 PROCEDURE — 700111 HCHG RX REV CODE 636 W/ 250 OVERRIDE (IP): Performed by: ANESTHESIOLOGY

## 2019-12-06 PROCEDURE — 500380 HCHG DRAIN, PENROSE 1/4X12: Performed by: UROLOGY

## 2019-12-06 PROCEDURE — 88305 TISSUE EXAM BY PATHOLOGIST: CPT

## 2019-12-06 PROCEDURE — 160036 HCHG PACU - EA ADDL 30 MINS PHASE I: Performed by: UROLOGY

## 2019-12-06 PROCEDURE — 160035 HCHG PACU - 1ST 60 MINS PHASE I: Performed by: UROLOGY

## 2019-12-06 PROCEDURE — 700105 HCHG RX REV CODE 258: Performed by: UROLOGY

## 2019-12-06 PROCEDURE — A6222 GAUZE <=16 IN NO W/SAL W/O B: HCPCS | Performed by: UROLOGY

## 2019-12-06 PROCEDURE — 160027 HCHG SURGERY MINUTES - 1ST 30 MINS LEVEL 2: Performed by: UROLOGY

## 2019-12-06 PROCEDURE — 700111 HCHG RX REV CODE 636 W/ 250 OVERRIDE (IP)

## 2019-12-06 PROCEDURE — 700111 HCHG RX REV CODE 636 W/ 250 OVERRIDE (IP): Performed by: UROLOGY

## 2019-12-06 PROCEDURE — 160002 HCHG RECOVERY MINUTES (STAT): Performed by: UROLOGY

## 2019-12-06 PROCEDURE — 93005 ELECTROCARDIOGRAM TRACING: CPT | Performed by: UROLOGY

## 2019-12-06 PROCEDURE — 160038 HCHG SURGERY MINUTES - EA ADDL 1 MIN LEVEL 2: Performed by: UROLOGY

## 2019-12-06 PROCEDURE — 160009 HCHG ANES TIME/MIN: Performed by: UROLOGY

## 2019-12-06 PROCEDURE — 502240 HCHG MISC OR SUPPLY RC 0272: Performed by: UROLOGY

## 2019-12-06 PROCEDURE — 700101 HCHG RX REV CODE 250: Performed by: ANESTHESIOLOGY

## 2019-12-06 PROCEDURE — A6454 SELF-ADHER BAND W>=3" <5"/YD: HCPCS | Performed by: UROLOGY

## 2019-12-06 RX ORDER — SODIUM CHLORIDE, SODIUM LACTATE, POTASSIUM CHLORIDE, CALCIUM CHLORIDE 600; 310; 30; 20 MG/100ML; MG/100ML; MG/100ML; MG/100ML
INJECTION, SOLUTION INTRAVENOUS CONTINUOUS
Status: DISCONTINUED | OUTPATIENT
Start: 2019-12-06 | End: 2019-12-06 | Stop reason: HOSPADM

## 2019-12-06 RX ORDER — OXYCODONE HCL 5 MG/5 ML
5 SOLUTION, ORAL ORAL
Status: DISCONTINUED | OUTPATIENT
Start: 2019-12-06 | End: 2019-12-06 | Stop reason: HOSPADM

## 2019-12-06 RX ORDER — OXYCODONE HCL 5 MG/5 ML
10 SOLUTION, ORAL ORAL
Status: DISCONTINUED | OUTPATIENT
Start: 2019-12-06 | End: 2019-12-06 | Stop reason: HOSPADM

## 2019-12-06 RX ORDER — CEFAZOLIN SODIUM 1 G/3ML
INJECTION, POWDER, FOR SOLUTION INTRAMUSCULAR; INTRAVENOUS PRN
Status: DISCONTINUED | OUTPATIENT
Start: 2019-12-06 | End: 2019-12-06 | Stop reason: SURG

## 2019-12-06 RX ORDER — LIDOCAINE HYDROCHLORIDE 10 MG/ML
INJECTION, SOLUTION EPIDURAL; INFILTRATION; INTRACAUDAL; PERINEURAL
Status: COMPLETED
Start: 2019-12-06 | End: 2019-12-06

## 2019-12-06 RX ORDER — HYDROMORPHONE HYDROCHLORIDE 1 MG/ML
0.2 INJECTION, SOLUTION INTRAMUSCULAR; INTRAVENOUS; SUBCUTANEOUS
Status: DISCONTINUED | OUTPATIENT
Start: 2019-12-06 | End: 2019-12-06 | Stop reason: HOSPADM

## 2019-12-06 RX ORDER — LIDOCAINE HYDROCHLORIDE 20 MG/ML
INJECTION, SOLUTION EPIDURAL; INFILTRATION; INTRACAUDAL; PERINEURAL PRN
Status: DISCONTINUED | OUTPATIENT
Start: 2019-12-06 | End: 2019-12-06 | Stop reason: SURG

## 2019-12-06 RX ORDER — IBUPROFEN 200 MG
200 TABLET ORAL EVERY 6 HOURS PRN
Status: ON HOLD | COMMUNITY
End: 2021-03-01

## 2019-12-06 RX ORDER — ONDANSETRON 2 MG/ML
4 INJECTION INTRAMUSCULAR; INTRAVENOUS
Status: DISCONTINUED | OUTPATIENT
Start: 2019-12-06 | End: 2019-12-06 | Stop reason: HOSPADM

## 2019-12-06 RX ORDER — HALOPERIDOL 5 MG/ML
1 INJECTION INTRAMUSCULAR
Status: DISCONTINUED | OUTPATIENT
Start: 2019-12-06 | End: 2019-12-06 | Stop reason: HOSPADM

## 2019-12-06 RX ORDER — ATORVASTATIN CALCIUM 10 MG/1
10 TABLET, FILM COATED ORAL NIGHTLY
COMMUNITY
End: 2021-08-30 | Stop reason: SDUPTHER

## 2019-12-06 RX ORDER — HYDROMORPHONE HYDROCHLORIDE 1 MG/ML
0.4 INJECTION, SOLUTION INTRAMUSCULAR; INTRAVENOUS; SUBCUTANEOUS
Status: DISCONTINUED | OUTPATIENT
Start: 2019-12-06 | End: 2019-12-06 | Stop reason: HOSPADM

## 2019-12-06 RX ORDER — MEPERIDINE HYDROCHLORIDE 25 MG/ML
6.25 INJECTION INTRAMUSCULAR; INTRAVENOUS; SUBCUTANEOUS
Status: DISCONTINUED | OUTPATIENT
Start: 2019-12-06 | End: 2019-12-06 | Stop reason: HOSPADM

## 2019-12-06 RX ORDER — PHENYLEPHRINE HYDROCHLORIDE 10 MG/ML
INJECTION, SOLUTION INTRAMUSCULAR; INTRAVENOUS; SUBCUTANEOUS PRN
Status: DISCONTINUED | OUTPATIENT
Start: 2019-12-06 | End: 2019-12-06 | Stop reason: SURG

## 2019-12-06 RX ORDER — HYDROMORPHONE HYDROCHLORIDE 1 MG/ML
0.1 INJECTION, SOLUTION INTRAMUSCULAR; INTRAVENOUS; SUBCUTANEOUS
Status: DISCONTINUED | OUTPATIENT
Start: 2019-12-06 | End: 2019-12-06 | Stop reason: HOSPADM

## 2019-12-06 RX ORDER — BUPIVACAINE HYDROCHLORIDE 2.5 MG/ML
INJECTION, SOLUTION EPIDURAL; INFILTRATION; INTRACAUDAL
Status: DISCONTINUED | OUTPATIENT
Start: 2019-12-06 | End: 2019-12-06 | Stop reason: HOSPADM

## 2019-12-06 RX ORDER — DIPHENHYDRAMINE HYDROCHLORIDE 50 MG/ML
12.5 INJECTION INTRAMUSCULAR; INTRAVENOUS
Status: DISCONTINUED | OUTPATIENT
Start: 2019-12-06 | End: 2019-12-06 | Stop reason: HOSPADM

## 2019-12-06 RX ORDER — HYDROCHLOROTHIAZIDE 12.5 MG/1
12.5 TABLET ORAL DAILY
COMMUNITY
End: 2021-08-30 | Stop reason: SDUPTHER

## 2019-12-06 RX ADMIN — CEFAZOLIN 2 G: 330 INJECTION, POWDER, FOR SOLUTION INTRAMUSCULAR; INTRAVENOUS at 12:29

## 2019-12-06 RX ADMIN — PHENYLEPHRINE HYDROCHLORIDE 100 MCG: 10 INJECTION INTRAVENOUS at 13:12

## 2019-12-06 RX ADMIN — PROPOFOL 200 MG: 10 INJECTION, EMULSION INTRAVENOUS at 12:35

## 2019-12-06 RX ADMIN — PROPOFOL 0 MG: 10 INJECTION, EMULSION INTRAVENOUS at 12:49

## 2019-12-06 RX ADMIN — LIDOCAINE HYDROCHLORIDE 5 ML: 20 INJECTION, SOLUTION EPIDURAL; INFILTRATION; INTRACAUDAL at 12:35

## 2019-12-06 RX ADMIN — PHENYLEPHRINE HYDROCHLORIDE 100 MCG: 10 INJECTION INTRAVENOUS at 13:29

## 2019-12-06 RX ADMIN — SODIUM CHLORIDE, POTASSIUM CHLORIDE, SODIUM LACTATE AND CALCIUM CHLORIDE: 600; 310; 30; 20 INJECTION, SOLUTION INTRAVENOUS at 11:02

## 2019-12-06 RX ADMIN — Medication 0.5 ML: at 11:01

## 2019-12-06 RX ADMIN — LIDOCAINE HYDROCHLORIDE 0.5 ML: 10 INJECTION, SOLUTION EPIDURAL; INFILTRATION; INTRACAUDAL at 11:01

## 2019-12-06 NOTE — OR SURGEON
Immediate Post OP Note    PreOp Diagnosis: Peyrone's Disease    PostOp Diagnosis: As above    Procedure(s):  ARTIFICIAL ERECTION  PEYRONIE'S PLAQUE INCISION WITH TACHOSIL GRAFTING  PEYRONE'S PLAQUE EXCISION WITH TACHOSIL GRAFTING,   JEAN CLAUDE PLICATION - Wound Class: Clean    Surgeon(s):  LAUREL Lundberg M.D.    Anesthesiologist/Type of Anesthesia:  Anesthesiologist: Shashi Palacios M.D./General    Surgical Staff:  Circulator: Kandis Wang R.N.  Relief Circulator: Amadou Scott R.N.  Relief Scrub: Marietta Dickinson  Scrub Person: Eugenio Kingston    Specimens removed if any:  ID Type Source Tests Collected by Time Destination   A : Plaque Tissue Penis PATHOLOGY SPECIMEN Tejinder Culver M.D. 12/6/2019  2:11 PM        Estimated Blood Loss: 50ml    Findings: 50 degree upward curvature    Complications: None        12/6/2019 3:15 PM Tejinder Culver M.D.

## 2019-12-06 NOTE — ANESTHESIA POSTPROCEDURE EVALUATION
Patient: Amadou Parekh    Procedure Summary     Date:  12/06/19 Room / Location:  [facility] 04 / SURGERY [facility]    Anesthesia Start:  1229 Anesthesia Stop:  1517    Procedure:  EXCISION, PEYRONIE'S PLAQUE, PENIS WITH GRAFTING, JEAN CLAUDE PLICATION, ARTIFICIAL ERECTION (Penis) Diagnosis:  (INDURATION PENIS PLASTICA)    Surgeon:  Tejinder Culver M.D. Responsible Provider:  Shashi Palacios M.D.    Anesthesia Type:  general ASA Status:  3          Final Anesthesia Type: general  Last vitals  BP   Blood Pressure : 135/77    Temp   36.4 °C (97.5 °F)    Pulse   Pulse: 72   Resp   19    SpO2   97 %      Anesthesia Post Evaluation    Patient location during evaluation: PACU  Patient participation: complete - patient participated  Level of consciousness: awake and alert    Airway patency: patent  Anesthetic complications: no  Cardiovascular status: hemodynamically stable  Respiratory status: acceptable  Hydration status: euvolemic    PONV: none           Nurse Pain Score: 4 (NPRS)

## 2019-12-06 NOTE — ANESTHESIA QCDR
2019 [facility] Clinical Data Registry (for Quality Improvement)     Postoperative nausea/vomiting risk protocol (Adult = 18 yrs and Pediatric 3-17 yrs)- (430 and 463)  General inhalation anesthetic (NOT TIVA) with PONV risk factors: Yes  Provision of anti-emetic therapy with at least 2 different classes of agents: Yes   Patient DID NOT receive anti-emetic therapy and reason is documented in Medical Record:  N/A    Multimodal Pain Management- (AQI59)  Patient undergoing Elective Surgery (i.e. Outpatient, or ASC, or Prescheduled Surgery prior to Hospital Admission): Yes  Use of Multimodal Pain Management, two or more drugs and/or interventions, NOT including systemic opioids: Yes   Exception: Documented allergy to multiple classes of analgesics:  N/A    PACU assessment of acute postoperative pain prior to Anesthesia Care End- Applies to Patients Age = 18- (ABG7)  Initial PACU pain score is which of the following: < 7/10  Patient unable to report pain score: N/A    Post-anesthetic transfer of care checklist/protocol to PACU/ICU- (426 and 427)  Upon conclusion of case, patient transferred to which of the following locations: PACU/Non-ICU  Use of transfer checklist/protocol: Yes  Exclusion: Service Performed in Patient Hospital Room (and thus did not require transfer): N/A    PACU Reintubation- (AQI31)  General anesthesia requiring endotracheal intubation (ETT) along with subsequent extubation in OR or PACU: No  Required reintubation in the PACU: N/A  Extubation was a planned trial documented in the medical record prior to removal of the original airway device: N/A    Unplanned admission to ICU related to anesthesia service up through end of PACU care- (MD51)  Unplanned admission to ICU (not initially anticipated at anesthesia start time): No

## 2019-12-06 NOTE — ANESTHESIA PROCEDURE NOTES
Airway  Date/Time: 12/6/2019 12:37 PM  Performed by: Shashi Palacios M.D.  Authorized by: Shashi Palacios M.D.     Location:  OR  Urgency:  Elective  Indications for Airway Management:  Anesthesia  Spontaneous Ventilation: absent    Sedation Level:  Deep  Preoxygenated: Yes    Final Airway Type:  Supraglottic airway  Final Supraglottic Airway:  Standard LMA  SGA Size:  4  Number of Attempts at Approach:  1

## 2019-12-06 NOTE — ANESTHESIA TIME REPORT
Anesthesia Start and Stop Event Times     Date Time Event    12/6/2019 1230 Ready for Procedure     1230 Anesthesia Start     1531 Anesthesia Stop        Responsible Staff  12/06/19    Name Role Begin End    Shashi Palacios M.D. Anesth 1230 1531        Preop Diagnosis (Free Text):  Pre-op Diagnosis     INDURATION PENIS PLASTICA        Preop Diagnosis (Codes):    Post op Diagnosis  Induratio penis plastica      Premium Reason  A. 3PM - 7AM    Comments:

## 2019-12-06 NOTE — ANESTHESIA PREPROCEDURE EVALUATION
Relevant Problems   CARDIAC   (+) Essential hypertension         (+) Impaired renal function      ENDO   (+) Thyroid activity decreased       Physical Exam    Airway   Mallampati: II  TM distance: >3 FB  Neck ROM: full       Cardiovascular - normal exam  Rhythm: regular  Rate: normal  (-) murmur     Dental - normal exam         Pulmonary - normal exam  Breath sounds clear to auscultation     Abdominal    Neurological - normal exam                 Anesthesia Plan    ASA 3   ASA physical status 3 criteria: morbid obesity - BMI greater than or equal to 40    Plan - general       Airway plan will be LMA        Induction: intravenous    Postoperative Plan: Postoperative administration of opioids is intended.    Pertinent diagnostic labs and testing reviewed    Informed Consent:    Anesthetic plan and risks discussed with patient.    Use of blood products discussed with: patient whom consented to blood products.

## 2019-12-07 NOTE — DISCHARGE INSTRUCTIONS
ACTIVITY: Rest and take it easy for the first 24 hours.  A responsible adult is recommended to remain with you during that time.  It is normal to feel sleepy.  We encourage you to not do anything that requires balance, judgment or coordination.    MILD FLU-LIKE SYMPTOMS ARE NORMAL. YOU MAY EXPERIENCE GENERALIZED MUSCLE ACHES, THROAT IRRITATION, HEADACHE AND/OR SOME NAUSEA.    FOR 24 HOURS DO NOT:  Drive, operate machinery or run household appliances.  Drink beer or alcoholic beverages.   Make important decisions or sign legal documents.    SPECIAL INSTRUCTIONS:  Follow-up in office on Monday for dressing removal.   Activity: Keep wound clean and dry for 48 hours then OK to bathe.   Remove the dressing at home for any penile pain after 24 hours.    DIET: To avoid nausea, slowly advance diet as tolerated, avoiding spicy or greasy foods for the first day.  Add more substantial food to your diet according to your physician's instructions.  INCREASE FLUIDS AND FIBER TO AVOID CONSTIPATION.    SURGICAL DRESSING/BATHING: See above    FOLLOW-UP APPOINTMENT:  A follow-up appointment should be arranged with your doctor in 1-2 weeks; call to schedule.    You should CALL YOUR PHYSICIAN if you develop:  Fever greater than 101 degrees F.  Pain not relieved by medication, or persistent nausea or vomiting.  Excessive bleeding (blood soaking through dressing) or unexpected drainage from the wound.  Extreme redness or swelling around the incision site, drainage of pus or foul smelling drainage.  Inability to urinate or empty your bladder within 8 hours.  Problems with breathing or chest pain.    You should call 911 if you develop problems with breathing or chest pain.  If you are unable to contact your doctor or surgical center, you should go to the nearest emergency room or urgent care center.  Physician's telephone #: [phone]    If any questions arise, call your doctor.  If your doctor is not available, please feel free to  call the Surgical Center at [phone].  The Center is open Monday through Friday from 7AM to 7PM.  You can also call the HEALTH HOTLINE open 24 hours/day, 7 days/week and speak to a nurse at [phone], or toll free at [phone].    A registered nurse may call you a few days after your surgery to see how you are doing after your procedure.    MEDICATIONS: Resume taking daily medication.  Take prescribed pain medication with food.  If no medication is prescribed, you may take non-aspirin pain medication if needed.  PAIN MEDICATION CAN BE VERY CONSTIPATING.  Take a stool softener or laxative such as senokot, pericolace, or milk of magnesia if needed.    Prescription given for [place].    If your physician has prescribed pain medication that includes Acetaminophen (Tylenol), do not take additional Acetaminophen (Tylenol) while taking the prescribed medication.    Depression / Suicide Risk    As you are discharged from this [facility] facility, it is important to learn how to keep safe from harming yourself.    Recognize the warning signs:  · Abrupt changes in personality, positive or negative- including increase in energy   · Giving away possessions  · Change in eating patterns- significant weight changes-  positive or negative  · Change in sleeping patterns- unable to sleep or sleeping all the time   · Unwillingness or inability to communicate  · Depression  · Unusual sadness, discouragement and loneliness  · Talk of wanting to die  · Neglect of personal appearance   · Rebelliousness- reckless behavior  · Withdrawal from people/activities they love  · Confusion- inability to concentrate     If you or a loved one observes any of these behaviors or has concerns about self-harm, here's what you can do:  · Talk about it- your feelings and reasons for harming yourself  · Remove any means that you might use to hurt yourself (examples: pills, rope, extension cords, firearm)  · Get professional help from the  community (Mental Health, Substance Abuse, psychological counseling)  · Do not be alone:Call your Safe Contact- someone whom you trust who will be there for you.  · Call your local CRISIS HOTLINE 337-6282 or 969-074-8822  · Call your local Children's Mobile Crisis Response Team [place] (598) 519-8653 or www.MyCrowd  · Call the toll free National Suicide Prevention Hotlines   · National Suicide Prevention Lifeline 903-645-OHNQ (7861)  · National Hope Line Network 800-SUICIDE (146-0441)

## 2019-12-07 NOTE — OR NURSING
VSS, pt steady with ambulation, meets discharge criteria. Discharged home with wife. Wheeled to car with hospital escort. Rx given to pt as written by physician. Marissa CDI. Patient voided without difficulty. IV dc'd, cathlon intact. Pt to f/u with physician as directed by physician. Pt to return to ER for any emergent sx. Pt verbalizes understanding of discharge instructions and all questions were answered.

## 2019-12-07 NOTE — OR NURSING
Patient to Stage II via gurney. Upon arrival, patient up to bathroom with SBA. Denies pain, nausea or dizziness.

## 2019-12-07 NOTE — OR NURSING
Pt AA/Ox4. VSS. Dressing to penis, CDI. Pt denies pain or need for pain medications at this time. Ice pack applied to penis. Penile block administered by MD. Pt denies nausea or vomiting. SCDs in place. Pt to void before discharge to home.     Report given to NENA White. Pt's wife, Neftaly, updated regarding plan of care.    Pt via britni was transferred to PACU2 at 1628.

## 2019-12-10 NOTE — OP REPORT
DATE OF SERVICE:  12/06/2019    UROLOGIC OPERATIVE REPORT    PREOPERATIVE DIAGNOSES:  1.  Peyronie's disease with persistent 45-degree upward curvature.  2.  Failed Xiaflex therapy.    SURGEON:  Tejinder Culver MD    ASSISTING SURGEON:  Jose Adamson MD     ANESTHESIOLOGIST:  Shashi Palacios MD    ANESTHESIA:  General laryngeal mask.    POSTOPERATIVE DIAGNOSIS:  Peyronie's disease with 45-55 degree upward   curvature despite failed Xiaflex injection.    OPERATION PROCEDURE PERFORMED:  1.  Creation of artificial erection.  2.  Peyronie's plaque incision with TachoSil grafting.  3.  Peyronie's plaque excision with TachoSil plaquing.  4.  Elly plication.    INDICATIONS:  The patient is a 74-year-old gentleman with a history of   Peyronie's disease.  He was seen originally by Dr. Adamson and counseled as   to risks, benefits, and Xiaflex injection.  First round of Xiaflex was   performed and he had approximately 10 degrees improvement in curvature.  A   second injection was performed.  He still had persistent significant curvature   at over 45 degrees precluding successful sexual activity.  I discussed with   the patient the option of Peyronie's plaque excision versus incision with   Elly plication.  We had a lengthy discussion regarding the magnitude of the   surgery and the significant risk involving the phallus and the risk including,   but not limited to wound infection in 1% to 2% of cases with the dissection   and mobilizing the dorsal vein of the penis and the neurovascular bundle   superiorly.  There is a 10% incidence of permanent hyperesthesia of the glans,   10% incidence of permanent hypoesthesia and a 10% incidence of new-onset   erectile dysfunction requiring either injection therapy or other therapies to   facilitate rigidity.  I explained the nature of the surgery that I would   perform an incision through the old circumcision site and then pulled the   tissue back.  A tourniquet will be placed, an  artificial erection created, and   then after elevating the neurovascular bundle, incision would be performed   with grafting with the collagen fleece material, TachoSil.  I did discuss also   that with the grafting procedure, there is a significant risk of penile   shortening and with all procedures operative for Peyronie's disease, there is   risk of persistent curvature.  Explained the goal is to allow for penetration.    We discussed the risk of plication and the rationale for the plication and   he is aware that plication can result in risk of injury to the urethra albeit   rare.  We also discussed that the skin can sometimes attached to the graft   sites requiring a secondary procedure to elevate the skin and we discussed the   fact that the wasting of the corporal bodies is not always corrected with the   surgical repair.  We discussed the risk of postoperative bleeding, hematoma,   pain as well as potential ultimate need for a penile prosthetic.  In addition,   I discussed the perioperative risk of deep vein thrombosis, pulmonary   embolism, aspiration pneumonia, heart attack, stroke and death.  An informed   consent was given to me by the patient to proceed.    PROCEDURE IN DETAIL:  After informed consent was obtained, the patient was   brought to the operating room and placed supine.  Bilateral sequential   compression devices were in place and operational.  A general laryngeal mask   anesthetic was administered by Dr. Palacios in a balanced fashion.  The patient   received weight-based Ancef.  The operative area having already been Betadine   prepped and draped in usual sterile fashion and shaved, attention was directed   towards the surgical timeout.  All members of the operative team agree as the   patient's name, procedure to be performed without objections, attention was   directed to the procedure.  I would note that I performed the entire procedure   with loupe magnification.  I began the procedure by  making an incision with a   15 blade scalpel through his old circumcision site.  After this was made, the   penis was degloved and dissection was carried up superiorly towards the base   of the penis.  The plaque was palpable dorsally.  It was approximately 3.5 cm   in length and was larger proximally that it is distally.  At this point in   time, after mobilizing the foreskin back, a Penrose was placed as a   tourniquet.  I then injected the right corporal body with a 20-gauge butterfly   needle and then sterile saline was injected into the corporal body creating   an artificial erection.  The erection under anesthesia without the skin in   place showed at least 45-55 degrees of upward curvature with slight curvature   to the left.  At this point in time, I marked the area of curvature, which was   slightly distal to the actual plaque and certainly the thickest part of the   plaque was more at the base.  At this point in time, it was clear that he   would require approximately 2 grafts, I elected to do a Peyronie's plaque   incision at one and excision of the small amount of the plaque on the other.    At this point in time, attention was now directed towards entering the tissue   and dissecting down to the corporal bodies.  Perera's fascia was elevated and I   used a combination of sharp dissection and bipolar current for emissary veins.    I was able to mobilize the neurovascular bundle up off the corporal bodies   elevating the dorsal vein of the penis and the preserved neurovascular bundle.    I then placed a vessel loop under this, dissected the tissue free, and once   I had good distal and proximal dissection allowing me to get to the location   of the plaque incision.  I proceeded to irrigate and then I used the bipolar   cautery for hemostasis.  The tourniquet was initially taken down.  Hemostasis   was excellent.  The tourniquet was repositioned and then a new artificial   erection was created.  At this  point in time, it was clear that I would be   performed 2 procedures.  I performed a plaque incision at the distal part of   the maximal curvature and then proximally I excised approximately 1 cm to 1.25   cm of the plaque with an elliptical-type incision.  I used a Deering blade 64   blade to incise the distal plaque incision and this was performed, dissection   was carried right down to the sinusoids and then the tissue was elevated   slightly.  Care was taken to avoid entering the sinusoids and after this   plaque had been created, a rectangle incision had been performed.  I   originally did an H-graft, but I took out the lateral triangular component   sharply to allow the TachoSil to fit good in this area.  After this incision   was performed and opened, the distance of opening was about 1.25-1.5 cm.    Attention was now directed towards a second more proximal incision and   actually performed a slight excision of the Peyronie's disease as this was   thicker, this was approximately a 1.5 cm area excised in a rectangle-type   fashion originally elliptical but converted to rectangle and at this point in   time, attention was directed towards mobilizing the plaque off the anterior   surface of the sinusoids.  Care was taken to avoid entering the sinusoids and   avoid excessive bleeding.  At this point in time, the TachoSil material was   evaluated.  It was cut to length of the site with the incision, it was   approximately 4 cm in length and 1.5 cm in width.  This TachoSil was placed   distally on the plaque incision site and then with sterile saline with the   collagen side down, there was excellent sealing to the corporal body.  I then   proceeded to place the second TachoSil patch which was used as a graft.  It   was slightly larger about 1.5-1.75 cm at its greatest width and this was   placed proximally.  After the grafts were placed and saline had been placed on   them, there appeared to be excellent  hemostasis.  At this point in time, the   tourniquet was taken down artificial erection was created and the penis was   relatively straight with a slight 3-5 degree upward curvature.  At this point   in time, the tourniquet was repositioned and attention was now directed   towards performing a Elly plication.  Elly plication was performed   basically at the level of the plaque incision ventrally.  A 2-0 Ethibond   suture was used, which was positioned in the corporal bodies utilizing the   typical 16-dot technique on both the right and left side at the level of   symmetry.  After these sutures were placed and care was taken to avoid entry   into the urethra, attention was directed toward creation of a final artificial   erection, which showed a straight phallus, we then proceeded to close.  The   wound was irrigated gently.  The tourniquet was removed.  Bleeding points were   cauterized with bipolar cautery and then the Perera's fascia was reapproximated   with 4-0 Vicryl on the left and right side to complete compression.  Care was   taken to avoid any placement of sutures near the neurovascular bundle.  Once   the Perera's fascia was closed, the penile shaft skin was reapproximated to the   preserved mucosal cuff utilizing 3-0 chromic in an interrupted fashion.  I   injected the base of the penis with 0.25% plain Marcaine, approximately 10 mL   for postop pain control and then Xeroform dressing was applied and placed   around the chromic sutures and then a loosely applied Coban dressing was   applied for slight compression.  At the end of the case, sponge, instrument,   and needle counts were correct x2.  The patient tolerated the procedure well   without complication.  He was awakened in the operating room and transferred   to the recovery room where he arrived in stable condition.       ____________________________________     MD MORIS Wilkes / JUANA    DD:  12/10/2019 06:57:02  DT:  12/10/2019  08:12:57    D#:  [id number]  Job#:  [id number]

## 2019-12-11 ENCOUNTER — HOSPITAL ENCOUNTER (OUTPATIENT)
Dept: LAB | Facility: MEDICAL CENTER | Age: 74
End: 2019-12-11
Attending: INTERNAL MEDICINE
Payer: COMMERCIAL

## 2019-12-11 DIAGNOSIS — I10 BENIGN ESSENTIAL HTN: ICD-10-CM

## 2019-12-11 LAB
ANION GAP SERPL CALC-SCNC: 10 MMOL/L (ref 0–11.9)
BASOPHILS # BLD AUTO: 0.7 % (ref 0–1.8)
BASOPHILS # BLD: 0.05 K/UL (ref 0–0.12)
BUN SERPL-MCNC: 32 MG/DL (ref 8–22)
CALCIUM SERPL-MCNC: 9.3 MG/DL (ref 8.5–10.5)
CHLORIDE SERPL-SCNC: 106 MMOL/L (ref 96–112)
CHOLEST SERPL-MCNC: 151 MG/DL (ref 100–199)
CO2 SERPL-SCNC: 26 MMOL/L (ref 20–33)
CREAT SERPL-MCNC: 1.2 MG/DL (ref 0.5–1.4)
EOSINOPHIL # BLD AUTO: 0.27 K/UL (ref 0–0.51)
EOSINOPHIL NFR BLD: 3.9 % (ref 0–6.9)
ERYTHROCYTE [DISTWIDTH] IN BLOOD BY AUTOMATED COUNT: 40 FL (ref 35.9–50)
FOLATE SERPL-MCNC: >22.4 NG/ML
GLUCOSE SERPL-MCNC: 91 MG/DL (ref 65–99)
HCT VFR BLD AUTO: 47.2 % (ref 42–52)
HDLC SERPL-MCNC: 41 MG/DL
HGB BLD-MCNC: 15.3 G/DL (ref 14–18)
IMM GRANULOCYTES # BLD AUTO: 0.05 K/UL (ref 0–0.11)
IMM GRANULOCYTES NFR BLD AUTO: 0.7 % (ref 0–0.9)
LDLC SERPL CALC-MCNC: 85 MG/DL
LYMPHOCYTES # BLD AUTO: 1.6 K/UL (ref 1–4.8)
LYMPHOCYTES NFR BLD: 23.1 % (ref 22–41)
MCH RBC QN AUTO: 29 PG (ref 27–33)
MCHC RBC AUTO-ENTMCNC: 32.4 G/DL (ref 33.7–35.3)
MCV RBC AUTO: 89.4 FL (ref 81.4–97.8)
MONOCYTES # BLD AUTO: 0.69 K/UL (ref 0–0.85)
MONOCYTES NFR BLD AUTO: 10 % (ref 0–13.4)
NEUTROPHILS # BLD AUTO: 4.26 K/UL (ref 1.82–7.42)
NEUTROPHILS NFR BLD: 61.6 % (ref 44–72)
NRBC # BLD AUTO: 0 K/UL
NRBC BLD-RTO: 0 /100 WBC
PLATELET # BLD AUTO: 311 K/UL (ref 164–446)
PMV BLD AUTO: 10.4 FL (ref 9–12.9)
POTASSIUM SERPL-SCNC: 4.3 MMOL/L (ref 3.6–5.5)
RBC # BLD AUTO: 5.28 M/UL (ref 4.7–6.1)
SODIUM SERPL-SCNC: 142 MMOL/L (ref 135–145)
T4 FREE SERPL-MCNC: 1.22 NG/DL (ref 0.53–1.43)
TRIGL SERPL-MCNC: 127 MG/DL (ref 0–149)
TSH SERPL DL<=0.005 MIU/L-ACNC: 0.34 UIU/ML (ref 0.38–5.33)
VIT B12 SERPL-MCNC: 505 PG/ML (ref 211–911)
WBC # BLD AUTO: 6.9 K/UL (ref 4.8–10.8)

## 2019-12-11 PROCEDURE — 84439 ASSAY OF FREE THYROXINE: CPT

## 2019-12-11 PROCEDURE — 82607 VITAMIN B-12: CPT

## 2019-12-11 PROCEDURE — 36415 COLL VENOUS BLD VENIPUNCTURE: CPT

## 2019-12-11 PROCEDURE — 80048 BASIC METABOLIC PNL TOTAL CA: CPT

## 2019-12-11 PROCEDURE — 80061 LIPID PANEL: CPT

## 2019-12-11 PROCEDURE — 84207 ASSAY OF VITAMIN B-6: CPT

## 2019-12-11 PROCEDURE — 82746 ASSAY OF FOLIC ACID SERUM: CPT

## 2019-12-11 PROCEDURE — 84443 ASSAY THYROID STIM HORMONE: CPT

## 2019-12-11 PROCEDURE — 85025 COMPLETE CBC W/AUTO DIFF WBC: CPT

## 2019-12-13 LAB — VIT B6 SERPL-MCNC: 110.4 NMOL/L (ref 20–125)

## 2020-03-28 ENCOUNTER — HOSPITAL ENCOUNTER (OUTPATIENT)
Facility: MEDICAL CENTER | Age: 75
End: 2020-03-28
Attending: FAMILY MEDICINE
Payer: COMMERCIAL

## 2020-03-28 ENCOUNTER — OFFICE VISIT (OUTPATIENT)
Dept: URGENT CARE | Facility: PHYSICIAN GROUP | Age: 75
End: 2020-03-28
Payer: COMMERCIAL

## 2020-03-28 VITALS
RESPIRATION RATE: 18 BRPM | DIASTOLIC BLOOD PRESSURE: 68 MMHG | WEIGHT: 190 LBS | HEIGHT: 72 IN | BODY MASS INDEX: 25.73 KG/M2 | HEART RATE: 86 BPM | OXYGEN SATURATION: 96 % | SYSTOLIC BLOOD PRESSURE: 108 MMHG | TEMPERATURE: 97.8 F

## 2020-03-28 DIAGNOSIS — N30.00 ACUTE CYSTITIS WITHOUT HEMATURIA: ICD-10-CM

## 2020-03-28 LAB
APPEARANCE UR: NORMAL
BILIRUB UR STRIP-MCNC: NEGATIVE MG/DL
COLOR UR AUTO: YELLOW
GLUCOSE UR STRIP.AUTO-MCNC: NEGATIVE MG/DL
KETONES UR STRIP.AUTO-MCNC: NEGATIVE MG/DL
LEUKOCYTE ESTERASE UR QL STRIP.AUTO: NORMAL
NITRITE UR QL STRIP.AUTO: POSITIVE
PH UR STRIP.AUTO: 5.5 [PH] (ref 5–8)
PROT UR QL STRIP: 30 MG/DL
RBC UR QL AUTO: NORMAL
SP GR UR STRIP.AUTO: 1.02
UROBILINOGEN UR STRIP-MCNC: 0.2 MG/DL

## 2020-03-28 PROCEDURE — 99214 OFFICE O/P EST MOD 30 MIN: CPT | Performed by: FAMILY MEDICINE

## 2020-03-28 PROCEDURE — 87077 CULTURE AEROBIC IDENTIFY: CPT

## 2020-03-28 PROCEDURE — 87086 URINE CULTURE/COLONY COUNT: CPT

## 2020-03-28 PROCEDURE — 87186 SC STD MICRODIL/AGAR DIL: CPT

## 2020-03-28 PROCEDURE — 81002 URINALYSIS NONAUTO W/O SCOPE: CPT | Performed by: FAMILY MEDICINE

## 2020-03-28 RX ORDER — CIPROFLOXACIN 500 MG/1
500 TABLET, FILM COATED ORAL EVERY 12 HOURS
Qty: 14 TAB | Refills: 0 | Status: SHIPPED | OUTPATIENT
Start: 2020-03-28 | End: 2020-04-04

## 2020-03-28 ASSESSMENT — ENCOUNTER SYMPTOMS
DIARRHEA: 0
ABDOMINAL PAIN: 0
FEVER: 0
HEADACHES: 0
SHORTNESS OF BREATH: 0
VOMITING: 0

## 2020-03-28 ASSESSMENT — PAIN SCALES - GENERAL: PAINLEVEL: 6=MODERATE PAIN

## 2020-03-28 ASSESSMENT — FIBROSIS 4 INDEX: FIB4 SCORE: 0.9

## 2020-03-29 DIAGNOSIS — N30.00 ACUTE CYSTITIS WITHOUT HEMATURIA: ICD-10-CM

## 2020-03-29 NOTE — PROGRESS NOTES
Subjective:     Amadou Parekh is a 74 y.o. male who presents for Dysuria (freq, difficult to urinate, pain, x3 days)    HPI  Pt presents for evaluation of a new problem   Pt with frequency, dysuria, incomplete voiding, and hesitancy for the past 3 days  Symptoms are all worsening  Dysuria is every void and not improving  Took over-the-counter cranberry pills without improvement  Has chronic back pain which is a little worse lately  No history of urinary tract infections    Review of Systems   Constitutional: Negative for fever.   Respiratory: Negative for shortness of breath.    Cardiovascular: Negative for chest pain.   Gastrointestinal: Negative for abdominal pain, diarrhea and vomiting.   Genitourinary: Positive for dysuria, frequency and urgency. Negative for hematuria.   Skin: Negative for rash.   Neurological: Negative for headaches.       PMH:  has a past medical history of Actinic keratoses, Bilateral hearing loss (08/06/2018), Chronic right-sided low back pain without sciatica (08/06/2018), ED (erectile dysfunction), High cholesterol, Hyperlipidemia, mixed, Hypertension, Hypothyroidism, Neuropathy (HCC) (8/6/2018), Pain (12/18/2018), and Peyronie disease.  MEDS:   Current Outpatient Medications:   •  atorvastatin (LIPITOR) 10 MG Tab, Take 10 mg by mouth every evening., Disp: , Rfl:   •  hydroCHLOROthiazide (HYDRODIURIL) 12.5 MG tablet, Take 12.5 mg by mouth every day., Disp: , Rfl:   •  aspirin EC (ECOTRIN) 81 MG Tablet Delayed Response, Take 81 mg by mouth every day., Disp: , Rfl:   •  ibuprofen (MOTRIN) 200 MG Tab, Take 200 mg by mouth every 6 hours as needed., Disp: , Rfl:   •  SYNTHROID 137 MCG Tab, Take 1 Tab by mouth Every morning on an empty stomach., Disp: 90 Tab, Rfl: 3  •  gabapentin (NEURONTIN) 300 MG Cap, Take 1 Cap by mouth 6 Times a Day., Disp: , Rfl:   •  b complex vitamins tablet, Take 1 Tab by mouth every day., Disp: , Rfl:   •  Calcium Carbonate-Vitamin D (CALCIUM + D PO), Take 1 Cap by  "mouth every day., Disp: , Rfl:   ALLERGIES: No Known Allergies  SURGHX:   Past Surgical History:   Procedure Laterality Date   • PEYRONIES PLAQUE  12/6/2019    Procedure: EXCISION, PEYRONIE'S PLAQUE, PENIS WITH GRAFTING, JEAN CLAUDE PLICATION, ARTIFICIAL ERECTION;  Surgeon: Tejinder Culver M.D.;  Location: SURGERY [facility];  Service: Urology   • ME NERVOUS SYSTEM SURGERY UNLISTED Left 6/3/2019    Procedure: EXPLORATION, NERVE- PERONEAL NERVE RELEASE AT THE FIBULAR HEAD  ;  Surgeon: Raz Garcia M.D.;  Location: SURGERY [facility];  Service: Neurosurgery   • PIP ARTHRODESIS Right 2/22/2019    Procedure: PIP ARTHRODESIS;  Surgeon: Amadou Bowden M.D.;  Location: SURGERY SAME DAY [facility];  Service: Orthopedics   • FINGER EXPLORATION Right 2/22/2019    Procedure: FINGER EXPLORATION - RING FINGER DISTAL INTERPHALANGEAL JOINT;  Surgeon: Amadou Bowden M.D.;  Location: SURGERY SAME DAY [facility];  Service: Orthopedics   • OTHER NEUROLOGICAL SURG  12/12/2018    \"Low Back Surgery'sx6 between 2006 & 2017\".   • CYST EXCISION Right 10/12/2018    Procedure: CYST EXCISION - RING FINGER MUCOUS CYST, DISTAL INTERPHALANGEAL JOINT;  Surgeon: Amadou Bowden M.D.;  Location: SURGERY SAME DAY [facility];  Service: Orthopedics   • LUMBAR LAMINECTOMY DISKECTOMY Left 12/6/2017    Procedure: LUMBAR LAMINECTOMY DISKECTOMY - POSTERIOR REDO LEFT  L4-S1 KAILA;  Surgeon: Be Webber M.D.;  Location: SURGERY [facility];  Service: Neurosurgery   • LUMBAR FUSION POSTERIOR  10/5/2017    Procedure: POSTERIOR TRANSFORAMINAL INTERBODY FUSION AT LUMBAR 4 - 5;  Surgeon: Be Webber M.D.;  Location: SURGERY [facility];  Service: Neurosurgery   • LUMBAR DECOMPRESSION  10/5/2017    Procedure: POSTERIOR LUMBAR 3-4,  4-5 DECOMPRESSION AND FUSION;  Surgeon: Be Webber M.D.;  Location: SURGERY [facility];  Service: Neurosurgery   • PB INJ DX/THER AGNT PARAVERT FACET JOINT, BRITT* Right 10/6/2016    " Procedure: INJ PARA FACET L/S 1 LVL W/IG - L3-4, L4-5, L5-S1;  Surgeon: Eugenio Camara M.D.;  Location: [facility];  Service: Pain Management   • PB INJ DX/THER AGNT PARAVERT FACET JOINT, BRITT*  10/6/2016    Procedure: INJ PARA FACET L/S 2D LVL W/IG;  Surgeon: Eugenio Camara M.D.;  Location: [facility];  Service: Pain Management   • PB INJ DX/THER AGNT PARAVERT FACET JOINT, BRITT*  10/6/2016    Procedure: INJ PARA FACET L/S 3D LVL W/IG;  Surgeon: Eugenio Camara M.D.;  Location: [facility];  Service: Pain Management   • PB INJ,FORAMEN,L/S,1 LEVEL Right 8/18/2016    Procedure: INJ-FORAMEN EPI LUM/SAC SNGL - L5-S1;  Surgeon: Eugenio Camara M.D.;  Location: [facility];  Service: Pain Management   • LUMBAR LAMINECTOMY DISKECTOMY Right 3/12/2016    Procedure: LUMBAR HemiLAMINECTOMY Micro DISKECTOMY posterior Redo L3-4 ;  Surgeon: Be Webber M.D.;  Location: [facility];  Service:    • FORAMINOTOMY Right 3/12/2016    Procedure: FORAMINOTOMY;  Surgeon: Be Webber M.D.;  Location: [facility];  Service:    • CERVICAL LAMINECTOMY POSTERIOR  2011   • OTHER      nasal surgery 2015   • OTHER NEUROLOGICAL SURG  2006, 2010, 2012, 2014, 2016, 2017    low back surgery x 5   • TONSILLECTOMY      child     SOCHX:  reports that he has never smoked. He has never used smokeless tobacco. He reports that he does not drink alcohol or use drugs.  FH: Family history was reviewed, not contributing to acute illness     Objective:   /68   Pulse 86   Temp 36.6 °C (97.8 °F) (Tympanic)   Resp 18   Ht 1.829 m (6')   Wt 86.2 kg (190 lb)   SpO2 96%   BMI 25.77 kg/m²     Physical Exam  Constitutional:       General: He is not in acute distress.     Appearance: He is well-developed. He is not diaphoretic.   HENT:      Head: Normocephalic and atraumatic.   Abdominal:      General: Abdomen is flat. Bowel sounds are normal. There is no distension.       Palpations: Abdomen is soft.      Tenderness: There is no right CVA tenderness, left CVA tenderness, guarding or rebound.      Comments: +Mild tenderness in suprapubic region   Skin:     General: Skin is warm and dry.      Findings: No rash.   Neurological:      Mental Status: He is alert and oriented to person, place, and time.   Psychiatric:         Behavior: Behavior normal.         Thought Content: Thought content normal.         Judgment: Judgment normal.         Assessment/Plan:   Assessment    1. Acute cystitis without hematuria  - POCT Urinalysis  - Urine Culture; Future  - ciprofloxacin (CIPRO) 500 MG Tab; Take 1 Tab by mouth every 12 hours for 7 days.  Dispense: 14 Tab; Refill: 0    Patient with acute cystitis.  Will treat with Cipro and send urine for culture.  Follow-up as needed.

## 2020-03-31 LAB
BACTERIA UR CULT: ABNORMAL
BACTERIA UR CULT: ABNORMAL
SIGNIFICANT IND 70042: ABNORMAL
SITE SITE: ABNORMAL
SOURCE SOURCE: ABNORMAL

## 2020-04-23 ENCOUNTER — HOSPITAL ENCOUNTER (OUTPATIENT)
Dept: LAB | Facility: MEDICAL CENTER | Age: 75
End: 2020-04-23
Attending: INTERNAL MEDICINE
Payer: COMMERCIAL

## 2020-04-23 LAB
CK SERPL-CCNC: 109 U/L (ref 0–154)
TSH SERPL DL<=0.005 MIU/L-ACNC: 2.9 UIU/ML (ref 0.38–5.33)

## 2020-04-23 PROCEDURE — 84443 ASSAY THYROID STIM HORMONE: CPT

## 2020-04-23 PROCEDURE — 36415 COLL VENOUS BLD VENIPUNCTURE: CPT

## 2020-04-23 PROCEDURE — 82550 ASSAY OF CK (CPK): CPT

## 2020-05-15 ENCOUNTER — HOSPITAL ENCOUNTER (OUTPATIENT)
Dept: LAB | Facility: MEDICAL CENTER | Age: 75
End: 2020-05-15
Attending: NURSE PRACTITIONER
Payer: COMMERCIAL

## 2020-05-15 ENCOUNTER — HOSPITAL ENCOUNTER (OUTPATIENT)
Dept: RADIOLOGY | Facility: MEDICAL CENTER | Age: 75
End: 2020-05-15
Attending: NURSE PRACTITIONER | Admitting: PAIN MEDICINE
Payer: COMMERCIAL

## 2020-05-15 LAB
ALBUMIN SERPL BCP-MCNC: 4 G/DL (ref 3.2–4.9)
ALBUMIN/GLOB SERPL: 1.7 G/DL
ALP SERPL-CCNC: 73 U/L (ref 30–99)
ALT SERPL-CCNC: 20 U/L (ref 2–50)
ANION GAP SERPL CALC-SCNC: 12 MMOL/L (ref 7–16)
APPEARANCE UR: ABNORMAL
APTT PPP: 25.6 SEC (ref 24.7–36)
AST SERPL-CCNC: 23 U/L (ref 12–45)
BACTERIA #/AREA URNS HPF: ABNORMAL /HPF
BASOPHILS # BLD AUTO: 1 % (ref 0–1.8)
BASOPHILS # BLD: 0.05 K/UL (ref 0–0.12)
BILIRUB SERPL-MCNC: 0.4 MG/DL (ref 0.1–1.5)
BILIRUB UR QL STRIP.AUTO: NEGATIVE
BUN SERPL-MCNC: 24 MG/DL (ref 8–22)
CALCIUM SERPL-MCNC: 9.1 MG/DL (ref 8.4–10.2)
CHLORIDE SERPL-SCNC: 106 MMOL/L (ref 96–112)
CO2 SERPL-SCNC: 24 MMOL/L (ref 20–33)
COLOR UR: YELLOW
CREAT SERPL-MCNC: 1.17 MG/DL (ref 0.5–1.4)
EOSINOPHIL # BLD AUTO: 0.17 K/UL (ref 0–0.51)
EOSINOPHIL NFR BLD: 3.3 % (ref 0–6.9)
EPI CELLS #/AREA URNS HPF: ABNORMAL /HPF
ERYTHROCYTE [DISTWIDTH] IN BLOOD BY AUTOMATED COUNT: 40.9 FL (ref 35.9–50)
GLOBULIN SER CALC-MCNC: 2.4 G/DL (ref 1.9–3.5)
GLUCOSE SERPL-MCNC: 102 MG/DL (ref 65–99)
GLUCOSE UR STRIP.AUTO-MCNC: NEGATIVE MG/DL
HCT VFR BLD AUTO: 44 % (ref 42–52)
HGB BLD-MCNC: 14.7 G/DL (ref 14–18)
IMM GRANULOCYTES # BLD AUTO: 0.01 K/UL (ref 0–0.11)
IMM GRANULOCYTES NFR BLD AUTO: 0.2 % (ref 0–0.9)
INR PPP: 0.94 (ref 0.87–1.13)
KETONES UR STRIP.AUTO-MCNC: NEGATIVE MG/DL
LEUKOCYTE ESTERASE UR QL STRIP.AUTO: ABNORMAL
LYMPHOCYTES # BLD AUTO: 1.46 K/UL (ref 1–4.8)
LYMPHOCYTES NFR BLD: 28.7 % (ref 22–41)
MCH RBC QN AUTO: 29 PG (ref 27–33)
MCHC RBC AUTO-ENTMCNC: 33.4 G/DL (ref 33.7–35.3)
MCV RBC AUTO: 86.8 FL (ref 81.4–97.8)
MICRO URNS: ABNORMAL
MONOCYTES # BLD AUTO: 0.5 K/UL (ref 0–0.85)
MONOCYTES NFR BLD AUTO: 9.8 % (ref 0–13.4)
NEUTROPHILS # BLD AUTO: 2.89 K/UL (ref 1.82–7.42)
NEUTROPHILS NFR BLD: 57 % (ref 44–72)
NITRITE UR QL STRIP.AUTO: NEGATIVE
NRBC # BLD AUTO: 0 K/UL
NRBC BLD-RTO: 0 /100 WBC
PH UR STRIP.AUTO: 6 [PH] (ref 5–8)
PLATELET # BLD AUTO: 283 K/UL (ref 164–446)
PMV BLD AUTO: 9.8 FL (ref 9–12.9)
POTASSIUM SERPL-SCNC: 4.5 MMOL/L (ref 3.6–5.5)
PROT SERPL-MCNC: 6.4 G/DL (ref 6–8.2)
PROT UR QL STRIP: NEGATIVE MG/DL
PROTHROMBIN TIME: 12.7 SEC (ref 12–14.6)
RBC # BLD AUTO: 5.07 M/UL (ref 4.7–6.1)
RBC # URNS HPF: ABNORMAL /HPF
RBC UR QL AUTO: NEGATIVE
SODIUM SERPL-SCNC: 142 MMOL/L (ref 135–145)
SP GR UR STRIP.AUTO: 1.02
WBC # BLD AUTO: 5.1 K/UL (ref 4.8–10.8)
WBC #/AREA URNS HPF: ABNORMAL /HPF

## 2020-05-15 PROCEDURE — 85025 COMPLETE CBC W/AUTO DIFF WBC: CPT

## 2020-05-15 PROCEDURE — 71045 X-RAY EXAM CHEST 1 VIEW: CPT

## 2020-05-15 PROCEDURE — 36415 COLL VENOUS BLD VENIPUNCTURE: CPT

## 2020-05-15 PROCEDURE — 85610 PROTHROMBIN TIME: CPT

## 2020-05-15 PROCEDURE — 85730 THROMBOPLASTIN TIME PARTIAL: CPT

## 2020-05-15 PROCEDURE — 81001 URINALYSIS AUTO W/SCOPE: CPT

## 2020-05-15 PROCEDURE — 80053 COMPREHEN METABOLIC PANEL: CPT

## 2020-05-15 RX ORDER — TAMSULOSIN HYDROCHLORIDE 0.4 MG/1
CAPSULE ORAL
COMMUNITY
End: 2021-02-05

## 2020-05-15 RX ORDER — DULOXETIN HYDROCHLORIDE 20 MG/1
CAPSULE, DELAYED RELEASE ORAL
COMMUNITY
Start: 2020-05-01 | End: 2020-05-15

## 2020-05-15 RX ORDER — PSYLLIUM HUSK 0.4 G
1000 CAPSULE ORAL DAILY
COMMUNITY

## 2020-05-15 RX ORDER — CHLORAL HYDRATE 500 MG
1 CAPSULE ORAL DAILY
Status: ON HOLD | COMMUNITY
End: 2022-08-15

## 2020-05-15 ASSESSMENT — FIBROSIS 4 INDEX: FIB4 SCORE: 0.9

## 2020-05-15 NOTE — OR NURSING
PreAdmit Appointment: Patient given Preparing for your procedure handout. Patient instructed to continue regularly prescribed medications through day before surgery. Instructed to take the following medications the day of surgery with a sip of water per Anesthesia protocol: Patient instructed to take Flomax, Synthroid, Neurontin and Lipitor day of surgery. Patient denies issues with anesthesia. Education provided. Patient to complete Covid testing on 5/18.

## 2020-05-18 DIAGNOSIS — Z01.812 PRE-OPERATIVE LABORATORY EXAMINATION: ICD-10-CM

## 2020-05-18 LAB — COVID ORDER STATUS COVID19: NORMAL

## 2020-05-18 PROCEDURE — C9803 HOPD COVID-19 SPEC COLLECT: HCPCS

## 2020-05-19 LAB
SARS-COV-2 RNA RESP QL NAA+PROBE: NOT DETECTED
SPECIMEN SOURCE: NORMAL

## 2020-05-22 ENCOUNTER — HOSPITAL ENCOUNTER (OUTPATIENT)
Facility: MEDICAL CENTER | Age: 75
End: 2020-05-22
Attending: PAIN MEDICINE | Admitting: PAIN MEDICINE
Payer: COMMERCIAL

## 2020-05-22 ENCOUNTER — APPOINTMENT (OUTPATIENT)
Dept: RADIOLOGY | Facility: MEDICAL CENTER | Age: 75
End: 2020-05-22
Attending: PAIN MEDICINE
Payer: COMMERCIAL

## 2020-05-22 ENCOUNTER — ANESTHESIA (OUTPATIENT)
Dept: SURGERY | Facility: MEDICAL CENTER | Age: 75
End: 2020-05-22
Payer: COMMERCIAL

## 2020-05-22 ENCOUNTER — ANESTHESIA EVENT (OUTPATIENT)
Dept: SURGERY | Facility: MEDICAL CENTER | Age: 75
End: 2020-05-22
Payer: COMMERCIAL

## 2020-05-22 VITALS
WEIGHT: 204.23 LBS | OXYGEN SATURATION: 96 % | HEART RATE: 59 BPM | HEIGHT: 72 IN | BODY MASS INDEX: 27.66 KG/M2 | DIASTOLIC BLOOD PRESSURE: 80 MMHG | RESPIRATION RATE: 16 BRPM | SYSTOLIC BLOOD PRESSURE: 135 MMHG | TEMPERATURE: 97 F

## 2020-05-22 PROCEDURE — 700101 HCHG RX REV CODE 250: Performed by: PAIN MEDICINE

## 2020-05-22 PROCEDURE — 160025 RECOVERY II MINUTES (STATS): Performed by: PAIN MEDICINE

## 2020-05-22 PROCEDURE — 160002 HCHG RECOVERY MINUTES (STAT): Performed by: PAIN MEDICINE

## 2020-05-22 PROCEDURE — 700105 HCHG RX REV CODE 258: Performed by: PAIN MEDICINE

## 2020-05-22 PROCEDURE — 160036 HCHG PACU - EA ADDL 30 MINS PHASE I: Performed by: PAIN MEDICINE

## 2020-05-22 PROCEDURE — 160041 HCHG SURGERY MINUTES - EA ADDL 1 MIN LEVEL 4: Performed by: PAIN MEDICINE

## 2020-05-22 PROCEDURE — 700102 HCHG RX REV CODE 250 W/ 637 OVERRIDE(OP)

## 2020-05-22 PROCEDURE — 502240 HCHG MISC OR SUPPLY RC 0272: Performed by: PAIN MEDICINE

## 2020-05-22 PROCEDURE — 700111 HCHG RX REV CODE 636 W/ 250 OVERRIDE (IP): Performed by: ANESTHESIOLOGY

## 2020-05-22 PROCEDURE — 501838 HCHG SUTURE GENERAL: Performed by: PAIN MEDICINE

## 2020-05-22 PROCEDURE — 160035 HCHG PACU - 1ST 60 MINS PHASE I: Performed by: PAIN MEDICINE

## 2020-05-22 PROCEDURE — 72080 X-RAY EXAM THORACOLMB 2/> VW: CPT

## 2020-05-22 PROCEDURE — C1778 LEAD, NEUROSTIMULATOR: HCPCS | Performed by: PAIN MEDICINE

## 2020-05-22 PROCEDURE — 160048 HCHG OR STATISTICAL LEVEL 1-5: Performed by: PAIN MEDICINE

## 2020-05-22 PROCEDURE — A9270 NON-COVERED ITEM OR SERVICE: HCPCS | Performed by: ANESTHESIOLOGY

## 2020-05-22 PROCEDURE — 160046 HCHG PACU - 1ST 60 MINS PHASE II: Performed by: PAIN MEDICINE

## 2020-05-22 PROCEDURE — 160029 HCHG SURGERY MINUTES - 1ST 30 MINS LEVEL 4: Performed by: PAIN MEDICINE

## 2020-05-22 PROCEDURE — 160009 HCHG ANES TIME/MIN: Performed by: PAIN MEDICINE

## 2020-05-22 PROCEDURE — 700102 HCHG RX REV CODE 250 W/ 637 OVERRIDE(OP): Performed by: ANESTHESIOLOGY

## 2020-05-22 PROCEDURE — 502000 HCHG MISC OR IMPLANTS RC 0278: Performed by: PAIN MEDICINE

## 2020-05-22 PROCEDURE — C1820 GENERATOR NEURO RECHG BAT SY: HCPCS | Performed by: PAIN MEDICINE

## 2020-05-22 PROCEDURE — A9270 NON-COVERED ITEM OR SERVICE: HCPCS

## 2020-05-22 DEVICE — IMPLANTABLE DEVICE: Type: IMPLANTABLE DEVICE | Site: SPINE LUMBAR | Status: FUNCTIONAL

## 2020-05-22 RX ORDER — OXYCODONE HCL 5 MG/5 ML
5 SOLUTION, ORAL ORAL
Status: DISCONTINUED | OUTPATIENT
Start: 2020-05-22 | End: 2020-05-22 | Stop reason: HOSPADM

## 2020-05-22 RX ORDER — HYDRALAZINE HYDROCHLORIDE 20 MG/ML
5 INJECTION INTRAMUSCULAR; INTRAVENOUS
Status: DISCONTINUED | OUTPATIENT
Start: 2020-05-22 | End: 2020-05-22 | Stop reason: HOSPADM

## 2020-05-22 RX ORDER — HYDROMORPHONE HYDROCHLORIDE 1 MG/ML
0.1 INJECTION, SOLUTION INTRAMUSCULAR; INTRAVENOUS; SUBCUTANEOUS
Status: DISCONTINUED | OUTPATIENT
Start: 2020-05-22 | End: 2020-05-22 | Stop reason: HOSPADM

## 2020-05-22 RX ORDER — BUPIVACAINE HYDROCHLORIDE AND EPINEPHRINE 5; 5 MG/ML; UG/ML
INJECTION, SOLUTION EPIDURAL; INTRACAUDAL; PERINEURAL
Status: DISCONTINUED | OUTPATIENT
Start: 2020-05-22 | End: 2020-05-22 | Stop reason: HOSPADM

## 2020-05-22 RX ORDER — MIDAZOLAM HYDROCHLORIDE 1 MG/ML
INJECTION INTRAMUSCULAR; INTRAVENOUS PRN
Status: DISCONTINUED | OUTPATIENT
Start: 2020-05-22 | End: 2020-05-22 | Stop reason: HOSPADM

## 2020-05-22 RX ORDER — SODIUM CHLORIDE, SODIUM LACTATE, POTASSIUM CHLORIDE, CALCIUM CHLORIDE 600; 310; 30; 20 MG/100ML; MG/100ML; MG/100ML; MG/100ML
INJECTION, SOLUTION INTRAVENOUS CONTINUOUS
Status: DISCONTINUED | OUTPATIENT
Start: 2020-05-22 | End: 2020-05-22 | Stop reason: HOSPADM

## 2020-05-22 RX ORDER — OXYCODONE HCL 5 MG/5 ML
10 SOLUTION, ORAL ORAL
Status: DISCONTINUED | OUTPATIENT
Start: 2020-05-22 | End: 2020-05-22 | Stop reason: HOSPADM

## 2020-05-22 RX ORDER — DIPHENHYDRAMINE HYDROCHLORIDE 50 MG/ML
12.5 INJECTION INTRAMUSCULAR; INTRAVENOUS
Status: DISCONTINUED | OUTPATIENT
Start: 2020-05-22 | End: 2020-05-22 | Stop reason: HOSPADM

## 2020-05-22 RX ORDER — HYDROMORPHONE HYDROCHLORIDE 1 MG/ML
0.4 INJECTION, SOLUTION INTRAMUSCULAR; INTRAVENOUS; SUBCUTANEOUS
Status: DISCONTINUED | OUTPATIENT
Start: 2020-05-22 | End: 2020-05-22 | Stop reason: HOSPADM

## 2020-05-22 RX ORDER — ACETAMINOPHEN 500 MG
1000 TABLET ORAL ONCE
Status: COMPLETED | OUTPATIENT
Start: 2020-05-22 | End: 2020-05-22

## 2020-05-22 RX ORDER — CEFAZOLIN SODIUM 1 G/3ML
INJECTION, POWDER, FOR SOLUTION INTRAMUSCULAR; INTRAVENOUS PRN
Status: DISCONTINUED | OUTPATIENT
Start: 2020-05-22 | End: 2020-05-22 | Stop reason: SURG

## 2020-05-22 RX ORDER — HALOPERIDOL 5 MG/ML
1 INJECTION INTRAMUSCULAR
Status: DISCONTINUED | OUTPATIENT
Start: 2020-05-22 | End: 2020-05-22 | Stop reason: HOSPADM

## 2020-05-22 RX ORDER — GABAPENTIN 300 MG/1
300 CAPSULE ORAL ONCE
Status: COMPLETED | OUTPATIENT
Start: 2020-05-22 | End: 2020-05-22

## 2020-05-22 RX ORDER — HYDROMORPHONE HYDROCHLORIDE 1 MG/ML
0.2 INJECTION, SOLUTION INTRAMUSCULAR; INTRAVENOUS; SUBCUTANEOUS
Status: DISCONTINUED | OUTPATIENT
Start: 2020-05-22 | End: 2020-05-22 | Stop reason: HOSPADM

## 2020-05-22 RX ORDER — LABETALOL HYDROCHLORIDE 5 MG/ML
5 INJECTION, SOLUTION INTRAVENOUS
Status: DISCONTINUED | OUTPATIENT
Start: 2020-05-22 | End: 2020-05-22 | Stop reason: HOSPADM

## 2020-05-22 RX ORDER — METOPROLOL TARTRATE 1 MG/ML
1 INJECTION, SOLUTION INTRAVENOUS
Status: DISCONTINUED | OUTPATIENT
Start: 2020-05-22 | End: 2020-05-22 | Stop reason: HOSPADM

## 2020-05-22 RX ORDER — LIDOCAINE HYDROCHLORIDE 10 MG/ML
INJECTION, SOLUTION INFILTRATION; PERINEURAL
Status: DISCONTINUED | OUTPATIENT
Start: 2020-05-22 | End: 2020-05-22 | Stop reason: HOSPADM

## 2020-05-22 RX ADMIN — PROPOFOL 75 MCG/KG/MIN: 10 INJECTION, EMULSION INTRAVENOUS at 12:21

## 2020-05-22 RX ADMIN — POVIDONE-IODINE 15 ML: 10 SOLUTION TOPICAL at 11:46

## 2020-05-22 RX ADMIN — FENTANYL CITRATE 150 MCG: 50 INJECTION, SOLUTION INTRAMUSCULAR; INTRAVENOUS at 12:21

## 2020-05-22 RX ADMIN — FENTANYL CITRATE 25 MCG: 50 INJECTION, SOLUTION INTRAMUSCULAR; INTRAVENOUS at 13:01

## 2020-05-22 RX ADMIN — CEFAZOLIN 2 G: 1 INJECTION, POWDER, FOR SOLUTION INTRAVENOUS at 12:11

## 2020-05-22 RX ADMIN — GABAPENTIN 300 MG: 300 CAPSULE ORAL at 11:43

## 2020-05-22 RX ADMIN — SODIUM CHLORIDE, POTASSIUM CHLORIDE, SODIUM LACTATE AND CALCIUM CHLORIDE: 600; 310; 30; 20 INJECTION, SOLUTION INTRAVENOUS at 11:44

## 2020-05-22 RX ADMIN — MIDAZOLAM HYDROCHLORIDE 2 MG: 1 INJECTION, SOLUTION INTRAMUSCULAR; INTRAVENOUS at 12:11

## 2020-05-22 RX ADMIN — FENTANYL CITRATE 25 MCG: 50 INJECTION, SOLUTION INTRAMUSCULAR; INTRAVENOUS at 12:58

## 2020-05-22 RX ADMIN — ACETAMINOPHEN 1000 MG: 500 TABLET, FILM COATED ORAL at 11:43

## 2020-05-22 ASSESSMENT — PAIN SCALES - GENERAL: PAIN_LEVEL: 0

## 2020-05-22 NOTE — DISCHARGE INSTRUCTIONS
ACTIVITY: Rest and take it easy for the first 24 hours.  A responsible adult is recommended to remain with you during that time.  It is normal to feel sleepy.  We encourage you to not do anything that requires balance, judgment or coordination.    MILD FLU-LIKE SYMPTOMS ARE NORMAL. YOU MAY EXPERIENCE GENERALIZED MUSCLE ACHES, THROAT IRRITATION, HEADACHE AND/OR SOME NAUSEA.    FOR 24 HOURS DO NOT:  Drive, operate machinery or run household appliances.  Drink beer or alcoholic beverages.   Make important decisions or sign legal documents.    SPECIAL INSTRUCTIONS: Cold pack to surgical site to reduce swelling    DIET: To avoid nausea, slowly advance diet as tolerated, avoiding spicy or greasy foods for the first day.  Add more substantial food to your diet according to your physician's instructions.  Babies can be fed formula or breast milk as soon as they are hungry.  INCREASE FLUIDS AND FIBER TO AVOID CONSTIPATION.    SURGICAL DRESSING/BATHING: Keep wound and dressing dry, clean and intact until follow up visit.     FOLLOW-UP APPOINTMENT:  A follow-up appointment should be arranged with your doctor; call to schedule.    You should CALL YOUR PHYSICIAN if you develop:  Fever greater than 101 degrees F.  Pain not relieved by medication, or persistent nausea or vomiting.  Excessive bleeding (blood soaking through dressing) or unexpected drainage from the wound.  Extreme redness or swelling around the incision site, drainage of pus or foul smelling drainage.  Inability to urinate or empty your bladder within 8 hours.  Problems with breathing or chest pain.    You should call 911 if you develop problems with breathing or chest pain.  If you are unable to contact your doctor or surgical center, you should go to the nearest emergency room or urgent care center.  Physician's telephone #: [phone] Dr Camara    If any questions arise, call your doctor.  If your doctor is not available, please feel free to call the  Surgical Center at [phone].  The Center is open Monday through Friday from 7AM to 7PM.  You can also call the HEALTH HOTLINE open 24 hours/day, 7 days/week and speak to a nurse at [phone], or toll free at [phone].    A registered nurse may call you a few days after your surgery to see how you are doing after your procedure.    MEDICATIONS: Resume taking daily medication.  Take prescribed pain medication with food.  If no medication is prescribed, you may take non-aspirin pain medication if needed.  PAIN MEDICATION CAN BE VERY CONSTIPATING.  Take a stool softener or laxative such as senokot, pericolace, or milk of magnesia if needed.    Prescription given for Norco.  Last pain medication given at n/a.    If your physician has prescribed pain medication that includes Acetaminophen (Tylenol), do not take additional Acetaminophen (Tylenol) while taking the prescribed medication.    Depression / Suicide Risk    As you are discharged from this [facility] facility, it is important to learn how to keep safe from harming yourself.    Recognize the warning signs:  · Abrupt changes in personality, positive or negative- including increase in energy   · Giving away possessions  · Change in eating patterns- significant weight changes-  positive or negative  · Change in sleeping patterns- unable to sleep or sleeping all the time   · Unwillingness or inability to communicate  · Depression  · Unusual sadness, discouragement and loneliness  · Talk of wanting to die  · Neglect of personal appearance   · Rebelliousness- reckless behavior  · Withdrawal from people/activities they love  · Confusion- inability to concentrate     If you or a loved one observes any of these behaviors or has concerns about self-harm, here's what you can do:  · Talk about it- your feelings and reasons for harming yourself  · Remove any means that you might use to hurt yourself (examples: pills, rope, extension cords, firearm)  · Get  professional help from the community (Mental Health, Substance Abuse, psychological counseling)  · Do not be alone:Call your Safe Contact- someone whom you trust who will be there for you.  · Call your local CRISIS HOTLINE 482-4153 or 733-609-3753  · Call your local Children's Mobile Crisis Response Team [place] (428) 421-2765 or www.[employer]ompium  · Call the toll free National Suicide Prevention Hotlines   · National Suicide Prevention Lifeline 587-712-VFNJ (9029)  · National Hope Line Network 800-SUICIDE (525-7184)

## 2020-05-22 NOTE — OR NURSING
1455: Arrived to Phase II from PACU. Patient is pain and nausea free.    1515: Patient provided with Qu ease-Ease which relieved a brief wave of nausea.     1520: Instructions and education provided to Patient and wife, Both Parties stated that they understood the material and have no questions.    1523: D/Daniel to care of family post uneventful stay in PACU 2.

## 2020-05-22 NOTE — OR NURSING
1455 Report to Ray BARRETT. Pt states pain is controled and tolerable, denies nausea. Pt tolerating PO fluids. Pt to phase II

## 2020-05-22 NOTE — OP REPORT
DATE OF SERVICE:  05/22/2020    NAME OF PROCEDURE:  Placement of a dual octad lead spinal cord stimulator and   Entellus battery.    PREPROCEDURAL DIAGNOSES:  The patient with failed back surgery syndrome with   severe back and bilateral lower extremity pains for permanent spinal cord   stimulator placement.    POSTPROCEDURAL DIAGNOSES:  The patient with failed back surgery syndrome with   severe back and bilateral lower extremity pains for permanent spinal cord   stimulator placement.    PROCEDURE PERFORMED BY:  Eugenio Camara MD.    ANESTHESIA:  Local standby with IV sedation.    ANESTHESIOLOGIST:  Abel Hollis DO.    PROCEDURE NOTE:  The patient was brought into the preop holding area.  The   procedure itself and possible complications had been thoroughly explained.    The patient understood and accepted the risks.  He was then brought in the   operating room and placed in a prone position.  IV sedation was induced by Dr. Hollis.  The back was sterilely prepped and the patient sterilely draped.    Physician gowned, gloved, and masked.  Total of 20 mL of 0.5% bupivacaine   1:200,000 epinephrine mixed 50 with 1% Lidocaine was then infiltrated over the   lumbar spinous region at the spinous process of L1 and also over the right   flank.  Two 14-gauge Tuohy needles were advanced, paramedian approach from   left to right side, starting at the spinous process of L1 and entering at the   T11-12 interspace.  The leads were then guided up to the T8, T9, T10 level.    Position was confirmed under PA and lateral fluoroscopy and stimulation was   demonstrated covering the patient's normal everyday pain on both the right and   left side.  A 5 cm incision was then made between the 2 needles.    Electrocautery was used to staunch the bleeding.  The skin was then unroofed   down at the fascia to the needle.  The needles were then removed.  The leads   were pulled into the wound site and then anchored using Silastic  anchors with   0 Ethibond sutures for each side.  Repeatedly throughout this, the leads were   evaluated for my gauge creation using fluoroscopic imaging.  Once the leads   were secured, a 4 cm incision was made over the right flank.  Electrocautery   was again used to staunch the bleeding.  Blunt dissection was used to make a   pocket for the battery.  Once the pocket was made, tunneling baldev was passed   from the gluteal wound site to the lumbar wound site.  The leads were passed   back through, connected to the Entellus battery.  Impedances were checked and   found to be within normal limits.  The battery was then anchored to the fascia   in the gluteal pocket.  Irrigation of both wound sites with sterile saline   was performed.  The wounds were then closed using 3-0 Vicryl.  Steri-Strips   for the gluteal wound site and staples for the lumbar wound site.  The patient   will undergo programming today.  He is scheduled to follow up in our office   on Tuesday morning or p.r.n. problems.       ____________________________________     MD SALAZAR ARVIZU / JUANA    DD:  05/22/2020 13:42:45  DT:  05/22/2020 16:58:18    D#:  [id number]  Job#:  [id number]

## 2020-05-22 NOTE — OR NURSING
"1341 To PACU from OR via gurney, side rails up x 2 for safety, lungs clear bilaterally, scds on patient and machine operational, pt arouses to voice on arrival and responds appropriately to RN. Denies pain and nausea. Eyes close quickly without stimulation.   1348 Assisted pt to reposition on bed for comfort and raised knees on gurney for back comfort. Pt moves all 4 extremities on command.   1355 Pt able to turn onto side independently to allow RN to assess dressings to right lower and right mid back; scant serosanguinous drainage. Pt denies pain or nausea. Mask in place over face after titrating to RA. Instructed patient to take several deep breaths for lung hygiene; demonstrated understanding.   1410 Pt resting with eyes closed; arouses easily to voice and denies pain or nausea.   1425 Pt reports \"feeling heady\" from the anesthesia. Denies pain or nausea but requesting a little more time before getting up in discharge area.   1429 Report to NENA Loving.   "

## 2020-05-22 NOTE — OR SURGEON
Immediate Post OP Note    PreOp Diagnosis: failed back surgery    PostOp Diagnosis: as above    Procedure(s):  INSERTION, NEUROSTIMULATOR, PERMANENT, SPINAL CORD - Wound Class: Clean    Surgeon(s):  Eugenio Camara M.D.    Anesthesiologist/Type of Anesthesia:  Anesthesiologist: Abel Hollis D.O./SHAWNEE    Surgical Staff:  Circulator: Shabnam Guadalupe R.N.  Relief Circulator: Brittany Frost R.N.  Scrub Person: Willem Wadsworth    Specimens removed if any:  * No specimens in log *    Estimated Blood Loss: <50cc    Findings: none    Complications: none        5/22/2020 1:37 PM Eugenio Camara M.D.

## 2020-05-22 NOTE — ANESTHESIA QCDR
2019 [facility] Clinical Data Registry (for Quality Improvement)     Postoperative nausea/vomiting risk protocol (Adult = 18 yrs and Pediatric 3-17 yrs)- (430 and 463)  General inhalation anesthetic (NOT TIVA) with PONV risk factors: No  Provision of anti-emetic therapy with at least 2 different classes of agents: N/A  Patient DID NOT receive anti-emetic therapy and reason is documented in Medical Record: N/A    Multimodal Pain Management- (477)  Non-emergent surgery AND patient age >= 18: Yes  Use of Multimodal Pain Management, two or more drugs and/or interventions, NOT including systemic opioids: Yes  Exception: Documented allergy to multiple classes of analgesics: N/A    Smoking Abstinence (404)  Patient is current smoker (cigarette, pipe, e-cig, marijuanna): No  Elective Surgery:   Abstinence instructions provided prior to day of surgery:   Patient abstained from smoking on day of surgery:     Pre-Op Beta-Blocker in Isolated CABG (44)  Isolated CABG AND patient age >= 18: No  Beta-blocker admin within 24 hours of surgical incision:   Exception:of medical reason(s) for not administering beta blocker within 24 hours prior to surgical incision (e.g., not  indicated,other medical reason):     PACU assessment of acute postoperative pain prior to Anesthesia Care End- Applies to Patients Age = 18- (ABG7)  Initial PACU pain score is which of the following: < 7/10  Patient unable to report pain score: N/A    Post-anesthetic transfer of care checklist/protocol to PACU/ICU- (426 and 427)  Upon conclusion of case, patient transferred to which of the following locations: PACU/Non-ICU  Use of transfer checklist/protocol: Yes  Exclusion: Service Performed in Patient Hospital Room (and thus did not require transfer): N/A  Unplanned admission to ICU related to anesthesia service up through end of PACU care- (MD51)  Unplanned admission to ICU (not initially anticipated at anesthesia start time): No

## 2020-05-22 NOTE — ANESTHESIA POSTPROCEDURE EVALUATION
Patient: Amadou Parekh    Procedure Summary     Date:  05/22/20 Room / Location:   OR 02 / SURGERY [facility]    Anesthesia Start:  1211 Anesthesia Stop:  1344    Procedure:  INSERTION, NEUROSTIMULATOR, PERMANENT, SPINAL CORD (Spine Lumbar) Diagnosis:  (LUMBAR RADICULOPATHY CHRONIC)    Surgeon:  Eugenio Camara M.D. Responsible Provider:  Abel Hollis D.O.    Anesthesia Type:  MAC ASA Status:  2          Final Anesthesia Type: MAC  Last vitals  BP   Blood Pressure : 119/74    Temp   36.6 °C (97.9 °F)    Pulse   Pulse: 70   Resp   16    SpO2   100 %      Anesthesia Post Evaluation    Patient location during evaluation: PACU  Patient participation: complete - patient participated  Level of consciousness: awake and alert  Pain score: 0    Airway patency: patent  Anesthetic complications: no  Cardiovascular status: hemodynamically stable  Respiratory status: acceptable  Hydration status: euvolemic    PONV: none           Nurse Pain Score: 0 (NPRS)

## 2020-05-22 NOTE — ANESTHESIA TIME REPORT
Anesthesia Start and Stop Event Times     Date Time Event    5/22/2020 1047 Ready for Procedure     1211 Anesthesia Start     1344 Anesthesia Stop        Responsible Staff  05/22/20    Name Role Begin End    Abel Hollis D.O. Anesth 1211 1344        Preop Diagnosis (Free Text):  Pre-op Diagnosis     LUMBAR RADICULOPATHY CHRONIC        Preop Diagnosis (Codes):    Post op Diagnosis  Lumbar radiculopathy, chronic      Premium Reason  Non-Premium    Comments:

## 2020-05-22 NOTE — ANESTHESIA PREPROCEDURE EVALUATION
Relevant Problems   CARDIAC   (+) Essential hypertension         (+) Impaired renal function      ENDO   (+) Thyroid activity decreased       Physical Exam    Airway   Mallampati: II  TM distance: >3 FB  Neck ROM: full       Cardiovascular - normal exam  Rhythm: regular  Rate: normal  (-) murmur     Dental - normal exam           Pulmonary - normal exam  Breath sounds clear to auscultation     Abdominal    Neurological - normal exam                 Anesthesia Plan    ASA 2       Plan - MAC             Induction: intravenous    Postoperative Plan: Postoperative administration of opioids is intended.    Pertinent diagnostic labs and testing reviewed    Informed Consent:    Anesthetic plan and risks discussed with patient.    Use of blood products discussed with: patient whom consented to blood products.

## 2020-06-09 ENCOUNTER — APPOINTMENT (RX ONLY)
Dept: URBAN - METROPOLITAN AREA CLINIC 4 | Facility: CLINIC | Age: 75
Setting detail: DERMATOLOGY
End: 2020-06-09

## 2020-06-09 DIAGNOSIS — L57.0 ACTINIC KERATOSIS: ICD-10-CM

## 2020-06-09 DIAGNOSIS — Z87.2 PERSONAL HISTORY OF DISEASES OF THE SKIN AND SUBCUTANEOUS TISSUE: ICD-10-CM

## 2020-06-09 PROCEDURE — 99214 OFFICE O/P EST MOD 30 MIN: CPT | Mod: 25

## 2020-06-09 PROCEDURE — ? LIQUID NITROGEN

## 2020-06-09 PROCEDURE — ? COUNSELING

## 2020-06-09 PROCEDURE — 17000 DESTRUCT PREMALG LESION: CPT

## 2020-06-09 ASSESSMENT — LOCATION DETAILED DESCRIPTION DERM
LOCATION DETAILED: RIGHT INFERIOR MEDIAL UPPER BACK
LOCATION DETAILED: RIGHT POSTERIOR SHOULDER

## 2020-06-09 ASSESSMENT — LOCATION SIMPLE DESCRIPTION DERM
LOCATION SIMPLE: RIGHT UPPER BACK
LOCATION SIMPLE: RIGHT SHOULDER

## 2020-06-09 ASSESSMENT — LOCATION ZONE DERM
LOCATION ZONE: ARM
LOCATION ZONE: TRUNK

## 2020-06-15 ENCOUNTER — RX ONLY (OUTPATIENT)
Age: 75
Setting detail: RX ONLY
End: 2020-06-15

## 2020-06-15 RX ORDER — TRIAMCINOLONE ACETONIDE 1 MG/G
CREAM TOPICAL
Qty: 1 | Refills: 0 | Status: ERX | COMMUNITY
Start: 2020-06-15

## 2020-08-07 ENCOUNTER — OFFICE VISIT (OUTPATIENT)
Dept: URGENT CARE | Facility: PHYSICIAN GROUP | Age: 75
End: 2020-08-07
Payer: COMMERCIAL

## 2020-08-07 ENCOUNTER — HOSPITAL ENCOUNTER (OUTPATIENT)
Facility: MEDICAL CENTER | Age: 75
End: 2020-08-07
Attending: NURSE PRACTITIONER
Payer: COMMERCIAL

## 2020-08-07 VITALS
BODY MASS INDEX: 25.73 KG/M2 | RESPIRATION RATE: 16 BRPM | HEIGHT: 72 IN | TEMPERATURE: 97.8 F | SYSTOLIC BLOOD PRESSURE: 130 MMHG | OXYGEN SATURATION: 95 % | HEART RATE: 83 BPM | WEIGHT: 190 LBS | DIASTOLIC BLOOD PRESSURE: 76 MMHG

## 2020-08-07 DIAGNOSIS — J02.9 SORE THROAT: ICD-10-CM

## 2020-08-07 DIAGNOSIS — J02.8 ACUTE PHARYNGITIS DUE TO OTHER SPECIFIED ORGANISMS: ICD-10-CM

## 2020-08-07 LAB
COVID ORDER STATUS COVID19: NORMAL
INT CON NEG: NORMAL
INT CON POS: NORMAL
S PYO AG THROAT QL: NEGATIVE

## 2020-08-07 PROCEDURE — 87880 STREP A ASSAY W/OPTIC: CPT | Performed by: NURSE PRACTITIONER

## 2020-08-07 PROCEDURE — U0003 INFECTIOUS AGENT DETECTION BY NUCLEIC ACID (DNA OR RNA); SEVERE ACUTE RESPIRATORY SYNDROME CORONAVIRUS 2 (SARS-COV-2) (CORONAVIRUS DISEASE [COVID-19]), AMPLIFIED PROBE TECHNIQUE, MAKING USE OF HIGH THROUGHPUT TECHNOLOGIES AS DESCRIBED BY CMS-2020-01-R: HCPCS

## 2020-08-07 PROCEDURE — 99213 OFFICE O/P EST LOW 20 MIN: CPT | Performed by: NURSE PRACTITIONER

## 2020-08-07 ASSESSMENT — FIBROSIS 4 INDEX: FIB4 SCORE: 1.36

## 2020-08-08 LAB
SARS-COV-2 RNA RESP QL NAA+PROBE: NOTDETECTED
SPECIMEN SOURCE: NORMAL

## 2020-08-08 ASSESSMENT — ENCOUNTER SYMPTOMS
CHILLS: 0
FEVER: 0
SHORTNESS OF BREATH: 0
MYALGIAS: 0
HEADACHES: 0
DIARRHEA: 0
DIZZINESS: 0
NAUSEA: 0
SORE THROAT: 1

## 2020-08-08 NOTE — PROGRESS NOTES
Subjective:      Amadou Parekh is a 75 y.o. male who presents with Pharyngitis (x2 days)            HPI New. 75 year old male with sore throat for 2 days. He denies fever, chills, myalgia, nausea or diarrhea. He has no congestion or cough; no shortness of breath. Was in contact with PCP who recommended he come in for covid testing. No known contact with anyone with positive covid.   Patient has no known allergies.  Current Outpatient Medications on File Prior to Visit   Medication Sig Dispense Refill   • Cholecalciferol (VITAMIN D-1000 MAX ST) 1000 UNIT Tab Take 2,000 Units by mouth.     • Omega-3 1000 MG Cap Take 750 mg by mouth.     • tamsulosin (FLOMAX) 0.4 MG capsule tamsulosin 0.4 mg capsule   Take 1 capsule twice a day by oral route for 90 days.     • atorvastatin (LIPITOR) 10 MG Tab Take 10 mg by mouth every evening.     • hydroCHLOROthiazide (HYDRODIURIL) 12.5 MG tablet Take 12.5 mg by mouth every day.     • aspirin EC (ECOTRIN) 81 MG Tablet Delayed Response Take 81 mg by mouth every day.     • ibuprofen (MOTRIN) 200 MG Tab Take 200 mg by mouth every 6 hours as needed.     • SYNTHROID 137 MCG Tab Take 1 Tab by mouth Every morning on an empty stomach. 90 Tab 3   • gabapentin (NEURONTIN) 300 MG Cap Take 1 Cap by mouth 6 Times a Day.     • b complex vitamins tablet Take 1 Tab by mouth every day.     • Calcium Carbonate-Vitamin D (CALCIUM + D PO) Take 1 Cap by mouth every day.       No current facility-administered medications on file prior to visit.      Social History     Socioeconomic History   • Marital status:      Spouse name: Not on file   • Number of children: Not on file   • Years of education: Not on file   • Highest education level: Not on file   Occupational History   • Not on file   Social Needs   • Financial resource strain: Not on file   • Food insecurity     Worry: Not on file     Inability: Not on file   • Transportation needs     Medical: Not on file     Non-medical: Not on file    Tobacco Use   • Smoking status: Never Smoker   • Smokeless tobacco: Never Used   Substance and Sexual Activity   • Alcohol use: No   • Drug use: No   • Sexual activity: Yes   Lifestyle   • Physical activity     Days per week: Not on file     Minutes per session: Not on file   • Stress: Not on file   Relationships   • Social connections     Talks on phone: Not on file     Gets together: Not on file     Attends Taoism service: Not on file     Active member of club or organization: Not on file     Attends meetings of clubs or organizations: Not on file     Relationship status: Not on file   • Intimate partner violence     Fear of current or ex partner: Not on file     Emotionally abused: Not on file     Physically abused: Not on file     Forced sexual activity: Not on file   Other Topics Concern   • Not on file   Social History Narrative    Lives with wife in     2 adult children    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      Review of Systems   Constitutional: Negative for chills and fever.   HENT: Positive for sore throat. Negative for congestion.    Respiratory: Negative for shortness of breath.    Gastrointestinal: Negative for diarrhea and nausea.   Musculoskeletal: Negative for myalgias.   Neurological: Negative for dizziness and headaches.          Objective:     /76   Pulse 83   Temp 36.6 °C (97.8 °F) (Temporal)   Resp 16   Ht 1.829 m (6')   Wt 86.2 kg (190 lb)   SpO2 95%   BMI 25.77 kg/m²      Physical Exam  Constitutional:       General: He is not in acute distress.     Appearance: He is well-developed.   HENT:      Head: Normocephalic.      Nose: Mucosal edema present. No rhinorrhea.      Mouth/Throat:      Pharynx: Posterior oropharyngeal erythema present.   Eyes:      General:         Right eye: No discharge.         Left eye: No discharge.      Conjunctiva/sclera: Conjunctivae normal.   Neck:       Musculoskeletal: Normal range of motion and neck supple.   Cardiovascular:      Rate and Rhythm: Normal rate and regular rhythm.      Heart sounds: Normal heart sounds.   Musculoskeletal: Normal range of motion.   Lymphadenopathy:      Cervical: No cervical adenopathy.   Skin:     General: Skin is warm and dry.   Neurological:      Mental Status: He is alert and oriented to person, place, and time.   Psychiatric:         Behavior: Behavior normal.         Thought Content: Thought content normal.                 Assessment/Plan:         1. Acute pharyngitis due to other specified organisms     2. Sore throat  COVID/SARS COV-2 PCR    POCT Rapid Strep A     Strep negative.   Handout on covid guidelines for isolation to patient.  Will call with results.  Symptomatic care for sore throat discussed with patient.

## 2020-08-08 NOTE — PATIENT INSTRUCTIONS
INSTRUCTIONS FOR COVID-19 OR ANY OTHER INFECTIOUS RESPIRATORY ILLNESSES    The Centers for Disease Control and Prevention (CDC) states that early indications for COVID-19 include cough, shortness of breath, difficulty breathing, or at least two of the following symptoms: chills, shaking with chills, muscle pain, headache, sore throat, and loss of taste or smell. Symptoms can range from mild to severe and may appear up to two weeks after exposure to the virus.    The practice of self-isolation and quarantine helps protect the public and your family by  preventing exposure to people who have or may have a contagious disease. Please follow the prevention steps below as based on CDC guidelines:    WHEN TO STOP ISOLATION: Persons with COVID-19 or any other infectious respiratory illness who have symptoms and were advised to care for themselves at home may discontinue home isolation under the following conditions:  · At least 24 hours have passed since recovery defined as resolution of fever without the use of fever-reducing medications; AND,  · Improvement in respiratory symptoms (e.g., cough, shortness of breath); AND,  · At least 10 days have passed since symptoms first appeared and have had no subsequent illness.    MONITOR YOUR SYMPTOMS: If your illness is worsening, seek prompt medical attention. If you have a medical emergency and need to call 911, notify the dispatch personnel that you have, or are being evaluated for confirmed or suspected COVID-19 or another infectious respiratory illness. Wear a facemask if possible.    ACTIVITY RESTRICTION: restrict activities outside your home, except for getting medical care. Do not go to work, school, or public areas. Avoid using public transportation, ride-sharing, or taxis.    SCHEDULED MEDICAL APPOINTMENTS: Notify your provider that you have, or are being evaluated for, confirmed or suspected COVID-19 or another infectious respiratory. This will help the healthcare  provider’s office safely take care of you and keep other people from getting exposed or infected.    FACEMASKS, when to wear: Anytime you are away from your home or around other people or pets. If you are unable to wear one, maintain a minimum of 6 feet distancing from others.    LIVING ENVIRONMENT: Stay in a separate room from other people and pets. If possible, use a separate bathroom, have someone else care for your pets and avoid sharing household items. Any items used should be washed thoroughly with soap and water. Clean all “high-touch” surfaces every day. Use a household cleaning spray or wipe, according to the label instructions. High touch surfaces include (but are not limited to) counters, tabletops, doorknobs, bathroom fixtures, toilets, phones, keyboards, tablets, and bedside tables.     HAND WASHING: Frequently wash hands with soap and water for at least 20 seconds,  especially after blowing your nose, coughing, or sneezing; going to the bathroom; before and after interacting with pets; and before and after eating or preparing food. If hands are visibly dirty use soap and water. If soap and water are not available, use an alcohol-based hand  with at least 60% alcohol. Avoid touching your eyes, nose, and mouth with unwashed hands. Cover your coughs and sneezes with a tissue. Throw used tissues in a lined trash can. Immediately wash your hands.    ACTIVE/FACILITATED SELF-MONITORING: Follow instructions provided by your local health department or health professionals, as appropriate. When working with your local health department check their available hours.    [facility]   Phone Number   [facility] [phone]   [facility] [place] [phone]   [place] Call 211   [place] [phone]     IF YOU HAVE CONFIRMED POSITIVE COVID-19:    Those who have completely recovered from COVID-19 may have immune-boosting antibodies in their plasma--called “convalescent plasma”--that could be  used to treat critically ill COVID19 patients.    [facility] is excited to begin working with [facility] on collecting convalescent plasma from  people who have recovered from COVID-19 as part of a program to treat patients infected with the virus. This FDA-approved “emergency investigational new drug” is a special blood product containing antibodies that may give patients an extra boost to fight the virus.    To be eligible to donate convalescent plasma, you must have a prior COVID-19 diagnosis documented by a laboratory test (or a positive test result for SARS-CoV-2 antibodies) and meet additional eligibility requirements.    If you are interested in donating convalescent plasma or have any additional questions, please contact the [facility] Convalescent Plasma  at [phone] or via e-mail at [email or web address].

## 2020-08-09 ENCOUNTER — TELEPHONE (OUTPATIENT)
Dept: URGENT CARE | Facility: PHYSICIAN GROUP | Age: 75
End: 2020-08-09

## 2020-08-09 NOTE — TELEPHONE ENCOUNTER
1. Caller Name: Amadou Parekh                          Call Back Number: [phone] (home)       How would the patient prefer to be contacted with a response: Phone call do NOT leave a detailed message    Patient informed of COVID results

## 2020-08-13 ENCOUNTER — HOSPITAL ENCOUNTER (OUTPATIENT)
Dept: LAB | Facility: MEDICAL CENTER | Age: 75
End: 2020-08-13
Attending: INTERNAL MEDICINE
Payer: COMMERCIAL

## 2020-08-13 ENCOUNTER — HOSPITAL ENCOUNTER (OUTPATIENT)
Dept: LAB | Facility: MEDICAL CENTER | Age: 75
End: 2020-08-13
Attending: SPECIALIST
Payer: COMMERCIAL

## 2020-08-13 LAB
ALBUMIN SERPL BCP-MCNC: 4.1 G/DL (ref 3.2–4.9)
ALBUMIN/GLOB SERPL: 1.6 G/DL
ALP SERPL-CCNC: 79 U/L (ref 30–99)
ALT SERPL-CCNC: 28 U/L (ref 2–50)
ANION GAP SERPL CALC-SCNC: 11 MMOL/L (ref 7–16)
APPEARANCE UR: CLEAR
AST SERPL-CCNC: 24 U/L (ref 12–45)
BASOPHILS # BLD AUTO: 0.8 % (ref 0–1.8)
BASOPHILS # BLD: 0.06 K/UL (ref 0–0.12)
BILIRUB SERPL-MCNC: 0.2 MG/DL (ref 0.1–1.5)
BILIRUB UR QL STRIP.AUTO: NEGATIVE
BUN SERPL-MCNC: 26 MG/DL (ref 8–22)
CALCIUM SERPL-MCNC: 9.3 MG/DL (ref 8.5–10.5)
CHLORIDE SERPL-SCNC: 102 MMOL/L (ref 96–112)
CO2 SERPL-SCNC: 27 MMOL/L (ref 20–33)
COLOR UR: YELLOW
CREAT SERPL-MCNC: 1.17 MG/DL (ref 0.5–1.4)
EOSINOPHIL # BLD AUTO: 0.21 K/UL (ref 0–0.51)
EOSINOPHIL NFR BLD: 3 % (ref 0–6.9)
ERYTHROCYTE [DISTWIDTH] IN BLOOD BY AUTOMATED COUNT: 39.8 FL (ref 35.9–50)
FASTING STATUS PATIENT QL REPORTED: NORMAL
GLOBULIN SER CALC-MCNC: 2.6 G/DL (ref 1.9–3.5)
GLUCOSE SERPL-MCNC: 81 MG/DL (ref 65–99)
GLUCOSE UR STRIP.AUTO-MCNC: NEGATIVE MG/DL
HCT VFR BLD AUTO: 46.5 % (ref 42–52)
HGB BLD-MCNC: 15.9 G/DL (ref 14–18)
IMM GRANULOCYTES # BLD AUTO: 0.03 K/UL (ref 0–0.11)
IMM GRANULOCYTES NFR BLD AUTO: 0.4 % (ref 0–0.9)
KETONES UR STRIP.AUTO-MCNC: NEGATIVE MG/DL
LEUKOCYTE ESTERASE UR QL STRIP.AUTO: NEGATIVE
LYMPHOCYTES # BLD AUTO: 1.79 K/UL (ref 1–4.8)
LYMPHOCYTES NFR BLD: 25.3 % (ref 22–41)
MCH RBC QN AUTO: 30.4 PG (ref 27–33)
MCHC RBC AUTO-ENTMCNC: 34.2 G/DL (ref 33.7–35.3)
MCV RBC AUTO: 88.9 FL (ref 81.4–97.8)
MICRO URNS: NORMAL
MONOCYTES # BLD AUTO: 0.69 K/UL (ref 0–0.85)
MONOCYTES NFR BLD AUTO: 9.8 % (ref 0–13.4)
NEUTROPHILS # BLD AUTO: 4.29 K/UL (ref 1.82–7.42)
NEUTROPHILS NFR BLD: 60.7 % (ref 44–72)
NITRITE UR QL STRIP.AUTO: NEGATIVE
NRBC # BLD AUTO: 0 K/UL
NRBC BLD-RTO: 0 /100 WBC
PH UR STRIP.AUTO: 5.5 [PH] (ref 5–8)
PLATELET # BLD AUTO: 298 K/UL (ref 164–446)
PMV BLD AUTO: 10.7 FL (ref 9–12.9)
POTASSIUM SERPL-SCNC: 5 MMOL/L (ref 3.6–5.5)
PROT SERPL-MCNC: 6.7 G/DL (ref 6–8.2)
PROT UR QL STRIP: NEGATIVE MG/DL
RBC # BLD AUTO: 5.23 M/UL (ref 4.7–6.1)
RBC UR QL AUTO: NEGATIVE
SODIUM SERPL-SCNC: 140 MMOL/L (ref 135–145)
SP GR UR STRIP.AUTO: 1.02
UROBILINOGEN UR STRIP.AUTO-MCNC: 0.2 MG/DL
WBC # BLD AUTO: 7.1 K/UL (ref 4.8–10.8)

## 2020-08-13 PROCEDURE — 80053 COMPREHEN METABOLIC PANEL: CPT

## 2020-08-13 PROCEDURE — 87086 URINE CULTURE/COLONY COUNT: CPT

## 2020-08-13 PROCEDURE — 36415 COLL VENOUS BLD VENIPUNCTURE: CPT

## 2020-08-13 PROCEDURE — 85025 COMPLETE CBC W/AUTO DIFF WBC: CPT

## 2020-08-13 PROCEDURE — 81003 URINALYSIS AUTO W/O SCOPE: CPT

## 2020-08-16 LAB
BACTERIA UR CULT: NORMAL
SIGNIFICANT IND 70042: NORMAL
SITE SITE: NORMAL
SOURCE SOURCE: NORMAL

## 2020-09-16 ENCOUNTER — HOSPITAL ENCOUNTER (OUTPATIENT)
Dept: LAB | Facility: MEDICAL CENTER | Age: 75
End: 2020-09-16
Attending: SPECIALIST
Payer: COMMERCIAL

## 2020-09-16 LAB
ALBUMIN SERPL BCP-MCNC: 4.3 G/DL (ref 3.2–4.9)
ALBUMIN/GLOB SERPL: 1.8 G/DL
ALP SERPL-CCNC: 85 U/L (ref 30–99)
ALT SERPL-CCNC: 38 U/L (ref 2–50)
ANION GAP SERPL CALC-SCNC: 13 MMOL/L (ref 7–16)
AST SERPL-CCNC: 26 U/L (ref 12–45)
BASOPHILS # BLD AUTO: 0.9 % (ref 0–1.8)
BASOPHILS # BLD: 0.07 K/UL (ref 0–0.12)
BILIRUB SERPL-MCNC: 0.4 MG/DL (ref 0.1–1.5)
BUN SERPL-MCNC: 19 MG/DL (ref 8–22)
CALCIUM SERPL-MCNC: 9.4 MG/DL (ref 8.5–10.5)
CHLORIDE SERPL-SCNC: 101 MMOL/L (ref 96–112)
CO2 SERPL-SCNC: 26 MMOL/L (ref 20–33)
CREAT SERPL-MCNC: 1.03 MG/DL (ref 0.5–1.4)
EOSINOPHIL # BLD AUTO: 0.12 K/UL (ref 0–0.51)
EOSINOPHIL NFR BLD: 1.6 % (ref 0–6.9)
ERYTHROCYTE [DISTWIDTH] IN BLOOD BY AUTOMATED COUNT: 38.2 FL (ref 35.9–50)
GLOBULIN SER CALC-MCNC: 2.4 G/DL (ref 1.9–3.5)
GLUCOSE SERPL-MCNC: 90 MG/DL (ref 65–99)
HCT VFR BLD AUTO: 49.2 % (ref 42–52)
HGB BLD-MCNC: 16.9 G/DL (ref 14–18)
IMM GRANULOCYTES # BLD AUTO: 0.02 K/UL (ref 0–0.11)
IMM GRANULOCYTES NFR BLD AUTO: 0.3 % (ref 0–0.9)
LYMPHOCYTES # BLD AUTO: 2.23 K/UL (ref 1–4.8)
LYMPHOCYTES NFR BLD: 29.3 % (ref 22–41)
MCH RBC QN AUTO: 29.4 PG (ref 27–33)
MCHC RBC AUTO-ENTMCNC: 34.3 G/DL (ref 33.7–35.3)
MCV RBC AUTO: 85.6 FL (ref 81.4–97.8)
MONOCYTES # BLD AUTO: 0.53 K/UL (ref 0–0.85)
MONOCYTES NFR BLD AUTO: 7 % (ref 0–13.4)
NEUTROPHILS # BLD AUTO: 4.63 K/UL (ref 1.82–7.42)
NEUTROPHILS NFR BLD: 60.9 % (ref 44–72)
NRBC # BLD AUTO: 0 K/UL
NRBC BLD-RTO: 0 /100 WBC
PLATELET # BLD AUTO: 286 K/UL (ref 164–446)
PMV BLD AUTO: 10.2 FL (ref 9–12.9)
POTASSIUM SERPL-SCNC: 4.1 MMOL/L (ref 3.6–5.5)
PROT SERPL-MCNC: 6.7 G/DL (ref 6–8.2)
RBC # BLD AUTO: 5.75 M/UL (ref 4.7–6.1)
SODIUM SERPL-SCNC: 140 MMOL/L (ref 135–145)
WBC # BLD AUTO: 7.6 K/UL (ref 4.8–10.8)

## 2020-09-16 PROCEDURE — 85025 COMPLETE CBC W/AUTO DIFF WBC: CPT

## 2020-09-16 PROCEDURE — 80053 COMPREHEN METABOLIC PANEL: CPT

## 2020-09-16 PROCEDURE — 36415 COLL VENOUS BLD VENIPUNCTURE: CPT

## 2020-10-06 ENCOUNTER — HOSPITAL ENCOUNTER (OUTPATIENT)
Facility: MEDICAL CENTER | Age: 75
End: 2020-10-06
Attending: FAMILY MEDICINE
Payer: COMMERCIAL

## 2020-10-06 ENCOUNTER — OFFICE VISIT (OUTPATIENT)
Dept: URGENT CARE | Facility: PHYSICIAN GROUP | Age: 75
End: 2020-10-06
Payer: COMMERCIAL

## 2020-10-06 VITALS
SYSTOLIC BLOOD PRESSURE: 158 MMHG | OXYGEN SATURATION: 94 % | DIASTOLIC BLOOD PRESSURE: 86 MMHG | TEMPERATURE: 97.6 F | HEIGHT: 72 IN | RESPIRATION RATE: 14 BRPM | WEIGHT: 190 LBS | HEART RATE: 73 BPM | BODY MASS INDEX: 25.73 KG/M2

## 2020-10-06 DIAGNOSIS — J02.9 SORE THROAT: ICD-10-CM

## 2020-10-06 DIAGNOSIS — Z20.822 EXPOSURE TO COVID-19 VIRUS: ICD-10-CM

## 2020-10-06 DIAGNOSIS — R09.81 NASAL CONGESTION: ICD-10-CM

## 2020-10-06 PROCEDURE — 99214 OFFICE O/P EST MOD 30 MIN: CPT | Performed by: FAMILY MEDICINE

## 2020-10-06 PROCEDURE — U0003 INFECTIOUS AGENT DETECTION BY NUCLEIC ACID (DNA OR RNA); SEVERE ACUTE RESPIRATORY SYNDROME CORONAVIRUS 2 (SARS-COV-2) (CORONAVIRUS DISEASE [COVID-19]), AMPLIFIED PROBE TECHNIQUE, MAKING USE OF HIGH THROUGHPUT TECHNOLOGIES AS DESCRIBED BY CMS-2020-01-R: HCPCS

## 2020-10-06 ASSESSMENT — FIBROSIS 4 INDEX: FIB4 SCORE: 1.11

## 2020-10-07 DIAGNOSIS — J02.9 SORE THROAT: ICD-10-CM

## 2020-10-07 DIAGNOSIS — Z20.822 EXPOSURE TO COVID-19 VIRUS: ICD-10-CM

## 2020-10-07 DIAGNOSIS — R09.81 NASAL CONGESTION: ICD-10-CM

## 2020-10-07 LAB
COVID ORDER STATUS COVID19: NORMAL
SARS-COV-2 RNA RESP QL NAA+PROBE: NOTDETECTED
SPECIMEN SOURCE: NORMAL

## 2020-10-07 NOTE — PROGRESS NOTES
Subjective:      Amadou Parekh is a 75 y.o. male who presents with Pharyngitis (congestion, green [id number] )            This is a new problem.  75-year-old presenting for evaluation of nasal congestion, sore throat for the past 3 to 4 days.  Also exposed to COVID.  Otherwise doing well.  Received his flu shot this season.  Review of systems otherwise negative.  He is a       Review of Systems   All other systems reviewed and are negative.         Objective:     /86   Pulse 73   Temp 36.4 °C (97.6 °F) (Temporal)   Resp 14   Ht 1.829 m (6')   Wt 86.2 kg (190 lb)   SpO2 94%   BMI 25.77 kg/m²      Physical Exam  Constitutional:       General: He is not in acute distress.     Appearance: He is not ill-appearing, toxic-appearing or diaphoretic.   HENT:      Head: Normocephalic and atraumatic.      Right Ear: External ear normal.      Left Ear: External ear normal.      Nose: Rhinorrhea present.      Mouth/Throat:      Mouth: Mucous membranes are moist.      Pharynx: Oropharynx is clear. Uvula midline. No pharyngeal swelling, oropharyngeal exudate, posterior oropharyngeal erythema or uvula swelling.   Eyes:      General: No scleral icterus.     Conjunctiva/sclera: Conjunctivae normal.   Neck:      Musculoskeletal: Neck supple.   Cardiovascular:      Rate and Rhythm: Normal rate and regular rhythm.      Heart sounds: No murmur. No friction rub. No gallop.    Pulmonary:      Effort: Pulmonary effort is normal.      Breath sounds: No stridor. No wheezing, rhonchi or rales.   Lymphadenopathy:      Cervical: No cervical adenopathy.   Skin:     General: Skin is warm.      Coloration: Skin is not jaundiced or pale.      Findings: No erythema or rash.   Neurological:      Mental Status: He is alert and oriented to person, place, and time.   Psychiatric:         Mood and Affect: Mood normal.                 Assessment/Plan:        1. Nasal congestion  - COVID/SARS COV-2 PCR; Future    2. Sore throat  -  COVID/SARS COV-2 PCR; Future    3. Exposure to COVID-19 virus  - COVID/SARS COV-2 PCR; Future      Continue symptomatic care  Plan per orders and instructions  Warning signs reviewed

## 2020-10-20 ENCOUNTER — HOSPITAL ENCOUNTER (OUTPATIENT)
Dept: LAB | Facility: MEDICAL CENTER | Age: 75
End: 2020-10-20
Attending: SPECIALIST
Payer: COMMERCIAL

## 2020-10-20 LAB
ALBUMIN SERPL BCP-MCNC: 4 G/DL (ref 3.2–4.9)
ALBUMIN/GLOB SERPL: 1.4 G/DL
ALP SERPL-CCNC: 84 U/L (ref 30–99)
ALT SERPL-CCNC: 21 U/L (ref 2–50)
ANION GAP SERPL CALC-SCNC: 10 MMOL/L (ref 7–16)
AST SERPL-CCNC: 24 U/L (ref 12–45)
BASOPHILS # BLD AUTO: 0.6 % (ref 0–1.8)
BASOPHILS # BLD: 0.04 K/UL (ref 0–0.12)
BILIRUB SERPL-MCNC: 0.3 MG/DL (ref 0.1–1.5)
BUN SERPL-MCNC: 27 MG/DL (ref 8–22)
CALCIUM SERPL-MCNC: 9.4 MG/DL (ref 8.5–10.5)
CHLORIDE SERPL-SCNC: 100 MMOL/L (ref 96–112)
CO2 SERPL-SCNC: 28 MMOL/L (ref 20–33)
CREAT SERPL-MCNC: 1.14 MG/DL (ref 0.5–1.4)
EOSINOPHIL # BLD AUTO: 0.17 K/UL (ref 0–0.51)
EOSINOPHIL NFR BLD: 2.6 % (ref 0–6.9)
ERYTHROCYTE [DISTWIDTH] IN BLOOD BY AUTOMATED COUNT: 40.7 FL (ref 35.9–50)
GLOBULIN SER CALC-MCNC: 2.8 G/DL (ref 1.9–3.5)
GLUCOSE SERPL-MCNC: 83 MG/DL (ref 65–99)
HCT VFR BLD AUTO: 47.2 % (ref 42–52)
HGB BLD-MCNC: 15.6 G/DL (ref 14–18)
IMM GRANULOCYTES # BLD AUTO: 0.02 K/UL (ref 0–0.11)
IMM GRANULOCYTES NFR BLD AUTO: 0.3 % (ref 0–0.9)
LYMPHOCYTES # BLD AUTO: 1.65 K/UL (ref 1–4.8)
LYMPHOCYTES NFR BLD: 25.6 % (ref 22–41)
MCH RBC QN AUTO: 29.1 PG (ref 27–33)
MCHC RBC AUTO-ENTMCNC: 33.1 G/DL (ref 33.7–35.3)
MCV RBC AUTO: 87.9 FL (ref 81.4–97.8)
MONOCYTES # BLD AUTO: 0.68 K/UL (ref 0–0.85)
MONOCYTES NFR BLD AUTO: 10.5 % (ref 0–13.4)
NEUTROPHILS # BLD AUTO: 3.89 K/UL (ref 1.82–7.42)
NEUTROPHILS NFR BLD: 60.4 % (ref 44–72)
NRBC # BLD AUTO: 0 K/UL
NRBC BLD-RTO: 0 /100 WBC
PLATELET # BLD AUTO: 299 K/UL (ref 164–446)
PMV BLD AUTO: 11.4 FL (ref 9–12.9)
POTASSIUM SERPL-SCNC: 4.4 MMOL/L (ref 3.6–5.5)
PROT SERPL-MCNC: 6.8 G/DL (ref 6–8.2)
RBC # BLD AUTO: 5.37 M/UL (ref 4.7–6.1)
SODIUM SERPL-SCNC: 138 MMOL/L (ref 135–145)
WBC # BLD AUTO: 6.5 K/UL (ref 4.8–10.8)

## 2020-10-20 PROCEDURE — 80053 COMPREHEN METABOLIC PANEL: CPT

## 2020-10-20 PROCEDURE — 36415 COLL VENOUS BLD VENIPUNCTURE: CPT

## 2020-10-20 PROCEDURE — 85025 COMPLETE CBC W/AUTO DIFF WBC: CPT

## 2020-12-28 ENCOUNTER — HOSPITAL ENCOUNTER (OUTPATIENT)
Dept: LAB | Facility: MEDICAL CENTER | Age: 75
End: 2020-12-28
Attending: SPECIALIST
Payer: COMMERCIAL

## 2020-12-28 ENCOUNTER — HOSPITAL ENCOUNTER (OUTPATIENT)
Dept: LAB | Facility: MEDICAL CENTER | Age: 75
End: 2020-12-28
Attending: FAMILY MEDICINE
Payer: COMMERCIAL

## 2020-12-28 LAB
25(OH)D3 SERPL-MCNC: 42 NG/ML (ref 30–100)
ALBUMIN SERPL BCP-MCNC: 4.2 G/DL (ref 3.2–4.9)
ALBUMIN/GLOB SERPL: 1.7 G/DL
ALP SERPL-CCNC: 79 U/L (ref 30–99)
ALT SERPL-CCNC: 27 U/L (ref 2–50)
ANION GAP SERPL CALC-SCNC: 10 MMOL/L (ref 7–16)
AST SERPL-CCNC: 25 U/L (ref 12–45)
BASOPHILS # BLD AUTO: 0.6 % (ref 0–1.8)
BASOPHILS # BLD: 0.04 K/UL (ref 0–0.12)
BILIRUB SERPL-MCNC: 0.6 MG/DL (ref 0.1–1.5)
BUN SERPL-MCNC: 20 MG/DL (ref 8–22)
CALCIUM SERPL-MCNC: 9.4 MG/DL (ref 8.5–10.5)
CHLORIDE SERPL-SCNC: 103 MMOL/L (ref 96–112)
CHOLEST SERPL-MCNC: 205 MG/DL (ref 100–199)
CO2 SERPL-SCNC: 27 MMOL/L (ref 20–33)
CREAT SERPL-MCNC: 1.2 MG/DL (ref 0.5–1.4)
CREAT UR-MCNC: 149.2 MG/DL
EOSINOPHIL # BLD AUTO: 0.18 K/UL (ref 0–0.51)
EOSINOPHIL NFR BLD: 2.8 % (ref 0–6.9)
ERYTHROCYTE [DISTWIDTH] IN BLOOD BY AUTOMATED COUNT: 41.7 FL (ref 35.9–50)
EST. AVERAGE GLUCOSE BLD GHB EST-MCNC: 126 MG/DL
FASTING STATUS PATIENT QL REPORTED: NORMAL
GLOBULIN SER CALC-MCNC: 2.5 G/DL (ref 1.9–3.5)
GLUCOSE SERPL-MCNC: 84 MG/DL (ref 65–99)
HBA1C MFR BLD: 6 % (ref 0–5.6)
HCT VFR BLD AUTO: 52.2 % (ref 42–52)
HDLC SERPL-MCNC: 47 MG/DL
HGB BLD-MCNC: 17.3 G/DL (ref 14–18)
IMM GRANULOCYTES # BLD AUTO: 0.07 K/UL (ref 0–0.11)
IMM GRANULOCYTES NFR BLD AUTO: 1.1 % (ref 0–0.9)
LDLC SERPL CALC-MCNC: 120 MG/DL
LYMPHOCYTES # BLD AUTO: 1.78 K/UL (ref 1–4.8)
LYMPHOCYTES NFR BLD: 27.3 % (ref 22–41)
MCH RBC QN AUTO: 29.6 PG (ref 27–33)
MCHC RBC AUTO-ENTMCNC: 33.1 G/DL (ref 33.7–35.3)
MCV RBC AUTO: 89.2 FL (ref 81.4–97.8)
MICROALBUMIN UR-MCNC: <1.2 MG/DL
MICROALBUMIN/CREAT UR: NORMAL MG/G (ref 0–30)
MONOCYTES # BLD AUTO: 0.6 K/UL (ref 0–0.85)
MONOCYTES NFR BLD AUTO: 9.2 % (ref 0–13.4)
NEUTROPHILS # BLD AUTO: 3.84 K/UL (ref 1.82–7.42)
NEUTROPHILS NFR BLD: 59 % (ref 44–72)
NRBC # BLD AUTO: 0 K/UL
NRBC BLD-RTO: 0 /100 WBC
PLATELET # BLD AUTO: 271 K/UL (ref 164–446)
PMV BLD AUTO: 10.5 FL (ref 9–12.9)
POTASSIUM SERPL-SCNC: 4.5 MMOL/L (ref 3.6–5.5)
PROT SERPL-MCNC: 6.7 G/DL (ref 6–8.2)
PSA SERPL-MCNC: 2.68 NG/ML (ref 0–4)
RBC # BLD AUTO: 5.85 M/UL (ref 4.7–6.1)
SODIUM SERPL-SCNC: 140 MMOL/L (ref 135–145)
T4 FREE SERPL-MCNC: 1.8 NG/DL (ref 0.93–1.7)
TRIGL SERPL-MCNC: 189 MG/DL (ref 0–149)
TSH SERPL DL<=0.005 MIU/L-ACNC: 1.97 UIU/ML (ref 0.38–5.33)
WBC # BLD AUTO: 6.5 K/UL (ref 4.8–10.8)

## 2020-12-28 PROCEDURE — 82306 VITAMIN D 25 HYDROXY: CPT

## 2020-12-28 PROCEDURE — 80053 COMPREHEN METABOLIC PANEL: CPT

## 2020-12-28 PROCEDURE — 80061 LIPID PANEL: CPT

## 2020-12-28 PROCEDURE — 84153 ASSAY OF PSA TOTAL: CPT

## 2020-12-28 PROCEDURE — 36415 COLL VENOUS BLD VENIPUNCTURE: CPT

## 2020-12-28 PROCEDURE — 83036 HEMOGLOBIN GLYCOSYLATED A1C: CPT

## 2020-12-28 PROCEDURE — 82570 ASSAY OF URINE CREATININE: CPT

## 2020-12-28 PROCEDURE — 84439 ASSAY OF FREE THYROXINE: CPT

## 2020-12-28 PROCEDURE — 84443 ASSAY THYROID STIM HORMONE: CPT

## 2020-12-28 PROCEDURE — 85025 COMPLETE CBC W/AUTO DIFF WBC: CPT

## 2020-12-28 PROCEDURE — 82043 UR ALBUMIN QUANTITATIVE: CPT

## 2021-01-11 DIAGNOSIS — Z23 NEED FOR VACCINATION: ICD-10-CM

## 2021-02-05 ENCOUNTER — HOSPITAL ENCOUNTER (OUTPATIENT)
Dept: RADIOLOGY | Facility: MEDICAL CENTER | Age: 76
End: 2021-02-05
Attending: NEUROLOGICAL SURGERY | Admitting: NEUROLOGICAL SURGERY
Payer: COMMERCIAL

## 2021-02-05 ENCOUNTER — PRE-ADMISSION TESTING (OUTPATIENT)
Dept: ADMISSIONS | Facility: MEDICAL CENTER | Age: 76
End: 2021-02-05
Attending: NEUROLOGICAL SURGERY
Payer: COMMERCIAL

## 2021-02-05 DIAGNOSIS — Z01.810 PRE-OPERATIVE CARDIOVASCULAR EXAMINATION: ICD-10-CM

## 2021-02-05 DIAGNOSIS — Z01.812 PRE-OPERATIVE LABORATORY EXAMINATION: ICD-10-CM

## 2021-02-05 DIAGNOSIS — Z01.811 PRE-OPERATIVE RESPIRATORY EXAMINATION: ICD-10-CM

## 2021-02-05 LAB
ANION GAP SERPL CALC-SCNC: 11 MMOL/L (ref 7–16)
APPEARANCE UR: CLEAR
APTT PPP: 25.1 SEC (ref 24.7–36)
BASOPHILS # BLD AUTO: 0.9 % (ref 0–1.8)
BASOPHILS # BLD: 0.06 K/UL (ref 0–0.12)
BILIRUB UR QL STRIP.AUTO: NEGATIVE
BUN SERPL-MCNC: 23 MG/DL (ref 8–22)
CALCIUM SERPL-MCNC: 9.4 MG/DL (ref 8.5–10.5)
CHLORIDE SERPL-SCNC: 104 MMOL/L (ref 96–112)
CO2 SERPL-SCNC: 26 MMOL/L (ref 20–33)
COLOR UR: NORMAL
CREAT SERPL-MCNC: 1.11 MG/DL (ref 0.5–1.4)
EKG IMPRESSION: NORMAL
EOSINOPHIL # BLD AUTO: 0.15 K/UL (ref 0–0.51)
EOSINOPHIL NFR BLD: 2.2 % (ref 0–6.9)
ERYTHROCYTE [DISTWIDTH] IN BLOOD BY AUTOMATED COUNT: 39.5 FL (ref 35.9–50)
GLUCOSE SERPL-MCNC: 89 MG/DL (ref 65–99)
GLUCOSE UR STRIP.AUTO-MCNC: NEGATIVE MG/DL
HCT VFR BLD AUTO: 49.4 % (ref 42–52)
HGB BLD-MCNC: 17 G/DL (ref 14–18)
IMM GRANULOCYTES # BLD AUTO: 0.02 K/UL (ref 0–0.11)
IMM GRANULOCYTES NFR BLD AUTO: 0.3 % (ref 0–0.9)
INR PPP: 0.97 (ref 0.87–1.13)
KETONES UR STRIP.AUTO-MCNC: NEGATIVE MG/DL
LEUKOCYTE ESTERASE UR QL STRIP.AUTO: NEGATIVE
LYMPHOCYTES # BLD AUTO: 1.99 K/UL (ref 1–4.8)
LYMPHOCYTES NFR BLD: 28.6 % (ref 22–41)
MCH RBC QN AUTO: 29.9 PG (ref 27–33)
MCHC RBC AUTO-ENTMCNC: 34.4 G/DL (ref 33.7–35.3)
MCV RBC AUTO: 86.8 FL (ref 81.4–97.8)
MICRO URNS: NORMAL
MONOCYTES # BLD AUTO: 0.73 K/UL (ref 0–0.85)
MONOCYTES NFR BLD AUTO: 10.5 % (ref 0–13.4)
NEUTROPHILS # BLD AUTO: 4.01 K/UL (ref 1.82–7.42)
NEUTROPHILS NFR BLD: 57.5 % (ref 44–72)
NITRITE UR QL STRIP.AUTO: NEGATIVE
NRBC # BLD AUTO: 0 K/UL
NRBC BLD-RTO: 0 /100 WBC
PH UR STRIP.AUTO: 5.5 [PH] (ref 5–8)
PLATELET # BLD AUTO: 278 K/UL (ref 164–446)
PMV BLD AUTO: 10.5 FL (ref 9–12.9)
POTASSIUM SERPL-SCNC: 4.6 MMOL/L (ref 3.6–5.5)
PROT UR QL STRIP: NEGATIVE MG/DL
PROTHROMBIN TIME: 13.2 SEC (ref 12–14.6)
RBC # BLD AUTO: 5.69 M/UL (ref 4.7–6.1)
RBC UR QL AUTO: NEGATIVE
SODIUM SERPL-SCNC: 141 MMOL/L (ref 135–145)
SP GR UR STRIP.AUTO: 1.02
UROBILINOGEN UR STRIP.AUTO-MCNC: 0.2 MG/DL
WBC # BLD AUTO: 7 K/UL (ref 4.8–10.8)

## 2021-02-05 PROCEDURE — 93010 ELECTROCARDIOGRAM REPORT: CPT | Performed by: INTERNAL MEDICINE

## 2021-02-05 PROCEDURE — 85025 COMPLETE CBC W/AUTO DIFF WBC: CPT

## 2021-02-05 PROCEDURE — 71046 X-RAY EXAM CHEST 2 VIEWS: CPT

## 2021-02-05 PROCEDURE — 85610 PROTHROMBIN TIME: CPT

## 2021-02-05 PROCEDURE — 36415 COLL VENOUS BLD VENIPUNCTURE: CPT

## 2021-02-05 PROCEDURE — 85730 THROMBOPLASTIN TIME PARTIAL: CPT

## 2021-02-05 PROCEDURE — 80048 BASIC METABOLIC PNL TOTAL CA: CPT

## 2021-02-05 PROCEDURE — 81003 URINALYSIS AUTO W/O SCOPE: CPT

## 2021-02-05 PROCEDURE — 72110 X-RAY EXAM L-2 SPINE 4/>VWS: CPT

## 2021-02-05 PROCEDURE — 93005 ELECTROCARDIOGRAM TRACING: CPT

## 2021-02-05 ASSESSMENT — FIBROSIS 4 INDEX: FIB4 SCORE: 1.33

## 2021-02-09 ENCOUNTER — APPOINTMENT (RX ONLY)
Dept: URBAN - METROPOLITAN AREA CLINIC 4 | Facility: CLINIC | Age: 76
Setting detail: DERMATOLOGY
End: 2021-02-09

## 2021-02-09 DIAGNOSIS — L91.8 OTHER HYPERTROPHIC DISORDERS OF THE SKIN: ICD-10-CM

## 2021-02-09 DIAGNOSIS — L82.1 OTHER SEBORRHEIC KERATOSIS: ICD-10-CM

## 2021-02-09 DIAGNOSIS — D22 MELANOCYTIC NEVI: ICD-10-CM

## 2021-02-09 PROBLEM — D48.5 NEOPLASM OF UNCERTAIN BEHAVIOR OF SKIN: Status: ACTIVE | Noted: 2021-02-09

## 2021-02-09 PROBLEM — D22.5 MELANOCYTIC NEVI OF TRUNK: Status: ACTIVE | Noted: 2021-02-09

## 2021-02-09 PROCEDURE — ? SKIN TAG REMOVAL (COSMETIC)

## 2021-02-09 PROCEDURE — ? BIOPSY BY SHAVE METHOD

## 2021-02-09 PROCEDURE — ? COUNSELING

## 2021-02-09 PROCEDURE — 11103 TANGNTL BX SKIN EA SEP/ADDL: CPT

## 2021-02-09 PROCEDURE — 99212 OFFICE O/P EST SF 10 MIN: CPT | Mod: 25

## 2021-02-09 PROCEDURE — 11102 TANGNTL BX SKIN SINGLE LES: CPT

## 2021-02-09 ASSESSMENT — LOCATION ZONE DERM
LOCATION ZONE: TRUNK
LOCATION ZONE: NECK

## 2021-02-09 ASSESSMENT — LOCATION DETAILED DESCRIPTION DERM
LOCATION DETAILED: LEFT CLAVICULAR NECK
LOCATION DETAILED: RIGHT MID-UPPER BACK
LOCATION DETAILED: LEFT CENTRAL LATERAL NECK
LOCATION DETAILED: LEFT RIB CAGE
LOCATION DETAILED: LEFT LATERAL ABDOMEN
LOCATION DETAILED: LEFT INFERIOR LATERAL NECK
LOCATION DETAILED: RIGHT INFERIOR LATERAL NECK
LOCATION DETAILED: RIGHT CLAVICULAR NECK
LOCATION DETAILED: LEFT SUPERIOR UPPER BACK
LOCATION DETAILED: RIGHT LATERAL ABDOMEN

## 2021-02-09 ASSESSMENT — LOCATION SIMPLE DESCRIPTION DERM
LOCATION SIMPLE: RIGHT ANTERIOR NECK
LOCATION SIMPLE: LEFT ANTERIOR NECK
LOCATION SIMPLE: RIGHT UPPER BACK
LOCATION SIMPLE: LEFT UPPER BACK
LOCATION SIMPLE: ABDOMEN
LOCATION SIMPLE: NECK

## 2021-02-09 NOTE — PROCEDURE: SKIN TAG REMOVAL (COSMETIC)
Anesthesia Type: 1% lidocaine with epinephrine
Detail Level: Detailed
Anesthesia Volume In Cc: 3
Removed With: gradle excision
Consent: Written consent obtained and the risks of skin tag removal was reviewed with the patient including but not limited to bleeding, pigmentary change, infection, pain, and remote possibility of scarring.

## 2021-02-23 ENCOUNTER — PRE-ADMISSION TESTING (OUTPATIENT)
Dept: ADMISSIONS | Facility: MEDICAL CENTER | Age: 76
End: 2021-02-23
Attending: NEUROLOGICAL SURGERY
Payer: COMMERCIAL

## 2021-02-23 DIAGNOSIS — Z01.812 PRE-OPERATIVE LABORATORY EXAMINATION: ICD-10-CM

## 2021-02-23 PROCEDURE — C9803 HOPD COVID-19 SPEC COLLECT: HCPCS

## 2021-02-23 PROCEDURE — U0005 INFEC AGEN DETEC AMPLI PROBE: HCPCS

## 2021-02-23 PROCEDURE — U0003 INFECTIOUS AGENT DETECTION BY NUCLEIC ACID (DNA OR RNA); SEVERE ACUTE RESPIRATORY SYNDROME CORONAVIRUS 2 (SARS-COV-2) (CORONAVIRUS DISEASE [COVID-19]), AMPLIFIED PROBE TECHNIQUE, MAKING USE OF HIGH THROUGHPUT TECHNOLOGIES AS DESCRIBED BY CMS-2020-01-R: HCPCS

## 2021-02-25 ENCOUNTER — APPOINTMENT (OUTPATIENT)
Dept: RADIOLOGY | Facility: MEDICAL CENTER | Age: 76
End: 2021-02-25
Attending: NEUROLOGICAL SURGERY
Payer: COMMERCIAL

## 2021-02-25 DIAGNOSIS — M43.16 SPONDYLOLISTHESIS OF LUMBAR REGION: ICD-10-CM

## 2021-02-25 PROCEDURE — 72131 CT LUMBAR SPINE W/O DYE: CPT

## 2021-02-27 ENCOUNTER — ANESTHESIA EVENT (OUTPATIENT)
Dept: SURGERY | Facility: MEDICAL CENTER | Age: 76
End: 2021-02-27
Payer: COMMERCIAL

## 2021-02-27 ENCOUNTER — APPOINTMENT (OUTPATIENT)
Dept: RADIOLOGY | Facility: MEDICAL CENTER | Age: 76
End: 2021-02-27
Attending: NEUROLOGICAL SURGERY
Payer: COMMERCIAL

## 2021-02-27 ENCOUNTER — HOSPITAL ENCOUNTER (OUTPATIENT)
Facility: MEDICAL CENTER | Age: 76
End: 2021-03-01
Attending: NEUROLOGICAL SURGERY | Admitting: NEUROLOGICAL SURGERY
Payer: COMMERCIAL

## 2021-02-27 ENCOUNTER — ANESTHESIA (OUTPATIENT)
Dept: SURGERY | Facility: MEDICAL CENTER | Age: 76
End: 2021-02-27
Payer: COMMERCIAL

## 2021-02-27 DIAGNOSIS — G89.18 POSTOPERATIVE PAIN: ICD-10-CM

## 2021-02-27 PROCEDURE — 160036 HCHG PACU - EA ADDL 30 MINS PHASE I: Performed by: NEUROLOGICAL SURGERY

## 2021-02-27 PROCEDURE — 95861 NEEDLE EMG 2 EXTREMITIES: CPT | Performed by: NEUROLOGICAL SURGERY

## 2021-02-27 PROCEDURE — 700105 HCHG RX REV CODE 258: Performed by: NURSE PRACTITIONER

## 2021-02-27 PROCEDURE — 160002 HCHG RECOVERY MINUTES (STAT): Performed by: NEUROLOGICAL SURGERY

## 2021-02-27 PROCEDURE — 700111 HCHG RX REV CODE 636 W/ 250 OVERRIDE (IP): Performed by: NEUROLOGICAL SURGERY

## 2021-02-27 PROCEDURE — 700111 HCHG RX REV CODE 636 W/ 250 OVERRIDE (IP): Performed by: NURSE PRACTITIONER

## 2021-02-27 PROCEDURE — 700111 HCHG RX REV CODE 636 W/ 250 OVERRIDE (IP)

## 2021-02-27 PROCEDURE — L8699 PROSTHETIC IMPLANT NOS: HCPCS | Performed by: NEUROLOGICAL SURGERY

## 2021-02-27 PROCEDURE — 500864 HCHG NEEDLE, SPINAL 18G: Performed by: NEUROLOGICAL SURGERY

## 2021-02-27 PROCEDURE — 700105 HCHG RX REV CODE 258: Performed by: ANESTHESIOLOGY

## 2021-02-27 PROCEDURE — 95925 SOMATOSENSORY TESTING: CPT | Performed by: NEUROLOGICAL SURGERY

## 2021-02-27 PROCEDURE — 72100 X-RAY EXAM L-S SPINE 2/3 VWS: CPT

## 2021-02-27 PROCEDURE — C1713 ANCHOR/SCREW BN/BN,TIS/BN: HCPCS | Performed by: NEUROLOGICAL SURGERY

## 2021-02-27 PROCEDURE — 95937 NEUROMUSCULAR JUNCTION TEST: CPT | Performed by: NEUROLOGICAL SURGERY

## 2021-02-27 PROCEDURE — G0378 HOSPITAL OBSERVATION PER HR: HCPCS

## 2021-02-27 PROCEDURE — 700101 HCHG RX REV CODE 250: Performed by: NURSE PRACTITIONER

## 2021-02-27 PROCEDURE — 502714 HCHG ROBOTIC SURGERY SERVICES: Performed by: NEUROLOGICAL SURGERY

## 2021-02-27 PROCEDURE — 160031 HCHG SURGERY MINUTES - 1ST 30 MINS LEVEL 5: Performed by: NEUROLOGICAL SURGERY

## 2021-02-27 PROCEDURE — 95940 IONM IN OPERATNG ROOM 15 MIN: CPT | Performed by: NEUROLOGICAL SURGERY

## 2021-02-27 PROCEDURE — 160042 HCHG SURGERY MINUTES - EA ADDL 1 MIN LEVEL 5: Performed by: NEUROLOGICAL SURGERY

## 2021-02-27 PROCEDURE — A9270 NON-COVERED ITEM OR SERVICE: HCPCS | Performed by: NEUROLOGICAL SURGERY

## 2021-02-27 PROCEDURE — L0631 LSO SAG R AN/POS PNL PRE CST: HCPCS

## 2021-02-27 PROCEDURE — 501838 HCHG SUTURE GENERAL: Performed by: NEUROLOGICAL SURGERY

## 2021-02-27 PROCEDURE — 700101 HCHG RX REV CODE 250: Performed by: ANESTHESIOLOGY

## 2021-02-27 PROCEDURE — A9270 NON-COVERED ITEM OR SERVICE: HCPCS | Performed by: NURSE PRACTITIONER

## 2021-02-27 PROCEDURE — 96365 THER/PROPH/DIAG IV INF INIT: CPT

## 2021-02-27 PROCEDURE — 160048 HCHG OR STATISTICAL LEVEL 1-5: Performed by: NEUROLOGICAL SURGERY

## 2021-02-27 PROCEDURE — 110371 HCHG SHELL REV 272: Performed by: NEUROLOGICAL SURGERY

## 2021-02-27 PROCEDURE — 700101 HCHG RX REV CODE 250: Performed by: NEUROLOGICAL SURGERY

## 2021-02-27 PROCEDURE — 160035 HCHG PACU - 1ST 60 MINS PHASE I: Performed by: NEUROLOGICAL SURGERY

## 2021-02-27 PROCEDURE — 502000 HCHG MISC OR IMPLANTS RC 0278: Performed by: NEUROLOGICAL SURGERY

## 2021-02-27 PROCEDURE — 500112 HCHG BONE WAX: Performed by: NEUROLOGICAL SURGERY

## 2021-02-27 PROCEDURE — 500367 HCHG DRAIN KIT, HEMOVAC: Performed by: NEUROLOGICAL SURGERY

## 2021-02-27 PROCEDURE — 700105 HCHG RX REV CODE 258: Performed by: NEUROLOGICAL SURGERY

## 2021-02-27 PROCEDURE — 700102 HCHG RX REV CODE 250 W/ 637 OVERRIDE(OP): Performed by: NURSE PRACTITIONER

## 2021-02-27 PROCEDURE — 160009 HCHG ANES TIME/MIN: Performed by: NEUROLOGICAL SURGERY

## 2021-02-27 PROCEDURE — 96375 TX/PRO/DX INJ NEW DRUG ADDON: CPT

## 2021-02-27 PROCEDURE — 500885 HCHG PACK, JACKSON TABLE: Performed by: NEUROLOGICAL SURGERY

## 2021-02-27 PROCEDURE — 96376 TX/PRO/DX INJ SAME DRUG ADON: CPT

## 2021-02-27 PROCEDURE — 110454 HCHG SHELL REV 250: Performed by: NEUROLOGICAL SURGERY

## 2021-02-27 PROCEDURE — 700111 HCHG RX REV CODE 636 W/ 250 OVERRIDE (IP): Performed by: ANESTHESIOLOGY

## 2021-02-27 DEVICE — SCREW SOLERA SET SCREW (1TCX40+3TCX21+2TCX10=123): Type: IMPLANTABLE DEVICE | Site: BACK | Status: FUNCTIONAL

## 2021-02-27 DEVICE — GRAPH BONE KIT INFUSE SMALL: Type: IMPLANTABLE DEVICE | Site: BACK | Status: FUNCTIONAL

## 2021-02-27 DEVICE — ROD PREBENT TITANIUM 5.5 X 90MM (2TCONX2=4): Type: IMPLANTABLE DEVICE | Site: BACK | Status: FUNCTIONAL

## 2021-02-27 DEVICE — IMPLANTABLE DEVICE: Type: IMPLANTABLE DEVICE | Site: BACK | Status: FUNCTIONAL

## 2021-02-27 RX ORDER — MEPERIDINE HYDROCHLORIDE 25 MG/ML
12.5 INJECTION INTRAMUSCULAR; INTRAVENOUS; SUBCUTANEOUS
Status: DISCONTINUED | OUTPATIENT
Start: 2021-02-27 | End: 2021-02-27 | Stop reason: HOSPADM

## 2021-02-27 RX ORDER — OXYCODONE HCL 5 MG/5 ML
5 SOLUTION, ORAL ORAL
Status: DISCONTINUED | OUTPATIENT
Start: 2021-02-27 | End: 2021-02-27 | Stop reason: HOSPADM

## 2021-02-27 RX ORDER — ACETAMINOPHEN 325 MG/1
650 TABLET ORAL EVERY 6 HOURS
Status: DISCONTINUED | OUTPATIENT
Start: 2021-02-27 | End: 2021-03-01 | Stop reason: HOSPADM

## 2021-02-27 RX ORDER — CEFAZOLIN SODIUM 2 G/100ML
2 INJECTION, SOLUTION INTRAVENOUS EVERY 8 HOURS
Status: COMPLETED | OUTPATIENT
Start: 2021-02-27 | End: 2021-02-28

## 2021-02-27 RX ORDER — DIPHENHYDRAMINE HCL 25 MG
25 TABLET ORAL EVERY 6 HOURS PRN
Status: DISCONTINUED | OUTPATIENT
Start: 2021-02-27 | End: 2021-03-01 | Stop reason: HOSPADM

## 2021-02-27 RX ORDER — HYDROMORPHONE HYDROCHLORIDE 1 MG/ML
0.2 INJECTION, SOLUTION INTRAMUSCULAR; INTRAVENOUS; SUBCUTANEOUS
Status: DISCONTINUED | OUTPATIENT
Start: 2021-02-27 | End: 2021-02-27 | Stop reason: HOSPADM

## 2021-02-27 RX ORDER — ATORVASTATIN CALCIUM 10 MG/1
10 TABLET, FILM COATED ORAL NIGHTLY
Status: DISCONTINUED | OUTPATIENT
Start: 2021-02-27 | End: 2021-03-01 | Stop reason: HOSPADM

## 2021-02-27 RX ORDER — HALOPERIDOL 5 MG/ML
1 INJECTION INTRAMUSCULAR
Status: DISCONTINUED | OUTPATIENT
Start: 2021-02-27 | End: 2021-02-27 | Stop reason: HOSPADM

## 2021-02-27 RX ORDER — LIDOCAINE HYDROCHLORIDE 20 MG/ML
JELLY TOPICAL
Status: DISCONTINUED | OUTPATIENT
Start: 2021-02-27 | End: 2021-02-27 | Stop reason: HOSPADM

## 2021-02-27 RX ORDER — SODIUM CHLORIDE AND POTASSIUM CHLORIDE 150; 900 MG/100ML; MG/100ML
INJECTION, SOLUTION INTRAVENOUS CONTINUOUS
Status: DISCONTINUED | OUTPATIENT
Start: 2021-02-27 | End: 2021-03-01 | Stop reason: HOSPADM

## 2021-02-27 RX ORDER — SODIUM CHLORIDE, SODIUM LACTATE, POTASSIUM CHLORIDE, CALCIUM CHLORIDE 600; 310; 30; 20 MG/100ML; MG/100ML; MG/100ML; MG/100ML
INJECTION, SOLUTION INTRAVENOUS CONTINUOUS
Status: DISCONTINUED | OUTPATIENT
Start: 2021-02-27 | End: 2021-02-27 | Stop reason: HOSPADM

## 2021-02-27 RX ORDER — ONDANSETRON 4 MG/1
4 TABLET, ORALLY DISINTEGRATING ORAL EVERY 4 HOURS PRN
Status: DISCONTINUED | OUTPATIENT
Start: 2021-02-27 | End: 2021-03-01 | Stop reason: HOSPADM

## 2021-02-27 RX ORDER — MAGNESIUM SULFATE HEPTAHYDRATE 40 MG/ML
INJECTION, SOLUTION INTRAVENOUS PRN
Status: DISCONTINUED | OUTPATIENT
Start: 2021-02-27 | End: 2021-02-27 | Stop reason: SURG

## 2021-02-27 RX ORDER — CEFAZOLIN SODIUM 1 G/3ML
INJECTION, POWDER, FOR SOLUTION INTRAMUSCULAR; INTRAVENOUS
Status: DISCONTINUED | OUTPATIENT
Start: 2021-02-27 | End: 2021-02-27 | Stop reason: HOSPADM

## 2021-02-27 RX ORDER — ACETAMINOPHEN 325 MG/1
650 TABLET ORAL EVERY 6 HOURS PRN
Status: DISCONTINUED | OUTPATIENT
Start: 2021-03-04 | End: 2021-03-01 | Stop reason: HOSPADM

## 2021-02-27 RX ORDER — HYDROMORPHONE HYDROCHLORIDE 2 MG/ML
INJECTION, SOLUTION INTRAMUSCULAR; INTRAVENOUS; SUBCUTANEOUS PRN
Status: DISCONTINUED | OUTPATIENT
Start: 2021-02-27 | End: 2021-02-27 | Stop reason: SURG

## 2021-02-27 RX ORDER — HYDROMORPHONE HYDROCHLORIDE 1 MG/ML
0.5 INJECTION, SOLUTION INTRAMUSCULAR; INTRAVENOUS; SUBCUTANEOUS
Status: DISCONTINUED | OUTPATIENT
Start: 2021-02-27 | End: 2021-02-27 | Stop reason: HOSPADM

## 2021-02-27 RX ORDER — SODIUM CHLORIDE, SODIUM LACTATE, POTASSIUM CHLORIDE, CALCIUM CHLORIDE 600; 310; 30; 20 MG/100ML; MG/100ML; MG/100ML; MG/100ML
INJECTION, SOLUTION INTRAVENOUS CONTINUOUS
Status: ACTIVE | OUTPATIENT
Start: 2021-02-27 | End: 2021-02-27

## 2021-02-27 RX ORDER — BUPIVACAINE HYDROCHLORIDE AND EPINEPHRINE 2.5; 5 MG/ML; UG/ML
INJECTION, SOLUTION EPIDURAL; INFILTRATION; INTRACAUDAL; PERINEURAL
Status: DISCONTINUED | OUTPATIENT
Start: 2021-02-27 | End: 2021-02-27 | Stop reason: HOSPADM

## 2021-02-27 RX ORDER — LEVOTHYROXINE SODIUM 137 UG/1
137 TABLET ORAL
Status: DISCONTINUED | OUTPATIENT
Start: 2021-02-27 | End: 2021-03-01 | Stop reason: HOSPADM

## 2021-02-27 RX ORDER — LIDOCAINE HYDROCHLORIDE 20 MG/ML
INJECTION, SOLUTION EPIDURAL; INFILTRATION; INTRACAUDAL; PERINEURAL PRN
Status: DISCONTINUED | OUTPATIENT
Start: 2021-02-27 | End: 2021-02-27 | Stop reason: SURG

## 2021-02-27 RX ORDER — ONDANSETRON 2 MG/ML
4 INJECTION INTRAMUSCULAR; INTRAVENOUS EVERY 4 HOURS PRN
Status: DISCONTINUED | OUTPATIENT
Start: 2021-02-27 | End: 2021-03-01 | Stop reason: HOSPADM

## 2021-02-27 RX ORDER — ENEMA 19; 7 G/133ML; G/133ML
1 ENEMA RECTAL
Status: DISCONTINUED | OUTPATIENT
Start: 2021-02-27 | End: 2021-03-01 | Stop reason: HOSPADM

## 2021-02-27 RX ORDER — OXYCODONE HCL 5 MG/5 ML
10 SOLUTION, ORAL ORAL
Status: DISCONTINUED | OUTPATIENT
Start: 2021-02-27 | End: 2021-02-27 | Stop reason: HOSPADM

## 2021-02-27 RX ORDER — DEXMEDETOMIDINE HYDROCHLORIDE 100 UG/ML
INJECTION, SOLUTION INTRAVENOUS PRN
Status: DISCONTINUED | OUTPATIENT
Start: 2021-02-27 | End: 2021-02-27 | Stop reason: SURG

## 2021-02-27 RX ORDER — VANCOMYCIN HYDROCHLORIDE 1 G/20ML
INJECTION, POWDER, LYOPHILIZED, FOR SOLUTION INTRAVENOUS
Status: COMPLETED | OUTPATIENT
Start: 2021-02-27 | End: 2021-02-27

## 2021-02-27 RX ORDER — LORAZEPAM 2 MG/ML
0.5 INJECTION INTRAMUSCULAR
Status: DISCONTINUED | OUTPATIENT
Start: 2021-02-27 | End: 2021-02-27 | Stop reason: HOSPADM

## 2021-02-27 RX ORDER — HYDROCHLOROTHIAZIDE 12.5 MG/1
12.5 TABLET ORAL DAILY
Status: DISCONTINUED | OUTPATIENT
Start: 2021-02-27 | End: 2021-03-01 | Stop reason: HOSPADM

## 2021-02-27 RX ORDER — AMOXICILLIN 250 MG
1 CAPSULE ORAL
Status: DISCONTINUED | OUTPATIENT
Start: 2021-02-27 | End: 2021-03-01 | Stop reason: HOSPADM

## 2021-02-27 RX ORDER — ONDANSETRON 2 MG/ML
INJECTION INTRAMUSCULAR; INTRAVENOUS PRN
Status: DISCONTINUED | OUTPATIENT
Start: 2021-02-27 | End: 2021-02-27 | Stop reason: SURG

## 2021-02-27 RX ORDER — BISACODYL 10 MG
10 SUPPOSITORY, RECTAL RECTAL
Status: DISCONTINUED | OUTPATIENT
Start: 2021-02-27 | End: 2021-03-01 | Stop reason: HOSPADM

## 2021-02-27 RX ORDER — HYDROMORPHONE HYDROCHLORIDE 1 MG/ML
0.4 INJECTION, SOLUTION INTRAMUSCULAR; INTRAVENOUS; SUBCUTANEOUS
Status: DISCONTINUED | OUTPATIENT
Start: 2021-02-27 | End: 2021-02-27 | Stop reason: HOSPADM

## 2021-02-27 RX ORDER — CEFAZOLIN SODIUM 1 G/3ML
INJECTION, POWDER, FOR SOLUTION INTRAMUSCULAR; INTRAVENOUS PRN
Status: DISCONTINUED | OUTPATIENT
Start: 2021-02-27 | End: 2021-02-27 | Stop reason: SURG

## 2021-02-27 RX ORDER — SODIUM CHLORIDE, SODIUM GLUCONATE, SODIUM ACETATE, POTASSIUM CHLORIDE AND MAGNESIUM CHLORIDE 526; 502; 368; 37; 30 MG/100ML; MG/100ML; MG/100ML; MG/100ML; MG/100ML
INJECTION, SOLUTION INTRAVENOUS
Status: DISCONTINUED | OUTPATIENT
Start: 2021-02-27 | End: 2021-02-27 | Stop reason: SURG

## 2021-02-27 RX ORDER — ONDANSETRON 2 MG/ML
4 INJECTION INTRAMUSCULAR; INTRAVENOUS
Status: DISCONTINUED | OUTPATIENT
Start: 2021-02-27 | End: 2021-02-27 | Stop reason: HOSPADM

## 2021-02-27 RX ORDER — POLYETHYLENE GLYCOL 3350 17 G/17G
1 POWDER, FOR SOLUTION ORAL 2 TIMES DAILY PRN
Status: DISCONTINUED | OUTPATIENT
Start: 2021-02-27 | End: 2021-03-01 | Stop reason: HOSPADM

## 2021-02-27 RX ORDER — TIZANIDINE 4 MG/1
2 TABLET ORAL 3 TIMES DAILY PRN
Status: DISCONTINUED | OUTPATIENT
Start: 2021-02-27 | End: 2021-03-01 | Stop reason: HOSPADM

## 2021-02-27 RX ORDER — DIPHENHYDRAMINE HYDROCHLORIDE 50 MG/ML
25 INJECTION INTRAMUSCULAR; INTRAVENOUS EVERY 6 HOURS PRN
Status: DISCONTINUED | OUTPATIENT
Start: 2021-02-27 | End: 2021-03-01 | Stop reason: HOSPADM

## 2021-02-27 RX ORDER — DEXAMETHASONE SODIUM PHOSPHATE 4 MG/ML
INJECTION, SOLUTION INTRA-ARTICULAR; INTRALESIONAL; INTRAMUSCULAR; INTRAVENOUS; SOFT TISSUE PRN
Status: DISCONTINUED | OUTPATIENT
Start: 2021-02-27 | End: 2021-02-27 | Stop reason: SURG

## 2021-02-27 RX ORDER — DOCUSATE SODIUM 100 MG/1
100 CAPSULE, LIQUID FILLED ORAL 2 TIMES DAILY
Status: DISCONTINUED | OUTPATIENT
Start: 2021-02-27 | End: 2021-03-01 | Stop reason: HOSPADM

## 2021-02-27 RX ORDER — DIPHENHYDRAMINE HYDROCHLORIDE 50 MG/ML
12.5 INJECTION INTRAMUSCULAR; INTRAVENOUS
Status: DISCONTINUED | OUTPATIENT
Start: 2021-02-27 | End: 2021-02-27 | Stop reason: HOSPADM

## 2021-02-27 RX ORDER — ROCURONIUM BROMIDE 10 MG/ML
INJECTION, SOLUTION INTRAVENOUS PRN
Status: DISCONTINUED | OUTPATIENT
Start: 2021-02-27 | End: 2021-02-27 | Stop reason: SURG

## 2021-02-27 RX ORDER — GABAPENTIN 300 MG/1
300 CAPSULE ORAL
Status: DISCONTINUED | OUTPATIENT
Start: 2021-02-27 | End: 2021-03-01 | Stop reason: HOSPADM

## 2021-02-27 RX ADMIN — DEXAMETHASONE SODIUM PHOSPHATE 10 MG: 4 INJECTION, SOLUTION INTRA-ARTICULAR; INTRALESIONAL; INTRAMUSCULAR; INTRAVENOUS; SOFT TISSUE at 08:25

## 2021-02-27 RX ADMIN — ONDANSETRON 4 MG: 2 INJECTION INTRAMUSCULAR; INTRAVENOUS at 11:31

## 2021-02-27 RX ADMIN — GABAPENTIN 300 MG: 300 CAPSULE ORAL at 21:57

## 2021-02-27 RX ADMIN — ACETAMINOPHEN 650 MG: 325 TABLET, FILM COATED ORAL at 17:05

## 2021-02-27 RX ADMIN — POVIDONE IODINE 15 ML: 100 SOLUTION TOPICAL at 06:21

## 2021-02-27 RX ADMIN — GABAPENTIN 300 MG: 300 CAPSULE ORAL at 16:52

## 2021-02-27 RX ADMIN — POTASSIUM CHLORIDE AND SODIUM CHLORIDE: 900; 150 INJECTION, SOLUTION INTRAVENOUS at 16:50

## 2021-02-27 RX ADMIN — MAGNESIUM SULFATE IN WATER 4 G: 40 INJECTION, SOLUTION INTRAVENOUS at 09:46

## 2021-02-27 RX ADMIN — HYDROMORPHONE HYDROCHLORIDE 1 MG: 2 INJECTION, SOLUTION INTRAMUSCULAR; INTRAVENOUS; SUBCUTANEOUS at 08:18

## 2021-02-27 RX ADMIN — PROPOFOL 150 MG: 10 INJECTION, EMULSION INTRAVENOUS at 08:18

## 2021-02-27 RX ADMIN — DEXMEDETOMIDINE HYDROCHLORIDE 10 MCG: 100 INJECTION, SOLUTION INTRAVENOUS at 08:35

## 2021-02-27 RX ADMIN — EPHEDRINE SULFATE 10 MG: 50 INJECTION, SOLUTION INTRAVENOUS at 11:51

## 2021-02-27 RX ADMIN — DEXMEDETOMIDINE HYDROCHLORIDE 10 MCG: 100 INJECTION, SOLUTION INTRAVENOUS at 09:41

## 2021-02-27 RX ADMIN — DEXMEDETOMIDINE HYDROCHLORIDE 10 MCG: 100 INJECTION, SOLUTION INTRAVENOUS at 11:02

## 2021-02-27 RX ADMIN — ATORVASTATIN CALCIUM 10 MG: 10 TABLET, FILM COATED ORAL at 21:57

## 2021-02-27 RX ADMIN — SODIUM CHLORIDE, SODIUM GLUCONATE, SODIUM ACETATE, POTASSIUM CHLORIDE AND MAGNESIUM CHLORIDE: 526; 502; 368; 37; 30 INJECTION, SOLUTION INTRAVENOUS at 10:28

## 2021-02-27 RX ADMIN — PHENYLEPHRINE HYDROCHLORIDE 50 MCG/MIN: 10 INJECTION INTRAVENOUS at 08:40

## 2021-02-27 RX ADMIN — CEFAZOLIN 2 G: 330 INJECTION, POWDER, FOR SOLUTION INTRAMUSCULAR; INTRAVENOUS at 08:18

## 2021-02-27 RX ADMIN — DOCUSATE SODIUM 100 MG: 100 CAPSULE, LIQUID FILLED ORAL at 17:06

## 2021-02-27 RX ADMIN — GABAPENTIN 300 MG: 300 CAPSULE ORAL at 19:53

## 2021-02-27 RX ADMIN — SUGAMMADEX 125 MG: 100 INJECTION, SOLUTION INTRAVENOUS at 11:33

## 2021-02-27 RX ADMIN — DEXMEDETOMIDINE HYDROCHLORIDE 10 MCG: 100 INJECTION, SOLUTION INTRAVENOUS at 11:06

## 2021-02-27 RX ADMIN — LIDOCAINE HYDROCHLORIDE 100 MG: 20 INJECTION, SOLUTION EPIDURAL; INFILTRATION; INTRACAUDAL at 08:18

## 2021-02-27 RX ADMIN — CEFAZOLIN SODIUM 2 G: 2 INJECTION, SOLUTION INTRAVENOUS at 17:07

## 2021-02-27 RX ADMIN — HYDROMORPHONE HYDROCHLORIDE: 10 INJECTION, SOLUTION INTRAMUSCULAR; INTRAVENOUS; SUBCUTANEOUS at 17:14

## 2021-02-27 RX ADMIN — HYDROMORPHONE HYDROCHLORIDE 0.5 MG: 2 INJECTION, SOLUTION INTRAMUSCULAR; INTRAVENOUS; SUBCUTANEOUS at 09:46

## 2021-02-27 RX ADMIN — SODIUM CHLORIDE, POTASSIUM CHLORIDE, SODIUM LACTATE AND CALCIUM CHLORIDE: 600; 310; 30; 20 INJECTION, SOLUTION INTRAVENOUS at 06:21

## 2021-02-27 RX ADMIN — ROCURONIUM BROMIDE 70 MG: 10 INJECTION, SOLUTION INTRAVENOUS at 08:18

## 2021-02-27 ASSESSMENT — COGNITIVE AND FUNCTIONAL STATUS - GENERAL
DRESSING REGULAR UPPER BODY CLOTHING: A LITTLE
HELP NEEDED FOR BATHING: A LITTLE
MOVING FROM LYING ON BACK TO SITTING ON SIDE OF FLAT BED: A LITTLE
SUGGESTED CMS G CODE MODIFIER MOBILITY: CK
WALKING IN HOSPITAL ROOM: A LITTLE
STANDING UP FROM CHAIR USING ARMS: A LITTLE
SUGGESTED CMS G CODE MODIFIER DAILY ACTIVITY: CJ
DRESSING REGULAR LOWER BODY CLOTHING: A LITTLE
MOBILITY SCORE: 19
MOVING TO AND FROM BED TO CHAIR: A LITTLE
DAILY ACTIVITIY SCORE: 21
CLIMB 3 TO 5 STEPS WITH RAILING: A LITTLE

## 2021-02-27 ASSESSMENT — LIFESTYLE VARIABLES
TOTAL SCORE: 0
HAVE YOU EVER FELT YOU SHOULD CUT DOWN ON YOUR DRINKING: NO
ON A TYPICAL DAY WHEN YOU DRINK ALCOHOL HOW MANY DRINKS DO YOU HAVE: 0
HAVE PEOPLE ANNOYED YOU BY CRITICIZING YOUR DRINKING: NO
TOTAL SCORE: 0
EVER HAD A DRINK FIRST THING IN THE MORNING TO STEADY YOUR NERVES TO GET RID OF A HANGOVER: NO
ALCOHOL_USE: NO
CONSUMPTION TOTAL: NEGATIVE
AVERAGE NUMBER OF DAYS PER WEEK YOU HAVE A DRINK CONTAINING ALCOHOL: 0
HOW MANY TIMES IN THE PAST YEAR HAVE YOU HAD 5 OR MORE DRINKS IN A DAY: 0
TOTAL SCORE: 0
DOES PATIENT WANT TO STOP DRINKING: NO
EVER FELT BAD OR GUILTY ABOUT YOUR DRINKING: NO

## 2021-02-27 ASSESSMENT — FIBROSIS 4 INDEX: FIB4 SCORE: 1.3

## 2021-02-27 ASSESSMENT — PAIN DESCRIPTION - PAIN TYPE
TYPE: SURGICAL PAIN
TYPE: ACUTE PAIN;SURGICAL PAIN
TYPE: SURGICAL PAIN
TYPE: CHRONIC PAIN

## 2021-02-27 ASSESSMENT — PAIN SCALES - GENERAL: PAIN_LEVEL: 2

## 2021-02-27 NOTE — ANESTHESIA PROCEDURE NOTES
Airway    Date/Time: 2/27/2021 8:19 AM  Performed by: Patrick Guo M.D.  Authorized by: Patrick Guo M.D.     Location:  OR  Urgency:  Elective  Difficult Airway: No    Indications for Airway Management:  Anesthesia      Spontaneous Ventilation: absent    Sedation Level:  Deep  Preoxygenated: Yes    Patient Position:  Sniffing  Mask Difficulty Assessment:  2 - vent by mask + OA or adjuvant +/- NMBA  Final Airway Type:  Endotracheal airway  Final Endotracheal Airway:  ETT  Cuffed: Yes    Technique Used for Successful ETT Placement:  Direct laryngoscopy  Devices/Methods Used in Placement:  Anterior pressure/BURP    Insertion Site:  Oral  Blade Type:  Moreno  Laryngoscope Blade/Videolaryngoscope Blade Size:  2  ETT Size (mm):  7.0  Measured from:  Lips  ETT to Lips (cm):  24  Placement Verified by: auscultation and capnometry    Cormack-Lehane Classification:  Grade IIa - partial view of glottis  Number of Attempts at Approach:  1

## 2021-02-27 NOTE — ANESTHESIA POSTPROCEDURE EVALUATION
Patient: Amadou Parekh    Procedure Summary     Date: 02/27/21 Room / Location: [facility] 04 / SURGERY [facility]    Anesthesia Start: 0812 Anesthesia Stop: 1202    Procedures:       FUSION, SPINE, LUMBAR, ROBOT-ASSISTED WITH IMAGING GUIDANCE - L5-S1 EXTENSION OF TLIF (Back)      DECOMPRESSION, SPINE, LUMBAR (N/A Back) Diagnosis: (LUMBAR SPONDYLOSIS)    Surgeons: Be Webber M.D. Responsible Provider: Patrick Guo M.D.    Anesthesia Type: general ASA Status: 2          Final Anesthesia Type: general  Last vitals  BP   Blood Pressure : 121/67    Temp   36.2 °C (97.2 °F)    Pulse   73   Resp   12    SpO2   94 %      Anesthesia Post Evaluation    Patient location during evaluation: PACU  Patient participation: complete - patient participated  Level of consciousness: awake and alert  Pain score: 2    Airway patency: patent  Anesthetic complications: no  Cardiovascular status: hemodynamically stable  Respiratory status: acceptable  Hydration status: euvolemic    PONV: none          No complications documented.     Nurse Pain Score: 0 (NPRS)

## 2021-02-27 NOTE — ANESTHESIA PREPROCEDURE EVALUATION
Denies: MI/CHF/smoking/CVA/DM      Relevant Problems   CARDIAC   (+) Essential hypertension         (+) Impaired renal function      ENDO   (+) Thyroid activity decreased       Physical Exam    Airway   Mallampati: II  TM distance: >3 FB  Neck ROM: full       Cardiovascular - normal exam  Rhythm: regular  Rate: normal  (-) murmur     Dental - normal exam           Pulmonary - normal exam  Breath sounds clear to auscultation     Abdominal    Neurological - normal exam                 Anesthesia Plan    ASA 2       Plan - general       Airway plan will be ETT          Induction: intravenous    Postoperative Plan: Postoperative administration of opioids is intended.    Pertinent diagnostic labs and testing reviewed    Informed Consent:    Anesthetic plan and risks discussed with patient.    Use of blood products discussed with: patient whom consented to blood products.

## 2021-02-27 NOTE — OR NURSING
Patient in PACU in stable condition. VSS. ELY surgical site, surgical dressing clean dry intact with hemovac drain to compression. Will continue to monitor and medicate appropriately.

## 2021-02-27 NOTE — OR NURSING
Patient to floor with transport in stable condition. VSS. Surgical dressing clean dry intact with hemovac drain to compression. Aox4 and on 2 L O2 attached to full O2 tank. Able to move all extremities. No further needs.

## 2021-02-27 NOTE — ANESTHESIA TIME REPORT
Anesthesia Start and Stop Event Times     Date Time Event    2/27/2021 0645 Ready for Procedure     0812 Anesthesia Start     1202 Anesthesia Stop        Responsible Staff  02/27/21    Name Role Begin End    Patrick Guo M.D. Anesth 0812 1202        Preop Diagnosis (Free Text):  Pre-op Diagnosis     LUMBAR SPONDYLOSIS        Preop Diagnosis (Codes):    Post op Diagnosis  Lumbar spinal stenosis  Lumbar spinal stenosis, L5-S1; history of multiple lumbar spine surgeries with instrumentation    Premium Reason  E. Weekend    Comments:

## 2021-02-28 LAB
ANION GAP SERPL CALC-SCNC: 9 MMOL/L (ref 7–16)
BUN SERPL-MCNC: 18 MG/DL (ref 8–22)
CALCIUM SERPL-MCNC: 8.1 MG/DL (ref 8.5–10.5)
CHLORIDE SERPL-SCNC: 101 MMOL/L (ref 96–112)
CO2 SERPL-SCNC: 23 MMOL/L (ref 20–33)
CREAT SERPL-MCNC: 1.24 MG/DL (ref 0.5–1.4)
ERYTHROCYTE [DISTWIDTH] IN BLOOD BY AUTOMATED COUNT: 40 FL (ref 35.9–50)
GLUCOSE SERPL-MCNC: 120 MG/DL (ref 65–99)
HCT VFR BLD AUTO: 40.3 % (ref 42–52)
HGB BLD-MCNC: 13.7 G/DL (ref 14–18)
MCH RBC QN AUTO: 30.1 PG (ref 27–33)
MCHC RBC AUTO-ENTMCNC: 34 G/DL (ref 33.7–35.3)
MCV RBC AUTO: 88.6 FL (ref 81.4–97.8)
PLATELET # BLD AUTO: 222 K/UL (ref 164–446)
PMV BLD AUTO: 10.1 FL (ref 9–12.9)
POTASSIUM SERPL-SCNC: 4.4 MMOL/L (ref 3.6–5.5)
RBC # BLD AUTO: 4.55 M/UL (ref 4.7–6.1)
SODIUM SERPL-SCNC: 133 MMOL/L (ref 135–145)
WBC # BLD AUTO: 13.7 K/UL (ref 4.8–10.8)

## 2021-02-28 PROCEDURE — G0378 HOSPITAL OBSERVATION PER HR: HCPCS

## 2021-02-28 PROCEDURE — 97166 OT EVAL MOD COMPLEX 45 MIN: CPT

## 2021-02-28 PROCEDURE — 700102 HCHG RX REV CODE 250 W/ 637 OVERRIDE(OP): Performed by: NURSE PRACTITIONER

## 2021-02-28 PROCEDURE — 97161 PT EVAL LOW COMPLEX 20 MIN: CPT

## 2021-02-28 PROCEDURE — 700111 HCHG RX REV CODE 636 W/ 250 OVERRIDE (IP): Performed by: NURSE PRACTITIONER

## 2021-02-28 PROCEDURE — 80048 BASIC METABOLIC PNL TOTAL CA: CPT

## 2021-02-28 PROCEDURE — 36415 COLL VENOUS BLD VENIPUNCTURE: CPT

## 2021-02-28 PROCEDURE — 85027 COMPLETE CBC AUTOMATED: CPT

## 2021-02-28 PROCEDURE — A9270 NON-COVERED ITEM OR SERVICE: HCPCS | Performed by: NURSE PRACTITIONER

## 2021-02-28 RX ORDER — OXYCODONE HYDROCHLORIDE AND ACETAMINOPHEN 5; 325 MG/1; MG/1
1 TABLET ORAL EVERY 4 HOURS PRN
Status: DISCONTINUED | OUTPATIENT
Start: 2021-02-28 | End: 2021-03-01 | Stop reason: HOSPADM

## 2021-02-28 RX ADMIN — GABAPENTIN 300 MG: 300 CAPSULE ORAL at 19:52

## 2021-02-28 RX ADMIN — ACETAMINOPHEN 650 MG: 325 TABLET, FILM COATED ORAL at 11:21

## 2021-02-28 RX ADMIN — ATORVASTATIN CALCIUM 10 MG: 10 TABLET, FILM COATED ORAL at 21:47

## 2021-02-28 RX ADMIN — GABAPENTIN 300 MG: 300 CAPSULE ORAL at 09:22

## 2021-02-28 RX ADMIN — MAGNESIUM HYDROXIDE 30 ML: 400 SUSPENSION ORAL at 11:21

## 2021-02-28 RX ADMIN — ACETAMINOPHEN 650 MG: 325 TABLET, FILM COATED ORAL at 04:46

## 2021-02-28 RX ADMIN — GABAPENTIN 300 MG: 300 CAPSULE ORAL at 11:25

## 2021-02-28 RX ADMIN — GABAPENTIN 300 MG: 300 CAPSULE ORAL at 17:16

## 2021-02-28 RX ADMIN — DOCUSATE SODIUM 100 MG: 100 CAPSULE, LIQUID FILLED ORAL at 04:41

## 2021-02-28 RX ADMIN — ACETAMINOPHEN 650 MG: 325 TABLET, FILM COATED ORAL at 17:15

## 2021-02-28 RX ADMIN — GABAPENTIN 300 MG: 300 CAPSULE ORAL at 06:25

## 2021-02-28 RX ADMIN — DOCUSATE SODIUM 100 MG: 100 CAPSULE, LIQUID FILLED ORAL at 17:16

## 2021-02-28 RX ADMIN — HYDROCHLOROTHIAZIDE 12.5 MG: 12.5 TABLET ORAL at 04:41

## 2021-02-28 RX ADMIN — CEFAZOLIN SODIUM 2 G: 2 INJECTION, SOLUTION INTRAVENOUS at 01:15

## 2021-02-28 RX ADMIN — ACETAMINOPHEN 650 MG: 325 TABLET, FILM COATED ORAL at 01:15

## 2021-02-28 RX ADMIN — GABAPENTIN 300 MG: 300 CAPSULE ORAL at 21:47

## 2021-02-28 RX ADMIN — LEVOTHYROXINE SODIUM 137 MCG: 137 TABLET ORAL at 04:41

## 2021-02-28 ASSESSMENT — COGNITIVE AND FUNCTIONAL STATUS - GENERAL
TURNING FROM BACK TO SIDE WHILE IN FLAT BAD: A LITTLE
DRESSING REGULAR UPPER BODY CLOTHING: A LOT
WALKING IN HOSPITAL ROOM: A LITTLE
MOBILITY SCORE: 20
HELP NEEDED FOR BATHING: A LITTLE
CLIMB 3 TO 5 STEPS WITH RAILING: A LITTLE
EATING MEALS: A LITTLE
MOVING TO AND FROM BED TO CHAIR: A LITTLE
TOILETING: A LITTLE
SUGGESTED CMS G CODE MODIFIER MOBILITY: CJ
PERSONAL GROOMING: A LITTLE
DRESSING REGULAR LOWER BODY CLOTHING: A LOT
DAILY ACTIVITIY SCORE: 16
SUGGESTED CMS G CODE MODIFIER DAILY ACTIVITY: CK

## 2021-02-28 ASSESSMENT — GAIT ASSESSMENTS
DISTANCE (FEET): 300
GAIT LEVEL OF ASSIST: SUPERVISED

## 2021-02-28 ASSESSMENT — PATIENT HEALTH QUESTIONNAIRE - PHQ9
1. LITTLE INTEREST OR PLEASURE IN DOING THINGS: NOT AT ALL
2. FEELING DOWN, DEPRESSED, IRRITABLE, OR HOPELESS: NOT AT ALL
1. LITTLE INTEREST OR PLEASURE IN DOING THINGS: NOT AT ALL
2. FEELING DOWN, DEPRESSED, IRRITABLE, OR HOPELESS: NOT AT ALL
SUM OF ALL RESPONSES TO PHQ9 QUESTIONS 1 AND 2: 0
SUM OF ALL RESPONSES TO PHQ9 QUESTIONS 1 AND 2: 0

## 2021-02-28 ASSESSMENT — PAIN DESCRIPTION - PAIN TYPE
TYPE: SURGICAL PAIN

## 2021-02-28 ASSESSMENT — ACTIVITIES OF DAILY LIVING (ADL): TOILETING: INDEPENDENT

## 2021-02-28 NOTE — THERAPY
Physical Therapy   Initial Evaluation     Patient Name: Amadou Parehk  Age:  75 y.o., Sex:  male  Medical Record #: [id number]  Today's Date: 2/28/2021     Precautions: Spinal / Back Precautions, Lumbar Corset    Assessment  Patient is a 75 y.o. male s/p L3-S1 stabilization and L5-S1 fusion and decompression on 2/27. Pt has lumbar corset to wear when OOB > 5 minutes. Pt seen for PT evaluation at this time. Educated and provided handout regarding post-op spinal precautions, log roll technique, and brace wear and care. Pt with good return demo and understanding of spinal precautions but does require assistance to don brace. Reports spouse can assist. Pt appears functionally capable of return home at this time; demonstrates ability to complete log roll for supine > sit, STS, and ambulation without assist, no LOB. Pt negotiated steps without difficulty. Pt reports no concerns with DC. Patient will not be actively followed for physical therapy services at this time, however may be seen if requested by physician for 1 more visit within 30 days to address any discharge or equipment needs.     Plan  Recommend Physical Therapy for Evaluation only   DC Equipment Recommendations: None  Discharge Recommendations: Recommend outpatient physical therapy services to address higher level deficits (when medically cleared)        02/28/21 0852   Prior Living Situation   Prior Services None   Housing / Facility 1 Story House   Steps Into Home 2   Steps In Home 0   Equipment Owned Front-Wheel Walker   Lives with -  Spouse   Comments reports spouse is able to assist   Prior Level of Functional Mobility   Bed Mobility Independent   Transfer Status Independent   Ambulation Independent   Distance Ambulation (Feet) community distances   Assistive Devices Used None   Stairs Independent   Balance Assessment   Sitting Balance (Static) Good   Sitting Balance (Dynamic) Fair +   Standing Balance (Static) Fair +   Standing Balance (Dynamic) Fair    Weight Shift Sitting Fair   Weight Shift Standing Fair   Comments no AD   Gait Analysis   Gait Level Of Assist Supervised   Assistive Device None   Distance (Feet) 300   # of Stairs Climbed 5   Level of Assist with Stairs Supervised   Weight Bearing Status no restrictions   Comments no LOB   Bed Mobility    Supine to Sit Supervised   Scooting Supervised   Comments demos log roll   Functional Mobility   Sit to Stand Supervised   Bed, Chair, WC Transfer Supervised

## 2021-02-28 NOTE — PROGRESS NOTES
Urethral Catheter Tate removed per MD order.  Urine output 750.  Tolerated well, urinal within reach, hourly rounding in effect.

## 2021-02-28 NOTE — PROGRESS NOTES
Patient's pulled out IV by accident. PCA and IV fluids stopped. Dr Llanos paged and ordered 1 tab Percocet Q4 PRN pain and dilaudid PCA to be discontinued. Patient taking in adequate po fluids, IV fluids stopped.

## 2021-02-28 NOTE — OP REPORT
DATE OF SERVICE:  02/27/2021     PREOPERATIVE DIAGNOSES:    1.  Status post L3-L4-L5 fusion.  2.  L5-S1 spondylosis with bilateral foraminal stenosis and L5 radiculopathy.     POSTOPERATIVE DIAGNOSES:  1.  Status post L3-L4-L5 fusion.  2.  L5-S1 spondylosis with bilateral foraminal stenosis and L5 radiculopathy.     PROCEDURES:    1.  [employer] robotic-assisted placement of bilateral S1 pedicle screws.  2.  Instrumented stabilization L3 through S1 with the use of Medtronic Solera   instrumentation.  3.  Bilateral Felix-Mohr osteotomies L5-S1 with decompression of   respective L5 and S1 nerve roots and resection of associated facet cyst.  4.  Interbody fusion L5-S1 with SABLE [id number] mm interbody cage with   22-degree lordosis.  5.  Augmentation of interbody fusion with use of locally harvested bone graft   and one small BMP-Infuse.     SURGEON:  Be Webber M.D.     ASSISTANT:  NOREEN Urrutia     ANESTHESIA:  General anesthetic.     ANESTHESIOLOGIST:  Patrick Guo MD     PREPARATION:  The patient had somatosensory evoked potentials, EMGs monitored.    The patient had Tate catheter placed sterilely.     DESCRIPTION OF PROCEDURE:  The patient was brought to the operating room and   following induction, the patient was intubated and placed under general   anesthetic.  He was rolled prone onto the OSI table using chest, hip, thigh   pads.  He was prepared for somatosensory evoked potentials, EMGs, and had a   sterilely placed Tate catheter.     After appropriate check of all pressure points, his back was prepped and   draped in sterile fashion and the [employer] robot was attached to the bed.     After sterile draping, a timeout was performed.     We injected over the PSIS on the right just inferior to the battery, made a   small incision, and placed our ____ pin.  The robot was attached to this pin   using the appropriate robotic attachment.  We did a 3-D defined visualization   of the  operative field, no-fly zone .  We used the snapshot tracker to sync   navigation and the robotic instrumentation.  We used the passive planar   localization for the region of interest.  We then took AP, oblique, x-rays   with a 3-D marker attachment to ____ in the AP and oblique position.  We did   registration and confirmation of the S1 pedicle screws and placement.     We then used the robotic assistance, for localization of S1 pedicle screw   placement on the left.  With robotic assistance, we placed our knife, a   dilator and then did the Midas drilling into the pedicle of S1 on the left.    We tapped and then placed our left 7.5x50 mm screw.  We then signal the   robotic arm to the right S1 pedicle.  We made our skin incision with a knife,   followed by placement of the dilator down to the facet joint, we removed the   inner dilator, drilled down the outer dilator with the Midas Ten into the   pedicle, followed by tapping, and placement of a 7.5x50 mm screw.     Intraoperative x-ray confirmed good location of instrumentation in AP and   lateral position as well as oblique.     We removed the robotic arm at this time, made a midline incision and carried   it down to subcutaneous tissue.  We injected the fascia with 30 mL of Marcaine   0.25% with epinephrine and then dissected out of the instrumentation at L3   through L5.  We dissected out the pedicle screws, the rods and set screws and   the set screws were removed followed by the rods.  The pedicle screws were   left in position, and our fusion appeared to be solid here.  Dissecting down   over L5-S1 and over the robotically placed pedicle screws, we were able to   visualize L3 through S1, align the multiaxial heads into position.     At this time, we were able to remove the scar tissue at L5-S1, and with   navigation, identify the lateral aspect of the facet joint at L5-S1.  On the   left, drilling from lateral to medial, we did a Felix-Mohr osteotomy,    drilling all the way down between the pedicles of L5 and S1 to the foramen.    Inferior facet was taken with Leksell.  Kerrisons were used to further   decompress the L5 nerve root as it rounded the pedicle of L5 and we also   decompressed over the S1 pedicle.     In this space was a mass of facet cyst.  I believe this was actually   compressing along the nerve root between the two pedicles.  We removed this   and saw the nerve root completely free from medial to lateral.  We also   decompressed superiorly around the pedicle of L5, making sure there was no   compression proximally.     Drawing the thecal sac medially, we were able to dissect scar tissue free,   coagulate the epidural vessels and open the disk space with an 11 blade   scalpel.  We prepared the endplates with use of paddle shaver starting with a   #5 and moving to a #10.     Up and down-biting curettes were used to prepare the endplates.  Two   BMP-soaked sponges from the small kit were placed anteriorly, followed by   locally harvested bone graft.  We were then able to tamp in tangentially a   SABLE 3-D printed spacer [id number] mm in height and 22 degrees in lordosis.    We opened this to probably its fullest extent or possibly 16 mm.    Intraoperative x-ray confirmed good location anteriorly, with lordosis, and   complete filling of the disk space with placed anteriorly, but not beyond the   vertebral bodies.     A 90 mm baldev was placed into the multiaxial heads posteriorly on the left and   set screws were secured and broken off at appropriate torque.     Going to the contralateral side, again we were able to perform a   Felix-Mohr osteotomy by drilling from medial to lateral through the facet   joint, that is the superior facet of S1 and inferior facet of L5.  This   superior facet of S1 was compressing along the nerve root and as we got down   to this level, we were able to use #3 and #4 Kerrisons to further decompress   laterally and  perform a complete removal of the superior facet and again a   complete Smith-Mohr osteotomy using the Felix-Mohr Leksell.  We   dissected out again a mass of facet cyst.  We decompressed superiorly around   the pedicle of L5 as well as S1.  We were able to place 90 mm rods into the   multiaxial heads and setscrews were secured and broken off at appropriate   torque.     The area was copiously irrigated.  Meticulous hemostasis was obtained.  Nerve   roots were completely free.  We placed a gram of vancomycin, a medium size   Hemovac and closed the fascia with #1 Vicryl suture, subcutaneous tissue with   2-0 Vicryl and subcuticular layer and skin with staples.  All sponge and   needle counts were correct and estimated blood loss was approximately 150 mL   with 150 mL of Cell Saver given back.        ______________________________  MD GANGA JEREZ/KEMI/YARITZA    DD:  02/27/2021 12:35  DT:  02/27/2021 13:43    Job#:  [id number]

## 2021-02-28 NOTE — PROGRESS NOTES
Neurosurgery Progress Note    Subjective:  Pt standing at bedside doing well, he thinks his leg is better but still unsure. Off of PCA early this am, given tylenol.    Exam:  AAO, cooperative, fc's, lee's strong. Brace on, drain remains    BP  Min: 94/49  Max: 140/80  Pulse  Av.4  Min: 65  Max: 82  Resp  Av.1  Min: 10  Max: 18  Temp  Av.7 °C (98.1 °F)  Min: 36.2 °C (97.2 °F)  Max: 37.6 °C (99.7 °F)  SpO2  Av.2 %  Min: 94 %  Max: 100 %    No data recorded    Recent Labs     21  0501   WBC 13.7*   RBC 4.55*   HEMOGLOBIN 13.7*   HEMATOCRIT 40.3*   MCV 88.6   MCH 30.1   MCHC 34.0   RDW 40.0   PLATELETCT 222   MPV 10.1     Recent Labs     21  0501   SODIUM 133*   POTASSIUM 4.4   CHLORIDE 101   CO2 23   GLUCOSE 120*   BUN 18   CREATININE 1.24   CALCIUM 8.1*               Intake/Output       21 0700 - 21 0659 21 0700 - 21 0659      [id number] 0571-2523 Total 1022-5300 3226-9441 Total       Intake    I.V.  1500  -- 1500  --  -- --    Volume (mL) (electrolyte-A (PLASMALYTE-A) infusion) 500 -- 500 -- -- --    Volume (mL) (lactated ringers infusion) 1000 -- 1000 -- -- --    Blood  150  -- 150  --  -- --    Cell Saver 150 -- 150 -- -- --    Total Intake 1650 -- 1650 -- -- --       Output    Urine  1550  500 2050  250  -- 250    Urine 800 -- 800 -- -- --    Number of Times Voided -- -- -- 1 x -- 1 x    Urine Void (mL) -- -- -- 250 -- 250    Output (mL) (Urethral Catheter)  -- -- --    Drains  60  250 310  --  -- --    Output (mL) (Closed/Suction Drain 1 Inferior;Right Back Hemovac) 60 250 310 -- -- --    Blood  150  -- 150  --  -- --    Est. Blood Loss 150 -- 150 -- -- --    Total Output 4281 954 0708 250 -- 250       Net I/O     -110 -750 -860 -250 -- -250            Intake/Output Summary (Last 24 hours) at 2021 1015  Last data filed at 2021 0923  Gross per 24 hour   Intake 1650 ml   Output 2760 ml   Net -1110 ml            • oxyCODONE-acetaminophen  1  tablet Q4HRS PRN   • atorvastatin  10 mg Nightly   • gabapentin  300 mg 6X/DAY   • hydroCHLOROthiazide  12.5 mg DAILY   • levothyroxine  137 mcg AM ES   • Pharmacy Consult Request  1 Each PHARMACY TO DOSE   • MD ALERT...DO NOT ADMINISTER NSAIDS or ASPIRIN unless ORDERED By Neurosurgery  1 Each PRN   • docusate sodium  100 mg BID   • senna-docusate  1 tablet Q24HRS PRN   • polyethylene glycol/lytes  1 Packet BID PRN   • magnesium hydroxide  30 mL QDAY PRN   • bisacodyl  10 mg Q24HRS PRN   • fleet  1 Each Once PRN   • 0.9 % NaCl with KCl 20 mEq 1,000 mL   Continuous   • acetaminophen  650 mg Q6HRS    Followed by   • [START ON 3/4/2021] acetaminophen  650 mg Q6HRS PRN   • diphenhydrAMINE  25 mg Q6HRS PRN    Or   • diphenhydrAMINE  25 mg Q6HRS PRN   • ondansetron  4 mg Q4HRS PRN   • ondansetron  4 mg Q4HRS PRN   • tizanidine  2 mg TID PRN       Assessment and Plan:  POD # 1   1.  [employer]or robotic-assisted placement of bilateral S1 pedicle screws.  2.  Instrumented stabilization L3 through S1 with the use of Medtronic Solera   instrumentation.  3.  Bilateral Felix-Mohr osteotomies L5-S1 with decompression of   respective L5 and S1 nerve roots and resection of associated facet cyst.  4.  Interbody fusion L5-S1 with SABLE [id number] mm interbody cage with   22-degree lordosis.  5.  Augmentation of interbody fusion with use of locally harvested bone graft   and one small BMP-Infuse.  NM as above - stable  Increase activity today  Monitor drain  DC rios  Prophylactic anticoagulation: no         Start date/time: no need  Disposition - probable dc home in am

## 2021-02-28 NOTE — PROGRESS NOTES
Ambulated from door to the bed.Oriented to the room. assessment completed.Alert and oriented  to person,place and time on 2L of O2 sat 96%.Complained of mid back pain 5/10 at this time.PCA pump at [facility] and other due medication given per MAR.Surgical site and dressing dry and intact, Hemovac in place, serosanguineous drainage noted.  Patients belongings within reach,Bed locked in low position.States understanding of POC. Call light at [facility].  Advised to call for assistance.

## 2021-02-28 NOTE — PROGRESS NOTES
2 RN skin check complete.   Devices in place Hemovac drain,PIV, Nasal Cannula and SCDs.  Skin assessed under devices Yes.  Confirmed pressure ulcers found on NA.  New potential pressure ulcers noted on NA. Wound consult placed NA.  The following interventions in place : extra pillows and patient turn self.

## 2021-02-28 NOTE — THERAPY
Occupational Therapy   Initial Evaluation     Patient Name: Amadou Parekh  Age:  75 y.o., Sex:  male  Medical Record #: [id number]  Today's Date: 2/28/2021     Precautions  Precautions: (P) Spinal / Back Precautions , Lumbosacral Orthosis, Fall Risk    Assessment  Patient is 75 y.o. male with a diagnosis of L5-S1 lami/fusion, extension to prior L3-5 fusion.  Additional factors influencing patient status / progress: good family support at home, prior level of independence.      Plan    Recommend Occupational Therapy 3 times per week until therapy goals are met for the following treatments:  Adaptive Equipment, Self Care/Activities of Daily Living, Therapeutic Activities and Therapeutic Exercises.    DC Equipment Recommendations: (P) Unable to determine at this time  Discharge Recommendations: (P) Anticipate that the patient will have no further occupational therapy needs after discharge from the hospital     Subjective    Cooperative throughout, limited insight into deficits, spinal precautions and LSO use/doff/shannan noted during eval.     Objective       02/28/21 0920   Total Time Spent   Total Time Spent (Mins) 40   Charge Group   OT Evaluation OT Evaluation Mod   Initial Contact Note    Initial Contact Note Order Received and Verified, Occupational Therapy Evaluation in Progress with Full Report to Follow.   Prior Living Situation   Prior Services None   Housing / Facility 1 Story House   Steps Into Home 2   Steps In Home 0   Elevator No   Bathroom Set up Walk In Shower;Bathtub / Shower Combination   Equipment Owned Front-Wheel Walker   Lives with - Patient's Self Care Capacity Spouse   Prior Level of ADL Function   Self Feeding Independent   Grooming / Hygiene Independent   Bathing Independent   Dressing Independent   Toileting Independent   Prior Level of IADL Function   Medication Management Independent   Laundry Requires Assist   Kitchen Mobility Independent   Finances Independent   Home Management Requires  Assist   Shopping Requires Assist   Prior Level Of Mobility Independent Without Device in Home   Driving / Transportation Driving Independent   History of Falls   History of Falls No   Precautions   Precautions Spinal / Back Precautions ;Lumbosacral Orthosis;Fall Risk   Vitals   O2 Delivery Device None - Room Air   Pain 0 - 10 Group   Therapist Pain Assessment Post Activity Pain Same as Prior to Activity;Nurse Notified;2   Cognition    Level of Consciousness Alert   Passive ROM Upper Body   Passive ROM Upper Body WDL   Active ROM Upper Body   Active ROM Upper Body  WDL   Dominant Hand Right   Strength Upper Body   Upper Body Strength  WDL   Sensation Upper Body   Upper Extremity Sensation  WDL   Upper Body Muscle Tone   Upper Body Muscle Tone  WDL   Coordination Upper Body   Coordination WDL   Balance Assessment   Sitting Balance (Static) Fair +   Sitting Balance (Dynamic) Fair   Standing Balance (Static) Fair   Standing Balance (Dynamic) Fair   Weight Shift Sitting Fair   Weight Shift Standing Fair   Comments balance improved with continued activity   Bed Mobility    Supine to Sit Minimal Assist   Sit to Supine   (left seated in bedside chair)   Scooting Supervised   Rolling Supervised   ADL Assessment   Eating Supervision   Grooming Supervision;Seated   Bathing   (NT)   Upper Body Dressing Maximal Assist  (for LSO)   Lower Body Dressing Maximal Assist   Toileting Minimal Assist   How much help from another person does the patient currently need...   Putting on and taking off regular lower body clothing? 2   Bathing (including washing, rinsing, and drying)? 3   Toileting, which includes using a toilet, bedpan, or urinal? 3   Putting on and taking off regular upper body clothing? 2   Taking care of personal grooming such as brushing teeth? 3   Eating meals? 3   6 Clicks Daily Activity Score 16   Functional Mobility   Sit to Stand Supervised  (SBA)   Bed, Chair, Wheelchair Transfer Supervised   Toilet Transfers  Supervised   Transfer Method Stand Pivot   Visual Perception   Visual Perception  WDL   Patient / Family Goals   Patient / Family Goal #1 go home   Short Term Goals   Short Term Goal # 1 mod I LSO don/ doff   Short Term Goal # 2 mod I LB ADLS, with appropriate use of AE   Short Term Goal # 3 mod I toileting   Short Term Goal # 4 complete basic ADls without need for cueing to adhere to spinal precautions   Education Group   Education Provided Back Safety   Role of Occupational Therapist Patient Response Patient;Acceptance;Explanation;Demonstration;Reinforcement Needed;Verbal Demonstration   Back Safety Patient Response Patient;Acceptance;Explanation;Demonstration;Reinforcement Needed;Verbal Demonstration   Problem List   Problem List Decreased Active Daily Living Skills;Safety Awareness Deficits / Cognition;Limited Knowledge of Post Op Precautions;Decreased Activity Tolerance   Anticipated Discharge Equipment and Recommendations   DC Equipment Recommendations Unable to determine at this time   Discharge Recommendations Anticipate that the patient will have no further occupational therapy needs after discharge from the hospital   Interdisciplinary Plan of Care Collaboration   IDT Collaboration with  Nursing;Physical Therapist   Patient Position at End of Therapy Seated;Call Light within Reach;Tray Table within Reach;Phone within Reach   Collaboration Comments report given   Session Information   Date / Session Number  2/28,1 ( 1/3, 3/6)   Priority 2

## 2021-02-28 NOTE — CARE PLAN
Problem: Communication  Goal: The ability to communicate needs accurately and effectively will improve  Outcome: PROGRESSING AS EXPECTED   Call light within reach, patient calls appropriately and communicates his needs.     Problem: Safety  Goal: Will remain free from injury  Outcome: PROGRESSING AS EXPECTED  Bed locked in the lowest position, treaded socks in place, call light and belongings within reach. Patient educated on fall prevention and safety interventions.

## 2021-03-01 VITALS
OXYGEN SATURATION: 98 % | BODY MASS INDEX: 28.25 KG/M2 | WEIGHT: 208.56 LBS | HEART RATE: 77 BPM | SYSTOLIC BLOOD PRESSURE: 115 MMHG | TEMPERATURE: 97.7 F | HEIGHT: 72 IN | RESPIRATION RATE: 18 BRPM | DIASTOLIC BLOOD PRESSURE: 67 MMHG

## 2021-03-01 LAB
ANION GAP SERPL CALC-SCNC: 7 MMOL/L (ref 7–16)
BUN SERPL-MCNC: 16 MG/DL (ref 8–22)
CALCIUM SERPL-MCNC: 8.5 MG/DL (ref 8.5–10.5)
CHLORIDE SERPL-SCNC: 104 MMOL/L (ref 96–112)
CO2 SERPL-SCNC: 28 MMOL/L (ref 20–33)
CREAT SERPL-MCNC: 1.15 MG/DL (ref 0.5–1.4)
ERYTHROCYTE [DISTWIDTH] IN BLOOD BY AUTOMATED COUNT: 41.5 FL (ref 35.9–50)
GLUCOSE SERPL-MCNC: 96 MG/DL (ref 65–99)
HCT VFR BLD AUTO: 39 % (ref 42–52)
HGB BLD-MCNC: 13.2 G/DL (ref 14–18)
MCH RBC QN AUTO: 30.4 PG (ref 27–33)
MCHC RBC AUTO-ENTMCNC: 33.8 G/DL (ref 33.7–35.3)
MCV RBC AUTO: 89.9 FL (ref 81.4–97.8)
PLATELET # BLD AUTO: 200 K/UL (ref 164–446)
PMV BLD AUTO: 10.1 FL (ref 9–12.9)
POTASSIUM SERPL-SCNC: 4.2 MMOL/L (ref 3.6–5.5)
RBC # BLD AUTO: 4.34 M/UL (ref 4.7–6.1)
SODIUM SERPL-SCNC: 139 MMOL/L (ref 135–145)
WBC # BLD AUTO: 9.4 K/UL (ref 4.8–10.8)

## 2021-03-01 PROCEDURE — 36415 COLL VENOUS BLD VENIPUNCTURE: CPT

## 2021-03-01 PROCEDURE — 85027 COMPLETE CBC AUTOMATED: CPT

## 2021-03-01 PROCEDURE — A9270 NON-COVERED ITEM OR SERVICE: HCPCS | Performed by: NURSE PRACTITIONER

## 2021-03-01 PROCEDURE — G0378 HOSPITAL OBSERVATION PER HR: HCPCS

## 2021-03-01 PROCEDURE — 700102 HCHG RX REV CODE 250 W/ 637 OVERRIDE(OP): Performed by: NURSE PRACTITIONER

## 2021-03-01 PROCEDURE — 80048 BASIC METABOLIC PNL TOTAL CA: CPT

## 2021-03-01 RX ORDER — OXYCODONE HYDROCHLORIDE 5 MG/1
5 TABLET ORAL EVERY 4 HOURS PRN
Qty: 42 TABLET | Refills: 0
Start: 2021-03-01 | End: 2021-03-08

## 2021-03-01 RX ORDER — TIZANIDINE 2 MG/1
2 TABLET ORAL 3 TIMES DAILY PRN
Qty: 30 TABLET | Refills: 0
Start: 2021-03-01 | End: 2021-11-09

## 2021-03-01 RX ADMIN — ACETAMINOPHEN 650 MG: 325 TABLET, FILM COATED ORAL at 00:12

## 2021-03-01 RX ADMIN — HYDROCHLOROTHIAZIDE 12.5 MG: 12.5 TABLET ORAL at 05:53

## 2021-03-01 RX ADMIN — LEVOTHYROXINE SODIUM 137 MCG: 137 TABLET ORAL at 05:53

## 2021-03-01 RX ADMIN — GABAPENTIN 300 MG: 300 CAPSULE ORAL at 05:52

## 2021-03-01 RX ADMIN — ACETAMINOPHEN 650 MG: 325 TABLET, FILM COATED ORAL at 05:52

## 2021-03-01 ASSESSMENT — PAIN DESCRIPTION - PAIN TYPE
TYPE: SURGICAL PAIN
TYPE: SURGICAL PAIN

## 2021-03-01 NOTE — PROGRESS NOTES
Report received and assumed care of patient. Patient sleeping with no distress noted. Call bell in reach and safety measures in place.

## 2021-03-01 NOTE — DISCHARGE INSTRUCTIONS
Ambulate as much as comfortable  Wear brace when out of bed  Ok to shower, pat area dry  Remove dressing 5 days after surgery, then leave open to air- no dressing   No Aspirin or non-steroidal anti-inflammatory medications (aleve, motrin, ibuprofen, celebrex)  No driving for 2 weeks/ No driving while on narcotic medication  Over the counter stool softeners daily while on narcotics  No lifting greater than 15 pounds, no repetitive bending or twisting  Follow up at [facility] 2 weeks after surgery    Discharge Instructions    Discharged to home by car with relative. Discharged via wheelchair, hospital escort: Yes.  Special equipment needed: Not Applicable    Be sure to schedule a follow-up appointment with your primary care doctor or any specialists as instructed.     Discharge Plan:   Diet Plan: Discussed  Activity Level: Discussed  Confirmed Follow up Appointment: Appointment Scheduled  Confirmed Symptoms Management: Discussed  Medication Reconciliation Updated: Yes  Influenza Vaccine Indication: Not indicated: Previously immunized this influenza season and > 8 years of age    I understand that a diet low in cholesterol, fat, and sodium is recommended for good health. Unless I have been given specific instructions below for another diet, I accept this instruction as my diet prescription.   Other diet: regular    Special Instructions: None    · Is patient discharged on Warfarin / Coumadin?   No     Depression / Suicide Risk    As you are discharged from this [facility] facility, it is important to learn how to keep safe from harming yourself.    Recognize the warning signs:  · Abrupt changes in personality, positive or negative- including increase in energy   · Giving away possessions  · Change in eating patterns- significant weight changes-  positive or negative  · Change in sleeping patterns- unable to sleep or sleeping all the time   · Unwillingness or inability to communicate  · Depression  · Unusual  sadness, discouragement and loneliness  · Talk of wanting to die  · Neglect of personal appearance   · Rebelliousness- reckless behavior  · Withdrawal from people/activities they love  · Confusion- inability to concentrate     If you or a loved one observes any of these behaviors or has concerns about self-harm, here's what you can do:  · Talk about it- your feelings and reasons for harming yourself  · Remove any means that you might use to hurt yourself (examples: pills, rope, extension cords, firearm)  · Get professional help from the community (Mental Health, Substance Abuse, psychological counseling)  · Do not be alone:Call your Safe Contact- someone whom you trust who will be there for you.  · Call your local CRISIS HOTLINE 433-5510 or 392-955-7824  · Call your local Children's Mobile Crisis Response Team [place] (272) 972-4959 or www.Medrio  · Call the toll free National Suicide Prevention Hotlines   · National Suicide Prevention Lifeline 085-784-SUQU (5211)  · National Hope Line Network 800-SUICIDE (057-1852)

## 2021-03-01 NOTE — PROGRESS NOTES
Neurosurgery Progress Note    Subjective:  Pt in bed, states he is doing well, pain controlled, amb, voiding, carter po    Exam:  AAO, cooperative, fc's, lee's strong. Brace on, drain remains    BP  Min: 109/60  Max: 115/67  Pulse  Av.5  Min: 66  Max: 77  Resp  Av  Min: 18  Max: 18  Temp  Av.9 °C (98.4 °F)  Min: 36.5 °C (97.7 °F)  Max: 37.3 °C (99.2 °F)  SpO2  Av.8 %  Min: 92 %  Max: 98 %    No data recorded    Recent Labs     21  0501 21  0405   WBC 13.7* 9.4   RBC 4.55* 4.34*   HEMOGLOBIN 13.7* 13.2*   HEMATOCRIT 40.3* 39.0*   MCV 88.6 89.9   MCH 30.1 30.4   MCHC 34.0 33.8   RDW 40.0 41.5   PLATELETCT 222 200   MPV 10.1 10.1     Recent Labs     21  0501 21  0405   SODIUM 133* 139   POTASSIUM 4.4 4.2   CHLORIDE 101 104   CO2 23 28   GLUCOSE 120* 96   BUN 18 16   CREATININE 1.24 1.15   CALCIUM 8.1* 8.5               Intake/Output       21 - 21 0659 21 - 21 0659      9757-6700 3445-4944 Total 190059 Total       Intake    P.O.  240  -- 240  --  -- --    P.O. 240 -- 240 -- -- --    Total Intake 240 -- 240 -- -- --       Output    Urine  1525  -- 1525  --  -- --    Number of Times Voided 2 x 1 x 3 x -- -- --    Urine Void (mL) 250 -- 250 -- -- --    Output (mL) ([REMOVED] Urethral Catheter) 1275 -- 1275 -- -- --    Emesis  0  -- 0  --  -- --    Emesis 0 -- 0 -- -- --    Drains  70  5 75  --  -- --    Output (mL) ([REMOVED] Closed/Suction Drain 1 Inferior;Right Back Hemovac) 70 5 75 -- -- --    Stool  --  -- --  --  -- --    Number of Times Stooled 2 x -- 2 x -- -- --    Total Output 1595 5 1600 -- -- --       Net I/O     -1355 -5 -1360 -- -- --            Intake/Output Summary (Last 24 hours) at 3/1/2021 0905  Last data filed at 3/1/2021 0400  Gross per 24 hour   Intake 240 ml   Output 1600 ml   Net -1360 ml            • oxyCODONE-acetaminophen  1 tablet Q4HRS PRN   • atorvastatin  10 mg Nightly   • gabapentin  300 mg 6X/DAY   •  hydroCHLOROthiazide  12.5 mg DAILY   • levothyroxine  137 mcg AM ES   • Pharmacy Consult Request  1 Each PHARMACY TO DOSE   • MD ALERT...DO NOT ADMINISTER NSAIDS or ASPIRIN unless ORDERED By Neurosurgery  1 Each PRN   • docusate sodium  100 mg BID   • senna-docusate  1 tablet Q24HRS PRN   • polyethylene glycol/lytes  1 Packet BID PRN   • magnesium hydroxide  30 mL QDAY PRN   • bisacodyl  10 mg Q24HRS PRN   • fleet  1 Each Once PRN   • 0.9 % NaCl with KCl 20 mEq 1,000 mL   Continuous   • acetaminophen  650 mg Q6HRS    Followed by   • [START ON 3/4/2021] acetaminophen  650 mg Q6HRS PRN   • diphenhydrAMINE  25 mg Q6HRS PRN    Or   • diphenhydrAMINE  25 mg Q6HRS PRN   • ondansetron  4 mg Q4HRS PRN   • ondansetron  4 mg Q4HRS PRN   • tizanidine  2 mg TID PRN       Assessment and Plan:  POD # 2  1.  RatherGatheror robotic-assisted placement of bilateral S1 pedicle screws.  2.  Instrumented stabilization L3 through S1 with the use of FunCaptcha Solera   instrumentation.  3.  Bilateral Felix-Mohr osteotomies L5-S1 with decompression of   respective L5 and S1 nerve roots and resection of associated facet cyst.  4.  Interbody fusion L5-S1 with SABLE [id number] mm interbody cage with   22-degree lordosis.  5.  Augmentation of interbody fusion with use of locally harvested bone graft   and one small BMP-Infuse.    NM as above - stable  Continue mobilization  Continue pain control  Dc home today    Prophylactic anticoagulation: no         Start date/time: no need

## 2021-03-01 NOTE — CARE PLAN
Problem: Communication  Goal: The ability to communicate needs accurately and effectively will improve  Outcome: PROGRESSING AS EXPECTED   Patient calls appropriately, call light within reach.     Problem: Pain Management  Goal: Pain level will decrease to patient's comfort goal  Outcome: PROGRESSING AS EXPECTED  Patient stated his pain is well controlled with the scheduled medication (see mar).

## 2021-03-02 NOTE — DISCHARGE SUMMARY
DATE OF ADMISSION:  02/27/2021   DATE OF DISCHARGE:  03/01/2021     SURGEON:  Be Webber MD     PROCEDURES PERFORMED:  Robotic-assisted placement of bilateral S1 pedicle   screws with L3 through L5 exploration of fusion and re-instrumented   stabilization, bilateral Felix-Mohr osteotomies of L5-S1 and interbody   fusion at L5-S1.     POSTOPERATIVE DIAGNOSES:  Status post L3 through L5 fusion with L5-S1   spondylosis, bilateral foraminal stenosis and L5 radiculopathy.     HPI has been previously dictated.     HOSPITAL COURSE:  The patient is seen on postoperative day #1.  He is standing   at the bedside and doing well.  He does state his leg pain maybe a little bit   better, although he is unsure.  His PCA has been stopped.  He remains with a   drain and is wearing his brace. Labs and vital signs were reviewed and will be   rechecked the following day. He was encouraged to continue increasing his   activity.  His drain will be monitored.  His Tate will be removed.  On postop   day #2, he is seen in bed and states he is doing well.  His pain is   controlled.  He is ambulating.  He is voiding and he is tolerating food and   medication by mouth.  On exam, he has full strength in his lower extremities   and his dressing remains clean and dry, so at this point in time, he will   continue on his pain control regimen as well as mobilization.  He will be   discharged home.     DISCHARGE INSTRUCTIONS:  He should ambulate as much as comfortable for him and   wear his brace whenever he is out of bed.  He may shower, pat the area dry   and remove his dressing 5 days after surgery, then this can be left open to   air.  He is not to take aspirin or nonsteroidal anti-inflammatory medications.    He is not to drive for 2 weeks while on pain medication.  He should also   take an over-the-counter stool softener while on narcotics.  He is not to lift   greater than 15 pounds, no repetitive bending or twisting, and needs to    follow up in the office 2 weeks after surgery.     DISCHARGE MEDICATIONS:  He can continue taking his home medications as   prescribed with the exception of aspirin and ibuprofen, which he was   instructed to stop.  He is being prescribed oxycodone 5 mg every 4 hours as   needed for pain for seven days, #42, tizanidine 2 mg 3 times a day as needed,   #30.  He can also supplement his pain medication with over-the-counter   Tylenol, not to exceed 4 grams a day.        ______________________________  NOREEN VELEZ        ______________________________  MD SHANTAL JEREZ/KEMI/ABHAY    DD:  03/01/2021 13:31  DT:  03/01/2021 14:17    Job#:  [id number]

## 2021-04-21 ENCOUNTER — HOSPITAL ENCOUNTER (OUTPATIENT)
Dept: LAB | Facility: MEDICAL CENTER | Age: 76
End: 2021-04-21
Attending: FAMILY MEDICINE
Payer: COMMERCIAL

## 2021-04-21 LAB
BASOPHILS # BLD AUTO: 1 % (ref 0–1.8)
BASOPHILS # BLD: 0.05 K/UL (ref 0–0.12)
EOSINOPHIL # BLD AUTO: 0.2 K/UL (ref 0–0.51)
EOSINOPHIL NFR BLD: 4.2 % (ref 0–6.9)
ERYTHROCYTE [DISTWIDTH] IN BLOOD BY AUTOMATED COUNT: 39.2 FL (ref 35.9–50)
EST. AVERAGE GLUCOSE BLD GHB EST-MCNC: 111 MG/DL
HBA1C MFR BLD: 5.5 % (ref 4–5.6)
HCT VFR BLD AUTO: 43.5 % (ref 42–52)
HGB BLD-MCNC: 14.8 G/DL (ref 14–18)
IMM GRANULOCYTES # BLD AUTO: 0.01 K/UL (ref 0–0.11)
IMM GRANULOCYTES NFR BLD AUTO: 0.2 % (ref 0–0.9)
LYMPHOCYTES # BLD AUTO: 1.52 K/UL (ref 1–4.8)
LYMPHOCYTES NFR BLD: 31.9 % (ref 22–41)
MCH RBC QN AUTO: 30 PG (ref 27–33)
MCHC RBC AUTO-ENTMCNC: 34 G/DL (ref 33.7–35.3)
MCV RBC AUTO: 88.2 FL (ref 81.4–97.8)
MONOCYTES # BLD AUTO: 0.51 K/UL (ref 0–0.85)
MONOCYTES NFR BLD AUTO: 10.7 % (ref 0–13.4)
NEUTROPHILS # BLD AUTO: 2.48 K/UL (ref 1.82–7.42)
NEUTROPHILS NFR BLD: 52 % (ref 44–72)
NRBC # BLD AUTO: 0 K/UL
NRBC BLD-RTO: 0 /100 WBC
PLATELET # BLD AUTO: 250 K/UL (ref 164–446)
PMV BLD AUTO: 10.6 FL (ref 9–12.9)
RBC # BLD AUTO: 4.93 M/UL (ref 4.7–6.1)
WBC # BLD AUTO: 4.8 K/UL (ref 4.8–10.8)

## 2021-04-21 PROCEDURE — 80061 LIPID PANEL: CPT

## 2021-04-21 PROCEDURE — 80053 COMPREHEN METABOLIC PANEL: CPT

## 2021-04-21 PROCEDURE — 36415 COLL VENOUS BLD VENIPUNCTURE: CPT

## 2021-04-21 PROCEDURE — 84681 ASSAY OF C-PEPTIDE: CPT

## 2021-04-21 PROCEDURE — 85025 COMPLETE CBC W/AUTO DIFF WBC: CPT

## 2021-04-21 PROCEDURE — 83036 HEMOGLOBIN GLYCOSYLATED A1C: CPT

## 2021-04-22 LAB
ALBUMIN SERPL BCP-MCNC: 4.2 G/DL (ref 3.2–4.9)
ALBUMIN/GLOB SERPL: 1.8 G/DL
ALP SERPL-CCNC: 112 U/L (ref 30–99)
ALT SERPL-CCNC: 27 U/L (ref 2–50)
ANION GAP SERPL CALC-SCNC: 7 MMOL/L (ref 7–16)
AST SERPL-CCNC: 27 U/L (ref 12–45)
BILIRUB SERPL-MCNC: 0.4 MG/DL (ref 0.1–1.5)
BUN SERPL-MCNC: 24 MG/DL (ref 8–22)
C PEPTIDE SERPL-MCNC: 2.3 NG/ML (ref 0.8–3.5)
CALCIUM SERPL-MCNC: 9 MG/DL (ref 8.5–10.5)
CHLORIDE SERPL-SCNC: 103 MMOL/L (ref 96–112)
CHOLEST SERPL-MCNC: 173 MG/DL (ref 100–199)
CO2 SERPL-SCNC: 27 MMOL/L (ref 20–33)
CREAT SERPL-MCNC: 0.99 MG/DL (ref 0.5–1.4)
FASTING STATUS PATIENT QL REPORTED: NORMAL
GLOBULIN SER CALC-MCNC: 2.4 G/DL (ref 1.9–3.5)
GLUCOSE SERPL-MCNC: 92 MG/DL (ref 65–99)
HDLC SERPL-MCNC: 38 MG/DL
LDLC SERPL CALC-MCNC: 106 MG/DL
POTASSIUM SERPL-SCNC: 4 MMOL/L (ref 3.6–5.5)
PROT SERPL-MCNC: 6.6 G/DL (ref 6–8.2)
SODIUM SERPL-SCNC: 137 MMOL/L (ref 135–145)
TRIGL SERPL-MCNC: 147 MG/DL (ref 0–149)

## 2021-05-27 ENCOUNTER — APPOINTMENT (RX ONLY)
Dept: URBAN - METROPOLITAN AREA CLINIC 4 | Facility: CLINIC | Age: 76
Setting detail: DERMATOLOGY
End: 2021-05-27

## 2021-05-27 DIAGNOSIS — L95.8 OTHER VASCULITIS LIMITED TO THE SKIN: ICD-10-CM

## 2021-05-27 PROBLEM — L30.9 DERMATITIS, UNSPECIFIED: Status: ACTIVE | Noted: 2021-05-27

## 2021-05-27 PROCEDURE — 11104 PUNCH BX SKIN SINGLE LESION: CPT

## 2021-05-27 PROCEDURE — ? BIOPSY BY PUNCH METHOD

## 2021-05-27 PROCEDURE — ? PRESCRIPTION

## 2021-05-27 RX ORDER — DESOXIMETASONE 0.5 MG/G
OINTMENT TOPICAL
Qty: 1 | Refills: 2 | Status: ERX | COMMUNITY
Start: 2021-05-27

## 2021-05-27 RX ADMIN — DESOXIMETASONE: 0.5 OINTMENT TOPICAL at 00:00

## 2021-05-27 ASSESSMENT — LOCATION DETAILED DESCRIPTION DERM: LOCATION DETAILED: RIGHT PROXIMAL PRETIBIAL REGION

## 2021-05-27 ASSESSMENT — LOCATION SIMPLE DESCRIPTION DERM: LOCATION SIMPLE: RIGHT PRETIBIAL REGION

## 2021-05-27 ASSESSMENT — LOCATION ZONE DERM: LOCATION ZONE: LEG

## 2021-06-30 ENCOUNTER — HOSPITAL ENCOUNTER (OUTPATIENT)
Dept: RADIOLOGY | Facility: MEDICAL CENTER | Age: 76
End: 2021-06-30
Attending: NEUROLOGICAL SURGERY
Payer: COMMERCIAL

## 2021-06-30 DIAGNOSIS — M51.36 DEGENERATION OF LUMBAR INTERVERTEBRAL DISC: ICD-10-CM

## 2021-06-30 PROCEDURE — 72131 CT LUMBAR SPINE W/O DYE: CPT | Mod: MH

## 2021-08-20 PROBLEM — G89.4 CHRONIC PAIN SYNDROME: Status: ACTIVE | Noted: 2021-08-20

## 2021-10-12 ENCOUNTER — HOSPITAL ENCOUNTER (OUTPATIENT)
Facility: MEDICAL CENTER | Age: 76
End: 2021-10-12
Attending: NEUROLOGICAL SURGERY | Admitting: NEUROLOGICAL SURGERY
Payer: COMMERCIAL

## 2021-10-14 ENCOUNTER — HOSPITAL ENCOUNTER (OUTPATIENT)
Facility: MEDICAL CENTER | Age: 76
End: 2021-10-14
Attending: NEUROLOGICAL SURGERY | Admitting: NEUROLOGICAL SURGERY
Payer: COMMERCIAL

## 2021-11-02 ENCOUNTER — HOSPITAL ENCOUNTER (OUTPATIENT)
Dept: LAB | Facility: MEDICAL CENTER | Age: 76
End: 2021-11-02
Attending: INTERNAL MEDICINE
Payer: COMMERCIAL

## 2021-11-02 DIAGNOSIS — E03.9 HYPOTHYROIDISM, UNSPECIFIED TYPE: ICD-10-CM

## 2021-11-02 DIAGNOSIS — E78.5 HYPERLIPIDEMIA, UNSPECIFIED HYPERLIPIDEMIA TYPE: ICD-10-CM

## 2021-11-02 LAB
CHOLEST SERPL-MCNC: 180 MG/DL (ref 100–199)
FASTING STATUS PATIENT QL REPORTED: NORMAL
HDLC SERPL-MCNC: 43 MG/DL
LDLC SERPL CALC-MCNC: 113 MG/DL
TRIGL SERPL-MCNC: 122 MG/DL (ref 0–149)
TSH SERPL DL<=0.005 MIU/L-ACNC: 3.05 UIU/ML (ref 0.38–5.33)

## 2021-11-02 PROCEDURE — 36415 COLL VENOUS BLD VENIPUNCTURE: CPT

## 2021-11-02 PROCEDURE — 80061 LIPID PANEL: CPT

## 2021-11-02 PROCEDURE — 84443 ASSAY THYROID STIM HORMONE: CPT

## 2021-11-08 ENCOUNTER — HOSPITAL ENCOUNTER (OUTPATIENT)
Facility: MEDICAL CENTER | Age: 76
End: 2021-11-08
Attending: NEUROLOGICAL SURGERY | Admitting: NEUROLOGICAL SURGERY
Payer: COMMERCIAL

## 2021-11-09 ENCOUNTER — OFFICE VISIT (OUTPATIENT)
Dept: INTERNAL MEDICINE | Facility: OTHER | Age: 76
End: 2021-11-09
Payer: COMMERCIAL

## 2021-11-09 VITALS
TEMPERATURE: 98.5 F | SYSTOLIC BLOOD PRESSURE: 122 MMHG | HEIGHT: 71 IN | DIASTOLIC BLOOD PRESSURE: 78 MMHG | OXYGEN SATURATION: 95 % | HEART RATE: 68 BPM | WEIGHT: 198 LBS | BODY MASS INDEX: 27.72 KG/M2

## 2021-11-09 DIAGNOSIS — I10 ESSENTIAL HYPERTENSION: ICD-10-CM

## 2021-11-09 DIAGNOSIS — E03.4 HYPOTHYROIDISM DUE TO ACQUIRED ATROPHY OF THYROID: ICD-10-CM

## 2021-11-09 DIAGNOSIS — R31.21 ASYMPTOMATIC MICROSCOPIC HEMATURIA: ICD-10-CM

## 2021-11-09 DIAGNOSIS — E78.5 DYSLIPIDEMIA: ICD-10-CM

## 2021-11-09 DIAGNOSIS — N52.9 IMPOTENCE: ICD-10-CM

## 2021-11-09 DIAGNOSIS — Z00.00 HEALTHCARE MAINTENANCE: ICD-10-CM

## 2021-11-09 DIAGNOSIS — M17.9 OSTEOARTHRITIS OF KNEE, UNSPECIFIED LATERALITY, UNSPECIFIED OSTEOARTHRITIS TYPE: ICD-10-CM

## 2021-11-09 DIAGNOSIS — E78.2 MIXED HYPERLIPIDEMIA: ICD-10-CM

## 2021-11-09 DIAGNOSIS — I10 PRIMARY HYPERTENSION: ICD-10-CM

## 2021-11-09 DIAGNOSIS — G58.9 MONONEUROPATHY: ICD-10-CM

## 2021-11-09 DIAGNOSIS — H91.93 BILATERAL HEARING LOSS, UNSPECIFIED HEARING LOSS TYPE: ICD-10-CM

## 2021-11-09 PROBLEM — M21.372 LEFT FOOT DROP: Status: ACTIVE | Noted: 2019-11-13

## 2021-11-09 PROBLEM — M79.605 PAIN OF LEFT LOWER EXTREMITY: Status: ACTIVE | Noted: 2019-10-11

## 2021-11-09 PROBLEM — N48.6 INDURATION PENIS PLASTICA: Status: ACTIVE | Noted: 2018-07-18

## 2021-11-09 PROBLEM — M79.609 PERONEAL PAIN: Status: ACTIVE | Noted: 2019-11-13

## 2021-11-09 PROBLEM — M19.90 OSTEOARTHRITIS: Status: ACTIVE | Noted: 2021-11-09

## 2021-11-09 PROBLEM — R74.8 ELEVATED CPK: Status: ACTIVE | Noted: 2020-01-31

## 2021-11-09 PROBLEM — H91.90 HEARING LOSS: Status: ACTIVE | Noted: 2021-11-09

## 2021-11-09 PROBLEM — B99.9 CHRONIC INFECTION: Status: RESOLVED | Noted: 2019-05-14 | Resolved: 2021-11-09

## 2021-11-09 PROCEDURE — 99214 OFFICE O/P EST MOD 30 MIN: CPT | Mod: GC | Performed by: STUDENT IN AN ORGANIZED HEALTH CARE EDUCATION/TRAINING PROGRAM

## 2021-11-09 RX ORDER — ATORVASTATIN CALCIUM 10 MG/1
10 TABLET, FILM COATED ORAL DAILY
Qty: 30 TABLET | Refills: 3 | Status: SHIPPED | OUTPATIENT
Start: 2021-11-09 | End: 2022-06-06 | Stop reason: SDUPTHER

## 2021-11-09 RX ORDER — HYDROCHLOROTHIAZIDE 12.5 MG/1
12.5 TABLET ORAL DAILY
Qty: 30 TABLET | Refills: 3 | Status: SHIPPED | OUTPATIENT
Start: 2021-11-09 | End: 2022-06-16

## 2021-11-09 RX ORDER — DEXTROMETHORPHAN HYDROBROMIDE AND PROMETHAZINE HYDROCHLORIDE 15; 6.25 MG/5ML; MG/5ML
SYRUP ORAL
COMMUNITY
End: 2021-11-09

## 2021-11-09 RX ORDER — LEVOTHYROXINE SODIUM 137 MCG
137 TABLET ORAL
Qty: 90 TABLET | Refills: 3 | Status: SHIPPED | OUTPATIENT
Start: 2021-11-09 | End: 2022-11-02

## 2021-11-09 ASSESSMENT — ENCOUNTER SYMPTOMS
PALPITATIONS: 0
DEPRESSION: 0
FEVER: 0
MYALGIAS: 0
NAUSEA: 0
DIZZINESS: 0
CONSTIPATION: 0
COUGH: 0
WEAKNESS: 0
DIARRHEA: 0
CHILLS: 0
VOMITING: 0
SHORTNESS OF BREATH: 0
WEIGHT LOSS: 0

## 2021-11-09 ASSESSMENT — FIBROSIS 4 INDEX: FIB4 SCORE: 1.58

## 2021-11-09 NOTE — ASSESSMENT & PLAN NOTE
-Lipid panel November 2021 with total cholesterol 180, triglycerides 122, HDL 43,   Plan  -Atorvastatin 10 mg PO qD

## 2021-11-09 NOTE — ASSESSMENT & PLAN NOTE
-Elevated ferritin for PSA  -Colonoscopy 2019  -Pneumovax 2018  -Prevnar 2015  -Tdap 2015  -Shingrix completed  Plan  -Colonoscopy 2024 recommended  -Flu shot  -Covid vaccine

## 2021-11-09 NOTE — PROGRESS NOTES
"Chief Complaint:  Establishing care    Last Seen:   Last seen by geriatric clinic 3 months ago    History of Present Illness:   Patient is a 76-year-old male with past medical history of hypertension, hypothyroidism, peyronie disease (s/p surgery via urology [place] few years ago) presenting today for follow up.     Upper lumbar with stenosis pending surgery this December. Dr Webber at [facility] neurosurgery.     Denies chest pain, dyspnea, LE edema, palpitations, cough, abdominal pain.    Needs refills of synthroid, HCTZ, and atorvastatin.     Past Medical History:   Past Medical History:   Diagnosis Date   • Actinic keratoses     sees derm - Dr. Everett   • Bilateral hearing loss 08/06/2018    wears aids   • Chronic right-sided low back pain without sciatica 08/06/2018    s/p 5 surgeries; sees Akbar   • ED (erectile dysfunction)    • High cholesterol    • Hyperlipidemia, mixed    • Hypertension     pt states well controlled on meds   • Hypothyroidism    • Neuropathy (HCC) 8/6/2018   • Pain 12/18/2018    \"Nerve Pain-Low back/legs\".   • Peyronie disease     sees [place] [facility]       Patient Active Problem List    Diagnosis Date Noted   • Hearing loss 11/09/2021   • Dyslipidemia 11/09/2021   • Osteoarthritis 11/09/2021   • Chronic pain syndrome 08/20/2021   • Asymptomatic microscopic hematuria 06/12/2020   • Elevated CPK 01/31/2020   • Left foot drop 11/13/2019   • Peroneal pain 11/13/2019   • Pain of left lower extremity 10/11/2019   • Peripheral neuropathy 08/06/2018   • Bilateral hearing loss 08/06/2018   • 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   • Spondylolysis, lumbar region 10/05/2017   • Lumbar radiculopathy 10/06/2016   • Chronic lumbar radiculopathy 08/18/2016   • Chronic infection of sinus 05/17/2016   • Bilateral stenosis of lateral recess of lumbar spine 03/12/2016   • Healthcare maintenance 01/05/2016   • Hypertension 01/04/2016   • Hypothyroidism 01/04/2016   • Impotence 01/04/2016   • Low back pain 01/04/2016 " "    Allergies:  Patient has no known allergies.    Medications:     Current Outpatient Medications:   •  gabapentin, 300 mg, Oral, 6X/DAY (Patient taking differently: 600 mg, Oral, 6 TIMES DAILY), Taking Differently  •  atorvastatin, 10 mg, Oral, DAILY  •  hydroCHLOROthiazide, 12.5 mg, Oral, DAILY  •  Synthroid, 137 mcg, Oral, AM ES  •  Multi-Vitamin, 1 Tablet, Oral, DAILY  •  Vitamin D-1000 Max St, Take 2,000 Units by mouth.  •  Omega-3, Take 750 mg by mouth.  •  b complex vitamins, 1 Tablet, Oral, DAILY  •  Calcium Carbonate-Vitamin D (CALCIUM + D PO), 1 Capsule, Oral, DAILY     Social History:   Social History     Tobacco Use   • Smoking status: Never Smoker   • Smokeless tobacco: Never Used   Vaping Use   • Vaping Use: Never used   Substance Use Topics   • Alcohol use: No   • Drug use: No     [place] - with wife    Family History:   Family History   Problem Relation Age of Onset   • Cancer Father         Leukemia    • Heart Disease Father    • Diabetes Father    • Stroke Father    • Cancer Mother         breast met to liver   • Heart Disease Mother      Review of Systems   Review of Systems   Constitutional: Negative for chills, fever and weight loss.   Respiratory: Negative for cough and shortness of breath.    Cardiovascular: Negative for chest pain, palpitations and leg swelling.   Gastrointestinal: Negative for constipation, diarrhea, nausea and vomiting.   Genitourinary: Negative for dysuria.   Musculoskeletal: Negative for myalgias.   Neurological: Negative for dizziness and weakness.   Psychiatric/Behavioral: Negative for depression.      Vitals:   /78   Pulse 68   Temp 36.9 °C (98.5 °F)   Ht 1.803 m (5' 11\")   Wt 89.8 kg (198 lb)   SpO2 95%  Body mass index is 27.62 kg/m².    Physical Exam:   Physical Exam  Constitutional:       General: He is not in acute distress.     Appearance: Normal appearance. He is not ill-appearing or diaphoretic.   HENT:      Head: Normocephalic and atraumatic.      " Mouth/Throat:      Mouth: Mucous membranes are moist.   Eyes:      Extraocular Movements: Extraocular movements intact.      Pupils: Pupils are equal, round, and reactive to light.   Cardiovascular:      Rate and Rhythm: Normal rate and regular rhythm.      Heart sounds: No murmur heard.  No friction rub. No gallop.    Pulmonary:      Effort: No respiratory distress.      Breath sounds: No stridor. No wheezing, rhonchi or rales.   Abdominal:      General: Bowel sounds are normal. There is no distension.      Tenderness: There is no abdominal tenderness. There is no guarding or rebound.   Musculoskeletal:      Right lower leg: No edema.      Left lower leg: No edema.   Skin:     Coloration: Skin is not jaundiced or pale.   Neurological:      General: No focal deficit present.      Mental Status: He is alert and oriented to person, place, and time.   Psychiatric:         Mood and Affect: Mood normal.         Behavior: Behavior normal.        Labs:   Lipid panel and TSH levels reviewed.     Assessment and Plan    Patient is a 76-year-old male with past medical history of hypertension, hyperlipidemia, hypothyroidism, peripheral neuropathy presenting today for wellness visit due for a follow up in 4 months with CBC, CMP and A1C.    Peripheral neuropathy 2/2 spinal stenosis  -A1c 12/20/2021 5.5  -B12, B6, folate WNL December 2019  -Chronic left lower extremity pain without obvious cause  --CT June 2021: posterior hardware extending from L3-S1, interbody hardware at L4-5 and L5-S1, spinal stenosis L1-2, L2-3, multilevel facet arthropathy, degenerative subluxation at L1-2, L2-3   **suspected hypersensitivity syndrome but most likely 2/2 patient's ongoing significant back etiologies   Plan  -Discogram  -Gabapentin 600mg TID  --A1C    Hypertension  -Well controlled in office BP   Plan  -Hydrochlorothiazide 12.5 mg PO qD    Hypothyroidism  -Well-controlled on current regimen  -Asymptomatic  -TSH nov 2021 WNL   Plan  -Synthroid  137mcg PO qD    Dyslipidemia  -Lipid panel November 2021 with total cholesterol 180, triglycerides 122, HDL 43,   Plan  -Atorvastatin 10 mg PO qD    Healthcare maintenance  -Elevated ferritin for PSA  -Colonoscopy 2019  -Pneumovax 2018  -Prevnar 2015  -Tdap 2015  -Shingrix completed  -COVID vaccine + booster (moderna) completed   Plan  -Colonoscopy 2024 recommended  -Flu shot  -Covid vaccine     Please note that this dictation was created using voice recognition software. I have made every reasonable attempt to correct obvious errors, but I expect that there are errors of grammar and possibly content that I did not discover before finalizing the note.    Patient was seen and assessed by both Dr Conley and the resident.    Vincenzo Mccarthy, PGY-1   Internal Medicine

## 2021-11-09 NOTE — ASSESSMENT & PLAN NOTE
-A1c 12/20/2021 5.5  -B12, B6, folate WNL December 2019  -Chronic left lower extremity pain without obvious cause  **suspect hypersensitivity syndrome  Plan  -Discogram  -Gabapentin

## 2021-11-30 ENCOUNTER — APPOINTMENT (OUTPATIENT)
Dept: ADMISSIONS | Facility: MEDICAL CENTER | Age: 76
End: 2021-11-30
Payer: COMMERCIAL

## 2021-12-06 ENCOUNTER — APPOINTMENT (OUTPATIENT)
Dept: ADMISSIONS | Facility: MEDICAL CENTER | Age: 76
End: 2021-12-06
Payer: COMMERCIAL

## 2021-12-28 ENCOUNTER — PRE-ADMISSION TESTING (OUTPATIENT)
Dept: ADMISSIONS | Facility: MEDICAL CENTER | Age: 76
DRG: 454 | End: 2021-12-28
Attending: NEUROLOGICAL SURGERY
Payer: COMMERCIAL

## 2021-12-28 ENCOUNTER — HOSPITAL ENCOUNTER (OUTPATIENT)
Dept: RADIOLOGY | Facility: MEDICAL CENTER | Age: 76
DRG: 454 | End: 2021-12-28
Attending: NEUROLOGICAL SURGERY | Admitting: NEUROLOGICAL SURGERY
Payer: COMMERCIAL

## 2021-12-28 VITALS — WEIGHT: 205.47 LBS | BODY MASS INDEX: 27.83 KG/M2 | HEIGHT: 72 IN

## 2021-12-28 DIAGNOSIS — Z01.810 PRE-OPERATIVE CARDIOVASCULAR EXAMINATION: ICD-10-CM

## 2021-12-28 DIAGNOSIS — Z01.812 PRE-OPERATIVE LABORATORY EXAMINATION: ICD-10-CM

## 2021-12-28 DIAGNOSIS — Z01.811 PRE-OPERATIVE RESPIRATORY EXAMINATION: ICD-10-CM

## 2021-12-28 LAB
ANION GAP SERPL CALC-SCNC: 12 MMOL/L (ref 7–16)
APPEARANCE UR: CLEAR
APTT PPP: 25.8 SEC (ref 24.7–36)
BASOPHILS # BLD AUTO: 1 % (ref 0–1.8)
BASOPHILS # BLD: 0.05 K/UL (ref 0–0.12)
BILIRUB UR QL STRIP.AUTO: NEGATIVE
BUN SERPL-MCNC: 17 MG/DL (ref 8–22)
CALCIUM SERPL-MCNC: 9.4 MG/DL (ref 8.5–10.5)
CHLORIDE SERPL-SCNC: 103 MMOL/L (ref 96–112)
CO2 SERPL-SCNC: 27 MMOL/L (ref 20–33)
COLOR UR: YELLOW
CREAT SERPL-MCNC: 1.18 MG/DL (ref 0.5–1.4)
EKG IMPRESSION: NORMAL
EOSINOPHIL # BLD AUTO: 0.13 K/UL (ref 0–0.51)
EOSINOPHIL NFR BLD: 2.6 % (ref 0–6.9)
ERYTHROCYTE [DISTWIDTH] IN BLOOD BY AUTOMATED COUNT: 42.2 FL (ref 35.9–50)
GLUCOSE SERPL-MCNC: 88 MG/DL (ref 65–99)
GLUCOSE UR STRIP.AUTO-MCNC: NEGATIVE MG/DL
HCT VFR BLD AUTO: 49.5 % (ref 42–52)
HGB BLD-MCNC: 16.5 G/DL (ref 14–18)
IMM GRANULOCYTES # BLD AUTO: 0.02 K/UL (ref 0–0.11)
IMM GRANULOCYTES NFR BLD AUTO: 0.4 % (ref 0–0.9)
INR PPP: 1 (ref 0.87–1.13)
KETONES UR STRIP.AUTO-MCNC: NEGATIVE MG/DL
LEUKOCYTE ESTERASE UR QL STRIP.AUTO: NEGATIVE
LYMPHOCYTES # BLD AUTO: 1.51 K/UL (ref 1–4.8)
LYMPHOCYTES NFR BLD: 29.8 % (ref 22–41)
MCH RBC QN AUTO: 30.4 PG (ref 27–33)
MCHC RBC AUTO-ENTMCNC: 33.3 G/DL (ref 33.7–35.3)
MCV RBC AUTO: 91.3 FL (ref 81.4–97.8)
MICRO URNS: NORMAL
MONOCYTES # BLD AUTO: 0.45 K/UL (ref 0–0.85)
MONOCYTES NFR BLD AUTO: 8.9 % (ref 0–13.4)
NEUTROPHILS # BLD AUTO: 2.91 K/UL (ref 1.82–7.42)
NEUTROPHILS NFR BLD: 57.3 % (ref 44–72)
NITRITE UR QL STRIP.AUTO: NEGATIVE
NRBC # BLD AUTO: 0 K/UL
NRBC BLD-RTO: 0 /100 WBC
PH UR STRIP.AUTO: 6.5 [PH] (ref 5–8)
PLATELET # BLD AUTO: 256 K/UL (ref 164–446)
PMV BLD AUTO: 10.8 FL (ref 9–12.9)
POTASSIUM SERPL-SCNC: 4.5 MMOL/L (ref 3.6–5.5)
PROT UR QL STRIP: NEGATIVE MG/DL
PROTHROMBIN TIME: 12.9 SEC (ref 12–14.6)
RBC # BLD AUTO: 5.42 M/UL (ref 4.7–6.1)
RBC UR QL AUTO: NEGATIVE
SODIUM SERPL-SCNC: 142 MMOL/L (ref 135–145)
SP GR UR STRIP.AUTO: 1.02
UROBILINOGEN UR STRIP.AUTO-MCNC: 0.2 MG/DL
WBC # BLD AUTO: 5.1 K/UL (ref 4.8–10.8)

## 2021-12-28 PROCEDURE — 85025 COMPLETE CBC W/AUTO DIFF WBC: CPT

## 2021-12-28 PROCEDURE — 36415 COLL VENOUS BLD VENIPUNCTURE: CPT

## 2021-12-28 PROCEDURE — U0003 INFECTIOUS AGENT DETECTION BY NUCLEIC ACID (DNA OR RNA); SEVERE ACUTE RESPIRATORY SYNDROME CORONAVIRUS 2 (SARS-COV-2) (CORONAVIRUS DISEASE [COVID-19]), AMPLIFIED PROBE TECHNIQUE, MAKING USE OF HIGH THROUGHPUT TECHNOLOGIES AS DESCRIBED BY CMS-2020-01-R: HCPCS

## 2021-12-28 PROCEDURE — 93005 ELECTROCARDIOGRAM TRACING: CPT

## 2021-12-28 PROCEDURE — 71046 X-RAY EXAM CHEST 2 VIEWS: CPT

## 2021-12-28 PROCEDURE — 80048 BASIC METABOLIC PNL TOTAL CA: CPT

## 2021-12-28 PROCEDURE — U0005 INFEC AGEN DETEC AMPLI PROBE: HCPCS

## 2021-12-28 PROCEDURE — 93010 ELECTROCARDIOGRAM REPORT: CPT | Performed by: INTERNAL MEDICINE

## 2021-12-28 PROCEDURE — 85610 PROTHROMBIN TIME: CPT

## 2021-12-28 PROCEDURE — 81003 URINALYSIS AUTO W/O SCOPE: CPT

## 2021-12-28 PROCEDURE — C9803 HOPD COVID-19 SPEC COLLECT: HCPCS

## 2021-12-28 PROCEDURE — 85730 THROMBOPLASTIN TIME PARTIAL: CPT

## 2021-12-28 RX ORDER — TADALAFIL 5 MG/1
5 TABLET ORAL DAILY
COMMUNITY

## 2021-12-28 ASSESSMENT — FIBROSIS 4 INDEX: FIB4 SCORE: 1.58

## 2021-12-29 LAB
SARS-COV-2 RNA RESP QL NAA+PROBE: NOTDETECTED
SPECIMEN SOURCE: NORMAL

## 2021-12-29 NOTE — OR NURSING
This RN contacted  regarding pt question about medication, tadalafil, and if pt should hold the medication still for 48 hours prior to surgery per protocol even though it is used for his prostate vs. ED. Per MD, it is preferable for pt to hold for 48 hours prior to surgery, unless it interferes with his ability to void.This RN called pt and relayed this message to pt and pt verbalized understanding.

## 2021-12-31 ENCOUNTER — APPOINTMENT (OUTPATIENT)
Dept: RADIOLOGY | Facility: MEDICAL CENTER | Age: 76
DRG: 454 | End: 2021-12-31
Attending: NEUROLOGICAL SURGERY
Payer: COMMERCIAL

## 2021-12-31 ENCOUNTER — ANESTHESIA (OUTPATIENT)
Dept: SURGERY | Facility: MEDICAL CENTER | Age: 76
DRG: 454 | End: 2021-12-31
Payer: COMMERCIAL

## 2021-12-31 ENCOUNTER — ANESTHESIA EVENT (OUTPATIENT)
Dept: SURGERY | Facility: MEDICAL CENTER | Age: 76
DRG: 454 | End: 2021-12-31
Payer: COMMERCIAL

## 2021-12-31 ENCOUNTER — HOSPITAL ENCOUNTER (INPATIENT)
Facility: MEDICAL CENTER | Age: 76
LOS: 5 days | DRG: 454 | End: 2022-01-05
Attending: NEUROLOGICAL SURGERY | Admitting: NEUROLOGICAL SURGERY
Payer: COMMERCIAL

## 2021-12-31 DIAGNOSIS — G89.18 ACUTE POSTOPERATIVE PAIN: ICD-10-CM

## 2021-12-31 PROBLEM — M51.369 LUMBAR DEGENERATIVE DISC DISEASE: Status: ACTIVE | Noted: 2021-12-31

## 2021-12-31 PROBLEM — M51.36 LUMBAR DEGENERATIVE DISC DISEASE: Status: ACTIVE | Noted: 2021-12-31

## 2021-12-31 LAB
ABO + RH BLD: NORMAL
ABO GROUP BLD: NORMAL
BLD GP AB SCN SERPL QL: NORMAL
RH BLD: NORMAL

## 2021-12-31 PROCEDURE — 86850 RBC ANTIBODY SCREEN: CPT

## 2021-12-31 PROCEDURE — 0SP30AZ REMOVAL OF INTERBODY FUSION DEVICE FROM LUMBOSACRAL JOINT, OPEN APPROACH: ICD-10-PCS | Performed by: NEUROLOGICAL SURGERY

## 2021-12-31 PROCEDURE — 700111 HCHG RX REV CODE 636 W/ 250 OVERRIDE (IP): Performed by: ANESTHESIOLOGY

## 2021-12-31 PROCEDURE — 160048 HCHG OR STATISTICAL LEVEL 1-5: Performed by: NEUROLOGICAL SURGERY

## 2021-12-31 PROCEDURE — 700101 HCHG RX REV CODE 250: Performed by: ANESTHESIOLOGY

## 2021-12-31 PROCEDURE — 700105 HCHG RX REV CODE 258: Performed by: NEUROLOGICAL SURGERY

## 2021-12-31 PROCEDURE — 95940 IONM IN OPERATNG ROOM 15 MIN: CPT | Performed by: NEUROLOGICAL SURGERY

## 2021-12-31 PROCEDURE — 0SG30A0 FUSION OF LUMBOSACRAL JOINT WITH INTERBODY FUSION DEVICE, ANTERIOR APPROACH, ANTERIOR COLUMN, OPEN APPROACH: ICD-10-PCS | Performed by: NEUROLOGICAL SURGERY

## 2021-12-31 PROCEDURE — 95938 SOMATOSENSORY TESTING: CPT | Performed by: NEUROLOGICAL SURGERY

## 2021-12-31 PROCEDURE — 700101 HCHG RX REV CODE 250: Performed by: NEUROLOGICAL SURGERY

## 2021-12-31 PROCEDURE — 160042 HCHG SURGERY MINUTES - EA ADDL 1 MIN LEVEL 5: Performed by: NEUROLOGICAL SURGERY

## 2021-12-31 PROCEDURE — 160009 HCHG ANES TIME/MIN: Performed by: NEUROLOGICAL SURGERY

## 2021-12-31 PROCEDURE — 95955 EEG DURING SURGERY: CPT | Performed by: NEUROLOGICAL SURGERY

## 2021-12-31 PROCEDURE — 700102 HCHG RX REV CODE 250 W/ 637 OVERRIDE(OP): Performed by: NURSE PRACTITIONER

## 2021-12-31 PROCEDURE — 74018 RADEX ABDOMEN 1 VIEW: CPT

## 2021-12-31 PROCEDURE — 700105 HCHG RX REV CODE 258: Performed by: ANESTHESIOLOGY

## 2021-12-31 PROCEDURE — L8699 PROSTHETIC IMPLANT NOS: HCPCS | Performed by: NEUROLOGICAL SURGERY

## 2021-12-31 PROCEDURE — 95861 NEEDLE EMG 2 EXTREMITIES: CPT | Performed by: NEUROLOGICAL SURGERY

## 2021-12-31 PROCEDURE — 700111 HCHG RX REV CODE 636 W/ 250 OVERRIDE (IP): Performed by: NURSE PRACTITIONER

## 2021-12-31 PROCEDURE — 160036 HCHG PACU - EA ADDL 30 MINS PHASE I: Performed by: NEUROLOGICAL SURGERY

## 2021-12-31 PROCEDURE — A9270 NON-COVERED ITEM OR SERVICE: HCPCS | Performed by: NURSE PRACTITIONER

## 2021-12-31 PROCEDURE — 770001 HCHG ROOM/CARE - MED/SURG/GYN PRIV*

## 2021-12-31 PROCEDURE — 72100 X-RAY EXAM L-S SPINE 2/3 VWS: CPT

## 2021-12-31 PROCEDURE — 160035 HCHG PACU - 1ST 60 MINS PHASE I: Performed by: NEUROLOGICAL SURGERY

## 2021-12-31 PROCEDURE — 700102 HCHG RX REV CODE 250 W/ 637 OVERRIDE(OP): Performed by: ANESTHESIOLOGY

## 2021-12-31 PROCEDURE — 160002 HCHG RECOVERY MINUTES (STAT): Performed by: NEUROLOGICAL SURGERY

## 2021-12-31 PROCEDURE — 86900 BLOOD TYPING SEROLOGIC ABO: CPT

## 2021-12-31 PROCEDURE — 160031 HCHG SURGERY MINUTES - 1ST 30 MINS LEVEL 5: Performed by: NEUROLOGICAL SURGERY

## 2021-12-31 PROCEDURE — 110454 HCHG SHELL REV 250: Performed by: NEUROLOGICAL SURGERY

## 2021-12-31 PROCEDURE — 500864 HCHG NEEDLE, SPINAL 18G: Performed by: NEUROLOGICAL SURGERY

## 2021-12-31 PROCEDURE — 86901 BLOOD TYPING SEROLOGIC RH(D): CPT

## 2021-12-31 PROCEDURE — 4A11X4G MONITORING OF PERIPHERAL NERVOUS ELECTRICAL ACTIVITY, INTRAOPERATIVE, EXTERNAL APPROACH: ICD-10-PCS | Performed by: NEUROLOGICAL SURGERY

## 2021-12-31 PROCEDURE — 501838 HCHG SUTURE GENERAL: Performed by: NEUROLOGICAL SURGERY

## 2021-12-31 PROCEDURE — 502000 HCHG MISC OR IMPLANTS RC 0278: Performed by: NEUROLOGICAL SURGERY

## 2021-12-31 PROCEDURE — 700111 HCHG RX REV CODE 636 W/ 250 OVERRIDE (IP): Performed by: NEUROLOGICAL SURGERY

## 2021-12-31 PROCEDURE — A9270 NON-COVERED ITEM OR SERVICE: HCPCS | Performed by: ANESTHESIOLOGY

## 2021-12-31 DEVICE — GRAPH BONE KIT INFUSE MEDIUM: Type: IMPLANTABLE DEVICE | Status: FUNCTIONAL

## 2021-12-31 RX ORDER — POLYETHYLENE GLYCOL 3350 17 G/17G
1 POWDER, FOR SOLUTION ORAL 2 TIMES DAILY PRN
Status: DISCONTINUED | OUTPATIENT
Start: 2021-12-31 | End: 2022-01-05 | Stop reason: HOSPADM

## 2021-12-31 RX ORDER — REMIFENTANIL HYDROCHLORIDE 1 MG/ML
INJECTION, POWDER, LYOPHILIZED, FOR SOLUTION INTRAVENOUS
Status: DISCONTINUED | OUTPATIENT
Start: 2021-12-31 | End: 2021-12-31 | Stop reason: SURG

## 2021-12-31 RX ORDER — DIPHENHYDRAMINE HYDROCHLORIDE 50 MG/ML
25 INJECTION INTRAMUSCULAR; INTRAVENOUS EVERY 6 HOURS PRN
Status: DISCONTINUED | OUTPATIENT
Start: 2021-12-31 | End: 2022-01-05 | Stop reason: HOSPADM

## 2021-12-31 RX ORDER — HYDROMORPHONE HYDROCHLORIDE 1 MG/ML
0.4 INJECTION, SOLUTION INTRAMUSCULAR; INTRAVENOUS; SUBCUTANEOUS
Status: DISCONTINUED | OUTPATIENT
Start: 2021-12-31 | End: 2021-12-31 | Stop reason: HOSPADM

## 2021-12-31 RX ORDER — PHENYLEPHRINE HYDROCHLORIDE 10 MG/ML
INJECTION, SOLUTION INTRAMUSCULAR; INTRAVENOUS; SUBCUTANEOUS PRN
Status: DISCONTINUED | OUTPATIENT
Start: 2021-12-31 | End: 2021-12-31 | Stop reason: SURG

## 2021-12-31 RX ORDER — LEVOTHYROXINE SODIUM 137 UG/1
137 TABLET ORAL
Status: DISCONTINUED | OUTPATIENT
Start: 2021-12-31 | End: 2022-01-05 | Stop reason: HOSPADM

## 2021-12-31 RX ORDER — TIZANIDINE 4 MG/1
2 TABLET ORAL 3 TIMES DAILY PRN
Status: DISCONTINUED | OUTPATIENT
Start: 2021-12-31 | End: 2022-01-04

## 2021-12-31 RX ORDER — MEPERIDINE HYDROCHLORIDE 25 MG/ML
12.5 INJECTION INTRAMUSCULAR; INTRAVENOUS; SUBCUTANEOUS
Status: DISCONTINUED | OUTPATIENT
Start: 2021-12-31 | End: 2021-12-31 | Stop reason: HOSPADM

## 2021-12-31 RX ORDER — DOCUSATE SODIUM 100 MG/1
100 CAPSULE, LIQUID FILLED ORAL 2 TIMES DAILY
Status: DISCONTINUED | OUTPATIENT
Start: 2021-12-31 | End: 2022-01-05 | Stop reason: HOSPADM

## 2021-12-31 RX ORDER — ACETAMINOPHEN 325 MG/1
650 TABLET ORAL EVERY 6 HOURS PRN
Status: DISCONTINUED | OUTPATIENT
Start: 2022-01-05 | End: 2022-01-05 | Stop reason: HOSPADM

## 2021-12-31 RX ORDER — SODIUM CHLORIDE AND POTASSIUM CHLORIDE 150; 900 MG/100ML; MG/100ML
INJECTION, SOLUTION INTRAVENOUS CONTINUOUS
Status: DISCONTINUED | OUTPATIENT
Start: 2021-12-31 | End: 2022-01-05 | Stop reason: HOSPADM

## 2021-12-31 RX ORDER — BISACODYL 10 MG
10 SUPPOSITORY, RECTAL RECTAL
Status: DISCONTINUED | OUTPATIENT
Start: 2021-12-31 | End: 2022-01-05 | Stop reason: HOSPADM

## 2021-12-31 RX ORDER — HYDROCHLOROTHIAZIDE 12.5 MG/1
12.5 TABLET ORAL DAILY
Status: DISCONTINUED | OUTPATIENT
Start: 2021-12-31 | End: 2022-01-05 | Stop reason: HOSPADM

## 2021-12-31 RX ORDER — DIPHENHYDRAMINE HYDROCHLORIDE 50 MG/ML
12.5 INJECTION INTRAMUSCULAR; INTRAVENOUS
Status: DISCONTINUED | OUTPATIENT
Start: 2021-12-31 | End: 2021-12-31 | Stop reason: HOSPADM

## 2021-12-31 RX ORDER — SODIUM CHLORIDE, SODIUM LACTATE, POTASSIUM CHLORIDE, CALCIUM CHLORIDE 600; 310; 30; 20 MG/100ML; MG/100ML; MG/100ML; MG/100ML
INJECTION, SOLUTION INTRAVENOUS CONTINUOUS
Status: ACTIVE | OUTPATIENT
Start: 2021-12-31 | End: 2021-12-31

## 2021-12-31 RX ORDER — ATORVASTATIN CALCIUM 10 MG/1
10 TABLET, FILM COATED ORAL DAILY
Status: DISCONTINUED | OUTPATIENT
Start: 2021-12-31 | End: 2022-01-05 | Stop reason: HOSPADM

## 2021-12-31 RX ORDER — ONDANSETRON 2 MG/ML
4 INJECTION INTRAMUSCULAR; INTRAVENOUS EVERY 4 HOURS PRN
Status: DISCONTINUED | OUTPATIENT
Start: 2021-12-31 | End: 2022-01-05 | Stop reason: HOSPADM

## 2021-12-31 RX ORDER — OXYCODONE HYDROCHLORIDE 10 MG/1
20 TABLET ORAL
Status: DISCONTINUED | OUTPATIENT
Start: 2021-12-31 | End: 2022-01-03

## 2021-12-31 RX ORDER — ONDANSETRON 4 MG/1
4 TABLET, ORALLY DISINTEGRATING ORAL EVERY 4 HOURS PRN
Status: DISCONTINUED | OUTPATIENT
Start: 2021-12-31 | End: 2022-01-05 | Stop reason: HOSPADM

## 2021-12-31 RX ORDER — BUPIVACAINE HYDROCHLORIDE AND EPINEPHRINE 5; 5 MG/ML; UG/ML
INJECTION, SOLUTION EPIDURAL; INTRACAUDAL; PERINEURAL
Status: DISCONTINUED | OUTPATIENT
Start: 2021-12-31 | End: 2021-12-31 | Stop reason: HOSPADM

## 2021-12-31 RX ORDER — HYDROMORPHONE HYDROCHLORIDE 1 MG/ML
0.2 INJECTION, SOLUTION INTRAMUSCULAR; INTRAVENOUS; SUBCUTANEOUS
Status: DISCONTINUED | OUTPATIENT
Start: 2021-12-31 | End: 2021-12-31 | Stop reason: HOSPADM

## 2021-12-31 RX ORDER — CEFAZOLIN SODIUM 1 G/3ML
INJECTION, POWDER, FOR SOLUTION INTRAMUSCULAR; INTRAVENOUS
Status: DISCONTINUED | OUTPATIENT
Start: 2021-12-31 | End: 2021-12-31 | Stop reason: HOSPADM

## 2021-12-31 RX ORDER — HALOPERIDOL 5 MG/ML
1 INJECTION INTRAMUSCULAR
Status: DISCONTINUED | OUTPATIENT
Start: 2021-12-31 | End: 2021-12-31 | Stop reason: HOSPADM

## 2021-12-31 RX ORDER — AMOXICILLIN 250 MG
1 CAPSULE ORAL NIGHTLY
Status: DISCONTINUED | OUTPATIENT
Start: 2021-12-31 | End: 2022-01-05 | Stop reason: HOSPADM

## 2021-12-31 RX ORDER — ONDANSETRON 2 MG/ML
4 INJECTION INTRAMUSCULAR; INTRAVENOUS
Status: DISCONTINUED | OUTPATIENT
Start: 2021-12-31 | End: 2021-12-31 | Stop reason: HOSPADM

## 2021-12-31 RX ORDER — HYDROMORPHONE HYDROCHLORIDE 1 MG/ML
0.1 INJECTION, SOLUTION INTRAMUSCULAR; INTRAVENOUS; SUBCUTANEOUS
Status: DISCONTINUED | OUTPATIENT
Start: 2021-12-31 | End: 2021-12-31 | Stop reason: HOSPADM

## 2021-12-31 RX ORDER — ENEMA 19; 7 G/133ML; G/133ML
1 ENEMA RECTAL
Status: DISCONTINUED | OUTPATIENT
Start: 2021-12-31 | End: 2022-01-05 | Stop reason: HOSPADM

## 2021-12-31 RX ORDER — HYDROMORPHONE HYDROCHLORIDE 1 MG/ML
0.5 INJECTION, SOLUTION INTRAMUSCULAR; INTRAVENOUS; SUBCUTANEOUS
Status: DISCONTINUED | OUTPATIENT
Start: 2021-12-31 | End: 2022-01-03

## 2021-12-31 RX ORDER — ACETAMINOPHEN 325 MG/1
650 TABLET ORAL EVERY 6 HOURS
Status: DISPENSED | OUTPATIENT
Start: 2021-12-31 | End: 2022-01-05

## 2021-12-31 RX ORDER — OXYCODONE HCL 5 MG/5 ML
10 SOLUTION, ORAL ORAL
Status: COMPLETED | OUTPATIENT
Start: 2021-12-31 | End: 2021-12-31

## 2021-12-31 RX ORDER — AMOXICILLIN 250 MG
1 CAPSULE ORAL
Status: DISCONTINUED | OUTPATIENT
Start: 2021-12-31 | End: 2022-01-05 | Stop reason: HOSPADM

## 2021-12-31 RX ORDER — SODIUM CHLORIDE, SODIUM LACTATE, POTASSIUM CHLORIDE, CALCIUM CHLORIDE 600; 310; 30; 20 MG/100ML; MG/100ML; MG/100ML; MG/100ML
INJECTION, SOLUTION INTRAVENOUS
Status: DISCONTINUED | OUTPATIENT
Start: 2021-12-31 | End: 2021-12-31 | Stop reason: SURG

## 2021-12-31 RX ORDER — GABAPENTIN 600 MG/1
600 TABLET ORAL 3 TIMES DAILY
COMMUNITY
End: 2023-05-09 | Stop reason: SDUPTHER

## 2021-12-31 RX ORDER — OXYCODONE HYDROCHLORIDE 10 MG/1
10 TABLET ORAL
Status: DISCONTINUED | OUTPATIENT
Start: 2021-12-31 | End: 2022-01-03

## 2021-12-31 RX ORDER — DEXAMETHASONE SODIUM PHOSPHATE 4 MG/ML
INJECTION, SOLUTION INTRA-ARTICULAR; INTRALESIONAL; INTRAMUSCULAR; INTRAVENOUS; SOFT TISSUE PRN
Status: DISCONTINUED | OUTPATIENT
Start: 2021-12-31 | End: 2021-12-31 | Stop reason: SURG

## 2021-12-31 RX ORDER — CEFAZOLIN SODIUM 2 G/100ML
2 INJECTION, SOLUTION INTRAVENOUS EVERY 8 HOURS
Status: COMPLETED | OUTPATIENT
Start: 2021-12-31 | End: 2022-01-01

## 2021-12-31 RX ORDER — OXYCODONE HCL 5 MG/5 ML
5 SOLUTION, ORAL ORAL
Status: COMPLETED | OUTPATIENT
Start: 2021-12-31 | End: 2021-12-31

## 2021-12-31 RX ORDER — CEFAZOLIN SODIUM 1 G/3ML
INJECTION, POWDER, FOR SOLUTION INTRAMUSCULAR; INTRAVENOUS PRN
Status: DISCONTINUED | OUTPATIENT
Start: 2021-12-31 | End: 2021-12-31 | Stop reason: SURG

## 2021-12-31 RX ORDER — SODIUM CHLORIDE, SODIUM LACTATE, POTASSIUM CHLORIDE, CALCIUM CHLORIDE 600; 310; 30; 20 MG/100ML; MG/100ML; MG/100ML; MG/100ML
INJECTION, SOLUTION INTRAVENOUS CONTINUOUS
Status: DISCONTINUED | OUTPATIENT
Start: 2021-12-31 | End: 2021-12-31 | Stop reason: HOSPADM

## 2021-12-31 RX ORDER — DIPHENHYDRAMINE HCL 25 MG
25 TABLET ORAL EVERY 6 HOURS PRN
Status: DISCONTINUED | OUTPATIENT
Start: 2021-12-31 | End: 2022-01-05 | Stop reason: HOSPADM

## 2021-12-31 RX ORDER — ONDANSETRON 2 MG/ML
INJECTION INTRAMUSCULAR; INTRAVENOUS PRN
Status: DISCONTINUED | OUTPATIENT
Start: 2021-12-31 | End: 2021-12-31 | Stop reason: SURG

## 2021-12-31 RX ORDER — GABAPENTIN 300 MG/1
600 CAPSULE ORAL
Status: DISCONTINUED | OUTPATIENT
Start: 2021-12-31 | End: 2022-01-05 | Stop reason: HOSPADM

## 2021-12-31 RX ORDER — CALCIUM CARBONATE 500 MG/1
500 TABLET, CHEWABLE ORAL 2 TIMES DAILY
Status: DISCONTINUED | OUTPATIENT
Start: 2021-12-31 | End: 2022-01-05 | Stop reason: HOSPADM

## 2021-12-31 RX ADMIN — ROCURONIUM BROMIDE 50 MG: 10 INJECTION, SOLUTION INTRAVENOUS at 07:16

## 2021-12-31 RX ADMIN — FENTANYL CITRATE 50 MCG: 50 INJECTION, SOLUTION INTRAMUSCULAR; INTRAVENOUS at 08:06

## 2021-12-31 RX ADMIN — CEFAZOLIN 2 G: 330 INJECTION, POWDER, FOR SOLUTION INTRAMUSCULAR; INTRAVENOUS at 07:27

## 2021-12-31 RX ADMIN — PHENYLEPHRINE HYDROCHLORIDE 100 MCG: 10 INJECTION INTRAVENOUS at 08:26

## 2021-12-31 RX ADMIN — GLYCOPYRROLATE 0.2 MG: 0.2 INJECTION INTRAMUSCULAR; INTRAVENOUS at 07:28

## 2021-12-31 RX ADMIN — SODIUM CHLORIDE, POTASSIUM CHLORIDE, SODIUM LACTATE AND CALCIUM CHLORIDE: 600; 310; 30; 20 INJECTION, SOLUTION INTRAVENOUS at 06:30

## 2021-12-31 RX ADMIN — REMIFENTANIL HYDROCHLORIDE 0.1 MCG/KG/MIN: 1 INJECTION, POWDER, LYOPHILIZED, FOR SOLUTION INTRAVENOUS at 08:00

## 2021-12-31 RX ADMIN — SODIUM CHLORIDE, POTASSIUM CHLORIDE, SODIUM LACTATE AND CALCIUM CHLORIDE: 600; 310; 30; 20 INJECTION, SOLUTION INTRAVENOUS at 07:09

## 2021-12-31 RX ADMIN — HYDROCHLOROTHIAZIDE 12.5 MG: 12.5 TABLET ORAL at 13:46

## 2021-12-31 RX ADMIN — Medication 100 MG: at 07:13

## 2021-12-31 RX ADMIN — HYDROMORPHONE HYDROCHLORIDE 0.4 MG: 1 INJECTION, SOLUTION INTRAMUSCULAR; INTRAVENOUS; SUBCUTANEOUS at 10:53

## 2021-12-31 RX ADMIN — DEXAMETHASONE SODIUM PHOSPHATE 12 MG: 4 INJECTION, SOLUTION INTRA-ARTICULAR; INTRALESIONAL; INTRAMUSCULAR; INTRAVENOUS; SOFT TISSUE at 07:28

## 2021-12-31 RX ADMIN — PROPOFOL 200 MG: 10 INJECTION, EMULSION INTRAVENOUS at 07:12

## 2021-12-31 RX ADMIN — GABAPENTIN 600 MG: 300 CAPSULE ORAL at 22:33

## 2021-12-31 RX ADMIN — SUGAMMADEX 200 MG: 100 INJECTION, SOLUTION INTRAVENOUS at 08:03

## 2021-12-31 RX ADMIN — CEFAZOLIN SODIUM 2 G: 2 INJECTION, SOLUTION INTRAVENOUS at 17:15

## 2021-12-31 RX ADMIN — OXYCODONE HYDROCHLORIDE 10 MG: 5 SOLUTION ORAL at 09:12

## 2021-12-31 RX ADMIN — EPHEDRINE SULFATE 5 MG: 50 INJECTION INTRAMUSCULAR; INTRAVENOUS; SUBCUTANEOUS at 07:29

## 2021-12-31 RX ADMIN — ACETAMINOPHEN 650 MG: 325 TABLET, FILM COATED ORAL at 13:46

## 2021-12-31 RX ADMIN — DOCUSATE SODIUM 100 MG: 100 CAPSULE ORAL at 17:18

## 2021-12-31 RX ADMIN — FENTANYL CITRATE 50 MCG: 50 INJECTION, SOLUTION INTRAMUSCULAR; INTRAVENOUS at 07:13

## 2021-12-31 RX ADMIN — CALCIUM CARBONATE 500 MG: 500 TABLET, CHEWABLE ORAL at 19:47

## 2021-12-31 RX ADMIN — FENTANYL CITRATE 25 MCG: 50 INJECTION, SOLUTION INTRAMUSCULAR; INTRAVENOUS at 09:42

## 2021-12-31 RX ADMIN — HYDROMORPHONE HYDROCHLORIDE 0.4 MG: 1 INJECTION, SOLUTION INTRAMUSCULAR; INTRAVENOUS; SUBCUTANEOUS at 09:59

## 2021-12-31 RX ADMIN — TIZANIDINE 2 MG: 4 TABLET ORAL at 19:48

## 2021-12-31 RX ADMIN — FENTANYL CITRATE 50 MCG: 50 INJECTION, SOLUTION INTRAMUSCULAR; INTRAVENOUS at 09:34

## 2021-12-31 RX ADMIN — FENTANYL CITRATE 25 MCG: 50 INJECTION, SOLUTION INTRAMUSCULAR; INTRAVENOUS at 09:23

## 2021-12-31 RX ADMIN — ONDANSETRON 4 MG: 2 INJECTION INTRAMUSCULAR; INTRAVENOUS at 07:28

## 2021-12-31 RX ADMIN — ACETAMINOPHEN 650 MG: 325 TABLET, FILM COATED ORAL at 17:16

## 2021-12-31 RX ADMIN — GABAPENTIN 600 MG: 300 CAPSULE ORAL at 13:46

## 2021-12-31 RX ADMIN — GABAPENTIN 600 MG: 300 CAPSULE ORAL at 17:16

## 2021-12-31 RX ADMIN — FENTANYL CITRATE 100 MCG: 50 INJECTION, SOLUTION INTRAMUSCULAR; INTRAVENOUS at 07:34

## 2021-12-31 RX ADMIN — PHENYLEPHRINE HYDROCHLORIDE 100 MCG: 10 INJECTION INTRAVENOUS at 08:15

## 2021-12-31 RX ADMIN — ATORVASTATIN CALCIUM 10 MG: 10 TABLET, FILM COATED ORAL at 19:40

## 2021-12-31 ASSESSMENT — PAIN SCALES - GENERAL: PAIN_LEVEL: 1

## 2021-12-31 ASSESSMENT — PAIN DESCRIPTION - PAIN TYPE
TYPE: ACUTE PAIN
TYPE: SURGICAL PAIN
TYPE: ACUTE PAIN
TYPE: SURGICAL PAIN
TYPE: ACUTE PAIN

## 2021-12-31 ASSESSMENT — FIBROSIS 4 INDEX: FIB4 SCORE: 1.54

## 2021-12-31 NOTE — PROGRESS NOTES
Pharmacy Medication Reconciliation      ~Medication reconciliation updated and complete per patient at bedside  ~Allergies have been verified  ~No oral ABX within the last 30 days  ~Patient home pharmacy:[facility]

## 2021-12-31 NOTE — ANESTHESIA PREPROCEDURE EVALUATION
Case: [id number] Date/Time: 12/31/21 0645    Procedure: FUSION, SPINE, LUMBAR, ANTERIOR APPROACH - L5-S1    Pre-op diagnosis: DEGENERATION OF LUMBAR INTERVERTEBRAL DISC    Location: [facility] OR 05 / SURGERY [facility]    Surgeons: Be Webber M.D.          Relevant Problems   CARDIAC   (positive) Hypertension      ENDO   (positive) Hypothyroidism       Physical Exam    Airway   Mallampati: II  TM distance: >3 FB  Neck ROM: full       Cardiovascular - normal exam  Rhythm: regular  Rate: normal  (-) murmur     Dental - normal exam           Pulmonary - normal exam  Breath sounds clear to auscultation     Abdominal    Neurological - normal exam                 Anesthesia Plan    ASA 3       Plan - general       Airway plan will be ETT          Induction: intravenous    Postoperative Plan: Postoperative administration of opioids is intended.    Pertinent diagnostic labs and testing reviewed    Informed Consent:    Anesthetic plan and risks discussed with patient.    Use of blood products discussed with: patient whom consented to blood products.

## 2021-12-31 NOTE — OR NURSING
Pt resting quietly on his right side and reports pain tolerable following interventions per anesthesia orders.   Surgical drsg CDI. Ice pack in place to incision. Heat back placed to pt's lower back.     VSS on 2 L O2 via NC.     Waiting on inpt room. Pt's wife called and updated.   Will continue to monitor.

## 2021-12-31 NOTE — ANESTHESIA PROCEDURE NOTES
Airway    Date/Time: 12/31/2021 7:16 AM  Performed by: Milton Jenkins M.D.  Authorized by: Milton Jenkins M.D.     Location:  OR  Urgency:  Elective  Indications for Airway Management:  Anesthesia      Spontaneous Ventilation: absent    Sedation Level:  Deep  Preoxygenated: Yes    Patient Position:  Sniffing  Final Airway Type:  Endotracheal airway  Final Endotracheal Airway:  ETT  Cuffed: Yes    Technique Used for Successful ETT Placement:  Direct laryngoscopy    Insertion Site:  Oral  Blade Type:  Moreno  Laryngoscope Blade/Videolaryngoscope Blade Size:  2  ETT Size (mm):  7.5  Measured from:  Teeth  ETT to Teeth (cm):  23  Placement Verified by: auscultation and capnometry    Cormack-Lehane Classification:  Grade I - full view of glottis  Number of Attempts at Approach:  1

## 2021-12-31 NOTE — ANESTHESIA POSTPROCEDURE EVALUATION
Patient: Amadou Parekh    Procedure Summary     Date: 12/31/21 Room / Location: [facility] 05 / SURGERY [facility]    Anesthesia Start: 0709 Anesthesia Stop: 0852    Procedure: FUSION, SPINE, LUMBAR, ANTERIOR APPROACH - L5-S1 (N/A Abdomen) Diagnosis: (DEGENERATION OF LUMBAR INTERVERTEBRAL DISC)    Surgeons: Be Webber M.D. Responsible Provider: Milton Jenkins M.D.    Anesthesia Type: general ASA Status: 3          Final Anesthesia Type: general  Last vitals  BP   Blood Pressure : 130/63    Temp   (!) 35.8 °C (96.4 °F)    Pulse   (!) 107   Resp   19    SpO2   97 %      Anesthesia Post Evaluation    Patient participation: complete - patient participated  Level of consciousness: awake and alert  Pain score: 1    Airway patency: patent  Anesthetic complications: no  Cardiovascular status: adequate and hemodynamically stable  Respiratory status: acceptable  Hydration status: acceptable    PONV: none          No complications documented.     Nurse Pain Score: 1 (NPRS)

## 2021-12-31 NOTE — OP REPORT
OPERATIVE NOTE    12/31/21      PRE-OPERATIVE DIAGNOSIS -     1.  Begenerative spine disease. L5-S1   2.  Back pain    POST-OPERATIVE DIAGNOSIS -same    PROCEDURE PERFORMED -     1.  Anterior retroperitoneal exposure lumbar spine L5-S1  2.  Removal cage L5-S1  3.  Discectomy and fusion L5-S1    SURGEON - Connor Nugent M.D.     CO-SURGEON - Be Webber MD     ASSISTANT - FARIBA Leon    TYPE OF ANESTHESIA -  General     ANESTHESIOLOGIST  - Anesthesiologist: Milton Jenkins M.D.     SPECIMENS - None    PROCEDURE IN DETAIL -     Patient was taken to the operating suite placed in the supine position.  Prior to prepping and draping the L5-S1 level was localized under fluoroscopy and the appropriate level marked.  Next the patient was prepped and draped in sterile fashion.  An Ioban was employed.  Small incision was made in transverse fashion just below the umbilicus to the patient's left.  The incision was carried down through the subcutaneous tissue to the rectus sheath.  An incision was made along the medial aspect of the left rectus sheath.  The medial edge of the left rectus muscle was identified.  Dissection took place beneath this left rectus muscle and using careful blunt dissection using Kitners and sponge sticks the retroperitoneal plane was entered into.  The dissection took place down to the psoas muscle.  The iliac vessels were identified.  The dissection took place between the confluence of the iliac artery and veins.  The middle sacral vessels were identified and ligated between clips.  The iliac vein and artery were carefully mobilized off of the anterior surface of the vertebral body and disc space.  Fixed retraction was established using an Omni retractor and Brau blades.  Once proper exposure was established Dr. Webber then proceeded with removing the interbody cage.  He completed the discectomy and performed a fusion procedure.  After instrumentation was completed and confirmed under  fluoroscopy all fixed retractors were removed carefully.  Hemostasis was assured.  A #1 Vicryl running suture was used for the rectus sheath.  Half percent Marcaine with epinephrine was infiltrated for postoperative anesthesia.  3-0 Vicryl subcutaneous sutures were placed today 4-0 strata fix suture was used for the skin.  Benzoin Steri-Strips sterile dressings were applied.  Patient tolerated procedure well and was taken to the recovery room in stable condition.      _______________________  Connor Nugent M.D.

## 2021-12-31 NOTE — ANESTHESIA TIME REPORT
Anesthesia Start and Stop Event Times     Date Time Event    12/31/2021 0649 Ready for Procedure     0709 Anesthesia Start     0852 Anesthesia Stop        Responsible Staff  12/31/21    Name Role Begin End    Milton Jenkins M.D. Anesth 0709 0852        Preop Diagnosis (Free Text):  Pre-op Diagnosis     DEGENERATION OF LUMBAR INTERVERTEBRAL DISC        Preop Diagnosis (Codes):    Premium Reason  F. Holiday    Comments:

## 2021-12-31 NOTE — OP REPORT
DATE OF SERVICE:  12/31/2021     PREOPERATIVE DIAGNOSES:  1.  Pseudoarthrosis, L5-S1.  2.  Degenerative disk disease multiple levels with previous fusion.     POSTOPERATIVE DIAGNOSES:  1.  Pseudoarthrosis, L5-S1.  2.  Degenerative disk disease multiple levels with previous fusion.     PROCEDURES:  1.  Redo anterior lumbar interbody fusion with the use of Hedron IA spacer   [id number] mm with 30-degree lordosis.  2.  Augmentation of interbody fusion with use of medium bone morphogenic   protein.     CO-SURGEONS:  Be Webber MD and Connor Nugent MD     ANESTHESIA:  General anesthetic.     PREPARATION:  Routine.     PROCEDURE:  The patient was brought to the operating room and following   induction, the patient was intubated and placed under general anesthetic,   supine on the operating table.  All pressure points were padded.  Sequential   stockings were in place.  Arms were placed at the side at right angles.     The abdomen was prepped and draped in sterile fashion and he was also prepared   for somatosensory evoked potentials and EMGs.     Dr. Nugent will dictate and has dictated the approach.     We came down on the interbody fusion at L5-S1, able to open the anterior disk   space with monopolar cautery.  I did do a partial corpectomy, to remove the   interbody cage previously placed from a dorsal approach.  We curetted the   endplates, we were able to make the endplates parallel, by using osteotome and   curettes.     We then used a 15 trial with 25-degree lordosis and subsequently moved to 15   mm in height trial with 30-degree lordosis.  The latter fit much better into   the prepared disk space.     We used the Hedron IA spacer [id number] mm with 30-degree lordosis.  It was   loaded with medium size BMP.  Tamped into solid position, we were subsequently   able to use the 27 mm anchors with 1 going superiorly and 1 going inferiorly.    Locking screws were secured.  The area was copiously irrigated.  Meticulous    hemostasis was obtained.  The patient tolerated the procedure without   complication.        ______________________________  MD GANGA JEREZ/BRNADON    DD:  12/31/2021 08:39  DT:  12/31/2021 09:07    Job#:  [id number]

## 2022-01-01 LAB
ANION GAP SERPL CALC-SCNC: 10 MMOL/L (ref 7–16)
BUN SERPL-MCNC: 19 MG/DL (ref 8–22)
CALCIUM SERPL-MCNC: 8.5 MG/DL (ref 8.5–10.5)
CHLORIDE SERPL-SCNC: 103 MMOL/L (ref 96–112)
CO2 SERPL-SCNC: 23 MMOL/L (ref 20–33)
CREAT SERPL-MCNC: 1.16 MG/DL (ref 0.5–1.4)
ERYTHROCYTE [DISTWIDTH] IN BLOOD BY AUTOMATED COUNT: 39.8 FL (ref 35.9–50)
GLUCOSE SERPL-MCNC: 124 MG/DL (ref 65–99)
HCT VFR BLD AUTO: 39.1 % (ref 42–52)
HGB BLD-MCNC: 13.6 G/DL (ref 14–18)
MCH RBC QN AUTO: 30.3 PG (ref 27–33)
MCHC RBC AUTO-ENTMCNC: 34.8 G/DL (ref 33.7–35.3)
MCV RBC AUTO: 87.1 FL (ref 81.4–97.8)
PLATELET # BLD AUTO: 208 K/UL (ref 164–446)
PMV BLD AUTO: 10.5 FL (ref 9–12.9)
POTASSIUM SERPL-SCNC: 4.2 MMOL/L (ref 3.6–5.5)
RBC # BLD AUTO: 4.49 M/UL (ref 4.7–6.1)
SODIUM SERPL-SCNC: 136 MMOL/L (ref 135–145)
WBC # BLD AUTO: 11.7 K/UL (ref 4.8–10.8)

## 2022-01-01 PROCEDURE — 700101 HCHG RX REV CODE 250: Performed by: NURSE PRACTITIONER

## 2022-01-01 PROCEDURE — A9270 NON-COVERED ITEM OR SERVICE: HCPCS | Performed by: NURSE PRACTITIONER

## 2022-01-01 PROCEDURE — 85027 COMPLETE CBC AUTOMATED: CPT

## 2022-01-01 PROCEDURE — 700102 HCHG RX REV CODE 250 W/ 637 OVERRIDE(OP): Performed by: NURSE PRACTITIONER

## 2022-01-01 PROCEDURE — 80048 BASIC METABOLIC PNL TOTAL CA: CPT

## 2022-01-01 PROCEDURE — 700111 HCHG RX REV CODE 636 W/ 250 OVERRIDE (IP): Performed by: NURSE PRACTITIONER

## 2022-01-01 PROCEDURE — 36415 COLL VENOUS BLD VENIPUNCTURE: CPT

## 2022-01-01 PROCEDURE — 770001 HCHG ROOM/CARE - MED/SURG/GYN PRIV*

## 2022-01-01 RX ADMIN — GABAPENTIN 600 MG: 300 CAPSULE ORAL at 05:12

## 2022-01-01 RX ADMIN — ACETAMINOPHEN 650 MG: 325 TABLET, FILM COATED ORAL at 23:41

## 2022-01-01 RX ADMIN — DOCUSATE SODIUM 100 MG: 100 CAPSULE ORAL at 05:05

## 2022-01-01 RX ADMIN — CALCIUM CARBONATE 500 MG: 500 TABLET, CHEWABLE ORAL at 16:30

## 2022-01-01 RX ADMIN — CALCIUM CARBONATE 500 MG: 500 TABLET, CHEWABLE ORAL at 05:05

## 2022-01-01 RX ADMIN — ATORVASTATIN CALCIUM 10 MG: 10 TABLET, FILM COATED ORAL at 21:07

## 2022-01-01 RX ADMIN — DOCUSATE SODIUM 50 MG AND SENNOSIDES 8.6 MG 1 TABLET: 8.6; 5 TABLET, FILM COATED ORAL at 21:07

## 2022-01-01 RX ADMIN — GABAPENTIN 600 MG: 300 CAPSULE ORAL at 10:19

## 2022-01-01 RX ADMIN — GABAPENTIN 600 MG: 300 CAPSULE ORAL at 13:13

## 2022-01-01 RX ADMIN — GABAPENTIN 600 MG: 300 CAPSULE ORAL at 21:05

## 2022-01-01 RX ADMIN — ACETAMINOPHEN 650 MG: 325 TABLET, FILM COATED ORAL at 05:05

## 2022-01-01 RX ADMIN — LEVOTHYROXINE SODIUM 137 MCG: 137 TABLET ORAL at 05:05

## 2022-01-01 RX ADMIN — ACETAMINOPHEN 650 MG: 325 TABLET, FILM COATED ORAL at 13:13

## 2022-01-01 RX ADMIN — GABAPENTIN 600 MG: 300 CAPSULE ORAL at 19:34

## 2022-01-01 RX ADMIN — DOCUSATE SODIUM 100 MG: 100 CAPSULE ORAL at 16:30

## 2022-01-01 RX ADMIN — CEFAZOLIN SODIUM 2 G: 2 INJECTION, SOLUTION INTRAVENOUS at 00:29

## 2022-01-01 RX ADMIN — ACETAMINOPHEN 650 MG: 325 TABLET, FILM COATED ORAL at 19:34

## 2022-01-01 RX ADMIN — POTASSIUM CHLORIDE AND SODIUM CHLORIDE: 900; 150 INJECTION, SOLUTION INTRAVENOUS at 00:29

## 2022-01-01 RX ADMIN — GABAPENTIN 600 MG: 300 CAPSULE ORAL at 16:30

## 2022-01-01 RX ADMIN — ACETAMINOPHEN 650 MG: 325 TABLET, FILM COATED ORAL at 00:29

## 2022-01-01 ASSESSMENT — PAIN DESCRIPTION - PAIN TYPE
TYPE: SURGICAL PAIN
TYPE: ACUTE PAIN;SURGICAL PAIN
TYPE: SURGICAL PAIN

## 2022-01-01 NOTE — CARE PLAN
Problem: Pain - Standard  Goal: Alleviation of pain or a reduction in pain to the patient’s comfort goal  Outcome: Progressing  Note: Administering mediations as ordered (see MAR). Pain management has been discussed with patient, patient has stated that gabapentin and tylenol have been working well when it comes to his pain control so far. Will continue to assess pain and administer medications as ordered. Offering cold packs and repositioning as needed.     Problem: Knowledge Deficit - Standard  Goal: Patient and family/care givers will demonstrate understanding of plan of care, disease process/condition, diagnostic tests and medications  Outcome: Progressing  Note: Discussed POC with patient; pain management, educated patient about spinal precautions/log rolling and encouraging patient to maintain them, no brace ordered at this time, working with PT/OT, surgery planned for Monday 1/3, voiding 6 hours after rios catheter removal, weaning off of oxygen, and use of IS 10x every hour while awake. Patient verbalized understanding and is agreeable to POC. Encouraging pt to voice questions and concerns. Oriented pt to use of call light. Patient is capable of making needs known.        The patient is Stable - Low risk of patient condition declining or worsening    Shift Goals  Clinical Goals: Pain Mangement, Mobilization  Patient Goals: Rest  Family Goals: ELY

## 2022-01-01 NOTE — PROGRESS NOTES
Neurosurgery Progress Note    Subjective:  Pt in bed, states he has min pain, leg leg nerve pain about same, +rios    Exam:  AAO, cooperative, incision with drsg, abd soft    BP  Min: 78/43  Max: 143/74  Pulse  Av.5  Min: 64  Max: 99  Resp  Av.9  Min: 11  Max: 19  Temp  Av.7 °C (98.1 °F)  Min: 35.8 °C (96.4 °F)  Max: 37.7 °C (99.8 °F)  SpO2  Av.1 %  Min: 90 %  Max: 98 %    No data recorded    Recent Labs     22  0727   WBC 11.7*   RBC 4.49*   HEMOGLOBIN 13.6*   HEMATOCRIT 39.1*   MCV 87.1   MCH 30.3   MCHC 34.8   RDW 39.8   PLATELETCT 208   MPV 10.5     Recent Labs     22  0727   SODIUM 136   POTASSIUM 4.2   CHLORIDE 103   CO2 23   GLUCOSE 124*   BUN 19   CREATININE 1.16   CALCIUM 8.5               Intake/Output                       21 0700 - 22 0659 22 07 - 22 0659     3275-2170 8198-1514 Total 0908-3167 5723-7590 Total                 Intake    I.V.  1300  -- 1300  --  -- --    Volume (mL) (Lactated Ringers) 1300 -- 1300 -- -- --    Total Intake 1300 -- 1300 -- -- --       Output    Urine  700  1100 1800  --  -- --    Output (mL) (Urethral Catheter Non-latex 16 Fr.) 700 1100 1800 -- -- --    Total Output 700 1100 1800 -- -- --       Net I/O     600 -1100 -500 -- -- --            Intake/Output Summary (Last 24 hours) at 2022 0859  Last data filed at 2022 0500  Gross per 24 hour   Intake --   Output 1800 ml   Net -1800 ml            • atorvastatin  10 mg DAILY   • gabapentin  600 mg 6X/DAY   • hydroCHLOROthiazide  12.5 mg DAILY   • levothyroxine  137 mcg AM ES   • Pharmacy Consult Request  1 Each PHARMACY TO DOSE   • MD ALERT...DO NOT ADMINISTER NSAIDS or ASPIRIN unless ORDERED By Neurosurgery  1 Each PRN   • docusate sodium  100 mg BID   • senna-docusate  1 Tablet Nightly   • senna-docusate  1 Tablet Q24HRS PRN   • polyethylene glycol/lytes  1 Packet BID PRN   • magnesium hydroxide  30 mL QDAY PRN   • bisacodyl  10 mg Q24HRS PRN   • sodium  phosphate  1 Each Once PRN   • 0.9 % NaCl with KCl 20 mEq 1,000 mL   Continuous   • acetaminophen  650 mg Q6HRS    Followed by   • [START ON 1/5/2022] acetaminophen  650 mg Q6HRS PRN   • oxyCODONE immediate-release  10 mg Q3HRS PRN    Or   • oxyCODONE immediate-release  20 mg Q3HRS PRN    Or   • HYDROmorphone  0.5 mg Q3HRS PRN   • diphenhydrAMINE  25 mg Q6HRS PRN    Or   • diphenhydrAMINE  25 mg Q6HRS PRN   • ondansetron  4 mg Q4HRS PRN   • ondansetron  4 mg Q4HRS PRN   • tizanidine  2 mg TID PRN   • calcium carbonate  500 mg BID       Assessment and Plan:  POD# 1 s/p L5-S1 ALIF  Posterior sx scheduled 1/3  Chemical prophylactic DVT therapy: No  Start date/time: tbd    Bowel protocol  Continue pain control  Dc rios  Will add removal of SCS to consent for Monday  NPO after mn on Sunday  Recheck labs tomorrow am

## 2022-01-01 NOTE — THERAPY
Missed Therapy     Patient Name: Amadou Parekh  Age:  76 y.o., Sex:  male  Medical Record #: [id number]  Today's Date: 1/1/2022 01/01/22 0733   Interdisciplinary Plan of Care Collaboration   Collaboration Comments  PT consult received, pt to undergo stage 2 surgery 1/3, will follow once staged surgeries are completed.

## 2022-01-01 NOTE — PROGRESS NOTES
Patient admitted to [facility] . Patient assessed, vital signs stable. Oriented patient to room and how to use call light. Call light within reach, bed alarm on, treaded socks on.

## 2022-01-01 NOTE — CARE PLAN
The patient is Stable - Low risk of patient condition declining or worsening    Shift Goals  Clinical Goals: Pain control, mobility  Patient Goals: Comfort  Family Goals: N/A    Progress made toward(s) clinical / shift goals:    Problem: Pain - Standard  Goal: Alleviation of pain or a reduction in pain to the patient’s comfort goal  Outcome: Progressing  Note:    Administering pain mediations as ordered (see MAR). Providing patient with cold packs, and repositioning as needed.         Problem: Knowledge Deficit - Standard  Goal: Patient and family/care givers will demonstrate understanding of plan of care, disease process/condition, diagnostic tests and medications  Outcome: Progressing  Note:   Discussed POC with patient; pain management, PT/OT ordered, weaning off oxygen, and discharge planning once medically cleared. Patient verbalized understanding and is agreeable to POC. Encouraging pt to voice questions and concerns. Oriented pt to use of call light. Patient is capable of making needs known.        Patient is not progressing towards the following goals:

## 2022-01-01 NOTE — PROGRESS NOTES
1021 Tate catheter discontinued according to order. Patient tolerated well, 8mL of fluid removed before withdrawing catheter.

## 2022-01-02 LAB
ANION GAP SERPL CALC-SCNC: 7 MMOL/L (ref 7–16)
BUN SERPL-MCNC: 17 MG/DL (ref 8–22)
CALCIUM SERPL-MCNC: 8.6 MG/DL (ref 8.5–10.5)
CHLORIDE SERPL-SCNC: 103 MMOL/L (ref 96–112)
CO2 SERPL-SCNC: 27 MMOL/L (ref 20–33)
CREAT SERPL-MCNC: 1.08 MG/DL (ref 0.5–1.4)
ERYTHROCYTE [DISTWIDTH] IN BLOOD BY AUTOMATED COUNT: 42 FL (ref 35.9–50)
GLUCOSE SERPL-MCNC: 103 MG/DL (ref 65–99)
HCT VFR BLD AUTO: 38.7 % (ref 42–52)
HGB BLD-MCNC: 13 G/DL (ref 14–18)
MCH RBC QN AUTO: 30 PG (ref 27–33)
MCHC RBC AUTO-ENTMCNC: 33.6 G/DL (ref 33.7–35.3)
MCV RBC AUTO: 89.2 FL (ref 81.4–97.8)
PLATELET # BLD AUTO: 189 K/UL (ref 164–446)
PMV BLD AUTO: 10 FL (ref 9–12.9)
POTASSIUM SERPL-SCNC: 4.2 MMOL/L (ref 3.6–5.5)
RBC # BLD AUTO: 4.34 M/UL (ref 4.7–6.1)
SODIUM SERPL-SCNC: 137 MMOL/L (ref 135–145)
WBC # BLD AUTO: 9.2 K/UL (ref 4.8–10.8)

## 2022-01-02 PROCEDURE — 85027 COMPLETE CBC AUTOMATED: CPT

## 2022-01-02 PROCEDURE — 700102 HCHG RX REV CODE 250 W/ 637 OVERRIDE(OP): Performed by: NURSE PRACTITIONER

## 2022-01-02 PROCEDURE — A9270 NON-COVERED ITEM OR SERVICE: HCPCS | Performed by: NURSE PRACTITIONER

## 2022-01-02 PROCEDURE — 36415 COLL VENOUS BLD VENIPUNCTURE: CPT

## 2022-01-02 PROCEDURE — 770001 HCHG ROOM/CARE - MED/SURG/GYN PRIV*

## 2022-01-02 PROCEDURE — 80048 BASIC METABOLIC PNL TOTAL CA: CPT

## 2022-01-02 RX ADMIN — HYDROCHLOROTHIAZIDE 12.5 MG: 12.5 TABLET ORAL at 05:04

## 2022-01-02 RX ADMIN — DOCUSATE SODIUM 50 MG AND SENNOSIDES 8.6 MG 1 TABLET: 8.6; 5 TABLET, FILM COATED ORAL at 21:41

## 2022-01-02 RX ADMIN — ATORVASTATIN CALCIUM 10 MG: 10 TABLET, FILM COATED ORAL at 21:41

## 2022-01-02 RX ADMIN — LEVOTHYROXINE SODIUM 137 MCG: 137 TABLET ORAL at 05:04

## 2022-01-02 RX ADMIN — GABAPENTIN 600 MG: 300 CAPSULE ORAL at 09:58

## 2022-01-02 RX ADMIN — CALCIUM CARBONATE 500 MG: 500 TABLET, CHEWABLE ORAL at 16:48

## 2022-01-02 RX ADMIN — DOCUSATE SODIUM 100 MG: 100 CAPSULE ORAL at 16:48

## 2022-01-02 RX ADMIN — GABAPENTIN 600 MG: 300 CAPSULE ORAL at 12:17

## 2022-01-02 RX ADMIN — ACETAMINOPHEN 650 MG: 325 TABLET, FILM COATED ORAL at 16:48

## 2022-01-02 RX ADMIN — ACETAMINOPHEN 650 MG: 325 TABLET, FILM COATED ORAL at 05:04

## 2022-01-02 RX ADMIN — GABAPENTIN 600 MG: 300 CAPSULE ORAL at 05:04

## 2022-01-02 RX ADMIN — ACETAMINOPHEN 650 MG: 325 TABLET, FILM COATED ORAL at 12:17

## 2022-01-02 RX ADMIN — CALCIUM CARBONATE 500 MG: 500 TABLET, CHEWABLE ORAL at 05:04

## 2022-01-02 RX ADMIN — DOCUSATE SODIUM 100 MG: 100 CAPSULE ORAL at 05:04

## 2022-01-02 RX ADMIN — GABAPENTIN 600 MG: 300 CAPSULE ORAL at 21:41

## 2022-01-02 RX ADMIN — GABAPENTIN 600 MG: 300 CAPSULE ORAL at 16:48

## 2022-01-02 ASSESSMENT — PAIN DESCRIPTION - PAIN TYPE: TYPE: SURGICAL PAIN

## 2022-01-02 NOTE — PROGRESS NOTES
Neurosurgery Progress Note    Subjective:  Pt in chair at bedside, states some abd soreness with movement, leg leg nerve pain about same, voiding, +flatus, no nausea    Exam:  AAO, cooperative, incision with drsg, abd soft    BP  Min: 105/62  Max: 149/69  Pulse  Av.8  Min: 64  Max: 83  Resp  Av.2  Min: 17  Max: 18  Temp  Av.9 °C (98.4 °F)  Min: 36.4 °C (97.6 °F)  Max: 37.9 °C (100.3 °F)  SpO2  Av.8 %  Min: 92 %  Max: 96 %    No data recorded    Recent Labs     22  0722  0549   WBC 11.7* 9.2   RBC 4.49* 4.34*   HEMOGLOBIN 13.6* 13.0*   HEMATOCRIT 39.1* 38.7*   MCV 87.1 89.2   MCH 30.3 30.0   MCHC 34.8 33.6*   RDW 39.8 42.0   PLATELETCT 208 189   MPV 10.5 10.0     Recent Labs     22  0727 22  0549   SODIUM 136 137   POTASSIUM 4.2 4.2   CHLORIDE 103 103   CO2 23 27   GLUCOSE 124* 103*   BUN 19 17   CREATININE 1.16 1.08   CALCIUM 8.5 8.6               Intake/Output                       22 - 2259 22 07 - 22 0659     9216-2313 3694-3804 Total 8853-7310 0364-5822 Total                 Intake    P.O.  240  120 360  --  -- --    P.O. 240 120 360 -- -- --    Total Intake 240 120 360 -- -- --       Output    Urine  1150  0 1150  --  -- --    Number of Times Voided 1 x 2 x 3 x -- -- --    Urine Void (mL) -- 0 0 -- -- --    Output (mL) ([REMOVED] Urethral Catheter Non-latex 16 Fr.) 1150 -- 1150 -- -- --    Stool  --  0 0  --  -- --    Number of Times Stooled -- 0 x 0 x -- -- --    Measurable Stool (mL) -- 0 0 -- -- --    Total Output 1150 0 1150 -- -- --       Net I/O     -910 120 -790 -- -- --            Intake/Output Summary (Last 24 hours) at 2022 0844  Last data filed at 2022 0502  Gross per 24 hour   Intake 360 ml   Output 1150 ml   Net -790 ml            • atorvastatin  10 mg DAILY   • gabapentin  600 mg 6X/DAY   • hydroCHLOROthiazide  12.5 mg DAILY   • levothyroxine  137 mcg AM ES   • Pharmacy Consult Request  1 Each PHARMACY TO DOSE    • MD ALERT...DO NOT ADMINISTER NSAIDS or ASPIRIN unless ORDERED By Neurosurgery  1 Each PRN   • docusate sodium  100 mg BID   • senna-docusate  1 Tablet Nightly   • senna-docusate  1 Tablet Q24HRS PRN   • polyethylene glycol/lytes  1 Packet BID PRN   • magnesium hydroxide  30 mL QDAY PRN   • bisacodyl  10 mg Q24HRS PRN   • sodium phosphate  1 Each Once PRN   • 0.9 % NaCl with KCl 20 mEq 1,000 mL   Continuous   • acetaminophen  650 mg Q6HRS    Followed by   • [START ON 1/5/2022] acetaminophen  650 mg Q6HRS PRN   • oxyCODONE immediate-release  10 mg Q3HRS PRN    Or   • oxyCODONE immediate-release  20 mg Q3HRS PRN    Or   • HYDROmorphone  0.5 mg Q3HRS PRN   • diphenhydrAMINE  25 mg Q6HRS PRN    Or   • diphenhydrAMINE  25 mg Q6HRS PRN   • ondansetron  4 mg Q4HRS PRN   • ondansetron  4 mg Q4HRS PRN   • tizanidine  2 mg TID PRN   • calcium carbonate  500 mg BID       Assessment and Plan:  POD# 2 s/p L5-S1 ALIF  Posterior sx scheduled 1/3  Chemical prophylactic DVT therapy: No  Start date/time: tbd    Bowel protocol  Continue pain control  NPO after mn  Labs ok  Sx scheduled Monday afternoon- consent orders placed

## 2022-01-02 NOTE — CARE PLAN
The patient is Stable - Low risk of patient condition declining or worsening    Shift Goals  Clinical Goals: Mobility  Patient Goals: Rest  Family Goals: N/A    Progress made toward(s) clinical / shift goals:    Problem: Pain - Standard  Goal: Alleviation of pain or a reduction in pain to the patient’s comfort goal  Outcome: Progressing  Note:    Administering pain mediations as ordered (see MAR). Providing patient with cold packs, and repositioning as needed.         Problem: Knowledge Deficit - Standard  Goal: Patient and family/care givers will demonstrate understanding of plan of care, disease process/condition, diagnostic tests and medications  Outcome: Progressing  Note:   Discussed POC with patient; pain management, PT/OT working with pt, weaning off oxygen, stage 2 surgery, and discharge planning once medically cleared. Patient verbalized understanding and is agreeable to POC. Encouraging pt to voice questions and concerns. Oriented pt to use of call light. Patient is capable of making needs known.        Patient is not progressing towards the following goals:

## 2022-01-02 NOTE — CARE PLAN
The patient is Stable - Low risk of patient condition declining or worsening    Shift Goals  Clinical Goals: Mobility; Maintain adequate oxygenation  Patient Goals: Mobility; Rest  Family Goals: n/a    Problem: Pain - Standard  Goal: Alleviation of pain or a reduction in pain to the patient’s comfort goal  Outcome: Progressing     Problem: Knowledge Deficit - Standard  Goal: Patient and family/care givers will demonstrate understanding of plan of care, disease process/condition, diagnostic tests and medications  Outcome: Progressing       Patient is not progressing towards the following goals:

## 2022-01-03 ENCOUNTER — APPOINTMENT (OUTPATIENT)
Dept: RADIOLOGY | Facility: MEDICAL CENTER | Age: 77
DRG: 454 | End: 2022-01-03
Attending: NEUROLOGICAL SURGERY
Payer: COMMERCIAL

## 2022-01-03 ENCOUNTER — ANESTHESIA (OUTPATIENT)
Dept: SURGERY | Facility: MEDICAL CENTER | Age: 77
DRG: 454 | End: 2022-01-03
Payer: COMMERCIAL

## 2022-01-03 ENCOUNTER — ANESTHESIA EVENT (OUTPATIENT)
Dept: SURGERY | Facility: MEDICAL CENTER | Age: 77
DRG: 454 | End: 2022-01-03
Payer: COMMERCIAL

## 2022-01-03 LAB
ANION GAP SERPL CALC-SCNC: 13 MMOL/L (ref 7–16)
BASOPHILS # BLD AUTO: 0.2 % (ref 0–1.8)
BASOPHILS # BLD: 0.02 K/UL (ref 0–0.12)
BUN SERPL-MCNC: 15 MG/DL (ref 8–22)
CALCIUM SERPL-MCNC: 8.6 MG/DL (ref 8.5–10.5)
CHLORIDE SERPL-SCNC: 103 MMOL/L (ref 96–112)
CO2 SERPL-SCNC: 23 MMOL/L (ref 20–33)
CREAT SERPL-MCNC: 1.06 MG/DL (ref 0.5–1.4)
EOSINOPHIL # BLD AUTO: 0 K/UL (ref 0–0.51)
EOSINOPHIL NFR BLD: 0 % (ref 0–6.9)
ERYTHROCYTE [DISTWIDTH] IN BLOOD BY AUTOMATED COUNT: 41.1 FL (ref 35.9–50)
GLUCOSE SERPL-MCNC: 133 MG/DL (ref 65–99)
HCT VFR BLD AUTO: 37.1 % (ref 42–52)
HGB BLD-MCNC: 12.2 G/DL (ref 14–18)
IMM GRANULOCYTES # BLD AUTO: 0.04 K/UL (ref 0–0.11)
IMM GRANULOCYTES NFR BLD AUTO: 0.4 % (ref 0–0.9)
LYMPHOCYTES # BLD AUTO: 0.42 K/UL (ref 1–4.8)
LYMPHOCYTES NFR BLD: 4.4 % (ref 22–41)
MCH RBC QN AUTO: 29.5 PG (ref 27–33)
MCHC RBC AUTO-ENTMCNC: 32.9 G/DL (ref 33.7–35.3)
MCV RBC AUTO: 89.8 FL (ref 81.4–97.8)
MONOCYTES # BLD AUTO: 0.93 K/UL (ref 0–0.85)
MONOCYTES NFR BLD AUTO: 9.7 % (ref 0–13.4)
NEUTROPHILS # BLD AUTO: 8.2 K/UL (ref 1.82–7.42)
NEUTROPHILS NFR BLD: 85.3 % (ref 44–72)
NRBC # BLD AUTO: 0 K/UL
NRBC BLD-RTO: 0 /100 WBC
PLATELET # BLD AUTO: 203 K/UL (ref 164–446)
PMV BLD AUTO: 10.3 FL (ref 9–12.9)
POTASSIUM SERPL-SCNC: 4.3 MMOL/L (ref 3.6–5.5)
RBC # BLD AUTO: 4.13 M/UL (ref 4.7–6.1)
SODIUM SERPL-SCNC: 139 MMOL/L (ref 135–145)
WBC # BLD AUTO: 9.6 K/UL (ref 4.8–10.8)

## 2022-01-03 PROCEDURE — 36415 COLL VENOUS BLD VENIPUNCTURE: CPT

## 2022-01-03 PROCEDURE — 0SG3071 FUSION OF LUMBOSACRAL JOINT WITH AUTOLOGOUS TISSUE SUBSTITUTE, POSTERIOR APPROACH, POSTERIOR COLUMN, OPEN APPROACH: ICD-10-PCS | Performed by: NEUROLOGICAL SURGERY

## 2022-01-03 PROCEDURE — 700105 HCHG RX REV CODE 258: Performed by: ANESTHESIOLOGY

## 2022-01-03 PROCEDURE — 85025 COMPLETE CBC W/AUTO DIFF WBC: CPT

## 2022-01-03 PROCEDURE — 110454 HCHG SHELL REV 250: Performed by: NEUROLOGICAL SURGERY

## 2022-01-03 PROCEDURE — 160002 HCHG RECOVERY MINUTES (STAT): Performed by: NEUROLOGICAL SURGERY

## 2022-01-03 PROCEDURE — 700105 HCHG RX REV CODE 258: Performed by: NURSE PRACTITIONER

## 2022-01-03 PROCEDURE — 01NB0ZZ RELEASE LUMBAR NERVE, OPEN APPROACH: ICD-10-PCS | Performed by: NEUROLOGICAL SURGERY

## 2022-01-03 PROCEDURE — 95955 EEG DURING SURGERY: CPT | Performed by: NEUROLOGICAL SURGERY

## 2022-01-03 PROCEDURE — 4A11X4G MONITORING OF PERIPHERAL NERVOUS ELECTRICAL ACTIVITY, INTRAOPERATIVE, EXTERNAL APPROACH: ICD-10-PCS | Performed by: NEUROLOGICAL SURGERY

## 2022-01-03 PROCEDURE — 500885 HCHG PACK, JACKSON TABLE: Performed by: NEUROLOGICAL SURGERY

## 2022-01-03 PROCEDURE — 0SP304Z REMOVAL OF INTERNAL FIXATION DEVICE FROM LUMBOSACRAL JOINT, OPEN APPROACH: ICD-10-PCS | Performed by: NEUROLOGICAL SURGERY

## 2022-01-03 PROCEDURE — 160048 HCHG OR STATISTICAL LEVEL 1-5: Performed by: NEUROLOGICAL SURGERY

## 2022-01-03 PROCEDURE — A9270 NON-COVERED ITEM OR SERVICE: HCPCS | Performed by: NURSE PRACTITIONER

## 2022-01-03 PROCEDURE — 770001 HCHG ROOM/CARE - MED/SURG/GYN PRIV*

## 2022-01-03 PROCEDURE — 502000 HCHG MISC OR IMPLANTS RC 0278: Performed by: NEUROLOGICAL SURGERY

## 2022-01-03 PROCEDURE — 95938 SOMATOSENSORY TESTING: CPT | Performed by: NEUROLOGICAL SURGERY

## 2022-01-03 PROCEDURE — 95937 NEUROMUSCULAR JUNCTION TEST: CPT | Performed by: NEUROLOGICAL SURGERY

## 2022-01-03 PROCEDURE — 700101 HCHG RX REV CODE 250: Performed by: ANESTHESIOLOGY

## 2022-01-03 PROCEDURE — 700101 HCHG RX REV CODE 250: Performed by: NEUROLOGICAL SURGERY

## 2022-01-03 PROCEDURE — 160009 HCHG ANES TIME/MIN: Performed by: NEUROLOGICAL SURGERY

## 2022-01-03 PROCEDURE — 95861 NEEDLE EMG 2 EXTREMITIES: CPT | Performed by: NEUROLOGICAL SURGERY

## 2022-01-03 PROCEDURE — 501838 HCHG SUTURE GENERAL: Performed by: NEUROLOGICAL SURGERY

## 2022-01-03 PROCEDURE — 8E0WXBZ COMPUTER ASSISTED PROCEDURE OF TRUNK REGION: ICD-10-PCS | Performed by: NEUROLOGICAL SURGERY

## 2022-01-03 PROCEDURE — 700102 HCHG RX REV CODE 250 W/ 637 OVERRIDE(OP): Performed by: NURSE PRACTITIONER

## 2022-01-03 PROCEDURE — L8699 PROSTHETIC IMPLANT NOS: HCPCS | Performed by: NEUROLOGICAL SURGERY

## 2022-01-03 PROCEDURE — 500367 HCHG DRAIN KIT, HEMOVAC: Performed by: NEUROLOGICAL SURGERY

## 2022-01-03 PROCEDURE — 700111 HCHG RX REV CODE 636 W/ 250 OVERRIDE (IP): Performed by: ANESTHESIOLOGY

## 2022-01-03 PROCEDURE — C1821 INTERSPINOUS IMPLANT: HCPCS | Performed by: NEUROLOGICAL SURGERY

## 2022-01-03 PROCEDURE — 160031 HCHG SURGERY MINUTES - 1ST 30 MINS LEVEL 5: Performed by: NEUROLOGICAL SURGERY

## 2022-01-03 PROCEDURE — 160042 HCHG SURGERY MINUTES - EA ADDL 1 MIN LEVEL 5: Performed by: NEUROLOGICAL SURGERY

## 2022-01-03 PROCEDURE — 00NY0ZZ RELEASE LUMBAR SPINAL CORD, OPEN APPROACH: ICD-10-PCS | Performed by: NEUROLOGICAL SURGERY

## 2022-01-03 PROCEDURE — 95940 IONM IN OPERATNG ROOM 15 MIN: CPT | Performed by: NEUROLOGICAL SURGERY

## 2022-01-03 PROCEDURE — 160035 HCHG PACU - 1ST 60 MINS PHASE I: Performed by: NEUROLOGICAL SURGERY

## 2022-01-03 PROCEDURE — 160036 HCHG PACU - EA ADDL 30 MINS PHASE I: Performed by: NEUROLOGICAL SURGERY

## 2022-01-03 PROCEDURE — 0SP004Z REMOVAL OF INTERNAL FIXATION DEVICE FROM LUMBAR VERTEBRAL JOINT, OPEN APPROACH: ICD-10-PCS | Performed by: NEUROLOGICAL SURGERY

## 2022-01-03 PROCEDURE — 110371 HCHG SHELL REV 272: Performed by: NEUROLOGICAL SURGERY

## 2022-01-03 PROCEDURE — 700111 HCHG RX REV CODE 636 W/ 250 OVERRIDE (IP): Performed by: NEUROLOGICAL SURGERY

## 2022-01-03 PROCEDURE — 0SG00AJ FUSION OF LUMBAR VERTEBRAL JOINT WITH INTERBODY FUSION DEVICE, POSTERIOR APPROACH, ANTERIOR COLUMN, OPEN APPROACH: ICD-10-PCS | Performed by: NEUROLOGICAL SURGERY

## 2022-01-03 PROCEDURE — 80048 BASIC METABOLIC PNL TOTAL CA: CPT

## 2022-01-03 PROCEDURE — 72100 X-RAY EXAM L-S SPINE 2/3 VWS: CPT

## 2022-01-03 PROCEDURE — 500864 HCHG NEEDLE, SPINAL 18G: Performed by: NEUROLOGICAL SURGERY

## 2022-01-03 PROCEDURE — 700111 HCHG RX REV CODE 636 W/ 250 OVERRIDE (IP): Performed by: NURSE PRACTITIONER

## 2022-01-03 PROCEDURE — 95939 C MOTOR EVOKED UPR&LWR LIMBS: CPT | Performed by: NEUROLOGICAL SURGERY

## 2022-01-03 PROCEDURE — 95907 NVR CNDJ TST 1-2 STUDIES: CPT | Performed by: NEUROLOGICAL SURGERY

## 2022-01-03 PROCEDURE — 01NR0ZZ RELEASE SACRAL NERVE, OPEN APPROACH: ICD-10-PCS | Performed by: NEUROLOGICAL SURGERY

## 2022-01-03 PROCEDURE — 0SG1071 FUSION OF 2 OR MORE LUMBAR VERTEBRAL JOINTS WITH AUTOLOGOUS TISSUE SUBSTITUTE, POSTERIOR APPROACH, POSTERIOR COLUMN, OPEN APPROACH: ICD-10-PCS | Performed by: NEUROLOGICAL SURGERY

## 2022-01-03 PROCEDURE — C1713 ANCHOR/SCREW BN/BN,TIS/BN: HCPCS | Performed by: NEUROLOGICAL SURGERY

## 2022-01-03 PROCEDURE — 700101 HCHG RX REV CODE 250: Performed by: NURSE PRACTITIONER

## 2022-01-03 DEVICE — SCREW SOLERA SET SCREW (1TCX40+3TCX21+2TCX10=123): Type: IMPLANTABLE DEVICE | Site: BACK | Status: FUNCTIONAL

## 2022-01-03 DEVICE — IMPLANTABLE DEVICE: Type: IMPLANTABLE DEVICE | Site: BACK | Status: FUNCTIONAL

## 2022-01-03 DEVICE — GRAFT BONE CHIPS CANCELLOUS 4-10MM 30CC (1EA): Type: IMPLANTABLE DEVICE | Site: BACK | Status: FUNCTIONAL

## 2022-01-03 DEVICE — SEALANT DURAL AUTOSPRAY ADHERUS (5EA/PK): Type: IMPLANTABLE DEVICE | Site: BACK | Status: FUNCTIONAL

## 2022-01-03 DEVICE — GRAFT DURAMATRIX ONLAY PLUS 1IN X 1IN OR 2.7CM X 2.75CM: Type: IMPLANTABLE DEVICE | Site: BACK | Status: FUNCTIONAL

## 2022-01-03 DEVICE — SCREW MAS SOLERA 8.5 X 45MM (1TCONX4=4): Type: IMPLANTABLE DEVICE | Site: BACK | Status: FUNCTIONAL

## 2022-01-03 DEVICE — ROD STRAIGHT TITANIUM ALLOY ROD 5.5 X 500MM (1TCX3+3TCX2=9): Type: IMPLANTABLE DEVICE | Site: BACK | Status: FUNCTIONAL

## 2022-01-03 DEVICE — SCREW MAS  SOLERA 6.5 X 50MM (1TCX16+3TCX8=40): Type: IMPLANTABLE DEVICE | Site: BACK | Status: FUNCTIONAL

## 2022-01-03 DEVICE — GRAPH BONE KIT INFUSE LARGE II: Type: IMPLANTABLE DEVICE | Site: BACK | Status: FUNCTIONAL

## 2022-01-03 RX ORDER — HALOPERIDOL 5 MG/ML
1 INJECTION INTRAMUSCULAR
Status: DISCONTINUED | OUTPATIENT
Start: 2022-01-03 | End: 2022-01-03 | Stop reason: HOSPADM

## 2022-01-03 RX ORDER — SODIUM CHLORIDE, SODIUM LACTATE, POTASSIUM CHLORIDE, CALCIUM CHLORIDE 600; 310; 30; 20 MG/100ML; MG/100ML; MG/100ML; MG/100ML
INJECTION, SOLUTION INTRAVENOUS
Status: DISCONTINUED | OUTPATIENT
Start: 2022-01-03 | End: 2022-01-03 | Stop reason: SURG

## 2022-01-03 RX ORDER — ONDANSETRON 2 MG/ML
4 INJECTION INTRAMUSCULAR; INTRAVENOUS
Status: DISCONTINUED | OUTPATIENT
Start: 2022-01-03 | End: 2022-01-03 | Stop reason: HOSPADM

## 2022-01-03 RX ORDER — HYDROMORPHONE HYDROCHLORIDE 1 MG/ML
0.2 INJECTION, SOLUTION INTRAMUSCULAR; INTRAVENOUS; SUBCUTANEOUS
Status: DISCONTINUED | OUTPATIENT
Start: 2022-01-03 | End: 2022-01-03 | Stop reason: HOSPADM

## 2022-01-03 RX ORDER — BUPIVACAINE HYDROCHLORIDE AND EPINEPHRINE 2.5; 5 MG/ML; UG/ML
INJECTION, SOLUTION EPIDURAL; INFILTRATION; INTRACAUDAL; PERINEURAL
Status: DISCONTINUED | OUTPATIENT
Start: 2022-01-03 | End: 2022-01-03 | Stop reason: HOSPADM

## 2022-01-03 RX ORDER — HYDROMORPHONE HYDROCHLORIDE 2 MG/ML
INJECTION, SOLUTION INTRAMUSCULAR; INTRAVENOUS; SUBCUTANEOUS PRN
Status: DISCONTINUED | OUTPATIENT
Start: 2022-01-03 | End: 2022-01-03 | Stop reason: SURG

## 2022-01-03 RX ORDER — HYDRALAZINE HYDROCHLORIDE 20 MG/ML
5 INJECTION INTRAMUSCULAR; INTRAVENOUS
Status: DISCONTINUED | OUTPATIENT
Start: 2022-01-03 | End: 2022-01-03 | Stop reason: HOSPADM

## 2022-01-03 RX ORDER — DIAZEPAM 5 MG/1
5 TABLET ORAL EVERY 6 HOURS PRN
Status: DISCONTINUED | OUTPATIENT
Start: 2022-01-03 | End: 2022-01-03

## 2022-01-03 RX ORDER — LIDOCAINE HYDROCHLORIDE 20 MG/ML
INJECTION, SOLUTION EPIDURAL; INFILTRATION; INTRACAUDAL; PERINEURAL PRN
Status: DISCONTINUED | OUTPATIENT
Start: 2022-01-03 | End: 2022-01-03 | Stop reason: SURG

## 2022-01-03 RX ORDER — HYDROMORPHONE HYDROCHLORIDE 1 MG/ML
0.1 INJECTION, SOLUTION INTRAMUSCULAR; INTRAVENOUS; SUBCUTANEOUS
Status: DISCONTINUED | OUTPATIENT
Start: 2022-01-03 | End: 2022-01-03 | Stop reason: HOSPADM

## 2022-01-03 RX ORDER — ONDANSETRON 2 MG/ML
INJECTION INTRAMUSCULAR; INTRAVENOUS PRN
Status: DISCONTINUED | OUTPATIENT
Start: 2022-01-03 | End: 2022-01-03 | Stop reason: SURG

## 2022-01-03 RX ORDER — HYDROMORPHONE HYDROCHLORIDE 1 MG/ML
0.4 INJECTION, SOLUTION INTRAMUSCULAR; INTRAVENOUS; SUBCUTANEOUS
Status: DISCONTINUED | OUTPATIENT
Start: 2022-01-03 | End: 2022-01-03 | Stop reason: HOSPADM

## 2022-01-03 RX ORDER — CEFAZOLIN SODIUM 1 G/3ML
INJECTION, POWDER, FOR SOLUTION INTRAMUSCULAR; INTRAVENOUS PRN
Status: DISCONTINUED | OUTPATIENT
Start: 2022-01-03 | End: 2022-01-03 | Stop reason: SURG

## 2022-01-03 RX ORDER — LABETALOL HYDROCHLORIDE 5 MG/ML
5 INJECTION, SOLUTION INTRAVENOUS
Status: DISCONTINUED | OUTPATIENT
Start: 2022-01-03 | End: 2022-01-03 | Stop reason: HOSPADM

## 2022-01-03 RX ORDER — OXYCODONE HCL 5 MG/5 ML
10 SOLUTION, ORAL ORAL
Status: DISCONTINUED | OUTPATIENT
Start: 2022-01-03 | End: 2022-01-03 | Stop reason: HOSPADM

## 2022-01-03 RX ORDER — DEXAMETHASONE SODIUM PHOSPHATE 4 MG/ML
INJECTION, SOLUTION INTRA-ARTICULAR; INTRALESIONAL; INTRAMUSCULAR; INTRAVENOUS; SOFT TISSUE PRN
Status: DISCONTINUED | OUTPATIENT
Start: 2022-01-03 | End: 2022-01-03 | Stop reason: SURG

## 2022-01-03 RX ORDER — CEFAZOLIN SODIUM 1 G/3ML
INJECTION, POWDER, FOR SOLUTION INTRAMUSCULAR; INTRAVENOUS
Status: DISCONTINUED | OUTPATIENT
Start: 2022-01-03 | End: 2022-01-03 | Stop reason: HOSPADM

## 2022-01-03 RX ORDER — CEFAZOLIN SODIUM 2 G/100ML
2 INJECTION, SOLUTION INTRAVENOUS EVERY 8 HOURS
Status: COMPLETED | OUTPATIENT
Start: 2022-01-03 | End: 2022-01-04

## 2022-01-03 RX ORDER — SODIUM CHLORIDE 9 MG/ML
INJECTION, SOLUTION INTRAVENOUS
Status: DISCONTINUED | OUTPATIENT
Start: 2022-01-03 | End: 2022-01-03 | Stop reason: SURG

## 2022-01-03 RX ORDER — PHENYLEPHRINE HYDROCHLORIDE 10 MG/ML
INJECTION, SOLUTION INTRAMUSCULAR; INTRAVENOUS; SUBCUTANEOUS PRN
Status: DISCONTINUED | OUTPATIENT
Start: 2022-01-03 | End: 2022-01-03

## 2022-01-03 RX ORDER — OXYCODONE HCL 5 MG/5 ML
5 SOLUTION, ORAL ORAL
Status: DISCONTINUED | OUTPATIENT
Start: 2022-01-03 | End: 2022-01-03 | Stop reason: HOSPADM

## 2022-01-03 RX ORDER — DIAZEPAM 5 MG/ML
5 INJECTION, SOLUTION INTRAMUSCULAR; INTRAVENOUS EVERY 6 HOURS PRN
Status: DISCONTINUED | OUTPATIENT
Start: 2022-01-03 | End: 2022-01-05 | Stop reason: HOSPADM

## 2022-01-03 RX ORDER — VANCOMYCIN HYDROCHLORIDE 1 G/20ML
INJECTION, POWDER, LYOPHILIZED, FOR SOLUTION INTRAVENOUS
Status: COMPLETED | OUTPATIENT
Start: 2022-01-03 | End: 2022-01-03

## 2022-01-03 RX ORDER — SODIUM CHLORIDE, SODIUM LACTATE, POTASSIUM CHLORIDE, CALCIUM CHLORIDE 600; 310; 30; 20 MG/100ML; MG/100ML; MG/100ML; MG/100ML
INJECTION, SOLUTION INTRAVENOUS CONTINUOUS
Status: DISCONTINUED | OUTPATIENT
Start: 2022-01-03 | End: 2022-01-03 | Stop reason: HOSPADM

## 2022-01-03 RX ORDER — METOCLOPRAMIDE HYDROCHLORIDE 5 MG/ML
INJECTION INTRAMUSCULAR; INTRAVENOUS PRN
Status: DISCONTINUED | OUTPATIENT
Start: 2022-01-03 | End: 2022-01-03 | Stop reason: SURG

## 2022-01-03 RX ORDER — MEPERIDINE HYDROCHLORIDE 25 MG/ML
12.5 INJECTION INTRAMUSCULAR; INTRAVENOUS; SUBCUTANEOUS
Status: DISCONTINUED | OUTPATIENT
Start: 2022-01-03 | End: 2022-01-03 | Stop reason: HOSPADM

## 2022-01-03 RX ADMIN — ONDANSETRON 4 MG: 2 INJECTION INTRAMUSCULAR; INTRAVENOUS at 12:15

## 2022-01-03 RX ADMIN — ROCURONIUM BROMIDE 20 MG: 10 INJECTION, SOLUTION INTRAVENOUS at 07:39

## 2022-01-03 RX ADMIN — GABAPENTIN 600 MG: 300 CAPSULE ORAL at 21:01

## 2022-01-03 RX ADMIN — GABAPENTIN 600 MG: 300 CAPSULE ORAL at 00:02

## 2022-01-03 RX ADMIN — POTASSIUM CHLORIDE AND SODIUM CHLORIDE: 900; 150 INJECTION, SOLUTION INTRAVENOUS at 17:54

## 2022-01-03 RX ADMIN — ACETAMINOPHEN 650 MG: 325 TABLET, FILM COATED ORAL at 04:40

## 2022-01-03 RX ADMIN — MIDAZOLAM 2 MG: 1 INJECTION INTRAMUSCULAR; INTRAVENOUS at 07:33

## 2022-01-03 RX ADMIN — LABETALOL HYDROCHLORIDE 5 MG: 5 INJECTION INTRAVENOUS at 14:10

## 2022-01-03 RX ADMIN — ACETAMINOPHEN 650 MG: 325 TABLET, FILM COATED ORAL at 21:00

## 2022-01-03 RX ADMIN — CALCIUM CARBONATE 500 MG: 500 TABLET, CHEWABLE ORAL at 04:41

## 2022-01-03 RX ADMIN — LIDOCAINE HYDROCHLORIDE 100 MG: 20 INJECTION, SOLUTION EPIDURAL; INFILTRATION; INTRACAUDAL at 07:39

## 2022-01-03 RX ADMIN — HYDROMORPHONE HYDROCHLORIDE 0.2 MG: 2 INJECTION, SOLUTION INTRAMUSCULAR; INTRAVENOUS; SUBCUTANEOUS at 12:40

## 2022-01-03 RX ADMIN — METOCLOPRAMIDE 10 MG: 5 INJECTION, SOLUTION INTRAMUSCULAR; INTRAVENOUS at 12:15

## 2022-01-03 RX ADMIN — SODIUM CHLORIDE: 9 INJECTION, SOLUTION INTRAVENOUS at 10:41

## 2022-01-03 RX ADMIN — HYDROMORPHONE HYDROCHLORIDE 0.4 MG: 2 INJECTION, SOLUTION INTRAMUSCULAR; INTRAVENOUS; SUBCUTANEOUS at 12:18

## 2022-01-03 RX ADMIN — SODIUM CHLORIDE: 9 INJECTION, SOLUTION INTRAVENOUS at 07:43

## 2022-01-03 RX ADMIN — PROPOFOL 200 MG: 10 INJECTION, EMULSION INTRAVENOUS at 07:39

## 2022-01-03 RX ADMIN — ACETAMINOPHEN 650 MG: 325 TABLET, FILM COATED ORAL at 00:02

## 2022-01-03 RX ADMIN — CEFAZOLIN 2 G: 10 INJECTION, POWDER, FOR SOLUTION INTRAVENOUS at 21:07

## 2022-01-03 RX ADMIN — PHENYLEPHRINE HYDROCHLORIDE 25 MCG/MIN: 10 INJECTION INTRAVENOUS at 08:16

## 2022-01-03 RX ADMIN — HYDRALAZINE HYDROCHLORIDE 5 MG: 20 INJECTION INTRAMUSCULAR; INTRAVENOUS at 13:35

## 2022-01-03 RX ADMIN — CEFAZOLIN 2 G: 330 INJECTION, POWDER, FOR SOLUTION INTRAMUSCULAR; INTRAVENOUS at 07:39

## 2022-01-03 RX ADMIN — PROPOFOL 100 MG: 10 INJECTION, EMULSION INTRAVENOUS at 07:48

## 2022-01-03 RX ADMIN — LEVOTHYROXINE SODIUM 137 MCG: 137 TABLET ORAL at 04:41

## 2022-01-03 RX ADMIN — REMIFENTANIL HYDROCHLORIDE 0.2 MCG/KG/MIN: 1 INJECTION, POWDER, LYOPHILIZED, FOR SOLUTION INTRAVENOUS at 07:43

## 2022-01-03 RX ADMIN — PHENYLEPHRINE HYDROCHLORIDE 25 MCG/MIN: 10 INJECTION INTRAVENOUS at 07:57

## 2022-01-03 RX ADMIN — SODIUM CHLORIDE, POTASSIUM CHLORIDE, SODIUM LACTATE AND CALCIUM CHLORIDE: 600; 310; 30; 20 INJECTION, SOLUTION INTRAVENOUS at 07:33

## 2022-01-03 RX ADMIN — DIAZEPAM 5 MG: 5 INJECTION, SOLUTION INTRAMUSCULAR; INTRAVENOUS at 18:17

## 2022-01-03 RX ADMIN — CEFAZOLIN 2 G: 330 INJECTION, POWDER, FOR SOLUTION INTRAMUSCULAR; INTRAVENOUS at 11:30

## 2022-01-03 RX ADMIN — Medication 180 MG: at 07:39

## 2022-01-03 RX ADMIN — METHOCARBAMOL 1000 MG: 1000 INJECTION, SOLUTION INTRAMUSCULAR; INTRAVENOUS at 22:00

## 2022-01-03 RX ADMIN — DEXAMETHASONE SODIUM PHOSPHATE 4 MG: 4 INJECTION, SOLUTION INTRA-ARTICULAR; INTRALESIONAL; INTRAMUSCULAR; INTRAVENOUS; SOFT TISSUE at 07:39

## 2022-01-03 RX ADMIN — DOCUSATE SODIUM 100 MG: 100 CAPSULE ORAL at 04:41

## 2022-01-03 ASSESSMENT — PAIN DESCRIPTION - PAIN TYPE: TYPE: SURGICAL PAIN

## 2022-01-03 NOTE — ANESTHESIA PROCEDURE NOTES
Peripheral IV    Date/Time: 1/3/2022 7:43 AM  Performed by: Moiz Yan M.D.  Authorized by: Moiz Yan M.D.     Size:  20 G  Laterality:  Right  Site Prep:  Alcohol  Technique:  Direct puncture  Attempts:  1

## 2022-01-03 NOTE — ANESTHESIA PROCEDURE NOTES
Peripheral IV    Date/Time: 1/3/2022 7:50 AM  Performed by: Moiz Yan M.D.  Authorized by: Moiz Yan M.D.     Size:  18 G  Laterality:  Left  Site Prep:  Alcohol  Technique:  Direct puncture  Attempts:  3

## 2022-01-03 NOTE — CARE PLAN
The patient is Stable - Low risk of patient condition declining or worsening    Shift Goals  Clinical Goals: Mobility; Maintain adequate oxygenation  Patient Goals: Mobility; Rest  Family Goals: n/a    Progress made toward(s) clinical / shift goals:      Problem: Pain - Standard  Goal: Alleviation of pain or a reduction in pain to the patient’s comfort goal  Outcome: Progressing     Problem: Knowledge Deficit - Standard  Goal: Patient and family/care givers will demonstrate understanding of plan of care, disease process/condition, diagnostic tests and medications  Outcome: Progressing       Patient is not progressing towards the following goals:    N/a

## 2022-01-03 NOTE — PROGRESS NOTES
Neurosurgery    Pt to have stage II- posterior procedure today  Labs, testing reviewed  Consents ordered  Remains NPO  Will proceed with surgery

## 2022-01-03 NOTE — ANESTHESIA TIME REPORT
Anesthesia Start and Stop Event Times     Date Time Event    1/3/2022 0701 Ready for Procedure     0733 Anesthesia Start     1250 Anesthesia Stop        Responsible Staff  01/03/22    Name Role Begin End    Moiz Yan M.D. Anesth 0733 1250        Preop Diagnosis (Free Text):  Pre-op Diagnosis     SPINAL STENOSIS OF LUMBAR REGION        Preop Diagnosis (Codes):    Premium Reason  Non-Premium    Comments:

## 2022-01-03 NOTE — ANESTHESIA PROCEDURE NOTES
Airway    Date/Time: 1/3/2022 7:40 AM  Performed by: Moiz Yan M.D.  Authorized by: Moiz Yan M.D.     Location:  OR  Urgency:  Elective  Difficult Airway: No    Indications for Airway Management:  Anesthesia      Spontaneous Ventilation: absent    Sedation Level:  Deep  Preoxygenated: Yes    Patient Position:  Sniffing  Final Airway Type:  Endotracheal airway  Final Endotracheal Airway:  ETT  Cuffed: Yes    Technique Used for Successful ETT Placement:  Direct laryngoscopy  Devices/Methods Used in Placement:  Intubating stylet    Insertion Site:  Oral  Blade Type:  Agustín  Laryngoscope Blade/Videolaryngoscope Blade Size:  4  ETT Size (mm):  7.5  Measured from:  Teeth  ETT to Teeth (cm):  23  Placement Verified by: auscultation and capnometry    Cormack-Lehane Classification:  Grade I - full view of glottis  Number of Attempts at Approach:  1

## 2022-01-03 NOTE — OR NURSING
Patient is awake, alert, oriented x 3, disoriented to time. States pain is tolerable for now. Vital signs stable on 3L NC. States he has some numbness in both feet that was there before surgery. 4/5 left plantar flexion, Per report from Dr. Webber this was present pre-operatively. Dressing CDI, hemovac intact.     Report given to Linda BARRETT

## 2022-01-03 NOTE — CARE PLAN
The patient is Stable - Low risk of patient condition declining or worsening    Shift Goals  Clinical Goals: Mobility; Maintain adequate oxygenation  Patient Goals: Mobility; Rest  Family Goals: n/a    Progress made toward(s) clinical / shift goals:      Problem: Pain - Standard  Goal: Alleviation of pain or a reduction in pain to the patient’s comfort goal  Outcome: Progressing  Note: Pt states pain is well controlled with scheduled medications. Reminded pt prn medications are available if needed.      Problem: Knowledge Deficit - Standard  Goal: Patient and family/care givers will demonstrate understanding of plan of care, disease process/condition, diagnostic tests and medications  Outcome: Progressing  Note: Pt updated on plan of care. Pt encouraged to ask questions and questions were answered. Call light within reach. Pt reminded to use call light whenever needing assistance.           Patient is not progressing towards the following goals:    N/a

## 2022-01-04 LAB
ANION GAP SERPL CALC-SCNC: 11 MMOL/L (ref 7–16)
BUN SERPL-MCNC: 13 MG/DL (ref 8–22)
CALCIUM SERPL-MCNC: 8.2 MG/DL (ref 8.5–10.5)
CHLORIDE SERPL-SCNC: 105 MMOL/L (ref 96–112)
CO2 SERPL-SCNC: 26 MMOL/L (ref 20–33)
CREAT SERPL-MCNC: 1.04 MG/DL (ref 0.5–1.4)
ERYTHROCYTE [DISTWIDTH] IN BLOOD BY AUTOMATED COUNT: 41.2 FL (ref 35.9–50)
GLUCOSE SERPL-MCNC: 116 MG/DL (ref 65–99)
HCT VFR BLD AUTO: 33 % (ref 42–52)
HGB BLD-MCNC: 11 G/DL (ref 14–18)
MCH RBC QN AUTO: 30 PG (ref 27–33)
MCHC RBC AUTO-ENTMCNC: 33.3 G/DL (ref 33.7–35.3)
MCV RBC AUTO: 89.9 FL (ref 81.4–97.8)
PLATELET # BLD AUTO: 195 K/UL (ref 164–446)
PMV BLD AUTO: 10.4 FL (ref 9–12.9)
POTASSIUM SERPL-SCNC: 4 MMOL/L (ref 3.6–5.5)
RBC # BLD AUTO: 3.67 M/UL (ref 4.7–6.1)
SODIUM SERPL-SCNC: 142 MMOL/L (ref 135–145)
WBC # BLD AUTO: 8.2 K/UL (ref 4.8–10.8)

## 2022-01-04 PROCEDURE — 700105 HCHG RX REV CODE 258: Performed by: NURSE PRACTITIONER

## 2022-01-04 PROCEDURE — 85027 COMPLETE CBC AUTOMATED: CPT

## 2022-01-04 PROCEDURE — A9270 NON-COVERED ITEM OR SERVICE: HCPCS | Performed by: NURSE PRACTITIONER

## 2022-01-04 PROCEDURE — 770001 HCHG ROOM/CARE - MED/SURG/GYN PRIV*

## 2022-01-04 PROCEDURE — 700111 HCHG RX REV CODE 636 W/ 250 OVERRIDE (IP): Performed by: NURSE PRACTITIONER

## 2022-01-04 PROCEDURE — 700102 HCHG RX REV CODE 250 W/ 637 OVERRIDE(OP): Performed by: NURSE PRACTITIONER

## 2022-01-04 PROCEDURE — 36415 COLL VENOUS BLD VENIPUNCTURE: CPT

## 2022-01-04 PROCEDURE — 80048 BASIC METABOLIC PNL TOTAL CA: CPT

## 2022-01-04 RX ORDER — OXYCODONE HYDROCHLORIDE 10 MG/1
10 TABLET ORAL EVERY 4 HOURS PRN
Status: DISCONTINUED | OUTPATIENT
Start: 2022-01-04 | End: 2022-01-05 | Stop reason: HOSPADM

## 2022-01-04 RX ORDER — TIZANIDINE 4 MG/1
2 TABLET ORAL EVERY 6 HOURS PRN
Status: DISCONTINUED | OUTPATIENT
Start: 2022-01-04 | End: 2022-01-05 | Stop reason: HOSPADM

## 2022-01-04 RX ORDER — HYDROMORPHONE HYDROCHLORIDE 1 MG/ML
0.5 INJECTION, SOLUTION INTRAMUSCULAR; INTRAVENOUS; SUBCUTANEOUS
Status: DISCONTINUED | OUTPATIENT
Start: 2022-01-04 | End: 2022-01-05 | Stop reason: HOSPADM

## 2022-01-04 RX ORDER — OXYCODONE HYDROCHLORIDE 5 MG/1
5 TABLET ORAL EVERY 4 HOURS PRN
Status: DISCONTINUED | OUTPATIENT
Start: 2022-01-04 | End: 2022-01-05 | Stop reason: HOSPADM

## 2022-01-04 RX ADMIN — METHOCARBAMOL 1000 MG: 1000 INJECTION, SOLUTION INTRAMUSCULAR; INTRAVENOUS at 23:19

## 2022-01-04 RX ADMIN — ACETAMINOPHEN 650 MG: 325 TABLET, FILM COATED ORAL at 13:26

## 2022-01-04 RX ADMIN — ATORVASTATIN CALCIUM 10 MG: 10 TABLET, FILM COATED ORAL at 20:07

## 2022-01-04 RX ADMIN — GABAPENTIN 600 MG: 300 CAPSULE ORAL at 16:50

## 2022-01-04 RX ADMIN — ACETAMINOPHEN 650 MG: 325 TABLET, FILM COATED ORAL at 16:51

## 2022-01-04 RX ADMIN — GABAPENTIN 600 MG: 300 CAPSULE ORAL at 13:27

## 2022-01-04 RX ADMIN — LEVOTHYROXINE SODIUM 137 MCG: 137 TABLET ORAL at 04:53

## 2022-01-04 RX ADMIN — ACETAMINOPHEN 650 MG: 325 TABLET, FILM COATED ORAL at 23:16

## 2022-01-04 RX ADMIN — GABAPENTIN 600 MG: 300 CAPSULE ORAL at 09:14

## 2022-01-04 RX ADMIN — GABAPENTIN 600 MG: 300 CAPSULE ORAL at 23:16

## 2022-01-04 RX ADMIN — OXYCODONE HYDROCHLORIDE 10 MG: 10 TABLET ORAL at 09:14

## 2022-01-04 RX ADMIN — METHOCARBAMOL 1000 MG: 1000 INJECTION, SOLUTION INTRAMUSCULAR; INTRAVENOUS at 07:37

## 2022-01-04 RX ADMIN — DOCUSATE SODIUM 100 MG: 100 CAPSULE ORAL at 16:51

## 2022-01-04 RX ADMIN — CALCIUM CARBONATE 500 MG: 500 TABLET, CHEWABLE ORAL at 04:53

## 2022-01-04 RX ADMIN — ACETAMINOPHEN 650 MG: 325 TABLET, FILM COATED ORAL at 04:53

## 2022-01-04 RX ADMIN — GABAPENTIN 600 MG: 300 CAPSULE ORAL at 20:07

## 2022-01-04 RX ADMIN — TIZANIDINE 2 MG: 4 TABLET ORAL at 03:30

## 2022-01-04 RX ADMIN — DOCUSATE SODIUM 100 MG: 100 CAPSULE ORAL at 04:53

## 2022-01-04 RX ADMIN — CALCIUM CARBONATE 500 MG: 500 TABLET, CHEWABLE ORAL at 16:51

## 2022-01-04 RX ADMIN — CEFAZOLIN 2 G: 10 INJECTION, POWDER, FOR SOLUTION INTRAVENOUS at 03:21

## 2022-01-04 RX ADMIN — METHOCARBAMOL 1000 MG: 1000 INJECTION, SOLUTION INTRAMUSCULAR; INTRAVENOUS at 13:37

## 2022-01-04 RX ADMIN — HYDROCHLOROTHIAZIDE 12.5 MG: 12.5 TABLET ORAL at 04:53

## 2022-01-04 RX ADMIN — OXYCODONE HYDROCHLORIDE 10 MG: 10 TABLET ORAL at 13:27

## 2022-01-04 RX ADMIN — OXYCODONE HYDROCHLORIDE 10 MG: 10 TABLET ORAL at 18:39

## 2022-01-04 RX ADMIN — DOCUSATE SODIUM 50 MG AND SENNOSIDES 8.6 MG 1 TABLET: 8.6; 5 TABLET, FILM COATED ORAL at 20:07

## 2022-01-04 ASSESSMENT — PAIN DESCRIPTION - PAIN TYPE
TYPE: ACUTE PAIN;SURGICAL PAIN
TYPE: ACUTE PAIN;CHRONIC PAIN
TYPE: ACUTE PAIN;SURGICAL PAIN

## 2022-01-04 NOTE — PROGRESS NOTES
0400 Bilateral wrist restraints discontinued. Patient is alert and oriented x4. Able to answer all questions appropriately. Pt is cooperative with instructions and is accepting to follow bed rest orders until he is rounded on this morning. Educated pt on importance of staying flat in bed and pt was accepting of education. Pt laying in bed, call light within reach.

## 2022-01-04 NOTE — CARE PLAN
The patient is Watcher - Medium risk of patient condition declining or worsening    Shift Goals  Clinical Goals: Mobility; Maintain adequate oxygenation  Patient Goals: Mobility; Rest  Family Goals: n/a    Progress made toward(s) clinical / shift goals:      Problem: Safety - Medical Restraint  Goal: Remains free of injury from restraints (Restraint for Interference with Medical Device)  Outcome: Progressing  Note: Monitoring pt every 2 hours. Attempting to educate pt on reasoning for restraints and that it is for his safety, pt refusing education. Assessing pt safety, status, hydration, and nutrition.      Problem: Fall Risk  Goal: Patient will remain free from falls  Outcome: Progressing  Note: Bed alarm in place. Call light within reach. Non slip socks in use. Upper side rails up. Bilateral soft wrist restraints in use. Educated pt on importance of calling.        Patient is not progressing towards the following goals:    N/a

## 2022-01-04 NOTE — PROGRESS NOTES
Received an order from KYRA Graves to put patient on non violent restrain (soft restraint right and left wrist) for pulling lines and getting out of bed.

## 2022-01-04 NOTE — PROGRESS NOTES
2030 Pt screaming down hallway, checked on pt and saw that pt linens need to be changed. Told pt that we need to change his bedding so he isn't laying in it and pt got agitated stating we have 3 minutes to remove the restraints before he starts to get really agitated. Educated pt that the restraints need to remain in place for now as he has tried to get out of bed when he has orders to remain flat in bed. Pt denied education and said that he needs to get out of bed and needs to go pee. Educated pt that he has rios catheter in place and assessed to assure it's draining properly. Pt continued to yell at this RN and deny the education. Escalated matter to Charge RN. Pt tries to grab staff when turning so security called for assistance with cleaning up pt linens.

## 2022-01-04 NOTE — CARE PLAN
Problem: Pain - Standard  Goal: Alleviation of pain or a reduction in pain to the patient’s comfort goal  Outcome: Progressing     Problem: Knowledge Deficit - Standard  Goal: Patient and family/care givers will demonstrate understanding of plan of care, disease process/condition, diagnostic tests and medications  Outcome: Progressing     Problem: Fall Risk  Goal: Patient will remain free from falls  Outcome: Progressing   The patient is Stable - Low risk of patient condition declining or worsening    Shift Goals  Clinical Goals: Mobility; Maintain adequate oxygenation  Patient Goals: Mobility; Rest  Family Goals: n/a    Progress made toward(s) clinical / shift goals:  Pt educated on pain control. Pt medicated as prescribed. Fall precautions in place. All questions answered at this time.    Patient is not progressing towards the following goals:

## 2022-01-04 NOTE — PROGRESS NOTES
Called by primary RN for assistance in cleaning and changing patient's gown and linens. Patient is restless and agitated, unable to redirect and reorient. Patient has a strict bed rest order with HOB flat, turning side to side ok. However, he is non-compliant with these restrictions and non receptive to the staff. Currently on restraints but still tries to grab staff whenever staff tries to help him turn. Called security for assistance.

## 2022-01-04 NOTE — PROGRESS NOTES
"Received report from NOC RN. Per report, pt has not been started on ordered PCA due to increased confusion yesterday. Per report  Pt was confused, agitated and on bilateral wrist restrains until this morning at 0400. Pt is now verbalizing increased pain 7/10. No other pain medications available at this time besides PCA. Page initiated to Dr. Webber's office regarding pain regimen.    Pt A&O4, resting in bed with no sign of distress noted at this time. Pt educated on strict bed rest at this time. Pt verbalizes understanding and agrees with plan \"I'm not going anywhere\". Fall precautions in place. Bed alarm on. Call light and personal belongings within reach.    0830 New orders received from Neurosurgery PA. Per KIANA Ash patient had a dural tear and that is why he needed to be on strict bedrest. Per PA, patient can elevate HOB as needed for comfort. Okay for bathroom privileges at this time but no PT/OT today.     0900 Rios dc per order. Pt tolerated well, rios intact. Pt ambulated to the bathroom with 2x assist. Pt tolerated well. Pt denies HA, no dizziness, no N/V at this time.     1000 Verbal ordered received from Dr. Webber to remove hemovac. Hemovac removed per order. Pt tolerated well.    1100 Pt verbalizes pain control at this time. All needs met. Call light and personal belongings within reach. Bed locked in lowest position.    1327 Pt medicated for pain as prescribed. All needs met at this time.    1500 Pt ambulated to the bathroom. All needs met at this time.    1845 Pt resting in bed. Report given to NOC RN. Call light and personal belongings within reach.  "

## 2022-01-04 NOTE — THERAPY
Missed Therapy     Patient Name: Amadou Parekh  Age:  76 y.o., Sex:  male  Medical Record #: [id number]  Today's Date: 1/4/2022    PT order received. Pt is now POD #1 stage 2 lumbar surgery. Bed rest order has been cleared but RN reported MD wants therapy to wait until tomorrow. Will follow up tomorrow as appropriate.     Wendy Ibarra PT, DPT

## 2022-01-04 NOTE — PROGRESS NOTES
"1535- [facility]    1555- Received patient awake, loopy and seems confused. Asked if he knows where he's at patient responded \"I'm in [place]\". When asked about his pain scale 1-10 patient answered saying \"98\". Oriented patient to time,place, person and situation and responded by saying \"I know\".Discussed POC with no evidence of understanding. Unable to do assessment and skin check patient is non-cooperative at this time. Bed alarm in on. Bed is locked and in lowest position.    "

## 2022-01-04 NOTE — PROGRESS NOTES
5198- Patient stands up and asked calmly go back to bed however patient is not listening and continued to verbally abused the nurses. Security called to help patient get back to bed. Will notify MD.

## 2022-01-04 NOTE — PROGRESS NOTES
Neurosurgery Progress Note    Subjective:  Pt in bed, restrained overnight d/t confusion and getting oob, agitated and upset this am, expresses wanting to leave today AMA    Exam:  AAO, agitated, incisions with drsg, hvac- 220ml serosang    BP  Min: 124/62  Max: 188/102  Pulse  Av.1  Min: 70  Max: 97  Resp  Av.1  Min: 12  Max: 20  Temp  Av.1 °C (98.7 °F)  Min: 36.3 °C (97.4 °F)  Max: 37.5 °C (99.5 °F)  SpO2  Av.7 %  Min: 93 %  Max: 98 %    No data recorded    Recent Labs     22  0522   WBC 9.2 9.6 8.2   RBC 4.34* 4.13* 3.67*   HEMOGLOBIN 13.0* 12.2* 11.0*   HEMATOCRIT 38.7* 37.1* 33.0*   MCV 89.2 89.8 89.9   MCH 30.0 29.5 30.0   MCHC 33.6* 32.9* 33.3*   RDW 42.0 41.1 41.2   PLATELETCT 189 203 195   MPV 10.0 10.3 10.4     Recent Labs     2214   SODIUM 137 139 142   POTASSIUM 4.2 4.3 4.0   CHLORIDE 103 103 105   CO2 27 23 26   GLUCOSE 103* 133* 116*   BUN 17 15 13   CREATININE 1.08 1.06 1.04   CALCIUM 8.6 8.6 8.2*               Intake/Output                       22 07 - 22 0659 22 - 22 0659      Total  Total                 Intake    P.O.  50  -- 50  --  -- --    P.O. 50 -- 50 -- -- --    I.V.  1500  -- 1500  --  -- --    Volume (mL) (lactated ringers infusion) 700 -- 700 -- -- --    Volume (mL) (NS infusion) 800 -- 800 -- -- --    Blood  250  -- 250  --  -- --    Cell Saver 250 -- 250 -- -- --    Total Intake 1800 -- 1800 -- -- --       Output    Urine  400  2400 2800  --  -- --    Urine 200 -- 200 -- -- --    Output (mL) (Urethral Catheter Non-latex 16 Fr.) 200 2400 2600 -- -- --    Drains  100  460 560  --  -- --    Output (mL) (Closed/Suction Drain 1 Back Hemovac) 100 460 560 -- -- --    Blood  345  -- 345  --  -- --    Est. Blood Loss 345 -- 345 -- -- --    Total Output 844 2860 3705 -- -- --       Net I/O     955 -2860 -1905 -- -- --             Intake/Output Summary (Last 24 hours) at 1/4/2022 0822  Last data filed at 1/4/2022 0500  Gross per 24 hour   Intake 1800 ml   Output 3705 ml   Net -1905 ml            • oxyCODONE immediate-release  5 mg Q4HRS PRN    Or   • oxyCODONE immediate-release  10 mg Q4HRS PRN   • HYDROmorphone  0.5 mg Q3HRS PRN   • tizanidine  2 mg Q6HRS PRN   • Pharmacy Consult Request  1 Each PHARMACY TO DOSE   • MD ALERT...DO NOT ADMINISTER NSAIDS or ASPIRIN unless ORDERED By Neurosurgery  1 Each PRN   • diazePAM  5 mg Q6HRS PRN   • Methocarbamol IVPB  1,000 mg Q8HRS   • atorvastatin  10 mg DAILY   • gabapentin  600 mg 6X/DAY   • hydroCHLOROthiazide  12.5 mg DAILY   • levothyroxine  137 mcg AM ES   • docusate sodium  100 mg BID   • senna-docusate  1 Tablet Nightly   • senna-docusate  1 Tablet Q24HRS PRN   • polyethylene glycol/lytes  1 Packet BID PRN   • magnesium hydroxide  30 mL QDAY PRN   • bisacodyl  10 mg Q24HRS PRN   • sodium phosphate  1 Each Once PRN   • 0.9 % NaCl with KCl 20 mEq 1,000 mL   Continuous   • acetaminophen  650 mg Q6HRS    Followed by   • [START ON 1/5/2022] acetaminophen  650 mg Q6HRS PRN   • diphenhydrAMINE  25 mg Q6HRS PRN    Or   • diphenhydrAMINE  25 mg Q6HRS PRN   • ondansetron  4 mg Q4HRS PRN   • ondansetron  4 mg Q4HRS PRN   • calcium carbonate  500 mg BID       Assessment and Plan:  POD# 4 s/p L5-S1 ALIF  POD# 1 s/p L1-3 ext of fusion  Chemical prophylactic DVT therapy: No  Start date/time: tbd    Bowel protocol  Begin orals  Monitor drain- call after next output  HOB to comfort- call for h/a  Labs ok- recheck tomorrow  Attempted to explain events last night and explain resoning  He continued to threaten lawsuits and that he may leave AMA today  Dr. Webber updated

## 2022-01-04 NOTE — DISCHARGE PLANNING
ANTICIPATED DATE OF DISCHARGE: to be determined     ANTICIPATED DISCHARGE DISPOSITION: SNF vs IRF    INSURANCE: [facility]     BARRIERS TO DISCHARGE:  Stage 2 surgical procedure pending, AMS/agitated/ restraints, pending PT/OT recs when able to participate     PLAN: CM/ERNESTO Team will continue to follow with care team/patient/family and facilities for safe discharge plan. Referrals as appropriate per patient and family choice as indicated.

## 2022-01-05 VITALS
HEART RATE: 96 BPM | BODY MASS INDEX: 26.99 KG/M2 | RESPIRATION RATE: 16 BRPM | TEMPERATURE: 98.1 F | OXYGEN SATURATION: 91 % | WEIGHT: 199.3 LBS | DIASTOLIC BLOOD PRESSURE: 57 MMHG | HEIGHT: 72 IN | SYSTOLIC BLOOD PRESSURE: 101 MMHG

## 2022-01-05 PROCEDURE — 700102 HCHG RX REV CODE 250 W/ 637 OVERRIDE(OP): Performed by: NURSE PRACTITIONER

## 2022-01-05 PROCEDURE — 700111 HCHG RX REV CODE 636 W/ 250 OVERRIDE (IP): Performed by: NURSE PRACTITIONER

## 2022-01-05 PROCEDURE — A9270 NON-COVERED ITEM OR SERVICE: HCPCS | Performed by: NURSE PRACTITIONER

## 2022-01-05 PROCEDURE — 97161 PT EVAL LOW COMPLEX 20 MIN: CPT

## 2022-01-05 PROCEDURE — 700105 HCHG RX REV CODE 258: Performed by: NURSE PRACTITIONER

## 2022-01-05 PROCEDURE — 97165 OT EVAL LOW COMPLEX 30 MIN: CPT

## 2022-01-05 RX ORDER — OXYCODONE HYDROCHLORIDE 5 MG/1
5-10 TABLET ORAL EVERY 4 HOURS PRN
Qty: 30 TABLET | Refills: 0
Start: 2022-01-05 | End: 2022-01-12

## 2022-01-05 RX ADMIN — OXYCODONE HYDROCHLORIDE 10 MG: 10 TABLET ORAL at 11:34

## 2022-01-05 RX ADMIN — LEVOTHYROXINE SODIUM 137 MCG: 137 TABLET ORAL at 06:15

## 2022-01-05 RX ADMIN — OXYCODONE HYDROCHLORIDE 10 MG: 10 TABLET ORAL at 02:46

## 2022-01-05 RX ADMIN — HYDROMORPHONE HYDROCHLORIDE 0.5 MG: 1 INJECTION, SOLUTION INTRAMUSCULAR; INTRAVENOUS; SUBCUTANEOUS at 05:05

## 2022-01-05 RX ADMIN — GABAPENTIN 600 MG: 300 CAPSULE ORAL at 06:15

## 2022-01-05 RX ADMIN — CALCIUM CARBONATE 500 MG: 500 TABLET, CHEWABLE ORAL at 06:15

## 2022-01-05 RX ADMIN — OXYCODONE HYDROCHLORIDE 5 MG: 5 TABLET ORAL at 07:31

## 2022-01-05 RX ADMIN — METHOCARBAMOL 1000 MG: 1000 INJECTION, SOLUTION INTRAMUSCULAR; INTRAVENOUS at 07:31

## 2022-01-05 RX ADMIN — DOCUSATE SODIUM 100 MG: 100 CAPSULE ORAL at 06:15

## 2022-01-05 RX ADMIN — GABAPENTIN 600 MG: 300 CAPSULE ORAL at 10:32

## 2022-01-05 RX ADMIN — ACETAMINOPHEN 650 MG: 325 TABLET, FILM COATED ORAL at 06:15

## 2022-01-05 RX ADMIN — HYDROCHLOROTHIAZIDE 12.5 MG: 12.5 TABLET ORAL at 06:15

## 2022-01-05 RX ADMIN — TIZANIDINE 2 MG: 4 TABLET ORAL at 10:36

## 2022-01-05 ASSESSMENT — COGNITIVE AND FUNCTIONAL STATUS - GENERAL
DAILY ACTIVITIY SCORE: 24
SUGGESTED CMS G CODE MODIFIER DAILY ACTIVITY: CH
MOBILITY SCORE: 20
MOVING FROM LYING ON BACK TO SITTING ON SIDE OF FLAT BED: A LITTLE
SUGGESTED CMS G CODE MODIFIER MOBILITY: CJ
CLIMB 3 TO 5 STEPS WITH RAILING: A LITTLE
MOVING TO AND FROM BED TO CHAIR: A LITTLE
TURNING FROM BACK TO SIDE WHILE IN FLAT BAD: A LITTLE

## 2022-01-05 ASSESSMENT — PAIN DESCRIPTION - PAIN TYPE
TYPE: ACUTE PAIN;SURGICAL PAIN

## 2022-01-05 ASSESSMENT — GAIT ASSESSMENTS
GAIT LEVEL OF ASSIST: SUPERVISED
ASSISTIVE DEVICE: FRONT WHEEL WALKER
DISTANCE (FEET): 200
DEVIATION: SHUFFLED GAIT

## 2022-01-05 ASSESSMENT — ACTIVITIES OF DAILY LIVING (ADL): TOILETING: INDEPENDENT

## 2022-01-05 NOTE — DISCHARGE INSTRUCTIONS
Discharge Instructions    Discharged to home by car with relative. Discharged via wheelchair, hospital escort: Yes.  Special equipment needed: Not Applicable    Be sure to schedule a follow-up appointment with your primary care doctor or any specialists as instructed.     Discharge Plan:   Diet Plan: Discussed  Activity Level: Discussed  Confirmed Follow up Appointment: Patient to Call and Schedule Appointment  Confirmed Symptoms Management: Discussed  Medication Reconciliation Updated: Yes    I understand that a diet low in cholesterol, fat, and sodium is recommended for good health. Unless I have been given specific instructions below for another diet, I accept this instruction as my diet prescription.   Other diet: Regular    Special Instructions: Use Brace that you have at home (The larger of the two)  Ambulate as much as comfortable  Ok to shower, pat area dry  Remove dressing 5 days after surgery, then leave open to air- no dressing   No Aspirin or NSAIDs for 2 weeks after surgery  No driving for 2 weeks/ No driving while on narcotic medication  Over the counter stool softeners daily while on narcotics  No lifting greater than 15 pounds, no repetitive bending or twisting  Call with signs of infection (fever, chills, redness, drainage)  Follow up at [facility] 2 weeks after surgery  Spinal Fusion, Adult, Care After  This sheet gives you information about how to care for yourself after your procedure. Your doctor may also give you more specific instructions. If you have problems or questions, contact your doctor.  Follow these instructions at home:  Medicines  · Take over-the-counter and prescription medicines only as told by your doctor. These include any medicines for pain or blood-thinning medicines (anticoagulants).  · If you were prescribed an antibiotic medicine, take it as told by your doctor. Do not stop taking the antibiotic even if you start to feel better.  · Do not drive for 24 hours if you  were given a medicine to help you relax (sedative) during your procedure.  · Do not drive or use heavy machinery while taking prescription pain medicine.  If you have a brace:  · Wear the brace as told by your doctor. Take it off only as told by your doctor.  · Keep the brace clean.  Managing pain, stiffness, and swelling  · If directed, put ice on the surgery area:  ? If you have a removable brace, take it off as told by your doctor.  ? Put ice in a plastic bag.  ? Place a towel between your skin and the bag.  ? Leave the ice on for 20 minutes, 2-3 times a day.  Surgery cut care    · Follow instructions from your doctor about how to take care of your cut from surgery (incision). Make sure you:  ? Wash your hands with soap and water before you change your bandage (dressing). If you cannot use soap and water, use hand .  ? Change your bandage as told by your doctor.  ? Leave stitches (sutures), skin glue, or skin tape (adhesive) strips in place. They may need to stay in place for 2 weeks or longer. If tape strips get loose and curl up, you may trim the loose edges. Do not remove tape strips completely unless your doctor says it is okay.  · Keep your cut from surgery clean and dry.  ? Do not take baths, swim, or use a hot tub until your doctor says it is okay.  ? Ask your doctor if you can take showers. You may only be allowed to take sponge baths.  · Every day, check your cut from surgery and the area around it for:  ? More redness, swelling, or pain.  ? Fluid or blood.  ? Warmth.  ? Pus or a bad smell.  · If you have a drain tube, follow instructions from your doctor about caring for it. Do not take out the drain tube or any bandages unless your doctor says it is okay.  Physical activity  · Rest and protect your back as much as possible.  · Follow instructions from your doctor about how to move. Use good posture to help your spine heal.  · Do not lift anything that is heavier than 8 lb (3.6 kg), or the  limit that you are told, until your doctor says that it is safe.  · Do not twist or bend at the waist until your doctor says it is okay.  · It is best if you:  ? Do not make pushing and pulling motions.  ? Do not sit or lie down in the same position for a long time.  ? Do not raise your hands or arms above your head.  · Return to your normal activities as told by your doctor. Ask your doctor what activities are safe for you. Rest and protect your back as much as you can.  · Do not start to exercise until your doctor says it is okay. Ask your doctor what kinds of exercise you can do to make your back stronger.  General instructions  · To prevent blood clots and lessen swelling in your legs:  ? Wear compression stockings as told.  ? Walk one or more times every few hours as told by your doctor.  · Do not use any products that contain nicotine or tobacco, such as cigarettes and e-cigarettes. These can delay bone healing. If you need help quitting, ask your doctor.  · To prevent or treat constipation while you are taking prescription pain medicine, your doctor may suggest that you:  ? Drink enough fluid to keep your pee (urine) pale yellow.  ? Take over-the-counter or prescription medicines.  ? Eat foods that are high in fiber. These include fresh fruits and vegetables, whole grains, and beans.  ? Limit foods that are high in fat and processed sugars, such as fried and sweet foods.  · Keep all follow-up visits as told by your doctor. This is important.  Contact a doctor if:  · Your pain gets worse.  · Your medicine does not help your pain.  · Your legs or feet get painful or swollen.  · Your cut from surgery is more red, swollen, or painful.  · Your cut from surgery feels warm to the touch.  · You have:  ? Fluid or blood coming from your cut from surgery.  ? Pus or a bad smell coming from your cut from surgery.  ? A fever.  ? Weakness or loss of feeling (numbness) in your legs that is new or getting worse.  ? Trouble  controlling when you pee (urinate) or poop (have a bowel movement).  · You feel sick to your stomach (nauseous).  · You throw up (vomit).  Get help right away if:  · Your pain is very bad.  · You have chest pain.  · You have trouble breathing.  · You start to have a cough.  These symptoms may be an emergency. Do not wait to see if the symptoms will go away. Get medical help right away. Call your local emergency services (911 in the [place] Do not drive yourself to the hospital.  Summary  · After the procedure, it is common to have pain in your back and pain by your surgery cut(s).  · Icing and pain medicines may help to control the pain. Follow directions from your doctor.  · Rest and protect your back as much as possible. Do not twist or bend at the waist.  · Get up and walk one or more times every few hours as told by your doctor.  This information is not intended to replace advice given to you by your health care provider. Make sure you discuss any questions you have with your health care provider.  Document Released: 04/12/2012 Document Revised: 04/09/2020 Document Reviewed: 04/03/2018  [employer] Patient Education © 2020 [employer]      · Is patient discharged on Warfarin / Coumadin?   No     Depression / Suicide Risk    As you are discharged from this [facility] facility, it is important to learn how to keep safe from harming yourself.    Recognize the warning signs:  · Abrupt changes in personality, positive or negative- including increase in energy   · Giving away possessions  · Change in eating patterns- significant weight changes-  positive or negative  · Change in sleeping patterns- unable to sleep or sleeping all the time   · Unwillingness or inability to communicate  · Depression  · Unusual sadness, discouragement and loneliness  · Talk of wanting to die  · Neglect of personal appearance   · Rebelliousness- reckless behavior  · Withdrawal from people/activities they love  · Confusion- inability to  concentrate     If you or a loved one observes any of these behaviors or has concerns about self-harm, here's what you can do:  · Talk about it- your feelings and reasons for harming yourself  · Remove any means that you might use to hurt yourself (examples: pills, rope, extension cords, firearm)  · Get professional help from the community (Mental Health, Substance Abuse, psychological counseling)  · Do not be alone:Call your Safe Contact- someone whom you trust who will be there for you.  · Call your local CRISIS HOTLINE 398-9116 or 760-564-9165  · Call your local Children's Mobile Crisis Response Team [place] (105) 314-4599 or www.[employer]ob[employer]  · Call the toll free National Suicide Prevention Hotlines   · National Suicide Prevention Lifeline 457-926-HHTJ (3403)  · National Hope Line Network 800-SUICIDE (013-8367)

## 2022-01-05 NOTE — PROGRESS NOTES
"Pt becoming agitated and verbally aggressive. He is stating the nursing staff is incompetent and that we are not managing his pain properly. This nurse has checked on the patient frequently throughout the night and the pt has never complained about pain. Charge nurse spoke to the patient to attempt to calm the patient down. After she left he stated she was incompetent and that he was \"sick of her flapping her gums\". He stated he wants to talk to somebody above us in the hospital and that we should be \"scared for our lives or something else\". Patient walking around room and refusing to return to bed when asked. Patient medicated for pain per MAR. All needs met at this time. Will continue to monitor.   "

## 2022-01-05 NOTE — THERAPY
Physical Therapy   Initial Evaluation     Patient Name: Amadou Parekh  Age:  76 y.o., Sex:  male  Medical Record #: [id number]  Today's Date: 1/5/2022     Precautions  Precautions: Spinal / Back Precautions   Comments: LSO when OOB >5min    Assessment  Patient is 76 y.o. male POD #5 L5-S1 ALIF and POD#2 L1-3 fusion. Pt provided with spine handout and educated on spine precautions. Pt completed all mobility with SPV and using FWW. No further acute PT needs    Plan    Recommend Physical Therapy for Evaluation only     DC Equipment Recommendations: None (use FWW)  Discharge Recommendations: Anticipate that the patient will have no further physical therapy needs after discharge from the hospital          01/05/22 0813   Prior Living Situation   Prior Services Home-Independent   Housing / Facility 2 Story House   Steps Into Home 2   Steps In Home 14   Equipment Owned Front-Wheel Walker   Lives with - Patient's Self Care Capacity Spouse   Comments spouse can assist   Prior Level of Functional Mobility   Bed Mobility Independent   Transfer Status Independent   Ambulation Independent   Distance Ambulation (Feet)   (community)   Assistive Devices Used None   Stairs Independent   Cognition    Level of Consciousness Alert   Comments receptive   Active ROM Lower Body    Active ROM Lower Body  WDL   Strength Lower Body   Lower Body Strength  WDL   Sensation Lower Body   Lower Extremity Sensation   WDL   Balance Assessment   Sitting Balance (Static) Fair +   Sitting Balance (Dynamic) Fair +   Standing Balance (Static) Fair   Standing Balance (Dynamic) Fair   Weight Shift Sitting Good   Weight Shift Standing Good   Comments FWW   Gait Analysis   Gait Level Of Assist Supervised   Assistive Device Front Wheel Walker   Distance (Feet) 200   # of Times Distance was Traveled 1   Deviation Shuffled Gait   # of Stairs Climbed 0   Weight Bearing Status no restrictions   Comments declined stairs, reports no concerns   Bed Mobility    Supine  to Sit Supervised   Scooting Supervised   Comments log roll   Functional Mobility   Sit to Stand Supervised   Bed, Chair, Wheelchair Transfer Supervised   Transfer Method Stand Step   Mobility in room and hallway   Education Group   Education Provided Role of Physical Therapist;Spine Precautions   Spine Precautions Patient Response Patient;Acceptance;Explanation;Action Demonstration;Handout   Role of Physical Therapist Patient Response Patient;Acceptance;Demonstration;Action Demonstration   Anticipated Discharge Equipment and Recommendations   DC Equipment Recommendations None  (use FWW)   Discharge Recommendations Anticipate that the patient will have no further physical therapy needs after discharge from the hospital

## 2022-01-05 NOTE — CARE PLAN
Problem: Pain - Standard  Goal: Alleviation of pain or a reduction in pain to the patient’s comfort goal  Outcome: Progressing     Problem: Knowledge Deficit - Standard  Goal: Patient and family/care givers will demonstrate understanding of plan of care, disease process/condition, diagnostic tests and medications  Outcome: Progressing     Problem: Fall Risk  Goal: Patient will remain free from falls  Outcome: Progressing   The patient is Stable - Low risk of patient condition declining or worsening    Shift Goals  Clinical Goals: pain control  Patient Goals: rest  Family Goals: n/a    Progress made toward(s) clinical / shift goals:  Pt verbalizes pain control at this time. Fall precaution in place. All questions answered at this time.    Patient is not progressing towards the following goals:

## 2022-01-05 NOTE — CARE PLAN
Problem: Knowledge Deficit - Standard  Goal: Patient and family/care givers will demonstrate understanding of plan of care, disease process/condition, diagnostic tests and medications  Outcome: Progressing     Problem: Fall Risk  Goal: Patient will remain free from falls  Outcome: Progressing     The patient is Stable - Low risk of patient condition declining or worsening    Shift Goals  Clinical Goals: pain control  Patient Goals: rest  Family Goals: n/a    Progress made toward(s) clinical / shift goals:  POC discussed with pt at bedside. Pt asks appropriate questions, no additional concerns at this time.  Bed is locked and in lowest position, treaded socks on, bed alarm on, DME out of sight, call light and belongings within reach, pt calls appropriately for assistance, hourly rounding in place.      Patient is not progressing towards the following goals:

## 2022-01-05 NOTE — ANESTHESIA POSTPROCEDURE EVALUATION
Patient: Amadou Parekh    Procedure Summary     Date: 01/03/22 Room / Location: [facility] 04 / SURGERY [facility]    Anesthesia Start: 0733 Anesthesia Stop: 1250    Procedures:       FUSION, SPINE, LUMBAR, WITH O-ARM IMAGING GUIDANCE - L1-3 EXTENSION-STAGE 2 (Back)      DECOMPRESSION, SPINE, LUMBAR (Back)      REMOVAL, NEUROSTIMULATOR, PERMANENT, SPINAL CORD (Back) Diagnosis: (SPINAL STENOSIS OF LUMBAR REGION)    Surgeons: Be Webber M.D. Responsible Provider: Moiz Yan M.D.    Anesthesia Type: general ASA Status: 2          Final Anesthesia Type: general  Last vitals  BP   Blood Pressure : 101/57    Temp   36.7 °C (98.1 °F)    Pulse   96   Resp   16    SpO2   91 %      Anesthesia Post Evaluation    Patient location during evaluation: PACU  Patient participation: complete - patient participated  Level of consciousness: awake and alert    Airway patency: patent  Anesthetic complications: no  Cardiovascular status: hemodynamically stable  Respiratory status: acceptable  Hydration status: euvolemic    PONV: none          No complications documented.     Nurse Pain Score: 7 (NPRS)

## 2022-01-05 NOTE — PROGRESS NOTES
Neurosurgery Progress Note    Subjective:  Pt sitting in chair at bedside. States he has ambulated, no h/a, legs feel good. Wants to go home & is clear by therapies.     Exam:  Manuel LOPEZ    BP  Min: 101/57  Max: 145/74  Pulse  Av  Min: 89  Max: 110  Resp  Av.6  Min: 16  Max: 18  Temp  Av.2 °C (99 °F)  Min: 36.7 °C (98.1 °F)  Max: 37.6 °C (99.7 °F)  SpO2  Av.4 %  Min: 91 %  Max: 94 %    No data recorded    Recent Labs     22  18222  0614   WBC 9.6 8.2   RBC 4.13* 3.67*   HEMOGLOBIN 12.2* 11.0*   HEMATOCRIT 37.1* 33.0*   MCV 89.8 89.9   MCH 29.5 30.0   MCHC 32.9* 33.3*   RDW 41.1 41.2   PLATELETCT 203 195   MPV 10.3 10.4     Recent Labs     22  0614   SODIUM 139 142   POTASSIUM 4.3 4.0   CHLORIDE 103 105   CO2 23 26   GLUCOSE 133* 116*   BUN 15 13   CREATININE 1.06 1.04   CALCIUM 8.6 8.2*               Intake/Output                       22 - 22 - 22 0659      Total  Total                 Intake    Total Intake -- -- -- -- -- --       Output    Emesis  --  0 0  --  -- --    Emesis -- 0 0 -- -- --    Drains  60  -- 60  --  -- --    Output (mL) ([REMOVED] Closed/Suction Drain 1 Back Hemovac) 60 -- 60 -- -- --    Stool  --  -- --  --  -- --    Number of Times Stooled -- 0 x 0 x -- -- --    Total Output 60 0 60 -- -- --       Net I/O     -60 0 -60 -- -- --            Intake/Output Summary (Last 24 hours) at 2022 1107  Last data filed at 2022  Gross per 24 hour   Intake --   Output 0 ml   Net 0 ml            • oxyCODONE immediate-release  5 mg Q4HRS PRN    Or   • oxyCODONE immediate-release  10 mg Q4HRS PRN   • HYDROmorphone  0.5 mg Q3HRS PRN   • tizanidine  2 mg Q6HRS PRN   • Pharmacy Consult Request  1 Each PHARMACY TO DOSE   • MD ALERT...DO NOT ADMINISTER NSAIDS or ASPIRIN unless ORDERED By Neurosurgery  1 Each PRN   • diazePAM  5 mg Q6HRS PRN   • Methocarbamol IVPB  1,000  mg Q8HRS   • atorvastatin  10 mg DAILY   • gabapentin  600 mg 6X/DAY   • hydroCHLOROthiazide  12.5 mg DAILY   • levothyroxine  137 mcg AM ES   • docusate sodium  100 mg BID   • senna-docusate  1 Tablet Nightly   • senna-docusate  1 Tablet Q24HRS PRN   • polyethylene glycol/lytes  1 Packet BID PRN   • magnesium hydroxide  30 mL QDAY PRN   • bisacodyl  10 mg Q24HRS PRN   • sodium phosphate  1 Each Once PRN   • 0.9 % NaCl with KCl 20 mEq 1,000 mL   Continuous   • acetaminophen  650 mg Q6HRS    Followed by   • acetaminophen  650 mg Q6HRS PRN   • diphenhydrAMINE  25 mg Q6HRS PRN    Or   • diphenhydrAMINE  25 mg Q6HRS PRN   • ondansetron  4 mg Q4HRS PRN   • ondansetron  4 mg Q4HRS PRN   • calcium carbonate  500 mg BID       Assessment and Plan:  POD# 5  L5-S1 ALIF  POD# 2 L1-3 ext of fusion  Chemical prophylactic DVT therapy: No  Start date/time: tbd    DC home - rx's sent in from our office to [facility] in [place] per pt request

## 2022-01-05 NOTE — THERAPY
Occupational Therapy   Initial Evaluation     Patient Name: Amadou Parekh  Age:  76 y.o., Sex:  male  Medical Record #: [id number]  Today's Date: 1/5/2022     Precautions  Precautions: Spinal / Back Precautions ,Lumbosacral Orthosis  Comments: LSO when OOB>5min    Assessment  Patient is 76 y.o. male with a diagnosis of Re do L5-S1 fusion.   Pt is at or near his/her functional baseline. Pt with no further skilled OT needs in the acute care setting at this time.       Plan     Occupational Therapy for Evaluation only        Discharge Recommendations: (P) Anticipate that the patient will have no further occupational therapy needs after discharge from the hospital        01/05/22 0810   Prior Living Situation   Prior Services Home-Independent   Housing / Facility 2 Story House   Steps Into Home 2   Steps In Home 14   Equipment Owned Front-Wheel Walker   Lives with - Patient's Self Care Capacity Spouse   Prior Level of ADL Function   Self Feeding Independent   Grooming / Hygiene Independent   Bathing Independent   Dressing Independent   Toileting Independent   ADL Assessment   Grooming Supervision   Upper Body Dressing Supervision   Lower Body Dressing Supervision   Toileting Supervision   Functional Mobility   Sit to Stand Supervised   Bed, Chair, Wheelchair Transfer Supervised

## 2022-01-05 NOTE — PROGRESS NOTES
0715 Received report from NOC RN. Pt resting in bed with no signs of distress at this time.    0730 Assessment completed. Pt medicated for pain as ordered. All personal belongings and call light within reach. All needs met at this time. Pt educated on call for assistance as needed. Hourly rounding in place.    0845 Pt resting in bed at this time. No signs of distress noted. Call light within reach.    1100 Pt requesting muscle relaxer to be prescribed. Spoke to [facility]. Oxycodone and muscle relaxer will be prescribed from office. Pt informed.    1200 Pt discharged home via wheelchair by CNA. AVS printed and reviewed by NENA Ohara. All questions answered at this time. Personal belongings taken by patient.

## 2022-01-08 NOTE — DISCHARGE SUMMARY
Discharge Summary    CHIEF COMPLAINT ON ADMISSION  No chief complaint on file.      Reason for Admission  DEGENERATION OF LUMBAR INTERVERTEB*     Admission Date  12/31/2021    CODE STATUS  Prior    HPI & HOSPITAL COURSE  This is a 76 y.o. male here with low back pain and radiculopathy   No notes on file    Therefore, he is discharged in good and stable condition to home with close outpatient follow-up.    The patient met 2-midnight criteria for an inpatient stay at the time of discharge.    Discharge Date  1/5/2022    FOLLOW UP ITEMS POST DISCHARGE  [place] [facility] [place] DIAGNOSES  Principal Problem:    Lumbar degenerative disc disease POA: Yes  Resolved Problems:    * No resolved hospital problems. *      FOLLOW UP  Future Appointments   Date Time Provider Department Center   3/22/2022  1:40 PM [facility]  [address] [facility] [address]  [phone]    Schedule an appointment as soon as possible for a visit in 2 weeks  Follow-Up    Vincenzo Mccarthy D.O.  [address] [facility] [address]  [phone]            MEDICATIONS ON DISCHARGE     Medication List      START taking these medications      Instructions   oxyCODONE immediate-release 5 MG Tabs  Commonly known as: ROXICODONE   Take 1-2 Tablets by mouth every four hours as needed for up to 7 days.  Dose: 5-10 mg        CONTINUE taking these medications      Instructions   atorvastatin 10 MG Tabs  Commonly known as: LIPITOR   Take 1 Tablet by mouth every day.  Dose: 10 mg     b complex vitamins tablet   Take 1 Tab by mouth every day.  Dose: 1 Tablet     CALCIUM + D PO   Take 1 Capsule by mouth every day.  Dose: 1 Capsule     gabapentin 600 MG tablet  Commonly known as: NEURONTIN   Take 600 mg by mouth every 3 hours.  Dose: 600 mg     hydroCHLOROthiazide 12.5 MG tablet  Commonly known as: HYDRODIURIL   Take 1 Tablet by mouth every day.  Dose: 12.5 mg     Multi-Vitamin Tabs   Take 1 Tablet by mouth  every day.  Dose: 1 Tablet     Omega-3 1000 MG Caps   Take 1 Capsule by mouth every day.  Dose: 1 Capsule     Synthroid 137 MCG Tabs  Generic drug: levothyroxine   Take 1 Tablet by mouth every morning on an empty stomach.  Dose: 137 mcg     tadalafil 5 MG tablet  Commonly known as: Cialis   Take 5 mg by mouth every day. Taking for prostate vs ED per pt  Dose: 5 mg     Vitamin D-1000 [address] St 1000 UNIT Tabs  Generic drug: vitamin D   Take 2,000 Units by mouth every day.  Dose: 2,000 Units            Allergies  No Known Allergies    DIET  No orders of the defined types were placed in this encounter.      ACTIVITY  As tolerated.  Weight bearing as tolerated    CONSULTATIONS  none    PROCEDURES  POD# 5  L5-S1 ALIF  POD# 2 L1-3 ext of fusion    LABORATORY  Lab Results   Component Value Date    SODIUM 142 01/04/2022    POTASSIUM 4.0 01/04/2022    CHLORIDE 105 01/04/2022    CO2 26 01/04/2022    GLUCOSE 116 (H) 01/04/2022    BUN 13 01/04/2022    CREATININE 1.04 01/04/2022        Lab Results   Component Value Date    WBC 8.2 01/04/2022    HEMOGLOBIN 11.0 (L) 01/04/2022    HEMATOCRIT 33.0 (L) 01/04/2022    PLATELETCT 195 01/04/2022        Total time of the discharge process exceeds 30 minutes.

## 2022-01-08 NOTE — OP REPORT
DATE OF SERVICE:  01/03/2022     PREOPERATIVE DIAGNOSES:  1.  Persisting left L5 radiculopathy despite multiple low back operations.  2.  Status post L3 through S1 fusion with multiple procedures.  3.  L5-S1 pseudoarthrosis, with part one of this surgical operation, being   done on the 31st with anterior interbody fusion.  4.  L1-L2 and L2-L3 degenerative disk disease and spinal stenosis with flat   back syndrome.     PROCEDURES:   1.  O-arm guided navigation with placement of pedicular instrumentation at   L1-L2.  2.  Exposure, and removal of previous hardware L3 through sacrum by removal of   set screws, rods, and pedicular screws at S1.  3.  Replacement of S1 screws with 8.5x50 mm screw on the left and 8.5x45 mm   screw on the right using O-arm navigation.  4.  L1 laminectomy with medial facetectomy and bilateral foraminotomy.  5.  L2 laminectomy with medial facetectomy and bilateral foraminotomy.  6.  L3-L4, L4-L5, and L5-S1 medial facetectomy redo on the left with   decompression of L3, L4, L5, and S1 nerve roots.  7.  L1-L2 TLIF for correction of kyphotic deformity using Sable spacer   [id number] mm in height with 15-degree lordosis.  6.  Instrumented stabilization L1-L2, L3, L5-S1 with extension of screw from   L1 through S1 with stabilization of the L1-L2, L2-L3, and L5-S1 segments.  7.  Posterolateral arthrodesis with use of large Infuse, locally harvested   bone graft augment with 30 mL of bone chips.  8.  Augmentation of interbody fusion L1-L2 with the use of BMP and structural   autograft.     SURGEON:  Be Webber MD     ASSISTANT:  NOREEN Urrutia     ANESTHESIA:  General anesthetic.     ANESTHESIOLOGIST:  Moiz Yan MD     PREPARATION:  The patient had somatosensory evoked potentials and EMGs   monitored throughout the case.     INDICATION:  A very complex case in which this gentleman has degenerative   changes of the lumbar spine from L1 through S1.  He has had previous   operations for  decompression and stabilization, but he has always had a   persisting left S1-L5 radiculopathy.  After his last operation, he got   somewhat better then his symptoms recurred.  We found that he had a   pseudoarthrosis at the L5-S1 level.  The indications and possible   complications of this procedure, which included decompression of the stenosis   at the levels above and correction of his kyphotic deformity were explained to   the patient and informed consent was obtained.     DESCRIPTION OF PROCEDURE:  The patient was brought to the operating room and   following induction, he was intubated and placed under general anesthetic.    Tate catheter was placed and he was prepared for somatosensory evoked   potentials, motor evokes and EMGs.  Rolled prone onto the operating table   using chest, hip, thigh pads.  All pressure points were padded.  Sequential   stockings were in place and his abdomen was free, putting his spine in   somewhat of a lordotic position.  After sterile prep and drape, timeout was   performed.  An incision was made over his posterior superior iliac spine on   the right hand side, and we were able to tamp in a fiducial pichardo with   fiducials being attached for the O-arm.  O-arm spin was performed, we were   able to navigate and actually we mapped out our incision from L1 down through   sacrum.  We injected the midline with Marcaine 0.25% with epinephrine 30 mL to   45 mL.  An incision was made from T12 down to sacrum and carried down to   subcutaneous tissue.  Subperiosteal dissection was carried out at L1, L2, and   over the instrumentation at L3, L4, L5 and S1.  Retractors were placed to   maintain exposure.  We dissected out over the transverse processes of L1 and   L2 as well as the previous fusion mass at L3-L4.  Areas were decorticated at   this time at transverse process of L1 and L2 as well as the previous bone   graft.  Blood from this decortication, was then used to reconstitute 30 mL of    bone chips.  Using O-arm navigation, we were able to drill down the axis of   the pedicle of L1 and L2 bilaterally with a Midas Ten, followed by the   thoracic gearshift and the navigated 5.5 tap.  Here we were able to place   6.5x50 mm screws bilaterally at L1 and 6.5x50 mm screws bilaterally at L2 and   again these were navigated with a lateral to medial slant.     At this time, we removed the soft tissue from L1-L2, did a laminectomy by   removing the spinous processes and drilling the junction of lamina facet at L1   and L2 to a thin shelf.  Center portion of the bone was removed with Leksell   and Kerrisons.  We undercut the stenosis at L1-L2 and L2-L3 with Kerrisons.    At L2-L3, we had to dissect the thecal sac free, as it was remarkable scar   tissue and a significant amount of L3 remaining.  We drilled this to a thin   shelf, dissected it free from the thecal sac and used Kerrisons to decompress   it.  We decompressed around the pedicles of L1 bilaterally, L2 bilaterally, L3   bilaterally in this fashion.  L1-L2, was closely approximated and probably   already fused at the disk space.     L2-L3 was already fused, or ____ autofused at the disk space.  We went   superiorly to L1-L2, took the pars interarticularis and superior facet and did   a complete facetectomy on the left hand side.  There was a small kyphosis   here.  The patient also had a flat back here.  We were able to enter into the   disk space laterally with 11 bladed scalpel and prepared the endplates with   paddle yanelis starting with #5 and moving to #8.  Small BMP soaked sponge was   placed anteriorly after decorticating the endplates and removing degenerative   disk material with curettes and Kerrisons.  We placed structural allograft   from the spinous processes, cut into strips of 1 cm x 8 mm and placed this   anteriorly.  We then placed diagonally a Sable spacer [id number] mm with   15-degree lordosis.  Upon opening this up, we  corrected some of the kyphotic   deformity.     After opening the cage and correcting some of the kyphotic deformity, we   carried our attention down to L3-L4, L4-L5 and L5-S1 on the left hand side.    We dissected scar tissue away from these markedly hypertrophied facets and   drilled them to a thin shelf, and basically took the inferior facet of L4 and   L5 and then removed the superior facet medially with use of #2 and #3   Kerrisons.  Lateral recess stenosis was severe.  We performed a complete a   facetectomy at L5-S1, which allowed us to remove a markedly hypertrophied   superior facet and the inferior facet of L5.  That was the superior facet of   S1 that we discussed.     The L5 nerve root was followed distally and we found that it had been   compressed with medial stenosis at L4-L5 and also around the pedicle at L5-S1,   and in the foramen.  This was a nerve root that has given the patient so much   difficulty over the years.  There has been obvious settling at the level of   the L5-S1 fusion.     We had done anterior interbody fusion and he had already been stabilized and   somewhat distracted.     We had removed the loosen screw at S1, and replaced this with 8.5x50 mm screw   on the left, an 8.5x45 mm screw on the right.     At this time, we dissected out over the transverse processes, decorticated the   transverse process at L5-S1 and L4-L5 and then used a long BMP soaked sponge   with locally harvested bone graft and autograft chips from L2 down to L5-S1.     We packed the remainder of the bone into the posterolateral gutters   bilaterally.     Rods were cut at approximately 140 mm, bent into position and placed into the   multiaxial heads starting at L1 and extending down through S1.  Although not   noted, I had removed the set screws at L3, L4, L5 and S1 and removed the rods   previously.  Multiaxial heads of L3, L4 and L5 was still mobile and S1 had   been replaced.  With the rods in position and  lordotic position being obtained   set screws were secured.  We compressed at the L1-L2 level to give greater   lordosis, tightened the screws and broke them off at appropriate torque.    Nerve roots were free at L1 bilaterally, L2 bilaterally, L3 on the left, L4 on   the left, L5 on the left and S1 on the left.  We had performed a complete   facetectomy at L3-L4, L4-L5, and L5-S1, and a partial pediculectomy of the L5   pedicle to really decompress his L5 nerve root.  Irrigation was performed.    Two grams of vancomycin were sprinkled over the wound after meticulous   hemostasis was obtained.  Medium size Hemovac was placed.     There was a small dural tear at L3 on the left hand side, which was 1 mm in   size and this was closed with two 6-0 Lincoln-Larry sutures.  Duragen was placed   over this.     Medium size Hemovac was placed and we closed the fascia with #1 Vicryl suture,   subcutaneous tissue with 2-0 Vicryl, subcuticular layer and skin with   staples.  All sponge and needle counts were correct.  Estimated blood loss for   the procedure was approximately 500 mL with 380 mL of Cell Saver given back.        ______________________________  MD GANGA JEREZ/LUIS/BOO    DD:  01/08/2022 12:04  DT:  01/08/2022 12:52    Job#:  [id number]    CC:Vincenzo Mccarthy DO

## 2022-02-17 ENCOUNTER — HOSPITAL ENCOUNTER (OUTPATIENT)
Dept: RADIOLOGY | Facility: MEDICAL CENTER | Age: 77
End: 2022-02-17
Attending: ORTHOPAEDIC SURGERY
Payer: COMMERCIAL

## 2022-02-17 DIAGNOSIS — M25.562 LEFT KNEE PAIN, UNSPECIFIED CHRONICITY: ICD-10-CM

## 2022-02-17 PROCEDURE — 73721 MRI JNT OF LWR EXTRE W/O DYE: CPT | Mod: LT

## 2022-03-29 ENCOUNTER — APPOINTMENT (OUTPATIENT)
Dept: INTERNAL MEDICINE | Facility: OTHER | Age: 77
End: 2022-03-29
Payer: COMMERCIAL

## 2022-03-29 DIAGNOSIS — Z00.00 HEALTHCARE MAINTENANCE: ICD-10-CM

## 2022-03-29 DIAGNOSIS — Z13.228 SCREENING FOR METABOLIC DISORDER: ICD-10-CM

## 2022-03-29 DIAGNOSIS — R73.9 HYPERGLYCEMIA: ICD-10-CM

## 2022-03-29 DIAGNOSIS — E78.5 DYSLIPIDEMIA: ICD-10-CM

## 2022-03-29 DIAGNOSIS — E03.4 HYPOTHYROIDISM DUE TO ACQUIRED ATROPHY OF THYROID: ICD-10-CM

## 2022-03-29 NOTE — PROGRESS NOTES
Pt presented to clinic without lab work that was ordered in Nov 2021 for today's visit to be completed 1 week before. Replaced orders for lab work. Pt to schedule when he is able to complete labs

## 2022-03-30 NOTE — PROGRESS NOTES
Spoke to lab orders placed gave him direct number to office to call and schedule appt after labs have been completed

## 2022-04-29 ENCOUNTER — HOSPITAL ENCOUNTER (OUTPATIENT)
Dept: LAB | Facility: MEDICAL CENTER | Age: 77
End: 2022-04-29
Attending: STUDENT IN AN ORGANIZED HEALTH CARE EDUCATION/TRAINING PROGRAM
Payer: COMMERCIAL

## 2022-04-29 DIAGNOSIS — R73.9 HYPERGLYCEMIA: ICD-10-CM

## 2022-04-29 DIAGNOSIS — E78.5 DYSLIPIDEMIA: ICD-10-CM

## 2022-04-29 DIAGNOSIS — E03.4 HYPOTHYROIDISM DUE TO ACQUIRED ATROPHY OF THYROID: ICD-10-CM

## 2022-04-29 DIAGNOSIS — Z00.00 HEALTHCARE MAINTENANCE: ICD-10-CM

## 2022-04-29 DIAGNOSIS — Z13.228 SCREENING FOR METABOLIC DISORDER: ICD-10-CM

## 2022-04-29 LAB
ALBUMIN SERPL BCP-MCNC: 4 G/DL (ref 3.2–4.9)
ALBUMIN/GLOB SERPL: 1.7 G/DL
ALP SERPL-CCNC: 90 U/L (ref 30–99)
ALT SERPL-CCNC: 26 U/L (ref 2–50)
ANION GAP SERPL CALC-SCNC: 12 MMOL/L (ref 7–16)
AST SERPL-CCNC: 28 U/L (ref 12–45)
BASOPHILS # BLD AUTO: 0.6 % (ref 0–1.8)
BASOPHILS # BLD: 0.03 K/UL (ref 0–0.12)
BILIRUB SERPL-MCNC: 0.2 MG/DL (ref 0.1–1.5)
BUN SERPL-MCNC: 24 MG/DL (ref 8–22)
CALCIUM SERPL-MCNC: 9.1 MG/DL (ref 8.5–10.5)
CHLORIDE SERPL-SCNC: 101 MMOL/L (ref 96–112)
CO2 SERPL-SCNC: 24 MMOL/L (ref 20–33)
CREAT SERPL-MCNC: 1.14 MG/DL (ref 0.5–1.4)
EOSINOPHIL # BLD AUTO: 0.15 K/UL (ref 0–0.51)
EOSINOPHIL NFR BLD: 2.9 % (ref 0–6.9)
ERYTHROCYTE [DISTWIDTH] IN BLOOD BY AUTOMATED COUNT: 39.5 FL (ref 35.9–50)
EST. AVERAGE GLUCOSE BLD GHB EST-MCNC: 117 MG/DL
FASTING STATUS PATIENT QL REPORTED: NORMAL
GFR SERPLBLD CREATININE-BSD FMLA CKD-EPI: 66 ML/MIN/1.73 M 2
GLOBULIN SER CALC-MCNC: 2.3 G/DL (ref 1.9–3.5)
GLUCOSE SERPL-MCNC: 99 MG/DL (ref 65–99)
HBA1C MFR BLD: 5.7 % (ref 4–5.6)
HCT VFR BLD AUTO: 42.7 % (ref 42–52)
HGB BLD-MCNC: 13.9 G/DL (ref 14–18)
IMM GRANULOCYTES # BLD AUTO: 0.02 K/UL (ref 0–0.11)
IMM GRANULOCYTES NFR BLD AUTO: 0.4 % (ref 0–0.9)
LYMPHOCYTES # BLD AUTO: 1.41 K/UL (ref 1–4.8)
LYMPHOCYTES NFR BLD: 26.9 % (ref 22–41)
MCH RBC QN AUTO: 27.5 PG (ref 27–33)
MCHC RBC AUTO-ENTMCNC: 32.6 G/DL (ref 33.7–35.3)
MCV RBC AUTO: 84.6 FL (ref 81.4–97.8)
MONOCYTES # BLD AUTO: 0.51 K/UL (ref 0–0.85)
MONOCYTES NFR BLD AUTO: 9.7 % (ref 0–13.4)
NEUTROPHILS # BLD AUTO: 3.13 K/UL (ref 1.82–7.42)
NEUTROPHILS NFR BLD: 59.5 % (ref 44–72)
NRBC # BLD AUTO: 0 K/UL
NRBC BLD-RTO: 0 /100 WBC
PLATELET # BLD AUTO: 261 K/UL (ref 164–446)
PMV BLD AUTO: 10.5 FL (ref 9–12.9)
POTASSIUM SERPL-SCNC: 4.4 MMOL/L (ref 3.6–5.5)
PROT SERPL-MCNC: 6.3 G/DL (ref 6–8.2)
RBC # BLD AUTO: 5.05 M/UL (ref 4.7–6.1)
SODIUM SERPL-SCNC: 137 MMOL/L (ref 135–145)
TSH SERPL DL<=0.005 MIU/L-ACNC: 4.29 UIU/ML (ref 0.38–5.33)
WBC # BLD AUTO: 5.3 K/UL (ref 4.8–10.8)

## 2022-04-29 PROCEDURE — 84443 ASSAY THYROID STIM HORMONE: CPT

## 2022-04-29 PROCEDURE — 83036 HEMOGLOBIN GLYCOSYLATED A1C: CPT

## 2022-04-29 PROCEDURE — 85025 COMPLETE CBC W/AUTO DIFF WBC: CPT

## 2022-04-29 PROCEDURE — 80053 COMPREHEN METABOLIC PANEL: CPT

## 2022-04-29 PROCEDURE — 36415 COLL VENOUS BLD VENIPUNCTURE: CPT

## 2022-05-02 ENCOUNTER — APPOINTMENT (OUTPATIENT)
Dept: INTERNAL MEDICINE | Facility: OTHER | Age: 77
End: 2022-05-02
Payer: COMMERCIAL

## 2022-05-06 ENCOUNTER — OFFICE VISIT (OUTPATIENT)
Dept: INTERNAL MEDICINE | Facility: OTHER | Age: 77
End: 2022-05-06
Payer: COMMERCIAL

## 2022-05-06 VITALS
BODY MASS INDEX: 26.84 KG/M2 | DIASTOLIC BLOOD PRESSURE: 75 MMHG | SYSTOLIC BLOOD PRESSURE: 143 MMHG | WEIGHT: 198.2 LBS | TEMPERATURE: 97.3 F | HEIGHT: 72 IN | OXYGEN SATURATION: 97 % | HEART RATE: 66 BPM

## 2022-05-06 DIAGNOSIS — Z85.9 HISTORY OF CANCER: ICD-10-CM

## 2022-05-06 DIAGNOSIS — N28.9 RENAL INSUFFICIENCY: ICD-10-CM

## 2022-05-06 DIAGNOSIS — D49.9 PRECANCEROUS LESION: ICD-10-CM

## 2022-05-06 DIAGNOSIS — D64.9 NORMOCYTIC ANEMIA: ICD-10-CM

## 2022-05-06 PROBLEM — H91.90 HEARING LOSS: Status: RESOLVED | Noted: 2021-11-09 | Resolved: 2022-05-06

## 2022-05-06 PROBLEM — R74.8 ELEVATED CPK: Status: RESOLVED | Noted: 2020-01-31 | Resolved: 2022-05-06

## 2022-05-06 PROCEDURE — 99214 OFFICE O/P EST MOD 30 MIN: CPT | Mod: GC | Performed by: STUDENT IN AN ORGANIZED HEALTH CARE EDUCATION/TRAINING PROGRAM

## 2022-05-06 ASSESSMENT — ENCOUNTER SYMPTOMS
CHILLS: 0
CONSTIPATION: 0
PALPITATIONS: 0
COUGH: 0
MYALGIAS: 0
FEVER: 0
VOMITING: 0
WEAKNESS: 0
DIZZINESS: 1
SHORTNESS OF BREATH: 0
DEPRESSION: 0
WEIGHT LOSS: 0
DIARRHEA: 0
NAUSEA: 0

## 2022-05-06 ASSESSMENT — PATIENT HEALTH QUESTIONNAIRE - PHQ9: CLINICAL INTERPRETATION OF PHQ2 SCORE: 0

## 2022-05-06 ASSESSMENT — FIBROSIS 4 INDEX: FIB4 SCORE: 1.6

## 2022-05-06 NOTE — PROGRESS NOTES
"Chief Complaint:  Follow up with labs    History of Present Illness:   Patient is a 76-year-old male with past medical history of hypertension, hypothyroidism, peyronie disease (s/p surgery via urology [place] few years ago), lumbar spinal stenosis (followed by Dr Webber at [facility]) presenting today for follow up with labs.     Ortho- repair fusion + did another fusion- back not so bad. Continuing to have nerve pain down L leg. Follows with neurosurgery.     Notes dizziness this AM- usually have that with Tylenol. Does not coincide with low BP as he checks those at home. Also states he takes 6-10 pills 300 mg gabapentin per day.     Doing PT after back surgery.     At home BP usually lower 130s/ upper 60s.     Annual skin check wanted - dermatology referral due to history of pre-cancerous lesions- abd biggest one- removed.      Review of Systems   Review of Systems   Constitutional: Negative for chills, fever and weight loss.   Respiratory: Negative for cough and shortness of breath.    Cardiovascular: Negative for chest pain, palpitations and leg swelling.   Gastrointestinal: Negative for constipation, diarrhea, nausea and vomiting.   Genitourinary: Negative for dysuria.   Musculoskeletal: Negative for myalgias.   Neurological: Positive for dizziness. Negative for weakness.   Psychiatric/Behavioral: Negative for depression.      Past Medical History:   Past Medical History:   Diagnosis Date   • Actinic keratoses     sees derm - Dr. Everett   • Bilateral hearing loss 08/06/2018    wears aids   • Chronic right-sided low back pain without sciatica 08/06/2018    s/p 5 surgeries; sees Akbar   • ED (erectile dysfunction)    • High cholesterol    • Hyperlipidemia, mixed    • Hypertension     pt states well controlled on meds   • Hypothyroidism    • Neuropathy (HCC) 8/6/2018   • Pain 12/18/2018    \"Nerve Pain-Low back/legs\".   • Peyronie disease     sees [place] urology       Patient Active Problem List    Diagnosis " Date Noted   • Lumbar degenerative disc disease 12/31/2021   • Dyslipidemia 11/09/2021   • Osteoarthritis 11/09/2021   • Chronic pain syndrome 08/20/2021   • Asymptomatic microscopic hematuria 06/12/2020   • Left foot drop 11/13/2019   • Peroneal pain 11/13/2019   • Pain of left lower extremity 10/11/2019   • Peripheral neuropathy 08/06/2018   • Bilateral hearing loss 08/06/2018   • 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   • Spondylolysis, lumbar region 10/05/2017   • Chronic lumbar radiculopathy 08/18/2016   • Chronic infection of sinus 05/17/2016   • Bilateral stenosis of lateral recess of lumbar spine 03/12/2016   • Hypertension 01/04/2016   • Hypothyroidism 01/04/2016   • Impotence 01/04/2016   • Low back pain 01/04/2016     Past Surgical History:   Past Surgical History:   Procedure Laterality Date   • PB IMPLANT NEUROSTIM/  1/3/2022    Procedure: REMOVAL, NEUROSTIMULATOR, PERMANENT, SPINAL CORD;  Surgeon: Be Webber M.D.;  Location: [facility];  Service: Neurosurgery   • LUMBAR FUSION O-ARM  1/3/2022    Procedure: FUSION, SPINE, LUMBAR, WITH O-ARM IMAGING GUIDANCE - L1-3 EXTENSION-STAGE 2;  Surgeon: Be Webber M.D.;  Location: [facility];  Service: Neurosurgery   • LUMBAR DECOMPRESSION  1/3/2022    Procedure: DECOMPRESSION, SPINE, LUMBAR;  Surgeon: Be Webber M.D.;  Location: [facility];  Service: Neurosurgery   • LUMBAR FUSION ANTERIOR N/A 12/31/2021    Procedure: FUSION, SPINE, LUMBAR, ANTERIOR APPROACH - L5-S1;  Surgeon: Be Webber M.D.;  Location: [facility];  Service: Neurosurgery   • FUSION, SPINE, LUMBAR, ROBOT-ASSISTED WITH IMAGING GUIDANCE  2/27/2021    Procedure: FUSION, SPINE, LUMBAR, ROBOT-ASSISTED WITH IMAGING GUIDANCE - L5-S1 EXTENSION OF TLIF;  Surgeon: Be Webber M.D.;  Location: [facility];  Service: Neurosurgery   • LUMBAR DECOMPRESSION N/A 2/27/2021    Procedure: DECOMPRESSION, SPINE, LUMBAR;  Surgeon: Be Webber M.D.;  Location:  "SURGERY [facility];  Service: Neurosurgery   • PB IMPLANT NEUROSTIM/  5/22/2020    Procedure: INSERTION, NEUROSTIMULATOR, PERMANENT, SPINAL CORD;  Surgeon: Eugenio Camara M.D.;  Location: [facility];  Service: Pain Management   • PEYRONIES PLAQUE  12/6/2019    Procedure: EXCISION, PEYRONIE'S PLAQUE, PENIS WITH GRAFTING, JEAN CLAUDE PLICATION, ARTIFICIAL ERECTION;  Surgeon: Tejinder Culver M.D.;  Location: [facility];  Service: Urology   • IN NERVOUS SYSTEM SURGERY UNLISTED Left 6/3/2019    Procedure: EXPLORATION, NERVE- PERONEAL NERVE RELEASE AT THE FIBULAR HEAD  ;  Surgeon: Raz Garcia M.D.;  Location: [facility];  Service: Neurosurgery   • FUSION, PIP JOINT, TOE Right 2/22/2019    Procedure: PIP ARTHRODESIS;  Surgeon: Amadou Bowden M.D.;  Location: SURGERY SAME DAY [facility];  Service: Orthopedics   • FINGER EXPLORATION Right 2/22/2019    Procedure: FINGER EXPLORATION - RING FINGER DISTAL INTERPHALANGEAL JOINT;  Surgeon: Amadou Bowden M.D.;  Location: SURGERY SAME DAY [facility];  Service: Orthopedics   • COLONOSCOPY  2019   • OTHER NEUROLOGICAL SURG  12/12/2018    \"Low Back Surgery'sx6 between 2006 & 2017\".   • CYST EXCISION Right 10/12/2018    Procedure: CYST EXCISION - RING FINGER MUCOUS CYST, DISTAL INTERPHALANGEAL JOINT;  Surgeon: Amadou Bowden M.D.;  Location: SURGERY SAME DAY [facility];  Service: Orthopedics   • LUMBAR LAMINECTOMY DISKECTOMY Left 12/6/2017    Procedure: LUMBAR LAMINECTOMY DISKECTOMY - POSTERIOR REDO LEFT  L4-S1 KAILA;  Surgeon: Be Webber M.D.;  Location: [facility];  Service: Neurosurgery   • FUSION, SPINE, LUMBAR, PLIF  10/5/2017    Procedure: POSTERIOR TRANSFORAMINAL INTERBODY FUSION AT LUMBAR 4 - 5;  Surgeon: Be Webber M.D.;  Location: [facility];  Service: Neurosurgery   • LUMBAR DECOMPRESSION  10/5/2017    Procedure: POSTERIOR LUMBAR 3-4,  4-5 DECOMPRESSION AND FUSION;  " Surgeon: Be Webber M.D.;  Location: [facility];  Service: Neurosurgery   • AL INJ DX/THER AGNT PARAVERT FACET JOINT, BRITT* Right 10/6/2016    Procedure: INJ PARA FACET L/S 1 LVL W/IG - L3-4, L4-5, L5-S1;  Surgeon: Eugenio Camara M.D.;  Location: [facility];  Service: Pain Management   • AL INJ DX/THER AGNT PARAVERT FACET JOINT, BRITT*  10/6/2016    Procedure: INJ PARA FACET L/S 2D LVL W/IG;  Surgeon: Eugenio Camara M.D.;  Location: [facility];  Service: Pain Management   • AL INJ DX/THER AGNT PARAVERT FACET JOINT, BRITT*  10/6/2016    Procedure: INJ PARA FACET L/S 3D LVL W/IG;  Surgeon: Eugenio Camara M.D.;  Location: [facility];  Service: Pain Management   • AL NJX AA&/STRD TFRML EPI LUMBAR/SACRAL 1 LEVEL Right 8/18/2016    Procedure: INJ-FORAMEN EPI LUM/SAC SNGL - L5-S1;  Surgeon: Eugenio Camara M.D.;  Location: [facility];  Service: Pain Management   • LUMBAR LAMINECTOMY DISKECTOMY Right 3/12/2016    Procedure: LUMBAR HemiLAMINECTOMY Micro DISKECTOMY posterior Redo L3-4 ;  Surgeon: Be Webber M.D.;  Location: [facility];  Service:    • FORAMINOTOMY Right 3/12/2016    Procedure: FORAMINOTOMY;  Surgeon: Be Webber M.D.;  Location: [facility];  Service:    • CERVICAL LAMINECTOMY POSTERIOR  2011   • DENTAL SURGERY Bilateral as a teenager    wisdom teeth   • OTHER      nasal surgery 2015   • OTHER NEUROLOGICAL SURG  2006, 2010, 2012, 2014, 2016, 2017    low back surgery x 5   • TONSILLECTOMY      child      Allergies:  Patient has no known allergies.    Medications:     Current Outpatient Medications:   •  gabapentin, 600 mg, Oral, Q3HRS, Taking  •  tadalafil, 5 mg, Oral, DAILY, Taking  •  atorvastatin, 10 mg, Oral, DAILY, Taking  •  hydroCHLOROthiazide, 12.5 mg, Oral, DAILY, Taking  •  Synthroid, 137 mcg, Oral, AM ES, Taking  •  Multi-Vitamin, 1 Tablet, Oral, DAILY, Taking  •  Vitamin D-1000 Max St, 2,000 Units,  Oral, DAILY, Taking  •  Omega-3, 1 Capsule, Oral, DAILY, Taking  •  b complex vitamins, 1 Tablet, Oral, DAILY, Taking  •  Calcium Carbonate-Vitamin D (CALCIUM + D PO), 1 Capsule, Oral, DAILY, Taking     Social History:   Social History     Tobacco Use   • Smoking status: Never Smoker   • Smokeless tobacco: Never Used   Vaping Use   • Vaping Use: Never used   Substance Use Topics   • Alcohol use: No   • Drug use: No     Family History:   Family History   Problem Relation Age of Onset   • Cancer Father         Leukemia    • Heart Disease Father    • Diabetes Father    • Stroke Father    • Cancer Mother         breast met to liver   • Heart Disease Mother      Vitals:   /75 (BP Location: Right arm, Patient Position: Sitting, BP Cuff Size: Adult)   Pulse 66   Temp 36.3 °C (97.3 °F) (Temporal)   Ht 1.829 m (6')   Wt 89.9 kg (198 lb 3.2 oz)   SpO2 97%  Body mass index is 26.88 kg/m².    Physical Exam:   Physical Exam  Constitutional:       General: He is not in acute distress.     Appearance: Normal appearance. He is not ill-appearing or diaphoretic.   HENT:      Head: Normocephalic and atraumatic.      Mouth/Throat:      Mouth: Mucous membranes are moist.   Eyes:      Extraocular Movements: Extraocular movements intact.      Pupils: Pupils are equal, round, and reactive to light.   Cardiovascular:      Rate and Rhythm: Normal rate and regular rhythm.      Heart sounds: No murmur heard.    No friction rub. No gallop.   Pulmonary:      Effort: No respiratory distress.      Breath sounds: No stridor. No wheezing, rhonchi or rales.   Abdominal:      General: Bowel sounds are normal. There is no distension.      Tenderness: There is no abdominal tenderness. There is no guarding or rebound.   Musculoskeletal:      Right lower leg: No edema.      Left lower leg: No edema.   Skin:     Coloration: Skin is not jaundiced or pale.   Neurological:      General: No focal deficit present.      Mental Status: He is alert and  oriented to person, place, and time.   Psychiatric:         Mood and Affect: Mood normal.         Behavior: Behavior normal.        Assessment and Plan    Patient is a 76-year-old male with past medical history of hypertension, hyperlipidemia, hypothyroidism, peripheral neuropathy presenting today for follow up of labs. Patient to follow up in 6 months with labs for follow up of anemia and renal insufficiency. SILVIA for otolaryngology office.  SILVIA placed this visit for patient's otolaryngology office.      Peripheral neuropathy 2/2 spinal stenosis  Lumbar spinal stenosis  Lumbar degenerative disc disease  Foot drop, left- result of above  -A1c 12/20/2021 5.5--> 5.7 4/26/22  -B12, B6, folate WNL December 2019  -Chronic left lower extremity pain without obvious cause  --CT June 2021: posterior hardware extending from L3-S1, interbody hardware at L4-5 and L5-S1, spinal stenosis L1-2, L2-3, multilevel facet arthropathy, degenerative subluxation at L1-2, L2-3   -Trialed Lyrica and felt dizzy in past  **suspected hypersensitivity syndrome but most likely 2/2 patient's ongoing significant back etiologies v some component of pre-DM   Plan  -Per PDMP pt received 7d of Norco in March   -Gabapentin 600mg TID  -Follow with ortho/ neurosurgery as outpatient as appropriate     Prediabetes   -A1c 12/20/2021 5.5--> 5.7 4/26/22  Plan  -At this time, given patient age no additional mgmt required  -Consider starting ace-I for renal protection although would worry about polypharmacy in setting of age    Anemia, normocytic  -Hgb ~13, appears to be BL for patient since apx jan 2022  Plan  -Iron panel with next labs    Renal insufficiency   -Apr 2022: GFR 60s with Cr 1.14  **Ddx: possible dehydration vs CKD in setting of HTN/ pre-DM   Plan  -CTM and consider starting ace-I low dose next visit    -- CHRONIC PROBLEMS --     Hypertension  -Well controlled in office BP   Plan  -Hydrochlorothiazide 12.5 mg PO  qD     Hypothyroidism  -Well-controlled on current regimen  -Asymptomatic  -TSH April 2022 WNL   Plan  -Synthroid 137mcg PO qD     Dyslipidemia  -Lipid panel November 2021 with total cholesterol 180, triglycerides 122, HDL 43,   Plan  -Atorvastatin 10 mg PO qD    Sinus infection, chronic  -s/p sinuplasty   Plan  -Follows with otolaryngology- PRN neti + flonase as needed   -SILVIA filled this visit     Healthcare maintenance  -Elevated ferritin for PSA  -Colonoscopy 2019 w/[facility]- 1 diminutive polyp in transverse colon, diverticulosis (sigmoid), internal hemorrhoids  -Pneumovax 2018  -Prevnar 2015  -Tdap 2015  -Shingrix completed  -COVID vaccine + booster (moderna) completed   Plan  -Colonoscopy 2024 recommended  -Flu shot  -Covid vaccine  -Return in 6 mo with lab eork     Please note that this dictation was created using voice recognition software. I have made every reasonable attempt to correct obvious errors, but I expect that there are errors of grammar and possibly content that I did not discover before finalizing the note.     Patient was seen and assessed by both Dr Conley and the resident.    Vincenzo Mccarthy, PGY-1   Internal Medicine

## 2022-05-06 NOTE — LETTER
[employer]  Vincenzo Mccarthy D.O.  [address] [place] [address]  Fax: [phone]   Authorization for Release/Disclosure of   Protected Health Information   Name: RINA PAREKH : 1945 SSN: [id number]   Address: [address] [place] [address] [address] [place] [address] Phone:    [phone] (home)    I authorize the entity listed below to release/disclose the PHI below to:   [facility] [employer]/Vincenzo Mccarthy D.O. and Vincenzo Mccarthy D.O.   Provider or Entity Name:     Address   City, State, Zip   Phone:      Fax:     Reason for request: continuity of care   Information to be released:    [  ] LAST COLONOSCOPY,  including any PATH REPORT and follow-up  [  ] LAST FIT/COLOGUARD RESULT [  ] LAST DEXA  [  ] LAST MAMMOGRAM  [  ] LAST PAP  [  ] LAST LABS [  ] RETINA EXAM REPORT  [  ] IMMUNIZATION RECORDS  [  ] Release all info      [  ] Check here and initial the line next to each item to release ALL health information INCLUDING  _____ Care and treatment for drug and / or alcohol abuse  _____ HIV testing, infection status, or AIDS  _____ Genetic Testing    DATES OF SERVICE OR TIME PERIOD TO BE DISCLOSED: _____________  I understand and acknowledge that:  * This Authorization may be revoked at any time by you in writing, except if your health information has already been used or disclosed.  * Your health information that will be used or disclosed as a result of you signing this authorization could be re-disclosed by the recipient. If this occurs, your re-disclosed health information may no longer be protected by State or Federal laws.  * You may refuse to sign this Authorization. Your refusal will not affect your ability to obtain treatment.  * This Authorization becomes effective upon signing and will  on (date) __________.      If no date is indicated, this Authorization will  one (1) year from the signature date.    Name: Rina Parekh    Signature:   Date:     2022       PLEASE FAX REQUESTED RECORDS BACK  TO: [phone]

## 2022-06-06 DIAGNOSIS — I10 ESSENTIAL HYPERTENSION: ICD-10-CM

## 2022-06-06 DIAGNOSIS — E78.2 MIXED HYPERLIPIDEMIA: ICD-10-CM

## 2022-06-06 RX ORDER — ATORVASTATIN CALCIUM 10 MG/1
10 TABLET, FILM COATED ORAL DAILY
Qty: 30 TABLET | Refills: 3 | Status: SHIPPED | OUTPATIENT
Start: 2022-06-06 | End: 2022-12-09 | Stop reason: SDUPTHER

## 2022-06-06 NOTE — TELEPHONE ENCOUNTER
Last seen:05/06/2022 by Dr. Mccarthy Next appt: none    Was the patient seen in the last year in this department? Yes   Does patient have an active prescription for medications requested? No   Received Request Via: Pharmacy    Patient is requesting fo this to be send in today. He will be leaving town in the evening. Please advise

## 2022-06-16 DIAGNOSIS — I10 ESSENTIAL HYPERTENSION: ICD-10-CM

## 2022-06-16 DIAGNOSIS — E78.2 MIXED HYPERLIPIDEMIA: ICD-10-CM

## 2022-06-16 RX ORDER — HYDROCHLOROTHIAZIDE 12.5 MG/1
TABLET ORAL
Qty: 30 TABLET | Refills: 0 | Status: SHIPPED | OUTPATIENT
Start: 2022-06-16 | End: 2022-07-13 | Stop reason: SDUPTHER

## 2022-06-16 NOTE — TELEPHONE ENCOUNTER
Hydrochlorothiazide Refill    Last seen: 5/6/22 by Dr. Mccarthy  Next appt: None    Was the patient seen in the last year in this department? Yes   Does patient have an active prescription for medications requested? No   Received Request Via: Pharmacy

## 2022-06-29 ENCOUNTER — OFFICE VISIT (OUTPATIENT)
Dept: DERMATOLOGY | Facility: IMAGING CENTER | Age: 77
End: 2022-06-29
Payer: COMMERCIAL

## 2022-06-29 DIAGNOSIS — L81.4 LENTIGINES: ICD-10-CM

## 2022-06-29 DIAGNOSIS — L82.1 SK (SEBORRHEIC KERATOSIS): ICD-10-CM

## 2022-06-29 DIAGNOSIS — D22.9 MULTIPLE NEVI: ICD-10-CM

## 2022-06-29 DIAGNOSIS — D18.01 CHERRY ANGIOMA: ICD-10-CM

## 2022-06-29 DIAGNOSIS — Z12.83 SKIN CANCER SCREENING: ICD-10-CM

## 2022-06-29 PROCEDURE — 99213 OFFICE O/P EST LOW 20 MIN: CPT | Mod: 25 | Performed by: NURSE PRACTITIONER

## 2022-06-29 PROCEDURE — 17110 DESTRUCTION B9 LES UP TO 14: CPT | Performed by: NURSE PRACTITIONER

## 2022-06-29 NOTE — PROGRESS NOTES
DERMATOLOGY NOTE  NEW VISIT       Chief complaint:  RONALD     Denies new, growing, changing, itching or bleeding skin lesions today.      History of skin cancer: No  History of precancers/actinic keratoses: No  History of biopsies:Yes, Details: Bx, back, states might have been precancerous, did not have follow up treatment  History of blistering/severe sunburns:Yes, Details: teen  Family history of skin cancer:No  Family history of atypical moles:No      No Known Allergies     MEDICATIONS:  Medications relevant to specialty reviewed.     REVIEW OF SYSTEMS:   Positive for skin (see HPI)  Negative for fevers and chills       EXAM:  There were no vitals taken for this visit.  Constitutional: Well-developed, well-nourished, and in no distress.     A total body skin exam was performed excluding the genitals per patient preference and including the following areas: head (including face), neck, chest, abdomen, groin/buttocks, back, bilateral upper extremities, and bilateral lower extremities with the following pertinent findings listed below and/or in assessment/plan.     -sun exposed skin of trunk and b/l upper, lower extremities and face with scattered clinically benign light brown reticulated macules all of which were morphologically similar and none of which were suspicious for skin cancer today on exam    -Several scattered 1-3mm bright red macules and thin papules on the trunk, scalp, extremities    -Several tan medium brown skin-colored stuck-on waxy papules scattered on the trunk, scalp and extremities    -Multiple tan medium brown dark brown skin-colored macules papules scattered over the trunk >> extremities, All with benign-appearing pigment network patterns on dermoscopy        IMPRESSION / PLAN:    1. Lentigines  - Benign-appearing nature of lesions discussed during exam.   - Advised to continue to monitor for any return to clinic for new or concerning changes.      2. Cherry angioma  - Benign-appearing nature  of lesions discussed during exam.   - Advised to continue to monitor for any return to clinic for new or concerning changes.      3. SK (seborrheic keratosis)  - Benign-appearing nature of lesions discussed during exam.   - Due to location, easily traumatized and irritated, LN2 applied:  CRYOTHERAPY:  Risks (including, but not limited to: skin discoloration, redness, blister, blood blister, recurrence, need for further treatment, infection, scar) and benefits of cryotherapy discussed. Patient verbally agreed to proceed with treatment. 1 cryotherapy freeze thaw cycles of 10 seconds were applied to 1 lesion on R parietal with cryac. Patient tolerated procedure well. Aftercare instructions given--no specific care needed unless irritated during healing process, can apply Vaseline with small band-aid if needed.    - Advised to continue to monitor for any return to clinic for new or concerning changes.      4. Multiple nevi  - Benign-appearing nature of lesions discussed during exam.   - Advised to continue to monitor for any return to clinic for new or concerning changes.  - ABCDE's of melanoma discussed/handout given      5. Skin cancer screening  Skin cancer education  - discussed importance of sun protective clothing, eyewear in addition to the use of broad spectrum sunscreen with SPF 30 or greater, as well as need for reapplication ~every 2 hours when exposed to UVR  - discussed importance following up for any new or changing lesions as noted in handout given, but every 12 months exams in clinic in the setting of dermatologic history  - ABCDE's of melanoma discussed/handout given          Discussed risks, benefits, alternative treatments as well as common side effects associated with prescribed treatment, Patient verbalized understanding and agrees with plan regarding the above               Please note that this dictation was created using voice recognition software. I have made every reasonable attempt to correct  obvious errors, but I expect that there are errors of grammar and possibly content that I did not discover before finalizing the note.      Return to clinic in: Return in about 1 year (around 6/29/2023) for RONALD. and as needed for any new or changing skin lesions.

## 2022-07-13 DIAGNOSIS — I10 ESSENTIAL HYPERTENSION: ICD-10-CM

## 2022-07-13 DIAGNOSIS — E78.2 MIXED HYPERLIPIDEMIA: ICD-10-CM

## 2022-07-13 RX ORDER — HYDROCHLOROTHIAZIDE 12.5 MG/1
TABLET ORAL
Qty: 30 TABLET | Refills: 0 | Status: SHIPPED | OUTPATIENT
Start: 2022-07-13 | End: 2022-09-06

## 2022-08-01 ENCOUNTER — PRE-ADMISSION TESTING (OUTPATIENT)
Dept: ADMISSIONS | Facility: MEDICAL CENTER | Age: 77
End: 2022-08-01
Attending: ORTHOPAEDIC SURGERY
Payer: COMMERCIAL

## 2022-08-01 DIAGNOSIS — Z01.812 PRE-OPERATIVE LABORATORY EXAMINATION: ICD-10-CM

## 2022-08-01 DIAGNOSIS — Z01.810 PRE-OPERATIVE CARDIOVASCULAR EXAMINATION: ICD-10-CM

## 2022-08-01 LAB
ANION GAP SERPL CALC-SCNC: 10 MMOL/L (ref 7–16)
BUN SERPL-MCNC: 20 MG/DL (ref 8–22)
CALCIUM SERPL-MCNC: 9.1 MG/DL (ref 8.4–10.2)
CHLORIDE SERPL-SCNC: 105 MMOL/L (ref 96–112)
CO2 SERPL-SCNC: 26 MMOL/L (ref 20–33)
CREAT SERPL-MCNC: 1.07 MG/DL (ref 0.5–1.4)
EKG IMPRESSION: NORMAL
ERYTHROCYTE [DISTWIDTH] IN BLOOD BY AUTOMATED COUNT: 46.5 FL (ref 35.9–50)
GFR SERPLBLD CREATININE-BSD FMLA CKD-EPI: 71 ML/MIN/1.73 M 2
GLUCOSE SERPL-MCNC: 90 MG/DL (ref 65–99)
HCT VFR BLD AUTO: 44.6 % (ref 42–52)
HGB BLD-MCNC: 14.1 G/DL (ref 14–18)
MCH RBC QN AUTO: 27.5 PG (ref 27–33)
MCHC RBC AUTO-ENTMCNC: 31.6 G/DL (ref 33.7–35.3)
MCV RBC AUTO: 86.9 FL (ref 81.4–97.8)
PLATELET # BLD AUTO: 260 K/UL (ref 164–446)
PMV BLD AUTO: 10.6 FL (ref 9–12.9)
POTASSIUM SERPL-SCNC: 4.6 MMOL/L (ref 3.6–5.5)
RBC # BLD AUTO: 5.13 M/UL (ref 4.7–6.1)
SODIUM SERPL-SCNC: 141 MMOL/L (ref 135–145)
WBC # BLD AUTO: 4.7 K/UL (ref 4.8–10.8)

## 2022-08-01 PROCEDURE — 87640 STAPH A DNA AMP PROBE: CPT

## 2022-08-01 PROCEDURE — 93005 ELECTROCARDIOGRAM TRACING: CPT

## 2022-08-01 PROCEDURE — 93010 ELECTROCARDIOGRAM REPORT: CPT | Performed by: INTERNAL MEDICINE

## 2022-08-01 PROCEDURE — 87641 MR-STAPH DNA AMP PROBE: CPT

## 2022-08-01 PROCEDURE — 80048 BASIC METABOLIC PNL TOTAL CA: CPT

## 2022-08-01 PROCEDURE — 36415 COLL VENOUS BLD VENIPUNCTURE: CPT

## 2022-08-01 PROCEDURE — 85027 COMPLETE CBC AUTOMATED: CPT

## 2022-08-01 ASSESSMENT — FIBROSIS 4 INDEX: FIB4 SCORE: 1.6

## 2022-08-01 NOTE — DISCHARGE PLANNING
DISCHARGE PLANNING NOTE - TOTAL JOINT    Procedure: Procedure(s):  LEFT TOTAL HIP ARTHROPLASTY AND REPAIRS AS INDICATED  Procedure Date: 8/15/2022  Insurance: Payor: MEDICARE / Plan: MEDICARE PART A ONLY / Product Type: *No Product type* /    Equipment currently available at home?  crutches, raised toilet seat and shower chair  Steps into the home? 2  Steps within the home? 2 flights  Toilet height? Standard  Type of shower? walk-in shower  Who will be with you during your recovery? Spouse.  Is Outpatient Physical Therapy set up after surgery? No   Did you take the Total Joint Class and where? Yes  Planning same day discharge?Yes     This writer met with pt during his preadmission appt. Pt will bring his crutches. Home safety checklist reviewed and copy given to pt. Pt educated to dc criteria. All questions answered and verbalizes understanding of all instructions. No dc needs identified at this time. Anticipate dc to home without barriers.

## 2022-08-01 NOTE — PREPROCEDURE INSTRUCTIONS
Pre admit apt: Pt. Instructed to continue regularly prescribed medications through day before surgery.  Instructed to take the following medications, the day of surgery, with a sip of water per anesthesia protocol:gabapentin, synthroid    METS greater than 4  Pt states, no previous issues with anesthesia  Pt denied any sxs of UTI, denied increased frequency, or pain/burning with urination.  Instructed pt if he develops any new sxs of illness/covid, to contact surgeon, asap.

## 2022-08-02 LAB
SCCMEC + MECA PNL NOSE NAA+PROBE: NEGATIVE
SCCMEC + MECA PNL NOSE NAA+PROBE: POSITIVE

## 2022-08-14 ENCOUNTER — ANESTHESIA EVENT (OUTPATIENT)
Dept: SURGERY | Facility: MEDICAL CENTER | Age: 77
End: 2022-08-14
Payer: COMMERCIAL

## 2022-08-15 ENCOUNTER — HOSPITAL ENCOUNTER (OUTPATIENT)
Facility: MEDICAL CENTER | Age: 77
End: 2022-08-15
Attending: ORTHOPAEDIC SURGERY | Admitting: ORTHOPAEDIC SURGERY
Payer: COMMERCIAL

## 2022-08-15 ENCOUNTER — ANESTHESIA (OUTPATIENT)
Dept: SURGERY | Facility: MEDICAL CENTER | Age: 77
End: 2022-08-15
Payer: COMMERCIAL

## 2022-08-15 ENCOUNTER — APPOINTMENT (OUTPATIENT)
Dept: RADIOLOGY | Facility: MEDICAL CENTER | Age: 77
End: 2022-08-15
Attending: ORTHOPAEDIC SURGERY
Payer: COMMERCIAL

## 2022-08-15 VITALS
SYSTOLIC BLOOD PRESSURE: 153 MMHG | BODY MASS INDEX: 26.71 KG/M2 | TEMPERATURE: 97.3 F | WEIGHT: 197.2 LBS | DIASTOLIC BLOOD PRESSURE: 65 MMHG | RESPIRATION RATE: 16 BRPM | OXYGEN SATURATION: 98 % | HEIGHT: 72 IN | HEART RATE: 84 BPM

## 2022-08-15 DIAGNOSIS — M79.605 PAIN OF LEFT LOWER EXTREMITY: ICD-10-CM

## 2022-08-15 PROBLEM — M16.10 ARTHRITIS OF HIP: Status: ACTIVE | Noted: 2022-08-15

## 2022-08-15 LAB
ABO GROUP BLD: NORMAL
BLD GP AB SCN SERPL QL: NORMAL
RH BLD: NORMAL

## 2022-08-15 PROCEDURE — A9270 NON-COVERED ITEM OR SERVICE: HCPCS | Performed by: ANESTHESIOLOGY

## 2022-08-15 PROCEDURE — 96365 THER/PROPH/DIAG IV INF INIT: CPT

## 2022-08-15 PROCEDURE — 700101 HCHG RX REV CODE 250: Performed by: ANESTHESIOLOGY

## 2022-08-15 PROCEDURE — 700102 HCHG RX REV CODE 250 W/ 637 OVERRIDE(OP): Performed by: ANESTHESIOLOGY

## 2022-08-15 PROCEDURE — 160048 HCHG OR STATISTICAL LEVEL 1-5: Performed by: ORTHOPAEDIC SURGERY

## 2022-08-15 PROCEDURE — 97530 THERAPEUTIC ACTIVITIES: CPT

## 2022-08-15 PROCEDURE — 700101 HCHG RX REV CODE 250: Performed by: ORTHOPAEDIC SURGERY

## 2022-08-15 PROCEDURE — 160035 HCHG PACU - 1ST 60 MINS PHASE I: Performed by: ORTHOPAEDIC SURGERY

## 2022-08-15 PROCEDURE — 160036 HCHG PACU - EA ADDL 30 MINS PHASE I: Performed by: ORTHOPAEDIC SURGERY

## 2022-08-15 PROCEDURE — G0378 HOSPITAL OBSERVATION PER HR: HCPCS

## 2022-08-15 PROCEDURE — 86850 RBC ANTIBODY SCREEN: CPT

## 2022-08-15 PROCEDURE — 160031 HCHG SURGERY MINUTES - 1ST 30 MINS LEVEL 5: Performed by: ORTHOPAEDIC SURGERY

## 2022-08-15 PROCEDURE — A9270 NON-COVERED ITEM OR SERVICE: HCPCS | Performed by: ORTHOPAEDIC SURGERY

## 2022-08-15 PROCEDURE — 160002 HCHG RECOVERY MINUTES (STAT): Performed by: ORTHOPAEDIC SURGERY

## 2022-08-15 PROCEDURE — 160009 HCHG ANES TIME/MIN: Performed by: ORTHOPAEDIC SURGERY

## 2022-08-15 PROCEDURE — 86901 BLOOD TYPING SEROLOGIC RH(D): CPT

## 2022-08-15 PROCEDURE — 700111 HCHG RX REV CODE 636 W/ 250 OVERRIDE (IP): Performed by: ANESTHESIOLOGY

## 2022-08-15 PROCEDURE — 700102 HCHG RX REV CODE 250 W/ 637 OVERRIDE(OP): Performed by: ORTHOPAEDIC SURGERY

## 2022-08-15 PROCEDURE — 160042 HCHG SURGERY MINUTES - EA ADDL 1 MIN LEVEL 5: Performed by: ORTHOPAEDIC SURGERY

## 2022-08-15 PROCEDURE — 86900 BLOOD TYPING SEROLOGIC ABO: CPT

## 2022-08-15 PROCEDURE — 36415 COLL VENOUS BLD VENIPUNCTURE: CPT

## 2022-08-15 PROCEDURE — C1776 JOINT DEVICE (IMPLANTABLE): HCPCS | Performed by: ORTHOPAEDIC SURGERY

## 2022-08-15 PROCEDURE — 700105 HCHG RX REV CODE 258: Performed by: ANESTHESIOLOGY

## 2022-08-15 PROCEDURE — 700105 HCHG RX REV CODE 258: Performed by: ORTHOPAEDIC SURGERY

## 2022-08-15 PROCEDURE — 96375 TX/PRO/DX INJ NEW DRUG ADDON: CPT

## 2022-08-15 PROCEDURE — 502000 HCHG MISC OR IMPLANTS RC 0278: Performed by: ORTHOPAEDIC SURGERY

## 2022-08-15 PROCEDURE — 99100 ANES PT EXTEME AGE<1 YR&>70: CPT | Performed by: ANESTHESIOLOGY

## 2022-08-15 PROCEDURE — 01214 ANES OPEN PX TOT HIP ARTHRP: CPT | Performed by: ANESTHESIOLOGY

## 2022-08-15 PROCEDURE — 700111 HCHG RX REV CODE 636 W/ 250 OVERRIDE (IP): Performed by: ORTHOPAEDIC SURGERY

## 2022-08-15 PROCEDURE — 73502 X-RAY EXAM HIP UNI 2-3 VIEWS: CPT | Mod: LT

## 2022-08-15 PROCEDURE — 97161 PT EVAL LOW COMPLEX 20 MIN: CPT

## 2022-08-15 DEVICE — IMPLANTABLE DEVICE: Type: IMPLANTABLE DEVICE | Site: HIP | Status: FUNCTIONAL

## 2022-08-15 RX ORDER — ACETAMINOPHEN 500 MG
1000 TABLET ORAL EVERY 6 HOURS PRN
Status: DISCONTINUED | OUTPATIENT
Start: 2022-08-20 | End: 2022-08-15 | Stop reason: HOSPADM

## 2022-08-15 RX ORDER — DIPHENHYDRAMINE HYDROCHLORIDE 50 MG/ML
12.5 INJECTION INTRAMUSCULAR; INTRAVENOUS
Status: DISCONTINUED | OUTPATIENT
Start: 2022-08-15 | End: 2022-08-15 | Stop reason: HOSPADM

## 2022-08-15 RX ORDER — CEFAZOLIN SODIUM 1 G/3ML
INJECTION, POWDER, FOR SOLUTION INTRAMUSCULAR; INTRAVENOUS PRN
Status: DISCONTINUED | OUTPATIENT
Start: 2022-08-15 | End: 2022-08-15 | Stop reason: SURG

## 2022-08-15 RX ORDER — DEXMEDETOMIDINE HYDROCHLORIDE 100 UG/ML
INJECTION, SOLUTION INTRAVENOUS PRN
Status: DISCONTINUED | OUTPATIENT
Start: 2022-08-15 | End: 2022-08-15 | Stop reason: SURG

## 2022-08-15 RX ORDER — OXYCODONE HYDROCHLORIDE 10 MG/1
10 TABLET ORAL
Status: DISCONTINUED | OUTPATIENT
Start: 2022-08-15 | End: 2022-08-15 | Stop reason: HOSPADM

## 2022-08-15 RX ORDER — AMOXICILLIN 250 MG
1 CAPSULE ORAL NIGHTLY
Status: DISCONTINUED | OUTPATIENT
Start: 2022-08-15 | End: 2022-08-15 | Stop reason: HOSPADM

## 2022-08-15 RX ORDER — ROCURONIUM BROMIDE 10 MG/ML
INJECTION, SOLUTION INTRAVENOUS PRN
Status: DISCONTINUED | OUTPATIENT
Start: 2022-08-15 | End: 2022-08-15 | Stop reason: SURG

## 2022-08-15 RX ORDER — LEVOTHYROXINE SODIUM 137 UG/1
137 TABLET ORAL
Status: DISCONTINUED | OUTPATIENT
Start: 2022-08-16 | End: 2022-08-15 | Stop reason: HOSPADM

## 2022-08-15 RX ORDER — GABAPENTIN 600 MG/1
600 TABLET ORAL
Status: DISCONTINUED | OUTPATIENT
Start: 2022-08-15 | End: 2022-08-15

## 2022-08-15 RX ORDER — HALOPERIDOL 5 MG/ML
1 INJECTION INTRAMUSCULAR
Status: DISCONTINUED | OUTPATIENT
Start: 2022-08-15 | End: 2022-08-15 | Stop reason: HOSPADM

## 2022-08-15 RX ORDER — ROPIVACAINE HYDROCHLORIDE 5 MG/ML
INJECTION, SOLUTION EPIDURAL; INFILTRATION; PERINEURAL
Status: DISCONTINUED | OUTPATIENT
Start: 2022-08-15 | End: 2022-08-15 | Stop reason: HOSPADM

## 2022-08-15 RX ORDER — MIDAZOLAM HYDROCHLORIDE 1 MG/ML
INJECTION INTRAMUSCULAR; INTRAVENOUS PRN
Status: DISCONTINUED | OUTPATIENT
Start: 2022-08-15 | End: 2022-08-15 | Stop reason: SURG

## 2022-08-15 RX ORDER — ACETAMINOPHEN 500 MG
1000 TABLET ORAL EVERY 6 HOURS
Status: DISCONTINUED | OUTPATIENT
Start: 2022-08-15 | End: 2022-08-15 | Stop reason: HOSPADM

## 2022-08-15 RX ORDER — DIPHENHYDRAMINE HYDROCHLORIDE 50 MG/ML
25 INJECTION INTRAMUSCULAR; INTRAVENOUS EVERY 6 HOURS PRN
Status: DISCONTINUED | OUTPATIENT
Start: 2022-08-15 | End: 2022-08-15 | Stop reason: HOSPADM

## 2022-08-15 RX ORDER — ASPIRIN 325 MG
325 TABLET ORAL DAILY
Status: DISCONTINUED | OUTPATIENT
Start: 2022-08-15 | End: 2022-08-15 | Stop reason: HOSPADM

## 2022-08-15 RX ORDER — AMOXICILLIN 250 MG
1 CAPSULE ORAL
Status: DISCONTINUED | OUTPATIENT
Start: 2022-08-15 | End: 2022-08-15 | Stop reason: HOSPADM

## 2022-08-15 RX ORDER — SODIUM CHLORIDE, SODIUM LACTATE, POTASSIUM CHLORIDE, CALCIUM CHLORIDE 600; 310; 30; 20 MG/100ML; MG/100ML; MG/100ML; MG/100ML
INJECTION, SOLUTION INTRAVENOUS CONTINUOUS
Status: DISCONTINUED | OUTPATIENT
Start: 2022-08-15 | End: 2022-08-15 | Stop reason: HOSPADM

## 2022-08-15 RX ORDER — DEXAMETHASONE SODIUM PHOSPHATE 4 MG/ML
4 INJECTION, SOLUTION INTRA-ARTICULAR; INTRALESIONAL; INTRAMUSCULAR; INTRAVENOUS; SOFT TISSUE
Status: DISCONTINUED | OUTPATIENT
Start: 2022-08-15 | End: 2022-08-15 | Stop reason: HOSPADM

## 2022-08-15 RX ORDER — MORPHINE SULFATE 4 MG/ML
INJECTION INTRAVENOUS
Status: DISCONTINUED | OUTPATIENT
Start: 2022-08-15 | End: 2022-08-15 | Stop reason: HOSPADM

## 2022-08-15 RX ORDER — ONDANSETRON 2 MG/ML
4 INJECTION INTRAMUSCULAR; INTRAVENOUS
Status: DISCONTINUED | OUTPATIENT
Start: 2022-08-15 | End: 2022-08-15 | Stop reason: HOSPADM

## 2022-08-15 RX ORDER — OXYCODONE HYDROCHLORIDE 5 MG/1
5 TABLET ORAL
Status: DISCONTINUED | OUTPATIENT
Start: 2022-08-15 | End: 2022-08-15 | Stop reason: HOSPADM

## 2022-08-15 RX ORDER — SODIUM CHLORIDE 9 MG/ML
INJECTION, SOLUTION INTRAMUSCULAR; INTRAVENOUS; SUBCUTANEOUS
Status: DISCONTINUED | OUTPATIENT
Start: 2022-08-15 | End: 2022-08-15 | Stop reason: HOSPADM

## 2022-08-15 RX ORDER — SODIUM CHLORIDE, SODIUM LACTATE, POTASSIUM CHLORIDE, CALCIUM CHLORIDE 600; 310; 30; 20 MG/100ML; MG/100ML; MG/100ML; MG/100ML
INJECTION, SOLUTION INTRAVENOUS CONTINUOUS
Status: ACTIVE | OUTPATIENT
Start: 2022-08-15 | End: 2022-08-15

## 2022-08-15 RX ORDER — ONDANSETRON 2 MG/ML
4 INJECTION INTRAMUSCULAR; INTRAVENOUS EVERY 4 HOURS PRN
Status: DISCONTINUED | OUTPATIENT
Start: 2022-08-15 | End: 2022-08-15 | Stop reason: HOSPADM

## 2022-08-15 RX ORDER — DEXAMETHASONE SODIUM PHOSPHATE 4 MG/ML
INJECTION, SOLUTION INTRA-ARTICULAR; INTRALESIONAL; INTRAMUSCULAR; INTRAVENOUS; SOFT TISSUE PRN
Status: DISCONTINUED | OUTPATIENT
Start: 2022-08-15 | End: 2022-08-15 | Stop reason: SURG

## 2022-08-15 RX ORDER — ENEMA 19; 7 G/133ML; G/133ML
1 ENEMA RECTAL
Status: DISCONTINUED | OUTPATIENT
Start: 2022-08-15 | End: 2022-08-15 | Stop reason: HOSPADM

## 2022-08-15 RX ORDER — HYDROCHLOROTHIAZIDE 12.5 MG/1
12.5 TABLET ORAL DAILY
Status: DISCONTINUED | OUTPATIENT
Start: 2022-08-16 | End: 2022-08-15 | Stop reason: HOSPADM

## 2022-08-15 RX ORDER — CELECOXIB 200 MG/1
200 CAPSULE ORAL ONCE
Status: DISCONTINUED | OUTPATIENT
Start: 2022-08-15 | End: 2022-08-15 | Stop reason: HOSPADM

## 2022-08-15 RX ORDER — ONDANSETRON 2 MG/ML
INJECTION INTRAMUSCULAR; INTRAVENOUS PRN
Status: DISCONTINUED | OUTPATIENT
Start: 2022-08-15 | End: 2022-08-15 | Stop reason: SURG

## 2022-08-15 RX ORDER — BISACODYL 10 MG
10 SUPPOSITORY, RECTAL RECTAL
Status: DISCONTINUED | OUTPATIENT
Start: 2022-08-15 | End: 2022-08-15 | Stop reason: HOSPADM

## 2022-08-15 RX ORDER — TRANEXAMIC ACID 100 MG/ML
INJECTION, SOLUTION INTRAVENOUS PRN
Status: DISCONTINUED | OUTPATIENT
Start: 2022-08-15 | End: 2022-08-15 | Stop reason: SURG

## 2022-08-15 RX ORDER — ACETAMINOPHEN 500 MG
1000 TABLET ORAL ONCE
Status: COMPLETED | OUTPATIENT
Start: 2022-08-15 | End: 2022-08-15

## 2022-08-15 RX ORDER — OXYCODONE HCL 5 MG/5 ML
5 SOLUTION, ORAL ORAL
Status: COMPLETED | OUTPATIENT
Start: 2022-08-15 | End: 2022-08-15

## 2022-08-15 RX ORDER — DOCUSATE SODIUM 100 MG/1
100 CAPSULE, LIQUID FILLED ORAL 2 TIMES DAILY
Status: DISCONTINUED | OUTPATIENT
Start: 2022-08-15 | End: 2022-08-15 | Stop reason: HOSPADM

## 2022-08-15 RX ORDER — LIDOCAINE HYDROCHLORIDE 20 MG/ML
INJECTION, SOLUTION EPIDURAL; INFILTRATION; INTRACAUDAL; PERINEURAL PRN
Status: DISCONTINUED | OUTPATIENT
Start: 2022-08-15 | End: 2022-08-15 | Stop reason: SURG

## 2022-08-15 RX ORDER — KETOROLAC TROMETHAMINE 30 MG/ML
15 INJECTION, SOLUTION INTRAMUSCULAR; INTRAVENOUS EVERY 6 HOURS
Status: DISCONTINUED | OUTPATIENT
Start: 2022-08-15 | End: 2022-08-15 | Stop reason: HOSPADM

## 2022-08-15 RX ORDER — LABETALOL HYDROCHLORIDE 5 MG/ML
5 INJECTION, SOLUTION INTRAVENOUS
Status: DISCONTINUED | OUTPATIENT
Start: 2022-08-15 | End: 2022-08-15 | Stop reason: HOSPADM

## 2022-08-15 RX ORDER — OXYCODONE HCL 5 MG/5 ML
10 SOLUTION, ORAL ORAL
Status: COMPLETED | OUTPATIENT
Start: 2022-08-15 | End: 2022-08-15

## 2022-08-15 RX ORDER — MORPHINE SULFATE 4 MG/ML
4 INJECTION INTRAVENOUS
Status: DISCONTINUED | OUTPATIENT
Start: 2022-08-15 | End: 2022-08-15 | Stop reason: HOSPADM

## 2022-08-15 RX ORDER — SCOLOPAMINE TRANSDERMAL SYSTEM 1 MG/1
1 PATCH, EXTENDED RELEASE TRANSDERMAL
Status: DISCONTINUED | OUTPATIENT
Start: 2022-08-15 | End: 2022-08-15 | Stop reason: HOSPADM

## 2022-08-15 RX ORDER — IBUPROFEN 400 MG/1
800 TABLET ORAL 3 TIMES DAILY PRN
Status: DISCONTINUED | OUTPATIENT
Start: 2022-08-18 | End: 2022-08-15 | Stop reason: HOSPADM

## 2022-08-15 RX ORDER — POLYETHYLENE GLYCOL 3350 17 G/17G
1 POWDER, FOR SOLUTION ORAL 2 TIMES DAILY PRN
Status: DISCONTINUED | OUTPATIENT
Start: 2022-08-15 | End: 2022-08-15 | Stop reason: HOSPADM

## 2022-08-15 RX ORDER — DEXAMETHASONE SODIUM PHOSPHATE 4 MG/ML
10 INJECTION, SOLUTION INTRA-ARTICULAR; INTRALESIONAL; INTRAMUSCULAR; INTRAVENOUS; SOFT TISSUE ONCE
Status: DISCONTINUED | OUTPATIENT
Start: 2022-08-16 | End: 2022-08-15 | Stop reason: HOSPADM

## 2022-08-15 RX ORDER — DIPHENHYDRAMINE HCL 25 MG
25 TABLET ORAL NIGHTLY PRN
Status: DISCONTINUED | OUTPATIENT
Start: 2022-08-16 | End: 2022-08-15 | Stop reason: HOSPADM

## 2022-08-15 RX ADMIN — ACETAMINOPHEN 1000 MG: 500 TABLET, FILM COATED ORAL at 06:41

## 2022-08-15 RX ADMIN — LIDOCAINE HYDROCHLORIDE 100 MG: 20 INJECTION, SOLUTION EPIDURAL; INFILTRATION; INTRACAUDAL at 07:03

## 2022-08-15 RX ADMIN — FENTANYL CITRATE 50 MCG: 50 INJECTION, SOLUTION INTRAMUSCULAR; INTRAVENOUS at 10:25

## 2022-08-15 RX ADMIN — DEXMEDETOMIDINE 5 MCG: 200 INJECTION, SOLUTION INTRAVENOUS at 07:57

## 2022-08-15 RX ADMIN — OXYCODONE HYDROCHLORIDE 10 MG: 5 SOLUTION ORAL at 09:33

## 2022-08-15 RX ADMIN — CEFAZOLIN 2 G: 2 INJECTION, POWDER, FOR SOLUTION INTRAMUSCULAR; INTRAVENOUS at 12:21

## 2022-08-15 RX ADMIN — ACETAMINOPHEN 1000 MG: 500 TABLET, FILM COATED ORAL at 12:22

## 2022-08-15 RX ADMIN — FENTANYL CITRATE 50 MCG: 50 INJECTION, SOLUTION INTRAMUSCULAR; INTRAVENOUS at 07:41

## 2022-08-15 RX ADMIN — KETOROLAC TROMETHAMINE 15 MG: 30 INJECTION, SOLUTION INTRAMUSCULAR at 12:22

## 2022-08-15 RX ADMIN — ROCURONIUM BROMIDE 20 MG: 10 INJECTION, SOLUTION INTRAVENOUS at 08:26

## 2022-08-15 RX ADMIN — FENTANYL CITRATE 50 MCG: 50 INJECTION, SOLUTION INTRAMUSCULAR; INTRAVENOUS at 09:49

## 2022-08-15 RX ADMIN — MIDAZOLAM HYDROCHLORIDE 1 MG: 1 INJECTION, SOLUTION INTRAMUSCULAR; INTRAVENOUS at 07:03

## 2022-08-15 RX ADMIN — FENTANYL CITRATE 50 MCG: 50 INJECTION, SOLUTION INTRAMUSCULAR; INTRAVENOUS at 09:33

## 2022-08-15 RX ADMIN — ROCURONIUM BROMIDE 50 MG: 10 INJECTION, SOLUTION INTRAVENOUS at 07:03

## 2022-08-15 RX ADMIN — ONDANSETRON 4 MG: 2 INJECTION INTRAMUSCULAR; INTRAVENOUS at 07:45

## 2022-08-15 RX ADMIN — VANCOMYCIN HYDROCHLORIDE 1500 MG: 500 INJECTION, POWDER, LYOPHILIZED, FOR SOLUTION INTRAVENOUS at 06:41

## 2022-08-15 RX ADMIN — DEXMEDETOMIDINE 5 MCG: 200 INJECTION, SOLUTION INTRAVENOUS at 08:28

## 2022-08-15 RX ADMIN — EPHEDRINE SULFATE 20 MG: 50 INJECTION, SOLUTION INTRAVENOUS at 08:13

## 2022-08-15 RX ADMIN — EPHEDRINE SULFATE 20 MG: 50 INJECTION, SOLUTION INTRAVENOUS at 07:20

## 2022-08-15 RX ADMIN — PROPOFOL 150 MG: 10 INJECTION, EMULSION INTRAVENOUS at 07:03

## 2022-08-15 RX ADMIN — TRANEXAMIC ACID 1000 MG: 100 INJECTION, SOLUTION INTRAVENOUS at 08:50

## 2022-08-15 RX ADMIN — FENTANYL CITRATE 50 MCG: 50 INJECTION, SOLUTION INTRAMUSCULAR; INTRAVENOUS at 10:00

## 2022-08-15 RX ADMIN — FENTANYL CITRATE 50 MCG: 50 INJECTION, SOLUTION INTRAMUSCULAR; INTRAVENOUS at 07:09

## 2022-08-15 RX ADMIN — TRANEXAMIC ACID 1000 MG: 100 INJECTION, SOLUTION INTRAVENOUS at 07:19

## 2022-08-15 RX ADMIN — SODIUM CHLORIDE, POTASSIUM CHLORIDE, SODIUM LACTATE AND CALCIUM CHLORIDE: 600; 310; 30; 20 INJECTION, SOLUTION INTRAVENOUS at 06:41

## 2022-08-15 RX ADMIN — PROPOFOL 50 MG: 10 INJECTION, EMULSION INTRAVENOUS at 07:40

## 2022-08-15 RX ADMIN — SODIUM CHLORIDE, POTASSIUM CHLORIDE, SODIUM LACTATE AND CALCIUM CHLORIDE: 600; 310; 30; 20 INJECTION, SOLUTION INTRAVENOUS at 09:37

## 2022-08-15 RX ADMIN — FENTANYL CITRATE 50 MCG: 50 INJECTION, SOLUTION INTRAMUSCULAR; INTRAVENOUS at 10:14

## 2022-08-15 RX ADMIN — FENTANYL CITRATE 50 MCG: 50 INJECTION, SOLUTION INTRAMUSCULAR; INTRAVENOUS at 07:03

## 2022-08-15 RX ADMIN — DEXAMETHASONE SODIUM PHOSPHATE 8 MG: 4 INJECTION, SOLUTION INTRA-ARTICULAR; INTRALESIONAL; INTRAMUSCULAR; INTRAVENOUS; SOFT TISSUE at 07:44

## 2022-08-15 RX ADMIN — SUGAMMADEX 200 MG: 100 INJECTION, SOLUTION INTRAVENOUS at 09:00

## 2022-08-15 RX ADMIN — CEFAZOLIN 2 G: 330 INJECTION, POWDER, FOR SOLUTION INTRAMUSCULAR; INTRAVENOUS at 07:03

## 2022-08-15 ASSESSMENT — COGNITIVE AND FUNCTIONAL STATUS - GENERAL
DRESSING REGULAR LOWER BODY CLOTHING: A LITTLE
MOBILITY SCORE: 18
MOBILITY SCORE: 20
WALKING IN HOSPITAL ROOM: A LITTLE
TURNING FROM BACK TO SIDE WHILE IN FLAT BAD: A LITTLE
STANDING UP FROM CHAIR USING ARMS: A LITTLE
SUGGESTED CMS G CODE MODIFIER DAILY ACTIVITY: CI
MOVING FROM LYING ON BACK TO SITTING ON SIDE OF FLAT BED: A LITTLE
SUGGESTED CMS G CODE MODIFIER MOBILITY: CJ
WALKING IN HOSPITAL ROOM: A LITTLE
MOVING TO AND FROM BED TO CHAIR: A LITTLE
STANDING UP FROM CHAIR USING ARMS: A LITTLE
MOVING FROM LYING ON BACK TO SITTING ON SIDE OF FLAT BED: A LITTLE
CLIMB 3 TO 5 STEPS WITH RAILING: A LITTLE
SUGGESTED CMS G CODE MODIFIER MOBILITY: CK
DAILY ACTIVITIY SCORE: 23
CLIMB 3 TO 5 STEPS WITH RAILING: A LITTLE

## 2022-08-15 ASSESSMENT — LIFESTYLE VARIABLES
HAVE PEOPLE ANNOYED YOU BY CRITICIZING YOUR DRINKING: NO
TOTAL SCORE: 0
AVERAGE NUMBER OF DAYS PER WEEK YOU HAVE A DRINK CONTAINING ALCOHOL: 0
EVER FELT BAD OR GUILTY ABOUT YOUR DRINKING: NO
ON A TYPICAL DAY WHEN YOU DRINK ALCOHOL HOW MANY DRINKS DO YOU HAVE: 0
EVER HAD A DRINK FIRST THING IN THE MORNING TO STEADY YOUR NERVES TO GET RID OF A HANGOVER: NO
HAVE YOU EVER FELT YOU SHOULD CUT DOWN ON YOUR DRINKING: NO
TOTAL SCORE: 0
TOTAL SCORE: 0
HOW MANY TIMES IN THE PAST YEAR HAVE YOU HAD 5 OR MORE DRINKS IN A DAY: 0
ALCOHOL_USE: NO
CONSUMPTION TOTAL: NEGATIVE

## 2022-08-15 ASSESSMENT — PAIN SCALES - GENERAL: PAIN_LEVEL: 5

## 2022-08-15 ASSESSMENT — PATIENT HEALTH QUESTIONNAIRE - PHQ9
SUM OF ALL RESPONSES TO PHQ9 QUESTIONS 1 AND 2: 0
1. LITTLE INTEREST OR PLEASURE IN DOING THINGS: NOT AT ALL
2. FEELING DOWN, DEPRESSED, IRRITABLE, OR HOPELESS: NOT AT ALL

## 2022-08-15 ASSESSMENT — GAIT ASSESSMENTS
ASSISTIVE DEVICE: FRONT WHEEL WALKER
DISTANCE (FEET): 125
DEVIATION: DECREASED HEEL STRIKE;DECREASED TOE OFF
GAIT LEVEL OF ASSIST: SUPERVISED

## 2022-08-15 ASSESSMENT — PAIN DESCRIPTION - PAIN TYPE: TYPE: SURGICAL PAIN

## 2022-08-15 ASSESSMENT — FIBROSIS 4 INDEX: FIB4 SCORE: 1.63

## 2022-08-15 NOTE — PROGRESS NOTES
"Pt arrived to unit w/out event, calm, cooperative, pleasant affect. Pt has ambulated hallway, offered BR ( does not need to void just yet). RN has reviewed POC which is for post op rest / recovery / pain mgt PRN / work w/ therapy / discharge planning once criteria met. CNA has started post op VS, polar ice to left hip. SCDs on for VTE. EDU done on \"I.S\" CDB, will reinforce t/o day. Pt is independent w/ turns for skin integrity. Pt has tolerated oral fluids, no c/o N/V. Hourly rounds ongoing, call light w/in reach.   "

## 2022-08-15 NOTE — OP REPORT
DATE OF OPERATION:8/15/2022      PREOPERATIVE DIAGNOSES:   1. Osteoarthritis, left hip.       POSTOPERATIVE DIAGNOSES:   1. Osteoarthritis, left hip.       PROCEDURES:   1. Left total hip arthroplasty.   2. Intraoperative block for relief of extensive postoperative pain    SURGEON: Luis Caceres MD   ASSISTANT: Jordon JEONG    ANESTHESIA: General,Yolanda Maurice M.D.    IMPLANTS: Biomet G 7 cup size 54 Trilogy cup with a size 15 high offset Biomet taperloc stem with   a 36 mm +0 ceramic head with and a 36 mm flat cross-linked polyethylene.     WEIGHTBEARING: As tolerated.     FINDINGS:   1. 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      PROCEDURE IN DETAIL:   The patient was seen in the preop holding area and consent reviewed. The left lower extremity was marked with patient. Patient was taken to the operating room where general anesthesia was given. The body-exhaust uniforms were utilized. Perioperative antibiotics were given The patient then was positioned in a lateral decubitus position with axillary roll and Stulberg.  All bony prominences were padded. The left lower extremity was addressed with a Hibiclens, alcohol and ChloraPrep. Air isolation draping was utilized. Then sterile draping technique was performed.  An appropriate timeout was  undertaken.        The surgery was initiated with a minimally invasive posterior approach. Skin   and subcutaneous tissue were incised. Hemostasis was obtained. The fascia   hope and gluteal fascia were split. Short external rotators were taken down   as a single layer. T-capsular incision was made.  The hip was dislocated.        Intraoperative periarticular and extensive sara-incisional block was undertaken. This was undertaken with a combination of ropivacaine and morphine to treat postoperative pain. This was done extensively throughout the pericapsular tissues, followed by an extensive block in the peritrochanteric tissues, followed by an extensive  tissue infusion into the subcutaneous tissues. This obtained a significant intraoperative improvement in anesthesia requirements as well as pain management and vitals stabilization.    Following this, the acetabulum was addressed with Labral excision and serial reaming.  Next, the acetabulum was reverse reamed with bone graft and the G7 cup inserted. The polyethylene was inserted after pulse lavage and the locking mechanism checked. This cup was utilized due to his extensive spinal fusion and surgeries.    At this point, the femur was addressed.  Cookie cutter was used.  Then serial reaming   and broaching was undertaken.  Trial reductions were undertaken with reproduction and stabilization in the patient's length and offset. The final stem was then inserted.  A trial reductions repeated and neck length and head chosen.  After pulse lavage, cleaning and drying the Zuluaga taper the head was inserted and the hip was relocated, full extension, external rotation stability as well as sleeping stability to 70 degrees and flexed 90 stability to 40 degrees was noted. Pulse lavage was repeated. T-capsular closure was done with #2 FiberWire. Short external rotator repair over bone bridges with #2 FiberWire.    The fascia hope repaired with #1 Quill barbed suture  The subcutaneous 0 and 2-0, And then closure of the skin. Patient was placed into a sterile bandage.  The patient was  awoken from anesthetic and taken to the recovery room, tolerated the procedure very   well with no signs of complications.    54

## 2022-08-15 NOTE — ANESTHESIA PROCEDURE NOTES
Airway    Date/Time: 8/15/2022 7:09 AM  Performed by: Yolanda Maurice M.D.  Authorized by: Yolanda Maurice M.D.     Location:  OR  Urgency:  Elective  Difficult Airway: No    Indications for Airway Management:  Anesthesia      Spontaneous Ventilation: absent    Sedation Level:  Deep  Preoxygenated: Yes    Patient Position:  Sniffing  MILS Maintained Throughout: No    Mask Difficulty Assessment:  1 - vent by mask  Final Airway Type:  Endotracheal airway  Final Endotracheal Airway:  ETT  Cuffed: Yes    Technique Used for Successful ETT Placement:  Direct laryngoscopy  Devices/Methods Used in Placement:  Intubating stylet and anterior pressure/BURP    Insertion Site:  Oral  Blade Type:  Moreno  Laryngoscope Blade/Videolaryngoscope Blade Size:  2  ETT Size (mm):  7.0  Placement Verified by: capnometry    Cormack-Lehane Classification:  Grade IIa - partial view of glottis  Number of Attempts at Approach:  1

## 2022-08-15 NOTE — ANESTHESIA POSTPROCEDURE EVALUATION
Patient: Amadou Parekh    Procedure Summary     Date: 08/15/22 Room / Location:  OR  / SURGERY [facility]    Anesthesia Start: 0659 Anesthesia Stop: 0919    Procedure: LEFT TOTAL HIP ARTHROPLASTY (Left: Hip) Diagnosis: (OA LEFT HIP)    Surgeons: Luis Caceres M.D. Responsible Provider: Yolanda Maurice M.D.    Anesthesia Type: general ASA Status: 2          Final Anesthesia Type: general  Last vitals  BP   Blood Pressure : (!) 165/74 (RN Notified)    Temp   36.3 °C (97.4 °F)    Pulse   82   Resp   16    SpO2   100 %      Anesthesia Post Evaluation    Patient location during evaluation: PACU  Patient participation: complete - patient participated  Level of consciousness: awake and alert  Pain score: 5    Airway patency: patent  Anesthetic complications: no  Cardiovascular status: adequate and hemodynamically stable  Respiratory status: acceptable, room air and nasal cannula  Hydration status: acceptable    PONV: none          No notable events documented.     Nurse Pain Score: 5 (NPRS)

## 2022-08-15 NOTE — DISCHARGE INSTRUCTIONS
Discharge Instructions    Discharged to home by car with relative. Discharged via wheelchair, hospital escort: Yes.  Special equipment needed: Walker    Be sure to schedule a follow-up appointment with your primary care doctor or any specialists as instructed.     Discharge Plan:        I understand that a diet low in cholesterol, fat, and sodium is recommended for good health. Unless I have been given specific instructions below for another diet, I accept this instruction as my diet prescription.   Other diet: Regular    Special Instructions: Discharge instructions for the Orthopedic Patient    Follow up with Primary Care Physician within 2 weeks of discharge to home, regarding:  Review of medications and diagnostic testing.  Surveillance for medical complications.  Workup and treatment of osteoporosis, if appropriate.     -Is this a Hip/Knee/Shoulder Joint Replacement patient? Yes   TOTAL HIP REPLACEMENT, AFTER-CARE GUIDELINES     These instructions provide you with information on caring for yourself and your hip after surgery. Your health care provider may also give you instructions that are more specific. Your treatment was planned and performed according to current medical practices but problems sometimes occur. Call your health care provider if you have any problems or questions.     WHAT TO EXPECT AFTER THE PROCEDURE   After your procedure, your hip will typically be stiff, sore, and bruised. This will improve over time.     Pain   Follow your home pain management plan as discussed with your nurse and as directed by your provider.   It is important to follow any scheduled pain medications for maximal pain relief.   If prescribed opioid medication, the goal is to use opioids only as needed and to wean off prescription pain medicine as soon as possible.   Ice use for pain control.  Put ice in a plastic bag.   Place a towel between your skin and the bag.   Leave the ice on for 20 minutes, 2-3 times a day at a  minimum.   Most patients are off the pain pills by 3 weeks. If your pain continues to be severe, follow up with your provider.     Infection   Deep hip joint infections that require removal of the prostheses occur in less than 1.0% of patients. Lesser infections in the skin (cellulites) are more common and much more easily treated.   Keep the incision as clean and dry as possible.   Always wash your hands before touching your incision.   Avoid dental care for 3 months after surgery. Your provider may recommend taking a dose of antibiotics an hour prior to any dental procedure. After 2 years, most providers recommend antibiotics only before an extensive procedure. Ask your provider what they recommend.   Signs and symptoms of infection include low-grade fever, redness, pain, swelling and drainage from your incision. Notify your provider IMMEDIATELY if you develop ANY of these symptoms.     Post op Disturbances   Bowel Habits - constipation is extremely common and caused by a combination of anesthesia, lack of mobility, dehydration and pain medicine. Use stool softeners or laxatives if necessary. It is important not to ignore this problem as bowel obstructions can be a serious complication after joint replacement surgery.   Mood/Energy Level - Many patients experience a lack of energy and endurance for up to 2-3 months after surgery. Some people feel down and can even become depressed. This is likely due to postoperative anemia, change in activity level, lack of sleep, pain medicine and just the emotional reaction to the surgery itself that is a big disruption in a person’s life. This usually passes. If symptoms persist, follow up with your primary care provider.   Returning to Work - Your provider will give you specific instructions based on your profession. Generally, if you work a sedentary job requiring little standing or walking, most patients may return within 2-6 weeks. Manual labor jobs involving walking,  lifting and standing may take 3-4 months. Your provider’s office can provide a release to part-time or light duty work early on in your recovery and progress you to full duty as able.   Driving - You can begin driving once cleared by your provider, provided you are no longer taking narcotic pain medication or any other medications that impair driving. Discuss the length of time with your doctor as returning to driving will depend on things such as your vehicle, which hip was replaced (right or left) and if you do or do not have post-operative movement precautions.   Avoiding falls - A fall during the first few weeks after surgery can damage your new hip and may result in a need for further surgery.  throw rugs and tack down loose carpeting. Be aware of floor hazards such as pets, small objects or uneven surfaces. Notify your provider of any falls.   Airport Metal Detectors - The sensitivity of metal detectors varies and it is likely that your prosthesis will cause an alarm. Inform the  of your artificial joint.     Diet   Resume your normal diet as tolerated.   It is important to achieve a healthy nutritional status by eating a well-balanced diet on a regular basis.   Your provider may recommend that you take iron and vitamin supplements.   Continue to drink plenty of fluids.     Shower/Bathing   You may shower as soon as you get home from the hospital unless otherwise instructed.   Keep your incision out of water to prevent infection. To keep the incision dry when showering, cover it with a plastic bag or plastic wrap. If your bandage is waterproof, this may not be necessary.   Pat incision dry if it gets wet. Do not rub. Notify your provider.   Do not submerge in a bath until cleared by your provider. Your staples must be out and the incision completely healed.     Dressing Changes: Only change your dressing if directed by your provider.   Wash hands.   Open all dressing change materials.    Remove old dressing and discard.   Inspect incision for signs of irritation or infection including redness, increase in clear drainage, yellow/green drainage, odor and surrounding skin hot to touch. Notify your provider if present.    the new dressing by one corner and lay over the incision. Be careful not to touch the inside of the dressing that will lay over the incision.   Secure in place as instructed.     Swelling/Bruising   Swelling is normal after hip replacement and can involve the thigh, knee, calf and foot.   Swelling can last from 3-6 months.   To reduce swelling, elevate your leg higher than your heart while reclining. The first week you are home you should elevate your leg an equal amount of time as you are active.   The swelling is usually worse after you go home since you are upright for longer periods of time.   Bruising often does not appear until after you arrive home and can be quite dramatic- appearing purple, black, or green. Bruising is typically not concerning and will subside without any treatment.     Blood Clot Prevention   Your treatment plan includes multiple preventative measures to decrease the risk of blood clots in the legs (DVTs) and the less common, but serious, clots that travel to the lungs (pulmonary emboli). Most patients are at standard risk for them, but people who are at higher risk include those who have had previous clots, a family history of clotting, smoking, diabetes, obesity, advanced age, use estrogen and/or live a sedentary lifestyle.     Signs of blood clots in legs include - Swelling in thigh, calf or ankle that does not go down with elevation. Pain, heat and tenderness in calf, back of calf or groin area. NOTE: blood clots can occur in either leg.   Signs of blood clots in lungs include - Sudden increased shortness of breath, sudden onset of chest pain, and localized chest pain with coughing.   If you experience any of the above symptoms, notify your  provider and seek medical attention immediately.   You received anticoagulant therapy (blood thinners) in the hospital. Continue the prescribed blood-thinning medication at home, as directed by your provider.   Your risk for developing a clot continues for up to 2-3 months after surgery. Avoid prolonged sitting and dehydration (long air trips and car trips). If you do take a trip during this time, please get up, move around every 1-1.5 hours, and discuss all travel plans with your provider.     Activity   Once you get home, stay active. The key is not to overdo it. While you can expect some good days and some bad days, you should notice a gradual improvement over time and a gradual increase in endurance over the next 6 to 12 months.     Weight Bearing - You can begin to bear weight as tolerated unless otherwise directed by your provider. Use a walker, crutches or cane to assist with walking until you can walk smoothly (minimal or no limp) without assistance.   Physical Precautions - To assure proper recovery and tissue healing, you may be asked to take special precautions when moving (sitting, bending or sleeping) - usually for the first 6 weeks after surgery. Precautions vary from patient to patient depending on surgical approach used by your provider. Follow any specific precautions provided by your provider and physical therapist until cleared by your provider.   Sitting - Early on it is easier to get up from a tall chair or a chair with arms. The physical therapist will show you how to sit and stand from a chair keeping your affected leg out in front of you. Get up and move around on a regular basis--at least once every hour.   Walking - Walk as much as you like once your doctor gives permission to proceed, but remember that walking is no substitute for your prescribed exercises.  Follow the home exercise program prescribed during your hospital stay as directed by your physical therapist or provider.   Continued  physical therapy may be prescribed after hospital discharge to strengthen your muscles and improve your gait/walking pattern. The decision to have therapy or not after the hospital stay is made by you and your provider prior to surgery or at your follow up appointment.   Swimming is an excellent low impact activity; you can begin as soon as the wound is healed and your provider clears you. Using a pair of training fins may make swimming a more enjoyable and effective exercise.   Sexual activity - Your provider can tell you when it is safe to resume sexual activity.   Other activities - Low impact activities are preferred. If you have specific questions, consult your provider.     When to Call the Doctor   Call the provider if you experience:   Fever over 100.5° F   Increased pain, drainage, redness, odor or heat around the incision area   Shaking chills   Increased knee pain with activity and rest   Increased pain in calf, tenderness or redness above or below the knee   Increased swelling of calf, ankle, foot   Sudden increased shortness of breath, sudden onset of chest pain, localized chest pain with coughing   Incision opening   Or, if there are any questions or concerns about medications or care.     Infection statistic resource:   https://www.[employer]/contents/prosthetic-joint-infection-epidemiology-microbiology-clinical-manifestations-and-diagnosis     -Is this patient being discharged with medication to prevent blood clots?  Yes, Aspirin   Aspirin, ASA oral tablets  What is this medicine?  ASPIRIN (AS pir in) is a pain reliever. It is used to treat mild pain and fever. This medicine is also used as directed by a doctor to prevent and to treat heart attacks, to prevent strokes and blood clots, and to treat arthritis or inflammation.  This medicine may be used for other purposes; ask your health care provider or pharmacist if you have questions.  COMMON BRAND NAME(S): Aspir-Low, Aspir-Erica, Aspirtab, David  Advanced Aspirin, David Aspirin, David Aspirin Extra Strength, David Aspirin Plus, David Extra Strength, David Extra Strength Plus, David Genuine Aspirin, David Womens Aspirin, Bufferin, Bufferin Extra Strength, Bufferin Low Dose  What should I tell my health care provider before I take this medicine?  They need to know if you have any of these conditions:  anemia  asthma  bleeding problems  child with chickenpox, the flu, or other viral infection  diabetes  gout  if you frequently drink alcohol containing drinks  kidney disease  liver disease  low level of vitamin K  lupus  smoke tobacco  stomach ulcers or other problems  an unusual or allergic reaction to aspirin, tartrazine dye, other medicines, dyes, or preservatives  pregnant or trying to get pregnant  breast-feeding  How should I use this medicine?  Take this medicine by mouth with a glass of water. Follow the directions on the package or prescription label. You can take this medicine with or without food. If it upsets your stomach, take it with food. Do not take your medicine more often than directed.  Talk to your pediatrician regarding the use of this medicine in children. While this drug may be prescribed for children as young as 12 years of age for selected conditions, precautions do apply. Children and teenagers should not use this medicine to treat chicken pox or flu symptoms unless directed by a doctor.  Patients over 65 years old may have a stronger reaction and need a smaller dose.  Overdosage: If you think you have taken too much of this medicine contact a poison control center or emergency room at once.  NOTE: This medicine is only for you. Do not share this medicine with others.  What if I miss a dose?  If you are taking this medicine on a regular schedule and miss a dose, take it as soon as you can. If it is almost time for your next dose, take only that dose. Do not take double or extra doses.  What may interact with this medicine?  Do not take  this medicine with any of the following medications:  cidofovir  ketorolac  probenecid  This medicine may also interact with the following medications:  alcohol  alendronate  bismuth subsalicylate  flavocoxid  herbal supplements like feverfew, garlic, baldo, ginkgo biloba, horse chestnut  medicines for diabetes or glaucoma like acetazolamide, methazolamide  medicines for gout  medicines that treat or prevent blood clots like enoxaparin, heparin, ticlopidine, warfarin  other aspirin and aspirin-like medicines  NSAIDs, medicines for pain and inflammation, like ibuprofen or naproxen  pemetrexed  sulfinpyrazone  varicella live vaccine  This list may not describe all possible interactions. Give your health care provider a list of all the medicines, herbs, non-prescription drugs, or dietary supplements you use. Also tell them if you smoke, drink alcohol, or use illegal drugs. Some items may interact with your medicine.  What should I watch for while using this medicine?  If you are treating yourself for pain, tell your doctor or health care professional if the pain lasts more than 10 days, if it gets worse, or if there is a new or different kind of pain. Tell your doctor if you see redness or swelling. Also, check with your doctor if you have a fever that lasts for more than 3 days. Only take this medicine to prevent heart attacks or blood clotting if prescribed by your doctor or health care professional.  Do not take aspirin or aspirin-like medicines with this medicine. Too much aspirin can be dangerous. Always read the labels carefully.  This medicine can irritate your stomach or cause bleeding problems. Do not smoke cigarettes or drink alcohol while taking this medicine. Do not lie down for 30 minutes after taking this medicine to prevent irritation to your throat.  If you are scheduled for any medical or dental procedure, tell your healthcare provider that you are taking this medicine. You may need to stop taking  this medicine before the procedure.  This medicine may be used to treat migraines. If you take migraine medicines for 10 or more days a month, your migraines may get worse. Keep a diary of headache days and medicine use. Contact your healthcare professional if your migraine attacks occur more frequently.  What side effects may I notice from receiving this medicine?  Side effects that you should report to your doctor or health care professional as soon as possible:  allergic reactions like skin rash, itching or hives, swelling of the face, lips, or tongue  breathing problems  changes in hearing, ringing in the ears  confusion  general ill feeling or flu-like symptoms  pain on swallowing  redness, blistering, peeling or loosening of the skin, including inside the mouth or nose  signs and symptoms of bleeding such as bloody or black, tarry stools; red or dark-brown urine; spitting up blood or brown material that looks like coffee grounds; red spots on the skin; unusual bruising or bleeding from the eye, gums, or nose  trouble passing urine or change in the amount of urine  unusually weak or tired  yellowing of the eyes or skin  Side effects that usually do not require medical attention (report to your doctor or health care professional if they continue or are bothersome):  diarrhea or constipation  headache  nausea, vomiting  stomach gas, heartburn  This list may not describe all possible side effects. Call your doctor for medical advice about side effects. You may report side effects to FDA at 7-978-FDA-3464.  Where should I keep my medicine?  Keep out of the reach of children.  Store at room temperature between 15 and 30 degrees C (59 and 86 degrees F). Protect from heat and moisture. Do not use this medicine if it has a strong vinegar smell. Throw away any unused medicine after the expiration date.  NOTE: This sheet is a summary. It may not cover all possible information. If you have questions about this medicine,  talk to your doctor, pharmacist, or health care provider.  © 2020 [employer] (2018-01-30 10:42:13)    -Is this patient being discharged with medication to prevent blood clots?  Yes, Aspirin   Aspirin, ASA oral tablets  What is this medicine?  ASPIRIN (AS pir in) is a pain reliever. It is used to treat mild pain and fever. This medicine is also used as directed by a doctor to prevent and to treat heart attacks, to prevent strokes and blood clots, and to treat arthritis or inflammation.  This medicine may be used for other purposes; ask your health care provider or pharmacist if you have questions.  COMMON BRAND NAME(S): Aspir-Low, Aspir-Erica, Aspirtab, David Advanced Aspirin, David Aspirin, David Aspirin Extra Strength, David Aspirin Plus, David Extra Strength, David Extra Strength Plus, David Genuine Aspirin, David Womens Aspirin, Bufferin, Bufferin Extra Strength, Bufferin Low Dose  What should I tell my health care provider before I take this medicine?  They need to know if you have any of these conditions:  anemia  asthma  bleeding problems  child with chickenpox, the flu, or other viral infection  diabetes  gout  if you frequently drink alcohol containing drinks  kidney disease  liver disease  low level of vitamin K  lupus  smoke tobacco  stomach ulcers or other problems  an unusual or allergic reaction to aspirin, tartrazine dye, other medicines, dyes, or preservatives  pregnant or trying to get pregnant  breast-feeding  How should I use this medicine?  Take this medicine by mouth with a glass of water. Follow the directions on the package or prescription label. You can take this medicine with or without food. If it upsets your stomach, take it with food. Do not take your medicine more often than directed.  Talk to your pediatrician regarding the use of this medicine in children. While this drug may be prescribed for children as young as 12 years of age for selected conditions, precautions do apply.  Children and teenagers should not use this medicine to treat chicken pox or flu symptoms unless directed by a doctor.  Patients over 65 years old may have a stronger reaction and need a smaller dose.  Overdosage: If you think you have taken too much of this medicine contact a poison control center or emergency room at once.  NOTE: This medicine is only for you. Do not share this medicine with others.  What if I miss a dose?  If you are taking this medicine on a regular schedule and miss a dose, take it as soon as you can. If it is almost time for your next dose, take only that dose. Do not take double or extra doses.  What may interact with this medicine?  Do not take this medicine with any of the following medications:  cidofovir  ketorolac  probenecid  This medicine may also interact with the following medications:  alcohol  alendronate  bismuth subsalicylate  flavocoxid  herbal supplements like feverfew, garlic, baldo, ginkgo biloba, horse chestnut  medicines for diabetes or glaucoma like acetazolamide, methazolamide  medicines for gout  medicines that treat or prevent blood clots like enoxaparin, heparin, ticlopidine, warfarin  other aspirin and aspirin-like medicines  NSAIDs, medicines for pain and inflammation, like ibuprofen or naproxen  pemetrexed  sulfinpyrazone  varicella live vaccine  This list may not describe all possible interactions. Give your health care provider a list of all the medicines, herbs, non-prescription drugs, or dietary supplements you use. Also tell them if you smoke, drink alcohol, or use illegal drugs. Some items may interact with your medicine.  What should I watch for while using this medicine?  If you are treating yourself for pain, tell your doctor or health care professional if the pain lasts more than 10 days, if it gets worse, or if there is a new or different kind of pain. Tell your doctor if you see redness or swelling. Also, check with your doctor if you have a fever that  lasts for more than 3 days. Only take this medicine to prevent heart attacks or blood clotting if prescribed by your doctor or health care professional.  Do not take aspirin or aspirin-like medicines with this medicine. Too much aspirin can be dangerous. Always read the labels carefully.  This medicine can irritate your stomach or cause bleeding problems. Do not smoke cigarettes or drink alcohol while taking this medicine. Do not lie down for 30 minutes after taking this medicine to prevent irritation to your throat.  If you are scheduled for any medical or dental procedure, tell your healthcare provider that you are taking this medicine. You may need to stop taking this medicine before the procedure.  This medicine may be used to treat migraines. If you take migraine medicines for 10 or more days a month, your migraines may get worse. Keep a diary of headache days and medicine use. Contact your healthcare professional if your migraine attacks occur more frequently.  What side effects may I notice from receiving this medicine?  Side effects that you should report to your doctor or health care professional as soon as possible:  allergic reactions like skin rash, itching or hives, swelling of the face, lips, or tongue  breathing problems  changes in hearing, ringing in the ears  confusion  general ill feeling or flu-like symptoms  pain on swallowing  redness, blistering, peeling or loosening of the skin, including inside the mouth or nose  signs and symptoms of bleeding such as bloody or black, tarry stools; red or dark-brown urine; spitting up blood or brown material that looks like coffee grounds; red spots on the skin; unusual bruising or bleeding from the eye, gums, or nose  trouble passing urine or change in the amount of urine  unusually weak or tired  yellowing of the eyes or skin  Side effects that usually do not require medical attention (report to your doctor or health care professional if they continue or  are bothersome):  diarrhea or constipation  headache  nausea, vomiting  stomach gas, heartburn  This list may not describe all possible side effects. Call your doctor for medical advice about side effects. You may report side effects to FDA at 6-637-NPU-6768.  Where should I keep my medicine?  Keep out of the reach of children.  Store at room temperature between 15 and 30 degrees C (59 and 86 degrees F). Protect from heat and moisture. Do not use this medicine if it has a strong vinegar smell. Throw away any unused medicine after the expiration date.  NOTE: This sheet is a summary. It may not cover all possible information. If you have questions about this medicine, talk to your doctor, pharmacist, or health care provider.  © 2020 Elsevier/Gold Standard (2018-01-30 10:42:13)    Is patient discharged on Warfarin / Coumadin?   No      Instructions:    1. DC home on crutches/ walker 2. DC home on home meds per home doses 3. DC home on Percocet, ASA, and Outpt PT. 4. Follow up [facility] 10-14 days 5. Call for Fevers, drainage, redness, shortness of breath, or increasing lower extremity swelling particularly in the calf. 6. Start Aspirin 325mg DAILY FOR 35 DAYS. 7. CHANGE BANDAGE 5-7 DAYS, KEEP INCISION COVERED FOR SHOWERS AND DRY. Call if drainage or wetness.

## 2022-08-15 NOTE — ANESTHESIA PREPROCEDURE EVALUATION
Case: [id number] Date/Time: 08/15/22 0645    Procedure: LEFT TOTAL HIP ARTHROPLASTY AND REPAIRS AS INDICATED    Pre-op diagnosis: OA LEFT HIP    Location:  OR  / SURGERY [facility]    Surgeons: Luis Caceres M.D.          Relevant Problems   CARDIAC   (positive) Hypertension         (positive) Renal insufficiency      ENDO   (positive) Hypothyroidism       Physical Exam    Airway   Mallampati: II  TM distance: >3 FB  Neck ROM: full       Cardiovascular   Rhythm: regular  Rate: normal     Dental - normal exam           Pulmonary   Breath sounds clear to auscultation     Abdominal - normal exam     Neurological              Anesthesia Plan    ASA 2       Plan - general       Airway plan will be ETT          Induction: intravenous    Postoperative Plan: Postoperative administration of opioids is intended.    Pertinent diagnostic labs and testing reviewed    Informed Consent:    Anesthetic plan and risks discussed with patient.    Use of blood products discussed with: whom consented to blood products.

## 2022-08-15 NOTE — PROGRESS NOTES
4 Eyes Skin Assessment Completed by NENA Jeff and NENA Duke.    Head WDL  Ears WDL  Nose WDL  Mouth WDL  Neck Scar from prior surgery / OT / healed  Breast/Chest WDL  Shoulder Blades WDL  Spine WDL  (R) Arm/Elbow/Hand WDL  (L) Arm/Elbow/Hand WDL  Abdomen WDL  Groin WDL  Scrotum/Coccyx/Buttocks WDL  (R) Leg WDL  (L) Leg Incision left hip / drsg in place  (R) Heel/Foot/Toe WDL  (L) Heel/Foot/Toe WDL          Devices In Places Blood Pressure Cuff and SCD's      Interventions In Place Pressure Redistribution Mattress    Possible Skin Injury No    Pictures Uploaded Into Epic N/A  Wound Consult Placed N/A  RN Wound Prevention Protocol Ordered No

## 2022-08-15 NOTE — DISCHARGE PLANNING
Received Choice form at @3031  Agency/Facility Name: [facility]  Referral sent per Choice form @ [phone]

## 2022-08-15 NOTE — OR NURSING
LEFT TOTAL HIP ARTHROPLASTY - Left  Luis Caceres M.D.  Yolanda Maurice M.D., Anesthesiologist  -------------------------------------------------------------  0913: Patient arrived to PACU from OR via bed. Report received from anesthesia and RN. Respirations are spontaneous and unlabored. Dressing is CDI. Cold pack applied. VSS on 6L.     0930: Pt slightly more awake.     0933: c/o 7/10 left hip pain. See MAR, PO analgesia given. No c/o nausea.    0945: Xray at bedside. Dressing CDI.    0949: 7/10 left hip pain, see MAR.     1000: 7/10 left hip pain, see MAR.     1015: 7/10 left hip pain, see MAR. Wife updated.     1025: 7/10 left hip pain, see MAR.     1030: Pain becoming tolerable.    1040: pt meets criteria for transfer to room. Report called to receiving RN. Awaiting transport.

## 2022-08-15 NOTE — CARE PLAN
The patient is Stable - Low risk of patient condition declining or worsening    Shift Goals  Clinical Goals: Pt will ambulate 50 feet and void 6 hours after surgery  Patient Goals: Pain will decrease to 3/10    Progress made toward(s) clinical / shift goals: Worked with PT. Pt able to ambulates using FWW steady gait. Pain has been controlled this shift. Void successfully.    Patient is not progressing towards the following goals:      Problem: Post-Operative Hip Replacement  Goal: Patient will demonstrate understanding of post-surgical hip precautions, weight bearing restrictions and DME usage during hospital stay and post discharge  Outcome: Progressing  Goal: Patient's neurovascular status will be maintained or improve  Outcome: Progressing  Goal: Early mobilization post surgery  Outcome: Progressing     Problem: Pain - Post Surgery  Goal: Alleviation or reduction of pain post surgery  Outcome: Progressing     Problem: Incision Care  Goal: Optimal post surgical incision care  Outcome: Progressing     Problem: Discharge Barriers/Planning  Goal: Patient's continuum of care needs are met  Outcome: Progressing  Flowsheets (Taken 8/15/2022 3647)  Continuum of Care Needs:   Involved patient/family/support system in discharge process   Collaborated with Case Management/   Provided and explained discharge instructions

## 2022-08-16 NOTE — THERAPY
Physical Therapy   Initial Evaluation     Patient Name: Amadou Parekh  Age:  77 y.o., Sex:  male  Medical Record #: [id number]  Today's Date: 8/16/2022          Assessment  Patient is 77 y.o. male s/p L ZECHARIAH POD#0. Pts pain well controlled and was able to ambulate safely with FWW, crutch training completed with pt for use on stairs and ambulation in home.  Pt states prefers to use FWW at this time. HEp handout issued, pt able to return demo all exs. ZECHARIAH posterior precautions reviewed with pt and spouse, reviewed how to adhere to them while performing ADLs and rec use of reacher to increase pts independence. Pt plans to DC home today, has supportive spouse at home. DC PT.   Plan    Recommend Physical Therapy for Evaluation only     DC Equipment Recommendations: Crutches, Raised Toilet Seat with Arms (reacher)  Discharge Recommendations: Recommend outpatient physical therapy services to address higher level deficits        08/15/22 1430   Prior Living Situation   Housing / Facility 2 Story House   Steps Into Home 2   Steps In Home 14   Rail Right Rail (Steps into Home)   Equipment Owned Front-Wheel Walker;Tub / Shower Seat   Lives with - Patient's Self Care Capacity Spouse   Prior Level of Functional Mobility   Bed Mobility Independent   Transfer Status Independent   Ambulation Independent   Assistive Devices Used None   Stairs Independent   Cognition    Level of Consciousness Alert   Comments Oriented x4   Passive ROM Lower Body   Passive ROM Lower Body Not Tested   Active ROM Lower Body    Active ROM Lower Body  Not Tested   Strength Lower Body   Lower Body Strength  X   Comments L hip limited due to pain   Balance Assessment   Sitting Balance (Static) Good   Sitting Balance (Dynamic) Fair +   Standing Balance (Static) Fair +   Standing Balance (Dynamic) Fair   Weight Shift Sitting Good   Weight Shift Standing Fair   Comments stdg with FWW   Gait Analysis   Gait Level Of Assist Supervised   Assistive Device Front Wheel  Walker   Distance (Feet) 125   # of Times Distance was Traveled 2   Deviation Decreased Heel Strike;Decreased Toe Off   # of Stairs Climbed 3   Level of Assist with Stairs Standby Assist   Weight Bearing Status WBAT L LE   Comments Gait training performed with B crutches x50 ft SBA, stair training performed with 1 crutch and 1 rail SBA   Bed Mobility    Supine to Sit Supervised   Sit to Supine Supervised   Functional Mobility   Sit to Stand Supervised

## 2022-09-05 ENCOUNTER — APPOINTMENT (OUTPATIENT)
Dept: RADIOLOGY | Facility: MEDICAL CENTER | Age: 77
End: 2022-09-05
Attending: EMERGENCY MEDICINE
Payer: COMMERCIAL

## 2022-09-05 ENCOUNTER — HOSPITAL ENCOUNTER (EMERGENCY)
Facility: MEDICAL CENTER | Age: 77
End: 2022-09-05
Attending: EMERGENCY MEDICINE
Payer: COMMERCIAL

## 2022-09-05 VITALS
HEART RATE: 62 BPM | TEMPERATURE: 97.3 F | DIASTOLIC BLOOD PRESSURE: 72 MMHG | RESPIRATION RATE: 16 BRPM | HEIGHT: 72 IN | OXYGEN SATURATION: 97 % | SYSTOLIC BLOOD PRESSURE: 149 MMHG | WEIGHT: 195 LBS | BODY MASS INDEX: 26.41 KG/M2

## 2022-09-05 DIAGNOSIS — E78.2 MIXED HYPERLIPIDEMIA: ICD-10-CM

## 2022-09-05 DIAGNOSIS — I10 ESSENTIAL HYPERTENSION: ICD-10-CM

## 2022-09-05 DIAGNOSIS — S73.005A CLOSED DISLOCATION OF LEFT HIP, INITIAL ENCOUNTER (HCC): ICD-10-CM

## 2022-09-05 PROCEDURE — 94799 UNLISTED PULMONARY SVC/PX: CPT

## 2022-09-05 PROCEDURE — 73502 X-RAY EXAM HIP UNI 2-3 VIEWS: CPT | Mod: LT

## 2022-09-05 PROCEDURE — 700111 HCHG RX REV CODE 636 W/ 250 OVERRIDE (IP): Performed by: EMERGENCY MEDICINE

## 2022-09-05 PROCEDURE — 99285 EMERGENCY DEPT VISIT HI MDM: CPT

## 2022-09-05 PROCEDURE — 73501 X-RAY EXAM HIP UNI 1 VIEW: CPT | Mod: LT

## 2022-09-05 PROCEDURE — 96374 THER/PROPH/DIAG INJ IV PUSH: CPT

## 2022-09-05 PROCEDURE — 27265 TREAT HIP DISLOCATION: CPT

## 2022-09-05 RX ORDER — PROPOFOL 10 MG/ML
INJECTION, EMULSION INTRAVENOUS
Status: COMPLETED | OUTPATIENT
Start: 2022-09-05 | End: 2022-09-05

## 2022-09-05 RX ORDER — PROPOFOL 10 MG/ML
200 INJECTION, EMULSION INTRAVENOUS ONCE
Status: DISCONTINUED | OUTPATIENT
Start: 2022-09-05 | End: 2022-09-05 | Stop reason: HOSPADM

## 2022-09-05 RX ADMIN — PROPOFOL 50 MG: 10 INJECTION, EMULSION INTRAVENOUS at 17:52

## 2022-09-05 RX ADMIN — PROPOFOL 10 MG: 10 INJECTION, EMULSION INTRAVENOUS at 17:54

## 2022-09-05 RX ADMIN — PROPOFOL 20 MG: 10 INJECTION, EMULSION INTRAVENOUS at 17:53

## 2022-09-05 RX ADMIN — PROPOFOL 20 MG: 10 INJECTION, EMULSION INTRAVENOUS at 17:55

## 2022-09-05 ASSESSMENT — LIFESTYLE VARIABLES
TOTAL SCORE: 0
CONSUMPTION TOTAL: NEGATIVE
ON A TYPICAL DAY WHEN YOU DRINK ALCOHOL HOW MANY DRINKS DO YOU HAVE: 0
AVERAGE NUMBER OF DAYS PER WEEK YOU HAVE A DRINK CONTAINING ALCOHOL: 0
TOTAL SCORE: 0
TOTAL SCORE: 0
HOW MANY TIMES IN THE PAST YEAR HAVE YOU HAD 5 OR MORE DRINKS IN A DAY: 0
HAVE PEOPLE ANNOYED YOU BY CRITICIZING YOUR DRINKING: NO
EVER FELT BAD OR GUILTY ABOUT YOUR DRINKING: NO
EVER HAD A DRINK FIRST THING IN THE MORNING TO STEADY YOUR NERVES TO GET RID OF A HANGOVER: NO
HAVE YOU EVER FELT YOU SHOULD CUT DOWN ON YOUR DRINKING: NO
DO YOU DRINK ALCOHOL: NO

## 2022-09-05 ASSESSMENT — FIBROSIS 4 INDEX: FIB4 SCORE: 1.63

## 2022-09-05 NOTE — ED TRIAGE NOTES
Chief Complaint   Patient presents with    Hip Pain     L hip pain s/p 'bending over to put my shoe on and I felt a pop', pt had full L hip replacement on 8/15 without prior complication, pt has shortening and rotation of LLE, +CMS     Pt BIB EMS for above complaints, VSS on RA, GCS 15, NAD.    Pt was given 50mcg of fentanyl and 4mg zofran en route, pt states he also took a home rx dose of oxycodone PTA.     BP (!) 160/75   Pulse 78   Temp 36.2 °C (97.1 °F) (Temporal)   Resp 16   Ht 1.829 m (6')   Wt 88.5 kg (195 lb)   SpO2 92%   BMI 26.45 kg/m²

## 2022-09-05 NOTE — ED PROVIDER NOTES
"ED Provider Note    CHIEF COMPLAINT  Chief Complaint   Patient presents with    Hip Pain     L hip pain s/p 'bending over to put my shoe on and I felt a pop', pt had full L hip replacement on 8/15 without prior complication, pt has shortening and rotation of LLE, +CMS       HPI  Amadou Parekh is a 77 y.o. male who presents to the emergency department left hip pain.  He had a total hip reconstruction 8/15 by Dr. Caceres.  Today he was working out, he was sitting down at the gym and bent down to put his shoe on resulting in a pop to the left hip and dislocation.  Complains of pain to left hip.  Denies loss of sensation or strength of his lower extremities.  He is unable to ambulate.    REVIEW OF SYSTEMS  Positives as above. Pertinent negatives include loss of sensation or strength to lower extremities  All other 10 review of systems are negative    PAST MEDICAL HISTORY  Past Medical History:   Diagnosis Date    Actinic keratoses     sees derm - Dr. Everett    Bilateral hearing loss 08/06/2018    wears aids    Chronic right-sided low back pain without sciatica 08/06/2018    s/p 5 surgeries; sees Akbar    ED (erectile dysfunction)     High cholesterol     Hyperlipidemia, mixed     Hypertension     pt states well controlled on meds    Hypothyroidism     Neuropathy (HCC) 8/6/2018    Pain 12/18/2018    \"Nerve Pain-Low back/legs\".    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;  Service: Neurosurgery    LUMBAR FUSION O-ARM  1/3/2022    Procedure: FUSION, SPINE, LUMBAR, WITH O-ARM IMAGING GUIDANCE - L1-3 EXTENSION-STAGE 2;  Surgeon: Be Webber M.D.;  Location: [facility];  Service: Neurosurgery    LUMBAR DECOMPRESSION  1/3/2022    Procedure: DECOMPRESSION, SPINE, LUMBAR;  Surgeon: Be Webber M.D.;  Location: [facility];  Service: Neurosurgery    LUMBAR FUSION ANTERIOR N/A 12/31/2021    Procedure: FUSION, SPINE, LUMBAR, ANTERIOR APPROACH - L5-S1;  Surgeon: Be Webber M.D.;  Location: [facility];  Service: Neurosurgery    FUSION, SPINE, LUMBAR, ROBOT-ASSISTED WITH IMAGING GUIDANCE  2/27/2021    Procedure: FUSION, SPINE, LUMBAR, ROBOT-ASSISTED WITH IMAGING GUIDANCE - L5-S1 EXTENSION OF TLIF;  Surgeon: Be Webber M.D.;  Location: [facility];  Service: Neurosurgery    LUMBAR DECOMPRESSION N/A 2/27/2021    Procedure: DECOMPRESSION, SPINE, LUMBAR;  Surgeon: Be Webber M.D.;  Location: [facility];  Service: Neurosurgery    PB IMPLANT NEUROSTIM/  5/22/2020    Procedure: INSERTION, NEUROSTIMULATOR, PERMANENT, SPINAL CORD;  Surgeon: Eugenio Camara M.D.;  Location: [facility];  Service: Pain Management    PEYRONIES PLAQUE  12/6/2019    Procedure: EXCISION, PEYRONIE'S PLAQUE, PENIS WITH GRAFTING, JEAN CLAUDE PLICATION, ARTIFICIAL ERECTION;  Surgeon: Tejinder Culver M.D.;  Location: [facility];  Service: Urology    KY NERVOUS SYSTEM SURGERY UNLISTED Left 6/3/2019    Procedure: EXPLORATION, NERVE- PERONEAL NERVE RELEASE AT THE FIBULAR HEAD  ;  Surgeon: Raz Garcia M.D.;  Location: [facility];   "Service: Neurosurgery    FUSION, PIP JOINT, TOE Right 2/22/2019    Procedure: PIP ARTHRODESIS;  Surgeon: Amadou Bowden M.D.;  Location: SURGERY SAME DAY [facility];  Service: Orthopedics    FINGER EXPLORATION Right 2/22/2019    Procedure: FINGER EXPLORATION - RING FINGER DISTAL INTERPHALANGEAL JOINT;  Surgeon: Amadou Bowden M.D.;  Location: SURGERY SAME DAY [facility];  Service: Orthopedics    COLONOSCOPY  2019    OTHER NEUROLOGICAL SURG  12/12/2018    \"Low Back Surgery'sx6 between 2006 & 2017\".    CYST EXCISION Right 10/12/2018    Procedure: CYST EXCISION - RING FINGER MUCOUS CYST, DISTAL INTERPHALANGEAL JOINT;  Surgeon: Amadou Bowden M.D.;  Location: SURGERY SAME DAY [facility];  Service: Orthopedics    LUMBAR LAMINECTOMY DISKECTOMY Left 12/6/2017    Procedure: LUMBAR LAMINECTOMY DISKECTOMY - POSTERIOR REDO LEFT  L4-S1 KAILA;  Surgeon: Be Webber M.D.;  Location: [facility];  Service: Neurosurgery    FUSION, SPINE, LUMBAR, PLIF  10/5/2017    Procedure: POSTERIOR TRANSFORAMINAL INTERBODY FUSION AT LUMBAR 4 - 5;  Surgeon: Be Webber M.D.;  Location: [facility];  Service: Neurosurgery    LUMBAR DECOMPRESSION  10/5/2017    Procedure: POSTERIOR LUMBAR 3-4,  4-5 DECOMPRESSION AND FUSION;  Surgeon: Be Webber M.D.;  Location: [facility];  Service: Neurosurgery    OH INJ DX/THER AGNT PARAVERT FACET JOINT, BRITT* Right 10/6/2016    Procedure: INJ PARA FACET L/S 1 LVL W/IG - L3-4, L4-5, L5-S1;  Surgeon: Eugenio Camara M.D.;  Location: [facility];  Service: Pain Management    OH INJ DX/THER AGNT PARAVERT FACET JOINT, BRITT*  10/6/2016    Procedure: INJ PARA FACET L/S 2D LVL W/IG;  Surgeon: Eugenio Camara M.D.;  Location: [facility];  Service: Pain Management    OH INJ DX/THER AGNT PARAVERT FACET JOINT, BRITT*  10/6/2016    Procedure: INJ PARA FACET L/S 3D LVL W/IG;  Surgeon: Eugenio Camara M.D.;  Location: [facility];  Service: Pain Management    NC NJX AA&/STRD TFRML EPI LUMBAR/SACRAL 1 LEVEL Right 8/18/2016    Procedure: INJ-FORAMEN EPI LUM/SAC SNGL - L5-S1;  Surgeon: Eugenio Camara M.D.;  Location: SURGERY [facility];  Service: Pain Management    LUMBAR LAMINECTOMY DISKECTOMY Right 3/12/2016    Procedure: LUMBAR HemiLAMINECTOMY Micro DISKECTOMY posterior Redo L3-4 ;  Surgeon: Be Webber M.D.;  Location: SURGERY [facility];  Service:     FORAMINOTOMY Right 3/12/2016    Procedure: FORAMINOTOMY;  Surgeon: Be Webber M.D.;  Location: SURGERY [facility];  Service:     CERVICAL LAMINECTOMY POSTERIOR  2011    DENTAL SURGERY Bilateral as a teenager    wisdom teeth    OTHER      nasal surgery 2015    OTHER NEUROLOGICAL SURG  2006, 2010, 2012, 2014, 2016, 2017    low back surgery x 5    TONSILLECTOMY      child       CURRENT MEDICATIONS  Home Medications       Reviewed by Brett Cordero R.N. (Registered Nurse) on 09/05/22 at 1639  Med List Status: <None>     Medication Last Dose Status   atorvastatin (LIPITOR) 10 MG Tab  Active   b complex vitamins tablet  Active   Calcium Carbonate-Vitamin D (CALCIUM + D PO)  Active   Cholecalciferol (VITAMIN D-1000 MAX ST) 1000 UNIT Tab  Active   gabapentin (NEURONTIN) 600 MG tablet  Active   hydroCHLOROthiazide (HYDRODIURIL) 12.5 MG tablet  Active   Multiple Vitamin (MULTI-VITAMIN) Tab  Active   SYNTHROID 137 MCG Tab  Active   tadalafil (CIALIS) 5 MG tablet  Active                    ALLERGIES  No Known Allergies    PHYSICAL EXAM  VITAL SIGNS: BP (!) 149/72   Pulse 62   Temp 36.3 °C (97.3 °F) (Temporal)   Resp 16   Ht 1.829 m (6')   Wt 88.5 kg (195 lb)   SpO2 97%   BMI 26.45 kg/m²    Constitutional: Well developed, Well nourished, No acute distress, Non-toxic appearance.   Eyes: PERRLA, EOMI, Conjunctiva normal, No discharge.   Cardiovascular: Normal heart rate, Normal rhythm, No murmurs, No rubs, No gallops, and intact distal pulses.   Thorax & Lungs:  No  respiratory distress, no rales, no rhonchi, No wheezing, No chest wall tenderness.   Skin: Warm, Dry, No erythema, No rash.   Extremities: Unable to move left lower extremity secondary to pain, distal pulses are brisk bilaterally lower extremities, the left lower extremity shortened and slightly internally rotated  Neurologic: Alert & oriented x 3, unable to lift left lower extremity secondary to dislocation and pain, strength and sensation intact plantar flexion dorsiflexion   psychiatric: Affect normal for clinical presentation.      RADIOLOGY/PROCEDURES  DX-HIP-COMPLETE - UNILATERAL 2+ LEFT   Final Result      Interval reduction of LEFT hip prosthesis dislocation.  No associated fracture.      DX-HIP-UNILATERAL-WITH PELVIS-1 VIEW LEFT   Final Result      1.  Dislocation of left hip arthroplasty.      2.  No acute fracture identified.        Conscious Sedation Procedure Note    Indication: hip dislocation    Consent: I have discussed with the patient and/or the patient representative the indication, alternatives, and the possible risks and/or complications of the planned procedure and the anesthesia methods. The patient and/or patient representative appear to understand and agree to proceed.    Physician Involvement: The attending physician was present and supervising this procedure.    Pre-Sedation Documentation and Exam: I have personally completed a history, physical exam & review of systems for this patient (see notes).  Airway Assessment: Mallampati Class II - (soft palate, fauces & uvula are visible)  f3  Prior History of Anesthesia Complications: none    ASA Classification: Class 2 - A normal healthy patient with mild systemic disease    Sedation/ Anesthesia Plan: intravenous sedation    Medications Used: propofol intravenously    Monitoring and Safety: The patient was placed on a cardiac monitor and vital signs, pulse oximetry and level of consciousness were continuously evaluated throughout the procedure.  The patient was closely monitored until recovery from the medications was complete and the patient had returned to baseline status. Respiratory therapy was on standby at all times during the procedure.      (The following sections must be completed)  Post-Sedation Vital Signs: Vital signs were reviewed and were stable after the procedure (see flow sheet for vitals)            Intraservice Time: Greater than 10 minutes    Post-Sedation Exam: Lungs: clear and Cardiovascular: normal           Complications: none    I provided both the sedation and procedure, a nurse was present at the bedside for the entire procedure.     Procedure: Hip dislocation reduction  Verbal and written consent was obtained and risk and benefits were discussed.    The patient received sedation as above  Using the Captain Akbar technique the patient had gentle pressure when the extremity was flexed at the hip with axial traction upwards resulting in reduction.  Post reduction, the patient was placed in a knee immobilizer and he was neurovascular intact post reduction.    COURSE & MEDICAL DECISION MAKING  Pertinent Labs & Imaging studies reviewed. (See chart for details)  This is a [facility] 77-year-old male who presents with a prosthetic hip dislocation.  He has no neurological vascular deficits.  Patient administered procedural sedation and reduction as above.  The patient was placed in a new monitor and will follow with Dr. Caceres as an outpatient.  I did inform him to utilize pillows between his legs while he is asleep until he follows up with Dr. Caceres.  Strict return precautions have been given.  Prior to discharge once again he had no neurological vascular deficits to the left lower extremity.      FINAL IMPRESSION     1. Closed dislocation of left hip, initial encounter ([facility]) Active       DISPOSITION:  Patient will be discharged home in stable condition.    FOLLOW UP:  [facility], Emergency Dept  [address]  [place]  [place] [id number] [phone]    If symptoms worsen    Luis Caceres M.D.  [address]  [facility] [address] [phone]    Schedule an appointment as soon as possible for a visit         Electronically signed by: Jose See D.O., 9/5/2022 4:56 PM

## 2022-09-06 RX ORDER — HYDROCHLOROTHIAZIDE 12.5 MG/1
TABLET ORAL
Qty: 30 TABLET | Refills: 0 | Status: SHIPPED | OUTPATIENT
Start: 2022-09-06 | End: 2022-10-10

## 2022-09-06 NOTE — ED NOTES
Brett RN at BS- given pt d/c instructions and had pt sign. Reviewed f/u care. Pt denies questions/concerns. Pt dressing, tech at BS to wheel pt to exit A&O x4 in stable condition w/ all belongings accompanied by wife

## 2022-09-17 ENCOUNTER — APPOINTMENT (OUTPATIENT)
Dept: RADIOLOGY | Facility: MEDICAL CENTER | Age: 77
DRG: 561 | End: 2022-09-17
Attending: EMERGENCY MEDICINE
Payer: COMMERCIAL

## 2022-09-17 ENCOUNTER — HOSPITAL ENCOUNTER (INPATIENT)
Facility: MEDICAL CENTER | Age: 77
LOS: 2 days | DRG: 561 | End: 2022-09-20
Attending: EMERGENCY MEDICINE | Admitting: STUDENT IN AN ORGANIZED HEALTH CARE EDUCATION/TRAINING PROGRAM
Payer: COMMERCIAL

## 2022-09-17 DIAGNOSIS — Z96.649 RECURRENT DISLOCATION OF HIP JOINT PROSTHESIS, INITIAL ENCOUNTER (HCC): ICD-10-CM

## 2022-09-17 DIAGNOSIS — I16.0 HYPERTENSIVE URGENCY: ICD-10-CM

## 2022-09-17 DIAGNOSIS — T84.029A RECURRENT DISLOCATION OF HIP JOINT PROSTHESIS, INITIAL ENCOUNTER (HCC): ICD-10-CM

## 2022-09-17 DIAGNOSIS — M16.10 ARTHRITIS OF HIP: ICD-10-CM

## 2022-09-17 DIAGNOSIS — E87.6 HYPOKALEMIA: ICD-10-CM

## 2022-09-17 PROCEDURE — 96375 TX/PRO/DX INJ NEW DRUG ADDON: CPT

## 2022-09-17 PROCEDURE — 700111 HCHG RX REV CODE 636 W/ 250 OVERRIDE (IP): Performed by: EMERGENCY MEDICINE

## 2022-09-17 PROCEDURE — 27265 TREAT HIP DISLOCATION: CPT

## 2022-09-17 PROCEDURE — 0SWBXJZ REVISION OF SYNTHETIC SUBSTITUTE IN LEFT HIP JOINT, EXTERNAL APPROACH: ICD-10-PCS | Performed by: EMERGENCY MEDICINE

## 2022-09-17 PROCEDURE — 99285 EMERGENCY DEPT VISIT HI MDM: CPT

## 2022-09-17 PROCEDURE — 73501 X-RAY EXAM HIP UNI 1 VIEW: CPT | Mod: LT

## 2022-09-17 RX ORDER — PROPOFOL 10 MG/ML
INJECTION, EMULSION INTRAVENOUS
Status: COMPLETED | OUTPATIENT
Start: 2022-09-17 | End: 2022-09-17

## 2022-09-17 RX ORDER — HYDROMORPHONE HYDROCHLORIDE 1 MG/ML
1 INJECTION, SOLUTION INTRAMUSCULAR; INTRAVENOUS; SUBCUTANEOUS ONCE
Status: COMPLETED | OUTPATIENT
Start: 2022-09-17 | End: 2022-09-17

## 2022-09-17 RX ORDER — PROPOFOL 10 MG/ML
200 INJECTION, EMULSION INTRAVENOUS ONCE
Status: ACTIVE | OUTPATIENT
Start: 2022-09-17 | End: 2022-09-18

## 2022-09-17 RX ADMIN — PROPOFOL 150 MG: 10 INJECTION, EMULSION INTRAVENOUS at 23:55

## 2022-09-17 RX ADMIN — HYDROMORPHONE HYDROCHLORIDE 1 MG: 1 INJECTION, SOLUTION INTRAMUSCULAR; INTRAVENOUS; SUBCUTANEOUS at 23:37

## 2022-09-17 ASSESSMENT — PAIN DESCRIPTION - PAIN TYPE: TYPE: ACUTE PAIN

## 2022-09-17 ASSESSMENT — FIBROSIS 4 INDEX: FIB4 SCORE: 1.63

## 2022-09-18 ENCOUNTER — APPOINTMENT (OUTPATIENT)
Dept: RADIOLOGY | Facility: MEDICAL CENTER | Age: 77
DRG: 561 | End: 2022-09-18
Attending: EMERGENCY MEDICINE
Payer: COMMERCIAL

## 2022-09-18 PROBLEM — S73.035A ANTERIOR DISLOCATION OF LEFT HIP, INITIAL ENCOUNTER (HCC): Status: ACTIVE | Noted: 2022-09-18

## 2022-09-18 PROBLEM — E87.6 HYPOKALEMIA: Status: ACTIVE | Noted: 2022-09-18

## 2022-09-18 PROBLEM — E83.51 HYPOCALCEMIA: Status: ACTIVE | Noted: 2022-09-18

## 2022-09-18 PROBLEM — I16.0 HYPERTENSIVE URGENCY: Status: ACTIVE | Noted: 2022-09-18

## 2022-09-18 LAB
ALBUMIN SERPL BCP-MCNC: 2.9 G/DL (ref 3.2–4.9)
ALBUMIN/GLOB SERPL: 1.6 G/DL
ALP SERPL-CCNC: 79 U/L (ref 30–99)
ALT SERPL-CCNC: 12 U/L (ref 2–50)
ANION GAP SERPL CALC-SCNC: 11 MMOL/L (ref 7–16)
ANION GAP SERPL CALC-SCNC: 6 MMOL/L (ref 7–16)
ANION GAP SERPL CALC-SCNC: 8 MMOL/L (ref 7–16)
AST SERPL-CCNC: 16 U/L (ref 12–45)
BASOPHILS # BLD AUTO: 0.4 % (ref 0–1.8)
BASOPHILS # BLD: 0.03 K/UL (ref 0–0.12)
BILIRUB SERPL-MCNC: 0.2 MG/DL (ref 0.1–1.5)
BUN SERPL-MCNC: 18 MG/DL (ref 8–22)
BUN SERPL-MCNC: 20 MG/DL (ref 8–22)
BUN SERPL-MCNC: 23 MG/DL (ref 8–22)
CA-I SERPL-SCNC: 1.2 MMOL/L (ref 1.1–1.3)
CALCIUM SERPL-MCNC: 7 MG/DL (ref 8.5–10.5)
CALCIUM SERPL-MCNC: 8.5 MG/DL (ref 8.5–10.5)
CALCIUM SERPL-MCNC: 8.6 MG/DL (ref 8.5–10.5)
CHLORIDE SERPL-SCNC: 106 MMOL/L (ref 96–112)
CHLORIDE SERPL-SCNC: 107 MMOL/L (ref 96–112)
CHLORIDE SERPL-SCNC: 111 MMOL/L (ref 96–112)
CO2 SERPL-SCNC: 20 MMOL/L (ref 20–33)
CO2 SERPL-SCNC: 22 MMOL/L (ref 20–33)
CO2 SERPL-SCNC: 26 MMOL/L (ref 20–33)
CREAT SERPL-MCNC: 1.02 MG/DL (ref 0.5–1.4)
CREAT SERPL-MCNC: 1.07 MG/DL (ref 0.5–1.4)
CREAT SERPL-MCNC: 1.1 MG/DL (ref 0.5–1.4)
EOSINOPHIL # BLD AUTO: 0.03 K/UL (ref 0–0.51)
EOSINOPHIL NFR BLD: 0.4 % (ref 0–6.9)
ERYTHROCYTE [DISTWIDTH] IN BLOOD BY AUTOMATED COUNT: 41.8 FL (ref 35.9–50)
ERYTHROCYTE [DISTWIDTH] IN BLOOD BY AUTOMATED COUNT: 42.3 FL (ref 35.9–50)
GFR SERPLBLD CREATININE-BSD FMLA CKD-EPI: 69 ML/MIN/1.73 M 2
GFR SERPLBLD CREATININE-BSD FMLA CKD-EPI: 71 ML/MIN/1.73 M 2
GFR SERPLBLD CREATININE-BSD FMLA CKD-EPI: 76 ML/MIN/1.73 M 2
GLOBULIN SER CALC-MCNC: 1.8 G/DL (ref 1.9–3.5)
GLUCOSE SERPL-MCNC: 101 MG/DL (ref 65–99)
GLUCOSE SERPL-MCNC: 129 MG/DL (ref 65–99)
GLUCOSE SERPL-MCNC: 87 MG/DL (ref 65–99)
HCT VFR BLD AUTO: 35.2 % (ref 42–52)
HCT VFR BLD AUTO: 38.8 % (ref 42–52)
HGB BLD-MCNC: 11.7 G/DL (ref 14–18)
HGB BLD-MCNC: 12.9 G/DL (ref 14–18)
IMM GRANULOCYTES # BLD AUTO: 0.04 K/UL (ref 0–0.11)
IMM GRANULOCYTES NFR BLD AUTO: 0.5 % (ref 0–0.9)
LYMPHOCYTES # BLD AUTO: 0.71 K/UL (ref 1–4.8)
LYMPHOCYTES NFR BLD: 9.3 % (ref 22–41)
MAGNESIUM SERPL-MCNC: 1.5 MG/DL (ref 1.5–2.5)
MCH RBC QN AUTO: 28.1 PG (ref 27–33)
MCH RBC QN AUTO: 28.7 PG (ref 27–33)
MCHC RBC AUTO-ENTMCNC: 33.2 G/DL (ref 33.7–35.3)
MCHC RBC AUTO-ENTMCNC: 33.2 G/DL (ref 33.7–35.3)
MCV RBC AUTO: 84.4 FL (ref 81.4–97.8)
MCV RBC AUTO: 86.2 FL (ref 81.4–97.8)
MONOCYTES # BLD AUTO: 0.62 K/UL (ref 0–0.85)
MONOCYTES NFR BLD AUTO: 8.1 % (ref 0–13.4)
NEUTROPHILS # BLD AUTO: 6.23 K/UL (ref 1.82–7.42)
NEUTROPHILS NFR BLD: 81.3 % (ref 44–72)
NRBC # BLD AUTO: 0 K/UL
NRBC BLD-RTO: 0 /100 WBC
PLATELET # BLD AUTO: 216 K/UL (ref 164–446)
PLATELET # BLD AUTO: 229 K/UL (ref 164–446)
PMV BLD AUTO: 10.2 FL (ref 9–12.9)
PMV BLD AUTO: 10.2 FL (ref 9–12.9)
POTASSIUM SERPL-SCNC: 2.9 MMOL/L (ref 3.6–5.5)
POTASSIUM SERPL-SCNC: 4.5 MMOL/L (ref 3.6–5.5)
POTASSIUM SERPL-SCNC: 5 MMOL/L (ref 3.6–5.5)
PROT SERPL-MCNC: 4.7 G/DL (ref 6–8.2)
RBC # BLD AUTO: 4.17 M/UL (ref 4.7–6.1)
RBC # BLD AUTO: 4.5 M/UL (ref 4.7–6.1)
SODIUM SERPL-SCNC: 138 MMOL/L (ref 135–145)
SODIUM SERPL-SCNC: 138 MMOL/L (ref 135–145)
SODIUM SERPL-SCNC: 141 MMOL/L (ref 135–145)
WBC # BLD AUTO: 6.3 K/UL (ref 4.8–10.8)
WBC # BLD AUTO: 7.7 K/UL (ref 4.8–10.8)

## 2022-09-18 PROCEDURE — 80048 BASIC METABOLIC PNL TOTAL CA: CPT

## 2022-09-18 PROCEDURE — 96365 THER/PROPH/DIAG IV INF INIT: CPT

## 2022-09-18 PROCEDURE — 73501 X-RAY EXAM HIP UNI 1 VIEW: CPT | Mod: LT

## 2022-09-18 PROCEDURE — 700102 HCHG RX REV CODE 250 W/ 637 OVERRIDE(OP): Performed by: STUDENT IN AN ORGANIZED HEALTH CARE EDUCATION/TRAINING PROGRAM

## 2022-09-18 PROCEDURE — 82330 ASSAY OF CALCIUM: CPT

## 2022-09-18 PROCEDURE — 36415 COLL VENOUS BLD VENIPUNCTURE: CPT

## 2022-09-18 PROCEDURE — 51702 INSERT TEMP BLADDER CATH: CPT

## 2022-09-18 PROCEDURE — 94799 UNLISTED PULMONARY SVC/PX: CPT

## 2022-09-18 PROCEDURE — 83735 ASSAY OF MAGNESIUM: CPT

## 2022-09-18 PROCEDURE — 700111 HCHG RX REV CODE 636 W/ 250 OVERRIDE (IP): Performed by: HOSPITALIST

## 2022-09-18 PROCEDURE — 303105 HCHG CATHETER EXTRA

## 2022-09-18 PROCEDURE — 700102 HCHG RX REV CODE 250 W/ 637 OVERRIDE(OP): Performed by: HOSPITALIST

## 2022-09-18 PROCEDURE — A9270 NON-COVERED ITEM OR SERVICE: HCPCS | Performed by: STUDENT IN AN ORGANIZED HEALTH CARE EDUCATION/TRAINING PROGRAM

## 2022-09-18 PROCEDURE — 96376 TX/PRO/DX INJ SAME DRUG ADON: CPT

## 2022-09-18 PROCEDURE — 80053 COMPREHEN METABOLIC PANEL: CPT

## 2022-09-18 PROCEDURE — 770001 HCHG ROOM/CARE - MED/SURG/GYN PRIV*

## 2022-09-18 PROCEDURE — 99223 1ST HOSP IP/OBS HIGH 75: CPT | Performed by: STUDENT IN AN ORGANIZED HEALTH CARE EDUCATION/TRAINING PROGRAM

## 2022-09-18 PROCEDURE — 85025 COMPLETE CBC W/AUTO DIFF WBC: CPT

## 2022-09-18 PROCEDURE — 700101 HCHG RX REV CODE 250: Performed by: EMERGENCY MEDICINE

## 2022-09-18 PROCEDURE — 85027 COMPLETE CBC AUTOMATED: CPT

## 2022-09-18 PROCEDURE — A9270 NON-COVERED ITEM OR SERVICE: HCPCS | Performed by: HOSPITALIST

## 2022-09-18 PROCEDURE — 96366 THER/PROPH/DIAG IV INF ADDON: CPT

## 2022-09-18 PROCEDURE — 700111 HCHG RX REV CODE 636 W/ 250 OVERRIDE (IP): Performed by: EMERGENCY MEDICINE

## 2022-09-18 RX ORDER — POTASSIUM CHLORIDE 20 MEQ/1
40 TABLET, EXTENDED RELEASE ORAL ONCE
Status: COMPLETED | OUTPATIENT
Start: 2022-09-18 | End: 2022-09-18

## 2022-09-18 RX ORDER — HYDROMORPHONE HYDROCHLORIDE 1 MG/ML
1 INJECTION, SOLUTION INTRAMUSCULAR; INTRAVENOUS; SUBCUTANEOUS ONCE
Status: COMPLETED | OUTPATIENT
Start: 2022-09-18 | End: 2022-09-18

## 2022-09-18 RX ORDER — POTASSIUM CHLORIDE 20 MEQ/1
40 TABLET, EXTENDED RELEASE ORAL ONCE
Status: ACTIVE | OUTPATIENT
Start: 2022-09-18 | End: 2022-09-19

## 2022-09-18 RX ORDER — HYDROCHLOROTHIAZIDE 12.5 MG/1
12.5 TABLET ORAL DAILY
Status: DISCONTINUED | OUTPATIENT
Start: 2022-09-18 | End: 2022-09-20 | Stop reason: HOSPADM

## 2022-09-18 RX ORDER — GABAPENTIN 300 MG/1
600 CAPSULE ORAL 3 TIMES DAILY
Status: DISCONTINUED | OUTPATIENT
Start: 2022-09-18 | End: 2022-09-19

## 2022-09-18 RX ORDER — ASPIRIN 325 MG
325 TABLET ORAL DAILY
Status: DISCONTINUED | OUTPATIENT
Start: 2022-09-18 | End: 2022-09-20 | Stop reason: HOSPADM

## 2022-09-18 RX ORDER — SODIUM CHLORIDE AND POTASSIUM CHLORIDE 300; 900 MG/100ML; MG/100ML
INJECTION, SOLUTION INTRAVENOUS CONTINUOUS
Status: DISCONTINUED | OUTPATIENT
Start: 2022-09-18 | End: 2022-09-18

## 2022-09-18 RX ORDER — PROPOFOL 10 MG/ML
INJECTION, EMULSION INTRAVENOUS
Status: COMPLETED | OUTPATIENT
Start: 2022-09-18 | End: 2022-09-18

## 2022-09-18 RX ORDER — CALCIUM CARBONATE 500 MG/1
1000 TABLET, CHEWABLE ORAL ONCE
Status: COMPLETED | OUTPATIENT
Start: 2022-09-18 | End: 2022-09-18

## 2022-09-18 RX ORDER — AMOXICILLIN 250 MG
2 CAPSULE ORAL 2 TIMES DAILY
Status: DISCONTINUED | OUTPATIENT
Start: 2022-09-18 | End: 2022-09-20 | Stop reason: HOSPADM

## 2022-09-18 RX ORDER — MAGNESIUM SULFATE HEPTAHYDRATE 40 MG/ML
2 INJECTION, SOLUTION INTRAVENOUS ONCE
Status: COMPLETED | OUTPATIENT
Start: 2022-09-18 | End: 2022-09-18

## 2022-09-18 RX ORDER — ATORVASTATIN CALCIUM 10 MG/1
10 TABLET, FILM COATED ORAL DAILY
Status: DISCONTINUED | OUTPATIENT
Start: 2022-09-18 | End: 2022-09-20 | Stop reason: HOSPADM

## 2022-09-18 RX ORDER — MORPHINE SULFATE 4 MG/ML
4 INJECTION INTRAVENOUS EVERY 4 HOURS PRN
Status: DISCONTINUED | OUTPATIENT
Start: 2022-09-18 | End: 2022-09-20 | Stop reason: HOSPADM

## 2022-09-18 RX ORDER — LEVOTHYROXINE SODIUM 137 UG/1
137 TABLET ORAL
Status: DISCONTINUED | OUTPATIENT
Start: 2022-09-18 | End: 2022-09-20 | Stop reason: HOSPADM

## 2022-09-18 RX ORDER — OXYCODONE HYDROCHLORIDE 5 MG/1
5 TABLET ORAL EVERY 4 HOURS PRN
Status: DISCONTINUED | OUTPATIENT
Start: 2022-09-18 | End: 2022-09-20 | Stop reason: HOSPADM

## 2022-09-18 RX ORDER — BISACODYL 10 MG
10 SUPPOSITORY, RECTAL RECTAL
Status: DISCONTINUED | OUTPATIENT
Start: 2022-09-18 | End: 2022-09-20 | Stop reason: HOSPADM

## 2022-09-18 RX ORDER — ACETAMINOPHEN 325 MG/1
650 TABLET ORAL EVERY 6 HOURS PRN
Status: DISCONTINUED | OUTPATIENT
Start: 2022-09-18 | End: 2022-09-20 | Stop reason: HOSPADM

## 2022-09-18 RX ORDER — PROPOFOL 10 MG/ML
200 INJECTION, EMULSION INTRAVENOUS ONCE
Status: ACTIVE | OUTPATIENT
Start: 2022-09-18 | End: 2022-09-19

## 2022-09-18 RX ORDER — ASPIRIN 325 MG
81 TABLET ORAL DAILY
COMMUNITY
End: 2023-05-09

## 2022-09-18 RX ORDER — POLYETHYLENE GLYCOL 3350 17 G/17G
1 POWDER, FOR SOLUTION ORAL
Status: DISCONTINUED | OUTPATIENT
Start: 2022-09-18 | End: 2022-09-20 | Stop reason: HOSPADM

## 2022-09-18 RX ADMIN — ATORVASTATIN CALCIUM 10 MG: 10 TABLET, FILM COATED ORAL at 05:36

## 2022-09-18 RX ADMIN — POTASSIUM CHLORIDE 40 MEQ: 1500 TABLET, EXTENDED RELEASE ORAL at 05:36

## 2022-09-18 RX ADMIN — SENNOSIDES AND DOCUSATE SODIUM 2 TABLET: 50; 8.6 TABLET ORAL at 05:36

## 2022-09-18 RX ADMIN — HYDROCHLOROTHIAZIDE 12.5 MG: 12.5 TABLET ORAL at 05:35

## 2022-09-18 RX ADMIN — POTASSIUM CHLORIDE AND SODIUM CHLORIDE: 900; 300 INJECTION, SOLUTION INTRAVENOUS at 02:30

## 2022-09-18 RX ADMIN — MAGNESIUM SULFATE HEPTAHYDRATE 2 G: 40 INJECTION, SOLUTION INTRAVENOUS at 11:21

## 2022-09-18 RX ADMIN — GABAPENTIN 600 MG: 300 CAPSULE ORAL at 11:21

## 2022-09-18 RX ADMIN — OXYCODONE 5 MG: 5 TABLET ORAL at 21:22

## 2022-09-18 RX ADMIN — CALCIUM CARBONATE 1000 MG: 500 TABLET, CHEWABLE ORAL at 05:36

## 2022-09-18 RX ADMIN — LEVOTHYROXINE SODIUM 137 MCG: 137 TABLET ORAL at 05:35

## 2022-09-18 RX ADMIN — OXYCODONE 5 MG: 5 TABLET ORAL at 05:35

## 2022-09-18 RX ADMIN — SENNOSIDES AND DOCUSATE SODIUM 2 TABLET: 50; 8.6 TABLET ORAL at 16:50

## 2022-09-18 RX ADMIN — HYDROMORPHONE HYDROCHLORIDE 1 MG: 1 INJECTION, SOLUTION INTRAMUSCULAR; INTRAVENOUS; SUBCUTANEOUS at 01:18

## 2022-09-18 RX ADMIN — POTASSIUM CHLORIDE 40 MEQ: 1500 TABLET, EXTENDED RELEASE ORAL at 11:19

## 2022-09-18 RX ADMIN — POTASSIUM CHLORIDE AND SODIUM CHLORIDE: 900; 300 INJECTION, SOLUTION INTRAVENOUS at 05:37

## 2022-09-18 RX ADMIN — GABAPENTIN 600 MG: 300 CAPSULE ORAL at 16:50

## 2022-09-18 RX ADMIN — PROPOFOL 100 MG: 10 INJECTION, EMULSION INTRAVENOUS at 02:08

## 2022-09-18 RX ADMIN — GABAPENTIN 600 MG: 300 CAPSULE ORAL at 05:36

## 2022-09-18 ASSESSMENT — PATIENT HEALTH QUESTIONNAIRE - PHQ9
2. FEELING DOWN, DEPRESSED, IRRITABLE, OR HOPELESS: NOT AT ALL
SUM OF ALL RESPONSES TO PHQ9 QUESTIONS 1 AND 2: 0
1. LITTLE INTEREST OR PLEASURE IN DOING THINGS: NOT AT ALL

## 2022-09-18 ASSESSMENT — LIFESTYLE VARIABLES
TOTAL SCORE: 0
EVER HAD A DRINK FIRST THING IN THE MORNING TO STEADY YOUR NERVES TO GET RID OF A HANGOVER: NO
EVER FELT BAD OR GUILTY ABOUT YOUR DRINKING: NO
ALCOHOL_USE: NO
TOTAL SCORE: 0
DOES PATIENT WANT TO STOP DRINKING: CANNOT ASSESS
HAVE PEOPLE ANNOYED YOU BY CRITICIZING YOUR DRINKING: NO
CONSUMPTION TOTAL: NEGATIVE
HAVE YOU EVER FELT YOU SHOULD CUT DOWN ON YOUR DRINKING: NO
HOW MANY TIMES IN THE PAST YEAR HAVE YOU HAD 5 OR MORE DRINKS IN A DAY: 0
TOTAL SCORE: 0
AVERAGE NUMBER OF DAYS PER WEEK YOU HAVE A DRINK CONTAINING ALCOHOL: 0
ON A TYPICAL DAY WHEN YOU DRINK ALCOHOL HOW MANY DRINKS DO YOU HAVE: 0

## 2022-09-18 ASSESSMENT — PAIN DESCRIPTION - PAIN TYPE: TYPE: ACUTE PAIN

## 2022-09-18 ASSESSMENT — ENCOUNTER SYMPTOMS
DIARRHEA: 0
DIZZINESS: 0
VOMITING: 0
FEVER: 0
HEADACHES: 0
SORE THROAT: 0
SHORTNESS OF BREATH: 0
NAUSEA: 0
CHILLS: 0
COUGH: 0
ABDOMINAL PAIN: 0

## 2022-09-18 ASSESSMENT — FIBROSIS 4 INDEX: FIB4 SCORE: 1.55

## 2022-09-18 NOTE — CONSULTS
DATE OF SERVICE:  09/18/2022     PREOPERATIVE DIAGNOSIS:  Instability, left hip.     HISTORY OF PRESENT ILLNESS: The patient is a 77-year-old  who presented   to the Emergency Room last evening with a dislocated left hip.  Of note, he   underwent a left primary total hip arthroplasty with Dr. Luis Caceres on   08/15/2022.  He did well for the first 2 weeks, then on 09/05, sustained a   left hip dislocation when sitting on a bench in a gym locker room, this was   reduced in the emergency room.  Then, he was leaning forward with his leg on a   trunk in his closet last evening, and again dislocated the left hip.  He   presented to [facility] Emergency Room.  The hip was reduced.  Then, when the knee   immobilizer was being applied it dislocated again.  A second reduction took   place, with application of a hip abduction pillow instead.  The patient   reports that the hip is quite painful when it is dislocated, but now that it   has been reduced again, he is not in much pain.  He voices some concern about   going home, due to his left hip instability at this point.     PAST MEDICAL HISTORY:  Hearing loss,  back pain, high cholesterol,   hypertension, hypothyroidism.     MEDICATIONS:  Hydrochlorothiazide, Lipitor, Neurontin, Cialis, Synthroid,   vitamin.     ALLERGIES:  None.     PHYSICAL EXAMINATION:    VITAL SIGNS:  Blood pressure 169/85, pulse 112, pulse ox 97%.   LIMITED PHYSICAL EXAMINATION:  Left lower extremity is immobilized in a hip   abduction brace.  There is good plantarflexion and dorsiflexion of the ankle.    There is good quadriceps contraction.  There is a 2+ dorsalis pedis pulse.    His posterolateral hip incision is dry, clean, and intact without drainage,   with mild swelling.     DIAGNOSTIC IMAGING:  Emergency room films from last evening demonstrate   dislocation of left total hip prosthesis with the most recent film from last   evening demonstrating successful reduction.  There was no obvious    periprosthetic fracture.     ASSESSMENT:  Recurrent instability, status post left total hip arthroplasty.       PLAN:  I spoke with the emergency room physician last evening, and due to   persistent instability, the decision was made to admit the patient to the   hospitalist with orthopedic consultation as it was deemed that he was unsafe   to go home due to an unstable left hip.  The patient concurs.  He is in bed   today with hip abduction pillow in place, and is comfortable.       His potassium was 2.9, which is being repleted by the hospitalist.  I have   reached out to Dr. Caceres regarding a plan for this patient given his   persistent left hip instability.  He may eat a regular diet today, in   anticipation of possible revision surgery tomorrow, per Dr. Caceres.       I will follow up later today with the patient regarding a plan once I hear   back from Dr. Caceres.       Tate to be kept in place.  Orthopedics will follow up with a plan later   today.        ______________________________  MD CLAY CORREIA/REAGAN    DD:  09/18/2022 09:45  DT:  09/18/2022 10:40    Job#:  [id number]

## 2022-09-18 NOTE — PROGRESS NOTES
0609 Communicated with Hospitalist Megan about infusion of NS with 40 mEq of potassium, currently ordered at 250mL per hour. Order to slow down to 100mL per hr.

## 2022-09-18 NOTE — ED NOTES
Post-reduction, pt started to desaturate to the 80's with good waveform, jaw thrust performed by RN, RT bagged pt. Pt now awake, alert, and maintaining his airway

## 2022-09-18 NOTE — ASSESSMENT & PLAN NOTE
Left hip replacement on 8/15 for osteoarthritis  Came here he was noted to have dislocation, orthopedic surgery was consulted, orthopedic surgery evaluated, plan for surgical intervention on Wednesday at [facility]   -- Dr. Caceres wants the patient to be discharged.  He recommended special brace which has been ordered, pendng    -- After that he will need pt and ot

## 2022-09-18 NOTE — THERAPY
09/18/22 1053   Interdisciplinary Plan of Care Collaboration   Collaboration Comments OT eval held 2/2  possible revision surgery tomorrow, per Dr. Caceres.

## 2022-09-18 NOTE — ED NOTES
Med rec completed per patient  Allergies reviewed  No PO Antibiotics in the last 30 days     Patient increased his daily dose of aspirin from 81 mg to 325 mg as of August 15th after surgery, this was at the direction of his doctor who had concerns for clotting.

## 2022-09-18 NOTE — H&P
[facility] Medicine History & Physical Note    Date of Service  9/18/2022    Primary Care Physician  Vincenzo Mccarthy D.O.    Consultants  orthopedics    Specialist Names: Dr Rivera     Code Status  Full Code    Chief Complaint  Chief Complaint   Patient presents with    Hip Injury    T-5000 GLF       History of Presenting Illness  Amadou Parekh is a 77 y.o. male who presented 9/17/2022 with left hip pain.  PMH of HTN, dyslipidemia, hypothyroidism, neuropathy.  On 8/15 patient had elective left hip replacement for severe osteoarthritis.  On 9/5 he had dislocation of the prosthetic hip and it was reduced in the ED.  Comes back in today with significant pain after bending over, dislocated again and was reduced in the ED.  While in the ED ambulatory x-ray hip actually dislocated another time, was reduced once more after that.  Ortho consulted in the ED and will evaluate tomorrow.  Denies any other acute complaints.    In the ED afebrile, tachycardic and hypertensive to systolic 193.  Labs show potassium at 2.9, calcium 7.9.    I discussed the plan of care with patient, bedside RN, and EDP .    Review of Systems  Review of Systems   Constitutional:  Negative for chills and fever.   HENT:  Negative for sore throat.    Respiratory:  Negative for cough and shortness of breath.    Cardiovascular:  Negative for chest pain.   Gastrointestinal:  Negative for abdominal pain, diarrhea, nausea and vomiting.   Genitourinary:  Negative for dysuria and urgency.   Musculoskeletal:  Positive for joint pain.   Neurological:  Negative for dizziness and headaches.   All other systems reviewed and are negative.    Past Medical History   has a past medical history of Actinic keratoses, Bilateral hearing loss (08/06/2018), Chronic right-sided low back pain without sciatica (08/06/2018), ED (erectile dysfunction), High cholesterol, Hyperlipidemia, mixed, Hypertension, Hypothyroidism, Neuropathy (HCC) (8/6/2018), Pain (12/18/2018), and  Peyronie disease.    Surgical History   has a past surgical history that includes cervical laminectomy posterior (2011); lumbar laminectomy diskectomy (Right, 3/12/2016); foraminotomy (Right, 3/12/2016); pr njx aa&/strd tfrml epi lumbar/sacral 1 level (Right, 8/18/2016); pr inj dx/ther agnt paravert facet joint, reno* (Right, 10/6/2016); pr inj dx/ther agnt paravert facet joint, rneo* (10/6/2016); pr inj dx/ther agnt paravert facet joint, reno* (10/6/2016); fusion, spine, lumbar, plif (10/5/2017); lumbar laminectomy diskectomy (Left, 12/6/2017); tonsillectomy; cyst excision (Right, 10/12/2018); other neurological surg (2006, 2010, 2012, 2014, 2016, 2017); lumbar decompression (10/5/2017); other neurological surg (12/12/2018); fusion, pip joint, toe (Right, 2/22/2019); pr nervous system surgery unlisted (Left, 6/3/2019); finger exploration (Right, 2/22/2019); other; peyronies plaque (12/6/2019); colonoscopy (2019); dental surgery (Bilateral, as a teenager); pr implant neurostim/ (5/22/2020); fusion, spine, lumbar, robot-assisted with imaging guidance (2/27/2021); lumbar decompression (N/A, 2/27/2021); lumbar fusion anterior (N/A, 12/31/2021); pr implant neurostim/ (1/3/2022); lumbar fusion o-arm (1/3/2022); lumbar decompression (1/3/2022); and pr total hip arthroplasty (Left, 8/15/2022).     Family History  family history includes Cancer in his father and mother; Diabetes in his father; Heart Disease in his father and mother; Stroke in his father.   Family history reviewed with patient. There is no family history that is pertinent to the chief complaint.     Social History   reports that he has never smoked. He has never used smokeless tobacco. He reports that he does not drink alcohol and does not use drugs.    Allergies  No Known Allergies    Medications  Prior to Admission Medications   Prescriptions Last Dose Informant Patient Reported? Taking?   Cholecalciferol (VITAMIN D-1000 MAX ST) 1000 UNIT Tab  9/17/2022 at AM Patient Yes No   Sig: Take 2,000 Units by mouth every day.   Multiple Vitamin (MULTI-VITAMIN) Tab 9/17/2022 at AM Patient Yes No   Sig: Take 1 Tablet by mouth every day.   SYNTHROID 137 MCG Tab 9/17/2022 at AM Patient No No   Sig: Take 1 Tablet by mouth every morning on an empty stomach.   aspirin (ASA) 325 MG Tab 9/17/2022 at AM Patient Yes Yes   Sig: Take 325 mg by mouth every day.   atorvastatin (LIPITOR) 10 MG Tab 9/16/2022 at PM Patient No No   Sig: Take 1 Tablet by mouth every day.   b complex vitamins tablet 9/17/2022 at AM Patient Yes No   Sig: Take 1 Tab by mouth every day.   gabapentin (NEURONTIN) 600 MG tablet 9/17/2022 at 2100 Patient Yes No   Sig: Take 600 mg by mouth every 3 hours.   hydroCHLOROthiazide (HYDRODIURIL) 12.5 MG tablet 9/17/2022 at AM Patient No No   Sig: TAKE ONE TABLET BY MOUTH ONCE DAILY   tadalafil (CIALIS) 5 MG tablet 9/17/2022 at AM Patient Yes No   Sig: Take 5 mg by mouth every day. Taking for prostate vs ED per pt      Facility-Administered Medications: None       Physical Exam  Temp:  [36.8 °C (98.2 °F)-36.9 °C (98.4 °F)] 36.8 °C (98.2 °F)  Pulse:  [] 85  Resp:  [12-20] 17  BP: (115-191)/() 130/67  SpO2:  [90 %-98 %] 98 %  Blood Pressure : 130/67   Temperature: 36.8 °C (98.2 °F)   Pulse: 85   Respiration: 17   Pulse Oximetry: 98 %       Physical Exam  Constitutional:       General: He is in acute distress.   HENT:      Head: Normocephalic and atraumatic.      Mouth/Throat:      Mouth: Mucous membranes are moist.      Pharynx: Oropharynx is clear. No oropharyngeal exudate or posterior oropharyngeal erythema.   Eyes:      General: No scleral icterus.  Cardiovascular:      Rate and Rhythm: Normal rate and regular rhythm.      Pulses: Normal pulses.      Heart sounds: Normal heart sounds. No murmur heard.  Pulmonary:      Effort: Pulmonary effort is normal. No respiratory distress.      Breath sounds: Normal breath sounds. No wheezing.   Abdominal:       Palpations: Abdomen is soft.      Tenderness: There is no abdominal tenderness.   Musculoskeletal:         General: Tenderness present. No swelling.      Cervical back: Normal range of motion.      Comments: L hip pain   Skin:     General: Skin is warm and dry.   Neurological:      General: No focal deficit present.      Mental Status: He is alert and oriented to person, place, and time. Mental status is at baseline.   Psychiatric:         Mood and Affect: Mood normal.       Laboratory:  Recent Labs     09/18/22  0022   WBC 7.7   RBC 4.17*   HEMOGLOBIN 11.7*   HEMATOCRIT 35.2*   MCV 84.4   MCH 28.1   MCHC 33.2*   RDW 41.8   PLATELETCT 229   MPV 10.2     Recent Labs     09/18/22  0022   SODIUM 141   POTASSIUM 2.9*   CHLORIDE 111   CO2 22   GLUCOSE 101*   BUN 23*   CREATININE 1.07   CALCIUM 7.0*     Recent Labs     09/18/22  0022   ALTSGPT 12   ASTSGOT 16   ALKPHOSPHAT 79   TBILIRUBIN 0.2   GLUCOSE 101*         No results for input(s): NTPROBNP in the last 72 hours.      No results for input(s): TROPONINT in the last 72 hours.    Imaging:  DX-HIP-UNILATERAL-WITHOUT PELVIS-1 VIEW LEFT   Final Result      Successful relocation of dislocated left hip arthroplasty      DX-HIP-UNILATERAL-WITHOUT PELVIS-1 VIEW LEFT   Final Result      Unsuccessful relocation of left hip arthroplasty dislocation      DX-HIP-UNILATERAL-WITH PELVIS-1 VIEW LEFT   Final Result      Recurrent dislocation of left hip arthroplasty      Moderate to severe right hip arthritis          X-Ray:  I have personally reviewed the images and compared with prior images. and My impression is: L hip dislocation     Assessment/Plan:  Justification for Admission Status  I anticipate this patient will require at least two midnights for appropriate medical management, necessitating inpatient admission because recurrent L hip prosthesis dislocation, needs surgical eval    Patient will need a Med/Surg bed on ORTHOPEDICS service .  The need is secondary to hip  pain.    * Anterior dislocation of left hip, initial encounter (HCC)- (present on admission)  Assessment & Plan  Left hip replacement on 8/15 for osteoarthritis, had dislocation 9/5000 reduced in the ED.  2 more locations today, 1 being in the ED after the first reduction.  ED discussed with Ortho who will evaluate the patient  N.p.o., pain management  PT, OT    Hypokalemia- (present on admission)  Assessment & Plan  2.9 presentation, replete as needed and check magnesium  Unclear etiology, he is on hydrochlorothiazide per lab review his potassium has never been low before.  In addition his calcium is also low, less likely related to HCTZ    Hypocalcemia- (present on admission)  Assessment & Plan  Corrected 7.9 on presentation.  Replete as needed    Dyslipidemia- (present on admission)  Assessment & Plan  Continue statin    Peripheral polyneuropathy- (present on admission)  Assessment & Plan  Continue gabapentin    Hypothyroidism- (present on admission)  Assessment & Plan  Continue levothyroxine    Primary hypertension- (present on admission)  Assessment & Plan  Continue hydrochlorothiazide      VTE prophylaxis: SCDs/TEDs and pharmacologic prophylaxis contraindicated due to possible proceudre

## 2022-09-18 NOTE — PROGRESS NOTES
This is a 77-year-old man with a past medical history significant for hypertension, dyslipidemia, hypothyroidism, neuropathy presented to the ER on 9/ 7/22 with a complaint of left hip pain.    Patient stated that he had elective hip replacement for severe osteoarthritis on 8/15; On 9/5 he had dislocation of her prosthetic hip and was reduced in the ED.  He again came this time with significant pain after bending over noted to have dislocation again which was reduced in the ED.  Ambulatory x-ray while in the ED so dislocated second time which was reduced.    Orthopedic surgery was consulted, Dr. Rivera has evaluated the patient and plan is surgery tomorrow.    Interval events:  -- No acute events overnight, medicine has been stable, heart rate 60s to 70s, blood pressure 100/71 sats are 98% on 2 L of oxygen  Hypokalemia has resolved, patient magnesium is noted to be 1.5, provide 2 g of IV magnesium.  Orthopedic surgery following, further recommendation    Plan:  Follow surgery recommendation.  Will make n.p.o. at midnight

## 2022-09-18 NOTE — ED NOTES
"This RN informed the pt that we need to remove his clothes as has an xray ordered. Pt refusing at this time stating \"You're not touching me until I get pain meds. Pt informed that there are no orders for pain meds at this time, pt screaming \"get the doctor back in here, you're fired, I don't want to see you. I'm not some lab specimen, I have a voice in this.\"   Attempted to de escalate the pt, pt unable to be deescalated at this time.   Charge RN notified   "

## 2022-09-18 NOTE — ED TRIAGE NOTES
BIBA from home for GLF while taking off shoes.  Presents w/ L hip pain, CMS intact to LLE.  HX of recent hip dislocation on sept 5th and states it feels similar.  Denies head strike, - blood thinners.     Received   100mcg fentanyl @ 2144  1mg versed @ 2145  1mg versed @ 2200  50mcg fentanyl @ 2226

## 2022-09-18 NOTE — HOSPITAL COURSE
This is a 77-year-old man with a past medical history significant for hypertension, dyslipidemia, hypothyroidism, neuropathy presented to the ER on 9/ 7/22 with a complaint of left hip pain.    Patient stated that he had elective hip replacement for severe osteoarthritis on 8/15; On 9/5 he had dislocation of her prosthetic hip and was reduced in the ED.  He again came this time with significant pain after bending over noted to have dislocation again which was reduced in the ED.  Ambulatory x-ray while in the ED so dislocated second time which was reduced.    Orthopedic surgery was consulted, Dr. Rivera evaluated initially.  I reached out to Dr. Caceres office at 9 AM and discussed with  who stated that their office will call and make a plan.   of Dr nohemi dasilva stated that patient will have a surgery on Wednesday.    Letter Nursing manager called orthopedic physician office around 11ish, I was contacted by Dr Caceres office at 3.00 pm .  I have ordered the device as per recommendation from Dr. Caceres which is pending at this time.    Patient is noted to have special brace      Interval events:  --No acute events overnight, Vital sign has been stable, heart rate 60-81, blood pressure 121/74, saturating 96% on 2 L of oxygen. Patient is alert and oriented and answering questions appropriately.  -- Patient has been frustrated in regards to the plan of care, I reached out to his orthopedic doctor who called me at 3 PM and recommended obtaining prosthesis which was ordered.  After patient will receive prosthesis, will obtain PT/OT.  -- As per PT, without prosthesis they will not be able to evaluate( which in understandable)  -- It is noted by his orthopedic surgery, patient will be having surgery at [facility] on Wednesday, 9/21/2022    I also reached to NAM in regards to different prosthesis; he is working.  I also reached out to the director of the PT and OT once prosthesis will be  delivered on time, patient will likely need PT/OT; she is aware    But unless prostheisis is delivered, patient understand that PT and OT wont bne able to evaluate; and in the circumstances, patient would likely need to stay in the hospital today

## 2022-09-18 NOTE — ED PROVIDER NOTES
"ED Provider Note     Scribed for Kandis Greene D.O. by Carloz Hilton. 9/17/2022, 11:12 PM.     Primary care provider: Vincenzo Mccarthy D.O.  Means of arrival: EMS         History obtained from: Patient  History limited by: None    CHIEF COMPLAINT  Chief Complaint   Patient presents with    Hip Injury    T-5000 GLF       HPI  Amadou Parekh is a 77 y.o. male who presents to the emergency Department via EMS for evaluation of a ground level fall onset prior to arrival. The patient states that earlier tonight while trying to tie his shoe he experienced a ground level fall primarily injuring his left hip. He notes dislocating his left hip on 09/05/2022 and reports that today's pain is similar to that episode. Associated symptoms include left hip pain and reduced ambulatory capacity. He denies any head injury or loss of sensation to the left lower extremity. EMS medicated the patient with Fentanyl 150 mcg and Versed 2 mg with minimal alleviation. His left hip pain is exacerbated by any movement. The patient's surgical history include a hip replacement by Dr. Caceres.     REVIEW OF SYSTEMS  Pertinent positives include ground level fall, left hip pain, and reduced ambulatory capacity. Pertinent negatives include no head strike or loss of sensation to the left lower extremity.   See HPI for further details. All other systems are negative.    PAST MEDICAL HISTORY  Past Medical History:   Diagnosis Date    Actinic keratoses     sees derm - Dr. Everett    Bilateral hearing loss 08/06/2018    wears aids    Chronic right-sided low back pain without sciatica 08/06/2018    s/p 5 surgeries; sees Akbar    ED (erectile dysfunction)     High cholesterol     Hyperlipidemia, mixed     Hypertension     pt states well controlled on meds    Hypothyroidism     Neuropathy (HCC) 8/6/2018    Pain 12/18/2018    \"Nerve Pain-Low back/legs\".    Peyronie disease     sees [place] [facility]       FAMILY HISTORY  Family History   Problem Relation Age of " Onset    Cancer Father         Leukemia     Heart Disease Father     Diabetes Father     Stroke Father     Cancer Mother         breast met to liver    Heart Disease Mother        SOCIAL HISTORY  Social History     Tobacco Use    Smoking status: Never    Smokeless tobacco: Never   Vaping Use    Vaping Use: Never used   Substance Use Topics    Alcohol use: No    Drug use: No      Social History     Substance and Sexual Activity   Drug Use No       SURGICAL HISTORY  Past Surgical History:   Procedure Laterality Date    PA TOTAL HIP ARTHROPLASTY Left 8/15/2022    Procedure: LEFT TOTAL HIP ARTHROPLASTY;  Surgeon: Luis Caceres M.D.;  Location: SURGERY [facility];  Service: Orthopedics    PB IMPLANT NEUROSTIM/  1/3/2022    Procedure: REMOVAL, NEUROSTIMULATOR, PERMANENT, SPINAL CORD;  Surgeon: Be Webber M.D.;  Location: [facility];  Service: Neurosurgery    LUMBAR FUSION O-ARM  1/3/2022    Procedure: FUSION, SPINE, LUMBAR, WITH O-ARM IMAGING GUIDANCE - L1-3 EXTENSION-STAGE 2;  Surgeon: Be Webber M.D.;  Location: [facility];  Service: Neurosurgery    LUMBAR DECOMPRESSION  1/3/2022    Procedure: DECOMPRESSION, SPINE, LUMBAR;  Surgeon: Be Webber M.D.;  Location: [facility];  Service: Neurosurgery    LUMBAR FUSION ANTERIOR N/A 12/31/2021    Procedure: FUSION, SPINE, LUMBAR, ANTERIOR APPROACH - L5-S1;  Surgeon: Be Webber M.D.;  Location: [facility];  Service: Neurosurgery    FUSION, SPINE, LUMBAR, ROBOT-ASSISTED WITH IMAGING GUIDANCE  2/27/2021    Procedure: FUSION, SPINE, LUMBAR, ROBOT-ASSISTED WITH IMAGING GUIDANCE - L5-S1 EXTENSION OF TLIF;  Surgeon: Be Webber M.D.;  Location: [facility];  Service: Neurosurgery    LUMBAR DECOMPRESSION N/A 2/27/2021    Procedure: DECOMPRESSION, SPINE, LUMBAR;  Surgeon: Be Webber M.D.;  Location: [facility];  Service: Neurosurgery    PB IMPLANT NEUROSTIM/  5/22/2020    Procedure: INSERTION,  "NEUROSTIMULATOR, PERMANENT, SPINAL CORD;  Surgeon: Eugenio Camara M.D.;  Location: SURGERY [facility];  Service: Pain Management    PEYRONIES PLAQUE  12/6/2019    Procedure: EXCISION, PEYRONIE'S PLAQUE, PENIS WITH GRAFTING, JEAN CLAUDE PLICATION, ARTIFICIAL ERECTION;  Surgeon: Tejinder Culver M.D.;  Location: SURGERY [facility];  Service: Urology    OR NERVOUS SYSTEM SURGERY UNLISTED Left 6/3/2019    Procedure: EXPLORATION, NERVE- PERONEAL NERVE RELEASE AT THE FIBULAR HEAD  ;  Surgeon: Raz Garcia M.D.;  Location: SURGERY [facility];  Service: Neurosurgery    FUSION, PIP JOINT, TOE Right 2/22/2019    Procedure: PIP ARTHRODESIS;  Surgeon: Amadou Bowden M.D.;  Location: SURGERY SAME DAY [facility];  Service: Orthopedics    FINGER EXPLORATION Right 2/22/2019    Procedure: FINGER EXPLORATION - RING FINGER DISTAL INTERPHALANGEAL JOINT;  Surgeon: Amadou Bowden M.D.;  Location: SURGERY SAME DAY [facility];  Service: Orthopedics    COLONOSCOPY  2019    OTHER NEUROLOGICAL SURG  12/12/2018    \"Low Back Surgery'sx6 between 2006 & 2017\".    CYST EXCISION Right 10/12/2018    Procedure: CYST EXCISION - RING FINGER MUCOUS CYST, DISTAL INTERPHALANGEAL JOINT;  Surgeon: Amadou Bowden M.D.;  Location: SURGERY SAME DAY [facility];  Service: Orthopedics    LUMBAR LAMINECTOMY DISKECTOMY Left 12/6/2017    Procedure: LUMBAR LAMINECTOMY DISKECTOMY - POSTERIOR REDO LEFT  L4-S1 KAILA;  Surgeon: Be Webber M.D.;  Location: SURGERY [facility];  Service: Neurosurgery    FUSION, SPINE, LUMBAR, PLIF  10/5/2017    Procedure: POSTERIOR TRANSFORAMINAL INTERBODY FUSION AT LUMBAR 4 - 5;  Surgeon: Be Webber M.D.;  Location: SURGERY [facility];  Service: Neurosurgery    LUMBAR DECOMPRESSION  10/5/2017    Procedure: POSTERIOR LUMBAR 3-4,  4-5 DECOMPRESSION AND FUSION;  Surgeon: Be Webber M.D.;  Location: SURGERY [facility];  Service: Neurosurgery    OR INJ DX/THER AGNT PARAVERT FACET " JOINT, BRITT* Right 10/6/2016    Procedure: INJ PARA FACET L/S 1 LVL W/IG - L3-4, L4-5, L5-S1;  Surgeon: Eugenio Camara M.D.;  Location: [facility];  Service: Pain Management    KS INJ DX/THER AGNT PARAVERT FACET JOINT, BRITT*  10/6/2016    Procedure: INJ PARA FACET L/S 2D LVL W/IG;  Surgeon: Eugenio Camara M.D.;  Location: [facility];  Service: Pain Management    KS INJ DX/THER AGNT PARAVERT FACET JOINT, BRITT*  10/6/2016    Procedure: INJ PARA FACET L/S 3D LVL W/IG;  Surgeon: Eugenio Camara M.D.;  Location: [facility];  Service: Pain Management    KS NJX AA&/STRD TFRML EPI LUMBAR/SACRAL 1 LEVEL Right 8/18/2016    Procedure: INJ-FORAMEN EPI LUM/SAC SNGL - L5-S1;  Surgeon: Eugenio Camara M.D.;  Location: [facility];  Service: Pain Management    LUMBAR LAMINECTOMY DISKECTOMY Right 3/12/2016    Procedure: LUMBAR HemiLAMINECTOMY Micro DISKECTOMY posterior Redo L3-4 ;  Surgeon: Be Webber M.D.;  Location: [facility];  Service:     FORAMINOTOMY Right 3/12/2016    Procedure: FORAMINOTOMY;  Surgeon: Be Webber M.D.;  Location: [facility];  Service:     CERVICAL LAMINECTOMY POSTERIOR  2011    DENTAL SURGERY Bilateral as a teenager    wisdom teeth    OTHER      nasal surgery 2015    OTHER NEUROLOGICAL SURG  2006, 2010, 2012, 2014, 2016, 2017    low back surgery x 5    TONSILLECTOMY      child       CURRENT MEDICATIONS  No current facility-administered medications for this encounter.    Current Outpatient Medications:     aspirin (ASA) 325 MG Tab, Take 325 mg by mouth every day., Disp: , Rfl:     hydroCHLOROthiazide (HYDRODIURIL) 12.5 MG tablet, TAKE ONE TABLET BY MOUTH ONCE DAILY, Disp: 30 Tablet, Rfl: 0    atorvastatin (LIPITOR) 10 MG Tab, Take 1 Tablet by mouth every day., Disp: 30 Tablet, Rfl: 3    gabapentin (NEURONTIN) 600 MG tablet, Take 600 mg by mouth every 3 hours., Disp: , Rfl:     tadalafil (CIALIS) 5 MG tablet, Take 5 mg by  mouth every day. Taking for prostate vs ED per pt, Disp: , Rfl:     SYNTHROID 137 MCG Tab, Take 1 Tablet by mouth every morning on an empty stomach., Disp: 90 Tablet, Rfl: 3    Multiple Vitamin (MULTI-VITAMIN) Tab, Take 1 Tablet by mouth every day., Disp: , Rfl:     Cholecalciferol (VITAMIN D-1000 MAX ST) 1000 UNIT Tab, Take 2,000 Units by mouth every day., Disp: , Rfl:     b complex vitamins tablet, Take 1 Tab by mouth every day., Disp: , Rfl:     ALLERGIES  No Known Allergies    PHYSICAL EXAM  VITAL SIGNS: BP (!) 169/85   Pulse (!) 112   Temp 36.8 °C (98.2 °F)   Resp 20   Ht 1.829 m (6')   Wt 86.2 kg (190 lb)   SpO2 97%   BMI 25.77 kg/m²     Constitutional: Patient is well developed, well nourished.  Severe distress secondary to his pain.  HENT: Normocephalic, Nose normal with no mucosal edema or drainage. Oropharynx moist without erythema or exudates.  Eyes: PERRL, EOMI, Conjunctiva without erythema   Neck: Supple   Lymphatic: No lymphadenopathy noted.   Cardiovascular: Normal heart rate and Regular rhythm. No murmur  Thorax & Lungs: Clear and equal breath sounds with good excursion. No respiratory distress, no rhonchi, wheezing or rales. No chest tenderness, or signs of trauma .  Abdomen: Bowel sounds normal in all four quadrants. Soft,nontender, no rebound , guarding, palpable masses.   Skin: Warm, Dry, flakey.  Back: No cervical, thoracic, or lumbosacral tenderness. No CVA tenderness.   Extremities: Peripheral pulses 4/4,Left hip with shortening of the left leg, limited range of motion of the left lower extremity secondary to dislocation. Left lower extremity is neurovascularly intact.  Muskuloskeletal: Normal range of motion in all major joints. No tenderness to palpation or major deformities noted.   Neurologic: Alert & oriented x 3, Normal motor function, Normal sensory function  Psychiatric: Affect irritated, Judgment normal, Agitated and angry.       DIAGNOSTICS/PROCEDURES    LABS  Results for  orders placed or performed during the hospital encounter of 09/17/22   CBC WITH DIFFERENTIAL   Result Value Ref Range    WBC 7.7 4.8 - 10.8 K/uL    RBC 4.17 (L) 4.70 - 6.10 M/uL    Hemoglobin 11.7 (L) 14.0 - 18.0 g/dL    Hematocrit 35.2 (L) 42.0 - 52.0 %    MCV 84.4 81.4 - 97.8 fL    MCH 28.1 27.0 - 33.0 pg    MCHC 33.2 (L) 33.7 - 35.3 g/dL    RDW 41.8 35.9 - 50.0 fL    Platelet Count 229 164 - 446 K/uL    MPV 10.2 9.0 - 12.9 fL    Neutrophils-Polys 81.30 (H) 44.00 - 72.00 %    Lymphocytes 9.30 (L) 22.00 - 41.00 %    Monocytes 8.10 0.00 - 13.40 %    Eosinophils 0.40 0.00 - 6.90 %    Basophils 0.40 0.00 - 1.80 %    Immature Granulocytes 0.50 0.00 - 0.90 %    Nucleated RBC 0.00 /100 WBC    Neutrophils (Absolute) 6.23 1.82 - 7.42 K/uL    Lymphs (Absolute) 0.71 (L) 1.00 - 4.80 K/uL    Monos (Absolute) 0.62 0.00 - 0.85 K/uL    Eos (Absolute) 0.03 0.00 - 0.51 K/uL    Baso (Absolute) 0.03 0.00 - 0.12 K/uL    Immature Granulocytes (abs) 0.04 0.00 - 0.11 K/uL    NRBC (Absolute) 0.00 K/uL   COMP METABOLIC PANEL   Result Value Ref Range    Sodium 141 135 - 145 mmol/L    Potassium 2.9 (L) 3.6 - 5.5 mmol/L    Chloride 111 96 - 112 mmol/L    Co2 22 20 - 33 mmol/L    Anion Gap 8.0 7.0 - 16.0    Glucose 101 (H) 65 - 99 mg/dL    Bun 23 (H) 8 - 22 mg/dL    Creatinine 1.07 0.50 - 1.40 mg/dL    Calcium 7.0 (L) 8.5 - 10.5 mg/dL    AST(SGOT) 16 12 - 45 U/L    ALT(SGPT) 12 2 - 50 U/L    Alkaline Phosphatase 79 30 - 99 U/L    Total Bilirubin 0.2 0.1 - 1.5 mg/dL    Albumin 2.9 (L) 3.2 - 4.9 g/dL    Total Protein 4.7 (L) 6.0 - 8.2 g/dL    Globulin 1.8 (L) 1.9 - 3.5 g/dL    A-G Ratio 1.6 g/dL   ESTIMATED GFR   Result Value Ref Range    GFR (CKD-EPI) 71 >60 mL/min/1.73 m 2       Labs reviewed by me    RADIOLOGY/PROCEDURES  DX-HIP-UNILATERAL-WITHOUT PELVIS-1 VIEW LEFT   Final Result      Successful relocation of dislocated left hip arthroplasty      DX-HIP-UNILATERAL-WITHOUT PELVIS-1 VIEW LEFT   Final Result      Unsuccessful relocation of left  hip arthroplasty dislocation      DX-HIP-UNILATERAL-WITH PELVIS-1 VIEW LEFT   Final Result      Recurrent dislocation of left hip arthroplasty      Moderate to severe right hip arthritis          Results and radiologist interpretation reviewed by me.     Conscious Sedation Procedure Note #1    Indication: hip dislocation    Consent: I have discussed with the patient and/or the patient representative the indication, alternatives, and the possible risks and/or complications of the planned procedure and the anesthesia methods. The patient and/or patient representative appear to understand and agree to proceed.    Physician Involvement: The attending physician was present and supervising this procedure.    Pre-Sedation Documentation and Exam: I have personally completed a history, physical exam & review of systems for this patient (see notes).  Airway Assessment: normal  f3  Prior History of Anesthesia Complications: none    ASA Classification: Class 2 - A normal healthy patient with mild systemic disease    Sedation/ Anesthesia Plan: intravenous sedation    Medications Used: propofol 150 mg intravenously    Monitoring and Safety: The patient was placed on a cardiac monitor and vital signs, pulse oximetry and level of consciousness were continuously evaluated throughout the procedure. The patient was closely monitored until recovery from the medications was complete and the patient had returned to baseline status. Respiratory therapy was on standby at all times during the procedure.      Post-Sedation Vital Signs: Vital signs were reviewed and were stable after the procedure (see flow sheet for vitals)            Intraservice Time: Greater than 10 minutes    Post-Sedation Exam: Lungs: clear to auscultation bilaterally and Cardiovascular: regular rate and rhythm           Complications: none    I provided both the sedation and procedure, a nurse was present at the bedside for the entire procedure.       Joint Reduction  Procedure Note #1    Indication: Joint dislocation    Consent: The patient was counseled regarding the procedure, it's indications, risks, potential complications and alternatives and any questions were answered. Consent was obtained.    Procedure: The pre-reduction exam showed distal perfusion & neurologic function to be normal. The patient was placed in the appropriate position. Anesthesia/pain control was obtained using conscious sedation -SEE CONSCIOUS SEDATION NOTE #1FOR DETAILS. Reduction of the left hip was performed by direct traction. Post reduction films were obtained and revealed continued dislocation and so Orthopedics was consulted. A post-reduction exam revealed distal perfusion & neurologic function to be normal.    The patient tolerated the procedure well.    Complications: None    Conscious Sedation Procedure Note #2    Indication: hip dislocation    Consent: I have discussed with the patient and/or the patient representative the indication, alternatives, and the possible risks and/or complications of the planned procedure and the anesthesia methods. The patient and/or patient representative appear to understand and agree to proceed.    Physician Involvement: The attending physician was present and supervising this procedure.    Pre-Sedation Documentation and Exam: I have personally completed a history, physical exam & review of systems for this patient (see notes).  Airway Assessment: normal  f3  Prior History of Anesthesia Complications: none    ASA Classification: Class 2 - A normal healthy patient with mild systemic disease    Sedation/ Anesthesia Plan: intravenous sedation    Medications Used: propofol 100 mg intravenously    Monitoring and Safety: The patient was placed on a cardiac monitor and vital signs, pulse oximetry and level of consciousness were continuously evaluated throughout the procedure. The patient was closely monitored until recovery from the medications was complete and the  patient had returned to baseline status. Respiratory therapy was on standby at all times during the procedure.      (The following sections must be completed)  Post-Sedation Vital Signs: Vital signs were reviewed and were stable after the procedure (see flow sheet for vitals)            Intraservice Time: Greater than 10 minutes    Post-Sedation Exam: Lungs: clear to auscultation bilaterally           Complications: none    I provided both the sedation and procedure, a nurse was present at the bedside for the entire procedure.     Joint Reduction Procedure Note #2    Indication: Joint dislocation    Consent: The patient was counseled regarding the procedure, it's indications, risks, potential complications and alternatives and any questions were answered. Consent was obtained.    Procedure: The pre-reduction exam showed distal perfusion & neurologic function to be normal. The patient was placed in the appropriate position. Anesthesia/pain control was obtained using conscious sedation -SEE CONSCIOUS SEDATION NOTE #2 FOR DETAILS. Reduction of the left hip was performed by traction and counter traction. Post reduction films were obtained and revealed satisfactory reduction. A post-reduction exam revealed distal perfusion & neurologic function to be normal. The affected area was immobilized with an adductor pillow.    The patient tolerated the procedure well.    Complications: None      COURSE & MEDICAL DECISION MAKING  Pertinent Labs & Imaging studies reviewed. (See chart for details)    11:12 PM - Patient seen and evaluated at bedside. Ordered for CMP, CBC with diff, DX-Hip-Unilateral-With Pelvis-1 View Left to evaluate. Patient will be treated with Dilaudid 1 mg for his symptoms. Differential diagnoses include, but are not limited to, dislocation vs fracture. Discussed utilizing labs and imaging to further evaluate the patient, he is amenable to the plan of care.     11:52 PM - Patient was reevaluated at bedside.  Explained radiology results with the patient and informed them that the left hip prosthesis is dislocated. Discussed performing a joint reduction with conscious sedation, he consents to the procedures.      11:56 PM - Conscious sedation and joint reduction procedures performed by me as assisted by Dr. Garcia, as detailed above.    12:04 AM - Ordered DX-Hip-Unilateral-Without Pelvis 1-View to further evaluate the patient.  Patient hip was reduced successfully after large pop and medial malleoli were equal bilaterally.  The leg was straight and neurovascular is intact.  When x-ray came to roll the patient for the post reduction film his hip immediately dislocated again became shortened and medially rotated.  X-ray was performed and was dislocated again.  Because the patient has sets laxity in his hip I felt he would need to be admitted into the hospital and evaluated by Ortho.    12:16 AM - Paged Orthopedics    12:42 AM - Paged Orthopedics again    1:03 AM - Nursing staff informed me that the patient is requesting more medication for his hip pain. Ordered Dilaudid 1 mg to treat the patient    1:13 AM - Paged Orthopedics again    1:20 AM - Paged Orthopedics again. The unit clerk informed me that Dr. Caceres is out of town and will not take my call, they advised to continue attempting to contact Dr. Rivera.     1:23 AM - I discussed the patient's case and the above findings with Dr. Rivera (Orthopedics) who relayed to reduce the joint again, then admit the patient to the hospitalist. She will evaluate the patient in the morning.     1:46 AM - Ordered 0.9 NaCl with Kcl 40 mEq 1000 mL to treat the patient for his hypokalemia.    1:51 AM - Patient was reevaluated at bedside. Explained my consult with Dr. Rivera and the repeat joint reduction procedure.     2:07 AM - Ordered DX-Hip-Unilateral-Without Pelvis 1-View to further evaluate the patient.    2:10 AM - Second conscious sedation and joint reduction  procedures performed by me as assisted by Dr. Olmos, as detailed above.  This time we placed the x-ray board below the patient just right after closed reduction and we also placed in adductor Pillow between his legs to prevent further dislocation.  Patient tolerated the conscious sedation and procedure well and is much more stable.    2:25 AM - Paged Hospitalist    2:28 AM I discussed the patient's case and the above findings with Dr. Bowers (Hospitalist) who will assess the patient for hospitalization.         DISPOSITION:  Patient will be hospitalized by Dr. Bowers in guarded condition.    FINAL IMPRESSION  1. Recurrent dislocation of hip joint prosthesis, initial encounter ([facility])    2.    Conscious sedation procedures performed by me, as detailed above x 2.  3.    Joint reduction procedures performed by me, as detailed above     Carloz DAI (Scribe), am scribing for, and in the presence of, Kandis Greene D.O..    Electronically signed by: Carloz Hilton (Scribe), 9/17/2022    IKandis D.O. personally performed the services described in this documentation, as scribed by Carloz Hilton in my presence, and it is both accurate and complete.    The note accurately reflects work and decisions made by me.  Kandis Greene D.O.  9/18/2022  5:46 AM

## 2022-09-18 NOTE — ED NOTES
ERPx2, xray, resp and this RN bedside for 2nd hip reduction. Tate placed post procedure per ERP orders.

## 2022-09-19 LAB
ANION GAP SERPL CALC-SCNC: 8 MMOL/L (ref 7–16)
BUN SERPL-MCNC: 14 MG/DL (ref 8–22)
CALCIUM SERPL-MCNC: 8.4 MG/DL (ref 8.5–10.5)
CHLORIDE SERPL-SCNC: 106 MMOL/L (ref 96–112)
CO2 SERPL-SCNC: 24 MMOL/L (ref 20–33)
CREAT SERPL-MCNC: 1.08 MG/DL (ref 0.5–1.4)
GFR SERPLBLD CREATININE-BSD FMLA CKD-EPI: 71 ML/MIN/1.73 M 2
GLUCOSE SERPL-MCNC: 100 MG/DL (ref 65–99)
POTASSIUM SERPL-SCNC: 5.4 MMOL/L (ref 3.6–5.5)
SODIUM SERPL-SCNC: 138 MMOL/L (ref 135–145)

## 2022-09-19 PROCEDURE — L1686 HO POST-OP HIP ABDUCTION: HCPCS

## 2022-09-19 PROCEDURE — A9270 NON-COVERED ITEM OR SERVICE: HCPCS | Performed by: STUDENT IN AN ORGANIZED HEALTH CARE EDUCATION/TRAINING PROGRAM

## 2022-09-19 PROCEDURE — 700102 HCHG RX REV CODE 250 W/ 637 OVERRIDE(OP): Performed by: STUDENT IN AN ORGANIZED HEALTH CARE EDUCATION/TRAINING PROGRAM

## 2022-09-19 PROCEDURE — A9270 NON-COVERED ITEM OR SERVICE: HCPCS | Performed by: HOSPITALIST

## 2022-09-19 PROCEDURE — 99233 SBSQ HOSP IP/OBS HIGH 50: CPT | Performed by: HOSPITALIST

## 2022-09-19 PROCEDURE — 770001 HCHG ROOM/CARE - MED/SURG/GYN PRIV*

## 2022-09-19 PROCEDURE — 700102 HCHG RX REV CODE 250 W/ 637 OVERRIDE(OP): Performed by: HOSPITALIST

## 2022-09-19 PROCEDURE — 306637 HCHG MISC ORTHO ITEM RC 0274

## 2022-09-19 PROCEDURE — 80048 BASIC METABOLIC PNL TOTAL CA: CPT

## 2022-09-19 RX ORDER — GABAPENTIN 300 MG/1
600 CAPSULE ORAL
Status: DISCONTINUED | OUTPATIENT
Start: 2022-09-19 | End: 2022-09-20 | Stop reason: HOSPADM

## 2022-09-19 RX ORDER — AMOXICILLIN 250 MG
2 CAPSULE ORAL 2 TIMES DAILY
Qty: 30 TABLET | Refills: 0 | Status: SHIPPED | OUTPATIENT
Start: 2022-09-19 | End: 2022-11-11

## 2022-09-19 RX ORDER — FAMOTIDINE 20 MG/1
20 TABLET, FILM COATED ORAL 2 TIMES DAILY
Qty: 60 TABLET | Refills: 0 | Status: SHIPPED | OUTPATIENT
Start: 2022-09-19 | End: 2022-11-11

## 2022-09-19 RX ORDER — POLYETHYLENE GLYCOL 3350 17 G/17G
17 POWDER, FOR SOLUTION ORAL
Qty: 15 EACH | Refills: 3 | Status: SHIPPED
Start: 2022-09-19 | End: 2022-11-11

## 2022-09-19 RX ORDER — ENOXAPARIN SODIUM 100 MG/ML
40 INJECTION SUBCUTANEOUS DAILY
Status: DISCONTINUED | OUTPATIENT
Start: 2022-09-19 | End: 2022-09-20 | Stop reason: HOSPADM

## 2022-09-19 RX ORDER — OXYCODONE HYDROCHLORIDE 5 MG/1
5 TABLET ORAL EVERY 8 HOURS PRN
Qty: 9 TABLET | Refills: 0 | Status: SHIPPED | OUTPATIENT
Start: 2022-09-19 | End: 2022-09-22

## 2022-09-19 RX ORDER — FAMOTIDINE 20 MG/1
20 TABLET, FILM COATED ORAL 2 TIMES DAILY
Status: DISCONTINUED | OUTPATIENT
Start: 2022-09-19 | End: 2022-09-20 | Stop reason: HOSPADM

## 2022-09-19 RX ADMIN — GABAPENTIN 600 MG: 300 CAPSULE ORAL at 17:34

## 2022-09-19 RX ADMIN — SENNOSIDES AND DOCUSATE SODIUM 2 TABLET: 50; 8.6 TABLET ORAL at 04:11

## 2022-09-19 RX ADMIN — GABAPENTIN 600 MG: 300 CAPSULE ORAL at 21:00

## 2022-09-19 RX ADMIN — GABAPENTIN 600 MG: 300 CAPSULE ORAL at 13:04

## 2022-09-19 RX ADMIN — LEVOTHYROXINE SODIUM 137 MCG: 137 TABLET ORAL at 04:10

## 2022-09-19 RX ADMIN — ATORVASTATIN CALCIUM 10 MG: 10 TABLET, FILM COATED ORAL at 04:11

## 2022-09-19 RX ADMIN — GABAPENTIN 600 MG: 300 CAPSULE ORAL at 04:10

## 2022-09-19 ASSESSMENT — ENCOUNTER SYMPTOMS
SORE THROAT: 0
FEVER: 0
HEARTBURN: 0
HEMOPTYSIS: 0
SPUTUM PRODUCTION: 0
ORTHOPNEA: 0
BLURRED VISION: 0
NAUSEA: 0
PALPITATIONS: 0
VOMITING: 0
DOUBLE VISION: 0
ABDOMINAL PAIN: 0

## 2022-09-19 ASSESSMENT — LIFESTYLE VARIABLES
HEADACHE, FULLNESS IN HEAD: NOT PRESENT
AGITATION: NORMAL ACTIVITY
VISUAL DISTURBANCES: NOT PRESENT
ORIENTATION AND CLOUDING OF SENSORIUM: ORIENTED AND CAN DO SERIAL ADDITIONS
TREMOR: NO TREMOR
ANXIETY: NO ANXIETY (AT EASE)
AUDITORY DISTURBANCES: NOT PRESENT
NAUSEA AND VOMITING: NO NAUSEA AND NO VOMITING
TOTAL SCORE: 0
PAROXYSMAL SWEATS: NO SWEAT VISIBLE

## 2022-09-19 ASSESSMENT — PAIN DESCRIPTION - PAIN TYPE: TYPE: ACUTE PAIN

## 2022-09-19 NOTE — THERAPY
Missed Therapy     Patient Name: Amadou Parekh  Age:  77 y.o., Sex:  male  Medical Record #: [id number]  Today's Date: 9/19/2022     Continue to hold OT eval. Pt awaiting surgical intervention, likely today per chart. Will hold and will re-attempt as appropriate/able.

## 2022-09-19 NOTE — THERAPY
09/19/22 1206   Interdisciplinary Plan of Care Collaboration   Collaboration Comments Hold PT letty, pt had been on bedrest this AM (since lifted) and awaiting POC from Dr Caceres.

## 2022-09-19 NOTE — CARE PLAN
The patient is Stable - Low risk of patient condition declining or worsening    Shift Goals  Clinical Goals: pain control and Comfort  Patient Goals: Rest  Family Goals: Discharge    Progress made toward(s) clinical / shift goals:    Problem: Pain - Standard  Goal: Alleviation of pain or a reduction in pain to the patient’s comfort goal  Outcome: Progressing     Problem: Knowledge Deficit - Standard  Goal: Patient and family/care givers will demonstrate understanding of plan of care, disease process/condition, diagnostic tests and medications  Outcome: Progressing     Problem: Skin Integrity  Goal: Skin integrity is maintained or improved  Outcome: Progressing       Patient is not progressing towards the following goals:

## 2022-09-19 NOTE — PROGRESS NOTES
Subjective  Prosthetic dislocation left hip  The patient has been admitted and not done physical therapy.  We have communicated with him yesterday over the phone and given him the suggestions in terms of management    Objective  Alert oriented and cognizant.  HEENT exam is atraumatic normocephalic extraocular motions are intact neck is supple    Lower extremity exam shows normal length and normal position of the leg.  There is no evidence of prosthetic dislocation    Assessment prosthetic hip dislocation with radiographic evidence of relocation    Plan at present I have recommended with the patient that he be up and mobile.  I have discussed with him and recommended yesterday and today that he consider revision arthroplasty in the subacute setting.  In this setting we would recommend discharge from the hospital for mobilization concerns and deep vein thrombosis concerns as well as colonization issues    The patient will proceed accordingly.  He has been given recommendations to continue aspirin and sequential stockings.    Due to the concerns regarding physical therapy and the hospitalist the recommendation has been undertaken to utilize a hip abduction orthosis.  The patient was comfortable with his knee brace but at present is concerned giving the recommendations around him.    The hospitalist has ordered physical therapy and the brace appropriately.  I have discussed the patient's care with his nurse and recommended plans accordingly.      The patient and hospitalist have been visited with and communicated with.  Disposition the patient will undergo brace fitting and discharged to home.  He will be weightbearing as tolerated.  He is cleared for out of bed mobilization.  He will utilize either his hip active abduction orthosis or his knee brace when he mobilizes out of bed and has been given instructions in terms of position and dislocation precautions.  He is clear in this regard and has had all questions  answered.  He is in communication with my office for revision arthroplasty in the near future

## 2022-09-19 NOTE — PROGRESS NOTES
Order for hip orthosis has been submitted to [facility]. If any questions you can contact [facility] at ext # [phone].

## 2022-09-19 NOTE — CARE PLAN
Problem: Pain - Standard  Goal: Alleviation of pain or a reduction in pain to the patient’s comfort goal  Outcome: Progressing  Flowsheets (Taken 9/18/2022 1500)  Pain Rating Scale (NPRS): 8     Problem: Knowledge Deficit - Standard  Goal: Patient and family/care givers will demonstrate understanding of plan of care, disease process/condition, diagnostic tests and medications  Outcome: Progressing     Problem: Skin Integrity  Goal: Skin integrity is maintained or improved  Outcome: Progressing   The patient is Stable - Low risk of patient condition declining or worsening    Shift Goals  Clinical Goals: Pain control and safety  Patient Goals: Discharge  Family Goals: Discharge    Progress made toward(s) clinical / shift goals:  YES      Patient is not progressing towards the following goals:

## 2022-09-20 ENCOUNTER — PRE-ADMISSION TESTING (OUTPATIENT)
Dept: ADMISSIONS | Facility: MEDICAL CENTER | Age: 77
End: 2022-09-20
Attending: ORTHOPAEDIC SURGERY
Payer: COMMERCIAL

## 2022-09-20 VITALS
SYSTOLIC BLOOD PRESSURE: 153 MMHG | WEIGHT: 200.84 LBS | BODY MASS INDEX: 27.2 KG/M2 | OXYGEN SATURATION: 92 % | HEIGHT: 72 IN | DIASTOLIC BLOOD PRESSURE: 79 MMHG | HEART RATE: 72 BPM | RESPIRATION RATE: 16 BRPM | TEMPERATURE: 98 F

## 2022-09-20 PROCEDURE — A9270 NON-COVERED ITEM OR SERVICE: HCPCS | Performed by: STUDENT IN AN ORGANIZED HEALTH CARE EDUCATION/TRAINING PROGRAM

## 2022-09-20 PROCEDURE — 97162 PT EVAL MOD COMPLEX 30 MIN: CPT

## 2022-09-20 PROCEDURE — 97535 SELF CARE MNGMENT TRAINING: CPT

## 2022-09-20 PROCEDURE — A9270 NON-COVERED ITEM OR SERVICE: HCPCS | Performed by: HOSPITALIST

## 2022-09-20 PROCEDURE — 700102 HCHG RX REV CODE 250 W/ 637 OVERRIDE(OP): Performed by: STUDENT IN AN ORGANIZED HEALTH CARE EDUCATION/TRAINING PROGRAM

## 2022-09-20 PROCEDURE — 700102 HCHG RX REV CODE 250 W/ 637 OVERRIDE(OP): Performed by: HOSPITALIST

## 2022-09-20 PROCEDURE — 97165 OT EVAL LOW COMPLEX 30 MIN: CPT

## 2022-09-20 RX ADMIN — ATORVASTATIN CALCIUM 10 MG: 10 TABLET, FILM COATED ORAL at 04:53

## 2022-09-20 RX ADMIN — GABAPENTIN 600 MG: 300 CAPSULE ORAL at 04:52

## 2022-09-20 RX ADMIN — FAMOTIDINE 20 MG: 20 TABLET, FILM COATED ORAL at 04:53

## 2022-09-20 RX ADMIN — ASPIRIN 325 MG: 325 TABLET ORAL at 04:53

## 2022-09-20 RX ADMIN — HYDROCHLOROTHIAZIDE 12.5 MG: 12.5 TABLET ORAL at 04:52

## 2022-09-20 RX ADMIN — LEVOTHYROXINE SODIUM 137 MCG: 137 TABLET ORAL at 04:52

## 2022-09-20 ASSESSMENT — COGNITIVE AND FUNCTIONAL STATUS - GENERAL
WALKING IN HOSPITAL ROOM: A LITTLE
CLIMB 3 TO 5 STEPS WITH RAILING: A LITTLE
DAILY ACTIVITIY SCORE: 20
WALKING IN HOSPITAL ROOM: A LITTLE
MOBILITY SCORE: 18
DAILY ACTIVITIY SCORE: 22
MOBILITY SCORE: 19
CLIMB 3 TO 5 STEPS WITH RAILING: A LITTLE
STANDING UP FROM CHAIR USING ARMS: A LITTLE
TURNING FROM BACK TO SIDE WHILE IN FLAT BAD: A LITTLE
MOVING FROM LYING ON BACK TO SITTING ON SIDE OF FLAT BED: A LITTLE
TOILETING: A LITTLE
MOVING FROM LYING ON BACK TO SITTING ON SIDE OF FLAT BED: A LITTLE
DRESSING REGULAR LOWER BODY CLOTHING: A LITTLE
STANDING UP FROM CHAIR USING ARMS: A LITTLE
DRESSING REGULAR LOWER BODY CLOTHING: A LOT
SUGGESTED CMS G CODE MODIFIER MOBILITY: CK
SUGGESTED CMS G CODE MODIFIER DAILY ACTIVITY: CJ
HELP NEEDED FOR BATHING: A LITTLE
MOVING TO AND FROM BED TO CHAIR: A LITTLE
MOVING TO AND FROM BED TO CHAIR: A LITTLE
SUGGESTED CMS G CODE MODIFIER MOBILITY: CK
HELP NEEDED FOR BATHING: A LITTLE
SUGGESTED CMS G CODE MODIFIER DAILY ACTIVITY: CJ

## 2022-09-20 ASSESSMENT — ACTIVITIES OF DAILY LIVING (ADL): TOILETING: INDEPENDENT

## 2022-09-20 ASSESSMENT — GAIT ASSESSMENTS
DEVIATION: BRADYKINETIC
GAIT LEVEL OF ASSIST: SUPERVISED
DISTANCE (FEET): 200
ASSISTIVE DEVICE: FRONT WHEEL WALKER

## 2022-09-20 ASSESSMENT — PAIN DESCRIPTION - PAIN TYPE: TYPE: ACUTE PAIN

## 2022-09-20 NOTE — THERAPY
Occupational Therapy   Initial Evaluation     Patient Name: Amadou Parekh  Age:  77 y.o., Sex:  male  Medical Record #: [id number]  Today's Date: 9/20/2022     Precautions  Precautions: Fall Risk, Other (See Comments), Weight Bearing As Tolerated Left Lower Extremity  Comments: assume posterior hip precautions d/t dislocation x3; hip abduction orthosis on AAT    Assessment  Patient is 77 y.o. male admitted for L hip dislocation bending forward, reduced on admittance, but then re-dislocated in ED, again reduced. Plan for revision at [facility], 9/21 PMx of s/p L ZECHARIAH 8/15 and prior dislocation 9/5 (reduced and sent home), as well as HTN and neuropathy. Completed ADLs/txfs with SBA-SPV and functional ambulation w/FWW and SPV. Reports lives with wife who can assist PRN. Patient will not be actively followed for occupational therapy services at this time, however may be seen if requested by physician for 1 more visit within 30 days to address any discharge or equipment needs.     Plan    Recommend Occupational Therapy  d/c needs only      DC Equipment Recommendations: Tub / Shower Seat (if doesnt have one)  Discharge Recommendations: Anticipate that the patient will have no further occupational therapy needs after discharge from the hospital (as long as wife can assist PRN)      Objective     09/20/22 5748   Prior Living Situation   Prior Services Home-Independent   Housing / Facility 2 Story House   Steps Into Home   (can stay on first floor)   Equipment Owned Front-Wheel Walker   Lives with - Patient's Self Care Capacity Spouse   Comments Reports lives with wife who can assist PRN. Pt scheduled for ZECHARIAH revision tomorrow.   Prior Level of ADL Function   Self Feeding Independent   Grooming / Hygiene Independent   Bathing Independent   Dressing Independent   Toileting Independent   Prior Level of IADL Function   Medication Management Independent   Laundry Independent   Kitchen Mobility Independent   Finances Independent    Home Management Independent   Shopping Independent   Prior Level Of Mobility Independent Without Device in Community;Independent Without Device in Home   Driving / Transportation Driving Independent   Occupation (Pre-Hospital Vocational)   (Retired )   Precautions   Precautions Fall Risk;Other (See Comments);Weight Bearing As Tolerated Left Lower Extremity   Comments assume posterior hip precautions d/t dislocation x3; hip abduction orthosis on AAT   Vitals   O2 Delivery Device Room air w/o2 available   Vitals Comments desaturation to 87% initially, but recovered to >90% SpO2 after pursed lip breathing   Pain 0 - 10 Group   Location Hip   Location Orientation Left   Therapist Pain Assessment Post Activity;During Activity;Nurse Notified  (not quantified)   Cognition    Cognition / Consciousness WDL   Level of Consciousness Alert   Comments pleasant and cooperative; receptive to education   Passive ROM Upper Body   Passive ROM Upper Body WDL   Active ROM Upper Body   Active ROM Upper Body  WDL   Strength Upper Body   Upper Body Strength  WDL   Balance Assessment   Sitting Balance (Static) Fair +   Sitting Balance (Dynamic) Fair   Standing Balance (Static) Fair   Standing Balance (Dynamic) Fair   Weight Shift Sitting Good   Weight Shift Standing Fair   Comments w/FWW   Bed Mobility    Supine to Sit Supervised   Sit to Supine   (left up in chair)   Scooting Supervised   Comments HOB highly elevated   ADL Assessment   Eating Supervision   Grooming   (declined need)   Upper Body Dressing Supervision   Lower Body Dressing Moderate Assist  (L shoe; reports wife can completed)   Toileting   (declined need)   Comments Edcuated on adaptive techniques for ADL/txfs.   Functional Mobility   Sit to Stand Supervised   Bed, Chair, Wheelchair Transfer Supervised   Toilet Transfers Standby Assist  (heavy use of GBs)   Transfer Method Stand Step   Mobility w/FWW room and hallway   Edema / Skin Assessment   Edema / Skin  Not  Assessed   Activity Tolerance   Comments limited by fatigue and pain   Education Group   Education Provided Hip Precautions;Activities of Daily Living;Role of Occupational Therapist;Transfers   Role of Occupational Therapist Patient Response Patient;Acceptance;Explanation;Verbal Demonstration;Reinforcement Needed   Hip Precautions Patient Response Patient;Acceptance;Explanation;Action Demonstration;Reinforcement Needed   Transfers Patient Response Patient;Acceptance;Explanation;Action Demonstration;Reinforcement Needed   ADL Patient Response Patient;Acceptance;Explanation;Reinforcement Needed;Action Demonstration

## 2022-09-20 NOTE — CARE PLAN
The patient is Stable - Low risk of patient condition declining or worsening    Shift Goals  Clinical Goals: Pain control, Safety  Patient Goals: Pain control, Discharge in the morning  Family Goals: Discharge in the morning    Progress made toward(s) clinical / shift goals:  Scheduled Gabapentin for nerve pain. Goal to discharge in the morning. Discussed plan of care prior to discharge.     Patient is not progressing towards the following goals: N/A      Problem: Pain - Standard  Goal: Alleviation of pain or a reduction in pain to the patient’s comfort goal  Outcome: Progressing  Pain assessed using verbal and nonverbal scales. Education on pain management plan and goals. Pt reports scheduled Gabapentin is most effective for pain.      Problem: Fall Risk  Goal: Patient will remain free from falls  Outcome: Progressing  Bed in lowest and locked position. Bed alarm in use. Treaded socks on. Belongings and call light in reach. Hourly rounding. Safety education.

## 2022-09-20 NOTE — PROGRESS NOTES
Patient cannot be discharged due to incomplete physical therapy consult. Brace was placed and fitted and remains on patient. Rios removed. Orders for care were not placed until the afternoon when PT is no longer here. No note was placed until afternoon for orders or POC.l Only word of mouth discussion since yesterday morning when I personally spoke with Kathe Castrejonana m who stated patient was to be bedrest and rios was to be kept in place to prevent another dislocation. The patient later that morning (9/18) said he was called by the physician and said he would have surgery when a part was delivered  and that in the meantime he could go home. No orders were placed at that time, no notes updated so orders in placed were followed. Again today I reached out to the hospitalist  said she spoke with the physician regarding the  surgery and that the patient would remain admitted until sx on Wednesday. Then later that morning I was called by the surgeons  and told that the patient could go home that the sx was to be at different facility for it. I asked for the  to have the surgeon call myself or  to directly give us these orders. She said that he was in Sx all day and that she would let him know. , my charge nurse, and nurse manager were all updated. No orders or notes placed until the surgeon came bedside around 4pm. Charge was present for conversation. Surgeon wanted patient discharged this evening unfortunately due to late orders and not speaking with the surgeon until this time we were unable to accommodate this request. Anticipating DC in the Am.

## 2022-09-20 NOTE — THERAPY
Physical Therapy   Initial Evaluation     Patient Name: Amadou Parekh  Age:  77 y.o., Sex:  male  Medical Record #: [id number]  Today's Date: 9/20/2022     Precautions  Precautions: Fall Risk;Weight Bearing As Tolerated Left Lower Extremity;Posterior Hip Precautions  Comments: hip abd brace on at all times    Assessment  Mr. Parekh is a 78 y/o male who presents to acute secondary to recurrent L hip dislocation after recent L ZECHARIAH. Pt has been fit with hip abduction orthosis and is aware it is to be on at all times until surgery. Provided with posterior hip precaution handout and reviewed. Pt demonstrated understanding. Supine in bed noted L LE to be internally rotated, educated on neutral or external rotation positions. Educated how can use visual of toe position to determine rotation and to use pillow between knees if is hard to maintain a neutral position. Also provided with ZECHARIAH therex handout and reviewed, eliminated exercises that encourage hip flexion. Pt demonstrated understanding.    Reviewed techniques for bed mobility and transfers emphasizing caution on L hip position and how to guard while performing tasks. Pt able to replicate without assist. Cues for focus on heel toe gait to optimize joint position and stability. Pt able to replicate this as well. Politely declined stair training, states he will not be using stairs prior to surgery. Discussed anatomy of hip joint and why posterior precautions are important, as well as why increasing glute function and strength is critical. Pt demonstrated understanding. No additional acute PT needs.    Plan    Recommend Physical Therapy for Evaluation only   DC Equipment Recommendations: None  Discharge Recommendations: Recommend outpatient physical therapy services to address higher level deficits            Objective       09/20/22 0801   Prior Living Situation   Prior Services None   Housing / Facility 2 Story House   Steps Into Home   (can stay on ground level)    Equipment Owned Front-Wheel Walker   Lives with - Patient's Self Care Capacity Spouse   Prior Level of Functional Mobility   Bed Mobility Independent   Transfer Status Independent   Ambulation Independent   Distance Ambulation (Feet)   (community ambulator)   Assistive Devices Used None   Stairs Independent   Comments at baseline independent, has FWW from original hip surgery   Cognition    Level of Consciousness Alert   Comments pleasant and motivated   Passive ROM Lower Body   Passive ROM Lower Body WDL   Comments within posterior hip precautions   Active ROM Lower Body    Active ROM Lower Body  WDL   Comments within posterior hip precautions   Strength Lower Body   Lower Body Strength  X   Comments R LE WFL. L hip flex/abd/add 3/5, however MMT not performed due to recurrent dislocations to reduce stress. Remaining L LE WFL   Sensation Lower Body   Lower Extremity Sensation   X   Comments neuropathy L foot, reports is baseline and not changed since dislocations   Balance Assessment   Sitting Balance (Static) Good   Sitting Balance (Dynamic) Good   Standing Balance (Static) Fair +   Standing Balance (Dynamic) Fair   Weight Shift Sitting Good   Weight Shift Standing Fair   Comments FWW   Gait Analysis   Gait Level Of Assist Supervised   Assistive Device Front Wheel Walker   Distance (Feet) 200   # of Times Distance was Traveled 1   Deviation Bradykinetic   Weight Bearing Status WBAT L LE   Bed Mobility    Supine to Sit Supervised   Sit to Supine   (up in chair)   Functional Mobility   Sit to Stand Supervised   Bed, Chair, Wheelchair Transfer Supervised

## 2022-09-20 NOTE — PROGRESS NOTES
[facility] Medicine Daily Progress Note    Date of Service  9/19/2022    Chief Complaint  Amadou Parekh is a 77 y.o. male admitted 9/17/2022 with   Chief Complaint   Patient presents with    Hip Injury    T-5000 GLF         Hospital Course  This is a 77-year-old man with a past medical history significant for hypertension, dyslipidemia, hypothyroidism, neuropathy presented to the ER on 9/ 7/22 with a complaint of left hip pain.    Patient stated that he had elective hip replacement for severe osteoarthritis on 8/15; On 9/5 he had dislocation of her prosthetic hip and was reduced in the ED.  He again came this time with significant pain after bending over noted to have dislocation again which was reduced in the ED.  Ambulatory x-ray while in the ED so dislocated second time which was reduced.    Orthopedic surgery was consulted, Dr. Rivera evaluated initially.  I reached out to Dr. Caceres office at 9 AM and discussed with  who stated that their office will call and make a plan.   of Dr nohemi dasilva stated that patient will have a surgery on Wednesday.    Letter Nursing manager called orthopedic physician office around 11ish, I was contacted by Dr Caceres office at 3.00 pm .  I have ordered the device as per recommendation from Dr. Caceres which is pending at this time.    Did have a Tate as he was on bedrest; Tate will be removed prior to discharge.    Interval events:  --No acute events overnight, Vital sign has been stable, heart rate 60-81, blood pressure 121/74, saturating 96% on 2 L of oxygen. Patient is alert and oriented and answering questions appropriately.  -- Patient has been frustrated in regards to the plan of care, I reached out to his orthopedic doctor who called me at 3 PM and recommended obtaining prosthesis which was ordered.  After patient will receive prosthesis, will obtain PT/OT.  -- As per PT, without prosthesis they will not be able to evaluate( which in  understandable)  -- It is noted by his orthopedic surgery, patient will be having surgery at [facility] on Wednesday, 9/21/2022    I also reached to NAM in regards to different prosthesis; he is working.  I also reached out to the director of the PT and OT once prosthesis will be delivered on time, patient will likely need PT/OT; she is aware    But unless prostheisis is delivered, patient understand that PT and OT wont bne able to evaluate; and in the circumstances, patient would likely need to stay in the hospital today  I have discussed this patient's plan of care and discharge plan at IDT rounds today with Case Management, Nursing, Nursing leadership, and other members of the IDT team.    Consultants/Specialty  orthopedics    Code Status  Full Code    Disposition  Patient is not medically cleared for discharge.   Anticipate discharge to to home with close outpatient follow-up.  I have placed the appropriate orders for post-discharge needs.    Review of Systems  Review of Systems   Constitutional:  Positive for malaise/fatigue. Negative for fever.   HENT:  Negative for congestion and sore throat.    Eyes:  Negative for blurred vision and double vision.   Respiratory:  Negative for hemoptysis and sputum production.    Cardiovascular:  Negative for chest pain, palpitations and orthopnea.   Gastrointestinal:  Negative for abdominal pain, heartburn, nausea and vomiting.   Genitourinary:  Negative for dysuria.   Musculoskeletal:  Positive for joint pain.   All other systems reviewed and are negative.     Physical Exam  Temp:  [36.6 °C (97.8 °F)-37.2 °C (98.9 °F)] 37.2 °C (98.9 °F)  Pulse:  [60-81] 81  Resp:  [16-17] 16  BP: (116-151)/(70-79) 121/74  SpO2:  [94 %-96 %] 96 %    Physical Exam  Vitals and nursing note reviewed.   Constitutional:       Appearance: Normal appearance.   HENT:      Head: Normocephalic and atraumatic.   Eyes:      Pupils: Pupils are equal, round, and reactive to light.   Cardiovascular:       Rate and Rhythm: Normal rate.   Pulmonary:      Effort: No respiratory distress.   Musculoskeletal:      Cervical back: Neck supple.      Right lower leg: No edema.      Left lower leg: No edema.   Skin:     General: Skin is warm.   Neurological:      Mental Status: He is alert and oriented to person, place, and time.      Cranial Nerves: No cranial nerve deficit.       Fluids  No intake or output data in the 24 hours ending 09/19/22 1731    Laboratory  Recent Labs     09/18/22  0022 09/18/22  0943   WBC 7.7 6.3   RBC 4.17* 4.50*   HEMOGLOBIN 11.7* 12.9*   HEMATOCRIT 35.2* 38.8*   MCV 84.4 86.2   MCH 28.1 28.7   MCHC 33.2* 33.2*   RDW 41.8 42.3   PLATELETCT 229 216   MPV 10.2 10.2     Recent Labs     09/18/22  0943 09/18/22  1509 09/19/22  0028   SODIUM 138 138 138   POTASSIUM 4.5 5.0 5.4   CHLORIDE 107 106 106   CO2 20 26 24   GLUCOSE 87 129* 100*   BUN 20 18 14   CREATININE 1.02 1.10 1.08   CALCIUM 8.5 8.6 8.4*                   Imaging  DX-HIP-UNILATERAL-WITHOUT PELVIS-1 VIEW LEFT   Final Result      Successful relocation of dislocated left hip arthroplasty      DX-HIP-UNILATERAL-WITHOUT PELVIS-1 VIEW LEFT   Final Result      Unsuccessful relocation of left hip arthroplasty dislocation      DX-HIP-UNILATERAL-WITH PELVIS-1 VIEW LEFT   Final Result      Recurrent dislocation of left hip arthroplasty      Moderate to severe right hip arthritis           Assessment/Plan  * Anterior dislocation of left hip, initial encounter ([facility])- (present on admission)  Assessment & Plan  Left hip replacement on 8/15 for osteoarthritis  Came here he was noted to have dislocation, orthopedic surgery was consulted, orthopedic surgery evaluated, plan for surgical intervention on Wednesday at [facility]   -- Dr. Caceres wants the patient to be discharged.  He recommended special brace which has been ordered, pendng    -- After that he will need pt and ot    Hypertensive urgency- (present on admission)  Assessment & Plan  Blood  pressure well controlled, continue his home medication    Hypocalcemia- (present on admission)  Assessment & Plan  - Ionized Calcium normal, monitor    Hypokalemia- (present on admission)  Assessment & Plan  Replete and monitor    Dyslipidemia- (present on admission)  Assessment & Plan  Continue statin    Peripheral polyneuropathy- (present on admission)  Assessment & Plan  Continue gabapentin    Hypothyroidism- (present on admission)  Assessment & Plan  Continue levothyroxine    Primary hypertension- (present on admission)  Assessment & Plan  Continue hydrochlorothiazide       VTE prophylaxis: enoxaparin ppx

## 2022-09-20 NOTE — CARE PLAN
The patient is Stable - Low risk of patient condition declining or worsening    Shift Goals  Clinical Goals: Mobility, DC  Patient Goals: DC home  Family Goals: Discharge in the morning    Progress made toward(s) clinical / shift goals:    Problem: Communication  Goal: The ability to communicate needs accurately and effectively will improve  Outcome: Progressing  Note: Plan of Care discussed, all questions answered. Pt effectively communicates needs to staff.       Problem: Discharge Barriers/Planning  Goal: Patient's continuum of care needs are met  Outcome: Progressing  Note: Worked with PT/OT-Cleared. Has DME at home. Would like to DC home.        Patient is not progressing towards the following goals: NA

## 2022-09-20 NOTE — DISCHARGE INSTRUCTIONS
Discharge Instructions    Discharged to home by car with relative. Discharged via wheelchair, hospital escort: Yes.  Special equipment needed: Not Applicable    Be sure to schedule a follow-up appointment with your primary care doctor or any specialists as instructed.     Discharge Plan:   Diet Plan: Discussed  Activity Level: Discussed  Confirmed Follow up Appointment: Patient to Call and Schedule Appointment  Confirmed Symptoms Management: Discussed  Medication Reconciliation Updated: Yes    I understand that a diet low in cholesterol, fat, and sodium is recommended for good health. Unless I have been given specific instructions below for another diet, I accept this instruction as my diet prescription.   Other diet: regular diet    Special Instructions: None    -Is this patient being discharged with medication to prevent blood clots?  No    Is patient discharged on Warfarin / Coumadin?   No Hip Dislocation    Hip dislocation is when the top of the thigh bone (femur) moves out of its normal place in your hip socket. To treat this, your doctor must move your bone back into place (reduction).  Hip dislocation may involve a broken (fractured) bone. It can also involve damage to:  Nerves.  Tissues that connect bones together (ligaments).  Tissues that connect muscles to bones (tendons).  This condition is an emergency. If you think you have dislocated your hip, do not move. Get medical help right away.  What are the causes?  A hard, direct hit to a bent knee. This is often caused by a car crash.  This injury may also be caused by:  Falls.  Contact sports.  Industrial accidents.  What increases the risk?  Having a man-made (prosthetic) hip.  Being overweight.  Not having hip strength and ease of movement (flexibility).  Having a problem in the hip joint that was present at birth (congenital).  Having a condition that causes you to be at an increased risk for falling.  Playing contact sports.  What are the signs or  symptoms?  Very bad pain in your hip area. Pain may get worse when you move or when you try to use your hip to support (bear) your weight.  Not being able to move your hip.  The leg on the side of the injured hip looks shorter than your other leg.  Inward turning of the foot on the side of the injured hip.  Loss of feeling in your lower leg, foot, or ankle.  How is this treated?  This condition is treated by moving the top of the thigh bone back into place in your hip socket. Your doctor may move it into place by hand (closed reduction) in a hospital or clinic. In some cases, you may need to have surgery to move the bone back into the hip socket (surgical reduction).  Your doctor may perform surgery if:  You have a broken bone.  You have pieces (fragments) of bone in your joint.  You have damaged blood vessels or nerves.  Closed reduction was not successful.  After your reduction, treatment may include:  Crutches.  A splint.  Physical therapy to help strengthen the muscles that hold your hip joint in place.  Follow these instructions at home:  Medicines  Take over-the-counter and prescription medicines only as told by your doctor.  Ask your doctor if the medicine prescribed to you:  Requires you to avoid driving or using heavy machinery.  Can cause trouble pooping (constipation). You may need to take actions to prevent or treat trouble pooping:  Drink enough fluid to keep your pee (urine) pale yellow.  Take over-the-counter or prescription medicines.  Eat foods that are high in fiber. These include beans, whole grains, and fresh fruits and vegetables.  Limit foods that are high in fat and sugar. These include fried or sweet foods.  If you have a splint:  Do not put pressure on any part of the splint until it is fully hardened. This may take many hours.  Wear it as told by your doctor. Remove it only as told by your doctor.  Loosen it if your toes:  Tingle.  Get numb.  Turn cold and blue.  Keep it clean and  dry.  Check the skin around it every day. Tell your doctor about any concerns.  Bathing  If the splint is not waterproof:  Do not let it get wet.  Cover it with a watertight covering when you take a bath or shower.  Do not take baths, swim, or use a hot tub until your doctor approves. Ask your doctor if you may take showers. You may only be allowed to take sponge baths.  Managing pain, stiffness, and swelling    If told, put ice on the injured area:  Put ice in a plastic bag.  Place a towel between your skin and the bag.  Leave the ice on for 20 minutes, 2-3 times a day.  Wear compression stockings or wraps as told by your doctor.  Move your toes often.  Activity  Do not use your hip to bear weight until your doctor says that you can. Use crutches or a walker as told by your doctor.  Do not lift anything that is heavier than 10 lb (4.5 kg), or the limit that you are told, until your doctor says that it is safe.  Return to your normal activities as told by your doctor. Ask your doctor what activities are safe for you.  If physical therapy was prescribed, do exercises as told by your doctor.  Driving  Ask your doctor when it is safe to drive if you have a splint on your hip.  Do not drive for 24 hours if you were given a medicine to help you relax (sedative).  General instructions  Do not use any products that contain nicotine or tobacco, such as cigarettes, e-cigarettes, and chewing tobacco. These can delay bone healing. If you need help quitting, ask your doctor.  Keep all follow-up visits as told by your doctor. This is important.  Contact a doctor if you have:  Pain that gets worse.  Pain that does not get better with medicine.  Swelling in your hip area or your leg.  Red skin on your hip area or your leg.  Tingling in any part of your hip, leg, or foot.  Get help right away if you:  Feel like your hip has dislocated again.  Have very bad pain in your hip or groin.  Have numbness or weakness in your leg.  Cannot  move any part of your hip or leg.  If you have symptoms of a hip dislocation, do not wait to see if the symptoms will go away. Get medical help right away. Call your local emergency services (911 in the [place] Do not drive yourself to the hospital.  Summary  Hip dislocation is when the bones in your hip joint move out of place.  This condition is often caused by a hard, direct hit to a bent knee.  This condition is treated by moving the top of the thigh bone back into your hip socket (reduction).  Hip dislocation is corrected by closed reduction or by surgery.  This information is not intended to replace advice given to you by your health care provider. Make sure you discuss any questions you have with your health care provider.  Document Released: 08/16/2012 Document Revised: 09/11/2019 Document Reviewed: 09/12/2019  [employer] Patient Education © 2020 [employer]

## 2022-09-20 NOTE — PREPROCEDURE INSTRUCTIONS
"Preadmit Phone appointment: \" Preparing for your Procedure information\" Instructions discussed with Patient.       Patient instructed to continue prescribed medications through the day before surgery, instructed to take the following medications the day of surgery per anesthesia protocol: gabapentin, synthroid,  oxycodone as needed, pepcid, Lipitor. Patient may have had Cialis today 9/21/22 as an inpatient at [facility]?      Pt states, \"no issues with anesthesia\".  Fasting guidelines discussed with Patient, patient will follow MD's instructions for Pre-Surgery Diet.      All Pt's questions and concerns answered or addressed.     "

## 2022-09-21 ENCOUNTER — HOSPITAL ENCOUNTER (OUTPATIENT)
Facility: MEDICAL CENTER | Age: 77
End: 2022-09-22
Attending: ORTHOPAEDIC SURGERY | Admitting: ORTHOPAEDIC SURGERY
Payer: COMMERCIAL

## 2022-09-21 ENCOUNTER — ANESTHESIA EVENT (OUTPATIENT)
Dept: SURGERY | Facility: MEDICAL CENTER | Age: 77
End: 2022-09-21
Payer: COMMERCIAL

## 2022-09-21 ENCOUNTER — ANESTHESIA (OUTPATIENT)
Dept: SURGERY | Facility: MEDICAL CENTER | Age: 77
End: 2022-09-21
Payer: COMMERCIAL

## 2022-09-21 DIAGNOSIS — E78.5 DYSLIPIDEMIA: ICD-10-CM

## 2022-09-21 LAB
ABO GROUP BLD: NORMAL
BLD GP AB SCN SERPL QL: NORMAL
BUN BLD-MCNC: 29 MG/DL (ref 8–22)
CA-I BLD ISE-SCNC: 1.22 MMOL/L (ref 1.1–1.3)
CHLORIDE BLD-SCNC: 104 MMOL/L (ref 96–112)
CO2 BLD-SCNC: 24 MMOL/L (ref 20–33)
CREAT BLD-MCNC: 1.3 MG/DL (ref 0.5–1.4)
GLUCOSE BLD-MCNC: 118 MG/DL (ref 65–99)
HCT VFR BLD CALC: 35 % (ref 42–52)
HGB BLD-MCNC: 11.9 G/DL (ref 14–18)
POTASSIUM BLD-SCNC: 4.7 MMOL/L (ref 3.6–5.5)
RH BLD: NORMAL
SODIUM BLD-SCNC: 141 MMOL/L (ref 135–145)

## 2022-09-21 PROCEDURE — 700105 HCHG RX REV CODE 258: Performed by: ORTHOPAEDIC SURGERY

## 2022-09-21 PROCEDURE — 160042 HCHG SURGERY MINUTES - EA ADDL 1 MIN LEVEL 5: Performed by: ORTHOPAEDIC SURGERY

## 2022-09-21 PROCEDURE — 88304 TISSUE EXAM BY PATHOLOGIST: CPT

## 2022-09-21 PROCEDURE — 700101 HCHG RX REV CODE 250: Performed by: ANESTHESIOLOGY

## 2022-09-21 PROCEDURE — 96365 THER/PROPH/DIAG IV INF INIT: CPT

## 2022-09-21 PROCEDURE — 86901 BLOOD TYPING SEROLOGIC RH(D): CPT

## 2022-09-21 PROCEDURE — 160036 HCHG PACU - EA ADDL 30 MINS PHASE I: Performed by: ORTHOPAEDIC SURGERY

## 2022-09-21 PROCEDURE — 502000 HCHG MISC OR IMPLANTS RC 0278: Performed by: ORTHOPAEDIC SURGERY

## 2022-09-21 PROCEDURE — 160002 HCHG RECOVERY MINUTES (STAT): Performed by: ORTHOPAEDIC SURGERY

## 2022-09-21 PROCEDURE — 700111 HCHG RX REV CODE 636 W/ 250 OVERRIDE (IP): Performed by: ORTHOPAEDIC SURGERY

## 2022-09-21 PROCEDURE — 160009 HCHG ANES TIME/MIN: Performed by: ORTHOPAEDIC SURGERY

## 2022-09-21 PROCEDURE — 700111 HCHG RX REV CODE 636 W/ 250 OVERRIDE (IP): Performed by: ANESTHESIOLOGY

## 2022-09-21 PROCEDURE — 86900 BLOOD TYPING SEROLOGIC ABO: CPT

## 2022-09-21 PROCEDURE — 160048 HCHG OR STATISTICAL LEVEL 1-5: Performed by: ORTHOPAEDIC SURGERY

## 2022-09-21 PROCEDURE — 160031 HCHG SURGERY MINUTES - 1ST 30 MINS LEVEL 5: Performed by: ORTHOPAEDIC SURGERY

## 2022-09-21 PROCEDURE — C1776 JOINT DEVICE (IMPLANTABLE): HCPCS | Performed by: ORTHOPAEDIC SURGERY

## 2022-09-21 PROCEDURE — 86850 RBC ANTIBODY SCREEN: CPT

## 2022-09-21 PROCEDURE — A9270 NON-COVERED ITEM OR SERVICE: HCPCS | Performed by: ORTHOPAEDIC SURGERY

## 2022-09-21 PROCEDURE — 01214 ANES OPEN PX TOT HIP ARTHRP: CPT | Performed by: ANESTHESIOLOGY

## 2022-09-21 PROCEDURE — G0378 HOSPITAL OBSERVATION PER HR: HCPCS

## 2022-09-21 PROCEDURE — 99100 ANES PT EXTEME AGE<1 YR&>70: CPT | Performed by: ANESTHESIOLOGY

## 2022-09-21 PROCEDURE — 700102 HCHG RX REV CODE 250 W/ 637 OVERRIDE(OP): Performed by: ORTHOPAEDIC SURGERY

## 2022-09-21 PROCEDURE — 700102 HCHG RX REV CODE 250 W/ 637 OVERRIDE(OP): Performed by: ANESTHESIOLOGY

## 2022-09-21 PROCEDURE — 80047 BASIC METABLC PNL IONIZED CA: CPT

## 2022-09-21 PROCEDURE — 160035 HCHG PACU - 1ST 60 MINS PHASE I: Performed by: ORTHOPAEDIC SURGERY

## 2022-09-21 PROCEDURE — 94760 N-INVAS EAR/PLS OXIMETRY 1: CPT

## 2022-09-21 PROCEDURE — 700101 HCHG RX REV CODE 250: Performed by: ORTHOPAEDIC SURGERY

## 2022-09-21 PROCEDURE — 85014 HEMATOCRIT: CPT

## 2022-09-21 PROCEDURE — A9270 NON-COVERED ITEM OR SERVICE: HCPCS | Performed by: ANESTHESIOLOGY

## 2022-09-21 PROCEDURE — 88311 DECALCIFY TISSUE: CPT

## 2022-09-21 PROCEDURE — 96375 TX/PRO/DX INJ NEW DRUG ADDON: CPT

## 2022-09-21 DEVICE — IMPLANTABLE DEVICE: Type: IMPLANTABLE DEVICE | Site: HIP | Status: FUNCTIONAL

## 2022-09-21 RX ORDER — ONDANSETRON 2 MG/ML
INJECTION INTRAMUSCULAR; INTRAVENOUS PRN
Status: DISCONTINUED | OUTPATIENT
Start: 2022-09-21 | End: 2022-09-21 | Stop reason: SURG

## 2022-09-21 RX ORDER — IBUPROFEN 400 MG/1
800 TABLET ORAL 3 TIMES DAILY PRN
Status: DISCONTINUED | OUTPATIENT
Start: 2022-09-24 | End: 2022-09-22 | Stop reason: HOSPADM

## 2022-09-21 RX ORDER — ALBUTEROL SULFATE 2.5 MG/3ML
2.5 SOLUTION RESPIRATORY (INHALATION)
Status: DISCONTINUED | OUTPATIENT
Start: 2022-09-21 | End: 2022-09-21 | Stop reason: HOSPADM

## 2022-09-21 RX ORDER — ROCURONIUM BROMIDE 10 MG/ML
INJECTION, SOLUTION INTRAVENOUS PRN
Status: DISCONTINUED | OUTPATIENT
Start: 2022-09-21 | End: 2022-09-21 | Stop reason: SURG

## 2022-09-21 RX ORDER — ONDANSETRON 2 MG/ML
4 INJECTION INTRAMUSCULAR; INTRAVENOUS
Status: DISCONTINUED | OUTPATIENT
Start: 2022-09-21 | End: 2022-09-21 | Stop reason: HOSPADM

## 2022-09-21 RX ORDER — LEVOTHYROXINE SODIUM 137 UG/1
137 TABLET ORAL
Status: DISCONTINUED | OUTPATIENT
Start: 2022-09-22 | End: 2022-09-22 | Stop reason: HOSPADM

## 2022-09-21 RX ORDER — DEXAMETHASONE SODIUM PHOSPHATE 4 MG/ML
10 INJECTION, SOLUTION INTRA-ARTICULAR; INTRALESIONAL; INTRAMUSCULAR; INTRAVENOUS; SOFT TISSUE ONCE
Status: COMPLETED | OUTPATIENT
Start: 2022-09-22 | End: 2022-09-22

## 2022-09-21 RX ORDER — HYDROMORPHONE HYDROCHLORIDE 2 MG/ML
INJECTION, SOLUTION INTRAMUSCULAR; INTRAVENOUS; SUBCUTANEOUS PRN
Status: DISCONTINUED | OUTPATIENT
Start: 2022-09-21 | End: 2022-09-21 | Stop reason: SURG

## 2022-09-21 RX ORDER — KETOROLAC TROMETHAMINE 30 MG/ML
15 INJECTION, SOLUTION INTRAMUSCULAR; INTRAVENOUS EVERY 6 HOURS
Status: DISCONTINUED | OUTPATIENT
Start: 2022-09-21 | End: 2022-09-22 | Stop reason: HOSPADM

## 2022-09-21 RX ORDER — ROPIVACAINE HYDROCHLORIDE 5 MG/ML
INJECTION, SOLUTION EPIDURAL; INFILTRATION; PERINEURAL
Status: DISCONTINUED | OUTPATIENT
Start: 2022-09-21 | End: 2022-09-21 | Stop reason: HOSPADM

## 2022-09-21 RX ORDER — AMOXICILLIN 250 MG
2 CAPSULE ORAL 2 TIMES DAILY
Status: DISCONTINUED | OUTPATIENT
Start: 2022-09-21 | End: 2022-09-22 | Stop reason: HOSPADM

## 2022-09-21 RX ORDER — HYDROCHLOROTHIAZIDE 12.5 MG/1
12.5 TABLET ORAL DAILY
Status: DISCONTINUED | OUTPATIENT
Start: 2022-09-22 | End: 2022-09-22 | Stop reason: HOSPADM

## 2022-09-21 RX ORDER — DOCUSATE SODIUM 100 MG/1
100 CAPSULE, LIQUID FILLED ORAL 2 TIMES DAILY
Status: DISCONTINUED | OUTPATIENT
Start: 2022-09-21 | End: 2022-09-22 | Stop reason: HOSPADM

## 2022-09-21 RX ORDER — SODIUM CHLORIDE 9 MG/ML
INJECTION, SOLUTION INTRAMUSCULAR; INTRAVENOUS; SUBCUTANEOUS
Status: DISCONTINUED | OUTPATIENT
Start: 2022-09-21 | End: 2022-09-21 | Stop reason: HOSPADM

## 2022-09-21 RX ORDER — POLYETHYLENE GLYCOL 3350 17 G/17G
1 POWDER, FOR SOLUTION ORAL
Status: DISCONTINUED | OUTPATIENT
Start: 2022-09-21 | End: 2022-09-22 | Stop reason: HOSPADM

## 2022-09-21 RX ORDER — HYDROMORPHONE HYDROCHLORIDE 1 MG/ML
0.1 INJECTION, SOLUTION INTRAMUSCULAR; INTRAVENOUS; SUBCUTANEOUS
Status: DISCONTINUED | OUTPATIENT
Start: 2022-09-21 | End: 2022-09-21 | Stop reason: HOSPADM

## 2022-09-21 RX ORDER — OXYCODONE HCL 5 MG/5 ML
5 SOLUTION, ORAL ORAL
Status: COMPLETED | OUTPATIENT
Start: 2022-09-21 | End: 2022-09-21

## 2022-09-21 RX ORDER — SCOLOPAMINE TRANSDERMAL SYSTEM 1 MG/1
1 PATCH, EXTENDED RELEASE TRANSDERMAL
Status: DISCONTINUED | OUTPATIENT
Start: 2022-09-21 | End: 2022-09-22 | Stop reason: HOSPADM

## 2022-09-21 RX ORDER — POLYETHYLENE GLYCOL 3350 17 G/17G
1 POWDER, FOR SOLUTION ORAL 2 TIMES DAILY PRN
Status: DISCONTINUED | OUTPATIENT
Start: 2022-09-21 | End: 2022-09-22 | Stop reason: HOSPADM

## 2022-09-21 RX ORDER — BISACODYL 10 MG
10 SUPPOSITORY, RECTAL RECTAL
Status: DISCONTINUED | OUTPATIENT
Start: 2022-09-21 | End: 2022-09-22 | Stop reason: HOSPADM

## 2022-09-21 RX ORDER — OXYCODONE HYDROCHLORIDE 10 MG/1
10 TABLET ORAL
Status: DISCONTINUED | OUTPATIENT
Start: 2022-09-21 | End: 2022-09-22 | Stop reason: HOSPADM

## 2022-09-21 RX ORDER — KETOROLAC TROMETHAMINE 30 MG/ML
INJECTION, SOLUTION INTRAMUSCULAR; INTRAVENOUS
Status: DISCONTINUED | OUTPATIENT
Start: 2022-09-21 | End: 2022-09-21 | Stop reason: HOSPADM

## 2022-09-21 RX ORDER — HYDROMORPHONE HYDROCHLORIDE 1 MG/ML
0.4 INJECTION, SOLUTION INTRAMUSCULAR; INTRAVENOUS; SUBCUTANEOUS
Status: DISCONTINUED | OUTPATIENT
Start: 2022-09-21 | End: 2022-09-21 | Stop reason: HOSPADM

## 2022-09-21 RX ORDER — ASPIRIN 325 MG
325 TABLET ORAL DAILY
Status: DISCONTINUED | OUTPATIENT
Start: 2022-09-22 | End: 2022-09-22 | Stop reason: HOSPADM

## 2022-09-21 RX ORDER — HYDROMORPHONE HYDROCHLORIDE 1 MG/ML
0.2 INJECTION, SOLUTION INTRAMUSCULAR; INTRAVENOUS; SUBCUTANEOUS
Status: DISCONTINUED | OUTPATIENT
Start: 2022-09-21 | End: 2022-09-21 | Stop reason: HOSPADM

## 2022-09-21 RX ORDER — DIPHENHYDRAMINE HCL 25 MG
25 TABLET ORAL NIGHTLY PRN
Status: DISCONTINUED | OUTPATIENT
Start: 2022-09-22 | End: 2022-09-22 | Stop reason: HOSPADM

## 2022-09-21 RX ORDER — LIDOCAINE HYDROCHLORIDE 20 MG/ML
INJECTION, SOLUTION EPIDURAL; INFILTRATION; INTRACAUDAL; PERINEURAL PRN
Status: DISCONTINUED | OUTPATIENT
Start: 2022-09-21 | End: 2022-09-21 | Stop reason: SURG

## 2022-09-21 RX ORDER — GABAPENTIN 300 MG/1
600 CAPSULE ORAL 3 TIMES DAILY
Status: DISCONTINUED | OUTPATIENT
Start: 2022-09-21 | End: 2022-09-22

## 2022-09-21 RX ORDER — TRANEXAMIC ACID 100 MG/ML
INJECTION, SOLUTION INTRAVENOUS PRN
Status: DISCONTINUED | OUTPATIENT
Start: 2022-09-21 | End: 2022-09-21 | Stop reason: SURG

## 2022-09-21 RX ORDER — ACETAMINOPHEN 500 MG
1000 TABLET ORAL EVERY 6 HOURS PRN
Status: DISCONTINUED | OUTPATIENT
Start: 2022-09-21 | End: 2022-09-21 | Stop reason: HOSPADM

## 2022-09-21 RX ORDER — DEXAMETHASONE SODIUM PHOSPHATE 4 MG/ML
4 INJECTION, SOLUTION INTRA-ARTICULAR; INTRALESIONAL; INTRAMUSCULAR; INTRAVENOUS; SOFT TISSUE
Status: DISCONTINUED | OUTPATIENT
Start: 2022-09-21 | End: 2022-09-22 | Stop reason: HOSPADM

## 2022-09-21 RX ORDER — AMOXICILLIN 250 MG
1 CAPSULE ORAL
Status: DISCONTINUED | OUTPATIENT
Start: 2022-09-21 | End: 2022-09-22 | Stop reason: HOSPADM

## 2022-09-21 RX ORDER — ACETAMINOPHEN 500 MG
1000 TABLET ORAL EVERY 6 HOURS
Status: DISCONTINUED | OUTPATIENT
Start: 2022-09-21 | End: 2022-09-22 | Stop reason: HOSPADM

## 2022-09-21 RX ORDER — DIPHENHYDRAMINE HYDROCHLORIDE 50 MG/ML
6.25 INJECTION INTRAMUSCULAR; INTRAVENOUS
Status: DISCONTINUED | OUTPATIENT
Start: 2022-09-21 | End: 2022-09-21 | Stop reason: HOSPADM

## 2022-09-21 RX ORDER — FAMOTIDINE 20 MG/1
20 TABLET, FILM COATED ORAL 2 TIMES DAILY
Status: DISCONTINUED | OUTPATIENT
Start: 2022-09-21 | End: 2022-09-22 | Stop reason: HOSPADM

## 2022-09-21 RX ORDER — ACETAMINOPHEN 500 MG
1000 TABLET ORAL EVERY 6 HOURS PRN
Status: DISCONTINUED | OUTPATIENT
Start: 2022-09-26 | End: 2022-09-22 | Stop reason: HOSPADM

## 2022-09-21 RX ORDER — ONDANSETRON 2 MG/ML
4 INJECTION INTRAMUSCULAR; INTRAVENOUS EVERY 4 HOURS PRN
Status: DISCONTINUED | OUTPATIENT
Start: 2022-09-21 | End: 2022-09-22 | Stop reason: HOSPADM

## 2022-09-21 RX ORDER — SODIUM CHLORIDE, SODIUM LACTATE, POTASSIUM CHLORIDE, CALCIUM CHLORIDE 600; 310; 30; 20 MG/100ML; MG/100ML; MG/100ML; MG/100ML
INJECTION, SOLUTION INTRAVENOUS CONTINUOUS
Status: DISCONTINUED | OUTPATIENT
Start: 2022-09-21 | End: 2022-09-21 | Stop reason: HOSPADM

## 2022-09-21 RX ORDER — ENEMA 19; 7 G/133ML; G/133ML
1 ENEMA RECTAL
Status: DISCONTINUED | OUTPATIENT
Start: 2022-09-21 | End: 2022-09-22 | Stop reason: HOSPADM

## 2022-09-21 RX ORDER — HYDRALAZINE HYDROCHLORIDE 20 MG/ML
5 INJECTION INTRAMUSCULAR; INTRAVENOUS
Status: DISCONTINUED | OUTPATIENT
Start: 2022-09-21 | End: 2022-09-21 | Stop reason: HOSPADM

## 2022-09-21 RX ORDER — SODIUM CHLORIDE, SODIUM LACTATE, POTASSIUM CHLORIDE, CALCIUM CHLORIDE 600; 310; 30; 20 MG/100ML; MG/100ML; MG/100ML; MG/100ML
INJECTION, SOLUTION INTRAVENOUS CONTINUOUS
Status: ACTIVE | OUTPATIENT
Start: 2022-09-21 | End: 2022-09-21

## 2022-09-21 RX ORDER — OXYCODONE HYDROCHLORIDE 5 MG/1
5 TABLET ORAL
Status: DISCONTINUED | OUTPATIENT
Start: 2022-09-21 | End: 2022-09-22 | Stop reason: HOSPADM

## 2022-09-21 RX ORDER — HALOPERIDOL 5 MG/ML
1 INJECTION INTRAMUSCULAR
Status: DISCONTINUED | OUTPATIENT
Start: 2022-09-21 | End: 2022-09-21 | Stop reason: HOSPADM

## 2022-09-21 RX ORDER — MORPHINE SULFATE 4 MG/ML
4 INJECTION INTRAVENOUS
Status: DISCONTINUED | OUTPATIENT
Start: 2022-09-21 | End: 2022-09-22 | Stop reason: HOSPADM

## 2022-09-21 RX ORDER — DIPHENHYDRAMINE HYDROCHLORIDE 50 MG/ML
25 INJECTION INTRAMUSCULAR; INTRAVENOUS EVERY 6 HOURS PRN
Status: DISCONTINUED | OUTPATIENT
Start: 2022-09-21 | End: 2022-09-22 | Stop reason: HOSPADM

## 2022-09-21 RX ORDER — AMOXICILLIN 250 MG
1 CAPSULE ORAL NIGHTLY
Status: DISCONTINUED | OUTPATIENT
Start: 2022-09-21 | End: 2022-09-22 | Stop reason: HOSPADM

## 2022-09-21 RX ORDER — LIDOCAINE HYDROCHLORIDE 40 MG/ML
SOLUTION TOPICAL PRN
Status: DISCONTINUED | OUTPATIENT
Start: 2022-09-21 | End: 2022-09-21 | Stop reason: SURG

## 2022-09-21 RX ORDER — CEFAZOLIN SODIUM 1 G/3ML
INJECTION, POWDER, FOR SOLUTION INTRAMUSCULAR; INTRAVENOUS PRN
Status: DISCONTINUED | OUTPATIENT
Start: 2022-09-21 | End: 2022-09-21 | Stop reason: SURG

## 2022-09-21 RX ORDER — DEXAMETHASONE SODIUM PHOSPHATE 4 MG/ML
INJECTION, SOLUTION INTRA-ARTICULAR; INTRALESIONAL; INTRAMUSCULAR; INTRAVENOUS; SOFT TISSUE PRN
Status: DISCONTINUED | OUTPATIENT
Start: 2022-09-21 | End: 2022-09-21 | Stop reason: SURG

## 2022-09-21 RX ORDER — OXYCODONE HCL 5 MG/5 ML
10 SOLUTION, ORAL ORAL
Status: COMPLETED | OUTPATIENT
Start: 2022-09-21 | End: 2022-09-21

## 2022-09-21 RX ADMIN — EPHEDRINE SULFATE 10 MG: 50 INJECTION, SOLUTION INTRAVENOUS at 14:04

## 2022-09-21 RX ADMIN — PROPOFOL 140 MG: 10 INJECTION, EMULSION INTRAVENOUS at 13:55

## 2022-09-21 RX ADMIN — TRANEXAMIC ACID 1000 MG: 100 INJECTION, SOLUTION INTRAVENOUS at 15:19

## 2022-09-21 RX ADMIN — CEFAZOLIN 2 G: 330 INJECTION, POWDER, FOR SOLUTION INTRAMUSCULAR; INTRAVENOUS at 13:55

## 2022-09-21 RX ADMIN — LIDOCAINE HYDROCHLORIDE 50 MG: 20 INJECTION, SOLUTION EPIDURAL; INFILTRATION; INTRACAUDAL at 13:55

## 2022-09-21 RX ADMIN — OXYCODONE HYDROCHLORIDE 10 MG: 5 SOLUTION ORAL at 16:38

## 2022-09-21 RX ADMIN — DEXAMETHASONE SODIUM PHOSPHATE 8 MG: 4 INJECTION, SOLUTION INTRA-ARTICULAR; INTRALESIONAL; INTRAMUSCULAR; INTRAVENOUS; SOFT TISSUE at 14:00

## 2022-09-21 RX ADMIN — HYDROMORPHONE HYDROCHLORIDE 0.5 MG: 2 INJECTION INTRAMUSCULAR; INTRAVENOUS; SUBCUTANEOUS at 15:23

## 2022-09-21 RX ADMIN — EPHEDRINE SULFATE 10 MG: 50 INJECTION, SOLUTION INTRAVENOUS at 14:57

## 2022-09-21 RX ADMIN — ACETAMINOPHEN 1000 MG: 500 TABLET ORAL at 18:22

## 2022-09-21 RX ADMIN — EPHEDRINE SULFATE 10 MG: 50 INJECTION, SOLUTION INTRAVENOUS at 14:00

## 2022-09-21 RX ADMIN — ONDANSETRON 4 MG: 2 INJECTION INTRAMUSCULAR; INTRAVENOUS at 15:24

## 2022-09-21 RX ADMIN — KETOROLAC TROMETHAMINE 15 MG: 30 INJECTION, SOLUTION INTRAMUSCULAR; INTRAVENOUS at 18:21

## 2022-09-21 RX ADMIN — HYDROMORPHONE HYDROCHLORIDE 0.5 MG: 2 INJECTION INTRAMUSCULAR; INTRAVENOUS; SUBCUTANEOUS at 14:52

## 2022-09-21 RX ADMIN — HYDROMORPHONE HYDROCHLORIDE 0.5 MG: 2 INJECTION INTRAMUSCULAR; INTRAVENOUS; SUBCUTANEOUS at 13:55

## 2022-09-21 RX ADMIN — TRANEXAMIC ACID 1000 MG: 100 INJECTION, SOLUTION INTRAVENOUS at 13:59

## 2022-09-21 RX ADMIN — LIDOCAINE HYDROCHLORIDE 4 ML: 40 SOLUTION TOPICAL at 13:55

## 2022-09-21 RX ADMIN — SODIUM CHLORIDE, POTASSIUM CHLORIDE, SODIUM LACTATE AND CALCIUM CHLORIDE: 600; 310; 30; 20 INJECTION, SOLUTION INTRAVENOUS at 12:48

## 2022-09-21 RX ADMIN — CEFAZOLIN 2 G: 2 INJECTION, POWDER, FOR SOLUTION INTRAMUSCULAR; INTRAVENOUS at 21:08

## 2022-09-21 RX ADMIN — FENTANYL CITRATE 25 MCG: 50 INJECTION, SOLUTION INTRAMUSCULAR; INTRAVENOUS at 16:43

## 2022-09-21 RX ADMIN — GABAPENTIN 600 MG: 300 CAPSULE ORAL at 21:07

## 2022-09-21 RX ADMIN — ROCURONIUM BROMIDE 60 MG: 10 INJECTION, SOLUTION INTRAVENOUS at 13:55

## 2022-09-21 RX ADMIN — DIPHENHYDRAMINE HYDROCHLORIDE 6.25 MG: 50 INJECTION INTRAMUSCULAR; INTRAVENOUS at 16:38

## 2022-09-21 RX ADMIN — ROCURONIUM BROMIDE 10 MG: 10 INJECTION, SOLUTION INTRAVENOUS at 14:54

## 2022-09-21 RX ADMIN — VANCOMYCIN HYDROCHLORIDE 1500 MG: 5 INJECTION, POWDER, LYOPHILIZED, FOR SOLUTION INTRAVENOUS at 12:51

## 2022-09-21 ASSESSMENT — LIFESTYLE VARIABLES
CONSUMPTION TOTAL: NEGATIVE
HAVE PEOPLE ANNOYED YOU BY CRITICIZING YOUR DRINKING: NO
ON A TYPICAL DAY WHEN YOU DRINK ALCOHOL HOW MANY DRINKS DO YOU HAVE: 0
TOTAL SCORE: 0
EVER HAD A DRINK FIRST THING IN THE MORNING TO STEADY YOUR NERVES TO GET RID OF A HANGOVER: NO
TOTAL SCORE: 0
EVER FELT BAD OR GUILTY ABOUT YOUR DRINKING: NO
ALCOHOL_USE: NO
AVERAGE NUMBER OF DAYS PER WEEK YOU HAVE A DRINK CONTAINING ALCOHOL: 0
HAVE YOU EVER FELT YOU SHOULD CUT DOWN ON YOUR DRINKING: NO
HOW MANY TIMES IN THE PAST YEAR HAVE YOU HAD 5 OR MORE DRINKS IN A DAY: 0
TOTAL SCORE: 0

## 2022-09-21 ASSESSMENT — PAIN DESCRIPTION - PAIN TYPE
TYPE: NEUROPATHIC PAIN
TYPE: ACUTE PAIN;SURGICAL PAIN
TYPE: ACUTE PAIN

## 2022-09-21 ASSESSMENT — FIBROSIS 4 INDEX
FIB4 SCORE: 1.65

## 2022-09-21 NOTE — ANESTHESIA PREPROCEDURE EVALUATION
Case: [id number] Date/Time: 09/21/22 1300    Procedure: LEFT REVISION TOTAL HIP ARTHROPLASTY AND REPAIRS AS INDICATED    Location:  OR 06 / SURGERY [facility]    Surgeons: Luis Caceres M.D.          Relevant Problems   CARDIAC   (positive) Hypertensive urgency   (positive) Primary hypertension         (positive) Renal insufficiency      ENDO   (positive) Hypothyroidism      Other   (positive) Arthritis of hip   (positive) Bilateral stenosis of lateral recess of lumbar spine   (positive) Chronic pain syndrome   (positive) Left foot drop       Physical Exam    Airway   Mallampati: II  TM distance: >3 FB  Neck ROM: full       Cardiovascular - normal exam  Rhythm: regular  Rate: normal  (-) murmur     Dental - normal exam           Pulmonary - normal exam  Breath sounds clear to auscultation     Abdominal    Neurological - normal exam                 Anesthesia Plan    ASA 2       Plan - general       Airway plan will be ETT          Induction: intravenous    Postoperative Plan: Postoperative administration of opioids is intended.    Pertinent diagnostic labs and testing reviewed    Informed Consent:    Anesthetic plan and risks discussed with patient.    Use of blood products discussed with: patient whom consented to blood products.

## 2022-09-21 NOTE — OR NURSING
1550 Patient to PACU from OR after report received  oral airway in place. Dressing clean dry and intact on right hip brace on bed but no longer needed.   Patient has an abrasion on left side diagonal along last rib. About 3 inches in length. Circulating nurse is also aware. See media tab for picture and LDA for placement.     1607 oral airway removed. Patient able to maintain own airway.      1615 patient still resting     1630 patient talking with anesthesia patient able to Men cid and keep up with conversation. Patient     1640 patient states he is having nerve pain in his left leg. He takes meds at home for it.  Talked about pain medication with patient. He is concerned about nausea from the orals so 6.25 mg of benadryl     1645 waited 5 minutes after the benadryl and then gave patient  10 mg Roxicodone and 25 mcgs of fentanyl    Called his wife and gave update including room number.        1715 patient meets criteria to go to [facility]     1723 called report to Emiliana BARRETT on [facility]    1734 moved to [facility] via transportr  in stable condition

## 2022-09-21 NOTE — OR NURSING
In person verbal COVID screening completed; negative.  Patient allergies and NPO status verified, home medication reconciliation completed and belongings secured. Surgical site verified with patient. Patient verbalizes understanding of pain scale, expected course of stay and plan of care; patient and family state verbal understanding at this time. IV access established. Sequential/aureliano in place as ordered.

## 2022-09-21 NOTE — ANESTHESIA PROCEDURE NOTES
Peripheral IV    Date/Time: 9/21/2022 2:45 PM  Performed by: Tonny Vickers M.D.  Authorized by: Tonny Vickers M.D.     Size:  18 G  Laterality:  Left  Site Prep:  Alcohol  Technique:  Direct puncture  Attempts:  1

## 2022-09-21 NOTE — OR NURSING
Patient arrived in Pre-op. Confirmed name, , and surgery to be performed. Verified allergies, medications, and preferred pharmacy. Patient is A&OX4. Screened for covid per protocol. Patient is negative and asymptomatic.    IV started and patency confirmed.

## 2022-09-21 NOTE — ANESTHESIA PROCEDURE NOTES
Airway    Date/Time: 9/21/2022 1:55 PM  Performed by: Tonny Vickers M.D.  Authorized by: Tonny Vickers M.D.     Location:  OR  Urgency:  Elective  Difficult Airway: No    Indications for Airway Management:  Anesthesia      Spontaneous Ventilation: absent    Sedation Level:  Deep  Preoxygenated: Yes    Patient Position:  Sniffing  Mask Difficulty Assessment:  1 - vent by mask  Final Airway Type:  Endotracheal airway  Final Endotracheal Airway:  ETT  Cuffed: Yes    Technique Used for Successful ETT Placement:  Direct laryngoscopy  Devices/Methods Used in Placement:  Intubating stylet    Insertion Site:  Oral  Blade Type:  Agustín  Laryngoscope Blade/Videolaryngoscope Blade Size:  3  ETT Size (mm):  7.5  Measured from:  Teeth  ETT to Teeth (cm):  23  Placement Verified by: auscultation and capnometry    Cormack-Lehane Classification:  Grade IIa - partial view of glottis  Number of Attempts at Approach:  1

## 2022-09-21 NOTE — ANESTHESIA POSTPROCEDURE EVALUATION
Patient: Amadou Parekh    Procedure Summary     Date: 09/21/22 Room / Location:  OR 06 / SURGERY [facility]    Anesthesia Start: 1350 Anesthesia Stop: 1550    Procedure: LEFT REVISION TOTAL HIP ARTHROPLASTY AND REPAIRS AS INDICATED (Left: Hip) Diagnosis: (LEFT HIP OSTEOARTHRITIS)    Surgeons: Luis Caceres M.D. Responsible Provider: Tonny Vickers M.D.    Anesthesia Type: general ASA Status: 2          Final Anesthesia Type: general  Last vitals  BP   Blood Pressure : (!) 154/59    Temp   36.3 °C (97.3 °F)    Pulse   70   Resp   (!) 8    SpO2   93 %      Anesthesia Post Evaluation    Patient location during evaluation: PACU  Patient participation: complete - patient participated  Level of consciousness: sleepy but conscious    Airway patency: patent  Anesthetic complications: no  Cardiovascular status: hemodynamically stable  Respiratory status: acceptable  Hydration status: euvolemic    PONV: none          No notable events documented.     Nurse Pain Score: 0 (NPRS)

## 2022-09-21 NOTE — ANESTHESIA TIME REPORT
Anesthesia Start and Stop Event Times     Date Time Event    9/21/2022 1233 Ready for Procedure     1350 Anesthesia Start     1550 Anesthesia Stop        Responsible Staff  09/21/22    Name Role Begin End    Tonny Vickers M.D. Anesth 1350 1550        Overtime Reason:  no overtime (within assigned shift)    Comments:

## 2022-09-22 VITALS
WEIGHT: 194.67 LBS | HEART RATE: 80 BPM | HEIGHT: 72 IN | DIASTOLIC BLOOD PRESSURE: 65 MMHG | BODY MASS INDEX: 26.37 KG/M2 | OXYGEN SATURATION: 94 % | TEMPERATURE: 96.8 F | RESPIRATION RATE: 18 BRPM | SYSTOLIC BLOOD PRESSURE: 141 MMHG

## 2022-09-22 PROCEDURE — 96375 TX/PRO/DX INJ NEW DRUG ADDON: CPT

## 2022-09-22 PROCEDURE — 97161 PT EVAL LOW COMPLEX 20 MIN: CPT

## 2022-09-22 PROCEDURE — 700105 HCHG RX REV CODE 258: Performed by: ORTHOPAEDIC SURGERY

## 2022-09-22 PROCEDURE — 700111 HCHG RX REV CODE 636 W/ 250 OVERRIDE (IP): Performed by: ORTHOPAEDIC SURGERY

## 2022-09-22 PROCEDURE — 96376 TX/PRO/DX INJ SAME DRUG ADON: CPT

## 2022-09-22 PROCEDURE — 700102 HCHG RX REV CODE 250 W/ 637 OVERRIDE(OP): Performed by: ORTHOPAEDIC SURGERY

## 2022-09-22 PROCEDURE — A9270 NON-COVERED ITEM OR SERVICE: HCPCS | Performed by: ORTHOPAEDIC SURGERY

## 2022-09-22 PROCEDURE — 97535 SELF CARE MNGMENT TRAINING: CPT

## 2022-09-22 PROCEDURE — 97530 THERAPEUTIC ACTIVITIES: CPT

## 2022-09-22 PROCEDURE — 94760 N-INVAS EAR/PLS OXIMETRY 1: CPT

## 2022-09-22 PROCEDURE — G0378 HOSPITAL OBSERVATION PER HR: HCPCS

## 2022-09-22 RX ORDER — GABAPENTIN 300 MG/1
600 CAPSULE ORAL 4 TIMES DAILY
Status: DISCONTINUED | OUTPATIENT
Start: 2022-09-22 | End: 2022-09-22 | Stop reason: HOSPADM

## 2022-09-22 RX ADMIN — DEXAMETHASONE SODIUM PHOSPHATE 10 MG: 4 INJECTION, SOLUTION INTRA-ARTICULAR; INTRALESIONAL; INTRAMUSCULAR; INTRAVENOUS; SOFT TISSUE at 05:54

## 2022-09-22 RX ADMIN — FAMOTIDINE 20 MG: 20 TABLET, FILM COATED ORAL at 05:52

## 2022-09-22 RX ADMIN — OXYCODONE 5 MG: 5 TABLET ORAL at 08:10

## 2022-09-22 RX ADMIN — ACETAMINOPHEN 1000 MG: 500 TABLET ORAL at 00:24

## 2022-09-22 RX ADMIN — ACETAMINOPHEN 1000 MG: 500 TABLET ORAL at 05:51

## 2022-09-22 RX ADMIN — GABAPENTIN 600 MG: 300 CAPSULE ORAL at 07:21

## 2022-09-22 RX ADMIN — KETOROLAC TROMETHAMINE 15 MG: 30 INJECTION, SOLUTION INTRAMUSCULAR; INTRAVENOUS at 05:48

## 2022-09-22 RX ADMIN — CEFAZOLIN 2 G: 2 INJECTION, POWDER, FOR SOLUTION INTRAMUSCULAR; INTRAVENOUS at 05:48

## 2022-09-22 RX ADMIN — HYDROCHLOROTHIAZIDE 12.5 MG: 12.5 TABLET ORAL at 05:53

## 2022-09-22 RX ADMIN — LEVOTHYROXINE SODIUM 137 MCG: 137 TABLET ORAL at 05:52

## 2022-09-22 RX ADMIN — KETOROLAC TROMETHAMINE 15 MG: 30 INJECTION, SOLUTION INTRAMUSCULAR; INTRAVENOUS at 00:23

## 2022-09-22 RX ADMIN — ASPIRIN 325 MG ORAL TABLET 325 MG: 325 PILL ORAL at 05:52

## 2022-09-22 ASSESSMENT — COGNITIVE AND FUNCTIONAL STATUS - GENERAL
DAILY ACTIVITIY SCORE: 22
MOVING FROM LYING ON BACK TO SITTING ON SIDE OF FLAT BED: A LITTLE
SUGGESTED CMS G CODE MODIFIER MOBILITY: CI
MOBILITY SCORE: 23
CLIMB 3 TO 5 STEPS WITH RAILING: A LITTLE
SUGGESTED CMS G CODE MODIFIER DAILY ACTIVITY: CJ
STANDING UP FROM CHAIR USING ARMS: A LITTLE
TURNING FROM BACK TO SIDE WHILE IN FLAT BAD: A LITTLE
CLIMB 3 TO 5 STEPS WITH RAILING: A LITTLE
HELP NEEDED FOR BATHING: A LITTLE
MOBILITY SCORE: 18
DRESSING REGULAR LOWER BODY CLOTHING: A LITTLE
MOVING TO AND FROM BED TO CHAIR: A LITTLE
WALKING IN HOSPITAL ROOM: A LITTLE
SUGGESTED CMS G CODE MODIFIER MOBILITY: CK

## 2022-09-22 ASSESSMENT — PATIENT HEALTH QUESTIONNAIRE - PHQ9
2. FEELING DOWN, DEPRESSED, IRRITABLE, OR HOPELESS: NOT AT ALL
1. LITTLE INTEREST OR PLEASURE IN DOING THINGS: NOT AT ALL
SUM OF ALL RESPONSES TO PHQ9 QUESTIONS 1 AND 2: 0

## 2022-09-22 ASSESSMENT — PAIN DESCRIPTION - PAIN TYPE
TYPE: SURGICAL PAIN
TYPE: ACUTE PAIN
TYPE: SURGICAL PAIN
TYPE: ACUTE PAIN

## 2022-09-22 ASSESSMENT — GAIT ASSESSMENTS
GAIT LEVEL OF ASSIST: SUPERVISED
ASSISTIVE DEVICE: FRONT WHEEL WALKER
DEVIATION: ANTALGIC
DISTANCE (FEET): 150

## 2022-09-22 NOTE — PROGRESS NOTES
Discharging patient home per MD order. Pt demonstrated understanding of discharge instructions, follow up appointments, home medications, prescriptions, and home care for surgical wound. Pt is voiding without difficulty, pain well controlled, tolerating oral medications, O2 greater than 90%.Pt verbalized understanding of discharge instructions and educational handouts and all questions answered. PIV removed. Pt discharged off unit with hospital escort.

## 2022-09-22 NOTE — PROGRESS NOTES
Pts care discussed with nursing  Mobilizing with PT  He will discuss gabapentin with his neurosurgery office    Stable revision ZECHARIAH      1. DC home on crutches/ walker  2. DC home on home meds per home doses  3. DC home on Percocet, ASA, and Outpt PT.  4. Follow up [facility] 10-14 days  5. Call for Fevers, drainage, redness, shortness of breath, or increasing lower extremity swelling particularly in the calf.  6. Start Aspirin 325mg DAILY FOR 35 DAYS.  7. CHANGE BANDAGE 5-7 DAYS, KEEP INCISION COVERED FOR SHOWERS AND DRY. Call if drainage or wetness.

## 2022-09-22 NOTE — OP REPORT
"DATE OF SERVICE:  09/21/2022     PREOPERATIVE DIAGNOSIS:   Recurrent dislocations, left prosthetic hip.     POSTOPERATIVE DIAGNOSES:  1.  Recurrent dislocations, left prosthetic hip.  2.  Bony fragment loose body anterior aspect of the left hip.     PROCEDURES:  1.  Iliac bone excision from the anterior aspect of the left pelvis.   2.  Revision total hip arthroplasty with revision of acetabular and femoral   components.  3.  Weightbearing as tolerated.     ANESTHESIA:  General.     SURGEON:  Luis Caceres MD     ASSISTANT:  Zaina Salas PA-C.     IMPLANTS:  The polyethylene liner was exchanged for the constraining \"Freedom\"   device with exchange to a metal head at 36 mm with a +3 neck length.     PROCEDURE IN DETAIL:  The patient was taken to the operating room where he   underwent a general anesthetic after being seen in the preoperative holding   area and answering all questions.  The left hip was marked in preop.  The   patient's general anesthesia was undertaken.  The patient was positioned in a   lateral decubitus position with a Stulberg.  Axillary roll and all bony   prominences padded.  The surgery was undertaken after a sterile Hibiclens   alcohol with ChloraPrep, a sterile draping technique with use of body exhaust   uniforms and an appropriate timeout. The prior incision was incised.  Skin and   subcutaneous tissue were incised.  The prior sutures were removed.  The   fascia hope and gluteal fascia were identified and split in the similar   position and the sutures were removed there.  At this point, a prominent   hematoma was encountered and evacuated with suction and pulse irrigation.    Similarly, the short external rotators area was identified and noted to have   been avulsed.  The capsule was avulsed as well.  The remaining sutures were   notable in these locations.  These were removed.     Upon completion of this thorough irrigation of the joint was undertaken and   inspection undertaken.  " Prominent stability was noted with full extension,   external rotation and sleeping stability to 70 degrees with flexed 90   stability to 30 degrees.  At this point, examination of the joint was   undertaken and it was noted that there was a large bony osteophytic process in   the anterior aspect of the soft tissues.  This was identified and noted to be   a significant block to terminal flexion and a likely source of dislocation.    For this reason, the fragment was carefully dissected, identified and removed.    This was roughly 2x1x1.5 cm.  It was difficult to tell whether this came   from the anterior aspect of the trochanter versus the anterior inferior iliac   spine.  The fragment was identified and removed and sent to pathology.     Upon completion of this, significant improvement in the stability of the hip   was noted.  The patient at that point was thoroughly evaluated for   progression.  The implant exchange of a dual mobility versus a constraining   polyethylene was assessed.     At this point, the femoral head was removed.  The Zuluaga taper cleaned and   dried and the hip displaced anteriorly.  Careful attention was made for   anterior inferior iliac spine remaining bony fragments, which were   decompressed with an osteotome.  This obtained a stable base for the   prosthesis.  At this point, the polyethylene was extracted from the [employer]7 [employer]   cup.  The remaining cup was noted to be stable as was the femoral stem.     Periarticular anesthetization was undertaken with Toradol and ropivacaine.  At   this point, the stability of the hip was assessed prior to polyethylene   excision and a decision undertaken to convert to a Freedom constrained   polyethylene device and appropriate femoral head.  The cup was cleared, pulse   lavaged and a Betadine soak undertaken.  Following this, pulse lavage was   repeated and the constrained polyethylene inserted.  This allowed excellent   seating and position.  Following  this, the hip was positioned and the Zuluaga   taper cleaned and dried and the femoral head inserted in its appropriate   position with the appropriate markings.  At this point, the hip was relocated   and then subsequently positioned to allow capture inside the constraining   device.  Once this was completed and obtained, the hip was noted to have   excellent stability with full extension, external rotation, sleeping stability   to 80 degrees and flexed 90 stability to 80. pulse lavage was repeated and   then our attempts at capsular closure undertaken.  The capsule was closed   using a #2 FiberWire stitch.  Following this, the short external rotators were   repaired to the posterior aspect of the femur with the same stitch and   oversewn with a 0 PDS.  Prior to this, the Exparel was utilized diffusely   throughout the joint and tissues.  Subsequent to this, the hip was brought   into a native position, pulse lavaged, repeat Betadine soak undertaken and   then closure of the fascia hope with #1 PDS and subcutaneous Quill.  [place]   were utilized on the skin.  The patient awoken from his anesthetic and taken   to the recovery room, tolerated the procedure very well with no signs of   complications.        ______________________________  MD JACOB LY/REAGAN    DD:  09/22/2022 07:56  DT:  09/22/2022 09:03    Job#:  [id number]

## 2022-09-22 NOTE — CARE PLAN
The patient is Stable - Low risk of patient condition declining or worsening    Shift Goals  Clinical Goals: Pt able to ambulate and work with PT this shift  Patient Goals: Adequate pain control, pain control    Progress made toward(s) clinical / shift goals:  Worked with PT. Pt able to ambulates using FWW, steady gait. Pain has been controlled with PRN medication.     Patient is not progressing towards the following goals:      Problem: Discharge Barriers/Planning  Goal: Patient's continuum of care needs are met  9/22/2022 0904 by Alta Carter R.N.  Outcome: Progressing  Flowsheets (Taken 9/22/2022 0904)  Continuum of Care Needs:   Assessed for discharge barriers   Communicated discharge barriers to interdisciplinary tream   Collaborated with Case Management/   Involved patient/family/support system in discharge process   Provided and explained discharge instructions  9/22/2022 0903 by Alta Carter R.N.  Outcome: Progressing     Problem: Post-Operative Hip Replacement  Goal: Patient will demonstrate understanding of post-surgical hip precautions, weight bearing restrictions and DME usage during hospital stay and post discharge  Outcome: Progressing  Goal: Patient's neurovascular status will be maintained or improve  Outcome: Progressing  Goal: Early mobilization post surgery  Outcome: Progressing     Problem: Pain - Post Surgery  Goal: Alleviation or reduction of pain post surgery  Outcome: Progressing     Problem: Incision Care  Goal: Optimal post surgical incision care  Outcome: Progressing     Problem: Respiratory/Oxygenation Function Post-Surgical  Goal: Patient will achieve/maintain normal respiratory rate/effort  Outcome: Progressing

## 2022-09-22 NOTE — PROGRESS NOTES
4 Eyes Skin Assessment Completed by Michell RN and Leroy RN.    Head WDL  Ears WDL  Nose WDL  Mouth WDL  Neck WDL  Breast/Chest WDL  Shoulder Blades WDL  Spine WDL  (R) Arm/Elbow/Hand WDL  (L) Arm/Elbow/Hand WDL  Abdomen WDL  Groin WDL  Scrotum/Coccyx/Buttocks Hip incision SP surgery  (R) Leg WDL  (L) Leg WDL  (R) Heel/Foot/Toe WDL  (L) Heel/Foot/Toe WDL          Devices In Places SCD's, Oxy Mask, and INA's      Interventions In Place N/A    Possible Skin Injury No    Pictures Uploaded Into Epic N/A  Wound Consult Placed N/A  RN Wound Prevention Protocol Ordered No

## 2022-09-22 NOTE — PROGRESS NOTES
1720-Received report from PACU RN.   1747-  Pt arrived to floor via gurney then assisted to bed via slide board.  Assumed care of pt.   Pt is AAOX4, no signs of distress, denies nausea/vomiting.   Pain scale 4/10.  Dressing CDI with dorsiflex and plantar flex both feet, palpable pulses on both LE.   SCD's and polar ice use.   No PT available at this time.  Fall precautions in place, treaded socks on pt, bed in low position.   Call light within reach.   Educated pt to call if needing anything.   Hourly rounding.   1900-Report given to night shift RN.

## 2022-09-22 NOTE — PROGRESS NOTES
Received report from day shift RN.   Assumed care of pt. Assessment and chart check complete.   Pt is AAOX4, no signs of distress, denies nausea/vomiting.   Reports pain after PT, medicated per MAR Dressing CDI with dorsiflex and plantar flex both feet, palpable pulses on both LE.   SCD's and polar ice use.  Encouraged IS.  Fall precautions in place, treaded socks on pt, bed in low position.   Call light within reach.   Educated pt to call if needing anything.   Hourly rounding.

## 2022-09-22 NOTE — PROGRESS NOTES
Received bedside report from day shift RN at 19:15. Assumed pt care.   Pt seen AO4, pain reported at 8/10 will medicate per mar. No nausea reported at this time.   POC discussed. Safety and fall precautions in place.   Instructed pt to use call light, verbalized understanding. Call light kept within reach.  No other needs at this time.   Will continue to monitor.

## 2022-09-22 NOTE — DISCHARGE INSTRUCTIONS
Discharge Instructions    Discharged to home by car with relative. Discharged via wheelchair, hospital escort: Yes.  Special equipment needed: Walker    Be sure to schedule a follow-up appointment with your primary care doctor or any specialists as instructed.     Discharge Plan:        I understand that a diet low in cholesterol, fat, and sodium is recommended for good health. Unless I have been given specific instructions below for another diet, I accept this instruction as my diet prescription.   Other diet: regular    Special Instructions: Discharge instructions for the Orthopedic Patient    Follow up with Primary Care Physician within 2 weeks of discharge to home, regarding:  Review of medications and diagnostic testing.  Surveillance for medical complications.  Workup and treatment of osteoporosis, if appropriate.     -Is this a Hip/Knee/Shoulder Joint Replacement patient? Yes   TOTAL HIP REPLACEMENT, AFTER-CARE GUIDELINES     These instructions provide you with information on caring for yourself and your hip after surgery. Your health care provider may also give you instructions that are more specific. Your treatment was planned and performed according to current medical practices but problems sometimes occur. Call your health care provider if you have any problems or questions.     WHAT TO EXPECT AFTER THE PROCEDURE   After your procedure, your hip will typically be stiff, sore, and bruised. This will improve over time.     Pain   Follow your home pain management plan as discussed with your nurse and as directed by your provider.   It is important to follow any scheduled pain medications for maximal pain relief.   If prescribed opioid medication, the goal is to use opioids only as needed and to wean off prescription pain medicine as soon as possible.   Ice use for pain control.  Put ice in a plastic bag.   Place a towel between your skin and the bag.   Leave the ice on for 20 minutes, 2-3 times a day at a  minimum.   Most patients are off the pain pills by 3 weeks. If your pain continues to be severe, follow up with your provider.     Infection   Deep hip joint infections that require removal of the prostheses occur in less than 1.0% of patients. Lesser infections in the skin (cellulites) are more common and much more easily treated.   Keep the incision as clean and dry as possible.   Always wash your hands before touching your incision.   Avoid dental care for 3 months after surgery. Your provider may recommend taking a dose of antibiotics an hour prior to any dental procedure. After 2 years, most providers recommend antibiotics only before an extensive procedure. Ask your provider what they recommend.   Signs and symptoms of infection include low-grade fever, redness, pain, swelling and drainage from your incision. Notify your provider IMMEDIATELY if you develop ANY of these symptoms.     Post op Disturbances   Bowel Habits - constipation is extremely common and caused by a combination of anesthesia, lack of mobility, dehydration and pain medicine. Use stool softeners or laxatives if necessary. It is important not to ignore this problem as bowel obstructions can be a serious complication after joint replacement surgery.   Mood/Energy Level - Many patients experience a lack of energy and endurance for up to 2-3 months after surgery. Some people feel down and can even become depressed. This is likely due to postoperative anemia, change in activity level, lack of sleep, pain medicine and just the emotional reaction to the surgery itself that is a big disruption in a person’s life. This usually passes. If symptoms persist, follow up with your primary care provider.   Returning to Work - Your provider will give you specific instructions based on your profession. Generally, if you work a sedentary job requiring little standing or walking, most patients may return within 2-6 weeks. Manual labor jobs involving walking,  lifting and standing may take 3-4 months. Your provider’s office can provide a release to part-time or light duty work early on in your recovery and progress you to full duty as able.   Driving - You can begin driving once cleared by your provider, provided you are no longer taking narcotic pain medication or any other medications that impair driving. Discuss the length of time with your doctor as returning to driving will depend on things such as your vehicle, which hip was replaced (right or left) and if you do or do not have post-operative movement precautions.   Avoiding falls - A fall during the first few weeks after surgery can damage your new hip and may result in a need for further surgery.  throw rugs and tack down loose carpeting. Be aware of floor hazards such as pets, small objects or uneven surfaces. Notify your provider of any falls.   Airport Metal Detectors - The sensitivity of metal detectors varies and it is likely that your prosthesis will cause an alarm. Inform the  of your artificial joint.     Diet   Resume your normal diet as tolerated.   It is important to achieve a healthy nutritional status by eating a well-balanced diet on a regular basis.   Your provider may recommend that you take iron and vitamin supplements.   Continue to drink plenty of fluids.     Shower/Bathing   You may shower as soon as you get home from the hospital unless otherwise instructed.   Keep your incision out of water to prevent infection. To keep the incision dry when showering, cover it with a plastic bag or plastic wrap. If your bandage is waterproof, this may not be necessary.   Pat incision dry if it gets wet. Do not rub. Notify your provider.   Do not submerge in a bath until cleared by your provider. Your staples must be out and the incision completely healed.     Dressing Changes: Only change your dressing if directed by your provider.   Wash hands.   Open all dressing change materials.    Remove old dressing and discard.   Inspect incision for signs of irritation or infection including redness, increase in clear drainage, yellow/green drainage, odor and surrounding skin hot to touch. Notify your provider if present.    the new dressing by one corner and lay over the incision. Be careful not to touch the inside of the dressing that will lay over the incision.   Secure in place as instructed.     Swelling/Bruising   Swelling is normal after hip replacement and can involve the thigh, knee, calf and foot.   Swelling can last from 3-6 months.   To reduce swelling, elevate your leg higher than your heart while reclining. The first week you are home you should elevate your leg an equal amount of time as you are active.   The swelling is usually worse after you go home since you are upright for longer periods of time.   Bruising often does not appear until after you arrive home and can be quite dramatic- appearing purple, black, or green. Bruising is typically not concerning and will subside without any treatment.     Blood Clot Prevention   Your treatment plan includes multiple preventative measures to decrease the risk of blood clots in the legs (DVTs) and the less common, but serious, clots that travel to the lungs (pulmonary emboli). Most patients are at standard risk for them, but people who are at higher risk include those who have had previous clots, a family history of clotting, smoking, diabetes, obesity, advanced age, use estrogen and/or live a sedentary lifestyle.     Signs of blood clots in legs include - Swelling in thigh, calf or ankle that does not go down with elevation. Pain, heat and tenderness in calf, back of calf or groin area. NOTE: blood clots can occur in either leg.   Signs of blood clots in lungs include - Sudden increased shortness of breath, sudden onset of chest pain, and localized chest pain with coughing.   If you experience any of the above symptoms, notify your  provider and seek medical attention immediately.   You received anticoagulant therapy (blood thinners) in the hospital. Continue the prescribed blood-thinning medication at home, as directed by your provider.   Your risk for developing a clot continues for up to 2-3 months after surgery. Avoid prolonged sitting and dehydration (long air trips and car trips). If you do take a trip during this time, please get up, move around every 1-1.5 hours, and discuss all travel plans with your provider.     Activity   Once you get home, stay active. The key is not to overdo it. While you can expect some good days and some bad days, you should notice a gradual improvement over time and a gradual increase in endurance over the next 6 to 12 months.     Weight Bearing - You can begin to bear weight as tolerated unless otherwise directed by your provider. Use a walker, crutches or cane to assist with walking until you can walk smoothly (minimal or no limp) without assistance.   Physical Precautions - To assure proper recovery and tissue healing, you may be asked to take special precautions when moving (sitting, bending or sleeping) - usually for the first 6 weeks after surgery. Precautions vary from patient to patient depending on surgical approach used by your provider. Follow any specific precautions provided by your provider and physical therapist until cleared by your provider.   Sitting - Early on it is easier to get up from a tall chair or a chair with arms. The physical therapist will show you how to sit and stand from a chair keeping your affected leg out in front of you. Get up and move around on a regular basis--at least once every hour.   Walking - Walk as much as you like once your doctor gives permission to proceed, but remember that walking is no substitute for your prescribed exercises.  Follow the home exercise program prescribed during your hospital stay as directed by your physical therapist or provider.   Continued  physical therapy may be prescribed after hospital discharge to strengthen your muscles and improve your gait/walking pattern. The decision to have therapy or not after the hospital stay is made by you and your provider prior to surgery or at your follow up appointment.   Swimming is an excellent low impact activity; you can begin as soon as the wound is healed and your provider clears you. Using a pair of training fins may make swimming a more enjoyable and effective exercise.   Sexual activity - Your provider can tell you when it is safe to resume sexual activity.   Other activities - Low impact activities are preferred. If you have specific questions, consult your provider.     When to Call the Doctor   Call the provider if you experience:   Fever over 100.5° F   Increased pain, drainage, redness, odor or heat around the incision area   Shaking chills   Increased knee pain with activity and rest   Increased pain in calf, tenderness or redness above or below the knee   Increased swelling of calf, ankle, foot   Sudden increased shortness of breath, sudden onset of chest pain, localized chest pain with coughing   Incision opening   Or, if there are any questions or concerns about medications or care.     Infection statistic resource:   [email or web address]sones/contents/prosthetic-joint-infection-epidemiology-microbiology-clinical-manifestations-and-diagnosis     -Is this patient being discharged with medication to prevent blood clots?  Yes, Aspirin   Aspirin, ASA oral tablets  What is this medicine?  ASPIRIN (AS pir in) is a pain reliever. It is used to treat mild pain and fever. This medicine is also used as directed by a doctor to prevent and to treat heart attacks, to prevent strokes and blood clots, and to treat arthritis or inflammation.  This medicine may be used for other purposes; ask your health care provider or pharmacist if you have questions.  COMMON BRAND NAME(S): Aspir-Low, Aspir-Erica, Aspirtab, David  Advanced Aspirin, David Aspirin, David Aspirin Extra Strength, David Aspirin Plus, David Extra Strength, David Extra Strength Plus, David Genuine Aspirin, David Womens Aspirin, Bufferin, Bufferin Extra Strength, Bufferin Low Dose  What should I tell my health care provider before I take this medicine?  They need to know if you have any of these conditions:  anemia  asthma  bleeding problems  child with chickenpox, the flu, or other viral infection  diabetes  gout  if you frequently drink alcohol containing drinks  kidney disease  liver disease  low level of vitamin K  lupus  smoke tobacco  stomach ulcers or other problems  an unusual or allergic reaction to aspirin, tartrazine dye, other medicines, dyes, or preservatives  pregnant or trying to get pregnant  breast-feeding  How should I use this medicine?  Take this medicine by mouth with a glass of water. Follow the directions on the package or prescription label. You can take this medicine with or without food. If it upsets your stomach, take it with food. Do not take your medicine more often than directed.  Talk to your pediatrician regarding the use of this medicine in children. While this drug may be prescribed for children as young as 12 years of age for selected conditions, precautions do apply. Children and teenagers should not use this medicine to treat chicken pox or flu symptoms unless directed by a doctor.  Patients over 65 years old may have a stronger reaction and need a smaller dose.  Overdosage: If you think you have taken too much of this medicine contact a poison control center or emergency room at once.  NOTE: This medicine is only for you. Do not share this medicine with others.  What if I miss a dose?  If you are taking this medicine on a regular schedule and miss a dose, take it as soon as you can. If it is almost time for your next dose, take only that dose. Do not take double or extra doses.  What may interact with this medicine?  Do not take  this medicine with any of the following medications:  cidofovir  ketorolac  probenecid  This medicine may also interact with the following medications:  alcohol  alendronate  bismuth subsalicylate  flavocoxid  herbal supplements like feverfew, garlic, baldo, ginkgo biloba, horse chestnut  medicines for diabetes or glaucoma like acetazolamide, methazolamide  medicines for gout  medicines that treat or prevent blood clots like enoxaparin, heparin, ticlopidine, warfarin  other aspirin and aspirin-like medicines  NSAIDs, medicines for pain and inflammation, like ibuprofen or naproxen  pemetrexed  sulfinpyrazone  varicella live vaccine  This list may not describe all possible interactions. Give your health care provider a list of all the medicines, herbs, non-prescription drugs, or dietary supplements you use. Also tell them if you smoke, drink alcohol, or use illegal drugs. Some items may interact with your medicine.  What should I watch for while using this medicine?  If you are treating yourself for pain, tell your doctor or health care professional if the pain lasts more than 10 days, if it gets worse, or if there is a new or different kind of pain. Tell your doctor if you see redness or swelling. Also, check with your doctor if you have a fever that lasts for more than 3 days. Only take this medicine to prevent heart attacks or blood clotting if prescribed by your doctor or health care professional.  Do not take aspirin or aspirin-like medicines with this medicine. Too much aspirin can be dangerous. Always read the labels carefully.  This medicine can irritate your stomach or cause bleeding problems. Do not smoke cigarettes or drink alcohol while taking this medicine. Do not lie down for 30 minutes after taking this medicine to prevent irritation to your throat.  If you are scheduled for any medical or dental procedure, tell your healthcare provider that you are taking this medicine. You may need to stop taking  this medicine before the procedure.  This medicine may be used to treat migraines. If you take migraine medicines for 10 or more days a month, your migraines may get worse. Keep a diary of headache days and medicine use. Contact your healthcare professional if your migraine attacks occur more frequently.  What side effects may I notice from receiving this medicine?  Side effects that you should report to your doctor or health care professional as soon as possible:  allergic reactions like skin rash, itching or hives, swelling of the face, lips, or tongue  breathing problems  changes in hearing, ringing in the ears  confusion  general ill feeling or flu-like symptoms  pain on swallowing  redness, blistering, peeling or loosening of the skin, including inside the mouth or nose  signs and symptoms of bleeding such as bloody or black, tarry stools; red or dark-brown urine; spitting up blood or brown material that looks like coffee grounds; red spots on the skin; unusual bruising or bleeding from the eye, gums, or nose  trouble passing urine or change in the amount of urine  unusually weak or tired  yellowing of the eyes or skin  Side effects that usually do not require medical attention (report to your doctor or health care professional if they continue or are bothersome):  diarrhea or constipation  headache  nausea, vomiting  stomach gas, heartburn  This list may not describe all possible side effects. Call your doctor for medical advice about side effects. You may report side effects to FDA at 8-644-FDA-8257.  Where should I keep my medicine?  Keep out of the reach of children.  Store at room temperature between 15 and 30 degrees C (59 and 86 degrees F). Protect from heat and moisture. Do not use this medicine if it has a strong vinegar smell. Throw away any unused medicine after the expiration date.  NOTE: This sheet is a summary. It may not cover all possible information. If you have questions about this medicine,  talk to your doctor, pharmacist, or health care provider.  © 2020 [employer] (2018-01-30 10:42:13)    -Is this patient being discharged with medication to prevent blood clots?  Yes, Aspirin   Aspirin, ASA oral tablets  What is this medicine?  ASPIRIN (AS pir in) is a pain reliever. It is used to treat mild pain and fever. This medicine is also used as directed by a doctor to prevent and to treat heart attacks, to prevent strokes and blood clots, and to treat arthritis or inflammation.  This medicine may be used for other purposes; ask your health care provider or pharmacist if you have questions.  COMMON BRAND NAME(S): Aspir-Low, Aspir-Erica, Aspirtab, David Advanced Aspirin, David Aspirin, David Aspirin Extra Strength, David Aspirin Plus, David Extra Strength, David Extra Strength Plus, David Genuine Aspirin, David Womens Aspirin, Bufferin, Bufferin Extra Strength, Bufferin Low Dose  What should I tell my health care provider before I take this medicine?  They need to know if you have any of these conditions:  anemia  asthma  bleeding problems  child with chickenpox, the flu, or other viral infection  diabetes  gout  if you frequently drink alcohol containing drinks  kidney disease  liver disease  low level of vitamin K  lupus  smoke tobacco  stomach ulcers or other problems  an unusual or allergic reaction to aspirin, tartrazine dye, other medicines, dyes, or preservatives  pregnant or trying to get pregnant  breast-feeding  How should I use this medicine?  Take this medicine by mouth with a glass of water. Follow the directions on the package or prescription label. You can take this medicine with or without food. If it upsets your stomach, take it with food. Do not take your medicine more often than directed.  Talk to your pediatrician regarding the use of this medicine in children. While this drug may be prescribed for children as young as 12 years of age for selected conditions, precautions do apply.  Children and teenagers should not use this medicine to treat chicken pox or flu symptoms unless directed by a doctor.  Patients over 65 years old may have a stronger reaction and need a smaller dose.  Overdosage: If you think you have taken too much of this medicine contact a poison control center or emergency room at once.  NOTE: This medicine is only for you. Do not share this medicine with others.  What if I miss a dose?  If you are taking this medicine on a regular schedule and miss a dose, take it as soon as you can. If it is almost time for your next dose, take only that dose. Do not take double or extra doses.  What may interact with this medicine?  Do not take this medicine with any of the following medications:  cidofovir  ketorolac  probenecid  This medicine may also interact with the following medications:  alcohol  alendronate  bismuth subsalicylate  flavocoxid  herbal supplements like feverfew, garlic, baldo, ginkgo biloba, horse chestnut  medicines for diabetes or glaucoma like acetazolamide, methazolamide  medicines for gout  medicines that treat or prevent blood clots like enoxaparin, heparin, ticlopidine, warfarin  other aspirin and aspirin-like medicines  NSAIDs, medicines for pain and inflammation, like ibuprofen or naproxen  pemetrexed  sulfinpyrazone  varicella live vaccine  This list may not describe all possible interactions. Give your health care provider a list of all the medicines, herbs, non-prescription drugs, or dietary supplements you use. Also tell them if you smoke, drink alcohol, or use illegal drugs. Some items may interact with your medicine.  What should I watch for while using this medicine?  If you are treating yourself for pain, tell your doctor or health care professional if the pain lasts more than 10 days, if it gets worse, or if there is a new or different kind of pain. Tell your doctor if you see redness or swelling. Also, check with your doctor if you have a fever that  lasts for more than 3 days. Only take this medicine to prevent heart attacks or blood clotting if prescribed by your doctor or health care professional.  Do not take aspirin or aspirin-like medicines with this medicine. Too much aspirin can be dangerous. Always read the labels carefully.  This medicine can irritate your stomach or cause bleeding problems. Do not smoke cigarettes or drink alcohol while taking this medicine. Do not lie down for 30 minutes after taking this medicine to prevent irritation to your throat.  If you are scheduled for any medical or dental procedure, tell your healthcare provider that you are taking this medicine. You may need to stop taking this medicine before the procedure.  This medicine may be used to treat migraines. If you take migraine medicines for 10 or more days a month, your migraines may get worse. Keep a diary of headache days and medicine use. Contact your healthcare professional if your migraine attacks occur more frequently.  What side effects may I notice from receiving this medicine?  Side effects that you should report to your doctor or health care professional as soon as possible:  allergic reactions like skin rash, itching or hives, swelling of the face, lips, or tongue  breathing problems  changes in hearing, ringing in the ears  confusion  general ill feeling or flu-like symptoms  pain on swallowing  redness, blistering, peeling or loosening of the skin, including inside the mouth or nose  signs and symptoms of bleeding such as bloody or black, tarry stools; red or dark-brown urine; spitting up blood or brown material that looks like coffee grounds; red spots on the skin; unusual bruising or bleeding from the eye, gums, or nose  trouble passing urine or change in the amount of urine  unusually weak or tired  yellowing of the eyes or skin  Side effects that usually do not require medical attention (report to your doctor or health care professional if they continue or  are bothersome):  diarrhea or constipation  headache  nausea, vomiting  stomach gas, heartburn  This list may not describe all possible side effects. Call your doctor for medical advice about side effects. You may report side effects to FDA at 8-186-DRC-2344.  Where should I keep my medicine?  Keep out of the reach of children.  Store at room temperature between 15 and 30 degrees C (59 and 86 degrees F). Protect from heat and moisture. Do not use this medicine if it has a strong vinegar smell. Throw away any unused medicine after the expiration date.  NOTE: This sheet is a summary. It may not cover all possible information. If you have questions about this medicine, talk to your doctor, pharmacist, or health care provider.  © 2020 [employer]vier/Gold Standard (2018-01-30 10:42:13)    Is patient discharged on Warfarin / Coumadin?   No      Instructions:  1. DC home on crutches/ walker 2. DC home on home meds per home doses 3. DC home on Percocet, ASA, and Outpt PT. 4. Follow up [facility] 10-14 days 5. Call for Fevers, drainage, redness, shortness of breath, or increasing lower extremity swelling particularly in the calf. 6. Start Aspirin 325mg DAILY FOR 35 DAYS. 7. CHANGE BANDAGE 5-7 DAYS, KEEP INCISION COVERED FOR SHOWERS AND DRY. Call if drainage or wetness.

## 2022-09-22 NOTE — THERAPY
Physical Therapy   Initial Evaluation     Patient Name: Amadou Parekh  Age:  77 y.o., Sex:  male  Medical Record #: [id number]  Today's Date: 9/22/2022     Precautions  Precautions: Posterior Hip Precautions;No Weight Bearing Restrictions Left Lower Extremity    Assessment    Patient is 77 y.o. male who was recently seen s/p revision of L ZECHARIAH. Pt reports of multiple dislocations of L ZECHARIAH prior to revision. Pt was educated on elevation, icing, positioning, supine therapeutic exercises, and posterior hip precautions. Pt was provided with education and compensatory strategies in order to maintain posterior hip precautions during bed mobility, sit<>stands, and transitions. Pt reports prior dislocation was when he attempted to put his L LE on the trunk of his car and attempt to tie his shoes. Pt was provided with demonstration on how certain positions can lead to breaking of posterior hip precautions. Pt was provided with VC, compensatory strategies, demonstration, and postioning for bed mobility, sit<>stands, and standing mechanics that will allow his to maintain posterior hip precautions. Pt was able to return demo correct mechanics throughout session and was able to verbalize all compensatory strategies and in order to maintain posterior hip precautions. Pt was able to return demo correct mechanics during sit<>stands, ambulation, and transfers. Pt reports of no concerns to go up/down steps and was able to verbalize correct mechanics. Pt is in no acute skilled PT needs at this time, anticipate pt to d/c home once medically clear. Recommend outpatient physical therapy services to address higher level deficits.    Plan    Recommend Physical Therapy for Evaluation only     DC Equipment Recommendations: (P) Front-Wheel Walker  Discharge Recommendations: (P) Recommend outpatient physical therapy services to address higher level deficits     Objective       09/22/22 0800   Initial Contact Note    Initial Contact Note Order  Received and Verified, Evaluation Only - Patient Does Not Require Further Acute Physical Therapy at this Time.  However, May Benefit from Post Acute Therapy for Higher Level Functional Deficits.   Precautions   Precautions Posterior Hip Precautions;No Weight Bearing Restrictions Left Lower Extremity   Pain 0 - 10 Group   Therapist Pain Assessment Nurse Notified;0   Prior Living Situation   Prior Services None   Housing / Facility 2 Story House   Steps Into Home 2   Steps In Home 14   Rail Right Rail (Steps into Home)   Equipment Owned Crutches;Single Point Cane;Front-Wheel Walker   Lives with - Patient's Self Care Capacity Significant Other;Spouse   Comments pt states spouse will be able to assist upon d/c to home   Prior Level of Functional Mobility   Bed Mobility Independent   Transfer Status Independent   Ambulation Independent   Distance Ambulation (Feet)   (community)   Assistive Devices Used None   Stairs Independent   History of Falls   History of Falls No   Cognition    Cognition / Consciousness WDL   Level of Consciousness Alert   Comments pleasant/cooperative   Passive ROM Lower Body   Passive ROM Lower Body Not Tested   Active ROM Lower Body    Active ROM Lower Body  Not Tested   Strength Lower Body   Lower Body Strength  X   Comments limited in L hip due to discomfort; able to demo functional strength in B LE   Sensation Lower Body   Lower Extremity Sensation   WDL   Lower Body Muscle Tone   Lower Body Muscle Tone  WDL   Strength Upper Body   Upper Body Strength  WDL   Upper Body Muscle Tone   Upper Body Muscle Tone  WDL   Neurological Concerns   Neurological Concerns No   Coordination Upper Body   Coordination WDL   Coordination Lower Body    Coordination Lower Body  WDL   Balance Assessment   Sitting Balance (Static) Good   Sitting Balance (Dynamic) Fair +   Standing Balance (Static) Good   Standing Balance (Dynamic) Fair +   Weight Shift Sitting Good   Weight Shift Standing Fair   Comments w/fww    Gait Analysis   Gait Level Of Assist Supervised   Assistive Device Front Wheel Walker   Distance (Feet) 150   # of Times Distance was Traveled 1   Deviation Antalgic   Weight Bearing Status WBAT L LE   Comments pt reports he has no concerns to go up/down steps; able to verbalize correct mechanics from prior ZECHARIAH   Bed Mobility    Supine to Sit Supervised   Sit to Supine Supervised   Functional Mobility   Sit to Stand Supervised   Bed, Chair, Wheelchair Transfer Supervised   Toilet Transfers Supervised   Transfer Method Stand Step   Mobility EOB, sit<>stand, ambulation, toilet transfer, transfer to chair   Comments cues and reminder for posterior hip precautions during transitions   How much difficulty does the patient currently have...   Turning over in bed (including adjusting bedclothes, sheets and blankets)? 4   Sitting down on and standing up from a chair with arms (e.g., wheelchair, bedside commode, etc.) 4   Moving from lying on back to sitting on the side of the bed? 4   How much help from another person does the patient currently need...   Moving to and from a bed to a chair (including a wheelchair)? 4   Need to walk in a hospital room? 4   Climbing 3-5 steps with a railing? 3   6 clicks Mobility Score 23   Activity Tolerance   Sitting in Chair functional   Sitting Edge of Bed 5 mins   Standing 10 mins   Comments no adverse events noted   Edema / Skin Assessment   Edema / Skin  Not Assessed   Education Group   Education Provided Hip Precautions Posterior;Role of Physical Therapist   Hip Precautions Posterior Patient Response Patient;Acceptance;Explanation;Demonstration;Verbal Demonstration;Action Demonstration;Reinforcement Needed;Handout   Role of Physical Therapist Patient Response Patient;Acceptance;Explanation;Demonstration;Verbal Demonstration;Action Demonstration   Anticipated Discharge Equipment and Recommendations   DC Equipment Recommendations Front-Wheel Walker   Discharge Recommendations  Recommend outpatient physical therapy services to address higher level deficits   Interdisciplinary Plan of Care Collaboration   IDT Collaboration with  Nursing   Patient Position at End of Therapy Seated;Tray Table within Reach;Call Light within Reach;Phone within Reach   Collaboration Comments aware of visit and recs   Session Information   Date / Session Number  9/22 eval only   Priority 0

## 2022-09-22 NOTE — CARE PLAN
The patient is Stable - Low risk of patient condition declining or worsening    Shift Goals  Clinical Goals: Pt will be able to manage pain during this shift  Patient Goals: Pt will be able to ambulate during this shift    Progress made toward(s) clinical / shift goals:  Administered pain meds per mar, cold therapy in place, pt reported relief from pain post interventions. Pt was able to ambulate during this shift.    Patient is not progressing towards the following goals: N/a

## 2022-10-03 DIAGNOSIS — I10 ESSENTIAL HYPERTENSION: ICD-10-CM

## 2022-10-03 DIAGNOSIS — E78.2 MIXED HYPERLIPIDEMIA: ICD-10-CM

## 2022-10-03 NOTE — TELEPHONE ENCOUNTER
Last seen: 05.06.2022 by Dr. Mccarthy  Next appt: None    Was the patient seen in the last year in this department? Yes   Does patient have an active prescription for medications requested? No   Received Request Via: Pharmacy

## 2022-10-10 RX ORDER — HYDROCHLOROTHIAZIDE 12.5 MG/1
TABLET ORAL
Qty: 30 TABLET | Refills: 0 | Status: SHIPPED | OUTPATIENT
Start: 2022-10-10 | End: 2022-11-08 | Stop reason: SDUPTHER

## 2022-10-10 NOTE — DISCHARGE SUMMARY
Independent with mobility, pain controlled  S/P revision ZECHARIAH    Plan:  1. DC home on crutches/ walker  2. DC home on home meds per home doses  3. DC home on Percocet, ASA, and Outpt PT.  4. Follow up [facility] 10-14 days  5. Call for Fevers, drainage, redness, shortness of breath, or increasing lower extremity swelling particularly in the calf.  6. Start Aspirin 325mg DAILY FOR 35 DAYS.  7. CHANGE BANDAGE 5-7 DAYS, KEEP INCISION COVERED FOR SHOWERS AND DRY. Call if drainage or wetness.

## 2022-10-27 LAB — PATHOLOGY CONSULT NOTE: NORMAL

## 2022-11-01 DIAGNOSIS — E03.4 HYPOTHYROIDISM DUE TO ACQUIRED ATROPHY OF THYROID: ICD-10-CM

## 2022-11-01 NOTE — TELEPHONE ENCOUNTER
Synthroid Refill     Last seen: 5/6/22 by Dr. Mccarthy  Next appt: None    Was the patient seen in the last year in this department? Yes   Does patient have an active prescription for medications requested? No   Received Request Via: Pharmacy

## 2022-11-02 ENCOUNTER — PATIENT MESSAGE (OUTPATIENT)
Dept: HEALTH INFORMATION MANAGEMENT | Facility: OTHER | Age: 77
End: 2022-11-02

## 2022-11-02 ENCOUNTER — OFFICE VISIT (OUTPATIENT)
Dept: URGENT CARE | Facility: PHYSICIAN GROUP | Age: 77
End: 2022-11-02
Payer: COMMERCIAL

## 2022-11-02 VITALS
RESPIRATION RATE: 18 BRPM | DIASTOLIC BLOOD PRESSURE: 64 MMHG | SYSTOLIC BLOOD PRESSURE: 128 MMHG | OXYGEN SATURATION: 97 % | HEART RATE: 74 BPM | TEMPERATURE: 98.1 F | HEIGHT: 72 IN | BODY MASS INDEX: 25.73 KG/M2 | WEIGHT: 190 LBS

## 2022-11-02 DIAGNOSIS — J02.9 SORE THROAT: ICD-10-CM

## 2022-11-02 PROBLEM — S84.12XA INJURY OF LEFT PERONEAL NERVE: Status: ACTIVE | Noted: 2022-10-24

## 2022-11-02 LAB
EXTERNAL QUALITY CONTROL: NORMAL
INT CON NEG: NORMAL
INT CON POS: NORMAL
SARS-COV+SARS-COV-2 AG RESP QL IA.RAPID: NEGATIVE

## 2022-11-02 PROCEDURE — 99212 OFFICE O/P EST SF 10 MIN: CPT

## 2022-11-02 PROCEDURE — 87426 SARSCOV CORONAVIRUS AG IA: CPT

## 2022-11-02 RX ORDER — LEVOTHYROXINE SODIUM 137 MCG
TABLET ORAL
Qty: 90 TABLET | Refills: 0 | Status: SHIPPED | OUTPATIENT
Start: 2022-11-02 | End: 2022-12-09 | Stop reason: SDUPTHER

## 2022-11-02 ASSESSMENT — FIBROSIS 4 INDEX: FIB4 SCORE: 1.65

## 2022-11-02 NOTE — PROGRESS NOTES
Subjective:   Amadou Parekh is a 77 y.o. male who presents for Nasal Congestion (X1 day, cough, sore throat )      HPI:    Patient presents to urgent care with complaints of nasal congestion, sore throat.   Onset was one day ago, returned from [place] yesterday. He was in the [place] area for 4 days. He reports he worse a mask on the plane.   Mild improvement with otc decongestants, fluids, rest.  Denies ever, chills, night sweats, nausea, vomiting, diarrhea  Patient is vaccinated and boosted for Covid, Flu  Would like to be covid tested. No home covid tests at this time.    ROS As above in HPI    Medications:    Current Outpatient Medications on File Prior to Visit   Medication Sig Dispense Refill    hydroCHLOROthiazide (HYDRODIURIL) 12.5 MG tablet TAKE ONE TABLET BY MOUTH ONCE DAILY 30 Tablet 0    atorvastatin (LIPITOR) 10 MG Tab Take 1 Tablet by mouth every day. 30 Tablet 3    gabapentin (NEURONTIN) 600 MG tablet Take 600 mg by mouth 3 times a day.      tadalafil (CIALIS) 5 MG tablet Take 5 mg by mouth every day. Taking for prostate vs ED per pt      SYNTHROID 137 MCG Tab Take 1 Tablet by mouth every morning on an empty stomach. 90 Tablet 3    Multiple Vitamin (MULTI-VITAMIN) Tab Take 1 Tablet by mouth every day.      Cholecalciferol (VITAMIN D-1000 MAX ST) 1000 UNIT Tab Take 2,000 Units by mouth every day.      b complex vitamins tablet Take 1 Tab by mouth every day.      famotidine (PEPCID) 20 MG Tab Take 1 Tablet by mouth 2 times a day. (Patient not taking: Reported on 11/2/2022) 60 Tablet 0    senna-docusate (PERICOLACE OR SENOKOT S) 8.6-50 MG Tab Take 2 Tablets by mouth 2 times a day. (Patient not taking: Reported on 11/2/2022) 30 Tablet 0    polyethylene glycol/lytes (MIRALAX) 17 g Pack Take 1 Packet by mouth 1 time a day as needed (if sennosides and docusate ineffective after 24 hours). (Patient not taking: Reported on 11/2/2022) 15 Each 3    aspirin (ASA) 325 MG Tab Take 325 mg by mouth every day.        No current facility-administered medications on file prior to visit.        Allergies:   Patient has no known allergies.    Problem List:   Patient Active Problem List   Diagnosis    Bilateral stenosis of lateral recess of lumbar spine    Chronic infection of sinus    Primary hypertension    Hypothyroidism    Impotence    Chronic lumbar radiculopathy    Spondylolysis, lumbar region    Low back pain    Peripheral polyneuropathy    Bilateral hearing loss    Chronic pain syndrome    Left foot drop    Pain of left lower extremity    Induration penis plastica    Asymptomatic microscopic hematuria    Peroneal pain    Dyslipidemia    Osteoarthritis    Lumbar degenerative disc disease    Renal insufficiency    Arthritis of hip    Anterior dislocation of left hip, initial encounter ([facility])    Hypokalemia    Hypocalcemia    Hypertensive urgency    Injury of left peroneal nerve        Surgical History:  Past Surgical History:   Procedure Laterality Date    HIP REVISION TOTAL Left 9/21/2022    Procedure: LEFT REVISION TOTAL HIP ARTHROPLASTY AND REPAIRS AS INDICATED;  Surgeon: Luis Caceres M.D.;  Location: [facility];  Service: Orthopedics    ND TOTAL HIP ARTHROPLASTY Left 8/15/2022    Procedure: LEFT TOTAL HIP ARTHROPLASTY;  Surgeon: Luis Caceres M.D.;  Location: [facility];  Service: Orthopedics    PB IMPLANT NEUROSTIM/  1/3/2022    Procedure: REMOVAL, NEUROSTIMULATOR, PERMANENT, SPINAL CORD;  Surgeon: Be Webber M.D.;  Location: [facility];  Service: Neurosurgery    LUMBAR FUSION O-ARM  1/3/2022    Procedure: FUSION, SPINE, LUMBAR, WITH O-ARM IMAGING GUIDANCE - L1-3 EXTENSION-STAGE 2;  Surgeon: Be Webber M.D.;  Location: [facility];  Service: Neurosurgery    LUMBAR DECOMPRESSION  1/3/2022    Procedure: DECOMPRESSION, SPINE, LUMBAR;  Surgeon: Be Webber M.D.;  Location: [facility];  Service: Neurosurgery    LUMBAR FUSION ANTERIOR N/A 12/31/2021     "Procedure: FUSION, SPINE, LUMBAR, ANTERIOR APPROACH - L5-S1;  Surgeon: Be Webber M.D.;  Location: [facility];  Service: Neurosurgery    FUSION, SPINE, LUMBAR, ROBOT-ASSISTED WITH IMAGING GUIDANCE  2/27/2021    Procedure: FUSION, SPINE, LUMBAR, ROBOT-ASSISTED WITH IMAGING GUIDANCE - L5-S1 EXTENSION OF TLIF;  Surgeon: Be Webber M.D.;  Location: [facility];  Service: Neurosurgery    LUMBAR DECOMPRESSION N/A 2/27/2021    Procedure: DECOMPRESSION, SPINE, LUMBAR;  Surgeon: Be Webber M.D.;  Location: [facility];  Service: Neurosurgery    PB IMPLANT NEUROSTIM/  5/22/2020    Procedure: INSERTION, NEUROSTIMULATOR, PERMANENT, SPINAL CORD;  Surgeon: Eugenio Camara M.D.;  Location: [facility];  Service: Pain Management    PEYRONIES PLAQUE  12/6/2019    Procedure: EXCISION, PEYRONIE'S PLAQUE, PENIS WITH GRAFTING, JEAN CLAUDE PLICATION, ARTIFICIAL ERECTION;  Surgeon: Tejinder Culver M.D.;  Location: [facility];  Service: Urology    [place] NERVOUS SYSTEM SURGERY UNLISTED Left 6/3/2019    Procedure: EXPLORATION, NERVE- PERONEAL NERVE RELEASE AT THE FIBULAR HEAD  ;  Surgeon: Raz Garcia M.D.;  Location: [facility];  Service: Neurosurgery    FUSION, PIP JOINT, TOE Right 2/22/2019    Procedure: PIP ARTHRODESIS;  Surgeon: Amadou Bowden M.D.;  Location: SURGERY SAME DAY [facility];  Service: Orthopedics    FINGER EXPLORATION Right 2/22/2019    Procedure: FINGER EXPLORATION - RING FINGER DISTAL INTERPHALANGEAL JOINT;  Surgeon: Amadou Bowden M.D.;  Location: SURGERY SAME DAY [facility];  Service: Orthopedics    COLONOSCOPY  2019    OTHER NEUROLOGICAL SURG  12/12/2018    \"Low Back Surgery'sx6 between 2006 & 2017\".    CYST EXCISION Right 10/12/2018    Procedure: CYST EXCISION - RING FINGER MUCOUS CYST, DISTAL INTERPHALANGEAL JOINT;  Surgeon: Amadou Bowden M.D.;  Location: SURGERY SAME DAY [facility];  Service: Orthopedics    LUMBAR " LAMINECTOMY DISKECTOMY Left 12/6/2017    Procedure: LUMBAR LAMINECTOMY DISKECTOMY - POSTERIOR REDO LEFT  L4-S1 KAILA;  Surgeon: Be Webber M.D.;  Location: [facility];  Service: Neurosurgery    FUSION, SPINE, LUMBAR, PLIF  10/5/2017    Procedure: POSTERIOR TRANSFORAMINAL INTERBODY FUSION AT LUMBAR 4 - 5;  Surgeon: Be Webber M.D.;  Location: [facility];  Service: Neurosurgery    LUMBAR DECOMPRESSION  10/5/2017    Procedure: POSTERIOR LUMBAR 3-4,  4-5 DECOMPRESSION AND FUSION;  Surgeon: Be Webber M.D.;  Location: [facility];  Service: Neurosurgery    SC INJ DX/THER AGNT PARAVERT FACET JOINT, BRITT* Right 10/6/2016    Procedure: INJ PARA FACET L/S 1 LVL W/IG - L3-4, L4-5, L5-S1;  Surgeon: Eugenio Camara M.D.;  Location: [facility];  Service: Pain Management    SC INJ DX/THER AGNT PARAVERT FACET JOINT, BRITT*  10/6/2016    Procedure: INJ PARA FACET L/S 2D LVL W/IG;  Surgeon: Eugenio Camara M.D.;  Location: [facility];  Service: Pain Management    SC INJ DX/THER AGNT PARAVERT FACET JOINT, BRITT*  10/6/2016    Procedure: INJ PARA FACET L/S 3D LVL W/IG;  Surgeon: Eugenio Camara M.D.;  Location: [facility];  Service: Pain Management    SC NJX AA&/STRD TFRML EPI LUMBAR/SACRAL 1 LEVEL Right 8/18/2016    Procedure: INJ-FORAMEN EPI LUM/SAC SNGL - L5-S1;  Surgeon: Eugenio Camara M.D.;  Location: [facility];  Service: Pain Management    LUMBAR LAMINECTOMY DISKECTOMY Right 3/12/2016    Procedure: LUMBAR HemiLAMINECTOMY Micro DISKECTOMY posterior Redo L3-4 ;  Surgeon: Be Webber M.D.;  Location: [facility];  Service:     FORAMINOTOMY Right 3/12/2016    Procedure: FORAMINOTOMY;  Surgeon: Be Webber M.D.;  Location: [facility];  Service:     CERVICAL LAMINECTOMY POSTERIOR  2011    DENTAL SURGERY Bilateral as a teenager    wisdom teeth    OTHER      nasal surgery 2015    OTHER NEUROLOGICAL SURG  2006,  2010, 2012, 2014, 2016, 2017    low back surgery x 5    TONSILLECTOMY      child       Past Social Hx:   Social History     Tobacco Use    Smoking status: Never    Smokeless tobacco: Never   Vaping Use    Vaping Use: Never used   Substance Use Topics    Alcohol use: No    Drug use: No          Problem list, medications, and allergies reviewed by myself today in Epic.     Objective:     /64 (BP Location: Left arm, Patient Position: Sitting, BP Cuff Size: Adult)   Pulse 74   Temp 36.7 °C (98.1 °F) (Temporal)   Resp 18   Ht 1.829 m (6')   Wt 86.2 kg (190 lb)   SpO2 97%   BMI 25.77 kg/m²     Physical Exam  Vitals and nursing note reviewed.   Constitutional:       General: He is not in acute distress.     Appearance: Normal appearance. He is not ill-appearing or diaphoretic.   HENT:      Head: Normocephalic and atraumatic.      Right Ear: Ear canal normal. Tympanic membrane is not erythematous or bulging.      Left Ear: Ear canal normal. Tympanic membrane is not erythematous or bulging.      Ears:      Comments: Bilateral hearing aids in place     Nose: Congestion and rhinorrhea present. Rhinorrhea is clear.      Right Sinus: No maxillary sinus tenderness or frontal sinus tenderness.      Left Sinus: No maxillary sinus tenderness or frontal sinus tenderness.      Mouth/Throat:      Mouth: Mucous membranes are moist.      Pharynx: Oropharynx is clear. Uvula midline. Posterior oropharyngeal erythema present. No pharyngeal swelling or oropharyngeal exudate.      Tonsils: No tonsillar exudate.      Comments: Cobblestone appearance of posterior pharynx  Eyes:      Conjunctiva/sclera: Conjunctivae normal.      Pupils: Pupils are equal, round, and reactive to light.   Cardiovascular:      Rate and Rhythm: Normal rate and regular rhythm.      Heart sounds: Normal heart sounds.   Pulmonary:      Effort: No respiratory distress.      Breath sounds: Normal breath sounds and air entry. No decreased breath sounds,  wheezing, rhonchi or rales.   Chest:      Chest wall: No tenderness.   Abdominal:      General: Bowel sounds are normal.      Palpations: Abdomen is soft.   Skin:     General: Skin is warm and dry.      Capillary Refill: Capillary refill takes less than 2 seconds.      Findings: No rash.   Neurological:      Mental Status: He is oriented to person, place, and time.       Assessment/Plan:     Results for orders placed or performed in visit on 11/02/22   POCT SARS-COV Antigen HERMELINDA (Symptomatic only)   Result Value Ref Range    Internal  Valid     SARS-COV ANTIGEN HERMELINDA Negative Negative, Indeterminate, None Detected, Not Detected, Detected, NotDetected, Valid, Invalid, Pass    Internal Control Positive Valid     Internal Control Negative Valid          Diagnosis and associated orders:   1. Sore throat  - POCT SARS-COV Antigen HERMELINDA (Symptomatic only)      Comments/MDM:     Rapid covid is negative. Patient declined symptomatic treatment. Supportive measures encouraged: Rest, increased oral hydration, NSAIDs/tylenol as needed per package instructions, decongestant, warm humidification, otc cough suppressant as needed. Patient also declined influenza testing. He is encouraged to follow up with his PCP.  Patient verbalized understanding and consented to plan of care.         Pt is clinically stable at today's acute urgent care visit.  No acute distress noted. Appropriate for outpatient management at this time.       Discussed DDx, management options (risks,benefits, and alternatives to planned treatment), natural progression and supportive care.  Expressed understanding and the treatment plan was agreed upon. Questions were encouraged and answered   Return to urgent care prn if new or worsening sx or if there is no improvement in condition prn.    Educated in Red flags and indications to immediately call 911 or present to the Emergency Department.   Advised the patient to follow-up with the primary care  physician for recheck, reevaluation, and consideration of further management.      Please note that this dictation was created using voice recognition software. I have made a reasonable attempt to correct obvious errors, but I expect that there are errors of grammar and possibly content that I did not discover before finalizing the note.    This note was electronically signed by Sirisha Davis DNP

## 2022-11-05 ENCOUNTER — OFFICE VISIT (OUTPATIENT)
Dept: URGENT CARE | Facility: PHYSICIAN GROUP | Age: 77
End: 2022-11-05
Payer: COMMERCIAL

## 2022-11-05 VITALS
OXYGEN SATURATION: 95 % | BODY MASS INDEX: 25.65 KG/M2 | DIASTOLIC BLOOD PRESSURE: 70 MMHG | HEART RATE: 74 BPM | HEIGHT: 72 IN | TEMPERATURE: 96.8 F | SYSTOLIC BLOOD PRESSURE: 122 MMHG | RESPIRATION RATE: 16 BRPM | WEIGHT: 189.38 LBS

## 2022-11-05 DIAGNOSIS — J01.00 ACUTE NON-RECURRENT MAXILLARY SINUSITIS: ICD-10-CM

## 2022-11-05 DIAGNOSIS — I10 ESSENTIAL HYPERTENSION: ICD-10-CM

## 2022-11-05 DIAGNOSIS — R09.81 NASAL CONGESTION: ICD-10-CM

## 2022-11-05 DIAGNOSIS — R05.1 ACUTE COUGH: ICD-10-CM

## 2022-11-05 DIAGNOSIS — E78.2 MIXED HYPERLIPIDEMIA: ICD-10-CM

## 2022-11-05 DIAGNOSIS — J02.9 SORE THROAT: ICD-10-CM

## 2022-11-05 PROCEDURE — 87426 SARSCOV CORONAVIRUS AG IA: CPT | Performed by: PHYSICIAN ASSISTANT

## 2022-11-05 PROCEDURE — 99213 OFFICE O/P EST LOW 20 MIN: CPT | Performed by: PHYSICIAN ASSISTANT

## 2022-11-05 RX ORDER — DOXYCYCLINE 100 MG/1
100 CAPSULE ORAL 2 TIMES DAILY
Qty: 10 CAPSULE | Refills: 0 | Status: SHIPPED | OUTPATIENT
Start: 2022-11-05 | End: 2022-11-10

## 2022-11-05 ASSESSMENT — FIBROSIS 4 INDEX: FIB4 SCORE: 1.65

## 2022-11-05 NOTE — PROGRESS NOTES
Subjective:   Amadou Parekh is a 77 y.o. male who presents for Cough (Cough, now coughing up green mucus. Concerned of pneumonia.)  Patient presents for follow-up with persistent symptoms.  Seen on November 2 with nasal congestion and sore throat.  COVID test was negative.  Today reports worsening cough associated with purulent nasal discharge.  Select throat has resolved.  He denies shortness of breath.  He continues to use decongestions.  He denies fever chills or body aches.  Did have quite a bit of sinus pressure and pain.        Medications:  aspirin Tabs  atorvastatin Tabs  b complex vitamins  famotidine Tabs  gabapentin  hydroCHLOROthiazide  Multi-Vitamin Tabs  polyethylene glycol/lytes Pack  senna-docusate Tabs  Synthroid Tabs  tadalafil  Vitamin D-1000 Max St Tabs    Allergies:             Patient has no known allergies.    Surgical History:         Past Surgical History:   Procedure Laterality Date    HIP REVISION TOTAL Left 9/21/2022    Procedure: LEFT REVISION TOTAL HIP ARTHROPLASTY AND REPAIRS AS INDICATED;  Surgeon: Luis Caceres M.D.;  Location: [facility];  Service: Orthopedics    AL TOTAL HIP ARTHROPLASTY Left 8/15/2022    Procedure: LEFT TOTAL HIP ARTHROPLASTY;  Surgeon: Luis Caceres M.D.;  Location: [facility];  Service: Orthopedics    PB IMPLANT NEUROSTIM/  1/3/2022    Procedure: REMOVAL, NEUROSTIMULATOR, PERMANENT, SPINAL CORD;  Surgeon: Be Webber M.D.;  Location: [facility];  Service: Neurosurgery    LUMBAR FUSION O-ARM  1/3/2022    Procedure: FUSION, SPINE, LUMBAR, WITH O-ARM IMAGING GUIDANCE - L1-3 EXTENSION-STAGE 2;  Surgeon: Be Webber M.D.;  Location: [facility];  Service: Neurosurgery    LUMBAR DECOMPRESSION  1/3/2022    Procedure: DECOMPRESSION, SPINE, LUMBAR;  Surgeon: Be Webber M.D.;  Location: [facility];  Service: Neurosurgery    LUMBAR FUSION ANTERIOR N/A 12/31/2021    Procedure: FUSION, SPINE, LUMBAR, ANTERIOR  "APPROACH - L5-S1;  Surgeon: Be Webber M.D.;  Location: [facility];  Service: Neurosurgery    FUSION, SPINE, LUMBAR, ROBOT-ASSISTED WITH IMAGING GUIDANCE  2/27/2021    Procedure: FUSION, SPINE, LUMBAR, ROBOT-ASSISTED WITH IMAGING GUIDANCE - L5-S1 EXTENSION OF TLIF;  Surgeon: Be Webber M.D.;  Location: [facility];  Service: Neurosurgery    LUMBAR DECOMPRESSION N/A 2/27/2021    Procedure: DECOMPRESSION, SPINE, LUMBAR;  Surgeon: Be Webber M.D.;  Location: [facility];  Service: Neurosurgery    PB IMPLANT NEUROSTIM/  5/22/2020    Procedure: INSERTION, NEUROSTIMULATOR, PERMANENT, SPINAL CORD;  Surgeon: Eugenio Camara M.D.;  Location: [facility];  Service: Pain Management    PEYRONIES PLAQUE  12/6/2019    Procedure: EXCISION, PEYRONIE'S PLAQUE, PENIS WITH GRAFTING, JAEN CLAUDE PLICATION, ARTIFICIAL ERECTION;  Surgeon: Tejinder Culver M.D.;  Location: [facility];  Service: Urology    PA NERVOUS SYSTEM SURGERY UNLISTED Left 6/3/2019    Procedure: EXPLORATION, NERVE- PERONEAL NERVE RELEASE AT THE FIBULAR HEAD  ;  Surgeon: Raz Garcia M.D.;  Location: [facility];  Service: Neurosurgery    FUSION, PIP JOINT, TOE Right 2/22/2019    Procedure: PIP ARTHRODESIS;  Surgeon: Amadou Bowden M.D.;  Location: SURGERY SAME DAY [facility];  Service: Orthopedics    FINGER EXPLORATION Right 2/22/2019    Procedure: FINGER EXPLORATION - RING FINGER DISTAL INTERPHALANGEAL JOINT;  Surgeon: Amadou Bowden M.D.;  Location: SURGERY SAME DAY [facility];  Service: Orthopedics    COLONOSCOPY  2019    OTHER NEUROLOGICAL SURG  12/12/2018    \"Low Back Surgery'sx6 between 2006 & 2017\".    CYST EXCISION Right 10/12/2018    Procedure: CYST EXCISION - RING FINGER MUCOUS CYST, DISTAL INTERPHALANGEAL JOINT;  Surgeon: Amadou Bowden M.D.;  Location: SURGERY SAME DAY [facility];  Service: Orthopedics    LUMBAR LAMINECTOMY DISKECTOMY Left 12/6/2017    " Procedure: LUMBAR LAMINECTOMY DISKECTOMY - POSTERIOR REDO LEFT  L4-S1 KAILA;  Surgeon: Be Webber M.D.;  Location: [facility];  Service: Neurosurgery    FUSION, SPINE, LUMBAR, PLIF  10/5/2017    Procedure: POSTERIOR TRANSFORAMINAL INTERBODY FUSION AT LUMBAR 4 - 5;  Surgeon: Be Webber M.D.;  Location: [facility];  Service: Neurosurgery    LUMBAR DECOMPRESSION  10/5/2017    Procedure: POSTERIOR LUMBAR 3-4,  4-5 DECOMPRESSION AND FUSION;  Surgeon: Be Webber M.D.;  Location: [facility];  Service: Neurosurgery    NV INJ DX/THER AGNT PARAVERT FACET JOINT, BRITT* Right 10/6/2016    Procedure: INJ PARA FACET L/S 1 LVL W/IG - L3-4, L4-5, L5-S1;  Surgeon: Eugenio Camara M.D.;  Location: [facility];  Service: Pain Management    NV INJ DX/THER AGNT PARAVERT FACET JOINT, BRITT*  10/6/2016    Procedure: INJ PARA FACET L/S 2D LVL W/IG;  Surgeon: Eugenio Camara M.D.;  Location: [facility];  Service: Pain Management    NV INJ DX/THER AGNT PARAVERT FACET JOINT, BRITT*  10/6/2016    Procedure: INJ PARA FACET L/S 3D LVL W/IG;  Surgeon: Eugenio Camara M.D.;  Location: [facility];  Service: Pain Management    NV NJX AA&/STRD TFRML EPI LUMBAR/SACRAL 1 LEVEL Right 8/18/2016    Procedure: INJ-FORAMEN EPI LUM/SAC SNGL - L5-S1;  Surgeon: Eugenio Camara M.D.;  Location: [facility];  Service: Pain Management    LUMBAR LAMINECTOMY DISKECTOMY Right 3/12/2016    Procedure: LUMBAR HemiLAMINECTOMY Micro DISKECTOMY posterior Redo L3-4 ;  Surgeon: Be Webber M.D.;  Location: [facility];  Service:     FORAMINOTOMY Right 3/12/2016    Procedure: FORAMINOTOMY;  Surgeon: Be Webber M.D.;  Location: [facility];  Service:     CERVICAL LAMINECTOMY POSTERIOR  2011    DENTAL SURGERY Bilateral as a teenager    wisdom teeth    OTHER      nasal surgery 2015    OTHER NEUROLOGICAL SURG  2006, 2010, 2012, 2014, 2016, 2017    low back  surgery x 5    TONSILLECTOMY      child       Past Social Hx:  Amadou Parkeh  reports that he has never smoked. He has never used smokeless tobacco. He reports that he does not drink alcohol and does not use drugs.     Past Family Hx:   Amadou Parekh family history includes Cancer in his father and mother; Diabetes in his father; Heart Disease in his father and mother; Stroke in his father.       Problem list, medications, and allergies reviewed by myself today in Epic.     Objective:     /70 (BP Location: Left arm, Patient Position: Sitting, BP Cuff Size: Small adult)   Pulse 74   Temp 36 °C (96.8 °F) (Temporal)   Resp 16   Ht 1.829 m (6')   Wt 85.9 kg (189 lb 6 oz)   SpO2 95%   BMI 25.68 kg/m²     Physical Exam  Vitals and nursing note reviewed.   Constitutional:       General: He is not in acute distress.     Appearance: Normal appearance. He is well-developed. He is not ill-appearing, toxic-appearing or diaphoretic.   HENT:      Head: Normocephalic.      Jaw: No trismus.      Right Ear: Ear canal and external ear normal. No drainage. A middle ear effusion is present. No mastoid tenderness. Tympanic membrane is injected. Tympanic membrane is not erythematous or bulging.      Left Ear: Ear canal and external ear normal. No drainage. A middle ear effusion is present. No mastoid tenderness. Tympanic membrane is injected. Tympanic membrane is not erythematous or bulging.      Nose: Mucosal edema, congestion and rhinorrhea present.      Right Turbinates: Enlarged and swollen.      Left Turbinates: Enlarged and swollen.      Right Sinus: Maxillary sinus tenderness and frontal sinus tenderness present.      Left Sinus: Maxillary sinus tenderness and frontal sinus tenderness present.      Mouth/Throat:      Mouth: Mucous membranes are moist.      Tongue: No lesions. Tongue does not deviate from midline.      Palate: No lesions.      Pharynx: Uvula midline. Posterior oropharyngeal erythema present. No  oropharyngeal exudate or uvula swelling.      Tonsils: No tonsillar exudate or tonsillar abscesses.   Eyes:      General:         Right eye: No discharge.         Left eye: No discharge.      Extraocular Movements: Extraocular movements intact.      Conjunctiva/sclera: Conjunctivae normal.      Pupils: Pupils are equal, round, and reactive to light.   Cardiovascular:      Rate and Rhythm: Normal rate and regular rhythm.      Pulses: Normal pulses.      Heart sounds: Normal heart sounds.   Pulmonary:      Effort: Pulmonary effort is normal. No accessory muscle usage or respiratory distress.      Breath sounds: Normal breath sounds and air entry. No stridor or decreased air movement. No decreased breath sounds, wheezing, rhonchi or rales.   Musculoskeletal:         General: Normal range of motion.      Cervical back: Normal range of motion and neck supple.   Lymphadenopathy:      Head:      Right side of head: No tonsillar adenopathy.      Left side of head: No tonsillar adenopathy.      Cervical: No cervical adenopathy.   Skin:     General: Skin is warm and dry.      Findings: No rash.   Neurological:      Mental Status: He is alert and oriented to person, place, and time.   Psychiatric:         Behavior: Behavior is cooperative.     Rapid COVID-negative  Assessment/Plan:     Diagnosis and Associated Orders:     1. Sore throat    2. Nasal congestion    3. Acute cough    4. Acute non-recurrent maxillary sinusitis  - doxycycline (MONODOX) 100 MG capsule; Take 1 Capsule by mouth 2 times a day for 5 days.  Dispense: 10 Capsule; Refill: 0  - POCT SARS-COV Antigen HERMELINDA Manual Result      Comments/MDM:  Patient presents with a 5-day history of sore throat, nasal congestion, sinus pressure now associated with purulent nasal discharge and cough.  Suspect bacterial sinusitis.  Lungs clear to auscultation bilaterally.  Do not suspect bacterial pneumonia.  Discussed conservative measures as below.  May wait 2-4 more days before  initiating antibiotics if inclined.  Discussed indications for antibiotic usage.  Discussed indications for return.  Vital signs stable and reassuring.      -Increase water intake  -May use over the counter Ibuprofen/Tylenol as needed for any fever, body aches or throat pain  -May take long acting antihistamine for seasonal allergy symptoms and post-nasal drip as needed  -Over the counter cough suppressant as directed.  -May use over the counter saline nasal spray for nasal lavage for nasal congestion as needed  -May use over the counter Nasacort/Flonase for nasal congestion as needed   -May use throat lozenges for throat discomfort as needed   -May gargle with salt water up to 4x/day as needed for throat discomfort (1 tsp salt dissolved in 1 cup warm water)  -Monitor for increased sinus pain/pressure with sinus congestion with thick mucus production, sinus headache, cough, shortness of breath, fever- need re-evaluation          I personally reviewed prior external notes and test results pertinent to today's visit.  Red flags discussed as well as indications to present to the Emergency Department.  Supportive care, natural history, differential diagnoses, and indications for immediate follow-up discussed.  Patient expresses understanding and agrees to plan.  Patient denies any other questions or concerns.    Follow-up with the primary care physician for recheck, reevaluation, and consideration of further management.      Please note that this dictation was created using voice recognition software. I have made a reasonable attempt to correct obvious errors, but I expect that there are errors of grammar and possibly content that I did not discover before finalizing the note.    This note was electronically signed by Kim Hinton PA-C

## 2022-11-08 RX ORDER — HYDROCHLOROTHIAZIDE 12.5 MG/1
TABLET ORAL
Qty: 30 TABLET | Refills: 0 | Status: SHIPPED | OUTPATIENT
Start: 2022-11-08 | End: 2022-12-05

## 2022-11-11 ENCOUNTER — OFFICE VISIT (OUTPATIENT)
Dept: INTERNAL MEDICINE | Facility: OTHER | Age: 77
End: 2022-11-11
Payer: COMMERCIAL

## 2022-11-11 VITALS
WEIGHT: 182.6 LBS | OXYGEN SATURATION: 96 % | TEMPERATURE: 97.8 F | DIASTOLIC BLOOD PRESSURE: 75 MMHG | SYSTOLIC BLOOD PRESSURE: 124 MMHG | HEIGHT: 70 IN | BODY MASS INDEX: 26.14 KG/M2 | HEART RATE: 80 BPM

## 2022-11-11 DIAGNOSIS — J01.40 ACUTE NON-RECURRENT PANSINUSITIS: ICD-10-CM

## 2022-11-11 PROBLEM — M79.609 PERONEAL PAIN: Status: RESOLVED | Noted: 2019-11-13 | Resolved: 2022-11-11

## 2022-11-11 PROBLEM — R31.21 ASYMPTOMATIC MICROSCOPIC HEMATURIA: Status: RESOLVED | Noted: 2020-06-12 | Resolved: 2022-11-11

## 2022-11-11 PROBLEM — M21.372 LEFT FOOT DROP: Status: RESOLVED | Noted: 2019-11-13 | Resolved: 2022-11-11

## 2022-11-11 PROCEDURE — 99213 OFFICE O/P EST LOW 20 MIN: CPT | Mod: GC | Performed by: STUDENT IN AN ORGANIZED HEALTH CARE EDUCATION/TRAINING PROGRAM

## 2022-11-11 RX ORDER — TRAMADOL HYDROCHLORIDE 50 MG/1
50 TABLET ORAL
COMMUNITY
Start: 2022-08-09 | End: 2022-11-30

## 2022-11-11 RX ORDER — OXYCODONE HYDROCHLORIDE AND ACETAMINOPHEN 5; 325 MG/1; MG/1
1 TABLET ORAL
COMMUNITY
Start: 2022-08-09 | End: 2022-11-30

## 2022-11-11 RX ORDER — IBUPROFEN 200 MG
200 TABLET ORAL EVERY 6 HOURS PRN
COMMUNITY

## 2022-11-11 RX ORDER — FLUTICASONE PROPIONATE 50 MCG
1 SPRAY, SUSPENSION (ML) NASAL DAILY
Qty: 16 G | Refills: 1 | Status: SHIPPED | OUTPATIENT
Start: 2022-11-11 | End: 2023-03-09 | Stop reason: SDUPTHER

## 2022-11-11 RX ORDER — GABAPENTIN 300 MG/1
300 CAPSULE ORAL
COMMUNITY
End: 2022-11-11

## 2022-11-11 ASSESSMENT — ENCOUNTER SYMPTOMS
HEMOPTYSIS: 1
HEADACHES: 0
FEVER: 0
MYALGIAS: 0
ABDOMINAL PAIN: 0
SPUTUM PRODUCTION: 1
NAUSEA: 0
NERVOUS/ANXIOUS: 0
BLURRED VISION: 0
SHORTNESS OF BREATH: 0
DIARRHEA: 0
DOUBLE VISION: 0
SINUS PAIN: 1
COUGH: 1
VOMITING: 0
CHILLS: 0
DEPRESSION: 0
DIZZINESS: 1
PALPITATIONS: 0
WEAKNESS: 0
CONSTIPATION: 0

## 2022-11-11 ASSESSMENT — FIBROSIS 4 INDEX: FIB4 SCORE: 1.65

## 2022-11-11 NOTE — PROGRESS NOTES
"Established Patient    Amadou Parekh is a 77 y.o. male who presents today with the following:    CC: Respiratory issues    HPI:      Sore throat/nasal congestion - was seen at Urgent Care on 11/5 for 5 days of \"sore throat, congestion, sinus pressure with purulent nasal discharge and cough\". Bacterial sinusitis was suspected at that time - pt was started on Doxycycline and instructed to take for 5 days. He started it on 11/9 and noted today was his third day of it. He also reports that he has continued discharge (yellow-green) and occasional lightheadedness and has not significantly improved since it first started about 10-14 days ago. He notes the lightheadedness is constant and not dependent on position. He reports every drinking 1 glass of water every few hours. He denies any history of allergies. He denies any prior episodes of this kind of issue.       Review of Systems   Constitutional:  Negative for chills and fever.   HENT:  Positive for congestion and sinus pain. Negative for hearing loss and tinnitus.    Eyes:  Negative for blurred vision and double vision.   Respiratory:  Positive for cough, hemoptysis (occasionally but mild amounts) and sputum production. Negative for shortness of breath.    Cardiovascular:  Negative for chest pain, palpitations and leg swelling.   Gastrointestinal:  Negative for abdominal pain, constipation, diarrhea, nausea and vomiting.   Musculoskeletal:  Negative for joint pain and myalgias.   Neurological:  Positive for dizziness. Negative for weakness and headaches.   Psychiatric/Behavioral:  Negative for depression. The patient is not nervous/anxious.      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    Anterior dislocation of left hip, initial encounter ([facility]) 09/18/2022    Hypokalemia 09/18/2022    Hypocalcemia 09/18/2022    Hypertensive urgency 09/18/2022    Arthritis of hip " 08/15/2022    Renal insufficiency 05/06/2022    Lumbar degenerative disc disease 12/31/2021    Dyslipidemia 11/09/2021    Osteoarthritis 11/09/2021    Chronic pain syndrome 08/20/2021    Pain of left lower extremity 10/11/2019    Peripheral polyneuropathy 08/06/2018    Bilateral hearing loss 08/06/2018    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    Spondylolysis, lumbar region 10/05/2017    Chronic lumbar radiculopathy 08/18/2016    Chronic infection of sinus 05/17/2016    Bilateral stenosis of lateral recess of lumbar spine 03/12/2016    Primary hypertension 01/04/2016    Hypothyroidism 01/04/2016    Low back pain 01/04/2016       Social History     Tobacco Use    Smoking status: Never    Smokeless tobacco: Never   Vaping Use    Vaping Use: Never used   Substance Use Topics    Alcohol use: No    Drug use: No       Current Outpatient Medications   Medication Sig Dispense Refill    oxyCODONE-acetaminophen (PERCOCET) 5-325 MG Tab Take 1 Tablet by mouth.      ibuprofen (MOTRIN) 200 MG Tab Take 1 Tablet by mouth.      traMADol (ULTRAM) 50 MG Tab Take 1 Tablet by mouth.      fluticasone (FLONASE) 50 MCG/ACT nasal spray Administer 1 Spray into affected nostril(S) every day. 16 g 1    hydroCHLOROthiazide (HYDRODIURIL) 12.5 MG tablet TAKE ONE TABLET BY MOUTH ONCE DAILY 30 Tablet 0    SYNTHROID 137 MCG Tab TAKE 1 TABLET BY MOUTH IN THE MORNING ON AN EMPTY STOMACH. 90 Tablet 0    aspirin (ASA) 325 MG Tab Take 81 mg by mouth every day.      atorvastatin (LIPITOR) 10 MG Tab Take 1 Tablet by mouth every day. 30 Tablet 3    tadalafil (CIALIS) 5 MG tablet Take 1 Tablet by mouth every day. Taking for prostate vs ED per pt      Multiple Vitamin (MULTI-VITAMIN) Tab Take 1 Tablet by mouth every day.      Cholecalciferol (VITAMIN D-1000 MAX ST) 1000 UNIT Tab Take 2 Tablets by mouth every day.      b complex vitamins tablet Take 1 Tablet by mouth every day.      gabapentin (NEURONTIN) 600 MG tablet Take 600 mg by mouth 3 times a day.    "    No current facility-administered medications for this visit.       /75 (BP Location: Left arm, Patient Position: Sitting, BP Cuff Size: Adult)   Pulse 80   Temp 36.6 °C (97.8 °F) (Temporal)   Ht 1.783 m (5' 10.2\")   Wt 82.8 kg (182 lb 9.6 oz)   SpO2 96%   BMI 26.05 kg/m²     PHYSICAL EXAM:  Physical Exam  Vitals reviewed.   Constitutional:       General: He is not in acute distress.     Appearance: Normal appearance. He is not ill-appearing.   HENT:      Head: Normocephalic and atraumatic.      Comments: Negative sinus tenderness in all three areas     Nose: Congestion and rhinorrhea present.      Comments: Mild erythema     Mouth/Throat:      Pharynx: No oropharyngeal exudate or posterior oropharyngeal erythema.   Eyes:      Extraocular Movements: Extraocular movements intact.      Conjunctiva/sclera: Conjunctivae normal.   Cardiovascular:      Rate and Rhythm: Normal rate and regular rhythm.      Heart sounds: Normal heart sounds. No murmur heard.    No friction rub. No gallop.   Pulmonary:      Effort: Pulmonary effort is normal. No respiratory distress.      Breath sounds: Normal breath sounds. No wheezing or rales.   Neurological:      General: No focal deficit present.      Mental Status: He is alert and oriented to person, place, and time. Mental status is at baseline.      Cranial Nerves: No cranial nerve deficit.      Motor: No weakness.   Psychiatric:         Mood and Affect: Mood normal.         Behavior: Behavior normal.         Assessment and Plan  Acute non-recurrent pansinusitis  - Persistent symptoms for over 10 days  - PE relatively benign (normal lung and sinus exam)  - Continue Doxycycline - to be finished on 11/13  - Prescribed Flonase and recommended to use for 7-14 weeks    - If no improvement within 2 weeks, recommended pt to come back in to discuss initiation of Augmentin      Return in about 2 weeks (around 11/25/2022).    Signed by: Allan Gutierrez M.D.          "

## 2022-11-11 NOTE — ASSESSMENT & PLAN NOTE
- Persistent symptoms for over 10 days  - PE relatively benign (normal lung and sinus exam)  - Continue Doxycycline - to be finished on 11/13  - Prescribed Flonase and recommended to use for 7-14 weeks    - If no improvement within 2 weeks, recommended pt to come back in to discuss initiation of Augmentin

## 2022-11-30 ENCOUNTER — OFFICE VISIT (OUTPATIENT)
Dept: INTERNAL MEDICINE | Facility: OTHER | Age: 77
End: 2022-11-30
Payer: COMMERCIAL

## 2022-11-30 VITALS
BODY MASS INDEX: 27.4 KG/M2 | WEIGHT: 191.4 LBS | HEIGHT: 70 IN | TEMPERATURE: 97.8 F | SYSTOLIC BLOOD PRESSURE: 126 MMHG | HEART RATE: 62 BPM | DIASTOLIC BLOOD PRESSURE: 66 MMHG | OXYGEN SATURATION: 98 %

## 2022-11-30 DIAGNOSIS — R73.03 PRE-DIABETES: ICD-10-CM

## 2022-11-30 DIAGNOSIS — J32.9 CHRONIC SINUSITIS, UNSPECIFIED LOCATION: ICD-10-CM

## 2022-11-30 DIAGNOSIS — Z13.820 SCREENING FOR OSTEOPOROSIS: ICD-10-CM

## 2022-11-30 DIAGNOSIS — N28.9 RENAL INSUFFICIENCY: ICD-10-CM

## 2022-11-30 DIAGNOSIS — E03.4 HYPOTHYROIDISM DUE TO ACQUIRED ATROPHY OF THYROID: ICD-10-CM

## 2022-11-30 DIAGNOSIS — M21.372 LEFT FOOT DROP: ICD-10-CM

## 2022-11-30 DIAGNOSIS — D64.9 NORMOCYTIC ANEMIA: ICD-10-CM

## 2022-11-30 DIAGNOSIS — R73.03 PREDIABETES: ICD-10-CM

## 2022-11-30 DIAGNOSIS — R79.89 LOW VITAMIN D LEVEL: ICD-10-CM

## 2022-11-30 DIAGNOSIS — Z11.59 NEED FOR HEPATITIS C SCREENING TEST: ICD-10-CM

## 2022-11-30 DIAGNOSIS — E78.5 DYSLIPIDEMIA: ICD-10-CM

## 2022-11-30 DIAGNOSIS — Z11.4 ENCOUNTER FOR SCREENING FOR HIV: ICD-10-CM

## 2022-11-30 PROBLEM — G89.4 CHRONIC PAIN SYNDROME: Status: RESOLVED | Noted: 2021-08-20 | Resolved: 2022-11-30

## 2022-11-30 PROBLEM — S73.035A ANTERIOR DISLOCATION OF LEFT HIP, INITIAL ENCOUNTER (HCC): Status: RESOLVED | Noted: 2022-09-18 | Resolved: 2022-11-30

## 2022-11-30 PROBLEM — E87.6 HYPOKALEMIA: Status: RESOLVED | Noted: 2022-09-18 | Resolved: 2022-11-30

## 2022-11-30 PROBLEM — J01.40 ACUTE NON-RECURRENT PANSINUSITIS: Status: RESOLVED | Noted: 2022-11-11 | Resolved: 2022-11-30

## 2022-11-30 PROBLEM — N48.6 INDURATION PENIS PLASTICA: Status: RESOLVED | Noted: 2018-07-18 | Resolved: 2022-11-30

## 2022-11-30 PROBLEM — I16.0 HYPERTENSIVE URGENCY: Status: RESOLVED | Noted: 2022-09-18 | Resolved: 2022-11-30

## 2022-11-30 PROBLEM — E83.51 HYPOCALCEMIA: Status: RESOLVED | Noted: 2022-09-18 | Resolved: 2022-11-30

## 2022-11-30 PROBLEM — S84.12XA INJURY OF LEFT PERONEAL NERVE: Status: RESOLVED | Noted: 2022-10-24 | Resolved: 2022-11-30

## 2022-11-30 PROCEDURE — 99213 OFFICE O/P EST LOW 20 MIN: CPT | Mod: GE | Performed by: STUDENT IN AN ORGANIZED HEALTH CARE EDUCATION/TRAINING PROGRAM

## 2022-11-30 ASSESSMENT — ENCOUNTER SYMPTOMS
WEIGHT LOSS: 0
DIZZINESS: 0
WEAKNESS: 0
CHILLS: 0
CONSTIPATION: 0
PALPITATIONS: 0
COUGH: 0
SHORTNESS OF BREATH: 0
NAUSEA: 0
MYALGIAS: 0
VOMITING: 0
FEVER: 0
DEPRESSION: 0
DIARRHEA: 0

## 2022-11-30 ASSESSMENT — FIBROSIS 4 INDEX: FIB4 SCORE: 1.65

## 2022-11-30 ASSESSMENT — PATIENT HEALTH QUESTIONNAIRE - PHQ9: CLINICAL INTERPRETATION OF PHQ2 SCORE: 0

## 2022-11-30 NOTE — PROGRESS NOTES
Chief Complaint:  Annual wellness     History of Present Illness:     Patient is a 77-year-old M with PMHx of:    HTN: on HCTZ  Hypothyroidism: on levothyroxine   DLD: lipid panel November 2021 Tchol 180    HDL 43    Pre-DM: a1c 5.7 May 2022  Normocytic anemia: pending w/u   Chronic sinusitis: follows with ENT  OA, hip  Bilateral hearing loss: b/l hearing aids  Anterior dislocation, left hip: s/p hip replacement 2022, ongoing PT sessions  Lumbar spinal stenosis c/b neuropathy: followed by Dr Webber at [facility], on gabapentin    Lumbar degenerative disc disease   Lumbar spondylosis  Peyronie disease: s/p surgery via urology [place]  Overweight: BMI 27.31 11/30/22     Has had hip replacement Aug and Sept of this year- physical therapy ongoing.     Slight dizziness from gabapentin but does not want to decrease dose.     Blood pressure- stress court hearings this morning he feels contributed. Has a good BP cuff.     Exercise  Difficult to do due to hip replacements, back in gym now     Diet  Well balanced    ----    Healthcare maintenance:   Mood screening: no anxiety, PHQ-2 score 0 11/30/22   Drug screening: no tobacco, alc, rec drugs   Sexual activity screening: not active since hip problems   Infectious disease screening: HIV, hep C ordered  Colon cancer screening: Colonoscopy 2019 w/DHC- 1 diminutive polyp in transverse colon, diverticulosis (sigmoid), internal hemorrhoids, q 5yr  Vaccines: all up to date   Osteoporosis screening: dexa ordered  Breast cancer screening: n/a  Cervical cancer screening: n/a    Review of Systems   Review of Systems   Constitutional:  Negative for chills, fever and weight loss.   Respiratory:  Negative for cough and shortness of breath.    Cardiovascular:  Negative for chest pain, palpitations and leg swelling.   Gastrointestinal:  Negative for constipation, diarrhea, nausea and vomiting.   Genitourinary:  Negative for dysuria.   Musculoskeletal:  Negative for  "myalgias.   Neurological:  Negative for dizziness and weakness.   Psychiatric/Behavioral:  Negative for depression.       Past Medical History:   Past Medical History:   Diagnosis Date    Actinic keratoses     sees olivia Everett    Bilateral hearing loss 08/06/2018    wears aids    Chronic right-sided low back pain without sciatica 08/06/2018    s/p 5 surgeries; sees Akbar    ED (erectile dysfunction)     High cholesterol     Hyperlipidemia, mixed     Hypertension     pt states well controlled on meds    Hypothyroidism     Neuropathy ([facility]) 8/6/2018    Pain 12/18/2018    \"Nerve Pain-Low back/legs\".    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    Anterior dislocation of left hip, initial encounter ([facility]) 09/18/2022    Hypokalemia 09/18/2022    Hypocalcemia 09/18/2022    Hypertensive urgency 09/18/2022    Arthritis of hip 08/15/2022    Renal insufficiency 05/06/2022    Lumbar degenerative disc disease 12/31/2021    Dyslipidemia 11/09/2021    Osteoarthritis 11/09/2021    Chronic pain syndrome 08/20/2021    Pain of left lower extremity 10/11/2019    Peripheral polyneuropathy 08/06/2018    Bilateral hearing loss 08/06/2018    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    Spondylolysis, lumbar region 10/05/2017    Chronic lumbar radiculopathy 08/18/2016    Chronic infection of sinus 05/17/2016    Bilateral stenosis of lateral recess of lumbar spine 03/12/2016    Primary hypertension 01/04/2016    Hypothyroidism 01/04/2016    Low back pain 01/04/2016       Past Surgical History:   Past Surgical History:   Procedure Laterality Date    HIP REVISION TOTAL Left 9/21/2022    Procedure: LEFT REVISION TOTAL HIP ARTHROPLASTY AND REPAIRS AS INDICATED;  Surgeon: Luis Caceres M.D.;  Location: SURGERY [facility];  Service: Orthopedics    OR TOTAL HIP ARTHROPLASTY Left 8/15/2022    " Procedure: LEFT TOTAL HIP ARTHROPLASTY;  Surgeon: Luis Caceres M.D.;  Location: [facility];  Service: Orthopedics    PB IMPLANT NEUROSTIM/  1/3/2022    Procedure: REMOVAL, NEUROSTIMULATOR, PERMANENT, SPINAL CORD;  Surgeon: Be Webber M.D.;  Location: [facility];  Service: Neurosurgery    LUMBAR FUSION O-ARM  1/3/2022    Procedure: FUSION, SPINE, LUMBAR, WITH O-ARM IMAGING GUIDANCE - L1-3 EXTENSION-STAGE 2;  Surgeon: Be Webber M.D.;  Location: [facility];  Service: Neurosurgery    LUMBAR DECOMPRESSION  1/3/2022    Procedure: DECOMPRESSION, SPINE, LUMBAR;  Surgeon: Be Webber M.D.;  Location: [facility];  Service: Neurosurgery    LUMBAR FUSION ANTERIOR N/A 12/31/2021    Procedure: FUSION, SPINE, LUMBAR, ANTERIOR APPROACH - L5-S1;  Surgeon: Be Webber M.D.;  Location: [facility];  Service: Neurosurgery    FUSION, SPINE, LUMBAR, ROBOT-ASSISTED WITH IMAGING GUIDANCE  2/27/2021    Procedure: FUSION, SPINE, LUMBAR, ROBOT-ASSISTED WITH IMAGING GUIDANCE - L5-S1 EXTENSION OF TLIF;  Surgeon: Be Webber M.D.;  Location: [facility];  Service: Neurosurgery    LUMBAR DECOMPRESSION N/A 2/27/2021    Procedure: DECOMPRESSION, SPINE, LUMBAR;  Surgeon: Be Webber M.D.;  Location: [facility];  Service: Neurosurgery    PB IMPLANT NEUROSTIM/  5/22/2020    Procedure: INSERTION, NEUROSTIMULATOR, PERMANENT, SPINAL CORD;  Surgeon: Eugenio Camara M.D.;  Location: [facility];  Service: Pain Management    PEYRONIES PLAQUE  12/6/2019    Procedure: EXCISION, PEYRONIE'S PLAQUE, PENIS WITH GRAFTING, JEAN CLAUDE PLICATION, ARTIFICIAL ERECTION;  Surgeon: Tejinder Culver M.D.;  Location: [facility];  Service: Urology    DC NERVOUS SYSTEM SURGERY UNLISTED Left 6/3/2019    Procedure: EXPLORATION, NERVE- PERONEAL NERVE RELEASE AT THE FIBULAR HEAD  ;  Surgeon: Raz Garcia M.D.;  Location: [facility];  Service:  "Neurosurgery    FUSION, PIP JOINT, TOE Right 2/22/2019    Procedure: PIP ARTHRODESIS;  Surgeon: Amadou Bowden M.D.;  Location: SURGERY SAME DAY [facility];  Service: Orthopedics    FINGER EXPLORATION Right 2/22/2019    Procedure: FINGER EXPLORATION - RING FINGER DISTAL INTERPHALANGEAL JOINT;  Surgeon: Amadou Bowden M.D.;  Location: SURGERY SAME DAY [facility];  Service: Orthopedics    COLONOSCOPY  2019    OTHER NEUROLOGICAL SURG  12/12/2018    \"Low Back Surgery'sx6 between 2006 & 2017\".    CYST EXCISION Right 10/12/2018    Procedure: CYST EXCISION - RING FINGER MUCOUS CYST, DISTAL INTERPHALANGEAL JOINT;  Surgeon: Amadou Bowden M.D.;  Location: SURGERY SAME DAY [facility];  Service: Orthopedics    LUMBAR LAMINECTOMY DISKECTOMY Left 12/6/2017    Procedure: LUMBAR LAMINECTOMY DISKECTOMY - POSTERIOR REDO LEFT  L4-S1 KAILA;  Surgeon: Be Webber M.D.;  Location: [facility];  Service: Neurosurgery    FUSION, SPINE, LUMBAR, PLIF  10/5/2017    Procedure: POSTERIOR TRANSFORAMINAL INTERBODY FUSION AT LUMBAR 4 - 5;  Surgeon: Be Webber M.D.;  Location: [facility];  Service: Neurosurgery    LUMBAR DECOMPRESSION  10/5/2017    Procedure: POSTERIOR LUMBAR 3-4,  4-5 DECOMPRESSION AND FUSION;  Surgeon: Be Webber M.D.;  Location: [facility];  Service: Neurosurgery    DC INJ DX/THER AGNT PARAVERT FACET JOINT, BRITT* Right 10/6/2016    Procedure: INJ PARA FACET L/S 1 LVL W/IG - L3-4, L4-5, L5-S1;  Surgeon: Eugenio Camara M.D.;  Location: [facility];  Service: Pain Management    DC INJ DX/THER AGNT PARAVERT FACET JOINT, BRITT*  10/6/2016    Procedure: INJ PARA FACET L/S 2D LVL W/IG;  Surgeon: Eugenio Camara M.D.;  Location: [facility];  Service: Pain Management    DC INJ DX/THER AGNT PARAVERT FACET JOINT, BRITT*  10/6/2016    Procedure: INJ PARA FACET L/S 3D LVL W/IG;  Surgeon: Eugenio Camara M.D.;  Location: [facility];  " Service: Pain Management    NM NJX AA&/STRD TFRML EPI LUMBAR/SACRAL 1 LEVEL Right 8/18/2016    Procedure: INJ-FORAMEN EPI LUM/SAC SNGL - L5-S1;  Surgeon: Eugenio Camara M.D.;  Location: SURGERY [facility];  Service: Pain Management    LUMBAR LAMINECTOMY DISKECTOMY Right 3/12/2016    Procedure: LUMBAR HemiLAMINECTOMY Micro DISKECTOMY posterior Redo L3-4 ;  Surgeon: Be Webber M.D.;  Location: SURGERY [facility];  Service:     FORAMINOTOMY Right 3/12/2016    Procedure: FORAMINOTOMY;  Surgeon: Be Webber M.D.;  Location: SURGERY [facility];  Service:     CERVICAL LAMINECTOMY POSTERIOR  2011    DENTAL SURGERY Bilateral as a teenager    wisdom teeth    OTHER      nasal surgery 2015    OTHER NEUROLOGICAL SURG  2006, 2010, 2012, 2014, 2016, 2017    low back surgery x 5    TONSILLECTOMY      child        Allergies:  Patient has no known allergies.    Medications:     Current Outpatient Medications:     ibuprofen, Take 1 Tablet by mouth., PRN    fluticasone, 1 Spray, Nasal, DAILY, PRN    hydroCHLOROthiazide, TAKE ONE TABLET BY MOUTH ONCE DAILY, Taking    Synthroid, TAKE 1 TABLET BY MOUTH IN THE MORNING ON AN EMPTY STOMACH., Taking    aspirin, 81 mg, Oral, DAILY, Taking    atorvastatin, 10 mg, Oral, DAILY, Taking    gabapentin, 600 mg, Oral, TID, Taking    tadalafil, 5 mg, Oral, DAILY, Taking    Multi-Vitamin, 1 Tablet, Oral, DAILY, Taking    Vitamin D-1000 Max St, 2,000 Units, Oral, DAILY, Taking    b complex vitamins, 1 Tablet, Oral, DAILY, Taking     Social History:   Social History     Tobacco Use    Smoking status: Never    Smokeless tobacco: Never   Vaping Use    Vaping Use: Never used   Substance Use Topics    Alcohol use: No    Drug use: No     Family History:   Family History   Problem Relation Age of Onset    Cancer Father         Leukemia     Heart Disease Father     Diabetes Father     Stroke Father     Cancer Mother         breast met to liver    Heart Disease Mother      Vitals:   BP  "126/66 (BP Location: Left arm, Patient Position: Sitting, BP Cuff Size: Adult)   Pulse 62   Temp 36.6 °C (97.8 °F) (Temporal)   Ht 1.783 m (5' 10.2\")   Wt 86.8 kg (191 lb 6.4 oz)   SpO2 98%  Body mass index is 27.31 kg/m².    Physical Exam:   Physical Exam  Constitutional:       General: He is not in acute distress.     Appearance: Normal appearance. He is not ill-appearing or diaphoretic.   HENT:      Head: Normocephalic and atraumatic.      Mouth/Throat:      Mouth: Mucous membranes are moist.   Eyes:      Extraocular Movements: Extraocular movements intact.      Pupils: Pupils are equal, round, and reactive to light.   Cardiovascular:      Rate and Rhythm: Normal rate and regular rhythm.      Heart sounds: No murmur heard.    No friction rub. No gallop.   Pulmonary:      Effort: No respiratory distress.      Breath sounds: No stridor. No wheezing, rhonchi or rales.   Abdominal:      General: Bowel sounds are normal. There is no distension.      Tenderness: There is no abdominal tenderness. There is no guarding or rebound.   Musculoskeletal:      Right lower leg: No edema.      Left lower leg: No edema.   Skin:     Coloration: Skin is not jaundiced or pale.   Neurological:      General: No focal deficit present.      Mental Status: He is alert and oriented to person, place, and time.   Psychiatric:         Mood and Affect: Mood normal.         Behavior: Behavior normal.      Assessment and Plan     Patient is a 77-year-old male with pertinent PMHx of HTN, DLD, pre-DM, hypothyroidism, lumbar stenosis c/b peripheral neuropathy, renal insufficiency, normocytic anemia presenting today for routine visit. Comprehensive follow up labs (fasting) ordered. Found to have SBP 140s in office. Follow up in 5 weeks with BP log.     Hypertension  -In office BP 11/30/22 145/85 --> 126/66   Plan  -Counseled on lifestyle modifications (diet, exercise)  -Hydrochlorothiazide 12.5 mg PO qD  -Keep home BP log to ensure not running " 150-160s, follow up in 5 weeks    Prediabetes   -A1c 12/20/2021 5.5--> 5.7 4/26/22  Plan  -At this time, given patient age no additional mgmt required  -A1c recheck   -Consider starting ace-I for renal protection although would worry about polypharmacy in setting of age    Anemia, normocytic  -Hgb ~13, appears to be BL for patient since apx jan 2022  Plan  -Iron panel with next labs    Renal insufficiency   -Apr 2022: GFR 60s with Cr 1.14  **Ddx: possible dehydration vs CKD in setting of HTN/ pre-DM   Plan  -CMP    Hypothyroidism  -Well-controlled on current regimen  -Asymptomatic  -TSH April 2022 WNL   Plan  -Synthroid 137mcg PO qD  -Recheck TSH    Dyslipidemia  -Lipid panel November 2021 with total cholesterol 180, triglycerides 122, HDL 43,   Plan  -Atorvastatin 10 mg PO qD  -Recheck lipid panel    Healthcare maintenance  Mood screening: no anxiety, PHQ-2 score 0 11/30/22   Drug screening: no tobacco, alc, rec drugs   Sexual activity screening: not active since hip problems   Colon cancer screening: Colonoscopy 2019 w/DHC- 1 diminutive polyp in transverse colon, diverticulosis (sigmoid), internal hemorrhoids, q5 yr   Vaccines: all up to date   Breast cancer screening: n/a  Cervical cancer screening: n/a  PLAN  -Osteoporosis screening: dexa ordered   -Infectious disease screening: HIV, hep C ordered    -- CHRONIC AND RESOLVED MEDICAL PROBLEMS --     Peripheral neuropathy 2/2 spinal stenosis  Lumbar spinal stenosis  Lumbar degenerative disc disease  Foot drop, left- result of above  -A1c 12/20/2021 5.5--> 5.7 4/26/22  -B12, B6, folate WNL December 2019  -Chronic left lower extremity pain without obvious cause  --CT June 2021: posterior hardware extending from L3-S1, interbody hardware at L4-5 and L5-S1, spinal stenosis L1-2, L2-3, multilevel facet arthropathy, degenerative subluxation at L1-2, L2-3   -Trialed Lyrica and felt dizzy in past  **suspected hypersensitivity syndrome but most likely 2/2 patient's  ongoing significant back etiologies v some component of pre-DM   Plan  -Gabapentin per neurosurgery  -Follow with neurosurgery as outpatient as appropriate    Sinus infection, chronic  -s/p sinuplasty   Plan  -Follows with otolaryngology- PRN neti + flonase as needed   -SILVIA filled      Please note that this dictation was created using voice recognition software. I have made every reasonable attempt to correct obvious errors, but I expect that there are errors of grammar and possibly content that I did not discover before finalizing the note.    Patient case was seen/ assessed/ discussed with Dr. Jass Mccarthy, PGY-2  Internal Medicine

## 2022-12-05 DIAGNOSIS — I10 ESSENTIAL HYPERTENSION: ICD-10-CM

## 2022-12-05 DIAGNOSIS — E78.2 MIXED HYPERLIPIDEMIA: ICD-10-CM

## 2022-12-05 RX ORDER — HYDROCHLOROTHIAZIDE 12.5 MG/1
TABLET ORAL
Qty: 30 TABLET | Refills: 0 | Status: SHIPPED | OUTPATIENT
Start: 2022-12-05 | End: 2022-12-12 | Stop reason: SDUPTHER

## 2022-12-08 DIAGNOSIS — E03.4 HYPOTHYROIDISM DUE TO ACQUIRED ATROPHY OF THYROID: ICD-10-CM

## 2022-12-08 DIAGNOSIS — I10 ESSENTIAL HYPERTENSION: ICD-10-CM

## 2022-12-08 DIAGNOSIS — E78.2 MIXED HYPERLIPIDEMIA: ICD-10-CM

## 2022-12-12 RX ORDER — ATORVASTATIN CALCIUM 10 MG/1
10 TABLET, FILM COATED ORAL DAILY
Qty: 30 TABLET | Refills: 3 | Status: SHIPPED | OUTPATIENT
Start: 2022-12-12 | End: 2023-05-09 | Stop reason: SDUPTHER

## 2022-12-12 RX ORDER — HYDROCHLOROTHIAZIDE 12.5 MG/1
TABLET ORAL
Qty: 30 TABLET | Refills: 0 | Status: SHIPPED
Start: 2022-12-12 | End: 2023-01-10

## 2022-12-12 RX ORDER — LEVOTHYROXINE SODIUM 137 MCG
137 TABLET ORAL
Qty: 90 TABLET | Refills: 0 | Status: SHIPPED | OUTPATIENT
Start: 2022-12-12 | End: 2023-05-09 | Stop reason: SDUPTHER

## 2023-01-03 ENCOUNTER — HOSPITAL ENCOUNTER (OUTPATIENT)
Dept: LAB | Facility: MEDICAL CENTER | Age: 78
End: 2023-01-03
Attending: STUDENT IN AN ORGANIZED HEALTH CARE EDUCATION/TRAINING PROGRAM
Payer: COMMERCIAL

## 2023-01-03 DIAGNOSIS — E03.4 HYPOTHYROIDISM DUE TO ACQUIRED ATROPHY OF THYROID: ICD-10-CM

## 2023-01-03 DIAGNOSIS — D64.9 NORMOCYTIC ANEMIA: ICD-10-CM

## 2023-01-03 DIAGNOSIS — Z11.4 ENCOUNTER FOR SCREENING FOR HIV: ICD-10-CM

## 2023-01-03 DIAGNOSIS — N28.9 RENAL INSUFFICIENCY: ICD-10-CM

## 2023-01-03 DIAGNOSIS — R73.03 PREDIABETES: ICD-10-CM

## 2023-01-03 DIAGNOSIS — E78.5 DYSLIPIDEMIA: ICD-10-CM

## 2023-01-03 DIAGNOSIS — Z11.59 NEED FOR HEPATITIS C SCREENING TEST: ICD-10-CM

## 2023-01-03 DIAGNOSIS — R79.89 LOW VITAMIN D LEVEL: ICD-10-CM

## 2023-01-03 LAB
25(OH)D3 SERPL-MCNC: 35 NG/ML (ref 30–100)
ALBUMIN SERPL BCP-MCNC: 4 G/DL (ref 3.2–4.9)
ALBUMIN/GLOB SERPL: 1.5 G/DL
ALP SERPL-CCNC: 96 U/L (ref 30–99)
ALT SERPL-CCNC: 23 U/L (ref 2–50)
ANION GAP SERPL CALC-SCNC: 11 MMOL/L (ref 7–16)
AST SERPL-CCNC: 28 U/L (ref 12–45)
BASOPHILS # BLD AUTO: 0.9 % (ref 0–1.8)
BASOPHILS # BLD: 0.04 K/UL (ref 0–0.12)
BILIRUB SERPL-MCNC: 0.3 MG/DL (ref 0.1–1.5)
BUN SERPL-MCNC: 22 MG/DL (ref 8–22)
CALCIUM ALBUM COR SERPL-MCNC: 9.2 MG/DL (ref 8.5–10.5)
CALCIUM SERPL-MCNC: 9.2 MG/DL (ref 8.5–10.5)
CHLORIDE SERPL-SCNC: 105 MMOL/L (ref 96–112)
CHOLEST SERPL-MCNC: 166 MG/DL (ref 100–199)
CO2 SERPL-SCNC: 25 MMOL/L (ref 20–33)
CREAT SERPL-MCNC: 1.25 MG/DL (ref 0.5–1.4)
EOSINOPHIL # BLD AUTO: 0.27 K/UL (ref 0–0.51)
EOSINOPHIL NFR BLD: 5.9 % (ref 0–6.9)
ERYTHROCYTE [DISTWIDTH] IN BLOOD BY AUTOMATED COUNT: 43.6 FL (ref 35.9–50)
EST. AVERAGE GLUCOSE BLD GHB EST-MCNC: 123 MG/DL
FASTING STATUS PATIENT QL REPORTED: NORMAL
FERRITIN SERPL-MCNC: 27.5 NG/ML (ref 22–322)
GFR SERPLBLD CREATININE-BSD FMLA CKD-EPI: 59 ML/MIN/1.73 M 2
GLOBULIN SER CALC-MCNC: 2.6 G/DL (ref 1.9–3.5)
GLUCOSE SERPL-MCNC: 88 MG/DL (ref 65–99)
HAV IGM SERPL QL IA: NORMAL
HBA1C MFR BLD: 5.9 % (ref 4–5.6)
HBV CORE IGM SER QL: NORMAL
HBV SURFACE AG SER QL: NORMAL
HCT VFR BLD AUTO: 43.4 % (ref 42–52)
HCV AB SER QL: NORMAL
HDLC SERPL-MCNC: 44 MG/DL
HGB BLD-MCNC: 13.5 G/DL (ref 14–18)
HIV 1+2 AB+HIV1 P24 AG SERPL QL IA: NORMAL
IMM GRANULOCYTES # BLD AUTO: 0.01 K/UL (ref 0–0.11)
IMM GRANULOCYTES NFR BLD AUTO: 0.2 % (ref 0–0.9)
IRON SATN MFR SERPL: 15 % (ref 15–55)
IRON SERPL-MCNC: 54 UG/DL (ref 50–180)
LDLC SERPL CALC-MCNC: 97 MG/DL
LYMPHOCYTES # BLD AUTO: 1.31 K/UL (ref 1–4.8)
LYMPHOCYTES NFR BLD: 28.7 % (ref 22–41)
MCH RBC QN AUTO: 26.3 PG (ref 27–33)
MCHC RBC AUTO-ENTMCNC: 31.1 G/DL (ref 33.7–35.3)
MCV RBC AUTO: 84.4 FL (ref 81.4–97.8)
MONOCYTES # BLD AUTO: 0.6 K/UL (ref 0–0.85)
MONOCYTES NFR BLD AUTO: 13.1 % (ref 0–13.4)
NEUTROPHILS # BLD AUTO: 2.34 K/UL (ref 1.82–7.42)
NEUTROPHILS NFR BLD: 51.2 % (ref 44–72)
NRBC # BLD AUTO: 0 K/UL
NRBC BLD-RTO: 0 /100 WBC
PLATELET # BLD AUTO: 285 K/UL (ref 164–446)
PMV BLD AUTO: 10.4 FL (ref 9–12.9)
POTASSIUM SERPL-SCNC: 4.5 MMOL/L (ref 3.6–5.5)
PROT SERPL-MCNC: 6.6 G/DL (ref 6–8.2)
RBC # BLD AUTO: 5.14 M/UL (ref 4.7–6.1)
SODIUM SERPL-SCNC: 141 MMOL/L (ref 135–145)
TIBC SERPL-MCNC: 358 UG/DL (ref 250–450)
TRANSFERRIN SERPL-MCNC: 324 MG/DL (ref 200–370)
TRIGL SERPL-MCNC: 123 MG/DL (ref 0–149)
TSH SERPL DL<=0.005 MIU/L-ACNC: 0.68 UIU/ML (ref 0.38–5.33)
UIBC SERPL-MCNC: 304 UG/DL (ref 110–370)
WBC # BLD AUTO: 4.6 K/UL (ref 4.8–10.8)

## 2023-01-03 PROCEDURE — 80061 LIPID PANEL: CPT

## 2023-01-03 PROCEDURE — 84443 ASSAY THYROID STIM HORMONE: CPT

## 2023-01-03 PROCEDURE — 85025 COMPLETE CBC W/AUTO DIFF WBC: CPT

## 2023-01-03 PROCEDURE — 82306 VITAMIN D 25 HYDROXY: CPT

## 2023-01-03 PROCEDURE — 84466 ASSAY OF TRANSFERRIN: CPT

## 2023-01-03 PROCEDURE — 87389 HIV-1 AG W/HIV-1&-2 AB AG IA: CPT

## 2023-01-03 PROCEDURE — 80053 COMPREHEN METABOLIC PANEL: CPT

## 2023-01-03 PROCEDURE — 80074 ACUTE HEPATITIS PANEL: CPT

## 2023-01-03 PROCEDURE — 82728 ASSAY OF FERRITIN: CPT

## 2023-01-03 PROCEDURE — 83540 ASSAY OF IRON: CPT

## 2023-01-03 PROCEDURE — 83036 HEMOGLOBIN GLYCOSYLATED A1C: CPT

## 2023-01-03 PROCEDURE — 36415 COLL VENOUS BLD VENIPUNCTURE: CPT

## 2023-01-03 PROCEDURE — 83550 IRON BINDING TEST: CPT

## 2023-01-05 ENCOUNTER — HOSPITAL ENCOUNTER (OUTPATIENT)
Dept: RADIOLOGY | Facility: MEDICAL CENTER | Age: 78
End: 2023-01-05
Attending: STUDENT IN AN ORGANIZED HEALTH CARE EDUCATION/TRAINING PROGRAM
Payer: COMMERCIAL

## 2023-01-05 DIAGNOSIS — Z13.820 SCREENING FOR OSTEOPOROSIS: ICD-10-CM

## 2023-01-05 PROCEDURE — 77080 DXA BONE DENSITY AXIAL: CPT

## 2023-01-10 ENCOUNTER — OFFICE VISIT (OUTPATIENT)
Dept: INTERNAL MEDICINE | Facility: OTHER | Age: 78
End: 2023-01-10
Payer: COMMERCIAL

## 2023-01-10 VITALS
TEMPERATURE: 97.5 F | OXYGEN SATURATION: 98 % | HEIGHT: 72 IN | DIASTOLIC BLOOD PRESSURE: 70 MMHG | WEIGHT: 198.2 LBS | SYSTOLIC BLOOD PRESSURE: 144 MMHG | BODY MASS INDEX: 26.84 KG/M2 | HEART RATE: 70 BPM

## 2023-01-10 DIAGNOSIS — I10 ESSENTIAL HYPERTENSION: ICD-10-CM

## 2023-01-10 DIAGNOSIS — E66.3 OVERWEIGHT: ICD-10-CM

## 2023-01-10 DIAGNOSIS — N18.2 STAGE 2 CHRONIC KIDNEY DISEASE: ICD-10-CM

## 2023-01-10 DIAGNOSIS — R73.03 PREDIABETES: ICD-10-CM

## 2023-01-10 DIAGNOSIS — E78.2 MIXED HYPERLIPIDEMIA: ICD-10-CM

## 2023-01-10 PROBLEM — M21.372 LEFT FOOT DROP: Status: RESOLVED | Noted: 2022-11-30 | Resolved: 2023-01-10

## 2023-01-10 PROBLEM — N28.9 RENAL INSUFFICIENCY: Status: RESOLVED | Noted: 2022-05-06 | Resolved: 2023-01-10

## 2023-01-10 LAB
HBA1C MFR BLD: 5.6 % (ref 0–5.6)
INT CON NEG: NORMAL
INT CON POS: NORMAL

## 2023-01-10 PROCEDURE — 83036 HEMOGLOBIN GLYCOSYLATED A1C: CPT | Performed by: STUDENT IN AN ORGANIZED HEALTH CARE EDUCATION/TRAINING PROGRAM

## 2023-01-10 PROCEDURE — 99213 OFFICE O/P EST LOW 20 MIN: CPT | Mod: GE | Performed by: STUDENT IN AN ORGANIZED HEALTH CARE EDUCATION/TRAINING PROGRAM

## 2023-01-10 RX ORDER — HYDROCHLOROTHIAZIDE 12.5 MG/1
TABLET ORAL
Qty: 30 TABLET | Refills: 0 | OUTPATIENT
Start: 2023-01-10

## 2023-01-10 RX ORDER — LISINOPRIL 5 MG/1
5 TABLET ORAL DAILY
Qty: 90 TABLET | Refills: 2 | Status: SHIPPED
Start: 2023-01-10 | End: 2023-02-14 | Stop reason: CLARIF

## 2023-01-10 ASSESSMENT — ENCOUNTER SYMPTOMS
WEIGHT LOSS: 0
DIZZINESS: 0
FEVER: 0
PALPITATIONS: 0
COUGH: 0
NAUSEA: 0
DIARRHEA: 0
MYALGIAS: 0
VOMITING: 0
DEPRESSION: 0
CHILLS: 0
SHORTNESS OF BREATH: 0
CONSTIPATION: 0
WEAKNESS: 0

## 2023-01-10 ASSESSMENT — PATIENT HEALTH QUESTIONNAIRE - PHQ9: CLINICAL INTERPRETATION OF PHQ2 SCORE: 0

## 2023-01-10 ASSESSMENT — FIBROSIS 4 INDEX: FIB4 SCORE: 1.58

## 2023-01-10 NOTE — PROGRESS NOTES
Chief Complaint:  Follow up for BP     Last Seen:   11/30/22    History of Present Illness:   Patient is a 77-year-old M with PMHx of:     HTN: on HCTZ  Hypothyroidism: on levothyroxine   DLD: lipid panel November 2021 Tchol 180    HDL 43    Pre-DM: a1c 5.7 May 2022  Normocytic anemia: pending w/u   Chronic sinusitis: follows with ENT  OA, hip  Bilateral hearing loss: b/l hearing aids  Anterior dislocation, left hip: s/p hip replacement 2022, ongoing PT sessions  Lumbar spinal stenosis c/b neuropathy: followed by Dr Webber at [facility], on gabapentin    Lumbar degenerative disc disease   Lumbar spondylosis  Peyronie disease: s/p surgery via urology [place]  Overweight: BMI 27.31 11/30/22     presenting to clinic today for BP follow up.     Home -130s on average. Does not feel light headed or dizzy. Open to switching to ace-I.     ----    Healthcare maintenance:   Mood screening: no anxiety, PHQ-2 score 0 11/30/22   Drug screening: no tobacco, alc, rec drugs   Sexual activity screening: not active since hip problems   Infectious disease screening: HIV, hep C neg Jan 2022 labs  Colon cancer screening: Colonoscopy 2019 w/[facility]- 1 diminutive polyp in transverse colon, diverticulosis (sigmoid), internal hemorrhoids, q 5yr- next due 2024  Vaccines: all up to date   Osteoporosis screening: dexa 1/5/23 WNL, next due 2025    Review of Systems   Review of Systems   Constitutional:  Negative for chills, fever and weight loss.   Respiratory:  Negative for cough and shortness of breath.    Cardiovascular:  Negative for chest pain, palpitations and leg swelling.   Gastrointestinal:  Negative for constipation, diarrhea, nausea and vomiting.   Genitourinary:  Negative for dysuria.   Musculoskeletal:  Negative for myalgias.   Neurological:  Negative for dizziness and weakness.   Psychiatric/Behavioral:  Negative for depression.       Past Medical History:   Past Medical History:   Diagnosis Date     "Actinic keratoses     sees olivia Everett    Bilateral hearing loss 08/06/2018    wears aids    Chronic right-sided low back pain without sciatica 08/06/2018    s/p 5 surgeries; sees Akbar    ED (erectile dysfunction)     High cholesterol     Hyperlipidemia, mixed     Hypertension     pt states well controlled on meds    Hypothyroidism     Neuropathy (HCC) 8/6/2018    Pain 12/18/2018    \"Nerve Pain-Low back/legs\".    Peyronie disease     sees [place] [facility]       Patient Active Problem List    Diagnosis Date Noted    Pre-diabetes 11/30/2022    Normocytic anemia 11/30/2022    Left foot drop 11/30/2022    Chronic sinusitis 11/30/2022    Arthritis of hip 08/15/2022    Renal insufficiency 05/06/2022    Lumbar degenerative disc disease 12/31/2021    Dyslipidemia 11/09/2021    Osteoarthritis 11/09/2021    Pain of left lower extremity 10/11/2019    Peripheral polyneuropathy 08/06/2018    Bilateral hearing loss 08/06/2018    Spondylolysis, lumbar region 10/05/2017    Chronic lumbar radiculopathy 08/18/2016    Bilateral stenosis of lateral recess of lumbar spine 03/12/2016    Primary hypertension 01/04/2016    Hypothyroidism 01/04/2016    Low back pain 01/04/2016       Past Surgical History:   Past Surgical History:   Procedure Laterality Date    HIP REVISION TOTAL Left 9/21/2022    Procedure: LEFT REVISION TOTAL HIP ARTHROPLASTY AND REPAIRS AS INDICATED;  Surgeon: Luis Caceres M.D.;  Location: SURGERY [facility];  Service: Orthopedics    NH TOTAL HIP ARTHROPLASTY Left 8/15/2022    Procedure: LEFT TOTAL HIP ARTHROPLASTY;  Surgeon: Luis Caceres M.D.;  Location: SURGERY [facility];  Service: Orthopedics    PB IMPLANT NEUROSTIM/  1/3/2022    Procedure: REMOVAL, NEUROSTIMULATOR, PERMANENT, SPINAL CORD;  Surgeon: Be Webber M.D.;  Location: [facility];  Service: Neurosurgery    LUMBAR FUSION O-ARM  1/3/2022    Procedure: FUSION, SPINE, LUMBAR, WITH O-ARM IMAGING GUIDANCE - L1-3 EXTENSION-STAGE 2;  " Surgeon: Be Webber M.D.;  Location: [facility];  Service: Neurosurgery    LUMBAR DECOMPRESSION  1/3/2022    Procedure: DECOMPRESSION, SPINE, LUMBAR;  Surgeon: Be Webber M.D.;  Location: [facility];  Service: Neurosurgery    LUMBAR FUSION ANTERIOR N/A 12/31/2021    Procedure: FUSION, SPINE, LUMBAR, ANTERIOR APPROACH - L5-S1;  Surgeon: Be Webber M.D.;  Location: [facility];  Service: Neurosurgery    FUSION, SPINE, LUMBAR, ROBOT-ASSISTED WITH IMAGING GUIDANCE  2/27/2021    Procedure: FUSION, SPINE, LUMBAR, ROBOT-ASSISTED WITH IMAGING GUIDANCE - L5-S1 EXTENSION OF TLIF;  Surgeon: Be Webber M.D.;  Location: [facility];  Service: Neurosurgery    LUMBAR DECOMPRESSION N/A 2/27/2021    Procedure: DECOMPRESSION, SPINE, LUMBAR;  Surgeon: Be Webber M.D.;  Location: [facility];  Service: Neurosurgery    PB IMPLANT NEUROSTIM/  5/22/2020    Procedure: INSERTION, NEUROSTIMULATOR, PERMANENT, SPINAL CORD;  Surgeon: Eugenio Camara M.D.;  Location: [facility];  Service: Pain Management    PEYRONIES PLAQUE  12/6/2019    Procedure: EXCISION, PEYRONIE'S PLAQUE, PENIS WITH GRAFTING, JEAN CLAUDE PLICATION, ARTIFICIAL ERECTION;  Surgeon: Tejinder Culver M.D.;  Location: [facility];  Service: Urology    [place] NERVOUS SYSTEM SURGERY UNLISTED Left 6/3/2019    Procedure: EXPLORATION, NERVE- PERONEAL NERVE RELEASE AT THE FIBULAR HEAD  ;  Surgeon: Raz Garcia M.D.;  Location: [facility];  Service: Neurosurgery    FUSION, PIP JOINT, TOE Right 2/22/2019    Procedure: PIP ARTHRODESIS;  Surgeon: Amadou Bowden M.D.;  Location: SURGERY SAME DAY [facility];  Service: Orthopedics    FINGER EXPLORATION Right 2/22/2019    Procedure: FINGER EXPLORATION - RING FINGER DISTAL INTERPHALANGEAL JOINT;  Surgeon: Amadou Bowden M.D.;  Location: SURGERY SAME DAY [facility];  Service: Orthopedics    COLONOSCOPY  2019    OTHER NEUROLOGICAL SURG   "12/12/2018    \"Low Back Surgery'sx6 between 2006 & 2017\".    CYST EXCISION Right 10/12/2018    Procedure: CYST EXCISION - RING FINGER MUCOUS CYST, DISTAL INTERPHALANGEAL JOINT;  Surgeon: Amadou Bowden M.D.;  Location: SURGERY SAME DAY [facility];  Service: Orthopedics    LUMBAR LAMINECTOMY DISKECTOMY Left 12/6/2017    Procedure: LUMBAR LAMINECTOMY DISKECTOMY - POSTERIOR REDO LEFT  L4-S1 KAILA;  Surgeon: Be Webber M.D.;  Location: SURGERY [facility];  Service: Neurosurgery    FUSION, SPINE, LUMBAR, PLIF  10/5/2017    Procedure: POSTERIOR TRANSFORAMINAL INTERBODY FUSION AT LUMBAR 4 - 5;  Surgeon: Be Webber M.D.;  Location: SURGERY [facility];  Service: Neurosurgery    LUMBAR DECOMPRESSION  10/5/2017    Procedure: POSTERIOR LUMBAR 3-4,  4-5 DECOMPRESSION AND FUSION;  Surgeon: Be Webber M.D.;  Location: SURGERY [facility];  Service: Neurosurgery    WI INJ DX/THER AGNT PARAVERT FACET JOINT, BRITT* Right 10/6/2016    Procedure: INJ PARA FACET L/S 1 LVL W/IG - L3-4, L4-5, L5-S1;  Surgeon: Eugenio Camara M.D.;  Location: [facility];  Service: Pain Management    WI INJ DX/THER AGNT PARAVERT FACET JOINT, BRITT*  10/6/2016    Procedure: INJ PARA FACET L/S 2D LVL W/IG;  Surgeon: Eugenio Camara M.D.;  Location: [facility];  Service: Pain Management    WI INJ DX/THER AGNT PARAVERT FACET JOINT, BRITT*  10/6/2016    Procedure: INJ PARA FACET L/S 3D LVL W/IG;  Surgeon: Eugenio Camara M.D.;  Location: SURGERY [facility];  Service: Pain Management    WI NJX AA&/STRD TFRML EPI LUMBAR/SACRAL 1 LEVEL Right 8/18/2016    Procedure: INJ-FORAMEN EPI LUM/SAC SNGL - L5-S1;  Surgeon: Eugenio Camara M.D.;  Location: [facility];  Service: Pain Management    LUMBAR LAMINECTOMY DISKECTOMY Right 3/12/2016    Procedure: LUMBAR HemiLAMINECTOMY Micro DISKECTOMY posterior Redo L3-4 ;  Surgeon: Be Webber M.D.;  Location: SURGERY [facility];  Service:     FORAMINOTOMY " Right 3/12/2016    Procedure: FORAMINOTOMY;  Surgeon: Be Webber M.D.;  Location: SURGERY [facility];  Service:     CERVICAL LAMINECTOMY POSTERIOR  2011    DENTAL SURGERY Bilateral as a teenager    wisdom teeth    OTHER      nasal surgery 2015    OTHER NEUROLOGICAL SURG  2006, 2010, 2012, 2014, 2016, 2017    low back surgery x 5    TONSILLECTOMY      child        Allergies:  Patient has no known allergies.    Medications:     Current Outpatient Medications:     B Complex Vitamins (VITAMIN B COMPLEX PO), 1 Tablet, Oral, DAILY, Taking    hydroCHLOROthiazide, TAKE ONE TABLET BY MOUTH ONCE DAILY, Taking    atorvastatin, 10 mg, Oral, DAILY, Taking    Synthroid, 137 mcg, Oral, AM ES, Taking    ibuprofen, Take 1 Tablet by mouth., PRN    fluticasone, 1 Spray, Nasal, DAILY, PRN    aspirin, 81 mg, Oral, DAILY, Taking    gabapentin, 600 mg, Oral, TID, Taking    tadalafil, 5 mg, Oral, DAILY, Taking    Multi-Vitamin, 1 Tablet, Oral, DAILY, Taking    Vitamin D-1000 Max St, 2,000 Units, Oral, DAILY, Taking    b complex vitamins, 1 Tablet, Oral, DAILY, Taking     Social History:   Social History     Tobacco Use    Smoking status: Never    Smokeless tobacco: Never   Vaping Use    Vaping Use: Never used   Substance Use Topics    Alcohol use: No    Drug use: No       Family History:   Family History   Problem Relation Age of Onset    Cancer Father         Leukemia     Heart Disease Father     Diabetes Father     Stroke Father     Cancer Mother         breast met to liver    Heart Disease Mother        Vitals:   BP (!) 144/70 (BP Location: Left arm, Patient Position: Sitting, BP Cuff Size: Adult)   Pulse 70   Temp 36.4 °C (97.5 °F) (Temporal)   Ht 1.829 m (6')   Wt 89.9 kg (198 lb 3.2 oz)   SpO2 98%  Body mass index is 26.88 kg/m².    Physical Exam:   Physical Exam  Constitutional:       General: He is not in acute distress.     Appearance: Normal appearance. He is not ill-appearing or diaphoretic.   HENT:      Head:  Normocephalic and atraumatic.      Mouth/Throat:      Mouth: Mucous membranes are moist.   Eyes:      Extraocular Movements: Extraocular movements intact.      Pupils: Pupils are equal, round, and reactive to light.   Cardiovascular:      Rate and Rhythm: Normal rate and regular rhythm.      Heart sounds: No murmur heard.    No friction rub. No gallop.   Pulmonary:      Effort: No respiratory distress.      Breath sounds: No stridor. No wheezing, rhonchi or rales.   Abdominal:      General: Bowel sounds are normal. There is no distension.      Tenderness: There is no abdominal tenderness. There is no guarding or rebound.   Musculoskeletal:      Right lower leg: No edema.      Left lower leg: No edema.   Skin:     Coloration: Skin is not jaundiced or pale.   Neurological:      General: No focal deficit present.      Mental Status: He is alert and oriented to person, place, and time.   Psychiatric:         Mood and Affect: Mood normal.         Behavior: Behavior normal.        Assessment and Plan    Patient is a 77-year-old male with pertinent PMHx of HTN, DLD, pre-DM, hypothyroidism, lumbar stenosis c/b peripheral neuropathy, renal insufficiency, normocytic anemia presenting today for routine visit. Comprehensive follow up labs (fasting) ordered. Found to have SBP 140s in office. Follow up in 5 weeks with BP log- started on lisinopril 5 mg PO qD and HCTZ discontinued.      #Hypertension  -In office BP Jan 2023 144/70 --> 120s on repeat  Plan  -Counseled on lifestyle modifications (diet, exercise)  -Discont Hydrochlorothiazide 12.5 mg PO qD  -Start lisinopril 5 mg PO qD, BMP after few days of starting medication  -Keep home BP log to ensure not running 150-160s, follow up in 5 weeks     #Prediabetes  #Overweight   -A1c 12/20/2021 5.5 --> 5.9 Jan 2023  Plan  -Lisinopril 5 mg PO qD for renal protection      #Anemia, normocytic  -Hgb ~13, appears to be BL for patient since apx jan 2022  -Iron panel WNL although ferritin  20s   Plan  -CTM with yearly labs, colonoscopy as appropriate     #Chronic kidney disease, stage 2  -Jan 2023: GFR 50s-60s   **Ddx: in setting of HTN/ pre-DM   Plan  -CTM, ace-I as above     #Hypothyroidism  -Well-controlled on current regimen  -Asymptomatic  -TSH Jan 2023 WNL  Plan  -Synthroid 137mcg PO qD     #Dyslipidemia  -Lipid panel Jan 2023 Tchol 166    HDL 44  LDL 97  Plan  -Atorvastatin 10 mg PO qD     #Healthcare maintenance  Mood screening: no anxiety, PHQ-2 score 0 11/30/22   Drug screening: no tobacco, alc, rec drugs   Sexual activity screening: not active since hip problems   Infectious disease screening: HIV, hep C neg Jan 2022 labs  Colon cancer screening: Colonoscopy 2019 w/[facility]HC- 1 diminutive polyp in transverse colon, diverticulosis (sigmoid), internal hemorrhoids, q 5yr- next due 2024  Vaccines: all up to date   Osteoporosis screening: dexa 1/5/23 WNL, next due 2025     -- CHRONIC AND RESOLVED MEDICAL PROBLEMS --      #Peripheral neuropathy 2/2 spinal stenosis  #Lumbar spinal stenosis  #Lumbar degenerative disc disease  #Foot drop, left- result of above  -A1c 12/20/2021 5.5--> 5.7 4/26/22  -B12, B6, folate WNL December 2019  -Chronic left lower extremity pain without obvious cause  --CT June 2021: posterior hardware extending from L3-S1, interbody hardware at L4-5 and L5-S1, spinal stenosis L1-2, L2-3, multilevel facet arthropathy, degenerative subluxation at L1-2, L2-3   -Trialed Lyrica and felt dizzy in past  **suspected hypersensitivity syndrome but most likely 2/2 patient's ongoing significant back etiologies v some component of pre-DM   Plan  -Gabapentin per neurosurgery  -Follow with neurosurgery as outpatient as appropriate     #Sinus infection, chronic  -s/p sinuplasty   Plan  -Follows with otolaryngology- PRN neti + flonase as needed   -SILVIA filled     Please note that this dictation was created using voice recognition software. I have made every reasonable attempt to correct  obvious errors, but I expect that there are errors of grammar and possibly content that I did not discover before finalizing the note.    Patient case was seen/ assessed/ discussed with Dr. Jass Mccarthy, PGY-2  Internal Medicine

## 2023-02-03 ENCOUNTER — HOSPITAL ENCOUNTER (OUTPATIENT)
Dept: LAB | Facility: MEDICAL CENTER | Age: 78
End: 2023-02-03
Attending: SPECIALIST
Payer: COMMERCIAL

## 2023-02-03 LAB
CREAT SERPL-MCNC: 1.03 MG/DL (ref 0.5–1.4)
GFR SERPLBLD CREATININE-BSD FMLA CKD-EPI: 75 ML/MIN/1.73 M 2
GLUCOSE SERPL-MCNC: 88 MG/DL (ref 65–99)
POTASSIUM SERPL-SCNC: 4.4 MMOL/L (ref 3.6–5.5)
SODIUM SERPL-SCNC: 138 MMOL/L (ref 135–145)

## 2023-02-03 PROCEDURE — 82947 ASSAY GLUCOSE BLOOD QUANT: CPT

## 2023-02-03 PROCEDURE — 82565 ASSAY OF CREATININE: CPT

## 2023-02-03 PROCEDURE — 84295 ASSAY OF SERUM SODIUM: CPT

## 2023-02-03 PROCEDURE — 36415 COLL VENOUS BLD VENIPUNCTURE: CPT

## 2023-02-03 PROCEDURE — 84132 ASSAY OF SERUM POTASSIUM: CPT

## 2023-02-06 ENCOUNTER — HOSPITAL ENCOUNTER (OUTPATIENT)
Dept: LAB | Facility: MEDICAL CENTER | Age: 78
End: 2023-02-06
Attending: SPECIALIST
Payer: COMMERCIAL

## 2023-02-06 LAB
BASOPHILS # BLD AUTO: 0.9 % (ref 0–1.8)
BASOPHILS # BLD: 0.05 K/UL (ref 0–0.12)
EOSINOPHIL # BLD AUTO: 0.18 K/UL (ref 0–0.51)
EOSINOPHIL NFR BLD: 3.3 % (ref 0–6.9)
ERYTHROCYTE [DISTWIDTH] IN BLOOD BY AUTOMATED COUNT: 46.2 FL (ref 35.9–50)
HCT VFR BLD AUTO: 42.5 % (ref 42–52)
HGB BLD-MCNC: 13.3 G/DL (ref 14–18)
IMM GRANULOCYTES # BLD AUTO: 0.02 K/UL (ref 0–0.11)
IMM GRANULOCYTES NFR BLD AUTO: 0.4 % (ref 0–0.9)
INR PPP: 0.98 (ref 0.87–1.13)
LYMPHOCYTES # BLD AUTO: 1.43 K/UL (ref 1–4.8)
LYMPHOCYTES NFR BLD: 26.3 % (ref 22–41)
MCH RBC QN AUTO: 26.2 PG (ref 27–33)
MCHC RBC AUTO-ENTMCNC: 31.3 G/DL (ref 33.7–35.3)
MCV RBC AUTO: 83.7 FL (ref 81.4–97.8)
MONOCYTES # BLD AUTO: 0.52 K/UL (ref 0–0.85)
MONOCYTES NFR BLD AUTO: 9.6 % (ref 0–13.4)
NEUTROPHILS # BLD AUTO: 3.23 K/UL (ref 1.82–7.42)
NEUTROPHILS NFR BLD: 59.5 % (ref 44–72)
NRBC # BLD AUTO: 0 K/UL
NRBC BLD-RTO: 0 /100 WBC
PLATELET # BLD AUTO: 280 K/UL (ref 164–446)
PMV BLD AUTO: 11.1 FL (ref 9–12.9)
PROTHROMBIN TIME: 13 SEC (ref 12–14.6)
RBC # BLD AUTO: 5.08 M/UL (ref 4.7–6.1)
WBC # BLD AUTO: 5.4 K/UL (ref 4.8–10.8)

## 2023-02-06 PROCEDURE — 85610 PROTHROMBIN TIME: CPT

## 2023-02-06 PROCEDURE — 85025 COMPLETE CBC W/AUTO DIFF WBC: CPT

## 2023-02-14 ENCOUNTER — OFFICE VISIT (OUTPATIENT)
Dept: INTERNAL MEDICINE | Facility: OTHER | Age: 78
End: 2023-02-14
Payer: COMMERCIAL

## 2023-02-14 VITALS
WEIGHT: 203 LBS | HEIGHT: 72 IN | DIASTOLIC BLOOD PRESSURE: 72 MMHG | HEART RATE: 63 BPM | TEMPERATURE: 97.6 F | SYSTOLIC BLOOD PRESSURE: 147 MMHG | BODY MASS INDEX: 27.5 KG/M2 | OXYGEN SATURATION: 100 %

## 2023-02-14 DIAGNOSIS — I10 ESSENTIAL HYPERTENSION: ICD-10-CM

## 2023-02-14 PROCEDURE — 99213 OFFICE O/P EST LOW 20 MIN: CPT | Mod: GE | Performed by: STUDENT IN AN ORGANIZED HEALTH CARE EDUCATION/TRAINING PROGRAM

## 2023-02-14 RX ORDER — LISINOPRIL 10 MG/1
10 TABLET ORAL DAILY
Qty: 30 TABLET | Refills: 0 | Status: SHIPPED | OUTPATIENT
Start: 2023-02-14 | End: 2023-05-09 | Stop reason: SDUPTHER

## 2023-02-14 ASSESSMENT — ENCOUNTER SYMPTOMS
CHILLS: 0
DEPRESSION: 0
NAUSEA: 0
MYALGIAS: 0
DIZZINESS: 0
VOMITING: 0
PALPITATIONS: 0
DIARRHEA: 0
SHORTNESS OF BREATH: 0
WEAKNESS: 0
FEVER: 0
COUGH: 0
CONSTIPATION: 0
WEIGHT LOSS: 0

## 2023-02-14 ASSESSMENT — FIBROSIS 4 INDEX: FIB4 SCORE: 1.61

## 2023-02-14 NOTE — PROGRESS NOTES
Chief Complaint:  Follow up for hypertension    Last Seen:   01/10/23    History of Present Illness:     Patient is a 77-year-old M with PMHx of:     HTN: on HCTZ  Hypothyroidism: on levothyroxine   DLD: lipid panel November 2021 Tchol 180    HDL 43    Pre-DM: a1c 5.7 May 2022  Normocytic anemia: pending w/u   Chronic sinusitis: follows with ENT  OA, hip  Bilateral hearing loss: b/l hearing aids  Anterior dislocation, left hip: s/p hip replacement 2022, ongoing PT sessions  Lumbar spinal stenosis c/b neuropathy: followed by Dr Webber at [facility] Neurosurgery, on gabapentin    Lumbar degenerative disc disease   Lumbar spondylosis  Peyronie disease: s/p surgery via urology [place]  Overweight: BMI 27.31 11/30/22    presenting to clinic today for BP follow up- home BP log with 120s-130s. No symptoms from lisinopril.     Perineal nerve decompression scheduled for later this month.     ----    Healthcare maintenance:   Mood screening: no anxiety, PHQ-2 score 0 11/30/22   Drug screening: no tobacco, alc, rec drugs   Sexual activity screening: not active since hip problems   Infectious disease screening: HIV, hep C neg Jan 2022 labs  Colon cancer screening: Colonoscopy 2019 w/DHC- 1 diminutive polyp in transverse colon, diverticulosis (sigmoid), internal hemorrhoids, q 5yr- next due 2024  Vaccines: all up to date   Osteoporosis screening: dexa 1/5/23 WNL, next due 2025    Review of Systems   Review of Systems   Constitutional:  Negative for chills, fever and weight loss.   Respiratory:  Negative for cough and shortness of breath.    Cardiovascular:  Negative for chest pain, palpitations and leg swelling.   Gastrointestinal:  Negative for constipation, diarrhea, nausea and vomiting.   Genitourinary:  Negative for dysuria.   Musculoskeletal:  Negative for myalgias.   Neurological:  Negative for dizziness and weakness.   Psychiatric/Behavioral:  Negative for depression.       Past Medical History:   Past Medical  "History:   Diagnosis Date    Actinic keratoses     sees olivia Everett    Bilateral hearing loss 08/06/2018    wears aids    Chronic right-sided low back pain without sciatica 08/06/2018    s/p 5 surgeries; sees Akbar    ED (erectile dysfunction)     High cholesterol     Hyperlipidemia, mixed     Hypertension     pt states well controlled on meds    Hypothyroidism     Neuropathy (HCC) 8/6/2018    Pain 12/18/2018    \"Nerve Pain-Low back/legs\".    Peyronie disease     sees [place] [facility]       Patient Active Problem List    Diagnosis Date Noted    Overweight 01/10/2023    Stage 2 chronic kidney disease 01/10/2023    Pre-diabetes 11/30/2022    Normocytic anemia 11/30/2022    Chronic sinusitis 11/30/2022    Arthritis of hip 08/15/2022    Lumbar degenerative disc disease 12/31/2021    Dyslipidemia 11/09/2021    Osteoarthritis 11/09/2021    Pain of left lower extremity 10/11/2019    Peripheral polyneuropathy 08/06/2018    Bilateral hearing loss 08/06/2018    Spondylolysis, lumbar region 10/05/2017    Chronic lumbar radiculopathy 08/18/2016    Bilateral stenosis of lateral recess of lumbar spine 03/12/2016    Primary hypertension 01/04/2016    Hypothyroidism 01/04/2016    Low back pain 01/04/2016       Past Surgical History:   Past Surgical History:   Procedure Laterality Date    HIP REVISION TOTAL Left 9/21/2022    Procedure: LEFT REVISION TOTAL HIP ARTHROPLASTY AND REPAIRS AS INDICATED;  Surgeon: Luis Caceres M.D.;  Location: SURGERY [facility];  Service: Orthopedics    SD TOTAL HIP ARTHROPLASTY Left 8/15/2022    Procedure: LEFT TOTAL HIP ARTHROPLASTY;  Surgeon: Luis Caceres M.D.;  Location: SURGERY [facility];  Service: Orthopedics    PB IMPLANT NEUROSTIM/  1/3/2022    Procedure: REMOVAL, NEUROSTIMULATOR, PERMANENT, SPINAL CORD;  Surgeon: Be Webber M.D.;  Location: [facility];  Service: Neurosurgery    LUMBAR FUSION O-ARM  1/3/2022    Procedure: FUSION, SPINE, LUMBAR, WITH O-ARM IMAGING " GUIDANCE - L1-3 EXTENSION-STAGE 2;  Surgeon: Be Webber M.D.;  Location: [facility];  Service: Neurosurgery    LUMBAR DECOMPRESSION  1/3/2022    Procedure: DECOMPRESSION, SPINE, LUMBAR;  Surgeon: Be Webber M.D.;  Location: [facility];  Service: Neurosurgery    LUMBAR FUSION ANTERIOR N/A 12/31/2021    Procedure: FUSION, SPINE, LUMBAR, ANTERIOR APPROACH - L5-S1;  Surgeon: Be Webber M.D.;  Location: [facility];  Service: Neurosurgery    FUSION, SPINE, LUMBAR, ROBOT-ASSISTED WITH IMAGING GUIDANCE  2/27/2021    Procedure: FUSION, SPINE, LUMBAR, ROBOT-ASSISTED WITH IMAGING GUIDANCE - L5-S1 EXTENSION OF TLIF;  Surgeon: Be Webber M.D.;  Location: [facility];  Service: Neurosurgery    LUMBAR DECOMPRESSION N/A 2/27/2021    Procedure: DECOMPRESSION, SPINE, LUMBAR;  Surgeon: Be Webber M.D.;  Location: [facility];  Service: Neurosurgery    PB IMPLANT NEUROSTIM/  5/22/2020    Procedure: INSERTION, NEUROSTIMULATOR, PERMANENT, SPINAL CORD;  Surgeon: Eugenio Camara M.D.;  Location: [facility];  Service: Pain Management    PEYRONIES PLAQUE  12/6/2019    Procedure: EXCISION, PEYRONIE'S PLAQUE, PENIS WITH GRAFTING, JEAN CLAUDE PLICATION, ARTIFICIAL ERECTION;  Surgeon: Tejinder Culver M.D.;  Location: [facility];  Service: Urology    [place] NERVOUS SYSTEM SURGERY UNLISTED Left 6/3/2019    Procedure: EXPLORATION, NERVE- PERONEAL NERVE RELEASE AT THE FIBULAR HEAD  ;  Surgeon: Raz Garcia M.D.;  Location: [facility];  Service: Neurosurgery    FUSION, PIP JOINT, TOE Right 2/22/2019    Procedure: PIP ARTHRODESIS;  Surgeon: Amadou Bowden M.D.;  Location: SURGERY SAME DAY [facility];  Service: Orthopedics    FINGER EXPLORATION Right 2/22/2019    Procedure: FINGER EXPLORATION - RING FINGER DISTAL INTERPHALANGEAL JOINT;  Surgeon: Amadou Bowden M.D.;  Location: SURGERY [facility] DAY [facility];  Service: Orthopedics    COLONOSCOPY   "2019    OTHER NEUROLOGICAL SURG  12/12/2018    \"Low Back Surgery'sx6 between 2006 & 2017\".    CYST EXCISION Right 10/12/2018    Procedure: CYST EXCISION - RING FINGER MUCOUS CYST, DISTAL INTERPHALANGEAL JOINT;  Surgeon: Amadou Bowden M.D.;  Location: SURGERY SAME DAY [facility];  Service: Orthopedics    LUMBAR LAMINECTOMY DISKECTOMY Left 12/6/2017    Procedure: LUMBAR LAMINECTOMY DISKECTOMY - POSTERIOR REDO LEFT  L4-S1 KAILA;  Surgeon: Be Webber M.D.;  Location: SURGERY [facility];  Service: Neurosurgery    FUSION, SPINE, LUMBAR, PLIF  10/5/2017    Procedure: POSTERIOR TRANSFORAMINAL INTERBODY FUSION AT LUMBAR 4 - 5;  Surgeon: Be Webber M.D.;  Location: SURGERY [facility];  Service: Neurosurgery    LUMBAR DECOMPRESSION  10/5/2017    Procedure: POSTERIOR LUMBAR 3-4,  4-5 DECOMPRESSION AND FUSION;  Surgeon: Be Webber M.D.;  Location: SURGERY [facility];  Service: Neurosurgery    MN INJ DX/THER AGNT PARAVERT FACET JOINT, BRITT* Right 10/6/2016    Procedure: INJ PARA FACET L/S 1 LVL W/IG - L3-4, L4-5, L5-S1;  Surgeon: Eugenio Camara M.D.;  Location: [facility];  Service: Pain Management    MN INJ DX/THER AGNT PARAVERT FACET JOINT, BRITT*  10/6/2016    Procedure: INJ PARA FACET L/S 2D LVL W/IG;  Surgeon: Eugenio Camara M.D.;  Location: [facility];  Service: Pain Management    MN INJ DX/THER AGNT PARAVERT FACET JOINT, BRITT*  10/6/2016    Procedure: INJ PARA FACET L/S 3D LVL W/IG;  Surgeon: Eugenio Camara M.D.;  Location: SURGERY [facility];  Service: Pain Management    MN NJX AA&/STRD TFRML EPI LUMBAR/SACRAL 1 LEVEL Right 8/18/2016    Procedure: INJ-FORAMEN EPI LUM/SAC SNGL - L5-S1;  Surgeon: Eugenio Camara M.D.;  Location: SURGERY [facility];  Service: Pain Management    LUMBAR LAMINECTOMY DISKECTOMY Right 3/12/2016    Procedure: LUMBAR HemiLAMINECTOMY Micro DISKECTOMY posterior Redo L3-4 ;  Surgeon: Be Webber M.D.;  Location: SURGERY [facility];  Service:     FORAMINOTOMY Right 3/12/2016    Procedure: FORAMINOTOMY;  Surgeon: Be Webber M.D.;  Location: SURGERY [facility];  Service:     CERVICAL LAMINECTOMY POSTERIOR  2011    DENTAL SURGERY Bilateral as a teenager    wisdom teeth    OTHER      nasal surgery 2015    OTHER NEUROLOGICAL SURG  2006, 2010, 2012, 2014, 2016, 2017    low back surgery x 5    TONSILLECTOMY      child        Allergies:  Patient has no known allergies.    Medications:     Current Outpatient Medications:     B Complex Vitamins (VITAMIN B COMPLEX PO), 1 Tablet, Oral, DAILY, Taking    lisinopril, 5 mg, Oral, DAILY, Taking    atorvastatin, 10 mg, Oral, DAILY, Taking    Synthroid, 137 mcg, Oral, AM ES, Taking    ibuprofen, Take 1 Tablet by mouth., PRN    fluticasone, 1 Spray, Nasal, DAILY, PRN    aspirin, 81 mg, Oral, DAILY, Taking Differently    gabapentin, 600 mg, Oral, TID, Taking    Multi-Vitamin, 1 Tablet, Oral, DAILY, Taking    Vitamin D-1000 Max St, 2,000 Units, Oral, DAILY, Taking    b complex vitamins, 1 Tablet, Oral, DAILY, Taking    tadalafil, 5 mg, Oral, DAILY (Patient not taking: Reported on 2/14/2023), Not Taking     Social History:   Social History     Tobacco Use    Smoking status: Never    Smokeless tobacco: Never   Vaping Use    Vaping Use: Never used   Substance Use Topics    Alcohol use: No    Drug use: No       Family History:   Family History   Problem Relation Age of Onset    Cancer Father         Leukemia     Heart Disease Father     Diabetes Father     Stroke Father     Cancer Mother         breast met to liver    Heart Disease Mother      Vitals:   BP (!) 147/72 (BP Location: Right arm, Patient Position: Sitting, BP Cuff Size: Adult)   Pulse 63   Temp 36.4 °C (97.6 °F) (Temporal)   Ht 1.829 m (6')   Wt 92.1 kg (203 lb)   SpO2 100%  Body mass index is 27.53 kg/m².    Physical Exam:   Physical Exam  Constitutional:       General: He is not in acute distress.     Appearance: Normal appearance.  He is not ill-appearing or diaphoretic.   HENT:      Head: Normocephalic and atraumatic.      Mouth/Throat:      Mouth: Mucous membranes are moist.   Eyes:      Extraocular Movements: Extraocular movements intact.      Pupils: Pupils are equal, round, and reactive to light.   Cardiovascular:      Rate and Rhythm: Normal rate and regular rhythm.      Heart sounds: No murmur heard.    No friction rub. No gallop.   Pulmonary:      Effort: No respiratory distress.      Breath sounds: No stridor. No wheezing, rhonchi or rales.   Abdominal:      General: Bowel sounds are normal. There is no distension.      Tenderness: There is no abdominal tenderness. There is no guarding or rebound.   Musculoskeletal:      Right lower leg: No edema.      Left lower leg: No edema.   Skin:     Coloration: Skin is not jaundiced or pale.   Neurological:      General: No focal deficit present.      Mental Status: He is alert and oriented to person, place, and time.   Psychiatric:         Mood and Affect: Mood normal.         Behavior: Behavior normal.      Assessment and Plan    Patient is a 77-year-old male with pertinent PMHx of HTN, DLD, pre-DM, hypothyroidism, lumbar stenosis c/b peripheral neuropathy, renal insufficiency, normocytic anemia presenting today for BP follow up. Follow up in 3 months with blood pressure log and BP cuff.       #Hypertension  -In office BP Jan 2023 144/70 --> 120s on repeat  Plan  -Lisinopril 5 mg PO qD  -Counseled on lifestyle modifications (diet, exercise)  -Continue to keep home BP log    -- CHRONIC AND RESOLVED MEDICAL PROBLEMS --      #Prediabetes  #Overweight   -A1c 12/20/2021 5.5 --> 5.9 Jan 2023  Plan  -Lisinopril 5 mg PO qD for renal protection      #Anemia, normocytic  -Hgb ~13, appears to be BL for patient since apx jan 2022  -Iron low normal with ferritin 20s   Plan  -CTM with yearly labs, colonoscopy as appropriate    #Chronic kidney disease, stage 2  -Jan 2023: GFR 50s-60s   **Ddx: in setting of  HTN/ pre-DM   Plan  -CTM, ace-I as above     #Hypothyroidism  -Well-controlled on current regimen  -Asymptomatic  -TSH Jan 2023 WNL  Plan  -Synthroid 137mcg PO qD     #Dyslipidemia  -Lipid panel Jan 2023 Tchol 166    HDL 44  LDL 97  Plan  -Atorvastatin 10 mg PO qD    #Peripheral neuropathy 2/2 spinal stenosis  #Lumbar spinal stenosis  #Lumbar degenerative disc disease  #Foot drop, left- result of above  -A1c 12/20/2021 5.5--> 5.7 4/26/22  -B12, B6, folate WNL December 2019  -Chronic left lower extremity pain without obvious cause  --CT June 2021: posterior hardware extending from L3-S1, interbody hardware at L4-5 and L5-S1, spinal stenosis L1-2, L2-3, multilevel facet arthropathy, degenerative subluxation at L1-2, L2-3   -Trialed Lyrica and felt dizzy in past  **suspected hypersensitivity syndrome but most likely 2/2 patient's ongoing significant back etiologies v some component of pre-DM   Plan  -Gabapentin per neurosurgery  -Follow with neurosurgery as outpatient as appropriate     #Sinus infection, chronic  -s/p sinuplasty   Plan  -Follows with otolaryngology- PRN neti + flonase as needed   -SILVIA filled     #Healthcare maintenance  Mood screening: no anxiety, PHQ-2 score 0 11/30/22   Drug screening: no tobacco, alc, rec drugs   Sexual activity screening: not active since hip problems   Infectious disease screening: HIV, hep C neg Jan 2022 labs  Colon cancer screening: Colonoscopy 2019 w/DHC- 1 diminutive polyp in transverse colon, diverticulosis (sigmoid), internal hemorrhoids, q 5yr- next due 2024  Vaccines: all up to date   Osteoporosis screening: dexa 1/5/23 WNL, next due 2025    Please note that this dictation was created using voice recognition software. I have made every reasonable attempt to correct obvious errors, but I expect that there are errors of grammar and possibly content that I did not discover before finalizing the note.    Patient case was seen/ assessed/ discussed with   Yael Mccarthy, PGY-2  Internal Medicine

## 2023-03-09 RX ORDER — FLUTICASONE PROPIONATE 50 MCG
SPRAY, SUSPENSION (ML) NASAL
Qty: 16 G | Refills: 0 | Status: SHIPPED
Start: 2023-03-09 | End: 2023-05-09

## 2023-05-09 ENCOUNTER — OFFICE VISIT (OUTPATIENT)
Dept: INTERNAL MEDICINE | Facility: OTHER | Age: 78
End: 2023-05-09
Payer: COMMERCIAL

## 2023-05-09 VITALS
DIASTOLIC BLOOD PRESSURE: 69 MMHG | SYSTOLIC BLOOD PRESSURE: 134 MMHG | HEART RATE: 73 BPM | OXYGEN SATURATION: 96 % | HEIGHT: 72 IN | BODY MASS INDEX: 26.98 KG/M2 | WEIGHT: 199.2 LBS | TEMPERATURE: 98.2 F

## 2023-05-09 DIAGNOSIS — E03.4 HYPOTHYROIDISM DUE TO ACQUIRED ATROPHY OF THYROID: ICD-10-CM

## 2023-05-09 DIAGNOSIS — G58.9 MONONEUROPATHY: ICD-10-CM

## 2023-05-09 DIAGNOSIS — E78.2 MIXED HYPERLIPIDEMIA: ICD-10-CM

## 2023-05-09 DIAGNOSIS — I10 ESSENTIAL HYPERTENSION: ICD-10-CM

## 2023-05-09 DIAGNOSIS — I10 PRIMARY HYPERTENSION: ICD-10-CM

## 2023-05-09 PROCEDURE — 99214 OFFICE O/P EST MOD 30 MIN: CPT | Mod: GC | Performed by: STUDENT IN AN ORGANIZED HEALTH CARE EDUCATION/TRAINING PROGRAM

## 2023-05-09 RX ORDER — LEVOTHYROXINE SODIUM 137 MCG
137 TABLET ORAL
Qty: 90 TABLET | Refills: 0 | Status: SHIPPED | OUTPATIENT
Start: 2023-05-09 | End: 2023-08-01

## 2023-05-09 RX ORDER — LISINOPRIL 10 MG/1
10 TABLET ORAL DAILY
Qty: 90 TABLET | Refills: 0 | Status: SHIPPED | OUTPATIENT
Start: 2023-05-09 | End: 2023-08-04 | Stop reason: SDUPTHER

## 2023-05-09 RX ORDER — ATORVASTATIN CALCIUM 10 MG/1
10 TABLET, FILM COATED ORAL DAILY
Qty: 90 TABLET | Refills: 0 | Status: SHIPPED | OUTPATIENT
Start: 2023-05-09 | End: 2023-08-04 | Stop reason: SDUPTHER

## 2023-05-09 RX ORDER — GABAPENTIN 600 MG/1
600 TABLET ORAL 3 TIMES DAILY
Qty: 300 TABLET | Refills: 0 | Status: SHIPPED | OUTPATIENT
Start: 2023-05-09 | End: 2023-07-04 | Stop reason: SDUPTHER

## 2023-05-09 ASSESSMENT — ENCOUNTER SYMPTOMS
CONSTIPATION: 0
DEPRESSION: 0
PALPITATIONS: 0
MYALGIAS: 0
SHORTNESS OF BREATH: 0
DIARRHEA: 0
VOMITING: 0
FEVER: 0
CHILLS: 0
COUGH: 0
NAUSEA: 0
WEAKNESS: 0
WEIGHT LOSS: 0
DIZZINESS: 0

## 2023-05-09 ASSESSMENT — FIBROSIS 4 INDEX: FIB4 SCORE: 1.61

## 2023-05-09 NOTE — PROGRESS NOTES
Chief Complaint:  Follow up for hypertension    Last Seen:   2/2023    History of Present Illness:     Patient is a 77-year-old M with PMHx of:     HTN: on HCTZ  Hypothyroidism: on levothyroxine   DLD: lipid panel November 2021 Tchol 180    HDL 43    Pre-DM: a1c 5.7 May 2022  Normocytic anemia: order w/u with next set of labs   Chronic sinusitis: follows with ENT  OA, hip  Bilateral hearing loss: b/l hearing aids  Anterior dislocation, left hip: s/p hip replacement 2022, ongoing PT sessions  Lumbar spinal stenosis c/b neuropathy: followed by Dr Webber at [facility], on gabapentin    Lumbar degenerative disc disease   Lumbar spondylosis  Peyronie disease: s/p surgery via [facility]  BPH- tadalafil, follows with Dr Culver  Overweight  S/p perineal nerve decompression 2023 at [facility]    presenting to clinic today for BP follow up- home BP log with BP 100s without lightheadedness or dizziness. No pre-syncopal episodes.     ----    Healthcare maintenance:   Mood screening: no anxiety, PHQ-2 score 0 11/30/22   Drug screening: no tobacco, alc, rec drugs   Sexual activity screening: not active since hip problems   Infectious disease screening: HIV, hep C neg Jan 2022 labs  Colon cancer screening: Colonoscopy 2019 w/DHC- 1 diminutive polyp in transverse colon, diverticulosis (sigmoid), internal hemorrhoids, q 5yr- next due 2024  Vaccines: all up to date   Osteoporosis screening: dexa 1/5/23 WNL, next due 2025    Review of Systems   Review of Systems   Constitutional:  Negative for chills, fever and weight loss.   Respiratory:  Negative for cough and shortness of breath.    Cardiovascular:  Negative for chest pain, palpitations and leg swelling.   Gastrointestinal:  Negative for constipation, diarrhea, nausea and vomiting.   Genitourinary:  Negative for dysuria.   Musculoskeletal:  Negative for myalgias.   Neurological:  Negative for dizziness and weakness.   Psychiatric/Behavioral:  Negative for  "depression.       Past Medical History:   Past Medical History:   Diagnosis Date    Actinic keratoses     sees olivia Everett    Bilateral hearing loss 08/06/2018    wears aids    Chronic right-sided low back pain without sciatica 08/06/2018    s/p 5 surgeries; sees Akbar    ED (erectile dysfunction)     High cholesterol     Hyperlipidemia, mixed     Hypertension     pt states well controlled on meds    Hypothyroidism     Neuropathy (HCC) 8/6/2018    Pain 12/18/2018    \"Nerve Pain-Low back/legs\".    Peyronie disease     sees [facility]       Patient Active Problem List    Diagnosis Date Noted    Overweight 01/10/2023    Stage 2 chronic kidney disease 01/10/2023    Pre-diabetes 11/30/2022    Normocytic anemia 11/30/2022    Chronic sinusitis 11/30/2022    Arthritis of hip 08/15/2022    Lumbar degenerative disc disease 12/31/2021    Dyslipidemia 11/09/2021    Osteoarthritis 11/09/2021    Pain of left lower extremity 10/11/2019    Peripheral polyneuropathy 08/06/2018    Bilateral hearing loss 08/06/2018    Spondylolysis, lumbar region 10/05/2017    Chronic lumbar radiculopathy 08/18/2016    Bilateral stenosis of lateral recess of lumbar spine 03/12/2016    Primary hypertension 01/04/2016    Hypothyroidism 01/04/2016       Past Surgical History:   Past Surgical History:   Procedure Laterality Date    HIP REVISION TOTAL Left 9/21/2022    Procedure: LEFT REVISION TOTAL HIP ARTHROPLASTY AND REPAIRS AS INDICATED;  Surgeon: Luis Caceres M.D.;  Location: SURGERY [facility];  Service: Orthopedics    KS TOTAL HIP ARTHROPLASTY Left 8/15/2022    Procedure: LEFT TOTAL HIP ARTHROPLASTY;  Surgeon: Luis Caceres M.D.;  Location: SURGERY [facility];  Service: Orthopedics    PB IMPLANT NEUROSTIM/  1/3/2022    Procedure: REMOVAL, NEUROSTIMULATOR, PERMANENT, SPINAL CORD;  Surgeon: Be Webber M.D.;  Location: [facility];  Service: Neurosurgery    LUMBAR FUSION O-ARM  1/3/2022    Procedure: FUSION, SPINE, " LUMBAR, WITH O-ARM IMAGING GUIDANCE - L1-3 EXTENSION-STAGE 2;  Surgeon: Be Webber M.D.;  Location: [facility];  Service: Neurosurgery    LUMBAR DECOMPRESSION  1/3/2022    Procedure: DECOMPRESSION, SPINE, LUMBAR;  Surgeon: Be Webber M.D.;  Location: [facility];  Service: Neurosurgery    LUMBAR FUSION ANTERIOR N/A 12/31/2021    Procedure: FUSION, SPINE, LUMBAR, ANTERIOR APPROACH - L5-S1;  Surgeon: Be Webber M.D.;  Location: [facility];  Service: Neurosurgery    FUSION, SPINE, LUMBAR, ROBOT-ASSISTED WITH IMAGING GUIDANCE  2/27/2021    Procedure: FUSION, SPINE, LUMBAR, ROBOT-ASSISTED WITH IMAGING GUIDANCE - L5-S1 EXTENSION OF TLIF;  Surgeon: Be Webber M.D.;  Location: [facility];  Service: Neurosurgery    LUMBAR DECOMPRESSION N/A 2/27/2021    Procedure: DECOMPRESSION, SPINE, LUMBAR;  Surgeon: Be Webber M.D.;  Location: [facility];  Service: Neurosurgery    PB IMPLANT NEUROSTIM/  5/22/2020    Procedure: INSERTION, NEUROSTIMULATOR, PERMANENT, SPINAL CORD;  Surgeon: Eugenio Camara M.D.;  Location: [facility];  Service: Pain Management    PEYRONIES PLAQUE  12/6/2019    Procedure: EXCISION, PEYRONIE'S PLAQUE, PENIS WITH GRAFTING, JEAN CLAUDE PLICATION, ARTIFICIAL ERECTION;  Surgeon: Tejinder Culver M.D.;  Location: [facility];  Service: Urology    DE NERVOUS SYSTEM SURGERY UNLISTED Left 6/3/2019    Procedure: EXPLORATION, NERVE- PERONEAL NERVE RELEASE AT THE FIBULAR HEAD  ;  Surgeon: Raz Garcia M.D.;  Location: [facility];  Service: Neurosurgery    FUSION, PIP JOINT, TOE Right 2/22/2019    Procedure: PIP ARTHRODESIS;  Surgeon: Amadou Bowden M.D.;  Location: SURGERY SAME DAY [facility];  Service: Orthopedics    FINGER EXPLORATION Right 2/22/2019    Procedure: FINGER EXPLORATION - RING FINGER DISTAL INTERPHALANGEAL JOINT;  Surgeon: Amadou Bowden M.D.;  Location: SURGERY SAME DAY [facility];  Service:  "Orthopedics    COLONOSCOPY  2019    OTHER NEUROLOGICAL SURG  12/12/2018    \"Low Back Surgery'sx6 between 2006 & 2017\".    CYST EXCISION Right 10/12/2018    Procedure: CYST EXCISION - RING FINGER MUCOUS CYST, DISTAL INTERPHALANGEAL JOINT;  Surgeon: Amadou Bowden M.D.;  Location: SURGERY SAME DAY [facility];  Service: Orthopedics    LUMBAR LAMINECTOMY DISKECTOMY Left 12/6/2017    Procedure: LUMBAR LAMINECTOMY DISKECTOMY - POSTERIOR REDO LEFT  L4-S1 KAILA;  Surgeon: Be Webber M.D.;  Location: SURGERY [facility];  Service: Neurosurgery    FUSION, SPINE, LUMBAR, PLIF  10/5/2017    Procedure: POSTERIOR TRANSFORAMINAL INTERBODY FUSION AT LUMBAR 4 - 5;  Surgeon: Be Webber M.D.;  Location: SURGERY [facility];  Service: Neurosurgery    LUMBAR DECOMPRESSION  10/5/2017    Procedure: POSTERIOR LUMBAR 3-4,  4-5 DECOMPRESSION AND FUSION;  Surgeon: Be Webber M.D.;  Location: SURGERY [facility];  Service: Neurosurgery    TN INJ DX/THER AGNT PARAVERT FACET JOINT, BRITT* Right 10/6/2016    Procedure: INJ PARA FACET L/S 1 LVL W/IG - L3-4, L4-5, L5-S1;  Surgeon: Eugenio Camara M.D.;  [facility];  Service: Pain Management    TN INJ DX/THER AGNT PARAVERT FACET JOINT, BRITT*  10/6/2016    Procedure: INJ PARA FACET L/S 2D LVL W/IG;  Surgeon: Eugenio Camara M.D.;  Location: [facility];  Service: Pain Management    TN INJ DX/THER AGNT PARAVERT FACET JOINT, BRITT*  10/6/2016    Procedure: INJ PARA FACET L/S 3D LVL W/IG;  Surgeon: Eugenio Camara M.D.;  Location: SURGERY [facility];  Service: Pain Management    TN NJX AA&/STRD TFRML EPI LUMBAR/SACRAL 1 LEVEL Right 8/18/2016    Procedure: INJ-FORAMEN EPI LUM/SAC SNGL - L5-S1;  Surgeon: Eugenio Camara M.D.;  Location: [facility];  Service: Pain Management    LUMBAR LAMINECTOMY DISKECTOMY Right 3/12/2016    Procedure: LUMBAR HemiLAMINECTOMY Micro DISKECTOMY posterior Redo L3-4 ;  Surgeon: Be Webber M.D.;  " Location: SURGERY [facility];  Service:     FORAMINOTOMY Right 3/12/2016    Procedure: FORAMINOTOMY;  Surgeon: Be Webber M.D.;  Location: SURGERY [facility];  Service:     CERVICAL LAMINECTOMY POSTERIOR  2011    DENTAL SURGERY Bilateral as a teenager    wisdom teeth    OTHER      nasal surgery 2015    OTHER NEUROLOGICAL SURG  2006, 2010, 2012, 2014, 2016, 2017    low back surgery x 5    TONSILLECTOMY      child        Allergies:  Patient has no known allergies.    Medications:     Current Outpatient Medications:     gabapentin, 600 mg, Oral, TID    lisinopril, 10 mg, Oral, DAILY    Synthroid, 137 mcg, Oral, AM ES    atorvastatin, 10 mg, Oral, DAILY    B Complex Vitamins (VITAMIN B COMPLEX PO), 1 Tablet, Oral, DAILY, Taking    ibuprofen, Take 1 Tablet by mouth., PRN    tadalafil, 5 mg, Oral, DAILY, Taking    Multi-Vitamin, 1 Tablet, Oral, DAILY, Taking    Vitamin D-1000 Max St, 2,000 Units, Oral, DAILY, Taking     Social History:   Social History     Tobacco Use    Smoking status: Never    Smokeless tobacco: Never   Vaping Use    Vaping Use: Never used   Substance Use Topics    Alcohol use: No    Drug use: No       Family History:   Family History   Problem Relation Age of Onset    Cancer Father         Leukemia     Heart Disease Father     Diabetes Father     Stroke Father     Cancer Mother         breast met to liver    Heart Disease Mother      Vitals:   /69 (BP Location: Left arm, Patient Position: Sitting, BP Cuff Size: Adult)   Pulse 73   Temp 36.8 °C (98.2 °F) (Temporal)   Ht 1.829 m (6')   Wt 90.4 kg (199 lb 3.2 oz)   SpO2 96%  Body mass index is 27.02 kg/m².    Physical Exam:   Physical Exam  Constitutional:       General: He is not in acute distress.     Appearance: Normal appearance. He is not ill-appearing or diaphoretic.   HENT:      Head: Normocephalic and atraumatic.      Mouth/Throat:      Mouth: Mucous membranes are moist.   Eyes:      Extraocular Movements: Extraocular  movements intact.      Pupils: Pupils are equal, round, and reactive to light.   Cardiovascular:      Rate and Rhythm: Normal rate and regular rhythm.      Heart sounds: No murmur heard.    No friction rub. No gallop.   Pulmonary:      Effort: No respiratory distress.      Breath sounds: No stridor. No wheezing, rhonchi or rales.   Abdominal:      General: Bowel sounds are normal. There is no distension.      Tenderness: There is no abdominal tenderness. There is no guarding or rebound.   Musculoskeletal:      Right lower leg: No edema.      Left lower leg: No edema.   Skin:     Coloration: Skin is not jaundiced or pale.   Neurological:      General: No focal deficit present.      Mental Status: He is alert and oriented to person, place, and time.   Psychiatric:         Mood and Affect: Mood normal.         Behavior: Behavior normal.      Assessment and Plan    Patient is a 77-year-old male with pertinent PMHx of HTN, DLD, pre-DM, hypothyroidism, lumbar stenosis c/b peripheral neuropathy, renal insufficiency, normocytic anemia presenting today for BP follow up. Follow up in 6 months for annual (no lab work until Feb 2024).     #Hypertension  -In office BP Jan 2023 144/70 --> 120s on repeat  Plan  -Lisinopril 10 mg PO qD  -Counseled on lifestyle modifications (diet, exercise)  -Continue to keep home BP log    -- CHRONIC AND RESOLVED MEDICAL PROBLEMS --      #Prediabetes  #Overweight   -A1c 12/20/2021 5.5 --> 5.9 Jan 2023  Plan  -Lisinopril 5 mg PO qD for renal protection      #Anemia, normocytic  -Hgb ~13, appears to be BL for patient since apx jan 2022  -Iron low normal with ferritin 20s   Plan  -CTM with yearly labs, colonoscopy as appropriate    #Chronic kidney disease, stage 2  -Jan 2023: GFR 50s-60s   **Ddx: in setting of HTN/ pre-DM   Plan  -CTM, ace-I as above     #Hypothyroidism  -Well-controlled on current regimen  -Asymptomatic  -TSH Jan 2023 WNL  Plan  -Synthroid 137mcg PO qD     #Dyslipidemia  -Lipid  panel Jan 2023 Tchol 166    HDL 44  LDL 97  Plan  -Atorvastatin 10 mg PO qD    #Peripheral neuropathy 2/2 spinal stenosis  #Lumbar spinal stenosis  #Lumbar degenerative disc disease  #Foot drop, left- result of above  -A1c 12/20/2021 5.5--> 5.7 4/26/22  -B12, B6, folate WNL December 2019  -Chronic left lower extremity pain without obvious cause  --CT June 2021: posterior hardware extending from L3-S1, interbody hardware at L4-5 and L5-S1, spinal stenosis L1-2, L2-3, multilevel facet arthropathy, degenerative subluxation at L1-2, L2-3   -Trialed Lyrica and felt dizzy in past  **suspected hypersensitivity syndrome but most likely 2/2 patient's ongoing significant back etiologies v some component of pre-DM   Plan  -Gabapentin per neurosurgery  -Follow with neurosurgery as outpatient as appropriate     #Sinus infection, chronic  -s/p sinuplasty   Plan  -Follows with otolaryngology- PRN neti + flonase as needed   -SILVIA filled     #Healthcare maintenance  Mood screening: no anxiety, PHQ-2 score 0 11/30/22   Drug screening: none  Sexual activity screening: not active   Infectious disease screening: HIV, hep C neg 1/ 2022 labs  Colon cancer screening: Colonoscopy 2019 w/DHC- 1 diminutive polyp in transverse colon, diverticulosis (sigmoid), internal hemorrhoids, q 5yr- next due 2024  Vaccines: all up to date   Osteoporosis screening: dexa 1/5/23 WNL, next due 2025    Please note that this dictation was created using voice recognition software. I have made every reasonable attempt to correct obvious errors, but I expect that there are errors of grammar and possibly content that I did not discover before finalizing the note.    Patient case was seen/ assessed/ discussed with Dr. Kohler.    Vincenzo Mccarthy, PGY-2  Internal Medicine

## 2023-06-30 ENCOUNTER — APPOINTMENT (OUTPATIENT)
Dept: ADMISSIONS | Facility: MEDICAL CENTER | Age: 78
End: 2023-06-30
Attending: ORTHOPAEDIC SURGERY
Payer: COMMERCIAL

## 2023-07-03 ENCOUNTER — OFFICE VISIT (OUTPATIENT)
Dept: URGENT CARE | Facility: PHYSICIAN GROUP | Age: 78
End: 2023-07-03
Payer: COMMERCIAL

## 2023-07-03 VITALS
OXYGEN SATURATION: 96 % | BODY MASS INDEX: 25.73 KG/M2 | RESPIRATION RATE: 16 BRPM | SYSTOLIC BLOOD PRESSURE: 124 MMHG | TEMPERATURE: 99.7 F | HEART RATE: 90 BPM | WEIGHT: 190 LBS | DIASTOLIC BLOOD PRESSURE: 60 MMHG | HEIGHT: 72 IN

## 2023-07-03 DIAGNOSIS — G58.9 MONONEUROPATHY: ICD-10-CM

## 2023-07-03 DIAGNOSIS — U07.1 COVID: ICD-10-CM

## 2023-07-03 LAB
FLUAV RNA SPEC QL NAA+PROBE: NEGATIVE
FLUBV RNA SPEC QL NAA+PROBE: NEGATIVE
RSV RNA SPEC QL NAA+PROBE: NEGATIVE
SARS-COV-2 RNA RESP QL NAA+PROBE: POSITIVE

## 2023-07-03 PROCEDURE — 3074F SYST BP LT 130 MM HG: CPT

## 2023-07-03 PROCEDURE — 3078F DIAST BP <80 MM HG: CPT

## 2023-07-03 PROCEDURE — 99213 OFFICE O/P EST LOW 20 MIN: CPT

## 2023-07-03 PROCEDURE — 0241U POCT CEPHEID COV-2, FLU A/B, RSV - PCR: CPT

## 2023-07-03 ASSESSMENT — FIBROSIS 4 INDEX: FIB4 SCORE: 1.61

## 2023-07-04 RX ORDER — GABAPENTIN 600 MG/1
TABLET ORAL
Qty: 300 TABLET | Refills: 0 | Status: SHIPPED | OUTPATIENT
Start: 2023-07-04 | End: 2023-08-29 | Stop reason: SDUPTHER

## 2023-07-04 NOTE — PROGRESS NOTES
Chief Complaint   Patient presents with    Coronavirus Screening     Pt states he has been feeling fatigue and states his wife has COVID currently         Subjective:   HISTORY OF PRESENT ILLNESS: Amadou Parekh is a 77 y.o. male who presents for covid testing after his wife tested + yesterday.  He is feeling well other than being a bit tired       Medications, Allergies, current problem list, Social and Family history reviewed today in Epic.     Objective:     /60 (BP Location: Left arm, Patient Position: Sitting, BP Cuff Size: Adult)   Pulse 90   Temp 37.6 °C (99.7 °F) (Temporal)   Resp 16   Ht 1.829 m (6')   Wt 86.2 kg (190 lb)   SpO2 96%     Physical Exam  Vitals reviewed.   Constitutional:       Appearance: Normal appearance.   HENT:      Mouth/Throat:      Mouth: Mucous membranes are moist.   Eyes:      Conjunctiva/sclera: Conjunctivae normal.   Cardiovascular:      Rate and Rhythm: Normal rate.      Pulses: Normal pulses.      Heart sounds: Normal heart sounds.   Pulmonary:      Effort: Pulmonary effort is normal.      Breath sounds: Normal breath sounds.   Skin:     General: Skin is warm and dry.   Neurological:      Mental Status: He is alert and oriented to person, place, and time.   Psychiatric:         Mood and Affect: Mood normal.          Assessment/Plan:     Diagnosis and associated orders    1. COVID  POCT CEPHEID COV-2, FLU A/B, RSV - PCR    molnupiravir 200 MG capsule            IMPRESSION: Patient is non-toxic appearing presenting with  a positive COVID test, we talked about the anti-viral options and decided on Molnupirivir and this has been sent to the [facility] for him.  He overall looks great      Instructed to return to Urgent Care or nearest Emergency Department if symptoms fail to improve, for any change in condition, further concerns, or new concerning symptoms. Patient states understanding of the plan of care and discharge instructions.        Please note that this dictation was  created using voice recognition software. I have made a reasonable attempt to correct obvious errors, but I expect that there are errors of grammar and possibly content that I did not discover before finalizing the note.    This note was electronically signed by ALAN Kiser

## 2023-07-05 ENCOUNTER — PRE-ADMISSION TESTING (OUTPATIENT)
Dept: ADMISSIONS | Facility: MEDICAL CENTER | Age: 78
End: 2023-07-05
Attending: ORTHOPAEDIC SURGERY
Payer: COMMERCIAL

## 2023-07-05 DIAGNOSIS — Z01.810 PRE-OPERATIVE CARDIOVASCULAR EXAMINATION: ICD-10-CM

## 2023-07-05 DIAGNOSIS — Z01.812 PRE-OPERATIVE LABORATORY EXAMINATION: ICD-10-CM

## 2023-07-05 RX ORDER — ACETAMINOPHEN 500 MG
500-1000 TABLET ORAL EVERY 6 HOURS PRN
COMMUNITY
End: 2023-12-06

## 2023-07-05 NOTE — PREPROCEDURE INSTRUCTIONS
"Preadmit appointment: \" Preparing for your Procedure information\" sheet reviewed with patient with verbal and written instructions- emailed. Patient instructed to continue prescribed medications through the day before surgery, instructed to take the following medications the day of surgery per anesthesia protocol: Tylenol if needed, Gabapentin, Synthroid.  Pt reports takes Cialis for urinary issues, instructed pt to check with MD prescribing this medication to verify to hold 48hrs per anesthesia and he states he will   Patient instructed to call his doctor doing procedure/ surgery if any illness develops prior to surgery/procedure. METS>4.  Pt denies issues with anesthesia.  Pt reports he had positive covid 7/3/2023 and is currently being treated with antiviral.  Reviewed covid protocol to be at least 5 days resolution of symptoms prior to surgery.  Pt reports symptoms started 7/3/23.  Advised pt to notify Dr. Caceres of recent covid and to also notify him if symptoms do not resolve or get worse. Pt denies any symptoms of UTI, no burning, urgency, frequency or pain with urination or frequent UTI   "

## 2023-07-06 ENCOUNTER — HOSPITAL ENCOUNTER (EMERGENCY)
Facility: MEDICAL CENTER | Age: 78
End: 2023-07-06
Attending: EMERGENCY MEDICINE
Payer: COMMERCIAL

## 2023-07-06 VITALS
HEART RATE: 79 BPM | HEIGHT: 71 IN | DIASTOLIC BLOOD PRESSURE: 81 MMHG | SYSTOLIC BLOOD PRESSURE: 165 MMHG | BODY MASS INDEX: 27.93 KG/M2 | WEIGHT: 199.52 LBS | TEMPERATURE: 97.7 F | RESPIRATION RATE: 16 BRPM | OXYGEN SATURATION: 94 %

## 2023-07-06 DIAGNOSIS — R09.82 POST-NASAL DRIP: ICD-10-CM

## 2023-07-06 DIAGNOSIS — U07.1 COVID-19: ICD-10-CM

## 2023-07-06 DIAGNOSIS — R05.1 ACUTE COUGH: ICD-10-CM

## 2023-07-06 PROCEDURE — 99284 EMERGENCY DEPT VISIT MOD MDM: CPT

## 2023-07-06 RX ORDER — OXYMETAZOLINE HYDROCHLORIDE 0.05 G/100ML
2 SPRAY NASAL 2 TIMES DAILY
Qty: 1.2 ML | Refills: 0 | Status: SHIPPED | OUTPATIENT
Start: 2023-07-06 | End: 2023-07-09

## 2023-07-06 RX ORDER — CODEINE PHOSPHATE/GUAIFENESIN 10-100MG/5
5 LIQUID (ML) ORAL 3 TIMES DAILY PRN
Qty: 120 ML | Refills: 0 | Status: SHIPPED | OUTPATIENT
Start: 2023-07-06 | End: 2023-07-11

## 2023-07-06 ASSESSMENT — LIFESTYLE VARIABLES: DO YOU DRINK ALCOHOL: NO

## 2023-07-06 ASSESSMENT — FIBROSIS 4 INDEX: FIB4 SCORE: 1.61

## 2023-07-06 NOTE — ED PROVIDER NOTES
"ED Provider Note    CHIEF COMPLAINT  Chief Complaint   Patient presents with    Flu Like Symptoms     Patient ambulates to triage reports he was diagnosed with covid on Monday at urgent care and has been experiencing worsening symptoms. Complaining of \"clear slimy liquid he cant keep up with\"      Records reviewed:  Results from 7/3/2023 COVID PCR test, positive for COVID    HPI/ROS      Amadou Parekh is a 77 y.o. male who presents with chief complaint of cough, myalgias and general malaise.  Patient was seen at urgent care 3 days ago and diagnosed with COVID-19.  Patient was started on molpirnovir for his symptoms once his diagnosis was confirmed.  He reports that he does not have significant shortness of breath but is having a very nagging cough significantly worse at night with associated runny nose.  Patient denies any significant pain.  He denies any other major complaints outside of those already mentioned.    PAST MEDICAL HISTORY   has a past medical history of Actinic keratoses, Acute nasopharyngitis, Bilateral hearing loss (08/06/2018), Chronic right-sided low back pain without sciatica (08/06/2018), ED (erectile dysfunction), High cholesterol, Hyperlipidemia, mixed, Hypertension, Hypothyroidism, Infectious disease (07/03/2023), Neuropathy (HCC) (08/06/2018), Pain (12/18/2018), Peyronie disease, Prediabetes, and Renal disorder.    SURGICAL HISTORY   has a past surgical history that includes cervical laminectomy posterior (2011); lumbar laminectomy diskectomy (Right, 03/12/2016); foraminotomy (Right, 03/12/2016); njx aa&/strd tfrml epi lumbar/sacral 1 level (Right, 08/18/2016); inj dx/ther agnt paravert facet joint, reno* (Right, 10/06/2016); inj dx/ther agnt paravert facet joint, reno* (10/06/2016); inj dx/ther agnt paravert facet joint, reno* (10/06/2016); fusion, spine, lumbar, plif (10/05/2017); lumbar laminectomy diskectomy (Left, 12/06/2017); tonsillectomy; cyst excision (Right, 10/12/2018); other " neurological surg (2006, 2010, 2012, 2014, 2016, 2017); lumbar decompression (10/05/2017); other neurological surg (12/12/2018); fusion, pip joint, toe (Right, 02/22/2019); nervous system surgery unlisted (Left, 06/03/2019); finger exploration (Right, 02/22/2019); other; peyronies plaque (12/06/2019); colonoscopy (2019); dental surgery (Bilateral, as a teenager); implant neurostim/ (05/22/2020); fusion, spine, lumbar, robot-assisted with imaging guidance (02/27/2021); lumbar decompression (N/A, 02/27/2021); lumbar fusion anterior (N/A, 12/31/2021); implant neurostim/ (01/03/2022); lumbar fusion o-arm (01/03/2022); lumbar decompression (01/03/2022); total hip arthroplasty (Left, 08/15/2022); hip revision total (Left, 09/21/2022); and other surgical procedure (Left, 02/17/2023).    FAMILY HISTORY  Family History   Problem Relation Age of Onset    Cancer Father         Leukemia     Heart Disease Father     Diabetes Father     Stroke Father     Cancer Mother         breast met to liver    Heart Disease Mother        SOCIAL HISTORY  Social History     Tobacco Use    Smoking status: Never    Smokeless tobacco: Never   Vaping Use    Vaping Use: Never used   Substance and Sexual Activity    Alcohol use: No    Drug use: No    Sexual activity: Yes       CURRENT MEDICATIONS  Home Medications       Reviewed by Emiliana Maradiaga R.N. (Registered Nurse) on 07/06/23 at 0635  Med List Status: Not Addressed     Medication Last Dose Status   acetaminophen (TYLENOL) 500 MG Tab  Active   atorvastatin (LIPITOR) 10 MG Tab  Active   B Complex Vitamins (VITAMIN B COMPLEX PO)  Active   Cholecalciferol (VITAMIN D-1000 MAX ST) 1000 UNIT Tab  Active   gabapentin (NEURONTIN) 600 MG tablet  Active   ibuprofen (MOTRIN) 200 MG Tab  Active   lisinopril (PRINIVIL) 10 MG Tab  Active   molnupiravir 200 MG capsule  Active   Multiple Vitamin (MULTI-VITAMIN) Tab  Active   SYNTHROID 137 MCG Tab  Active   tadalafil (CIALIS) 5 MG tablet   "Active                    ALLERGIES  No Known Allergies    PHYSICAL EXAM  VITAL SIGNS: BP (!) 152/113   Pulse 84   Temp 36.6 °C (97.9 °F) (Temporal)   Resp 18   Ht 1.803 m (5' 11\")   Wt 90.5 kg (199 lb 8.3 oz)   SpO2 96%   BMI 27.83 kg/m²    Physical Exam  Constitutional:       Appearance: Normal appearance.   HENT:      Head: Normocephalic.      Right Ear: Tympanic membrane normal.      Left Ear: Tympanic membrane normal.      Nose: Rhinorrhea present.      Mouth/Throat:      Mouth: Mucous membranes are moist.   Eyes:      Extraocular Movements: Extraocular movements intact.      Pupils: Pupils are equal, round, and reactive to light.   Cardiovascular:      Rate and Rhythm: Normal rate and regular rhythm.   Pulmonary:      Effort: Pulmonary effort is normal. No respiratory distress.      Breath sounds: Normal breath sounds. No stridor. No wheezing or rales.   Chest:      Chest wall: No tenderness.   Abdominal:      General: Abdomen is flat. There is no distension.      Palpations: Abdomen is soft. There is no mass.      Tenderness: There is no abdominal tenderness.   Musculoskeletal:      Cervical back: Normal range of motion.   Skin:     General: Skin is warm.      Capillary Refill: Capillary refill takes less than 2 seconds.   Neurological:      General: No focal deficit present.      Mental Status: He is alert and oriented to person, place, and time.   Psychiatric:         Mood and Affect: Mood normal.           DIAGNOSTIC STUDIES / PROCEDURES      LABS  Results for orders placed or performed in visit on 07/03/23   POCT CEPHEID COV-2, FLU A/B, RSV - PCR   Result Value Ref Range    SARS-CoV-2 by PCR Positive (A) Negative, Invalid    Influenza virus A RNA Negative Negative, Invalid    Influenza virus B, PCR Negative Negative, Invalid    RSV, PCR Negative Negative, Invalid             COURSE & MEDICAL DECISION MAKING      INITIAL ASSESSMENT, COURSE AND PLAN  Care Narrative: pt here with symptoms consistent " "with mild COVID.  He is satting at 95% on room air.  Normal work of breathing.  Exam is otherwise very reassuring.  I have offered to prescribe him Paxlovid but he is on a statin so this will have to be held.  He is already on a antiviral.  The data on Paxlovid and \"on his not entirely convincing but I did discuss that I would be willing to switch him to this medication if you would like.  He will hold off on making the change at this point which I believe is entirely reasonable, I will start him on cough syrup with codeine given patient's ongoing cough as well as Afrin to help with his likely postnasal drip.  Patient has been consented for narcotic use.        DISPOSITION AND DISCUSSIONS    Escalation of care considered, and ultimately not performed: Paxlovid was deferred, see above for further reasoning.  Steroids were deferred as patient is not hypoxic.  Patient without any focal findings on his lung exam to suggest complicating lobar pneumonia or bacterial pneumonia.  He is otherwise well-appearing and therefore chest x-ray was deferred      FINAL DIAGNOSIS  1. COVID-19    2. Acute cough    3. Post-nasal drip          "

## 2023-07-06 NOTE — ED TRIAGE NOTES
"Chief Complaint   Patient presents with    Flu Like Symptoms     Patient ambulates to triage reports he was diagnosed with covid on Monday at urgent care and has been experiencing worsening symptoms. Complaining of \"clear slimy liquid he cant keep up with\"        BP (!) 152/113   Pulse 84   Temp 36.6 °C (97.9 °F) (Temporal)   Resp 18   Ht 1.803 m (5' 11\")   Wt 90.5 kg (199 lb 8.3 oz)   SpO2 96%     Patient educated on ed triage process, instructed to notify staff of any new or worsening symptoms, verbalizes understanding. Patient returned to ed lobby, apologized for wait times.     "

## 2023-07-06 NOTE — DISCHARGE INSTRUCTIONS
You can take the Afrin for the next 3 days to help with your runny nose.  I will prescribe you Mucinex with codeine that should help with your cough.  Take these only as directed.  If you develop significant worsening shortness of breath return to the emergency department.

## 2023-07-06 NOTE — ED NOTES
Reviewed discharge instructions, pt verbalized understanding of instructions and medication rxs. States he will schedule follow-up appointment. Denies further questions at this time. Pt ambulatory out of ER with steady gait.

## 2023-07-12 ENCOUNTER — APPOINTMENT (OUTPATIENT)
Dept: ADMISSIONS | Facility: MEDICAL CENTER | Age: 78
End: 2023-07-12
Attending: ORTHOPAEDIC SURGERY
Payer: COMMERCIAL

## 2023-07-12 DIAGNOSIS — Z01.810 PRE-OPERATIVE CARDIOVASCULAR EXAMINATION: ICD-10-CM

## 2023-07-12 DIAGNOSIS — Z01.812 PRE-OPERATIVE LABORATORY EXAMINATION: ICD-10-CM

## 2023-07-13 ENCOUNTER — APPOINTMENT (OUTPATIENT)
Dept: ADMISSIONS | Facility: MEDICAL CENTER | Age: 78
End: 2023-07-13
Attending: ORTHOPAEDIC SURGERY
Payer: COMMERCIAL

## 2023-07-13 DIAGNOSIS — Z01.812 PRE-OPERATIVE LABORATORY EXAMINATION: ICD-10-CM

## 2023-07-13 DIAGNOSIS — Z01.810 PRE-OPERATIVE CARDIOVASCULAR EXAMINATION: ICD-10-CM

## 2023-07-14 ENCOUNTER — PRE-ADMISSION TESTING (OUTPATIENT)
Dept: ADMISSIONS | Facility: MEDICAL CENTER | Age: 78
End: 2023-07-14
Attending: ORTHOPAEDIC SURGERY
Payer: COMMERCIAL

## 2023-07-14 DIAGNOSIS — Z01.812 PRE-OPERATIVE LABORATORY EXAMINATION: ICD-10-CM

## 2023-07-14 DIAGNOSIS — Z01.810 PRE-OPERATIVE CARDIOVASCULAR EXAMINATION: ICD-10-CM

## 2023-07-14 LAB
ANION GAP SERPL CALC-SCNC: 11 MMOL/L (ref 7–16)
BUN SERPL-MCNC: 22 MG/DL (ref 8–22)
CALCIUM SERPL-MCNC: 8.9 MG/DL (ref 8.4–10.2)
CHLORIDE SERPL-SCNC: 107 MMOL/L (ref 96–112)
CO2 SERPL-SCNC: 22 MMOL/L (ref 20–33)
CREAT SERPL-MCNC: 1.37 MG/DL (ref 0.5–1.4)
EKG IMPRESSION: NORMAL
ERYTHROCYTE [DISTWIDTH] IN BLOOD BY AUTOMATED COUNT: 42.6 FL (ref 35.9–50)
EST. AVERAGE GLUCOSE BLD GHB EST-MCNC: 126 MG/DL
GFR SERPLBLD CREATININE-BSD FMLA CKD-EPI: 53 ML/MIN/1.73 M 2
GLUCOSE SERPL-MCNC: 101 MG/DL (ref 65–99)
HBA1C MFR BLD: 6 % (ref 4–5.6)
HCT VFR BLD AUTO: 42.6 % (ref 42–52)
HGB BLD-MCNC: 13.7 G/DL (ref 14–18)
MCH RBC QN AUTO: 28.2 PG (ref 27–33)
MCHC RBC AUTO-ENTMCNC: 32.2 G/DL (ref 32.3–36.5)
MCV RBC AUTO: 87.7 FL (ref 81.4–97.8)
PLATELET # BLD AUTO: 297 K/UL (ref 164–446)
PMV BLD AUTO: 10.1 FL (ref 9–12.9)
POTASSIUM SERPL-SCNC: 5.3 MMOL/L (ref 3.6–5.5)
RBC # BLD AUTO: 4.86 M/UL (ref 4.7–6.1)
SCCMEC + MECA PNL NOSE NAA+PROBE: NEGATIVE
SCCMEC + MECA PNL NOSE NAA+PROBE: NEGATIVE
SODIUM SERPL-SCNC: 140 MMOL/L (ref 135–145)
WBC # BLD AUTO: 6.2 K/UL (ref 4.8–10.8)

## 2023-07-14 PROCEDURE — 87640 STAPH A DNA AMP PROBE: CPT

## 2023-07-14 PROCEDURE — 93005 ELECTROCARDIOGRAM TRACING: CPT

## 2023-07-14 PROCEDURE — 36415 COLL VENOUS BLD VENIPUNCTURE: CPT

## 2023-07-14 PROCEDURE — 87641 MR-STAPH DNA AMP PROBE: CPT

## 2023-07-14 PROCEDURE — 83036 HEMOGLOBIN GLYCOSYLATED A1C: CPT

## 2023-07-14 PROCEDURE — 93010 ELECTROCARDIOGRAM REPORT: CPT | Performed by: INTERNAL MEDICINE

## 2023-07-14 PROCEDURE — 80048 BASIC METABOLIC PNL TOTAL CA: CPT

## 2023-07-14 PROCEDURE — 85027 COMPLETE CBC AUTOMATED: CPT

## 2023-07-14 NOTE — DISCHARGE PLANNING
DISCHARGE PLANNING NOTE - TOTAL JOINT    Procedure: Procedure(s):  RIGHT TOTAL HIP ARTHROPLASTY AND REPAIRS AS INDICATED  Procedure Date: 7/21/2023  Insurance: Payor: [facility] / Plan: HTH COS [id number] & [id number]  PD / Product Type: Full Service TPA /    Equipment currently available at home?  crutches, front-wheel walker, raised toilet seat, and shower chair  Steps into the home? 2  Steps within the home? 2 flights  Toilet height? Standard  Type of shower? walk-in shower  Who will be with you during your recovery? spouse  Is Outpatient Physical Therapy set up after surgery? No   Did you take the Total Joint Class and where? Yes  Planning same day discharge?Yes     This writer met with pt and educated to preop showers and nasal mrsa swab. Pt educated to dc criteria. All questions answered and verbalizes understanding of all instructions. No dc needs identified at this time. Anticipate dc to home without barriers.

## 2023-07-21 ENCOUNTER — APPOINTMENT (OUTPATIENT)
Dept: RADIOLOGY | Facility: MEDICAL CENTER | Age: 78
End: 2023-07-21
Attending: ORTHOPAEDIC SURGERY
Payer: COMMERCIAL

## 2023-07-21 ENCOUNTER — ANESTHESIA EVENT (OUTPATIENT)
Dept: SURGERY | Facility: MEDICAL CENTER | Age: 78
End: 2023-07-21
Payer: COMMERCIAL

## 2023-07-21 ENCOUNTER — HOSPITAL ENCOUNTER (OUTPATIENT)
Facility: MEDICAL CENTER | Age: 78
End: 2023-07-21
Attending: ORTHOPAEDIC SURGERY | Admitting: ORTHOPAEDIC SURGERY
Payer: COMMERCIAL

## 2023-07-21 ENCOUNTER — ANESTHESIA (OUTPATIENT)
Dept: SURGERY | Facility: MEDICAL CENTER | Age: 78
End: 2023-07-21
Payer: COMMERCIAL

## 2023-07-21 VITALS
WEIGHT: 194.67 LBS | HEART RATE: 82 BPM | DIASTOLIC BLOOD PRESSURE: 72 MMHG | BODY MASS INDEX: 25.8 KG/M2 | RESPIRATION RATE: 16 BRPM | OXYGEN SATURATION: 94 % | HEIGHT: 73 IN | SYSTOLIC BLOOD PRESSURE: 137 MMHG | TEMPERATURE: 97.7 F

## 2023-07-21 DIAGNOSIS — Z01.810 PRE-OPERATIVE CARDIOVASCULAR EXAMINATION: ICD-10-CM

## 2023-07-21 DIAGNOSIS — Z01.812 PRE-OPERATIVE LABORATORY EXAMINATION: ICD-10-CM

## 2023-07-21 DIAGNOSIS — E78.5 DYSLIPIDEMIA: ICD-10-CM

## 2023-07-21 LAB
ABO GROUP BLD: NORMAL
BLD GP AB SCN SERPL QL: NORMAL
GLUCOSE BLD STRIP.AUTO-MCNC: 100 MG/DL (ref 65–99)
RH BLD: NORMAL

## 2023-07-21 PROCEDURE — 160047 HCHG PACU  - EA ADDL 30 MINS PHASE II: Performed by: ORTHOPAEDIC SURGERY

## 2023-07-21 PROCEDURE — 700102 HCHG RX REV CODE 250 W/ 637 OVERRIDE(OP): Performed by: ANESTHESIOLOGY

## 2023-07-21 PROCEDURE — 700101 HCHG RX REV CODE 250: Mod: JZ | Performed by: ORTHOPAEDIC SURGERY

## 2023-07-21 PROCEDURE — 99100 ANES PT EXTEME AGE<1 YR&>70: CPT | Performed by: ANESTHESIOLOGY

## 2023-07-21 PROCEDURE — 160002 HCHG RECOVERY MINUTES (STAT): Performed by: ORTHOPAEDIC SURGERY

## 2023-07-21 PROCEDURE — C1776 JOINT DEVICE (IMPLANTABLE): HCPCS | Performed by: ORTHOPAEDIC SURGERY

## 2023-07-21 PROCEDURE — 160035 HCHG PACU - 1ST 60 MINS PHASE I: Performed by: ORTHOPAEDIC SURGERY

## 2023-07-21 PROCEDURE — 160048 HCHG OR STATISTICAL LEVEL 1-5: Performed by: ORTHOPAEDIC SURGERY

## 2023-07-21 PROCEDURE — 160042 HCHG SURGERY MINUTES - EA ADDL 1 MIN LEVEL 5: Performed by: ORTHOPAEDIC SURGERY

## 2023-07-21 PROCEDURE — C1713 ANCHOR/SCREW BN/BN,TIS/BN: HCPCS | Performed by: ORTHOPAEDIC SURGERY

## 2023-07-21 PROCEDURE — 73502 X-RAY EXAM HIP UNI 2-3 VIEWS: CPT | Mod: RT

## 2023-07-21 PROCEDURE — 700105 HCHG RX REV CODE 258: Mod: JZ | Performed by: ORTHOPAEDIC SURGERY

## 2023-07-21 PROCEDURE — 160025 RECOVERY II MINUTES (STATS): Performed by: ORTHOPAEDIC SURGERY

## 2023-07-21 PROCEDURE — 502000 HCHG MISC OR IMPLANTS RC 0278: Performed by: ORTHOPAEDIC SURGERY

## 2023-07-21 PROCEDURE — 36415 COLL VENOUS BLD VENIPUNCTURE: CPT

## 2023-07-21 PROCEDURE — 82962 GLUCOSE BLOOD TEST: CPT

## 2023-07-21 PROCEDURE — 86850 RBC ANTIBODY SCREEN: CPT

## 2023-07-21 PROCEDURE — 86901 BLOOD TYPING SEROLOGIC RH(D): CPT

## 2023-07-21 PROCEDURE — 700111 HCHG RX REV CODE 636 W/ 250 OVERRIDE (IP): Mod: JZ | Performed by: ORTHOPAEDIC SURGERY

## 2023-07-21 PROCEDURE — 160046 HCHG PACU - 1ST 60 MINS PHASE II: Performed by: ORTHOPAEDIC SURGERY

## 2023-07-21 PROCEDURE — 86900 BLOOD TYPING SEROLOGIC ABO: CPT

## 2023-07-21 PROCEDURE — 700111 HCHG RX REV CODE 636 W/ 250 OVERRIDE (IP): Performed by: ANESTHESIOLOGY

## 2023-07-21 PROCEDURE — 160031 HCHG SURGERY MINUTES - 1ST 30 MINS LEVEL 5: Performed by: ORTHOPAEDIC SURGERY

## 2023-07-21 PROCEDURE — 700101 HCHG RX REV CODE 250: Performed by: ANESTHESIOLOGY

## 2023-07-21 PROCEDURE — C9290 INJ, BUPIVACAINE LIPOSOME: HCPCS | Mod: JZ | Performed by: ORTHOPAEDIC SURGERY

## 2023-07-21 PROCEDURE — 01214 ANES OPEN PX TOT HIP ARTHRP: CPT | Performed by: ANESTHESIOLOGY

## 2023-07-21 PROCEDURE — 160036 HCHG PACU - EA ADDL 30 MINS PHASE I: Performed by: ORTHOPAEDIC SURGERY

## 2023-07-21 PROCEDURE — A9270 NON-COVERED ITEM OR SERVICE: HCPCS | Performed by: ANESTHESIOLOGY

## 2023-07-21 PROCEDURE — 160009 HCHG ANES TIME/MIN: Performed by: ORTHOPAEDIC SURGERY

## 2023-07-21 PROCEDURE — 97162 PT EVAL MOD COMPLEX 30 MIN: CPT

## 2023-07-21 DEVICE — IMPLANTABLE DEVICE: Type: IMPLANTABLE DEVICE | Site: HIP | Status: FUNCTIONAL

## 2023-07-21 RX ORDER — OXYCODONE HYDROCHLORIDE 5 MG/1
5 TABLET ORAL
Status: CANCELLED | OUTPATIENT
Start: 2023-07-21

## 2023-07-21 RX ORDER — DEXAMETHASONE SODIUM PHOSPHATE 4 MG/ML
4 INJECTION, SOLUTION INTRA-ARTICULAR; INTRALESIONAL; INTRAMUSCULAR; INTRAVENOUS; SOFT TISSUE
Status: CANCELLED | OUTPATIENT
Start: 2023-07-21

## 2023-07-21 RX ORDER — MORPHINE SULFATE 4 MG/ML
INJECTION INTRAVENOUS
Status: DISCONTINUED | OUTPATIENT
Start: 2023-07-21 | End: 2023-07-21 | Stop reason: HOSPADM

## 2023-07-21 RX ORDER — DOCUSATE SODIUM 100 MG/1
100 CAPSULE, LIQUID FILLED ORAL 2 TIMES DAILY
Status: CANCELLED | OUTPATIENT
Start: 2023-07-21

## 2023-07-21 RX ORDER — DEXAMETHASONE SODIUM PHOSPHATE 4 MG/ML
INJECTION, SOLUTION INTRA-ARTICULAR; INTRALESIONAL; INTRAMUSCULAR; INTRAVENOUS; SOFT TISSUE PRN
Status: DISCONTINUED | OUTPATIENT
Start: 2023-07-21 | End: 2023-07-21 | Stop reason: SURG

## 2023-07-21 RX ORDER — OXYCODONE HYDROCHLORIDE 10 MG/1
10 TABLET ORAL
Status: CANCELLED | OUTPATIENT
Start: 2023-07-21

## 2023-07-21 RX ORDER — LIDOCAINE HYDROCHLORIDE 40 MG/ML
SOLUTION TOPICAL PRN
Status: DISCONTINUED | OUTPATIENT
Start: 2023-07-21 | End: 2023-07-21 | Stop reason: SURG

## 2023-07-21 RX ORDER — LABETALOL HYDROCHLORIDE 5 MG/ML
5 INJECTION, SOLUTION INTRAVENOUS
Status: DISCONTINUED | OUTPATIENT
Start: 2023-07-21 | End: 2023-07-21 | Stop reason: HOSPADM

## 2023-07-21 RX ORDER — SCOLOPAMINE TRANSDERMAL SYSTEM 1 MG/1
1 PATCH, EXTENDED RELEASE TRANSDERMAL
Status: CANCELLED | OUTPATIENT
Start: 2023-07-21

## 2023-07-21 RX ORDER — CEFAZOLIN SODIUM 1 G/3ML
2 INJECTION, POWDER, FOR SOLUTION INTRAMUSCULAR; INTRAVENOUS ONCE
Status: DISCONTINUED | OUTPATIENT
Start: 2023-07-21 | End: 2023-07-21 | Stop reason: HOSPADM

## 2023-07-21 RX ORDER — ONDANSETRON 2 MG/ML
INJECTION INTRAMUSCULAR; INTRAVENOUS PRN
Status: DISCONTINUED | OUTPATIENT
Start: 2023-07-21 | End: 2023-07-21 | Stop reason: SURG

## 2023-07-21 RX ORDER — HYDROMORPHONE HYDROCHLORIDE 1 MG/ML
0.1 INJECTION, SOLUTION INTRAMUSCULAR; INTRAVENOUS; SUBCUTANEOUS
Status: DISCONTINUED | OUTPATIENT
Start: 2023-07-21 | End: 2023-07-21 | Stop reason: HOSPADM

## 2023-07-21 RX ORDER — SODIUM CHLORIDE 9 MG/ML
INJECTION, SOLUTION INTRAMUSCULAR; INTRAVENOUS; SUBCUTANEOUS
Status: DISCONTINUED | OUTPATIENT
Start: 2023-07-21 | End: 2023-07-21 | Stop reason: HOSPADM

## 2023-07-21 RX ORDER — OXYCODONE HCL 5 MG/5 ML
5 SOLUTION, ORAL ORAL
Status: COMPLETED | OUTPATIENT
Start: 2023-07-21 | End: 2023-07-21

## 2023-07-21 RX ORDER — HYDRALAZINE HYDROCHLORIDE 20 MG/ML
5 INJECTION INTRAMUSCULAR; INTRAVENOUS
Status: DISCONTINUED | OUTPATIENT
Start: 2023-07-21 | End: 2023-07-21 | Stop reason: HOSPADM

## 2023-07-21 RX ORDER — AMOXICILLIN 250 MG
1 CAPSULE ORAL
Status: CANCELLED | OUTPATIENT
Start: 2023-07-21

## 2023-07-21 RX ORDER — CEFAZOLIN SODIUM 1 G/3ML
INJECTION, POWDER, FOR SOLUTION INTRAMUSCULAR; INTRAVENOUS PRN
Status: DISCONTINUED | OUTPATIENT
Start: 2023-07-21 | End: 2023-07-21 | Stop reason: SURG

## 2023-07-21 RX ORDER — AMOXICILLIN 250 MG
1 CAPSULE ORAL NIGHTLY
Status: CANCELLED | OUTPATIENT
Start: 2023-07-21

## 2023-07-21 RX ORDER — LIDOCAINE HYDROCHLORIDE 20 MG/ML
INJECTION, SOLUTION EPIDURAL; INFILTRATION; INTRACAUDAL; PERINEURAL PRN
Status: DISCONTINUED | OUTPATIENT
Start: 2023-07-21 | End: 2023-07-21 | Stop reason: SURG

## 2023-07-21 RX ORDER — MAGNESIUM SULFATE HEPTAHYDRATE 40 MG/ML
INJECTION, SOLUTION INTRAVENOUS PRN
Status: DISCONTINUED | OUTPATIENT
Start: 2023-07-21 | End: 2023-07-21 | Stop reason: SURG

## 2023-07-21 RX ORDER — ROCURONIUM BROMIDE 10 MG/ML
INJECTION, SOLUTION INTRAVENOUS PRN
Status: DISCONTINUED | OUTPATIENT
Start: 2023-07-21 | End: 2023-07-21 | Stop reason: SURG

## 2023-07-21 RX ORDER — TRAMADOL HYDROCHLORIDE 50 MG/1
50 TABLET ORAL EVERY 6 HOURS PRN
Status: DISCONTINUED | OUTPATIENT
Start: 2023-07-21 | End: 2023-07-21

## 2023-07-21 RX ORDER — LEVOTHYROXINE SODIUM 137 UG/1
137 TABLET ORAL
Status: CANCELLED | OUTPATIENT
Start: 2023-07-21

## 2023-07-21 RX ORDER — DEXAMETHASONE SODIUM PHOSPHATE 4 MG/ML
10 INJECTION, SOLUTION INTRA-ARTICULAR; INTRALESIONAL; INTRAMUSCULAR; INTRAVENOUS; SOFT TISSUE ONCE
Status: CANCELLED | OUTPATIENT
Start: 2023-07-22 | End: 2023-07-22

## 2023-07-21 RX ORDER — ENEMA 19; 7 G/133ML; G/133ML
1 ENEMA RECTAL
Status: CANCELLED | OUTPATIENT
Start: 2023-07-21

## 2023-07-21 RX ORDER — ASPIRIN 325 MG
325 TABLET ORAL DAILY
Status: CANCELLED | OUTPATIENT
Start: 2023-07-21

## 2023-07-21 RX ORDER — DIPHENHYDRAMINE HCL 25 MG
25 TABLET ORAL NIGHTLY PRN
Status: CANCELLED | OUTPATIENT
Start: 2023-07-22

## 2023-07-21 RX ORDER — EPHEDRINE SULFATE 50 MG/ML
INJECTION, SOLUTION INTRAVENOUS PRN
Status: DISCONTINUED | OUTPATIENT
Start: 2023-07-21 | End: 2023-07-21 | Stop reason: SURG

## 2023-07-21 RX ORDER — BISACODYL 10 MG
10 SUPPOSITORY, RECTAL RECTAL
Status: CANCELLED | OUTPATIENT
Start: 2023-07-21

## 2023-07-21 RX ORDER — ACETAMINOPHEN 500 MG
500-1000 TABLET ORAL EVERY 6 HOURS PRN
Status: CANCELLED | OUTPATIENT
Start: 2023-07-21

## 2023-07-21 RX ORDER — SODIUM CHLORIDE, SODIUM LACTATE, POTASSIUM CHLORIDE, CALCIUM CHLORIDE 600; 310; 30; 20 MG/100ML; MG/100ML; MG/100ML; MG/100ML
INJECTION, SOLUTION INTRAVENOUS CONTINUOUS
Status: DISCONTINUED | OUTPATIENT
Start: 2023-07-21 | End: 2023-07-21 | Stop reason: HOSPADM

## 2023-07-21 RX ORDER — ONDANSETRON 2 MG/ML
4 INJECTION INTRAMUSCULAR; INTRAVENOUS
Status: DISCONTINUED | OUTPATIENT
Start: 2023-07-21 | End: 2023-07-21 | Stop reason: HOSPADM

## 2023-07-21 RX ORDER — GABAPENTIN 300 MG/1
600 CAPSULE ORAL ONCE
Status: COMPLETED | OUTPATIENT
Start: 2023-07-21 | End: 2023-07-21

## 2023-07-21 RX ORDER — GABAPENTIN 600 MG/1
600 TABLET ORAL 3 TIMES DAILY
Status: CANCELLED | OUTPATIENT
Start: 2023-07-21

## 2023-07-21 RX ORDER — POLYETHYLENE GLYCOL 3350 17 G/17G
1 POWDER, FOR SOLUTION ORAL 2 TIMES DAILY PRN
Status: CANCELLED | OUTPATIENT
Start: 2023-07-21

## 2023-07-21 RX ORDER — OXYCODONE HCL 5 MG/5 ML
10 SOLUTION, ORAL ORAL
Status: COMPLETED | OUTPATIENT
Start: 2023-07-21 | End: 2023-07-21

## 2023-07-21 RX ORDER — LISINOPRIL 5 MG/1
10 TABLET ORAL DAILY
Status: CANCELLED | OUTPATIENT
Start: 2023-07-21

## 2023-07-21 RX ORDER — SODIUM CHLORIDE, SODIUM LACTATE, POTASSIUM CHLORIDE, CALCIUM CHLORIDE 600; 310; 30; 20 MG/100ML; MG/100ML; MG/100ML; MG/100ML
INJECTION, SOLUTION INTRAVENOUS CONTINUOUS
Status: ACTIVE | OUTPATIENT
Start: 2023-07-21 | End: 2023-07-21

## 2023-07-21 RX ORDER — TRANEXAMIC ACID 100 MG/ML
INJECTION, SOLUTION INTRAVENOUS PRN
Status: DISCONTINUED | OUTPATIENT
Start: 2023-07-21 | End: 2023-07-21 | Stop reason: SURG

## 2023-07-21 RX ORDER — ACETAMINOPHEN 500 MG
1000 TABLET ORAL ONCE
Status: DISCONTINUED | OUTPATIENT
Start: 2023-07-21 | End: 2023-07-21 | Stop reason: HOSPADM

## 2023-07-21 RX ORDER — DIPHENHYDRAMINE HYDROCHLORIDE 50 MG/ML
25 INJECTION INTRAMUSCULAR; INTRAVENOUS EVERY 6 HOURS PRN
Status: CANCELLED | OUTPATIENT
Start: 2023-07-21

## 2023-07-21 RX ORDER — ACETAMINOPHEN 500 MG
1000 TABLET ORAL EVERY 6 HOURS PRN
Status: CANCELLED | OUTPATIENT
Start: 2023-07-26

## 2023-07-21 RX ORDER — GABAPENTIN 300 MG/1
300 CAPSULE ORAL ONCE
Status: DISCONTINUED | OUTPATIENT
Start: 2023-07-21 | End: 2023-07-21

## 2023-07-21 RX ORDER — ATORVASTATIN CALCIUM 10 MG/1
10 TABLET, FILM COATED ORAL DAILY
Status: CANCELLED | OUTPATIENT
Start: 2023-07-21

## 2023-07-21 RX ORDER — HYDROMORPHONE HYDROCHLORIDE 1 MG/ML
0.2 INJECTION, SOLUTION INTRAMUSCULAR; INTRAVENOUS; SUBCUTANEOUS
Status: DISCONTINUED | OUTPATIENT
Start: 2023-07-21 | End: 2023-07-21 | Stop reason: HOSPADM

## 2023-07-21 RX ORDER — ROPIVACAINE HYDROCHLORIDE 5 MG/ML
INJECTION, SOLUTION EPIDURAL; INFILTRATION; PERINEURAL
Status: DISCONTINUED | OUTPATIENT
Start: 2023-07-21 | End: 2023-07-21 | Stop reason: HOSPADM

## 2023-07-21 RX ORDER — ACETAMINOPHEN 500 MG
1000 TABLET ORAL EVERY 6 HOURS
Status: CANCELLED | OUTPATIENT
Start: 2023-07-21 | End: 2023-07-26

## 2023-07-21 RX ORDER — HYDROMORPHONE HYDROCHLORIDE 2 MG/ML
INJECTION, SOLUTION INTRAMUSCULAR; INTRAVENOUS; SUBCUTANEOUS PRN
Status: DISCONTINUED | OUTPATIENT
Start: 2023-07-21 | End: 2023-07-21 | Stop reason: SURG

## 2023-07-21 RX ORDER — TRAMADOL HYDROCHLORIDE 50 MG/1
100 TABLET ORAL EVERY 6 HOURS PRN
Status: DISCONTINUED | OUTPATIENT
Start: 2023-07-21 | End: 2023-07-21

## 2023-07-21 RX ORDER — ONDANSETRON 2 MG/ML
4 INJECTION INTRAMUSCULAR; INTRAVENOUS EVERY 4 HOURS PRN
Status: CANCELLED | OUTPATIENT
Start: 2023-07-21

## 2023-07-21 RX ADMIN — HYDROMORPHONE HYDROCHLORIDE 0.2 MG: 1 INJECTION, SOLUTION INTRAMUSCULAR; INTRAVENOUS; SUBCUTANEOUS at 10:00

## 2023-07-21 RX ADMIN — LIDOCAINE HYDROCHLORIDE 4 ML: 40 SOLUTION TOPICAL at 08:04

## 2023-07-21 RX ADMIN — HYDROMORPHONE HYDROCHLORIDE 0.2 MG: 1 INJECTION, SOLUTION INTRAMUSCULAR; INTRAVENOUS; SUBCUTANEOUS at 10:30

## 2023-07-21 RX ADMIN — HYDROMORPHONE HYDROCHLORIDE 0.2 MG: 1 INJECTION, SOLUTION INTRAMUSCULAR; INTRAVENOUS; SUBCUTANEOUS at 11:48

## 2023-07-21 RX ADMIN — EPHEDRINE SULFATE 10 MG: 50 INJECTION, SOLUTION INTRAVENOUS at 09:00

## 2023-07-21 RX ADMIN — ONDANSETRON 4 MG: 2 INJECTION INTRAMUSCULAR; INTRAVENOUS at 09:20

## 2023-07-21 RX ADMIN — LIDOCAINE HYDROCHLORIDE 80 MG: 20 INJECTION, SOLUTION EPIDURAL; INFILTRATION; INTRACAUDAL at 08:01

## 2023-07-21 RX ADMIN — SODIUM CHLORIDE, POTASSIUM CHLORIDE, SODIUM LACTATE AND CALCIUM CHLORIDE: 600; 310; 30; 20 INJECTION, SOLUTION INTRAVENOUS at 08:47

## 2023-07-21 RX ADMIN — HYDROMORPHONE HYDROCHLORIDE 0.2 MG: 1 INJECTION, SOLUTION INTRAMUSCULAR; INTRAVENOUS; SUBCUTANEOUS at 11:15

## 2023-07-21 RX ADMIN — GABAPENTIN 600 MG: 300 CAPSULE ORAL at 10:22

## 2023-07-21 RX ADMIN — HYDROMORPHONE HYDROCHLORIDE 1 MG: 2 INJECTION INTRAMUSCULAR; INTRAVENOUS; SUBCUTANEOUS at 08:33

## 2023-07-21 RX ADMIN — CEFAZOLIN 2 G: 1 INJECTION, POWDER, FOR SOLUTION INTRAMUSCULAR; INTRAVENOUS at 08:01

## 2023-07-21 RX ADMIN — TRANEXAMIC ACID 1000 MG: 100 INJECTION, SOLUTION INTRAVENOUS at 08:01

## 2023-07-21 RX ADMIN — LIDOCAINE HYDROCHLORIDE 0.5 ML: 10 INJECTION, SOLUTION EPIDURAL; INFILTRATION; INTRACAUDAL; PERINEURAL at 07:44

## 2023-07-21 RX ADMIN — OXYCODONE HYDROCHLORIDE 10 MG: 5 SOLUTION ORAL at 10:22

## 2023-07-21 RX ADMIN — LABETALOL HYDROCHLORIDE 5 MG: 5 INJECTION INTRAVENOUS at 11:25

## 2023-07-21 RX ADMIN — PROPOFOL 100 MG: 10 INJECTION, EMULSION INTRAVENOUS at 08:01

## 2023-07-21 RX ADMIN — MAGNESIUM SULFATE HEPTAHYDRATE 2 G: 2 INJECTION, SOLUTION INTRAVENOUS at 08:20

## 2023-07-21 RX ADMIN — EPHEDRINE SULFATE 10 MG: 50 INJECTION, SOLUTION INTRAVENOUS at 09:18

## 2023-07-21 RX ADMIN — SODIUM CHLORIDE, POTASSIUM CHLORIDE, SODIUM LACTATE AND CALCIUM CHLORIDE: 600; 310; 30; 20 INJECTION, SOLUTION INTRAVENOUS at 07:44

## 2023-07-21 RX ADMIN — EPHEDRINE SULFATE 10 MG: 50 INJECTION, SOLUTION INTRAVENOUS at 08:18

## 2023-07-21 RX ADMIN — FENTANYL CITRATE 50 MCG: 50 INJECTION, SOLUTION INTRAMUSCULAR; INTRAVENOUS at 10:16

## 2023-07-21 RX ADMIN — ROCURONIUM BROMIDE 50 MG: 50 INJECTION, SOLUTION INTRAVENOUS at 08:01

## 2023-07-21 RX ADMIN — TRANEXAMIC ACID 1000 MG: 100 INJECTION, SOLUTION INTRAVENOUS at 10:23

## 2023-07-21 RX ADMIN — FENTANYL CITRATE 150 MCG: 50 INJECTION, SOLUTION INTRAMUSCULAR; INTRAVENOUS at 08:01

## 2023-07-21 RX ADMIN — SUGAMMADEX 200 MG: 100 INJECTION, SOLUTION INTRAVENOUS at 09:41

## 2023-07-21 RX ADMIN — FENTANYL CITRATE 50 MCG: 50 INJECTION, SOLUTION INTRAMUSCULAR; INTRAVENOUS at 10:22

## 2023-07-21 RX ADMIN — HYDROMORPHONE HYDROCHLORIDE 0.2 MG: 1 INJECTION, SOLUTION INTRAMUSCULAR; INTRAVENOUS; SUBCUTANEOUS at 10:47

## 2023-07-21 RX ADMIN — DEXAMETHASONE SODIUM PHOSPHATE 8 MG: 4 INJECTION INTRA-ARTICULAR; INTRALESIONAL; INTRAMUSCULAR; INTRAVENOUS; SOFT TISSUE at 08:15

## 2023-07-21 RX ADMIN — FENTANYL CITRATE 50 MCG: 50 INJECTION, SOLUTION INTRAMUSCULAR; INTRAVENOUS at 08:32

## 2023-07-21 ASSESSMENT — PAIN DESCRIPTION - PAIN TYPE
TYPE: SURGICAL PAIN
TYPE: SURGICAL PAIN
TYPE: CHRONIC PAIN

## 2023-07-21 ASSESSMENT — COGNITIVE AND FUNCTIONAL STATUS - GENERAL
MOVING FROM LYING ON BACK TO SITTING ON SIDE OF FLAT BED: A LITTLE
WALKING IN HOSPITAL ROOM: A LITTLE
SUGGESTED CMS G CODE MODIFIER MOBILITY: CJ
MOBILITY SCORE: 20
STANDING UP FROM CHAIR USING ARMS: A LITTLE
CLIMB 3 TO 5 STEPS WITH RAILING: A LITTLE

## 2023-07-21 ASSESSMENT — PAIN SCALES - GENERAL: PAIN_LEVEL: 5

## 2023-07-21 ASSESSMENT — GAIT ASSESSMENTS
GAIT LEVEL OF ASSIST: CONTACT GUARD ASSIST
DEVIATION: STEP TO
DISTANCE (FEET): 100
ASSISTIVE DEVICE: FRONT WHEEL WALKER

## 2023-07-21 ASSESSMENT — FIBROSIS 4 INDEX: FIB4 SCORE: 1.51

## 2023-07-21 NOTE — ANESTHESIA PREPROCEDURE EVALUATION
Case: [id number] Anesthesia Start Date/Time: 07/21/23 0755    Procedure: RIGHT TOTAL HIP ARTHROPLASTY AND REPAIRS AS INDICATED    Pre-op diagnosis: OA RIGHT HIP    Location:  OR  / SURGERY [facility]    Surgeons: Luis Caceres M.D.        78 yo w/right hip OA    Relevant Problems   ANESTHESIA (within normal limits)      CARDIAC   (positive) Primary hypertension         (positive) Stage 2 chronic kidney disease      ENDO   (positive) Hypothyroidism      Other   (positive) Arthritis of hip   (positive) Dyslipidemia   (positive) Peripheral polyneuropathy   (positive) Pre-diabetes     (+) COVID beginning of July.  Most recent test (-).  Residual mild sniffles.    Denies CAD, CP/SOB, CVA, LUNG/LIVER DZ, GERD, URI    Physical Exam    Airway   Mallampati: II  TM distance: >3 FB  Neck ROM: full       Cardiovascular   Rhythm: regular  Rate: normal  (-) murmur     Dental - normal exam           Pulmonary   Breath sounds clear to auscultation     Abdominal    Neurological - normal exam                 Anesthesia Plan    ASA 2       Plan - general       Airway plan will be ETT          Induction: intravenous    Postoperative Plan: Postoperative administration of opioids is intended.    Pertinent diagnostic labs and testing reviewed    Informed Consent:    Anesthetic plan and risks discussed with patient.    Use of blood products discussed with: patient whom consented to blood products.

## 2023-07-21 NOTE — OR NURSING
"1321 patient to stage 2  Patient settled in recliner chair post short ambulation from [facility] by PT.   1340 PT completed and ready for discharge instructions. Wife at bedside.   1435 Pt ambulated well with walker to BR and able to void. Assisted in BR for safety by RN with SBA. Pt states he is \"lightheaded\" but not dizzy, nauseated or double vision issues. \"I just want to rest a minute\". Pt given ice pack to posterior neck and ginger ale for energy. Wife instructed to remind him to remember to put his right foot forward of his left when standing or sitting to avoid >90 degree angle of hip; she states verbal understanding.     D/Daniel to care of family post uneventful stay in PACU 2.   "

## 2023-07-21 NOTE — ANESTHESIA TIME REPORT
Anesthesia Start and Stop Event Times     Date Time Event    7/21/2023 0746 Ready for Procedure     0755 Anesthesia Start     0959 Anesthesia Stop        Responsible Staff  07/21/23    Name Role Begin End    Amber Blank M.D. Anesth 0755 0937        Overtime Reason:  no overtime (within assigned shift)    Comments:

## 2023-07-21 NOTE — DISCHARGE INSTRUCTIONS
If any questions arise, call your provider.  If your provider is not available, please feel free to call the Surgical Center at [phone].    MEDICATIONS: Resume taking daily medication.  Take prescribed pain medication with food.  If no medication is prescribed, you may take non-aspirin pain medication if needed.  PAIN MEDICATION CAN BE VERY CONSTIPATING.  Take a stool softener or laxative such as senokot, pericolace, or milk of magnesia if needed.    Last pain medication given at     What to Expect Post Anesthesia    Rest and take it easy for the first 24 hours.  A responsible adult is recommended to remain with you during that time.  It is normal to feel sleepy.  We encourage you to not do anything that requires balance, judgment or coordination.    FOR 24 HOURS DO NOT:  Drive, operate machinery or run household appliances.  Drink beer or alcoholic beverages.  Make important decisions or sign legal documents.    To avoid nausea, slowly advance diet as tolerated, avoiding spicy or greasy foods for the first day.  Add more substantial food to your diet according to your provider's instructions.  Babies can be fed formula or breast milk as soon as they are hungry.  INCREASE FLUIDS AND FIBER TO AVOID CONSTIPATION.    MILD FLU-LIKE SYMPTOMS ARE NORMAL.  YOU MAY EXPERIENCE GENERALIZED MUSCLE ACHES, THROAT IRRITATION, HEADACHE AND/OR SOME NAUSEA.    Dr. Caceres's Home Instructions:    1. Home on crutches/ walker   2. Home on home meds per home doses   3. Home on Tramadol, ASA, and Outpatient physical therapy.  4. Follow up [facility] 10-14 days   5. Call for Fevers, drainage, redness, shortness of breath, or increasing lower extremity swelling particularly in the calf.   6. Start Aspirin 325mg DAILY FOR 35 DAYS.   7. CHANGE BANDAGE 5-7 DAYS, KEEP INCISION COVERED FOR SHOWERS AND DRY. Call if drainage or wetness.       Total Hip Replacement Discharge Instructions    ACTIVITY  The first week after your hip  replacement is a time to recover from the surgery. We expect light exercise to keep you active and mobile.  Posterior Hip Precautions    To care for your new hip and keep it from sliding out of position, you'll need to follow a few general rules at first.     Don't bend your hip past a 90 degree angle.  Don't cross your legs.  Don't twist your hip inwards-keep knees and toes pointed upwards.     ASSISTED DEVICES: You are being discharged with the following special equipment:  Walker    DRESSING AND WOUND CARE   Keep dressing clean and dry. Leave dressing in place until follow up.     SHOWER / BATHING   OK to shower, keep dressing in place. Pat dressing dry, do not rub incision.  Swimming pools, hot tubs, or baths are to be avoided until after your follow up and then only if cleared by your surgeon.      APPLY ICE PACK TO HIP   Apply ice packs to your hip (15 minutes on the hip, 15 minutes off the hip) for the first week, as you feel necessary to help with the pain and swelling.    SWELLING/BRUISING  Swelling is an expected response to surgery. To reduce swelling, elevate your surgical limb above heart level multiple times per day and apply ice (see Ice instructions). If your swelling becomes excessive, is limiting your ability to move, or becomes worrisome please contact your doctor's office.    Bruising often does not appear until after you arrive home and can be quite dramatic-appearing purple, black, or green. Bruising is typically not concerning and will subside without any treatment.        Make sure that you have an appointment 7-14 days following surgery.Your procedure/rehabilitation will be discussed and physical therapy may be prescribed at that time.    IMPORTANT NOTE IF YOU RECEIVED EXPAREL:    The liposomal bupivacaine (Exparel) teal arm band is used as a visual queue to alert healthcare professionals that you received a long-acting form of bupivacaine during your recent surgery. You should not receive  additional local anesthetics (i.e. bupivacaine, lidocaine) for 96 hours after surgery. There may be situations where you are unable to communicate this information, so it is important for your safety to keep the teal arm band on for 96 hours (4 days) after surgery.       Bupivacaine Liposomal Injection Suspension  What is this medication?  BUPIVACAINE LIPOSOMAL (bue PIV a montano LIP oh rajesh al) prevents or treats pain, including during a procedure. It works by numbing a specific area of the body, which blocks pain signals going to the brain. It belongs to a group of medications called local anesthetics.  This medicine may be used for other purposes; ask your health care provider or pharmacist if you have questions.  COMMON BRAND NAME(S): EXPAREL  What should I tell my care team before I take this medication?  They need to know if you have any of these conditions:  G6PD deficiency  Heart disease  Kidney disease  Liver disease  Low blood pressure  Lung disease  An unusual or allergic reaction to bupivacaine, other medications, foods, dyes, or preservatives  Pregnant or trying to get pregnant  Breast-feeding  How should I use this medication?  This medication is injected into the affected area. It is given by your care team in a hospital or clinic setting.  Talk to your care team about the use of this medication in children. While it may be given to children as young as 6 years for selected conditions, precautions do apply.  Overdosage: If you think you have taken too much of this medicine contact a poison control center or emergency room at once.  NOTE: This medicine is only for you. Do not share this medicine with others.  What if I miss a dose?  This does not apply.  What may interact with this medication?  Acetaminophen  Certain antibiotics, such as dapsone, nitrofurantoin, aminosalicylic acid, sulfonamides  Certain medications for seizures, such as phenobarbital, phenytoin, valproic  acid  Chloroquine  Cyclophosphamide  Flutamide  Hydroxyurea  Ifosfamide  Metoclopramide  Nitric oxide  Nitroglycerin  Nitroprusside  Nitrous oxide  Other local anesthetics, such as lidocaine, pramoxine, tetracaine  Primaquine  Quinine  Rasburicase  Sulfasalazine  This list may not describe all possible interactions. Give your health care provider a list of all the medicines, herbs, non-prescription drugs, or dietary supplements you use. Also tell them if you smoke, drink alcohol, or use illegal drugs. Some items may interact with your medicine.  What should I watch for while using this medication?  Your condition will be monitored carefully while you are receiving this medication.  Be careful to avoid injury while the area is numb, and you are not aware of pain. Numbness and loss of movement in the area where the medication was given can last up to 5 days.  If you have a procedure in the next 4 days, tell your care team you had this medication. You should not receive similar medications for 96 hours (4 days) from the time this medication was given.  What side effects may I notice from receiving this medication?  Side effects that you should report to your care team as soon as possible:  Allergic reactions--skin rash, itching, hives, swelling of the face, lips, tongue, or throat  CNS depression--slow or shallow breathing, shortness of breath, feeling faint, dizziness, confusion, trouble staying awake  Headache, unusual weakness or fatigue, shortness of breath, nausea, vomiting, rapid heartbeat, blue skin or lips, which may be signs of methemoglobinemia  Heart rhythm changes--fast or irregular heartbeat, dizziness, feeling faint or lightheaded, chest pain, trouble breathing  Increase in blood pressure  Low blood pressure--dizziness, feeling faint or lightheaded, blurry vision  Seizures  Side effects that usually do not require medical attention (report these to your care team if they continue or are  bothersome):  Anxiety, nervousness  Drowsiness  Nausea  Vomiting  This list may not describe all possible side effects. Call your doctor for medical advice about side effects. You may report side effects to FDA at 1-203-RGX-3914.  Where should I keep my medication?  This medication is given in a hospital or clinic. It will not be stored at home.  NOTE: This sheet is a summary. It may not cover all possible information. If you have questions about this medicine, talk to your doctor, pharmacist, or health care provider.  © 2023 [employer]/Gold Standard (2013-10-15 00:00:00)

## 2023-07-21 NOTE — OR NURSING
0955: Patient arrived from OR via gurney.  R hip dressing CDI; pedal pulse +2, RLE warm, cap refill <2 secs. Cold pack applied to R hip.     Sedation/Resp Status: Non responsive to verbal .  Respirations spontaneous and non-labored.    1000: Pt mumbling with eyes closed.     1015: Dr Blank at bedside. Pt states that he is experiencing significant amt of pain. Medicated.     1030: TXA infusing. Pt medicated for pain, denies nausea. Oxymask in place, pt refusing juice at this time, but sipping on water and tolerating well.     1045: Wife updated of pt status. Pt continues to have pain. Medicated.     1100:  Dr Caceres's office contacted regarding home rx. Pt requesting tramadol instead of percocet. Pt continues to have pain. Medicated.     1115: Pt medicated for pain. Awaiting X-ray.     1130: Report to [facility] Gifford.    1145: Report from April,   Pt medicated for pain. Xray complete.     1200: Pt using IS =3000 (goal 3300). States pain is 4-5/10  and tolerable. Denies nausea. PT will return to work with pt. Pt states he still has some residual congestion due to recovering from Covid.     1215: Pt reading a book and using AL=0052. Pain tolerable.    1230:Pt using urinal at bedside, able to urinate 100cc. Bedside mobility test complete and pt has passed.     1245: Pt meets criteria for stage 2.     1300: Report to [facility]NA Carreno. PT at bedside.   1310: Pt ambulatory with [facility] w/ PT

## 2023-07-21 NOTE — THERAPY
Physical Therapy   Initial Evaluation     Patient Name: Amadou Parekh  Age:  77 y.o., Sex:  male  Medical Record #: [id number]  Today's Date: 7/21/2023     Precautions  Precautions: Posterior Hip Precautions;Weight Bearing As Tolerated Right Lower Extremity    Assessment  Patient is 77 y.o. male with a diagnosis of R THR post Pt lives at home with wife and is active.Pt is safe with bed mob,transfers and ambulation.He understands HEP and precautions and has no equipment needs.     Plan  DC Equipment Recommendations: None  Discharge Recommendations: Recommend outpatient physical therapy services to address higher level deficits       Objective       07/21/23 1315   Charge Group   PT Evaluation PT Evaluation Mod   Total Time Spent   PT Evaluation Time Spent (Mins) 30   PT Total Time Spent (Calculated) 30   Initial Contact Note    Initial Contact Note Order Received and Verified, Physical Therapy Evaluation in Progress with Full Report to Follow.   Precautions   Precautions Posterior Hip Precautions;Weight Bearing As Tolerated Right Lower Extremity   Vitals   Respiration 12   Prior Level of Functional Mobility   Bed Mobility Independent   Transfer Status Independent   Ambulation Independent   Ambulation Distance community amb   Assistive Devices Used None   Stairs Independent   History of Falls   History of Falls No   Cognition    Cognition / Consciousness WDL   Passive ROM Lower Body   Passive ROM Lower Body X   Active ROM Lower Body    Active ROM Lower Body  X   Strength Lower Body   Lower Body Strength  X   Sensation Lower Body   Lower Extremity Sensation   WDL   Coordination Lower Body    Coordination Lower Body  WDL   Balance Assessment   Sitting Balance (Static) Good   Sitting Balance (Dynamic) Good   Standing Balance (Static) Fair +   Standing Balance (Dynamic) Fair +   Weight Shift Sitting Good   Weight Shift Standing Good   Bed Mobility    Supine to Sit Modified Independent   Sit to Supine Modified Independent    Scooting Modified Independent   Gait Analysis   Gait Level Of Assist Contact Guard Assist   Assistive Device Front Wheel Walker   Distance (Feet) 100   # of Times Distance was Traveled 1   Deviation Step To   Weight Bearing Status wbat R   Functional Mobility   Sit to Stand Contact Guard Assist   Bed, Chair, Wheelchair Transfer Contact Guard Assist   Transfer Method Stand Step   How much difficulty does the patient currently have...   Turning over in bed (including adjusting bedclothes, sheets and blankets)? 4   Sitting down on and standing up from a chair with arms (e.g., wheelchair, bedside commode, etc.) 3   Moving from lying on back to sitting on the side of the bed? 4   How much help from another person does the patient currently need...   Moving to and from a bed to a chair (including a wheelchair)? 3   Need to walk in a hospital room? 3   Climbing 3-5 steps with a railing? 3   6 clicks Mobility Score 20   Activity Tolerance   Sitting in Chair > 1hr   Sitting Edge of Bed 10   Standing 10   Patient / Family Goals    Patient / Family Goal #1 Home   Anticipated Discharge Equipment and Recommendations   DC Equipment Recommendations None   Discharge Recommendations Recommend outpatient physical therapy services to address higher level deficits   Interdisciplinary Plan of Care Collaboration   IDT Collaboration with  Nursing   Session Information   Date / Session Number  7/21   Priority 0

## 2023-07-21 NOTE — ANESTHESIA POSTPROCEDURE EVALUATION
Patient: Amadou Parekh    Procedure Summary     Date: 07/21/23 Room / Location:  OR  / SURGERY [facility]    Anesthesia Start: 0755 Anesthesia Stop: 0959    Procedure: RIGHT TOTAL HIP ARTHROPLASTY AND REPAIRS AS INDICATED (Right: Hip) Diagnosis: (OA RIGHT HIP)    Surgeons: Luis Caceres M.D. Responsible Provider: Amber Blank M.D.    Anesthesia Type: general ASA Status: 2          Final Anesthesia Type: general  Last vitals  BP   Blood Pressure : (!) 186/90    Temp   36 °C (96.8 °F)    Pulse   76   Resp   16    SpO2   99 %      Anesthesia Post Evaluation    Patient location during evaluation: PACU  Patient participation: complete - patient participated  Level of consciousness: awake  Pain score: 5    Airway patency: patent  Anesthetic complications: no  Cardiovascular status: hemodynamically stable  Respiratory status: acceptable  Hydration status: acceptable    PONV: none          No notable events documented.     Nurse Pain Score: 0 (NPRS)

## 2023-07-21 NOTE — ANESTHESIA PROCEDURE NOTES
Airway    Date/Time: 7/21/2023 8:04 AM    Performed by: Amber Blank M.D.  Authorized by: Amber Blank M.D.    Location:  OR  Urgency:  Elective  Indications for Airway Management:  Anesthesia      Spontaneous Ventilation: absent    Sedation Level:  Deep  Preoxygenated: Yes    Patient Position:  Sniffing  Mask Difficulty Assessment:  2 - vent by mask + OA or adjuvant +/- NMBA  Final Airway Type:  Endotracheal airway  Final Endotracheal Airway:  ETT  Cuffed: Yes    Technique Used for Successful ETT Placement:  Direct laryngoscopy  Devices/Methods Used in Placement:  Cricoid pressure and intubating stylet    Insertion Site:  Oral  Blade Type:  Agustín  Laryngoscope Blade/Videolaryngoscope Blade Size:  4  ETT Size (mm):  7.5  Measured from:  Teeth  ETT to Teeth (cm):  22  Placement Verified by: capnometry and palpation of cuff    Cormack-Lehane Classification:  Grade IIb - view of arytenoids or posterior of glottis only  Number of Attempts at Approach:  1

## 2023-07-21 NOTE — OP REPORT
DATE OF OPERATION: 7/21/2023      PREOPERATIVE DIAGNOSES:   1. Osteoarthritis, right hip.     POSTOPERATIVE DIAGNOSES:   1. Osteoarthritis, right hip.       PROCEDURES:   1. Right total hip arthroplasty.   2. Intraoperative block for rtelief of extensive postoperative pain.    SURGEON: Luis Caceres MD       ANESTHESIA: General, Amber Blank M.D.      IMPLANTS: Ana  Biomet size 56 G7 cup with a flat cross-linked polyethylene and a 36 mm +0 neck length ceramic femoral head on a high offset Taperloc size 15 stem    WEIGHTBEARING: As tolerated.     FINDINGS:   1. 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  2.  Small anterior osteophyte on the inferior iliac spine was stable excision    PROCEDURE IN DETAIL:   The patient was seen in the preop holding area and consent reviewed. The right lower extremity was marked with patient. Patient was taken to the operating room where general anesthesia was given. The body-exhaust uniforms were utilized. Perioperative antibiotics were given The patient then was positioned in a lateral decubitus position with axillary roll and Stulberg.  All bony prominences were padded. The right lower extremity was addressed with a Hibiclens, alcohol and ChloraPrep. Then sterile draping technique was performed. Air isolation draping was undertaken. An appropriate timeout was  undertaken.        The surgery was initiated with a minimally invasive posterior approach. Skin   and subcutaneous tissue were incised. Hemostasis was obtained. The fascia   hope and gluteal fascia were split. Short external rotators were taken down   as a single layer. T-capsular incision was made.  The hip was dislocated.     Intraoperative periarticular and extensive sara-incisional block was undertaken. This was undertaken with a combination of Toradol ropivacaine and morphine to treat postoperative pain. This was done extensively throughout the pericapsular tissues, followed by an extensive block in the  peritrochanteric tissues, followed by an extensive tissue infusion into the subcutaneous tissues. This obtained a significant intraoperative improvement in anesthesia requirements as well as pain management and vitals stabilization.    Following this, the acetabulum was addressed with Labral excision and serial reaming.  Next, the acetabulum was reverse reamed with bone graft  and the Trilogy cup inserted. The polyethylene was inserted after pulse lavage and the locking mechanism checked.     At this point, the femur was addressed.  Cookie cutter was used.  Then serial reaming   and broaching was undertaken.  Trial reductions were undertaken with reproduction and stabilization in the patient's length and offset. The final stem was then inserted.  A trial reductions repeated and  neck length and head chosen.  After pulse lavage,  cleaning and drying the Zuluaga taper the head was inserted and the hip was relocated, full extension, external rotation stability as well as sleeping stability to 70 degrees and flexed 90 stability to 50 degrees was noted. Pulse lavage was repeated. T-capsular closure was done with #2 FiberWire. Short external rotator repair over bone bridges with #2 FiberWire.    The fascia hope repaired with #1 PDS, and oh Quill on the subcutaneous layer and then closure of the skin. Patient was placed into a sterile bandage. The patient was  awoken from anesthetic and taken to the recovery room, tolerated the procedure very well with no signs of complications.

## 2023-07-21 NOTE — OR NURSING
Patient allergies and NPO status verified, home medication reconciliation completed and belongings secured. Surgical site verified with patient. Patient verbalizes understanding of pain scale, expected course of stay and plan of care; patient and family state verbal understanding at this time. IV access established. Sequentials/teds in place as ordered.

## 2023-08-01 DIAGNOSIS — E03.4 HYPOTHYROIDISM DUE TO ACQUIRED ATROPHY OF THYROID: ICD-10-CM

## 2023-08-01 RX ORDER — LEVOTHYROXINE SODIUM 137 MCG
137 TABLET ORAL
Qty: 90 TABLET | Refills: 0 | Status: SHIPPED | OUTPATIENT
Start: 2023-08-01 | End: 2023-08-09 | Stop reason: SDUPTHER

## 2023-08-03 DIAGNOSIS — E78.2 MIXED HYPERLIPIDEMIA: ICD-10-CM

## 2023-08-03 DIAGNOSIS — I10 ESSENTIAL HYPERTENSION: ICD-10-CM

## 2023-08-04 RX ORDER — ATORVASTATIN CALCIUM 10 MG/1
10 TABLET, FILM COATED ORAL DAILY
Qty: 90 TABLET | Refills: 0 | Status: SHIPPED | OUTPATIENT
Start: 2023-08-04 | End: 2023-11-01 | Stop reason: SDUPTHER

## 2023-08-04 RX ORDER — LISINOPRIL 10 MG/1
10 TABLET ORAL DAILY
Qty: 90 TABLET | Refills: 0 | Status: SHIPPED
Start: 2023-08-04 | End: 2023-10-20

## 2023-08-08 ENCOUNTER — HOSPITAL ENCOUNTER (OUTPATIENT)
Dept: RADIOLOGY | Facility: MEDICAL CENTER | Age: 78
End: 2023-08-08
Attending: ORTHOPAEDIC SURGERY
Payer: COMMERCIAL

## 2023-08-08 DIAGNOSIS — R60.0 LOCALIZED EDEMA: ICD-10-CM

## 2023-08-08 PROCEDURE — 93970 EXTREMITY STUDY: CPT

## 2023-08-09 ENCOUNTER — OFFICE VISIT (OUTPATIENT)
Dept: INTERNAL MEDICINE | Facility: OTHER | Age: 78
End: 2023-08-09
Payer: COMMERCIAL

## 2023-08-09 VITALS
HEIGHT: 73 IN | TEMPERATURE: 98 F | OXYGEN SATURATION: 98 % | DIASTOLIC BLOOD PRESSURE: 68 MMHG | SYSTOLIC BLOOD PRESSURE: 135 MMHG | WEIGHT: 200.6 LBS | HEART RATE: 58 BPM | BODY MASS INDEX: 26.59 KG/M2

## 2023-08-09 DIAGNOSIS — Z96.641 HISTORY OF TOTAL HIP ARTHROPLASTY, RIGHT: ICD-10-CM

## 2023-08-09 DIAGNOSIS — I10 PRIMARY HYPERTENSION: ICD-10-CM

## 2023-08-09 DIAGNOSIS — E03.4 HYPOTHYROIDISM DUE TO ACQUIRED ATROPHY OF THYROID: ICD-10-CM

## 2023-08-09 DIAGNOSIS — M79.89 RIGHT LEG SWELLING: ICD-10-CM

## 2023-08-09 DIAGNOSIS — M15.9 PRIMARY OSTEOARTHRITIS INVOLVING MULTIPLE JOINTS: ICD-10-CM

## 2023-08-09 PROCEDURE — 3078F DIAST BP <80 MM HG: CPT | Performed by: STUDENT IN AN ORGANIZED HEALTH CARE EDUCATION/TRAINING PROGRAM

## 2023-08-09 PROCEDURE — 3075F SYST BP GE 130 - 139MM HG: CPT | Performed by: STUDENT IN AN ORGANIZED HEALTH CARE EDUCATION/TRAINING PROGRAM

## 2023-08-09 PROCEDURE — 99214 OFFICE O/P EST MOD 30 MIN: CPT | Mod: GC | Performed by: STUDENT IN AN ORGANIZED HEALTH CARE EDUCATION/TRAINING PROGRAM

## 2023-08-09 RX ORDER — FUROSEMIDE 20 MG/1
10 TABLET ORAL DAILY
Qty: 7 TABLET | Refills: 0 | Status: SHIPPED | OUTPATIENT
Start: 2023-08-09 | End: 2023-08-16

## 2023-08-09 RX ORDER — TRAMADOL HYDROCHLORIDE 50 MG/1
50 TABLET ORAL
COMMUNITY
Start: 2023-02-28

## 2023-08-09 RX ORDER — LEVOTHYROXINE SODIUM 137 MCG
137 TABLET ORAL
Qty: 90 TABLET | Refills: 0 | Status: SHIPPED | OUTPATIENT
Start: 2023-08-09 | End: 2023-11-13

## 2023-08-09 ASSESSMENT — FIBROSIS 4 INDEX: FIB4 SCORE: 1.53

## 2023-08-09 ASSESSMENT — ENCOUNTER SYMPTOMS
GASTROINTESTINAL NEGATIVE: 1
EYES NEGATIVE: 1
CARDIOVASCULAR NEGATIVE: 1
RESPIRATORY NEGATIVE: 1
NEUROLOGICAL NEGATIVE: 1
PSYCHIATRIC NEGATIVE: 1
CONSTITUTIONAL NEGATIVE: 1

## 2023-08-09 NOTE — PATIENT INSTRUCTIONS
- Start taking furosemide 10 mg, once daily. The tablet will need to be cut in half, as they are 20 mg tablets.  - If you do not notice any improvement with the 10 mg, OK to increase the dose to 20 mg.   - Encouraged to continue physical therapy, as scheduled.   - If no improvement in the leg swelling with increasing furosemide dose, please return to the clinic.  - Otherwise, follow-up in clinic in 3 months.

## 2023-08-09 NOTE — PROGRESS NOTES
Established Patient    Amadou presents today with the following:    CC: Leg swelling    HPI:     Mr. Parekh is a 78 year-old male who presents to [facility] for right leg swelling.    Of note, he recently underwent total right hip arthroplasty on July 21st. Since his surgery, he has been taking 325 mg aspirin, once daily. Denies any limitations in being able to walk. Pain is located at the surgical site and along his lower leg. Trace edema seen along his left ankle. He was seen by his Orthopedic Surgeon yesterday, whom removed his stitches. Ultrasonography was done of both legs, negative for DVT's. Denies any recent falls. He has been using a walking stick, daily to help him with balance. He has been active and walks frequently at work. He was established with physical therapy, with scheduled PT sessions.     Patient Active Problem List    Diagnosis Date Noted    Overweight 01/10/2023    Stage 2 chronic kidney disease 01/10/2023    Pre-diabetes 11/30/2022    Normocytic anemia 11/30/2022    Chronic sinusitis 11/30/2022    Arthritis of hip 08/15/2022    Lumbar degenerative disc disease 12/31/2021    Dyslipidemia 11/09/2021    Osteoarthritis 11/09/2021    Pain of left lower extremity 10/11/2019    Peripheral polyneuropathy 08/06/2018    Bilateral hearing loss 08/06/2018    Spondylolysis, lumbar region 10/05/2017    Chronic lumbar radiculopathy 08/18/2016    Bilateral stenosis of lateral recess of lumbar spine 03/12/2016    Primary hypertension 01/04/2016    Hypothyroidism 01/04/2016     Social History     Tobacco Use    Smoking status: Never    Smokeless tobacco: Never   Vaping Use    Vaping Use: Never used   Substance Use Topics    Alcohol use: No    Drug use: No     Current Outpatient Medications   Medication Sig Dispense Refill    traMADol (ULTRAM) 50 MG Tab Take 50 mg by mouth.      SYNTHROID 137 MCG Tab Take 1 Tablet by mouth every morning on an empty stomach. 90 Tablet 0    furosemide (LASIX) 20 MG Tab Take  "0.5 Tablets by mouth every day for 7 days. 7 Tablet 0    atorvastatin (LIPITOR) 10 MG Tab TAKE ONE TABLET BY MOUTH ONCE DAILY 90 Tablet 0    lisinopril (PRINIVIL) 10 MG Tab TAKE ONE TABLET BY MOUTH ONCE DAILY 90 Tablet 0    acetaminophen (TYLENOL) 500 MG Tab Take 500-1,000 mg by mouth every 6 hours as needed.      gabapentin (NEURONTIN) 600 MG tablet TAKE ONE TABLET BY MOUTH THREE TIMES DAILY 300 Tablet 0    B Complex Vitamins (VITAMIN B COMPLEX PO) Take 1 Tablet by mouth every day.      ibuprofen (MOTRIN) 200 MG Tab Take 200 mg by mouth every 6 hours as needed.      tadalafil (CIALIS) 5 MG tablet Take 5 mg by mouth every day. Taking for prostate vs ED per pt      Multiple Vitamin (MULTI-VITAMIN) Tab Take 1 Tablet by mouth every day.      Cholecalciferol (VITAMIN D-1000 MAX ST) 1000 UNIT Tab Take 2 Tablets by mouth every day.       No current facility-administered medications for this visit.     Review of Systems   Constitutional: Negative.    HENT: Negative.     Eyes: Negative.    Respiratory: Negative.     Cardiovascular: Negative.    Gastrointestinal: Negative.    Genitourinary: Negative.    Musculoskeletal:         Right leg pain, and swelling   Skin: Negative.    Neurological: Negative.    Endo/Heme/Allergies: Negative.    Psychiatric/Behavioral: Negative.       /68 (BP Location: Left arm, Patient Position: Sitting, BP Cuff Size: Adult)   Pulse (!) 58   Temp 36.7 °C (98 °F) (Temporal)   Ht 1.854 m (6' 1\")   Wt 91 kg (200 lb 9.6 oz)   SpO2 98%   BMI 26.47 kg/m²     Physical Exam  Vitals reviewed.   Constitutional:       Appearance: Normal appearance.   HENT:      Head: Normocephalic and atraumatic.      Nose: Nose normal.      Mouth/Throat:      Mouth: Mucous membranes are moist.      Pharynx: Oropharynx is clear.   Eyes:      Extraocular Movements: Extraocular movements intact.      Conjunctiva/sclera: Conjunctivae normal.   Musculoskeletal:         General: Swelling and tenderness present. Normal " range of motion.      Cervical back: Neck supple.      Comments: Right hip surgical site clean, without any purulence seen. Stitches removed, with surgical tape to prevent wound dehiscence.    Skin:     General: Skin is warm and dry.      Capillary Refill: Capillary refill takes less than 2 seconds.   Neurological:      General: No focal deficit present.      Mental Status: He is alert and oriented to person, place, and time.   Psychiatric:         Mood and Affect: Mood normal.         Behavior: Behavior normal.       Note: I have reviewed all pertinent labs and diagnostic tests associated with this visit with specific comments listed under the assessment and plan below    Assessment and Plan    Mr. Parekh is a 78 year-old male who presents to [facility] for right leg swelling.    1. Right leg swelling  2. History of total hip arthroplasty, right  3. Primary osteoarthritis involving multiple joints  S/p recent total right hip arthroplasty. US DVT B/L negative for DVT's. Currently on aspirin 325 mg, daily. No other significant changes were made to his medications. He has otherwise not been limited in his ability to walk, sit/stand positioning. Adequate pulses and capillary refills appreciated on examination.   - Encouraged to continue physical therapy, as scheduled.  - Keep leg elevated when not at work, or when able.   - Will start furosemide 10 mg, once daily. If no improvement over the course of 1-2 days, recommend to increase the dose to 20 mg.   - If no improvement in swelling, recommend for him to return to clinic for further evaluation.   - furosemide (LASIX) 20 MG Tab; Take 0.5 Tablets by mouth every day for 7 days.  Dispense: 7 Tablet; Refill: 0  - Appreciate orthopedic's care/recommendations.   - Follow-up with PCP in 3 months, or as needed.     4. Primary hypertension  BP at today's visit, 135/68. He has been on lisinopril 10 mg, daily. Known history of renal insufficiency, with stable creatinine/GFR.   -  Starting Lasix 10 mg, daily may change his BP.   - Encouraged to continue logging his BP readings at home and to bring in the log to his next clinic visit.     Followup: Return in about 3 months (around 11/9/2023), or if symptoms worsen or fail to improve.      Signed by:     Alejandrina Quintanilla MD  Internal Medicine PGY-3      Patient was seen and examined with Attending Physician

## 2023-08-24 ENCOUNTER — TELEPHONE (OUTPATIENT)
Dept: INTERNAL MEDICINE | Facility: OTHER | Age: 78
End: 2023-08-24
Payer: COMMERCIAL

## 2023-08-24 DIAGNOSIS — M79.89 RIGHT LEG SWELLING: ICD-10-CM

## 2023-08-24 RX ORDER — FUROSEMIDE 20 MG/1
10 TABLET ORAL DAILY
Qty: 45 TABLET | Refills: 0 | Status: SHIPPED | OUTPATIENT
Start: 2023-08-24 | End: 2023-10-20 | Stop reason: SDUPTHER

## 2023-08-24 NOTE — TELEPHONE ENCOUNTER
Requesting Furosemide 20mg     Received request via: Patient    Was the patient seen in the last year in this department? Yes    Does the patient have an active prescription (recently filled or refills available) for medication(s) requested? No    Does the patient have [facility] and need 100 day supply (blood pressure, diabetes and cholesterol meds only)? Patient does not have SCP

## 2023-08-29 DIAGNOSIS — G58.9 MONONEUROPATHY: ICD-10-CM

## 2023-08-29 RX ORDER — GABAPENTIN 600 MG/1
TABLET ORAL
Qty: 300 TABLET | Refills: 0 | Status: SHIPPED
Start: 2023-08-29 | End: 2023-12-06

## 2023-09-25 ENCOUNTER — TELEPHONE (OUTPATIENT)
Dept: INTERNAL MEDICINE | Facility: OTHER | Age: 78
End: 2023-09-25
Payer: COMMERCIAL

## 2023-09-25 NOTE — TELEPHONE ENCOUNTER
VOICEMAIL  1. Caller Name: Amadou                       Call Back Number: [phone] (home)      2. Message: patient left vm asking if he can get prescribed Nexletol because Atorvastatin is causing to have pain all of his body.      3. Patient approves office to leave a detailed voicemail/MyChart message: yes

## 2023-09-26 NOTE — TELEPHONE ENCOUNTER
CLINICAL PHARMACY NOTE: MEDS TO [address] Select Patient?: No  Total # of Prescriptions Filled: 2   The following medications were delivered to the patient:  · Hydrocodone 5/325mg  · meloxicam 15mg  Total # of Interventions Completed: 0  Time Spent (min): 30    Additional Documentation:  Medications delivered- patient signed  Marisol Shetty Vincenzo Mccarthy D.O.  You 17 hours ago (4:27 PM)     NS  Hello,     We don’t typically make new prescriptions over the phone. I’d recommend he try to half his medication and if it still doesn’t resolve stop it altogether. He should also make follow up visit with us if he hasn’t already regarding this.     Best,   Vincenzo

## 2023-09-26 NOTE — TELEPHONE ENCOUNTER
Phone Number Called: [phone] (home)      Call outcome: Spoke to patient regarding message below.    Message: patient notified to only take half a table and he has an appt scheduled on 10/31. He will call back to give us an update.

## 2023-10-18 ENCOUNTER — HOSPITAL ENCOUNTER (OUTPATIENT)
Dept: RADIOLOGY | Facility: MEDICAL CENTER | Age: 78
End: 2023-10-18
Attending: NURSE PRACTITIONER
Payer: COMMERCIAL

## 2023-10-18 DIAGNOSIS — R60.0 LOCALIZED EDEMA: ICD-10-CM

## 2023-10-18 PROCEDURE — 93970 EXTREMITY STUDY: CPT

## 2023-10-20 ENCOUNTER — HOSPITAL ENCOUNTER (OUTPATIENT)
Dept: LAB | Facility: MEDICAL CENTER | Age: 78
End: 2023-10-20
Attending: HOSPITALIST
Payer: COMMERCIAL

## 2023-10-20 ENCOUNTER — HOSPITAL ENCOUNTER (OUTPATIENT)
Dept: CARDIOLOGY | Facility: MEDICAL CENTER | Age: 78
End: 2023-10-20
Attending: HOSPITALIST
Payer: COMMERCIAL

## 2023-10-20 ENCOUNTER — OFFICE VISIT (OUTPATIENT)
Dept: INTERNAL MEDICINE | Facility: OTHER | Age: 78
End: 2023-10-20
Payer: COMMERCIAL

## 2023-10-20 ENCOUNTER — APPOINTMENT (OUTPATIENT)
Dept: INTERNAL MEDICINE | Facility: OTHER | Age: 78
End: 2023-10-20
Payer: COMMERCIAL

## 2023-10-20 VITALS
SYSTOLIC BLOOD PRESSURE: 99 MMHG | TEMPERATURE: 97.2 F | HEART RATE: 63 BPM | BODY MASS INDEX: 26.14 KG/M2 | HEIGHT: 73 IN | DIASTOLIC BLOOD PRESSURE: 60 MMHG | OXYGEN SATURATION: 94 % | WEIGHT: 197.2 LBS

## 2023-10-20 DIAGNOSIS — M79.89 RIGHT LEG SWELLING: ICD-10-CM

## 2023-10-20 DIAGNOSIS — I95.9 HYPOTENSION, UNSPECIFIED HYPOTENSION TYPE: ICD-10-CM

## 2023-10-20 DIAGNOSIS — R60.0 PEDAL EDEMA: ICD-10-CM

## 2023-10-20 DIAGNOSIS — R21 RASH IN ADULT: ICD-10-CM

## 2023-10-20 DIAGNOSIS — I10 ESSENTIAL HYPERTENSION: ICD-10-CM

## 2023-10-20 LAB
APPEARANCE UR: CLEAR
BILIRUB UR QL STRIP.AUTO: NEGATIVE
COLOR UR: YELLOW
GLUCOSE UR STRIP.AUTO-MCNC: NEGATIVE MG/DL
KETONES UR STRIP.AUTO-MCNC: NEGATIVE MG/DL
LEUKOCYTE ESTERASE UR QL STRIP.AUTO: NEGATIVE
LV EJECT FRACT  99904: 75
LV EJECT FRACT MOD 2C 99903: 75.22
LV EJECT FRACT MOD 4C 99902: 74.69
LV EJECT FRACT MOD BP 99901: 75.25
MICRO URNS: NORMAL
NITRITE UR QL STRIP.AUTO: NEGATIVE
PH UR STRIP.AUTO: 5.5 [PH] (ref 5–8)
PROT UR QL STRIP: NEGATIVE MG/DL
RBC UR QL AUTO: NEGATIVE
SP GR UR STRIP.AUTO: 1.02

## 2023-10-20 PROCEDURE — 81003 URINALYSIS AUTO W/O SCOPE: CPT

## 2023-10-20 PROCEDURE — 93306 TTE W/DOPPLER COMPLETE: CPT

## 2023-10-20 PROCEDURE — 93306 TTE W/DOPPLER COMPLETE: CPT | Mod: 26 | Performed by: INTERNAL MEDICINE

## 2023-10-20 PROCEDURE — 3074F SYST BP LT 130 MM HG: CPT | Performed by: HOSPITALIST

## 2023-10-20 PROCEDURE — 3078F DIAST BP <80 MM HG: CPT | Performed by: HOSPITALIST

## 2023-10-20 PROCEDURE — 99214 OFFICE O/P EST MOD 30 MIN: CPT | Mod: GC | Performed by: HOSPITALIST

## 2023-10-20 RX ORDER — FUROSEMIDE 20 MG/1
10 TABLET ORAL DAILY
Qty: 30 TABLET | Refills: 0 | Status: SHIPPED | OUTPATIENT
Start: 2023-10-20 | End: 2023-12-19

## 2023-10-20 RX ORDER — LISINOPRIL 5 MG/1
5 TABLET ORAL DAILY
Qty: 60 TABLET | Refills: 0 | Status: SHIPPED | OUTPATIENT
Start: 2023-10-20 | End: 2023-11-01 | Stop reason: SDUPTHER

## 2023-10-20 RX ORDER — FUROSEMIDE 20 MG/1
10 TABLET ORAL DAILY
Qty: 45 TABLET | Refills: 0 | Status: SHIPPED
Start: 2023-10-20 | End: 2023-10-20

## 2023-10-20 ASSESSMENT — FIBROSIS 4 INDEX: FIB4 SCORE: 1.53

## 2023-10-20 NOTE — PROGRESS NOTES
"Subjective     Amadou Parekh is a 78 y.o. male who presents with Edema (Patient here for bilateral leg edema)            HPI  1. Pedal Edema/Right leg swelling (worse than left)  7/21/23: had right total hip arthoplasty  Started having right leg swelling shortly after  Swelling is intermittent and bilateral with the right leg and left foot  Sometimes worse in the morning  Furosemide has helped with swelling   Denies chest pain at rest and with exertion; does upper body exercises and leg exercises with his physical therapist  Unable to assess for Orthopnea as patient reports not being able to lay flat secondary to hip pain   Denies Shortness of breath   Reports some shortness of breath    2. Rash in adult  Now has a right lower leg rash of 2-3 duration  Has been using anti-itch cream for 2 days because it was red and itching  Denies being outdoors and having skin exposed no predisposing injuring    3. Hypotension, unspecified hypotension type  BP in office 99/60  Likely 2/2 to Gabapentin use, tramadolol, and recently added furosemide  Reports some lightheadedness before starting the furosemide but while gabapentin and tramadolol  No falls  No Syncope    4. Essential hypertension  BP in office 99/60  Patient is a past medical history of hypertension  Does report trying to avoid foods with high salt content but intermittently eating salty items  Continues to exercise and works out physical therapy  Compliant with lisinopril 10 mg    ROS  Review of systems as described in HPI.           Objective     BP 99/60 (BP Location: Left arm, Patient Position: Sitting, BP Cuff Size: Adult)   Pulse 63   Temp 36.2 °C (97.2 °F) (Temporal)   Ht 1.854 m (6' 1\")   Wt 89.4 kg (197 lb 3.2 oz)   SpO2 94%   BMI 26.02 kg/m²      Physical Exam  Constitutional:       General: He is not in acute distress.     Appearance: He is not toxic-appearing.   HENT:      Head: Normocephalic and atraumatic.      Nose: No congestion or rhinorrhea. "   Eyes:      General: No scleral icterus.        Right eye: No discharge.         Left eye: No discharge.      Extraocular Movements: Extraocular movements intact.   Cardiovascular:      Rate and Rhythm: Normal rate.      Pulses: Normal pulses.      Heart sounds: No murmur heard.  Pulmonary:      Effort: Pulmonary effort is normal. No respiratory distress.      Breath sounds: Normal breath sounds. No wheezing.   Musculoskeletal:         General: Normal range of motion.      Cervical back: Normal range of motion. No rigidity.      Right lower leg: Edema present.      Left lower leg: Edema present.   Skin:     Coloration: Skin is not jaundiced.      Findings: Erythema and rash present. No bruising.      Comments: 2+ pitting edema bilaterally  Erythema on right inner leg   Neurological:      General: No focal deficit present.      Gait: Gait normal.   Psychiatric:         Mood and Affect: Mood normal.         Behavior: Behavior normal.             Assessment & Plan        1. Right leg swelling  Physical exam revealed bilateral pitting edema.  Will evaluate for systemic causes of lower extremity edema such as protein loss in the urine and heart failure.  -Continue furosemide  -Home blood pressure checks  - EC-ECHOCARDIOGRAM COMPLETE W/O CONT; Future  - furosemide (LASIX) 20 MG Tab; Take 0.5 Tablets by mouth every day for 60 days.  Dispense: 30 Tablet; Refill: 0  - URINALYSIS; Future    2. Rash in adult  Right inner leg only in the setting of lower extremity swelling  Rash likely secondary to venous insufficiency   Recommend feet elevation  Compression stockings  Limiting salt intake    3. Hypotension, unspecified hypotension type  In office blood pressure 99/60.  Blood pressure likely secondary to medications, unable to rule out an underlying pathological element.  We will decrease lisinopril to 5 mg a day as patient will continue to take gabapentin, furosemide, and tramadol.  -Home blood pressure checks  -Decrease  lisinopril to 5 mg a day    4. Essential hypertension  In office blood pressure 99/60 in the setting of lisinopril 10 mg, recently added furosemide 10 mg daily, and gabapentin.  We will decrease lisinopril to 5 mg daily and recommend patient check his blood pressures at home  - lisinopril (PRINIVIL) 5 MG Tab; Take 1 Tablet by mouth every day.  Dispense: 60 Tablet; Refill: 0  -Home blood pressure monitoring:  - check your blood pressure every day and put it in a log  - pick a different time each day so we can see how it varies throughout the day  - when you check your blood pressure: make sure you sit quietly for 5-10 minutes beforehand, keep both feet flat on the ground, and make sure you use an arm cuff at heart height    Lifestyle factors to help manage blood pressure:  1) reduce stress with daily activity, consider yoga, breathing exercises (like 4-7-8 breathing technique)   2) reduce sodium to <2000 mg/day  3) DASH diet     4) 200-300 min/week cardio, which you can build up to.         Follow up: Thank you for allowing us to participate in your care today. Please follow up in 2-3 weeks with your PCP    Aura Nova MD MPH  PGY-3  [employer] Internal Medicine     Please note that this dictation was created using voice recognition software. I have made every reasonable attempt to correct obvious errors, but I expect that there are errors of grammar and possibly content that I did not discover before finalizing the note.

## 2023-10-20 NOTE — PATIENT INSTRUCTIONS
Thank you for allowing us to participate in your care today. Please follow up in 2-3 weeks with your PCP    Today we evaluated your leg swelling and leg rash:  Rash likely secondary to venous insufficiency   Recommend feet elevation  Compression stockings  Limiting salt intake

## 2023-10-25 ENCOUNTER — APPOINTMENT (OUTPATIENT)
Dept: INTERNAL MEDICINE | Facility: OTHER | Age: 78
End: 2023-10-25
Payer: COMMERCIAL

## 2023-10-30 ENCOUNTER — OFFICE VISIT (OUTPATIENT)
Dept: INTERNAL MEDICINE | Facility: OTHER | Age: 78
End: 2023-10-30
Payer: COMMERCIAL

## 2023-10-30 VITALS
HEIGHT: 73 IN | WEIGHT: 194.8 LBS | SYSTOLIC BLOOD PRESSURE: 117 MMHG | TEMPERATURE: 98 F | HEART RATE: 66 BPM | BODY MASS INDEX: 25.82 KG/M2 | DIASTOLIC BLOOD PRESSURE: 65 MMHG | OXYGEN SATURATION: 99 %

## 2023-10-30 DIAGNOSIS — E03.4 HYPOTHYROIDISM DUE TO ACQUIRED ATROPHY OF THYROID: ICD-10-CM

## 2023-10-30 DIAGNOSIS — E78.2 MIXED HYPERLIPIDEMIA: ICD-10-CM

## 2023-10-30 DIAGNOSIS — Z80.42 FAMILY HISTORY OF PROSTATE CANCER: ICD-10-CM

## 2023-10-30 DIAGNOSIS — D50.9 IRON DEFICIENCY ANEMIA, UNSPECIFIED IRON DEFICIENCY ANEMIA TYPE: ICD-10-CM

## 2023-10-30 DIAGNOSIS — N18.2 STAGE 2 CHRONIC KIDNEY DISEASE: ICD-10-CM

## 2023-10-30 DIAGNOSIS — E78.5 DYSLIPIDEMIA: ICD-10-CM

## 2023-10-30 DIAGNOSIS — R73.03 PRE-DIABETES: ICD-10-CM

## 2023-10-30 DIAGNOSIS — I95.9 HYPOTENSION, UNSPECIFIED HYPOTENSION TYPE: ICD-10-CM

## 2023-10-30 PROBLEM — M79.605 PAIN OF LEFT LOWER EXTREMITY: Status: RESOLVED | Noted: 2019-10-11 | Resolved: 2023-10-30

## 2023-10-30 PROCEDURE — 99214 OFFICE O/P EST MOD 30 MIN: CPT | Mod: GC | Performed by: STUDENT IN AN ORGANIZED HEALTH CARE EDUCATION/TRAINING PROGRAM

## 2023-10-30 PROCEDURE — 3078F DIAST BP <80 MM HG: CPT | Performed by: STUDENT IN AN ORGANIZED HEALTH CARE EDUCATION/TRAINING PROGRAM

## 2023-10-30 PROCEDURE — 3074F SYST BP LT 130 MM HG: CPT | Performed by: STUDENT IN AN ORGANIZED HEALTH CARE EDUCATION/TRAINING PROGRAM

## 2023-10-30 ASSESSMENT — ENCOUNTER SYMPTOMS
WEIGHT LOSS: 0
WEAKNESS: 0
MYALGIAS: 0
COUGH: 0
PALPITATIONS: 0
CONSTIPATION: 0
VOMITING: 0
DIARRHEA: 0
SHORTNESS OF BREATH: 0
FEVER: 0
DEPRESSION: 0
DIZZINESS: 0
NAUSEA: 0
CHILLS: 0

## 2023-10-30 ASSESSMENT — FIBROSIS 4 INDEX: FIB4 SCORE: 1.53

## 2023-10-30 NOTE — PROGRESS NOTES
Chief Complaint:  Follow up, leg swelling R>L    Last Seen:   10/2023    History of Present Illness:     Patient is a 78-year-old M with PMHx of:     HTN: on HCTZ  Hypothyroidism: on levothyroxine   DLD: lipid panel November 2021 Tchol 180    HDL 43    Pre-DM: a1c 5.7 May 2022  Normocytic anemia: order w/u with next set of labs   Chronic sinusitis: follows with ENT  OA, hip  Bilateral hearing loss: b/l hearing aids  Anterior dislocation, left hip: s/p hip replacement 2022, ongoing PT sessions  Lumbar spinal stenosis c/b neuropathy: followed by Dr Webber at [facility], on gabapentin    Lumbar degenerative disc disease   Lumbar spondylosis  Peyronie disease: s/p surgery via urology [facility]  BPH- tadalafil, follows with Dr Abiola Avalos  S/p perineal nerve decompression 2023 at [facility]    presenting to clinic today for follow up for leg swelling, rash, and low blood pressure.    Leg swelling (R>L)  Ongoing since R total hip arthroplasty. Intermittent. LASIX helps. Some SOB. UA neg and TTE with LVEF WNL and no evidence of valvular or diastolic dysfunction. Has been using compression stockings. 10/18 DVT US negative. Feels swelling subsided but still present.     Rash  R inner leg. Presumed 2/2 venous insufficiency last visit. Has apt for dermatologist tomorrow.     Low blood pressure  Asymptomatic. Lisinopril decreased to 5 mg daily last visit. Denies lightheadedness and dizziness. Home blood pressure log. Presumed 2/2 polypharmacy in setting of ace-I, gabapentin, tramadol, lasix use.     Is taking atorvastatin 5 mg daily but did not notice changes to muscle aches with this-- since resolved. Increase back to 10 mg daily.     ----    Healthcare maintenance:   Mood screening: no anxiety, PHQ-2 score 0 11/30/22   Drug screening: no tobacco, alc, rec drugs   Sexual activity screening: not active since hip problems   Infectious disease screening: HIV, hep C neg Jan 2022 negative  Colon cancer  "screening: Colonoscopy 2019 w/[facility]- 1 diminutive polyp in transverse colon, diverticulosis (sigmoid), internal hemorrhoids, q 5yr- next due 2024  Vaccines: all up to date   Osteoporosis screening: dexa 1/5/23 WNL, next due 2025    Review of Systems   Review of Systems   Constitutional:  Negative for chills, fever and weight loss.   Respiratory:  Negative for cough and shortness of breath.    Cardiovascular:  Negative for chest pain, palpitations and leg swelling.   Gastrointestinal:  Negative for constipation, diarrhea, nausea and vomiting.   Genitourinary:  Negative for dysuria.   Musculoskeletal:  Negative for myalgias.   Neurological:  Negative for dizziness and weakness.   Psychiatric/Behavioral:  Negative for depression.         Past Medical History:   Past Medical History:   Diagnosis Date    Actinic keratoses     sees olivia Everett    Acute nasopharyngitis     7/3 covid    Bilateral hearing loss 08/06/2018    wears aids    Chronic right-sided low back pain without sciatica 08/06/2018    s/p 5 surgeries; sees Akbar    ED (erectile dysfunction)     High cholesterol     Hyperlipidemia, mixed     Hypertension     pt states well controlled on meds    Hypothyroidism     Infectious disease 07/03/2023    Neuropathy (HCC) 08/06/2018    Pain 12/18/2018    \"Nerve Pain-Low back/legs\".    Peyronie disease     sees [facility]    Prediabetes     Renal disorder     chronic kidney disease stage 2       Patient Active Problem List    Diagnosis Date Noted    Pedal edema 10/20/2023    Rash in adult 10/20/2023    Overweight 01/10/2023    Stage 2 chronic kidney disease 01/10/2023    Pre-diabetes 11/30/2022    Normocytic anemia 11/30/2022    Chronic sinusitis 11/30/2022    Arthritis of hip 08/15/2022    Lumbar degenerative disc disease 12/31/2021    Dyslipidemia 11/09/2021    Osteoarthritis 11/09/2021    Peripheral polyneuropathy 08/06/2018    Bilateral hearing loss 08/06/2018    Spondylolysis, lumbar region 10/05/2017    " Chronic lumbar radiculopathy 08/18/2016    Bilateral stenosis of lateral recess of lumbar spine 03/12/2016    Primary hypertension 01/04/2016    Hypothyroidism 01/04/2016     Past Surgical History:   Past Surgical History:   Procedure Laterality Date    [place] TOTAL HIP ARTHROPLASTY Right 7/21/2023    Procedure: RIGHT TOTAL HIP ARTHROPLASTY AND REPAIRS AS INDICATED;  Surgeon: Luis Caceres M.D.;  Location: [facility];  Service: Orthopedics    OTHER SURGICAL PROCEDURE Left 02/17/2023    nerve decompression perineal nerve done at [facility]    HIP REVISION TOTAL Left 09/21/2022    Procedure: LEFT REVISION TOTAL HIP ARTHROPLASTY AND REPAIRS AS INDICATED;  Surgeon: Luis Caceres M.D.;  Location: SURGERY [facility];  Service: Orthopedics    [place] TOTAL HIP ARTHROPLASTY Left 08/15/2022    Procedure: LEFT TOTAL HIP ARTHROPLASTY;  Surgeon: Luis Caceres M.D.;  Location: [facility];  Service: Orthopedics    PB IMPLANT NEUROSTIM/  01/03/2022    Procedure: REMOVAL, NEUROSTIMULATOR, PERMANENT, SPINAL CORD;  Surgeon: Be Webber M.D.;  Location: [facility];  Service: Neurosurgery    LUMBAR FUSION O-ARM  01/03/2022    Procedure: FUSION, SPINE, LUMBAR, WITH O-ARM IMAGING GUIDANCE - L1-3 EXTENSION-STAGE 2;  Surgeon: Be Webber M.D.;  Location: [facility];  Service: Neurosurgery    LUMBAR DECOMPRESSION  01/03/2022    Procedure: DECOMPRESSION, SPINE, LUMBAR;  Surgeon: Be Webber M.D.;  Location: [facility];  Service: Neurosurgery    LUMBAR FUSION ANTERIOR N/A 12/31/2021    Procedure: FUSION, SPINE, LUMBAR, ANTERIOR APPROACH - L5-S1;  Surgeon: Be Webber M.D.;  Location: [facility];  Service: Neurosurgery    FUSION, SPINE, LUMBAR, ROBOT-ASSISTED WITH IMAGING GUIDANCE  02/27/2021    Procedure: FUSION, SPINE, LUMBAR, ROBOT-ASSISTED WITH IMAGING GUIDANCE - L5-S1 EXTENSION OF TLIF;  Surgeon: Be Webber M.D.;  Location: [facility];  Service: Neurosurgery     "LUMBAR DECOMPRESSION N/A 02/27/2021    Procedure: DECOMPRESSION, SPINE, LUMBAR;  Surgeon: Be Webber M.D.;  Location: [facility];  Service: Neurosurgery    PB IMPLANT NEUROSTIM/  05/22/2020    Procedure: INSERTION, NEUROSTIMULATOR, PERMANENT, SPINAL CORD;  Surgeon: Eugenio Camara M.D.;  Location: [facility];  Service: Pain Management    PEYRONIES PLAQUE  12/06/2019    Procedure: EXCISION, PEYRONIE'S PLAQUE, PENIS WITH GRAFTING, JEAN CLAUDE PLICATION, ARTIFICIAL ERECTION;  Surgeon: Tejinder Culver M.D.;  Location: [facility];  Service: Urology    HI NERVOUS SYSTEM SURGERY UNLISTED Left 06/03/2019    Procedure: EXPLORATION, NERVE- PERONEAL NERVE RELEASE AT THE FIBULAR HEAD  ;  Surgeon: Raz Garcia M.D.;  Location: [facility];  Service: Neurosurgery    FUSION, PIP JOINT, TOE Right 02/22/2019    Procedure: PIP ARTHRODESIS;  Surgeon: Amadou Bowden M.D.;  Location: SURGERY [facility];  Service: Orthopedics    FINGER EXPLORATION Right 02/22/2019    Procedure: FINGER EXPLORATION - RING FINGER DISTAL INTERPHALANGEAL JOINT;  Surgeon: Amadou Bowden M.D.;  Location: SURGERY [facility];  Service: Orthopedics    COLONOSCOPY  2019    OTHER NEUROLOGICAL SURG  12/12/2018    \"Low Back Surgery'sx6 between 2006 & 2017\".    CYST EXCISION Right 10/12/2018    Procedure: CYST EXCISION - RING FINGER MUCOUS CYST, DISTAL INTERPHALANGEAL JOINT;  Surgeon: Amadou Bowden M.D.;  Location: SURGERY [facility];  Service: Orthopedics    LUMBAR LAMINECTOMY DISKECTOMY Left 12/06/2017    Procedure: LUMBAR LAMINECTOMY DISKECTOMY - POSTERIOR REDO LEFT  L4-S1 KAILA;  Surgeon: Be Webber M.D.;  Location: [facility];  Service: Neurosurgery    FUSION, SPINE, LUMBAR, PLIF  10/05/2017    Procedure: POSTERIOR TRANSFORAMINAL INTERBODY FUSION AT LUMBAR 4 - 5;  Surgeon: Be Webber M.D.;  Location: [facility];  Service: " Neurosurgery    LUMBAR DECOMPRESSION  10/05/2017    Procedure: POSTERIOR LUMBAR 3-4,  4-5 DECOMPRESSION AND FUSION;  Surgeon: Be Webber M.D.;  Location: [facility];  Service: Neurosurgery    CO INJ DX/THER AGNT PARAVERT FACET JOINT, BRITT* Right 10/06/2016    Procedure: INJ PARA FACET L/S 1 LVL W/IG - L3-4, L4-5, L5-S1;  Surgeon: Eugenio Camara M.D.;  Location: [facility];  Service: Pain Management    CO INJ DX/THER AGNT PARAVERT FACET JOINT, BRITT*  10/06/2016    Procedure: INJ PARA FACET L/S 2D LVL W/IG;  Surgeon: Eugenio Camara M.D.;  Location: [facility];  Service: Pain Management    CO INJ DX/THER AGNT PARAVERT FACET JOINT, BRITT*  10/06/2016    Procedure: INJ PARA FACET L/S 3D LVL W/IG;  Surgeon: Eugenio Camara M.D.;  Location: [facility];  Service: Pain Management    CO NJX AA&/STRD TFRML EPI LUMBAR/SACRAL 1 LEVEL Right 08/18/2016    Procedure: INJ-FORAMEN EPI LUM/SAC SNGL - L5-S1;  Surgeon: Eugenio Camara M.D.;  Location: [facility];  Service: Pain Management    LUMBAR LAMINECTOMY DISKECTOMY Right 03/12/2016    Procedure: LUMBAR HemiLAMINECTOMY Micro DISKECTOMY posterior Redo L3-4 ;  Surgeon: Be Webber M.D.;  Location: [facility];  Service:     FORAMINOTOMY Right 03/12/2016    Procedure: FORAMINOTOMY;  Surgeon: Be Webber M.D.;  Location: [facility];  Service:     CERVICAL LAMINECTOMY POSTERIOR  2011    DENTAL SURGERY Bilateral as a teenager    wisdom teeth    OTHER      nasal surgery 2015    OTHER NEUROLOGICAL SURG  2006, 2010, 2012, 2014, 2016, 2017    low back surgery x 5    TONSILLECTOMY      child        Allergies:  Patient has no known allergies.    Medications:     Current Outpatient Medications:     lisinopril, 5 mg, Oral, DAILY, Taking    furosemide, 10 mg, Oral, DAILY, Taking    gabapentin, TAKE ONE TABLET BY MOUTH THREE TIMES DAILY, Taking    traMADol, Take 50 mg by mouth., Taking     "Synthroid, 137 mcg, Oral, AM ES, Taking    atorvastatin, 10 mg, Oral, DAILY, Taking    acetaminophen, 500-1,000 mg, Oral, Q6HRS PRN, PRN    B Complex Vitamins (VITAMIN B COMPLEX PO), 1 Tablet, Oral, DAILY, Taking    ibuprofen, 200 mg, Oral, Q6HRS PRN, Taking    tadalafil, 5 mg, Oral, DAILY, Taking    Multi-Vitamin, 1 Tablet, Oral, DAILY, Taking    Vitamin D-1000 Max St, 2,000 Units, Oral, DAILY, Taking     Social History:   Social History     Tobacco Use    Smoking status: Never    Smokeless tobacco: Never   Vaping Use    Vaping Use: Never used   Substance Use Topics    Alcohol use: No    Drug use: No       Family History:   Family History   Problem Relation Age of Onset    Cancer Father         Leukemia     Heart Disease Father     Diabetes Father     Stroke Father     Cancer Mother         breast met to liver    Heart Disease Mother      Vitals:     /65 (BP Location: Right arm, Patient Position: Sitting, BP Cuff Size: Adult)   Pulse 66   Temp 36.7 °C (98 °F) (Temporal)   Ht 1.854 m (6' 1\")   Wt 88.4 kg (194 lb 12.8 oz)   SpO2 99%  Body mass index is 25.7 kg/m².    Physical Exam:   Physical Exam  Constitutional:       General: He is not in acute distress.     Appearance: Normal appearance. He is not ill-appearing or diaphoretic.   HENT:      Head: Normocephalic and atraumatic.      Mouth/Throat:      Mouth: Mucous membranes are moist.   Eyes:      Extraocular Movements: Extraocular movements intact.      Pupils: Pupils are equal, round, and reactive to light.   Cardiovascular:      Rate and Rhythm: Normal rate and regular rhythm.      Heart sounds: No murmur heard.     No friction rub. No gallop.   Pulmonary:      Effort: No respiratory distress.      Breath sounds: No stridor. No wheezing, rhonchi or rales.   Abdominal:      General: Bowel sounds are normal. There is no distension.      Tenderness: There is no abdominal tenderness. There is no guarding or rebound.   Musculoskeletal:      Right lower " leg: No edema.      Left lower leg: No edema.   Skin:     Coloration: Skin is not jaundiced or pale.   Neurological:      General: No focal deficit present.      Mental Status: He is alert and oriented to person, place, and time.   Psychiatric:         Mood and Affect: Mood normal.         Behavior: Behavior normal.        Assessment and Plan    Patient is a 78-year-old male with pertinent PMHx of HTN, DLD, pre-DM, hypothyroidism, lumbar stenosis c/b peripheral neuropathy, renal insufficiency, normocytic anemia presenting today for follow up of leg swelling and low blood pressure. Follow up in Jan/Feb with yearly lab work.      #Leg swelling, bilateral, R>L  #Leg rash, right inner leg  Began after R total hip arthroplasty this past summer, has been intermittent since. TTE and UA both WNL 10/2023. Likely that rash is related to venous insufficiency.   Plan  -Compression stockings  -Decrease lasix/ discontinue if no notable changes with it  -Counseled on reduced Na intake    #Low blood pressure- resolved  #Hypertension  Decreased ace-I 10/2023 dose due to asx low blood pressure  Plan  -Lisinopril 5 mg PO qdaily  -Counseled on lifestyle modifications (diet, exercise)  -Continue to keep home BP log    #Prediabetes  #Overweight   -A1c 12/20/2021 5.5 --> 5.9 Jan 2023  Plan  -Lisinopril for renal protection      #Anemia, normocytic  #Iron deficiency anemia?  -Hgb ~13, appears to be BL for patient since apx jan 2022  -Iron low normal with ferritin 20s   -Colonoscopy 2019 w/[facility]- 1 diminutive polyp in transverse colon, diverticulosis (sigmoid), internal hemorrhoids, q 5yr- next due 2024  Plan  -CTM with yearly labs    #Chronic kidney disease, stage 2  -Jan 2023: GFR 50s-60s   **Ddx: in setting of HTN/ pre-DM   Plan  -CTM, ace-I as above    #Hypothyroidism  -Well-controlled on current regimen  -Asymptomatic  -TSH Jan 2023 WNL  Plan  -Synthroid 137mcg PO qD  -TSH/ free t4    #Dyslipidemia  -Lipid panel Jan 2023 Tchol 166  TG  123  HDL 44  LDL 97  Plan  -Atorvastatin 10 mg PO qdaily    -- CHRONIC AND RESOLVED MEDICAL PROBLEMS --      #Peripheral neuropathy 2/2 spinal stenosis  #Lumbar spinal stenosis  #Lumbar degenerative disc disease  #Foot drop, left- result of above  -A1c 12/20/2021 5.5--> 5.7 4/26/22  -B12, B6, folate WNL December 2019  -Chronic left lower extremity pain without obvious cause  --CT June 2021: posterior hardware extending from L3-S1, interbody hardware at L4-5 and L5-S1, spinal stenosis L1-2, L2-3, multilevel facet arthropathy, degenerative subluxation at L1-2, L2-3   -Trialed Lyrica and felt dizzy in past  **suspected hypersensitivity syndrome but most likely 2/2 patient's ongoing significant back etiologies v some component of pre-DM   Plan  -Gabapentin per neurosurgery  -Follow with neurosurgery as outpatient as appropriate     #Sinus infection, chronic  -s/p sinuplasty   Plan  -Follows with otolaryngology- PRN neti + flonase as needed   -SILVIA filled     #Healthcare maintenance  Mood screening: no anxiety, PHQ-2 score 0 11/30/22   Drug screening: none  Sexual activity screening: not active   Infectious disease screening: HIV, hep C neg 1/ 2022 labs  Colon cancer screening: Colonoscopy 2019 w/[facility]- 1 diminutive polyp in transverse colon, diverticulosis (sigmoid), internal hemorrhoids, q 5yr- next due 2024  Vaccines: all up to date   Osteoporosis screening: dexa 1/5/23 WNL, next due 2025    Please note that this dictation was created using voice recognition software. I have made every reasonable attempt to correct obvious errors, but I expect that there are errors of grammar and possibly content that I did not discover before finalizing the note.    Patient case was seen/ assessed/ discussed with Dr. Umana.    Vincenzo Mccarthy, PGY-3  Internal Medicine

## 2023-10-31 ENCOUNTER — APPOINTMENT (RX ONLY)
Dept: URBAN - METROPOLITAN AREA CLINIC 4 | Facility: CLINIC | Age: 78
Setting detail: DERMATOLOGY
End: 2023-10-31

## 2023-10-31 DIAGNOSIS — L81.4 OTHER MELANIN HYPERPIGMENTATION: ICD-10-CM

## 2023-10-31 DIAGNOSIS — I87.2 VENOUS INSUFFICIENCY (CHRONIC) (PERIPHERAL): ICD-10-CM

## 2023-10-31 DIAGNOSIS — D18.0 HEMANGIOMA: ICD-10-CM

## 2023-10-31 DIAGNOSIS — Z71.89 OTHER SPECIFIED COUNSELING: ICD-10-CM

## 2023-10-31 DIAGNOSIS — L82.1 OTHER SEBORRHEIC KERATOSIS: ICD-10-CM

## 2023-10-31 DIAGNOSIS — D22 MELANOCYTIC NEVI: ICD-10-CM

## 2023-10-31 PROBLEM — D22.72 MELANOCYTIC NEVI OF LEFT LOWER LIMB, INCLUDING HIP: Status: ACTIVE | Noted: 2023-10-31

## 2023-10-31 PROBLEM — D22.71 MELANOCYTIC NEVI OF RIGHT LOWER LIMB, INCLUDING HIP: Status: ACTIVE | Noted: 2023-10-31

## 2023-10-31 PROBLEM — D18.01 HEMANGIOMA OF SKIN AND SUBCUTANEOUS TISSUE: Status: ACTIVE | Noted: 2023-10-31

## 2023-10-31 PROCEDURE — ? MEDICATION COUNSELING

## 2023-10-31 PROCEDURE — 99213 OFFICE O/P EST LOW 20 MIN: CPT

## 2023-10-31 PROCEDURE — ? ADDITIONAL NOTES

## 2023-10-31 PROCEDURE — ? SUNSCREEN RECOMMENDATIONS

## 2023-10-31 PROCEDURE — ? PRESCRIPTION

## 2023-10-31 PROCEDURE — ? COUNSELING

## 2023-10-31 RX ORDER — TRIAMCINOLONE ACETONIDE 1 MG/G
1 CREAM TOPICAL BID
Qty: 60 | Refills: 0 | Status: ERX

## 2023-10-31 ASSESSMENT — LOCATION DETAILED DESCRIPTION DERM
LOCATION DETAILED: RIGHT MEDIAL UPPER BACK
LOCATION DETAILED: LEFT ANTERIOR DISTAL THIGH
LOCATION DETAILED: RIGHT DISTAL POSTERIOR UPPER ARM
LOCATION DETAILED: LEFT RADIAL DORSAL HAND
LOCATION DETAILED: PERIUMBILICAL SKIN
LOCATION DETAILED: LEFT DISTAL POSTERIOR UPPER ARM
LOCATION DETAILED: RIGHT RADIAL DORSAL HAND
LOCATION DETAILED: RIGHT SUPERIOR MEDIAL UPPER BACK
LOCATION DETAILED: RIGHT ANTERIOR DISTAL THIGH
LOCATION DETAILED: RIGHT DISTAL PRETIBIAL REGION
LOCATION DETAILED: EPIGASTRIC SKIN

## 2023-10-31 ASSESSMENT — LOCATION SIMPLE DESCRIPTION DERM
LOCATION SIMPLE: LEFT THIGH
LOCATION SIMPLE: LEFT UPPER ARM
LOCATION SIMPLE: RIGHT UPPER ARM
LOCATION SIMPLE: RIGHT PRETIBIAL REGION
LOCATION SIMPLE: RIGHT THIGH
LOCATION SIMPLE: ABDOMEN
LOCATION SIMPLE: RIGHT HAND
LOCATION SIMPLE: LEFT HAND
LOCATION SIMPLE: RIGHT UPPER BACK

## 2023-10-31 ASSESSMENT — LOCATION ZONE DERM
LOCATION ZONE: TRUNK
LOCATION ZONE: HAND
LOCATION ZONE: ARM
LOCATION ZONE: LEG

## 2023-10-31 NOTE — PROCEDURE: MEDICATION COUNSELING
Topical Sulfur Applications Pregnancy And Lactation Text: This medication is considered safe during pregnancy and breast feeding secondary to limited systemic absorption.
Metronidazole Pregnancy And Lactation Text: This medication is Pregnancy Category B and considered safe during pregnancy.  It is also excreted in breast milk.
Rituxan Counseling:  I discussed with the patient the risks of Rituxan infusions. Side effects can include infusion reactions, severe drug rashes including mucocutaneous reactions, reactivation of latent hepatitis and other infections and rarely progressive multifocal leukoencephalopathy.  All of the patient's questions and concerns were addressed.
Hydroxychloroquine Pregnancy And Lactation Text: This medication has been shown to cause fetal harm but it isn't assigned a Pregnancy Risk Category. There are small amounts excreted in breast milk.
Minoxidil Pregnancy And Lactation Text: This medication has not been assigned a Pregnancy Risk Category but animal studies failed to show danger with the topical medication. It is unknown if the medication is excreted in breast milk.
Mirvaso Pregnancy And Lactation Text: This medication has not been assigned a Pregnancy Risk Category. It is unknown if the medication is excreted in breast milk.
Zyclara Counseling:  I discussed with the patient the risks of imiquimod including but not limited to erythema, scaling, itching, weeping, crusting, and pain.  Patient understands that the inflammatory response to imiquimod is variable from person to person and was educated regarded proper titration schedule.  If flu-like symptoms develop, patient knows to discontinue the medication and contact us.
High Dose Vitamin A Counseling: Side effects reviewed, pt to contact office should one occur.
Cyclophosphamide Counseling:  I discussed with the patient the risks of cyclophosphamide including but not limited to hair loss, hormonal abnormalities, decreased fertility, abdominal pain, diarrhea, nausea and vomiting, bone marrow suppression and infection. The patient understands that monitoring is required while taking this medication.
Carac Counseling:  I discussed with the patient the risks of Carac including but not limited to erythema, scaling, itching, weeping, crusting, and pain.
Dupixent Pregnancy And Lactation Text: This medication likely crosses the placenta but the risk for the fetus is uncertain. This medication is excreted in breast milk.
Ivermectin Counseling:  Patient instructed to take medication on an empty stomach with a full glass of water.  Patient informed of potential adverse effects including but not limited to nausea, diarrhea, dizziness, itching, and swelling of the extremities or lymph nodes.  The patient verbalized understanding of the proper use and possible adverse effects of ivermectin.  All of the patient's questions and concerns were addressed.
Taltz Counseling: I discussed with the patient the risks of ixekizumab including but not limited to immunosuppression, serious infections, worsening of inflammatory bowel disease and drug reactions.  The patient understands that monitoring is required including a PPD at baseline and must alert us or the primary physician if symptoms of infection or other concerning signs are noted.
Tetracycline Pregnancy And Lactation Text: This medication is Pregnancy Category D and not consider safe during pregnancy. It is also excreted in breast milk.
SSKI Counseling:  I discussed with the patient the risks of SSKI including but not limited to thyroid abnormalities, metallic taste, GI upset, fever, headache, acne, arthralgias, paraesthesias, lymphadenopathy, easy bleeding, arrhythmias, and allergic reaction.
Cephalexin Counseling: I counseled the patient regarding use of cephalexin as an antibiotic for prophylactic and/or therapeutic purposes. Cephalexin (commonly prescribed under brand name Keflex) is a cephalosporin antibiotic which is active against numerous classes of bacteria, including most skin bacteria. Side effects may include nausea, diarrhea, gastrointestinal upset, rash, hives, yeast infections, and in rare cases, hepatitis, kidney disease, seizures, fever, confusion, neurologic symptoms, and others. Patients with severe allergies to penicillin medications are cautioned that there is about a 10% incidence of cross-reactivity with cephalosporins. When possible, patients with penicillin allergies should use alternatives to cephalosporins for antibiotic therapy.
Carac Pregnancy And Lactation Text: This medication is Pregnancy Category X and contraindicated in pregnancy and in women who may become pregnant. It is unknown if this medication is excreted in breast milk.
Topical Clindamycin Counseling: Patient counseled that this medication may cause skin irritation or allergic reactions.  In the event of skin irritation, the patient was advised to reduce the amount of the drug applied or use it less frequently.   The patient verbalized understanding of the proper use and possible adverse effects of clindamycin.  All of the patient's questions and concerns were addressed.
Xeljanz Counseling: I discussed with the patient the risks of Xeljanz therapy including increased risk of infection, liver issues, headache, diarrhea, or cold symptoms. Live vaccines should be avoided. They were instructed to call if they have any problems.
Cibinqo Pregnancy And Lactation Text: It is unknown if this medication will adversely affect pregnancy or breast feeding.  You should not take this medication if you are currently pregnant or planning a pregnancy or while breastfeeding.
Eucrisa Counseling: Patient may experience a mild burning sensation during topical application. Eucrisa is not approved in children less than 3 months of age.
Low Dose Naltrexone Counseling- I discussed with the patient the potential risks and side effects of low dose naltrexone including but not limited to: more vivid dreams, headaches, nausea, vomiting, abdominal pain, fatigue, dizziness, and anxiety.
Opzelura Counseling:  I discussed with the patient the risks of Opzelura including but not limited to nasopharngitis, bronchitis, ear infection, eosinophila, hives, diarrhea, folliculitis, tonsillitis, and rhinorrhea.  Taken orally, this medication has been linked to serious infections; higher rate of mortality; malignancy and lymphoproliferative disorders; major adverse cardiovascular events; thrombosis; thrombocytopenia, anemia, and neutropenia; and lipid elevations.
Libtayo Pregnancy And Lactation Text: This medication is contraindicated in pregnancy and when breast feeding.
Ivermectin Pregnancy And Lactation Text: This medication is Pregnancy Category C and it isn't known if it is safe during pregnancy. It is also excreted in breast milk.
Colchicine Pregnancy And Lactation Text: This medication is Pregnancy Category C and isn't considered safe during pregnancy. It is excreted in breast milk.
Fluconazole Pregnancy And Lactation Text: This medication is Pregnancy Category C and it isn't know if it is safe during pregnancy. It is also excreted in breast milk.
Rituxan Pregnancy And Lactation Text: This medication is Pregnancy Category C and it isn't know if it is safe during pregnancy. It is unknown if this medication is excreted in breast milk but similar antibodies are known to be excreted.
Zyclara Pregnancy And Lactation Text: This medication is Pregnancy Category C. It is unknown if this medication is excreted in breast milk.
Olanzapine Pregnancy And Lactation Text: This medication is pregnancy category C.   There are no adequate and well controlled trials with olanzapine in pregnant females.  Olanzapine should be used during pregnancy only if the potential benefit justifies the potential risk to the fetus.   In a study in lactating healthy women, olanzapine was excreted in breast milk.  It is recommended that women taking olanzapine should not breast feed.
Cimetidine Counseling:  I discussed with the patient the risks of Cimetidine including but not limited to gynecomastia, headache, diarrhea, nausea, drowsiness, arrhythmias, pancreatitis, skin rashes, psychosis, bone marrow suppression and kidney toxicity.
High Dose Vitamin A Pregnancy And Lactation Text: High dose vitamin A therapy is contraindicated during pregnancy and breast feeding.
Wartpeel Counseling:  I discussed with the patient the risks of Wartpeel including but not limited to erythema, scaling, itching, weeping, crusting, and pain.
Cephalexin Pregnancy And Lactation Text: This medication is Pregnancy Category B and considered safe during pregnancy.  It is also excreted in breast milk but can be used safely for shorter doses.
Topical Clindamycin Pregnancy And Lactation Text: This medication is Pregnancy Category B and is considered safe during pregnancy. It is unknown if it is excreted in breast milk.
Xelhiaz Pregnancy And Lactation Text: This medication is Pregnancy Category D and is not considered safe during pregnancy.  The risk during breast feeding is also uncertain.
Enbrel Counseling:  I discussed with the patient the risks of etanercept including but not limited to myelosuppression, immunosuppression, autoimmune hepatitis, demyelinating diseases, lymphoma, and infections.  The patient understands that monitoring is required including a PPD at baseline and must alert us or the primary physician if symptoms of infection or other concerning signs are noted.
Minocycline Counseling: Patient advised regarding possible photosensitivity and discoloration of the teeth, skin, lips, tongue and gums.  Patient instructed to avoid sunlight, if possible.  When exposed to sunlight, patients should wear protective clothing, sunglasses, and sunscreen.  The patient was instructed to call the office immediately if the following severe adverse effects occur:  hearing changes, easy bruising/bleeding, severe headache, or vision changes.  The patient verbalized understanding of the proper use and possible adverse effects of minocycline.  All of the patient's questions and concerns were addressed.
Cyclophosphamide Pregnancy And Lactation Text: This medication is Pregnancy Category D and it isn't considered safe during pregnancy. This medication is excreted in breast milk.
Sski Pregnancy And Lactation Text: This medication is Pregnancy Category D and isn't considered safe during pregnancy. It is excreted in breast milk.
Opzelura Pregnancy And Lactation Text: There is insufficient data to evaluate drug-associated risk for major birth defects, miscarriage, or other adverse maternal or fetal outcomes.  There is a pregnancy registry that monitors pregnancy outcomes in pregnant persons exposed to the medication during pregnancy.  It is unknown if this medication is excreted in breast milk.  Do not breastfeed during treatment and for about 4 weeks after the last dose.
Solaraze Counseling:  I discussed with the patient the risks of Solaraze including but not limited to erythema, scaling, itching, weeping, crusting, and pain.
Litfulo Counseling: I discussed with the patient the risks of Litfulo therapy including but not limited to upper respiratory tract infections, shingles, cold sores, and nausea. Live vaccines should be avoided.  This medication has been linked to serious infections; higher rate of mortality; malignancy and lymphoproliferative disorders; major adverse cardiovascular events; thrombosis; gastrointestinal perforations; neutropenia; lymphopenia; anemia; liver enzyme elevations; and lipid elevations.
Odomzo Counseling- I discussed with the patient the risks of Odomzo including but not limited to nausea, vomiting, diarrhea, constipation, weight loss, changes in the sense of taste, decreased appetite, muscle spasms, and hair loss.  The patient verbalized understanding of the proper use and possible adverse effects of Odomzo.  All of the patient's questions and concerns were addressed.
Taltz Pregnancy And Lactation Text: The risk during pregnancy and breastfeeding is uncertain with this medication.
Cimetidine Pregnancy And Lactation Text: This medication is Pregnancy Category B and is considered safe during pregnancy. It is also excreted in breast milk and breast feeding isn't recommended.
Dapsone Counseling: I discussed with the patient the risks of dapsone including but not limited to hemolytic anemia, agranulocytosis, rashes, methemoglobinemia, kidney failure, peripheral neuropathy, headaches, GI upset, and liver toxicity.  Patients who start dapsone require monitoring including baseline LFTs and weekly CBCs for the first month, then every month thereafter.  The patient verbalized understanding of the proper use and possible adverse effects of dapsone.  All of the patient's questions and concerns were addressed.
Siliq Counseling:  I discussed with the patient the risks of Siliq including but not limited to new or worsening depression, suicidal thoughts and behavior, immunosuppression, malignancy, posterior leukoencephalopathy syndrome, and serious infections.  The patient understands that monitoring is required including a PPD at baseline and must alert us or the primary physician if symptoms of infection or other concerning signs are noted. There is also a special program designed to monitor depression which is required with Siliq.
Oral Minoxidil Counseling- I discussed with the patient the risks of oral minoxidil including but not limited to shortness of breath, swelling of the feet or ankles, dizziness, lightheadedness, unwanted hair growth and allergic reaction.  The patient verbalized understanding of the proper use and possible adverse effects of oral minoxidil.  All of the patient's questions and concerns were addressed.
Detail Level: Simple
Griseofulvin Counseling:  I discussed with the patient the risks of griseofulvin including but not limited to photosensitivity, cytopenia, liver damage, nausea/vomiting and severe allergy.  The patient understands that this medication is best absorbed when taken with a fatty meal (e.g., ice cream or french fries).
Calcipotriene Counseling:  I discussed with the patient the risks of calcipotriene including but not limited to erythema, scaling, itching, and irritation.
Low Dose Naltrexone Pregnancy And Lactation Text: Naltrexone is pregnancy category C.  There have been no adequate and well-controlled studies in pregnant women.  It should be used in pregnancy only if the potential benefit justifies the potential risk to the fetus.   Limited data indicates that naltrexone is minimally excreted into breastmilk.
Enbrel Pregnancy And Lactation Text: This medication is Pregnancy Category B and is considered safe during pregnancy. It is unknown if this medication is excreted in breast milk.
Topical Ketoconazole Counseling: Patient counseled that this medication may cause skin irritation or allergic reactions.  In the event of skin irritation, the patient was advised to reduce the amount of the drug applied or use it less frequently.   The patient verbalized understanding of the proper use and possible adverse effects of ketoconazole.  All of the patient's questions and concerns were addressed.
Cyclosporine Counseling:  I discussed with the patient the risks of cyclosporine including but not limited to hypertension, gingival hyperplasia,myelosuppression, immunosuppression, liver damage, kidney damage, neurotoxicity, lymphoma, and serious infections. The patient understands that monitoring is required including baseline blood pressure, CBC, CMP, lipid panel and uric acid, and then 1-2 times monthly CMP and blood pressure.
Hydroquinone Counseling:  Patient advised that medication may result in skin irritation, lightening (hypopigmentation), dryness, and burning.  In the event of skin irritation, the patient was advised to reduce the amount of the drug applied or use it less frequently.  Rarely, spots that are treated with hydroquinone can become darker (pseudoochronosis).  Should this occur, patient instructed to stop medication and call the office. The patient verbalized understanding of the proper use and possible adverse effects of hydroquinone.  All of the patient's questions and concerns were addressed.
Birth Control Pills Pregnancy And Lactation Text: This medication should be avoided if pregnant and for the first 30 days post-partum.
Odomzo Pregnancy And Lactation Text: This medication is Pregnancy Category X and is absolutely contraindicated during pregnancy. It is unknown if it is excreted in breast milk.
Litfulo Pregnancy And Lactation Text: Based on animal studies, Lifulo may cause embryo-fetal harm when administered to pregnant women.  The medication should not be used in pregnancy.  Breastfeeding is not recommended during treatment.
Calcipotriene Pregnancy And Lactation Text: The use of this medication during pregnancy or lactation is not recommended as there is insufficient data.
Tremfya Counseling: I discussed with the patient the risks of guselkumab including but not limited to immunosuppression, serious infections, and drug reactions.  The patient understands that monitoring is required including a PPD at baseline and must alert us or the primary physician if symptoms of infection or other concerning signs are noted.
Clindamycin Counseling: I counseled the patient regarding use of clindamycin as an antibiotic for prophylactic and/or therapeutic purposes. Clindamycin is active against numerous classes of bacteria, including skin bacteria. Side effects may include nausea, diarrhea, gastrointestinal upset, rash, hives, yeast infections, and in rare cases, colitis.
Fluconazole Counseling:  Patient counseled regarding adverse effects of fluconazole including but not limited to headache, diarrhea, nausea, upset stomach, liver function test abnormalities, taste disturbance, and stomach pain.  There is a rare possibility of liver failure that can occur when taking fluconazole.  The patient understands that monitoring of LFTs and kidney function test may be required, especially at baseline. The patient verbalized understanding of the proper use and possible adverse effects of fluconazole.  All of the patient's questions and concerns were addressed.
Opioid Counseling: I discussed with the patient the potential side effects of opioids including but not limited to addiction, altered mental status, and depression. I stressed avoiding alcohol, benzodiazepines, muscle relaxants and sleep aids unless specifically okayed by a physician. The patient verbalized understanding of the proper use and possible adverse effects of opioids. All of the patient's questions and concerns were addressed. They were instructed to flush the remaining pills down the toilet if they did not need them for pain.
Oral Minoxidil Pregnancy And Lactation Text: This medication should only be used when clearly needed if you are pregnant, attempting to become pregnant or breast feeding.
Aklief counseling:  Patient advised to apply a pea-sized amount only at bedtime and wait 30 minutes after washing their face before applying.  If too drying, patient may add a non-comedogenic moisturizer.  The most commonly reported side effects including irritation, redness, scaling, dryness, stinging, burning, itching, and increased risk of sunburn.  The patient verbalized understanding of the proper use and possible adverse effects of retinoids.  All of the patient's questions and concerns were addressed.
Niacinamide Counseling: I recommended taking niacin or niacinamide, also know as vitamin B3, twice daily. Recent evidence suggests that taking vitamin B3 (500 mg twice daily) can reduce the risk of actinic keratoses and non-melanoma skin cancers. Side effects of vitamin B3 include flushing and headache.
Include Pregnancy/Lactation Warning?: No
Griseofulvin Pregnancy And Lactation Text: This medication is Pregnancy Category X and is known to cause serious birth defects. It is unknown if this medication is excreted in breast milk but breast feeding should be avoided.
Picato Counseling:  I discussed with the patient the risks of Picato including but not limited to erythema, scaling, itching, weeping, crusting, and pain.
Solaraze Pregnancy And Lactation Text: This medication is Pregnancy Category B and is considered safe. There is some data to suggest avoiding during the third trimester. It is unknown if this medication is excreted in breast milk.
Thalidomide Counseling: I discussed with the patient the risks of thalidomide including but not limited to birth defects, anxiety, weakness, chest pain, dizziness, cough and severe allergy.
Cantharidin Pregnancy And Lactation Text: This medication has not been proven safe during pregnancy. It is unknown if this medication is excreted in breast milk.
Adbry Counseling: I discussed with the patient the risks of tralokinumab including but not limited to eye infection and irritation, cold sores, injection site reactions, worsening of asthma, allergic reactions and increased risk of parasitic infection.  Live vaccines should be avoided while taking tralokinumab. The patient understands that monitoring is required and they must alert us or the primary physician if symptoms of infection or other concerning signs are noted.
Quinolones Counseling:  I discussed with the patient the risks of fluoroquinolones including but not limited to GI upset, allergic reaction, drug rash, diarrhea, dizziness, photosensitivity, yeast infections, liver function test abnormalities, tendonitis/tendon rupture.
Cantharidin Counseling:  I discussed with the patient the risks of Cantharidin including but not limited to pain, redness, burning, itching, and blistering.
Dapsone Pregnancy And Lactation Text: This medication is Pregnancy Category C and is not considered safe during pregnancy or breast feeding.
Spironolactone Counseling: Patient advised regarding risks of diarrhea, abdominal pain, hyperkalemia, birth defects (for female patients), liver toxicity and renal toxicity. The patient may need blood work to monitor liver and kidney function and potassium levels while on therapy. The patient verbalized understanding of the proper use and possible adverse effects of spironolactone.  All of the patient's questions and concerns were addressed.
Winlevi Counseling:  I discussed with the patient the risks of topical clascoterone including but not limited to erythema, scaling, itching, and stinging. Patient voiced their understanding.
Acitretin Counseling:  I discussed with the patient the risks of acitretin including but not limited to hair loss, dry lips/skin/eyes, liver damage, hyperlipidemia, depression/suicidal ideation, photosensitivity.  Serious rare side effects can include but are not limited to pancreatitis, pseudotumor cerebri, bony changes, clot formation/stroke/heart attack.  Patient understands that alcohol is contraindicated since it can result in liver toxicity and significantly prolong the elimination of the drug by many years.
Doxepin Counseling:  Patient advised that the medication is sedating and not to drive a car after taking this medication. Patient informed of potential adverse effects including but not limited to dry mouth, urinary retention, and blurry vision.  The patient verbalized understanding of the proper use and possible adverse effects of doxepin.  All of the patient's questions and concerns were addressed.
Olumiant Counseling: I discussed with the patient the risks of Olumiant therapy including but not limited to upper respiratory tract infections, shingles, cold sores, and nausea. Live vaccines should be avoided.  This medication has been linked to serious infections; higher rate of mortality; malignancy and lymphoproliferative disorders; major adverse cardiovascular events; thrombosis; gastrointestinal perforations; neutropenia; lymphopenia; anemia; liver enzyme elevations; and lipid elevations.
Opioid Pregnancy And Lactation Text: These medications can lead to premature delivery and should be avoided during pregnancy. These medications are also present in breast milk in small amounts.
Cyclosporine Pregnancy And Lactation Text: This medication is Pregnancy Category C and it isn't know if it is safe during pregnancy. This medication is excreted in breast milk.
5-Fu Counseling: 5-Fluorouracil Counseling:  I discussed with the patient the risks of 5-fluorouracil including but not limited to erythema, scaling, itching, weeping, crusting, and pain.
Humira Counseling:  I discussed with the patient the risks of adalimumab including but not limited to myelosuppression, immunosuppression, autoimmune hepatitis, demyelinating diseases, lymphoma, and serious infections.  The patient understands that monitoring is required including a PPD at baseline and must alert us or the primary physician if symptoms of infection or other concerning signs are noted.
Adbry Pregnancy And Lactation Text: It is unknown if this medication will adversely affect pregnancy or breast feeding.
Niacinamide Pregnancy And Lactation Text: These medications are considered safe during pregnancy.
Soolantra Counseling: I discussed with the patients the risks of topial Soolantra. This is a medicine which decreases the number of mites and inflammation in the skin. You experience burning, stinging, eye irritation or allergic reactions.  Please call our office if you develop any problems from using this medication.
Clindamycin Pregnancy And Lactation Text: This medication can be used in pregnancy if certain situations. Clindamycin is also present in breast milk.
Spironolactone Pregnancy And Lactation Text: This medication can cause feminization of the male fetus and should be avoided during pregnancy. The active metabolite is also found in breast milk.
Winlevi Pregnancy And Lactation Text: This medication is considered safe during pregnancy and breastfeeding.
Aklief Pregnancy And Lactation Text: It is unknown if this medication is safe to use during pregnancy.  It is unknown if this medication is excreted in breast milk.  Breastfeeding women should use the topical cream on the smallest area of the skin for the shortest time needed while breastfeeding.  Do not apply to nipple and areola.
Acitretin Pregnancy And Lactation Text: This medication is Pregnancy Category X and should not be given to women who are pregnant or may become pregnant in the future. This medication is excreted in breast milk.
Dutasteride Male Counseling: Dustasteride Counseling:  I discussed with the patient the risks of use of dutasteride including but not limited to decreased libido, decreased ejaculate volume, and gynecomastia. Women who can become pregnant should not handle medication.  All of the patient's questions and concerns were addressed.
Itraconazole Counseling:  I discussed with the patient the risks of itraconazole including but not limited to liver damage, nausea/vomiting, neuropathy, and severe allergy.  The patient understands that this medication is best absorbed when taken with acidic beverages such as non-diet cola or ginger ale.  The patient understands that monitoring is required including baseline LFTs and repeat LFTs at intervals.  The patient understands that they are to contact us or the primary physician if concerning signs are noted.
Otezla Counseling: The side effects of Otezla were discussed with the patient, including but not limited to worsening or new depression, weight loss, diarrhea, nausea, upper respiratory tract infection, and headache. Patient instructed to call the office should any adverse effect occur.  The patient verbalized understanding of the proper use and possible adverse effects of Otezla.  All the patient's questions and concerns were addressed.
Simponi Counseling:  I discussed with the patient the risks of golimumab including but not limited to myelosuppression, immunosuppression, autoimmune hepatitis, demyelinating diseases, lymphoma, and serious infections.  The patient understands that monitoring is required including a PPD at baseline and must alert us or the primary physician if symptoms of infection or other concerning signs are noted.
Methotrexate Counseling:  Patient counseled regarding adverse effects of methotrexate including but not limited to nausea, vomiting, abnormalities in liver function tests. Patients may develop mouth sores, rash, diarrhea, and abnormalities in blood counts. The patient understands that monitoring is required including LFT's and blood counts.  There is a rare possibility of scarring of the liver and lung problems that can occur when taking methotrexate. Persistent nausea, loss of appetite, pale stools, dark urine, cough, and shortness of breath should be reported immediately. Patient advised to discontinue methotrexate treatment at least three months before attempting to become pregnant.  I discussed the need for folate supplements while taking methotrexate.  These supplements can decrease side effects during methotrexate treatment. The patient verbalized understanding of the proper use and possible adverse effects of methotrexate.  All of the patient's questions and concerns were addressed.
Doxepin Pregnancy And Lactation Text: This medication is Pregnancy Category C and it isn't known if it is safe during pregnancy. It is also excreted in breast milk and breast feeding isn't recommended.
Gabapentin Counseling: I discussed with the patient the risks of gabapentin including but not limited to dizziness, somnolence, fatigue and ataxia.
Tranexamic Acid Counseling:  Patient advised of the small risk of bleeding problems with tranexamic acid. They were also instructed to call if they developed any nausea, vomiting or diarrhea. All of the patient's questions and concerns were addressed.
Soolantra Pregnancy And Lactation Text: This medication is Pregnancy Category C. This medication is considered safe during breast feeding.
Xolair Counseling:  Patient informed of potential adverse effects including but not limited to fever, muscle aches, rash and allergic reactions.  The patient verbalized understanding of the proper use and possible adverse effects of Xolair.  All of the patient's questions and concerns were addressed.
Olumiant Pregnancy And Lactation Text: Based on animal studies, Olumiant may cause embryo-fetal harm when administered to pregnant women.  The medication should not be used in pregnancy.  Breastfeeding is not recommended during treatment.
Topical Metronidazole Counseling: Metronidazole is a topical antibiotic medication. You may experience burning, stinging, redness, or allergic reactions.  Please call our office if you develop any problems from using this medication.
Imiquimod Counseling:  I discussed with the patient the risks of imiquimod including but not limited to erythema, scaling, itching, weeping, crusting, and pain.  Patient understands that the inflammatory response to imiquimod is variable from person to person and was educated regarded proper titration schedule.  If flu-like symptoms develop, patient knows to discontinue the medication and contact us.
Arava Counseling:  Patient counseled regarding adverse effects of Arava including but not limited to nausea, vomiting, abnormalities in liver function tests. Patients may develop mouth sores, rash, diarrhea, and abnormalities in blood counts. The patient understands that monitoring is required including LFTs and blood counts.  There is a rare possibility of scarring of the liver and lung problems that can occur when taking methotrexate. Persistent nausea, loss of appetite, pale stools, dark urine, cough, and shortness of breath should be reported immediately. Patient advised to discontinue Arava treatment and consult with a physician prior to attempting conception. The patient will have to undergo a treatment to eliminate Arava from the body prior to conception.
Otezla Pregnancy And Lactation Text: This medication is Pregnancy Category C and it isn't known if it is safe during pregnancy. It is unknown if it is excreted in breast milk.
Doxycycline Counseling:  Patient counseled regarding possible photosensitivity and increased risk for sunburn.  Patient instructed to avoid sunlight, if possible.  When exposed to sunlight, patients should wear protective clothing, sunglasses, and sunscreen.  The patient was instructed to call the office immediately if the following severe adverse effects occur:  hearing changes, easy bruising/bleeding, severe headache, or vision changes.  The patient verbalized understanding of the proper use and possible adverse effects of doxycycline.  All of the patient's questions and concerns were addressed.
Cimzia Counseling:  I discussed with the patient the risks of Cimzia including but not limited to immunosuppression, allergic reactions and infections.  The patient understands that monitoring is required including a PPD at baseline and must alert us or the primary physician if symptoms of infection or other concerning signs are noted.
Protopic Counseling: Patient may experience a mild burning sensation during topical application. Protopic is not approved in children less than 2 years of age. There have been case reports of hematologic and skin malignancies in patients using topical calcineurin inhibitors although causality is questionable.
Nsaids Counseling: NSAID Counseling: I discussed with the patient that NSAIDs should be taken with food. Prolonged use of NSAIDs can result in the development of stomach ulcers.  Patient advised to stop taking NSAIDs if abdominal pain occurs.  The patient verbalized understanding of the proper use and possible adverse effects of NSAIDs.  All of the patient's questions and concerns were addressed.
Dutasteride Pregnancy And Lactation Text: This medication is absolutely contraindicated in women, especially during pregnancy and breast feeding. Feminization of male fetuses is possible if taking while pregnant.
VTAMA Counseling: I discussed with the patient that VTAMA is not for use in the eyes, mouth or mouth. They should call the office if they develop any signs of allergic reactions to VTAMA. The patient verbalized understanding of the proper use and possible adverse effects of VTAMA.  All of the patient's questions and concerns were addressed.
Ilumya Counseling: I discussed with the patient the risks of tildrakizumab including but not limited to immunosuppression, malignancy, posterior leukoencephalopathy syndrome, and serious infections.  The patient understands that monitoring is required including a PPD at baseline and must alert us or the primary physician if symptoms of infection or other concerning signs are noted.
Azelaic Acid Counseling: Patient counseled that medicine may cause skin irritation and to avoid applying near the eyes.  In the event of skin irritation, the patient was advised to reduce the amount of the drug applied or use it less frequently.   The patient verbalized understanding of the proper use and possible adverse effects of azelaic acid.  All of the patient's questions and concerns were addressed.
Hydroxyzine Counseling: Patient advised that the medication is sedating and not to drive a car after taking this medication.  Patient informed of potential adverse effects including but not limited to dry mouth, urinary retention, and blurry vision.  The patient verbalized understanding of the proper use and possible adverse effects of hydroxyzine.  All of the patient's questions and concerns were addressed.
Doxycycline Pregnancy And Lactation Text: This medication is Pregnancy Category D and not consider safe during pregnancy. It is also excreted in breast milk but is considered safe for shorter treatment courses.
Bexarotene Counseling:  I discussed with the patient the risks of bexarotene including but not limited to hair loss, dry lips/skin/eyes, liver abnormalities, hyperlipidemia, pancreatitis, depression/suicidal ideation, photosensitivity, drug rash/allergic reactions, hypothyroidism, anemia, leukopenia, infection, cataracts, and teratogenicity.  Patient understands that they will need regular blood tests to check lipid profile, liver function tests, white blood cell count, thyroid function tests and pregnancy test if applicable.
Topical Metronidazole Pregnancy And Lactation Text: This medication is Pregnancy Category B and considered safe during pregnancy.  It is also considered safe to use while breastfeeding.
Rifampin Counseling: I discussed with the patient the risks of rifampin including but not limited to liver damage, kidney damage, red-orange body fluids, nausea/vomiting and severe allergy.
Methotrexate Pregnancy And Lactation Text: This medication is Pregnancy Category X and is known to cause fetal harm. This medication is excreted in breast milk.
Xolair Pregnancy And Lactation Text: This medication is Pregnancy Category B and is considered safe during pregnancy. This medication is excreted in breast milk.
Ketoconazole Counseling:   Patient counseled regarding improving absorption with orange juice.  Adverse effects include but are not limited to breast enlargement, headache, diarrhea, nausea, upset stomach, liver function test abnormalities, taste disturbance, and stomach pain.  There is a rare possibility of liver failure that can occur when taking ketoconazole. The patient understands that monitoring of LFTs may be required, especially at baseline. The patient verbalized understanding of the proper use and possible adverse effects of ketoconazole.  All of the patient's questions and concerns were addressed.
Skyrizi Counseling: I discussed with the patient the risks of risankizumab-rzaa including but not limited to immunosuppression, and serious infections.  The patient understands that monitoring is required including a PPD at baseline and must alert us or the primary physician if symptoms of infection or other concerning signs are noted.
Azathioprine Counseling:  I discussed with the patient the risks of azathioprine including but not limited to myelosuppression, immunosuppression, hepatotoxicity, lymphoma, and infections.  The patient understands that monitoring is required including baseline LFTs, Creatinine, possible TPMP genotyping and weekly CBCs for the first month and then every 2 weeks thereafter.  The patient verbalized understanding of the proper use and possible adverse effects of azathioprine.  All of the patient's questions and concerns were addressed.
Protopic Pregnancy And Lactation Text: This medication is Pregnancy Category C. It is unknown if this medication is excreted in breast milk when applied topically.
Topical Retinoid counseling:  Patient advised to apply a pea-sized amount only at bedtime and wait 30 minutes after washing their face before applying.  If too drying, patient may add a non-comedogenic moisturizer. The patient verbalized understanding of the proper use and possible adverse effects of retinoids.  All of the patient's questions and concerns were addressed.
Rinvoq Counseling: I discussed with the patient the risks of Rinvoq therapy including but not limited to upper respiratory tract infections, shingles, cold sores, bronchitis, nausea, cough, fever, acne, and headache. Live vaccines should be avoided.  This medication has been linked to serious infections; higher rate of mortality; malignancy and lymphoproliferative disorders; major adverse cardiovascular events; thrombosis; thrombocytopenia, anemia, and neutropenia; lipid elevations; liver enzyme elevations; and gastrointestinal perforations.
Nsaids Pregnancy And Lactation Text: These medications are considered safe up to 30 weeks gestation. It is excreted in breast milk.
Rifampin Pregnancy And Lactation Text: This medication is Pregnancy Category C and it isn't know if it is safe during pregnancy. It is also excreted in breast milk and should not be used if you are breast feeding.
Cimzia Pregnancy And Lactation Text: This medication crosses the placenta but can be considered safe in certain situations. Cimzia may be excreted in breast milk.
Tranexamic Acid Pregnancy And Lactation Text: It is unknown if this medication is safe during pregnancy or breast feeding.
Hydroxyzine Pregnancy And Lactation Text: This medication is not safe during pregnancy and should not be taken. It is also excreted in breast milk and breast feeding isn't recommended.
Glycopyrrolate Counseling:  I discussed with the patient the risks of glycopyrrolate including but not limited to skin rash, drowsiness, dry mouth, difficulty urinating, and blurred vision.
Azelaic Acid Pregnancy And Lactation Text: This medication is considered safe during pregnancy and breast feeding.
Oxybutynin Counseling:  I discussed with the patient the risks of oxybutynin including but not limited to skin rash, drowsiness, dry mouth, difficulty urinating, and blurred vision.
Azithromycin Counseling:  I discussed with the patient the risks of azithromycin including but not limited to GI upset, allergic reaction, drug rash, diarrhea, and yeast infections.
Drysol Counseling:  I discussed with the patient the risks of drysol/aluminum chloride including but not limited to skin rash, itching, irritation, burning.
Topical Steroids Counseling: I discussed with the patient that prolonged use of topical steroids can result in the increased appearance of superficial blood vessels (telangiectasias), lightening (hypopigmentation) and thinning of the skin (atrophy).  Patient understands to avoid using high potency steroids in skin folds, the groin or the face.  The patient verbalized understanding of the proper use and possible adverse effects of topical steroids.  All of the patient's questions and concerns were addressed.
Erythromycin Counseling:  I discussed with the patient the risks of erythromycin including but not limited to GI upset, allergic reaction, drug rash, diarrhea, increase in liver enzymes, and yeast infections.
Prednisone Counseling:  I discussed with the patient the risks of prolonged use of prednisone including but not limited to weight gain, insomnia, osteoporosis, mood changes, diabetes, susceptibility to infection, glaucoma and high blood pressure.  In cases where prednisone use is prolonged, patients should be monitored with blood pressure checks, serum glucose levels and an eye exam.  Additionally, the patient may need to be placed on GI prophylaxis, PCP prophylaxis, and calcium and vitamin D supplementation and/or a bisphosphonate.  The patient verbalized understanding of the proper use and the possible adverse effects of prednisone.  All of the patient's questions and concerns were addressed.
Finasteride Male Counseling: Finasteride Counseling:  I discussed with the patient the risks of use of finasteride including but not limited to decreased libido, decreased ejaculate volume, gynecomastia, and depression. Women should not handle medication.  All of the patient's questions and concerns were addressed.
Bexarotene Pregnancy And Lactation Text: This medication is Pregnancy Category X and should not be given to women who are pregnant or may become pregnant. This medication should not be used if you are breast feeding.
Vtama Pregnancy And Lactation Text: It is unknown if this medication can cause problems during pregnancy and breastfeeding.
Cosentyx Counseling:  I discussed with the patient the risks of Cosentyx including but not limited to worsening of Crohn's disease, immunosuppression, allergic reactions and infections.  The patient understands that monitoring is required including a PPD at baseline and must alert us or the primary physician if symptoms of infection or other concerning signs are noted.
Rinvoq Pregnancy And Lactation Text: Based on animal studies, Rinvoq may cause embryo-fetal harm when administered to pregnant women.  The medication should not be used in pregnancy.  Breastfeeding is not recommended during treatment and for 6 days after the last dose.
Valtrex Counseling: I discussed with the patient the risks of valacyclovir including but not limited to kidney damage, nausea, vomiting and severe allergy.  The patient understands that if the infection seems to be worsening or is not improving, they are to call.
Klisyri Counseling:  I discussed with the patient the risks of Klisyri including but not limited to erythema, scaling, itching, weeping, crusting, and pain.
Sarecycline Counseling: Patient advised regarding possible photosensitivity and discoloration of the teeth, skin, lips, tongue and gums.  Patient instructed to avoid sunlight, if possible.  When exposed to sunlight, patients should wear protective clothing, sunglasses, and sunscreen.  The patient was instructed to call the office immediately if the following severe adverse effects occur:  hearing changes, easy bruising/bleeding, severe headache, or vision changes.  The patient verbalized understanding of the proper use and possible adverse effects of sarecycline.  All of the patient's questions and concerns were addressed.
Benzoyl Peroxide Counseling: Patient counseled that medicine may cause skin irritation and bleach clothing.  In the event of skin irritation, the patient was advised to reduce the amount of the drug applied or use it less frequently.   The patient verbalized understanding of the proper use and possible adverse effects of benzoyl peroxide.  All of the patient's questions and concerns were addressed.
Clofazimine Counseling:  I discussed with the patient the risks of clofazimine including but not limited to skin and eye pigmentation, liver damage, nausea/vomiting, gastrointestinal bleeding and allergy.
Qbrexza Counseling:  I discussed with the patient the risks of Qbrexza including but not limited to headache, mydriasis, blurred vision, dry eyes, nasal dryness, dry mouth, dry throat, dry skin, urinary hesitation, and constipation.  Local skin reactions including erythema, burning, stinging, and itching can also occur.
Olanzapine Counseling- I discussed with the patient the common side effects of olanzapine including but are not limited to: lack of energy, dry mouth, increased appetite, sleepiness, tremor, constipation, dizziness, changes in behavior, or restlessness.  Explained that teenagers are more likely to experience headaches, abdominal pain, pain in the arms or legs, tiredness, and sleepiness.  Serious side effects include but are not limited: increased risk of death in elderly patients who are confused, have memory loss, or dementia-related psychosis; hyperglycemia; increased cholesterol and triglycerides; and weight gain.
Azithromycin Pregnancy And Lactation Text: This medication is considered safe during pregnancy and is also secreted in breast milk.
Ketoconazole Pregnancy And Lactation Text: This medication is Pregnancy Category C and it isn't know if it is safe during pregnancy. It is also excreted in breast milk and breast feeding isn't recommended.
Azathioprine Pregnancy And Lactation Text: This medication is Pregnancy Category D and isn't considered safe during pregnancy. It is unknown if this medication is excreted in breast milk.
Zoryve Counseling:  I discussed with the patient that Zoryve is not for use in the eyes, mouth or vagina. The most commonly reported side effects include diarrhea, headache, insomnia, application site pain, upper respiratory tract infections, and urinary tract infections.  All of the patient's questions and concerns were addressed.
Infliximab Counseling:  I discussed with the patient the risks of infliximab including but not limited to myelosuppression, immunosuppression, autoimmune hepatitis, demyelinating diseases, lymphoma, and serious infections.  The patient understands that monitoring is required including a PPD at baseline and must alert us or the primary physician if symptoms of infection or other concerning signs are noted.
Isotretinoin Counseling: Patient should get monthly blood tests, not donate blood, not drive at night if vision affected, not share medication, and not undergo elective surgery for 6 months after tx completed. Side effects reviewed, pt to contact office should one occur.
Finasteride Pregnancy And Lactation Text: This medication is absolutely contraindicated during pregnancy. It is unknown if it is excreted in breast milk.
Glycopyrrolate Pregnancy And Lactation Text: This medication is Pregnancy Category B and is considered safe during pregnancy. It is unknown if it is excreted breast milk.
Erivedge Counseling- I discussed with the patient the risks of Erivedge including but not limited to nausea, vomiting, diarrhea, constipation, weight loss, changes in the sense of taste, decreased appetite, muscle spasms, and hair loss.  The patient verbalized understanding of the proper use and possible adverse effects of Erivedge.  All of the patient's questions and concerns were addressed.
Sotyktu Counseling:  I discussed the most common side effects of Sotyktu including: common cold, sore throat, sinus infections, cold sores, canker sores, folliculitis, and acne.  I also discussed more serious side effects of Sotyktu including but not limited to: serious allergic reactions; increased risk for infections such as TB; cancers such as lymphomas; rhabdomyolysis and elevated CPK; and elevated triglycerides and liver enzymes. 
Albendazole Counseling:  I discussed with the patient the risks of albendazole including but not limited to cytopenia, kidney damage, nausea/vomiting and severe allergy.  The patient understands that this medication is being used in an off-label manner.
Elidel Counseling: Patient may experience a mild burning sensation during topical application. Elidel is not approved in children less than 2 years of age. There have been case reports of hematologic and skin malignancies in patients using topical calcineurin inhibitors although causality is questionable.
Topical Steroids Applications Pregnancy And Lactation Text: Most topical steroids are considered safe to use during pregnancy and lactation.  Any topical steroid applied to the breast or nipple should be washed off before breastfeeding.
Bactrim Counseling:  I discussed with the patient the risks of sulfa antibiotics including but not limited to GI upset, allergic reaction, drug rash, diarrhea, dizziness, photosensitivity, and yeast infections.  Rarely, more serious reactions can occur including but not limited to aplastic anemia, agranulocytosis, methemoglobinemia, blood dyscrasias, liver or kidney failure, lung infiltrates or desquamative/blistering drug rashes.
Tazorac Counseling:  Patient advised that medication is irritating and drying.  Patient may need to apply sparingly and wash off after an hour before eventually leaving it on overnight.  The patient verbalized understanding of the proper use and possible adverse effects of tazorac.  All of the patient's questions and concerns were addressed.
Erythromycin Pregnancy And Lactation Text: This medication is Pregnancy Category B and is considered safe during pregnancy. It is also excreted in breast milk.
Valtrex Pregnancy And Lactation Text: this medication is Pregnancy Category B and is considered safe during pregnancy. This medication is not directly found in breast milk but it's metabolite acyclovir is present.
Cellcept Counseling:  I discussed with the patient the risks of mycophenolate mofetil including but not limited to infection/immunosuppression, GI upset, hypokalemia, hypercholesterolemia, bone marrow suppression, lymphoproliferative disorders, malignancy, GI ulceration/bleed/perforation, colitis, interstitial lung disease, kidney failure, progressive multifocal leukoencephalopathy, and birth defects.  The patient understands that monitoring is required including a baseline creatinine and regular CBC testing. In addition, patient must alert us immediately if symptoms of infection or other concerning signs are noted.
Klisyri Pregnancy And Lactation Text: It is unknown if this medication can harm a developing fetus or if it is excreted in breast milk.
Isotretinoin Pregnancy And Lactation Text: This medication is Pregnancy Category X and is considered extremely dangerous during pregnancy. It is unknown if it is excreted in breast milk.
Birth Control Pills Counseling: Birth Control Pill Counseling: I discussed with the patient the potential side effects of OCPs including but not limited to increased risk of stroke, heart attack, thrombophlebitis, deep venous thrombosis, hepatic adenomas, breast changes, GI upset, headaches, and depression.  The patient verbalized understanding of the proper use and possible adverse effects of OCPs. All of the patient's questions and concerns were addressed.
Mirvaso Counseling: Mirvaso is a topical medication which can decrease superficial blood flow where applied. Side effects are uncommon and include stinging, redness and allergic reactions.
Qbrexza Pregnancy And Lactation Text: There is no available data on Qbrexza use in pregnant women.  There is no available data on Qbrexza use in lactation.
Hydroxychloroquine Counseling:  I discussed with the patient that a baseline ophthalmologic exam is needed at the start of therapy and every year thereafter while on therapy. A CBC may also be warranted for monitoring.  The side effects of this medication were discussed with the patient, including but not limited to agranulocytosis, aplastic anemia, seizures, rashes, retinopathy, and liver toxicity. Patient instructed to call the office should any adverse effect occur.  The patient verbalized understanding of the proper use and possible adverse effects of Plaquenil.  All the patient's questions and concerns were addressed.
Terbinafine Counseling: Patient counseling regarding adverse effects of terbinafine including but not limited to headache, diarrhea, rash, upset stomach, liver function test abnormalities, itching, taste/smell disturbance, nausea, abdominal pain, and flatulence.  There is a rare possibility of liver failure that can occur when taking terbinafine.  The patient understands that a baseline LFT and kidney function test may be required. The patient verbalized understanding of the proper use and possible adverse effects of terbinafine.  All of the patient's questions and concerns were addressed.
Metronidazole Counseling:  I discussed with the patient the risks of metronidazole including but not limited to seizures, nausea/vomiting, a metallic taste in the mouth, nausea/vomiting and severe allergy.
Stelara Counseling:  I discussed with the patient the risks of ustekinumab including but not limited to immunosuppression, malignancy, posterior leukoencephalopathy syndrome, and serious infections.  The patient understands that monitoring is required including a PPD at baseline and must alert us or the primary physician if symptoms of infection or other concerning signs are noted.
Propranolol Counseling:  I discussed with the patient the risks of propranolol including but not limited to low heart rate, low blood pressure, low blood sugar, restlessness and increased cold sensitivity. They should call the office if they experience any of these side effects.
Topical Sulfur Applications Counseling: Topical Sulfur Counseling: Patient counseled that this medication may cause skin irritation or allergic reactions.  In the event of skin irritation, the patient was advised to reduce the amount of the drug applied or use it less frequently.   The patient verbalized understanding of the proper use and possible adverse effects of topical sulfur application.  All of the patient's questions and concerns were addressed.
Benzoyl Peroxide Pregnancy And Lactation Text: This medication is Pregnancy Category C. It is unknown if benzoyl peroxide is excreted in breast milk.
Cibinqo Counseling: I discussed with the patient the risks of Cibinqo therapy including but not limited to common cold, nausea, headache, cold sores, increased blood CPK levels, dizziness, UTIs, fatigue, acne, and vomitting. Live vaccines should be avoided.  This medication has been linked to serious infections; higher rate of mortality; malignancy and lymphoproliferative disorders; major adverse cardiovascular events; thrombosis; thrombocytopenia and lymphopenia; lipid elevations; and retinal detachment.
Tazorac Pregnancy And Lactation Text: This medication is not safe during pregnancy. It is unknown if this medication is excreted in breast milk.
Dupixent Counseling: I discussed with the patient the risks of dupilumab including but not limited to eye infection and irritation, cold sores, injection site reactions, worsening of asthma, allergic reactions and increased risk of parasitic infection.  Live vaccines should be avoided while taking dupilumab. Dupilumab will also interact with certain medications such as warfarin and cyclosporine. The patient understands that monitoring is required and they must alert us or the primary physician if symptoms of infection or other concerning signs are noted.
Sotyktu Pregnancy And Lactation Text: There is insufficient data to evaluate whether or not Sotyktu is safe to use during pregnancy.   It is not known if Sotyktu passes into breast milk and whether or not it is safe to use when breastfeeding.  
Tetracycline Counseling: Patient counseled regarding possible photosensitivity and increased risk for sunburn.  Patient instructed to avoid sunlight, if possible.  When exposed to sunlight, patients should wear protective clothing, sunglasses, and sunscreen.  The patient was instructed to call the office immediately if the following severe adverse effects occur:  hearing changes, easy bruising/bleeding, severe headache, or vision changes.  The patient verbalized understanding of the proper use and possible adverse effects of tetracycline.  All of the patient's questions and concerns were addressed. Patient understands to avoid pregnancy while on therapy due to potential birth defects.
Libtayo Counseling- I discussed with the patient the risks of Libtayo including but not limited to nausea, vomiting, diarrhea, and bone or muscle pain.  The patient verbalized understanding of the proper use and possible adverse effects of Libtayo.  All of the patient's questions and concerns were addressed.
Rhofade Counseling: Rhofade is a topical medication which can decrease superficial blood flow where applied. Side effects are uncommon and include stinging, redness and allergic reactions.
Minoxidil Counseling: Minoxidil is a topical medication which can increase blood flow where it is applied. It is uncertain how this medication increases hair growth. Side effects are uncommon and include stinging and allergic reactions.
Colchicine Counseling:  Patient counseled regarding adverse effects including but not limited to stomach upset (nausea, vomiting, stomach pain, or diarrhea).  Patient instructed to limit alcohol consumption while taking this medication.  Colchicine may reduce blood counts especially with prolonged use.  The patient understands that monitoring of kidney function and blood counts may be required, especially at baseline. The patient verbalized understanding of the proper use and possible adverse effects of colchicine.  All of the patient's questions and concerns were addressed.
Propranolol Pregnancy And Lactation Text: This medication is Pregnancy Category C and it isn't known if it is safe during pregnancy. It is excreted in breast milk.
Bactrim Pregnancy And Lactation Text: This medication is Pregnancy Category D and is known to cause fetal risk.  It is also excreted in breast milk.

## 2023-10-31 NOTE — PROCEDURE: COUNSELING
Pt arrives via EMS with c/o syncopal event while standing in line at the gas station. Pt has laceration to posterior scalp.      Triage Assessment     Row Name 10/27/22 0718       Triage Assessment (Adult)    Airway WDL WDL       Respiratory WDL    Respiratory WDL WDL       Skin Circulation/Temperature WDL    Skin Circulation/Temperature WDL WDL       Cardiac WDL    Cardiac WDL WDL       Peripheral/Neurovascular WDL    Peripheral Neurovascular WDL WDL       Cognitive/Neuro/Behavioral WDL    Cognitive/Neuro/Behavioral WDL WDL              
Detail Level: Detailed
Detail Level: Simple
Prescription Strength Graduated Compression Stockings Recommendations: The patient was counseled that prescription strength graduated compression stockings should be worn for all waking hours. They will follow up with a venous specialist to monitor graduated compression stocking usage and their symptoms.

## 2023-10-31 NOTE — PROCEDURE: ADDITIONAL NOTES
Additional Notes: Discussed edema is likely the driving force here. Discussed compression therapy and handout provided; offered referral to lymphedema clinic though he did not wish this currently. TAC as directed and to contact me if requiring regular usage. Discussed edema is likely related to prior hip surgery based on timeframe.
Render Risk Assessment In Note?: no
Detail Level: Simple

## 2023-11-01 DIAGNOSIS — E78.2 MIXED HYPERLIPIDEMIA: ICD-10-CM

## 2023-11-01 DIAGNOSIS — I10 ESSENTIAL HYPERTENSION: ICD-10-CM

## 2023-11-01 RX ORDER — LISINOPRIL 5 MG/1
5 TABLET ORAL DAILY
Qty: 60 TABLET | Refills: 0 | Status: SHIPPED
Start: 2023-11-01 | End: 2023-11-06

## 2023-11-01 RX ORDER — ATORVASTATIN CALCIUM 10 MG/1
10 TABLET, FILM COATED ORAL DAILY
Qty: 90 TABLET | Refills: 0 | Status: SHIPPED | OUTPATIENT
Start: 2023-11-01 | End: 2024-03-14 | Stop reason: SDUPTHER

## 2023-11-06 DIAGNOSIS — I10 ESSENTIAL HYPERTENSION: ICD-10-CM

## 2023-11-06 RX ORDER — LISINOPRIL 10 MG/1
10 TABLET ORAL DAILY
Qty: 90 TABLET | Refills: 0 | Status: SHIPPED | OUTPATIENT
Start: 2023-11-06 | End: 2024-03-05

## 2023-11-07 ENCOUNTER — HOSPITAL ENCOUNTER (OUTPATIENT)
Facility: MEDICAL CENTER | Age: 78
End: 2023-11-07
Attending: STUDENT IN AN ORGANIZED HEALTH CARE EDUCATION/TRAINING PROGRAM
Payer: COMMERCIAL

## 2023-11-07 LAB — TESTOST SERPL-MCNC: 362 NG/DL (ref 175–781)

## 2023-11-07 PROCEDURE — 84403 ASSAY OF TOTAL TESTOSTERONE: CPT

## 2023-11-09 DIAGNOSIS — E03.4 HYPOTHYROIDISM DUE TO ACQUIRED ATROPHY OF THYROID: ICD-10-CM

## 2023-11-13 RX ORDER — LEVOTHYROXINE SODIUM 137 MCG
137 TABLET ORAL
Qty: 90 TABLET | Refills: 0 | Status: SHIPPED | OUTPATIENT
Start: 2023-11-13

## 2023-12-06 ENCOUNTER — OFFICE VISIT (OUTPATIENT)
Dept: CARDIOLOGY | Facility: MEDICAL CENTER | Age: 78
End: 2023-12-06
Attending: INTERNAL MEDICINE
Payer: COMMERCIAL

## 2023-12-06 VITALS
RESPIRATION RATE: 16 BRPM | WEIGHT: 192 LBS | HEART RATE: 69 BPM | BODY MASS INDEX: 25.45 KG/M2 | OXYGEN SATURATION: 96 % | SYSTOLIC BLOOD PRESSURE: 124 MMHG | HEIGHT: 73 IN | DIASTOLIC BLOOD PRESSURE: 60 MMHG

## 2023-12-06 DIAGNOSIS — I35.8 AORTIC VALVE SCLEROSIS: ICD-10-CM

## 2023-12-06 DIAGNOSIS — I87.2 VENOUS INSUFFICIENCY: ICD-10-CM

## 2023-12-06 DIAGNOSIS — E78.5 DYSLIPIDEMIA: ICD-10-CM

## 2023-12-06 PROCEDURE — 99204 OFFICE O/P NEW MOD 45 MIN: CPT | Performed by: INTERNAL MEDICINE

## 2023-12-06 PROCEDURE — 3074F SYST BP LT 130 MM HG: CPT | Performed by: INTERNAL MEDICINE

## 2023-12-06 PROCEDURE — 3078F DIAST BP <80 MM HG: CPT | Performed by: INTERNAL MEDICINE

## 2023-12-06 PROCEDURE — 99213 OFFICE O/P EST LOW 20 MIN: CPT | Performed by: INTERNAL MEDICINE

## 2023-12-06 RX ORDER — TIZANIDINE 4 MG/1
4 TABLET ORAL EVERY 6 HOURS PRN
COMMUNITY

## 2023-12-06 RX ORDER — PREGABALIN 25 MG/1
25 CAPSULE ORAL 3 TIMES DAILY
COMMUNITY

## 2023-12-06 ASSESSMENT — FIBROSIS 4 INDEX: FIB4 SCORE: 1.53

## 2023-12-06 NOTE — PROGRESS NOTES
"CARDIOLOGY OUTPATIENT FOLLOWUP    PCP: Vincenzo Mccarthy D.O.    1. Venous insufficiency    2. Dyslipidemia    3. Aortic valve sclerosis        Amadou Parekh is having persistent difficulty with uncomfortable lower extremity edema due to venous insufficiency.  I recommended modifications he could make to his compression therapy regimen to achieve more satisfactory results.  We will complete a venous insufficiency study.  He does express interest in ablation procedures if he is a candidate.  I also discouraged routine use of furosemide but did recommend that he could use this as needed after high salt intake.    Follow up: as needed    History: Amadou Parekh is a 78 y.o. 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  He has been seen by primary care and dermatology for this condition.  He was prescribed triamcinolone cream as well as compression stockings.  Compression has helped, triamcinolone has not been helpful.  Symptoms been present since July.  He also has furosemide.    He does have aortic sclerosis and in the absence of any new cardiovascular symptoms could repeat an echocardiogram again in 5 years. This can be tracked through primary care.     Physical Exam:  /60 (BP Location: Left arm, Patient Position: Sitting, BP Cuff Size: Adult)   Pulse 69   Resp 16   Ht 1.854 m (6' 1\")   Wt 87.1 kg (192 lb)   SpO2 96%   BMI 25.33 kg/m²   GEN: NAD  RESP: CTAB  CVS:  RRR, no M/R/G  ABD: Soft, NT/ND  EXT: Warm 2+ Edema Venous stasis dermatitis    The 10-year ASCVD risk score (Mireille WANG, et al., 2019) is: 31.8%    For this encounter I directly reviewed and interpreted Echo images. There is mild flow acceleration across the aortic valve.      Studies  Lab Results   Component Value Date/Time    CHOLSTRLTOT 166 01/03/2023 07:38 AM    LDL 97 01/03/2023 07:38 AM    HDL 44 01/03/2023 07:38 AM    TRIGLYCERIDE 123 " "01/03/2023 07:38 AM       Lab Results   Component Value Date/Time    SODIUM 140 07/14/2023 01:51 PM    POTASSIUM 5.3 07/14/2023 01:51 PM    CHLORIDE 107 07/14/2023 01:51 PM    CO2 22 07/14/2023 01:51 PM    GLUCOSE 101 (H) 07/14/2023 01:51 PM    BUN 22 07/14/2023 01:51 PM    CREATININE 1.37 07/14/2023 01:51 PM     Lab Results   Component Value Date/Time    ALKPHOSPHAT 96 01/03/2023 07:38 AM    ASTSGOT 28 01/03/2023 07:38 AM    ALTSGPT 23 01/03/2023 07:38 AM    TBILIRUBIN 0.3 01/03/2023 07:38 AM        Past Medical History:   Diagnosis Date    Actinic keratoses     sees olivia Everett    Acute nasopharyngitis     7/3 covid    Bilateral hearing loss 08/06/2018    wears aids    Chronic right-sided low back pain without sciatica 08/06/2018    s/p 5 surgeries; sees [facility]bar    ED (erectile dysfunction)     High cholesterol     Hyperlipidemia, mixed     Hypertension     pt states well controlled on meds    Hypothyroidism     Infectious disease 07/03/2023    Neuropathy (HCC) 08/06/2018    Pain 12/18/2018    \"Nerve Pain-Low back/legs\".    Peyronie disease     sees [facility]    Prediabetes     Renal disorder     chronic kidney disease stage 2     No Known Allergies  Outpatient Encounter Medications as of 12/6/2023   Medication Sig Dispense Refill    tizanidine (ZANAFLEX) 4 MG Tab Take 4 mg by mouth every 6 hours as needed.      pregabalin (LYRICA) 25 MG Cap Take 25 mg by mouth 3 times a day.      SYNTHROID 137 MCG Tab TAKE ONE TABLET BY MOUTH EVERY MORNING on an empty stomach 90 Tablet 0    lisinopril (PRINIVIL) 10 MG Tab TAKE ONE TABLET BY MOUTH ONCE DAILY 90 Tablet 0    atorvastatin (LIPITOR) 10 MG Tab Take 1 Tablet by mouth every day. 90 Tablet 0    furosemide (LASIX) 20 MG Tab Take 0.5 Tablets by mouth every day for 60 days. 30 Tablet 0    traMADol (ULTRAM) 50 MG Tab Take 50 mg by mouth.      B Complex Vitamins (VITAMIN B COMPLEX PO) Take 1 Tablet by mouth every day.      ibuprofen (MOTRIN) 200 MG Tab Take 200 mg by " mouth every 6 hours as needed.      tadalafil (CIALIS) 5 MG tablet Take 5 mg by mouth every day. Taking for prostate vs ED per pt      Multiple Vitamin (MULTI-VITAMIN) Tab Take 1 Tablet by mouth every day.      Cholecalciferol (VITAMIN D-1000 MAX ST) 1000 UNIT Tab Take 2 Tablets by mouth every day.      [DISCONTINUED] gabapentin (NEURONTIN) 600 MG tablet TAKE ONE TABLET BY MOUTH THREE TIMES DAILY (Patient not taking: Reported on 12/6/2023) 300 Tablet 0    [DISCONTINUED] acetaminophen (TYLENOL) 500 MG Tab Take 500-1,000 mg by mouth every 6 hours as needed. (Patient not taking: Reported on 12/6/2023)       No facility-administered encounter medications on file as of 12/6/2023.     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

## 2023-12-13 ENCOUNTER — TELEPHONE (OUTPATIENT)
Dept: CARDIOLOGY | Facility: MEDICAL CENTER | Age: 78
End: 2023-12-13
Payer: COMMERCIAL

## 2023-12-13 NOTE — TELEPHONE ENCOUNTER
BE          Caller: Amadou Parekh      Topic/issue: Patient was calling about the ultrasound that was ordered and he was calling to see if there was any assistance that could be provided in expediting it      Callback Number: [phone]    Thank you      -Jerry PONCE

## 2023-12-13 NOTE — TELEPHONE ENCOUNTER
Demetrio Ruiz M.D.        Not urgent     S/W pt, suggested that he call radiology scheduling and see if he can be put on wait list for call if something sooner opens up. Pt did already do this and will keep checking with them.

## 2023-12-13 NOTE — TELEPHONE ENCOUNTER
It looks like pt US is scheduled for 2/9/23. Any urgency with this order? It was not initially ordered as stat, any need for this or is 2/9 acceptable for pt needs?

## 2023-12-20 ENCOUNTER — TELEPHONE (OUTPATIENT)
Dept: CARDIOLOGY | Facility: MEDICAL CENTER | Age: 78
End: 2023-12-20
Payer: COMMERCIAL

## 2023-12-20 DIAGNOSIS — I87.2 VENOUS INSUFFICIENCY: ICD-10-CM

## 2023-12-20 NOTE — TELEPHONE ENCOUNTER
BE          Caller: Amadou Parekh      Topic/issue: Patient was calling about the specialist that was suggested for him regarding his veins and he was asking to see if that referral could be placed so he could schedule it and asked for a call back      Callback Number: [phone]      Thank you    -Jerry PONCE

## 2023-12-20 NOTE — TELEPHONE ENCOUNTER
To BE: was the referral just for vascular medicine or was the referral to be with Dr. Cuellar? Thank you!

## 2023-12-22 NOTE — TELEPHONE ENCOUNTER
Demetrio Ruiz M.D.  You2 days ago     To  To    Thx  BE     Referral placed at this time. Patient notified via [employer].

## 2024-01-02 ENCOUNTER — HOSPITAL ENCOUNTER (OUTPATIENT)
Dept: RADIOLOGY | Facility: MEDICAL CENTER | Age: 79
End: 2024-01-02
Attending: INTERNAL MEDICINE
Payer: COMMERCIAL

## 2024-01-02 DIAGNOSIS — I87.2 VENOUS INSUFFICIENCY: ICD-10-CM

## 2024-01-02 PROCEDURE — 93970 EXTREMITY STUDY: CPT

## 2024-01-03 ENCOUNTER — TELEPHONE (OUTPATIENT)
Dept: CARDIOLOGY | Facility: MEDICAL CENTER | Age: 79
End: 2024-01-03
Payer: COMMERCIAL

## 2024-01-03 NOTE — TELEPHONE ENCOUNTER
----- Message from Demetrio Ruiz M.D. 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  This condition is potentially amenable to ablation therapies.  I recommend consultation with Dr. Cuellar to discuss options in greater detail.

## 2024-01-15 ENCOUNTER — HOSPITAL ENCOUNTER (OUTPATIENT)
Dept: LAB | Facility: MEDICAL CENTER | Age: 79
End: 2024-01-15
Attending: STUDENT IN AN ORGANIZED HEALTH CARE EDUCATION/TRAINING PROGRAM
Payer: COMMERCIAL

## 2024-01-15 DIAGNOSIS — E78.5 DYSLIPIDEMIA: ICD-10-CM

## 2024-01-15 DIAGNOSIS — R73.03 PRE-DIABETES: ICD-10-CM

## 2024-01-15 DIAGNOSIS — D50.9 IRON DEFICIENCY ANEMIA, UNSPECIFIED IRON DEFICIENCY ANEMIA TYPE: ICD-10-CM

## 2024-01-15 DIAGNOSIS — E78.2 MIXED HYPERLIPIDEMIA: ICD-10-CM

## 2024-01-15 DIAGNOSIS — Z80.42 FAMILY HISTORY OF PROSTATE CANCER: ICD-10-CM

## 2024-01-15 DIAGNOSIS — E03.4 HYPOTHYROIDISM DUE TO ACQUIRED ATROPHY OF THYROID: ICD-10-CM

## 2024-01-15 DIAGNOSIS — N18.2 STAGE 2 CHRONIC KIDNEY DISEASE: ICD-10-CM

## 2024-01-15 LAB
ALBUMIN SERPL BCP-MCNC: 3.9 G/DL (ref 3.2–4.9)
ALBUMIN/GLOB SERPL: 1.6 G/DL
ALP SERPL-CCNC: 87 U/L (ref 30–99)
ALT SERPL-CCNC: 19 U/L (ref 2–50)
ANION GAP SERPL CALC-SCNC: 13 MMOL/L (ref 7–16)
AST SERPL-CCNC: 26 U/L (ref 12–45)
BASOPHILS # BLD AUTO: 0.9 % (ref 0–1.8)
BASOPHILS # BLD: 0.06 K/UL (ref 0–0.12)
BILIRUB SERPL-MCNC: 0.2 MG/DL (ref 0.1–1.5)
BUN SERPL-MCNC: 17 MG/DL (ref 8–22)
CALCIUM ALBUM COR SERPL-MCNC: 8.5 MG/DL (ref 8.5–10.5)
CALCIUM SERPL-MCNC: 8.4 MG/DL (ref 8.5–10.5)
CHLORIDE SERPL-SCNC: 108 MMOL/L (ref 96–112)
CHOLEST SERPL-MCNC: 152 MG/DL (ref 100–199)
CO2 SERPL-SCNC: 19 MMOL/L (ref 20–33)
CREAT SERPL-MCNC: 1.08 MG/DL (ref 0.5–1.4)
EOSINOPHIL # BLD AUTO: 0.29 K/UL (ref 0–0.51)
EOSINOPHIL NFR BLD: 4.5 % (ref 0–6.9)
ERYTHROCYTE [DISTWIDTH] IN BLOOD BY AUTOMATED COUNT: 40.5 FL (ref 35.9–50)
EST. AVERAGE GLUCOSE BLD GHB EST-MCNC: 111 MG/DL
FERRITIN SERPL-MCNC: 20.3 NG/ML (ref 22–322)
GFR SERPLBLD CREATININE-BSD FMLA CKD-EPI: 70 ML/MIN/1.73 M 2
GLOBULIN SER CALC-MCNC: 2.4 G/DL (ref 1.9–3.5)
GLUCOSE SERPL-MCNC: 87 MG/DL (ref 65–99)
HBA1C MFR BLD: 5.5 % (ref 4–5.6)
HCT VFR BLD AUTO: 41.8 % (ref 42–52)
HDLC SERPL-MCNC: 45 MG/DL
HGB BLD-MCNC: 14.1 G/DL (ref 14–18)
IMM GRANULOCYTES # BLD AUTO: 0.01 K/UL (ref 0–0.11)
IMM GRANULOCYTES NFR BLD AUTO: 0.2 % (ref 0–0.9)
IRON SATN MFR SERPL: 14 % (ref 15–55)
IRON SERPL-MCNC: 47 UG/DL (ref 50–180)
LDLC SERPL CALC-MCNC: 88 MG/DL
LYMPHOCYTES # BLD AUTO: 1.61 K/UL (ref 1–4.8)
LYMPHOCYTES NFR BLD: 25 % (ref 22–41)
MCH RBC QN AUTO: 27.4 PG (ref 27–33)
MCHC RBC AUTO-ENTMCNC: 33.7 G/DL (ref 32.3–36.5)
MCV RBC AUTO: 81.2 FL (ref 81.4–97.8)
MONOCYTES # BLD AUTO: 0.54 K/UL (ref 0–0.85)
MONOCYTES NFR BLD AUTO: 8.4 % (ref 0–13.4)
NEUTROPHILS # BLD AUTO: 3.93 K/UL (ref 1.82–7.42)
NEUTROPHILS NFR BLD: 61 % (ref 44–72)
NRBC # BLD AUTO: 0 K/UL
NRBC BLD-RTO: 0 /100 WBC (ref 0–0.2)
PLATELET # BLD AUTO: 259 K/UL (ref 164–446)
PMV BLD AUTO: 11.3 FL (ref 9–12.9)
POTASSIUM SERPL-SCNC: 4.6 MMOL/L (ref 3.6–5.5)
PROT SERPL-MCNC: 6.3 G/DL (ref 6–8.2)
PSA SERPL-MCNC: 2.88 NG/ML (ref 0–4)
RBC # BLD AUTO: 5.15 M/UL (ref 4.7–6.1)
SODIUM SERPL-SCNC: 140 MMOL/L (ref 135–145)
T4 FREE SERPL-MCNC: 1.46 NG/DL (ref 0.93–1.7)
TIBC SERPL-MCNC: 332 UG/DL (ref 250–450)
TRANSFERRIN SERPL-MCNC: 268 MG/DL (ref 200–370)
TRIGL SERPL-MCNC: 94 MG/DL (ref 0–149)
TSH SERPL DL<=0.005 MIU/L-ACNC: 0.94 UIU/ML (ref 0.38–5.33)
UIBC SERPL-MCNC: 285 UG/DL (ref 110–370)
WBC # BLD AUTO: 6.4 K/UL (ref 4.8–10.8)

## 2024-01-15 PROCEDURE — 83550 IRON BINDING TEST: CPT

## 2024-01-15 PROCEDURE — 83540 ASSAY OF IRON: CPT

## 2024-01-15 PROCEDURE — 84443 ASSAY THYROID STIM HORMONE: CPT

## 2024-01-15 PROCEDURE — 84439 ASSAY OF FREE THYROXINE: CPT

## 2024-01-15 PROCEDURE — 85025 COMPLETE CBC W/AUTO DIFF WBC: CPT

## 2024-01-15 PROCEDURE — 80061 LIPID PANEL: CPT

## 2024-01-15 PROCEDURE — 84466 ASSAY OF TRANSFERRIN: CPT

## 2024-01-15 PROCEDURE — 83036 HEMOGLOBIN GLYCOSYLATED A1C: CPT

## 2024-01-15 PROCEDURE — 82728 ASSAY OF FERRITIN: CPT

## 2024-01-15 PROCEDURE — 36415 COLL VENOUS BLD VENIPUNCTURE: CPT

## 2024-01-15 PROCEDURE — 80053 COMPREHEN METABOLIC PANEL: CPT

## 2024-01-15 PROCEDURE — 84153 ASSAY OF PSA TOTAL: CPT

## 2024-01-16 ENCOUNTER — OFFICE VISIT (OUTPATIENT)
Dept: INTERNAL MEDICINE | Facility: OTHER | Age: 79
End: 2024-01-16
Payer: COMMERCIAL

## 2024-01-16 VITALS
TEMPERATURE: 97.2 F | WEIGHT: 203.4 LBS | SYSTOLIC BLOOD PRESSURE: 138 MMHG | OXYGEN SATURATION: 96 % | HEART RATE: 63 BPM | BODY MASS INDEX: 28.48 KG/M2 | DIASTOLIC BLOOD PRESSURE: 75 MMHG | HEIGHT: 71 IN

## 2024-01-16 DIAGNOSIS — D50.9 MICROCYTIC ANEMIA: ICD-10-CM

## 2024-01-16 DIAGNOSIS — D50.9 IRON DEFICIENCY ANEMIA, UNSPECIFIED IRON DEFICIENCY ANEMIA TYPE: ICD-10-CM

## 2024-01-16 DIAGNOSIS — E78.5 DYSLIPIDEMIA: ICD-10-CM

## 2024-01-16 DIAGNOSIS — E66.3 OVERWEIGHT: ICD-10-CM

## 2024-01-16 DIAGNOSIS — R73.03 PRE-DIABETES: ICD-10-CM

## 2024-01-16 PROCEDURE — 3075F SYST BP GE 130 - 139MM HG: CPT | Performed by: STUDENT IN AN ORGANIZED HEALTH CARE EDUCATION/TRAINING PROGRAM

## 2024-01-16 PROCEDURE — 3078F DIAST BP <80 MM HG: CPT | Performed by: STUDENT IN AN ORGANIZED HEALTH CARE EDUCATION/TRAINING PROGRAM

## 2024-01-16 PROCEDURE — 99213 OFFICE O/P EST LOW 20 MIN: CPT | Mod: GE | Performed by: STUDENT IN AN ORGANIZED HEALTH CARE EDUCATION/TRAINING PROGRAM

## 2024-01-16 RX ORDER — FERROUS SULFATE 325(65) MG
325 TABLET ORAL
Qty: 50 TABLET | Refills: 0 | Status: SHIPPED
Start: 2024-01-17 | End: 2024-01-24

## 2024-01-16 ASSESSMENT — PATIENT HEALTH QUESTIONNAIRE - PHQ9: CLINICAL INTERPRETATION OF PHQ2 SCORE: 0

## 2024-01-16 ASSESSMENT — ENCOUNTER SYMPTOMS
PALPITATIONS: 0
WEAKNESS: 0
CONSTIPATION: 0
DIARRHEA: 0
SHORTNESS OF BREATH: 0
WEIGHT LOSS: 0
FEVER: 0
CHILLS: 0
COUGH: 0
DEPRESSION: 0
VOMITING: 0
MYALGIAS: 0
NAUSEA: 0
DIZZINESS: 0

## 2024-01-16 ASSESSMENT — FIBROSIS 4 INDEX: FIB4 SCORE: 1.8

## 2024-01-16 NOTE — PROGRESS NOTES
Chief Complaint:  Follow up, labs    Last Seen:   10/2023    History of Present Illness:     Patient is a 78-year-old M with PMHx of:     HTN: on HCTZ  Hypothyroidism: on levothyroxine   DLD: lipid panel November 2021 Tchol 180    HDL 43    Pre-DM: a1c 5.5 1/2024  Normocytic anemia: order w/u with next set of labs   Chronic sinusitis: follows with ENT  OA, hip  Bilateral hearing loss: b/l hearing aids  Anterior dislocation, left hip: s/p hip replacement 2022, ongoing PT sessions  Lumbar spinal stenosis c/b neuropathy: followed by Dr Webber at [facility], on gabapentin    Iron deficiency  Lumbar degenerative disc disease   Lumbar spondylosis  Peyronie disease: s/p surgery via urology [place]  BPH- tadalafil, follows with Dr Abiola Avalos  S/p perineal nerve decompression 2023 at [facility]    presenting to clinic today for follow up for lab work.    Iron deficiency  Denies sx. Has well balanced diet. Pt to call GI specialists to set of colonoscopy for this year. Start iron supplement. No s/s of celiac disease. Does note intermittent dizziness with taking medication (Lyrica/ gabapentin).     Labs  A1c went from 6 to 5.5. Cholesterol WNL. Otherwise labs WNL     Has apt with Dr Cuellar for venous ablation? Tx in Feb.     ----    Healthcare maintenance:   Mood screening: no anxiety, PHQ-2 score 0 11/30/22   Drug screening: no tobacco, alc, rec drugs   Sexual activity screening: not active since hip problems   Infectious disease screening: HIV, hep C neg Jan 2022 negative  Colon cancer screening: Colonoscopy 2019 w/[facility]- 1 diminutive polyp in transverse colon, diverticulosis (sigmoid), internal hemorrhoids, q 5yr- next due 2024  Vaccines: all up to date   Osteoporosis screening: dexa 1/5/23 WNL, next due 2025    Review of Systems   Review of Systems   Constitutional:  Negative for chills, fever and weight loss.   Respiratory:  Negative for cough and shortness of breath.    Cardiovascular:  Negative for  "chest pain, palpitations and leg swelling.   Gastrointestinal:  Negative for constipation, diarrhea, nausea and vomiting.   Genitourinary:  Negative for dysuria.   Musculoskeletal:  Negative for myalgias.   Neurological:  Negative for dizziness and weakness.   Psychiatric/Behavioral:  Negative for depression.         Past Medical History:   Past Medical History:   Diagnosis Date    Actinic keratoses     sees olivia Everett    Acute nasopharyngitis     7/3 covid    Bilateral hearing loss 08/06/2018    wears aids    Chronic right-sided low back pain without sciatica 08/06/2018    s/p 5 surgeries; sees Akbar    ED (erectile dysfunction)     High cholesterol     Hyperlipidemia, mixed     Hypertension     pt states well controlled on meds    Hypothyroidism     Infectious disease 07/03/2023    Neuropathy (HCC) 08/06/2018    Pain 12/18/2018    \"Nerve Pain-Low back/legs\".    Peyronie disease     sees [place] [facility]    Prediabetes     Renal disorder     chronic kidney disease stage 2       Patient Active Problem List    Diagnosis Date Noted    Pedal edema 10/20/2023    Rash in adult 10/20/2023    Overweight 01/10/2023    Stage 2 chronic kidney disease 01/10/2023    Pre-diabetes 11/30/2022    Normocytic anemia 11/30/2022    Chronic sinusitis 11/30/2022    Arthritis of hip 08/15/2022    Lumbar degenerative disc disease 12/31/2021    Dyslipidemia 11/09/2021    Osteoarthritis 11/09/2021    Peripheral polyneuropathy 08/06/2018    Bilateral hearing loss 08/06/2018    Spondylolysis, lumbar region 10/05/2017    Chronic lumbar radiculopathy 08/18/2016    Bilateral stenosis of lateral recess of lumbar spine 03/12/2016    Primary hypertension 01/04/2016    Hypothyroidism 01/04/2016     Past Surgical History:   Past Surgical History:   Procedure Laterality Date    MO TOTAL HIP ARTHROPLASTY Right 7/21/2023    Procedure: RIGHT TOTAL HIP ARTHROPLASTY AND REPAIRS AS INDICATED;  Surgeon: Luis Caceres M.D.;  Location: SURGERY [facility];  Service: Orthopedics    OTHER SURGICAL PROCEDURE Left 02/17/2023    nerve decompression perineal nerve done at [facility]    HIP REVISION TOTAL Left 09/21/2022    Procedure: LEFT REVISION TOTAL HIP ARTHROPLASTY AND REPAIRS AS INDICATED;  Surgeon: Luis Caceres M.D.;  Location[facility];  Service: Orthopedics    RI TOTAL HIP ARTHROPLASTY Left 08/15/2022    Procedure: LEFT TOTAL HIP ARTHROPLASTY;  Surgeon: Luis Caceres M.D.;  Location: [facility];  Service: Orthopedics    RI INS/RPLC SPI NPG/RCVR POCKET  01/03/2022    Procedure: REMOVAL, NEUROSTIMULATOR, PERMANENT, SPINAL CORD;  Surgeon: Be Webber M.D.;  Location: [facility];  Service: Neurosurgery    LUMBAR FUSION O-ARM  01/03/2022    Procedure: FUSION, SPINE, LUMBAR, WITH O-ARM IMAGING GUIDANCE - L1-3 EXTENSION-STAGE 2;  Surgeon: Be Webber M.D.;  Location: [facility];  Service: Neurosurgery    LUMBAR DECOMPRESSION  01/03/2022    Procedure: DECOMPRESSION, SPINE, LUMBAR;  Surgeon: Be Webber M.D.;  Location: [facility];  Service: Neurosurgery    LUMBAR FUSION ANTERIOR N/A 12/31/2021    Procedure: FUSION, SPINE, LUMBAR, ANTERIOR APPROACH - L5-S1;  Surgeon: Be Webber M.D.;  Location: [facility];  Service: Neurosurgery    FUSION, SPINE, LUMBAR, ROBOT-ASSISTED WITH IMAGING GUIDANCE  02/27/2021    Procedure: FUSION, SPINE, LUMBAR, ROBOT-ASSISTED WITH IMAGING GUIDANCE - L5-S1 EXTENSION OF TLIF;  Surgeon: Be Webber M.D.;  Location: [facility];  Service: Neurosurgery    LUMBAR DECOMPRESSION N/A 02/27/2021    Procedure: DECOMPRESSION, SPINE, LUMBAR;  Surgeon: Be Webber M.D.;  Location: [facility];  Service: Neurosurgery    RI INS/RPLC SPI NPG/RCVR POCKET  05/22/2020    Procedure: INSERTION, NEUROSTIMULATOR, PERMANENT, SPINAL CORD;  Surgeon: Eugenio Camara M.D.;  Location: [facility];  Service: Pain Management    PEYRONIES PLAQUE  12/06/2019    Procedure:  "EXCISION, PEYRONIE'S PLAQUE, PENIS WITH GRAFTING, JEAN CLAUDE PLICATION, ARTIFICIAL ERECTION;  Surgeon: Tejinder Culver M.D.;  Location: [facility];  Service: Urology    ID NERVOUS SYSTEM SURGERY UNLISTED Left 06/03/2019    Procedure: EXPLORATION, NERVE- PERONEAL NERVE RELEASE AT THE FIBULAR HEAD  ;  Surgeon: Raz Garcia M.D.;  Location: [facility];  Service: Neurosurgery    FUSION, PIP JOINT, TOE Right 02/22/2019    Procedure: PIP ARTHRODESIS;  Surgeon: Amadou Bowden M.D.;  Location: SURGERY SAME DAY [facility];  Service: Orthopedics    FINGER EXPLORATION Right 02/22/2019    Procedure: FINGER EXPLORATION - RING FINGER DISTAL INTERPHALANGEAL JOINT;  Surgeon: Amadou Bowden M.D.;  Location: SURGERY SAME DAY [facility];  Service: Orthopedics    COLONOSCOPY  2019    OTHER NEUROLOGICAL SURG  12/12/2018    \"Low Back Surgery'sx6 between 2006 & 2017\".    CYST EXCISION Right 10/12/2018    Procedure: CYST EXCISION - RING FINGER MUCOUS CYST, DISTAL INTERPHALANGEAL JOINT;  Surgeon: Amadou Bowden M.D.;  Location: SURGERY SAME DAY [facility];  Service: Orthopedics    LUMBAR LAMINECTOMY DISKECTOMY Left 12/06/2017    Procedure: LUMBAR LAMINECTOMY DISKECTOMY - POSTERIOR REDO LEFT  L4-S1 KAILA;  Surgeon: Be Webber M.D.;  Location: [facility];  Service: Neurosurgery    FUSION, SPINE, LUMBAR, PLIF  10/05/2017    Procedure: POSTERIOR TRANSFORAMINAL INTERBODY FUSION AT LUMBAR 4 - 5;  Surgeon: Be Webber M.D.;  Location: [facility];  Service: Neurosurgery    LUMBAR DECOMPRESSION  10/05/2017    Procedure: POSTERIOR LUMBAR 3-4,  4-5 DECOMPRESSION AND FUSION;  Surgeon: Be Webber M.D.;  Location: [facility];  Service: Neurosurgery    ID INJ DX/THER AGNT PARAVERT FACET JOINT, BRITT* Right 10/06/2016    Procedure: INJ PARA FACET L/S 1 LVL W/IG - L3-4, L4-5, L5-S1;  Surgeon: Eugenio Camara M.D.;  Location: [facility];  Service: Pain " Management    MT INJ DX/THER AGNT PARAVERT FACET JOINT, BRITT*  10/06/2016    Procedure: INJ PARA FACET L/S 2D LVL W/IG;  Surgeon: Eugenio Camara M.D.;  Location: [facility];  Service: Pain Management    MT INJ DX/THER AGNT PARAVERT FACET JOINT, BRITT*  10/06/2016    Procedure: INJ PARA FACET L/S 3D LVL W/IG;  Surgeon: Eugenio Camara M.D.;  Location: [facility];  Service: Pain Management    MT NJX AA&/STRD TFRML EPI LUMBAR/SACRAL 1 LEVEL Right 08/18/2016    Procedure: INJ-FORAMEN EPI LUM/SAC SNGL - L5-S1;  Surgeon: Eugenio Camara M.D.;  Location: [facility];  Service: Pain Management    LUMBAR LAMINECTOMY DISKECTOMY Right 03/12/2016    Procedure: LUMBAR HemiLAMINECTOMY Micro DISKECTOMY posterior Redo L3-4 ;  Surgeon: Be Webber M.D.;  Location: [facility];  Service:     FORAMINOTOMY Right 03/12/2016    Procedure: FORAMINOTOMY;  Surgeon: Be Webber M.D.;  Location: [facility];  Service:     CERVICAL LAMINECTOMY POSTERIOR  2011    DENTAL SURGERY Bilateral as a teenager    wisdom teeth    OTHER      nasal surgery 2015    OTHER NEUROLOGICAL SURG  2006, 2010, 2012, 2014, 2016, 2017    low back surgery x 5    TONSILLECTOMY      child        Allergies:  Patient has no known allergies.    Medications:     Current Outpatient Medications:     ferrous sulfate, 325 mg, Oral, MO, WE + FR    tizanidine, 4 mg, Oral, Q6HRS PRN, Taking    pregabalin, 25 mg, Oral, TID, Taking    Synthroid, 137 mcg, Oral, AM ES, Taking    lisinopril, 10 mg, Oral, DAILY, Taking    atorvastatin, 10 mg, Oral, DAILY, Taking    traMADol, Take 50 mg by mouth., Taking    B Complex Vitamins (VITAMIN B COMPLEX PO), 1 Tablet, Oral, DAILY, Taking    ibuprofen, 200 mg, Oral, Q6HRS PRN, PRN    tadalafil, 5 mg, Oral, DAILY, Taking    Multi-Vitamin, 1 Tablet, Oral, DAILY, Taking    Vitamin D-1000 Max St, 2,000 Units, Oral, DAILY, Taking     Social History:   Social History     Tobacco Use  "   Smoking status: Never    Smokeless tobacco: Never   Vaping Use    Vaping Use: Never used   Substance Use Topics    Alcohol use: No    Drug use: No       Family History:   Family History   Problem Relation Age of Onset    Cancer Father         Leukemia     Heart Disease Father     Diabetes Father     Stroke Father     Cancer Mother         breast met to liver    Heart Disease Mother      Vitals:     /75 (BP Location: Left arm, Patient Position: Sitting, BP Cuff Size: Adult)   Pulse 63   Temp 36.2 °C (97.2 °F) (Temporal)   Ht 1.803 m (5' 11\")   Wt 92.3 kg (203 lb 6.4 oz)   SpO2 96%  Body mass index is 28.37 kg/m².    Physical Exam:   Physical Exam  Constitutional:       General: He is not in acute distress.     Appearance: Normal appearance. He is not ill-appearing or diaphoretic.   HENT:      Head: Normocephalic and atraumatic.      Mouth/Throat:      Mouth: Mucous membranes are moist.   Eyes:      Extraocular Movements: Extraocular movements intact.      Pupils: Pupils are equal, round, and reactive to light.   Cardiovascular:      Rate and Rhythm: Normal rate and regular rhythm.      Heart sounds: No murmur heard.     No friction rub. No gallop.   Pulmonary:      Effort: No respiratory distress.      Breath sounds: No stridor. No wheezing, rhonchi or rales.   Abdominal:      General: Bowel sounds are normal. There is no distension.      Tenderness: There is no abdominal tenderness. There is no guarding or rebound.   Musculoskeletal:      Right lower leg: No edema.      Left lower leg: No edema.   Skin:     Coloration: Skin is not jaundiced or pale.   Neurological:      General: No focal deficit present.      Mental Status: He is alert and oriented to person, place, and time.   Psychiatric:         Mood and Affect: Mood normal.         Behavior: Behavior normal.     Assessment and Plan    Patient is a 78-year-old male with pertinent PMHx of HTN, DLD, pre-DM, hypothyroidism, lumbar stenosis c/b " peripheral neuropathy, renal insufficiency, normocytic anemia presenting today for follow up of leg swelling and low blood pressure. Follow up in 3 months with repeat iron panel.     #Anemia, microcytic- resolved  #Iron deficiency anemia  -Hgb ~13, appears to be BL for patient since apx jan 2022  -Iron low normal with ferritin 20s   -Colonoscopy 2019 w/[facility]- 1 diminutive polyp in transverse colon, diverticulosis (sigmoid), internal hemorrhoids, q 5yr- next due 2024  Plan  -Iron supplementation every other day  -H pylori testing  -Repeat iron panel in 3 months  -[facility] referral replaced for need for possible endoscopy as well   -Follow up in 3 months    #Prediabetes  #Overweight   -A1c 12/20/2021 5.5 --> 5.9 Jan 2023  Plan  -Lisinopril for renal protection     #Dyslipidemia  -Lipid panel Jan 2023 Tchol 166    HDL 44  LDL 97  Plan  -Atorvastatin 10 mg PO qdaily    -- CHRONIC AND RESOLVED MEDICAL PROBLEMS --      #Chronic kidney disease, stage 2  -Jan 2023: GFR 50s-60s   **Ddx: in setting of HTN/ pre-DM   Plan  -CTM, ace-I as above    #Hypothyroidism  -Well-controlled on current regimen  -Asymptomatic  -TSH Jan 2024  Plan  -Synthroid 137mcg PO qdaily    #Leg swelling, bilateral, R>L  #Leg rash, right inner leg  Began after R total hip arthroplasty this past summer, has been intermittent since. TTE and UA both WNL 10/2023. Likely that rash is related to venous insufficiency.   Plan  -Compression stockings  -Decrease lasix/ discontinue if no notable changes with it  -Counseled on reduced Na intake    #Low blood pressure- resolved  #Hypertension  Decreased ace-I 10/2023 dose due to asx low blood pressure  Plan  -Lisinopril 5 mg PO qdaily  -Counseled on lifestyle modifications (diet, exercise)  -Continue to keep home BP log    #Peripheral neuropathy 2/2 spinal stenosis  #Lumbar spinal stenosis  #Lumbar degenerative disc disease  #Foot drop, left- result of above  -A1c 12/20/2021 5.5--> 5.7 4/26/22  -B12, B6, folate  WNL December 2019  -Chronic left lower extremity pain without obvious cause  --CT June 2021: posterior hardware extending from L3-S1, interbody hardware at L4-5 and L5-S1, spinal stenosis L1-2, L2-3, multilevel facet arthropathy, degenerative subluxation at L1-2, L2-3   -Trialed Lyrica and felt dizzy in past  **suspected hypersensitivity syndrome but most likely 2/2 patient's ongoing significant back etiologies v some component of pre-DM   Plan  -Gabapentin per neurosurgery  -Follow with neurosurgery as outpatient as appropriate     #Sinus infection, chronic  -s/p sinuplasty   Plan  -Follows with otolaryngology- PRN neti + flonase as needed   -SILVIA filled     #Healthcare maintenance  Mood screening: no anxiety, PHQ-2 score 0 11/30/22   Drug screening: none  Sexual activity screening: not active   Infectious disease screening: HIV, hep C neg 1/ 2022 labs  Colon cancer screening: Colonoscopy 2019 w/[facility]- 1 diminutive polyp in transverse colon, diverticulosis (sigmoid), internal hemorrhoids, q 5yr- next due 2024  Vaccines: all up to date   Osteoporosis screening: dexa 1/5/23 WNL, next due 2025    Please note that this dictation was created using voice recognition software. I have made every reasonable attempt to correct obvious errors, but I expect that there are errors of grammar and possibly content that I did not discover before finalizing the note.    Patient case was seen/ assessed/ discussed with Dr. Covarrubias.    Vincenzo Mccarthy, PGY-3  Internal Medicine

## 2024-01-16 NOTE — PATIENT INSTRUCTIONS
Take iron supplement every other day  Get iron labs rechecked 1 week prior to next visit   Follow up in 3 months  Make apt with GI doctor for iron deficiency

## 2024-01-24 ENCOUNTER — OFFICE VISIT (OUTPATIENT)
Dept: CARDIOLOGY | Facility: MEDICAL CENTER | Age: 79
End: 2024-01-24
Attending: INTERNAL MEDICINE
Payer: COMMERCIAL

## 2024-01-24 ENCOUNTER — TELEPHONE (OUTPATIENT)
Dept: CARDIOLOGY | Facility: MEDICAL CENTER | Age: 79
End: 2024-01-24

## 2024-01-24 VITALS
OXYGEN SATURATION: 98 % | WEIGHT: 205.8 LBS | RESPIRATION RATE: 18 BRPM | DIASTOLIC BLOOD PRESSURE: 68 MMHG | BODY MASS INDEX: 28.81 KG/M2 | HEART RATE: 63 BPM | SYSTOLIC BLOOD PRESSURE: 130 MMHG | HEIGHT: 71 IN

## 2024-01-24 DIAGNOSIS — M79.89 RIGHT LEG SWELLING: ICD-10-CM

## 2024-01-24 DIAGNOSIS — I87.2 VENOUS INSUFFICIENCY: ICD-10-CM

## 2024-01-24 PROCEDURE — 99213 OFFICE O/P EST LOW 20 MIN: CPT | Performed by: INTERNAL MEDICINE

## 2024-01-24 PROCEDURE — 3075F SYST BP GE 130 - 139MM HG: CPT | Performed by: INTERNAL MEDICINE

## 2024-01-24 PROCEDURE — 3078F DIAST BP <80 MM HG: CPT | Performed by: INTERNAL MEDICINE

## 2024-01-24 PROCEDURE — 99214 OFFICE O/P EST MOD 30 MIN: CPT | Performed by: INTERNAL MEDICINE

## 2024-01-24 ASSESSMENT — ENCOUNTER SYMPTOMS
BLOOD IN STOOL: 0
FALLS: 0
CHILLS: 0
BRUISES/BLEEDS EASILY: 0
VOMITING: 0
PALPITATIONS: 0
PND: 0
DEPRESSION: 0
ORTHOPNEA: 0
SENSORY CHANGE: 0
BLURRED VISION: 0
EYE DISCHARGE: 0
EYE PAIN: 0
DIZZINESS: 0
HALLUCINATIONS: 0
DOUBLE VISION: 0
ABDOMINAL PAIN: 0
LOSS OF CONSCIOUSNESS: 0
MYALGIAS: 0
CLAUDICATION: 0
NAUSEA: 0
SHORTNESS OF BREATH: 0
COUGH: 0
HEADACHES: 0
FEVER: 0
WEIGHT LOSS: 0
SPEECH CHANGE: 0

## 2024-01-24 ASSESSMENT — FIBROSIS 4 INDEX: FIB4 SCORE: 1.8

## 2024-01-24 NOTE — TELEPHONE ENCOUNTER
Varinder Soto,      Patient was informed you will be reaching out to schedule: US-VEIN ABLATION VENASEAL UNILATERAL RIGHT [[id number]] . Ordered per TT, Thank you.

## 2024-01-24 NOTE — PROGRESS NOTES
"Chief Complaint   Patient presents with    Follow-Up     Dx: Venous insufficiency         Subjective     Amadou Parekh is a 78 y.o. male who presents today For vascular evaluation of persistent symptoms of RIGHT lower extremities edema, numbness / pain in setting of possible venous insufficiency and reflux disease.  Patient has tried compression stockings without success.  Patient is now seeking for invasive intervention for symptomatic relief and improving quality of life.  No prior vein treatments.  No prior vein stripping surgery.    I personally interpreted the venous duplex ultrasound which showed significant reflux disease venous insufficiency seen in RIGHT great saphenous vein from the proximal calf to distal calf ankle area.  I personally reviewed the images with patient in clinic today as well.      Past Medical History:   Diagnosis Date    Actinic keratoses     sees olivia Everett    Acute nasopharyngitis     7/3 covid    Bilateral hearing loss 08/06/2018    wears aids    Chronic right-sided low back pain without sciatica 08/06/2018    s/p 5 surgeries; sees Akbar    ED (erectile dysfunction)     High cholesterol     Hyperlipidemia, mixed     Hypertension     pt states well controlled on meds    Hypothyroidism     Infectious disease 07/03/2023    Neuropathy (HCC) 08/06/2018    Pain 12/18/2018    \"Nerve Pain-Low back/legs\".    Peyronie disease     sees [facility]    Prediabetes     Renal disorder     chronic kidney disease stage 2     Past Surgical History:   Procedure Laterality Date    AL TOTAL HIP ARTHROPLASTY Right 7/21/2023    Procedure: RIGHT TOTAL HIP ARTHROPLASTY AND REPAIRS AS INDICATED;  Surgeon: Luis Caceres M.D.;  Location: SURGERY [facility];  Service: Orthopedics    OTHER SURGICAL PROCEDURE Left 02/17/2023    nerve decompression perineal nerve done at [facility]    HIP REVISION TOTAL Left 09/21/2022    Procedure: LEFT REVISION TOTAL HIP ARTHROPLASTY AND REPAIRS AS INDICATED;  Surgeon: Luis" SHASHA Caceres M.D.;  Location: [facility];  Service: Orthopedics    FL TOTAL HIP ARTHROPLASTY Left 08/15/2022    Procedure: LEFT TOTAL HIP ARTHROPLASTY;  Surgeon: Luis Caceres M.D.;  Location: [facility];  Service: Orthopedics    FL INS/[facility] SPI NPG/RCVR POCKET  01/03/2022    Procedure: REMOVAL, NEUROSTIMULATOR, PERMANENT, SPINAL CORD;  Surgeon: Be Webber M.D.;  Location: [facility];  Service: Neurosurgery    LUMBAR FUSION O-ARM  01/03/2022    Procedure: FUSION, SPINE, LUMBAR, WITH O-ARM IMAGING GUIDANCE - L1-3 EXTENSION-STAGE 2;  Surgeon: Be Webber M.D.;  Location: [facility];  Service: Neurosurgery    LUMBAR DECOMPRESSION  01/03/2022    Procedure: DECOMPRESSION, SPINE, LUMBAR;  Surgeon: Be Webber M.D.;  Location: [facility];  Service: Neurosurgery    LUMBAR FUSION ANTERIOR N/A 12/31/2021    Procedure: FUSION, SPINE, LUMBAR, ANTERIOR APPROACH - L5-S1;  Surgeon: Be Webber M.D.;  Location: [facility];  Service: Neurosurgery    FUSION, SPINE, LUMBAR, ROBOT-ASSISTED WITH IMAGING GUIDANCE  02/27/2021    Procedure: FUSION, SPINE, LUMBAR, ROBOT-ASSISTED WITH IMAGING GUIDANCE - L5-S1 EXTENSION OF TLIF;  Surgeon: Be Webber M.D.;  Location: [facility];  Service: Neurosurgery    LUMBAR DECOMPRESSION N/A 02/27/2021    Procedure: DECOMPRESSION, SPINE, LUMBAR;  Surgeon: Be Webber M.D.;  Location: [facility];  Service: Neurosurgery    FL INS/[facility] SPI NPG/RCVR POCKET  05/22/2020    Procedure: INSERTION, NEUROSTIMULATOR, PERMANENT, SPINAL CORD;  Surgeon: Eugenio Camara M.D.;  Location: [facility];  Service: Pain Management    PEYRONIES PLAQUE  12/06/2019    Procedure: EXCISION, PEYRONIE'S PLAQUE, PENIS WITH GRAFTING, JEAN CLAUDE PLICATION, ARTIFICIAL ERECTION;  Surgeon: Tejinder Culver M.D.;  Location: [facility];  Service: Urology    FL NERVOUS SYSTEM SURGERY UNLISTED Left 06/03/2019    Procedure: EXPLORATION, NERVE-  "PERONEAL NERVE RELEASE AT THE FIBULAR HEAD  ;  Surgeon: Raz Garcia M.D.;  Location: SURGERY [facility];  Service: Neurosurgery    FUSION, PIP JOINT, TOE Right 02/22/2019    Procedure: PIP ARTHRODESIS;  Surgeon: Amadou Bowden M.D.;  Location: SURGERY SAME DAY [facility];  Service: Orthopedics    FINGER EXPLORATION Right 02/22/2019    Procedure: FINGER EXPLORATION - RING FINGER DISTAL INTERPHALANGEAL JOINT;  Surgeon: Amadou Bowden M.D.;  Location: SURGERY SAME DAY [facility];  Service: Orthopedics    COLONOSCOPY  2019    OTHER NEUROLOGICAL SURG  12/12/2018    \"Low Back Surgery'sx6 between 2006 & 2017\".    CYST EXCISION Right 10/12/2018    Procedure: CYST EXCISION - RING FINGER MUCOUS CYST, DISTAL INTERPHALANGEAL JOINT;  Surgeon: Amadou Bowden M.D.;  Location: SURGERY SAME DAY [facility];  Service: Orthopedics    LUMBAR LAMINECTOMY DISKECTOMY Left 12/06/2017    Procedure: LUMBAR LAMINECTOMY DISKECTOMY - POSTERIOR REDO LEFT  L4-S1 KAILA;  Surgeon: Be Webber M.D.;  Location: [facility];  Service: Neurosurgery    FUSION, SPINE, LUMBAR, PLIF  10/05/2017    Procedure: POSTERIOR TRANSFORAMINAL INTERBODY FUSION AT LUMBAR 4 - 5;  Surgeon: Be Webber M.D.;  Location: [facility];  Service: Neurosurgery    LUMBAR DECOMPRESSION  10/05/2017    Procedure: POSTERIOR LUMBAR 3-4,  4-5 DECOMPRESSION AND FUSION;  Surgeon: Be Webber M.D.;  Location: [facility];  Service: Neurosurgery    [place]Y INJ DX/THER AGNT PARAVERT FACET JOINT, BRITT* Right 10/06/2016    Procedure: INJ PARA FACET L/S 1 LVL W/IG - L3-4, L4-5, L5-S1;  Surgeon: Eugenio Camara M.D.;  Location: [facility];  Service: Pain Management    [place] INJ DX/THER AGNT PARAVERT FACET JOINT, BRITT*  10/06/2016    Procedure: INJ PARA FACET L/S 2D LVL W/IG;  Surgeon: Eugenio Camara M.D.;  Location: [facility];  Service: Pain Management    [place] INJ DX/THER AGNT PARAVERT FACET JOINT, " BRITT*  10/06/2016    Procedure: INJ PARA FACET L/S 3D LVL W/IG;  Surgeon: Eugenio Camara M.D.;  Location: SURGERY [facility];  Service: Pain Management    LA NJX AA&/STRD TFRML EPI LUMBAR/SACRAL 1 LEVEL Right 08/18/2016    Procedure: INJ-FORAMEN EPI LUM/SAC SNGL - L5-S1;  Surgeon: Eugenio Camara M.D.;  Location: SURGERY [facility];  Service: Pain Management    LUMBAR LAMINECTOMY DISKECTOMY Right 03/12/2016    Procedure: LUMBAR HemiLAMINECTOMY Micro DISKECTOMY posterior Redo L3-4 ;  Surgeon: Be Webber M.D.;  Location: SURGERY [facility];  Service:     FORAMINOTOMY Right 03/12/2016    Procedure: FORAMINOTOMY;  Surgeon: Be Webber M.D.;  Location: SURGERY [facility];  Service:     CERVICAL LAMINECTOMY POSTERIOR  2011    DENTAL SURGERY Bilateral as a teenager    wisdom teeth    OTHER      nasal surgery 2015    OTHER NEUROLOGICAL SURG  2006, 2010, 2012, 2014, 2016, 2017    low back surgery x 5    TONSILLECTOMY      child     Family History   Problem Relation Age of Onset    Cancer Father         Leukemia     Heart Disease Father     Diabetes Father     Stroke Father     Cancer Mother         breast met to liver    Heart Disease Mother      Social History     Socioeconomic History    Marital status:      Spouse name: Not on file    Number of children: Not on file    Years of education: Not on file    Highest education level: Not on file   Occupational History    Not on file   Tobacco Use    Smoking status: Never    Smokeless tobacco: Never   Vaping Use    Vaping Use: Never used   Substance and Sexual Activity    Alcohol use: No    Drug use: No    Sexual activity: Yes   Other Topics Concern    Not on file   Social History Narrative    Lives with wife in 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      pregabalin (LYRICA) 25 MG Cap Take 25 mg by mouth 3 times a day.      SYNTHROID 137 MCG Tab TAKE ONE TABLET BY MOUTH EVERY MORNING on an empty stomach 90 Tablet 0    lisinopril (PRINIVIL) 10 MG Tab TAKE ONE TABLET BY MOUTH ONCE DAILY 90 Tablet 0    atorvastatin (LIPITOR) 10 MG Tab Take 1 Tablet by mouth every day. 90 Tablet 0    traMADol (ULTRAM) 50 MG Tab Take 50 mg by mouth.      B Complex Vitamins (VITAMIN B COMPLEX PO) Take 1 Tablet by mouth every day.      ibuprofen (MOTRIN) 200 MG Tab Take 200 mg by mouth every 6 hours as needed.      tadalafil (CIALIS) 5 MG tablet Take 5 mg by mouth every day. Taking for prostate vs ED per pt      Multiple Vitamin (MULTI-VITAMIN) Tab Take 1 Tablet by mouth every day.      Cholecalciferol (VITAMIN D-1000 MAX ST) 1000 UNIT Tab Take 2 Tablets by mouth every day.      ferrous sulfate 325 (65 Fe) MG tablet Take 1 Tablet by mouth every Monday, Wednesday, and Friday for 90 days. (Patient not taking: Reported on 1/24/2024) 50 Tablet 0     No facility-administered encounter medications on file as of 1/24/2024.     Review of Systems   Constitutional:  Negative for chills, fever, malaise/fatigue and weight loss.   HENT:  Negative for ear discharge, ear pain, hearing loss and nosebleeds.    Eyes:  Negative for blurred vision, double vision, pain and discharge.   Respiratory:  Negative for cough and shortness of breath.    Cardiovascular:  Positive for leg swelling. Negative for chest pain, palpitations, orthopnea, claudication and PND.        + right leg swelling and redness.   Gastrointestinal:  Negative for abdominal pain, blood in stool,  "melena, nausea and vomiting.   Genitourinary:  Negative for dysuria and hematuria.   Musculoskeletal:  Negative for falls, joint pain and myalgias.   Skin:  Negative for itching and rash.   Neurological:  Negative for dizziness, sensory change, speech change, loss of consciousness and headaches.   Endo/Heme/Allergies:  Negative for environmental allergies. Does not bruise/bleed easily.   Psychiatric/Behavioral:  Negative for depression, hallucinations and suicidal ideas.               Objective     /68 (BP Location: Left arm, Patient Position: Sitting, BP Cuff Size: Adult)   Pulse 63   Resp 18   Ht 1.803 m (5' 11\")   Wt 93.4 kg (205 lb 12.8 oz)   SpO2 98%   BMI 28.70 kg/m²     Physical Exam  Vitals and nursing note reviewed.   Constitutional:       General: He is not in acute distress.     Appearance: He is not diaphoretic.   HENT:      Head: Normocephalic and atraumatic.      Right Ear: External ear normal.      Left Ear: External ear normal.      Nose: No congestion or rhinorrhea.   Eyes:      General:         Right eye: No discharge.         Left eye: No discharge.   Neck:      Thyroid: No thyromegaly.      Vascular: No JVD.   Cardiovascular:      Rate and Rhythm: Normal rate and regular rhythm.      Pulses: Normal pulses.   Pulmonary:      Effort: No respiratory distress.   Abdominal:      General: There is no distension.      Tenderness: There is no abdominal tenderness.   Musculoskeletal:         General: No swelling or tenderness.      Right lower leg: No edema.      Left lower leg: No edema.   Skin:     General: Skin is warm and dry.   Neurological:      Mental Status: He is alert and oriented to person, place, and time.      Cranial Nerves: No cranial nerve deficit.   Psychiatric:         Behavior: Behavior normal.                Assessment & Plan     1. Venous insufficiency        2. Right leg swelling            Medical Decision Making: Today's Assessment/Status/Plan:   At this time, patient " is very symptomatic in terms of varicose veins and venous insufficiency, CEAP of C4A. Patient tried compression stockings for 6 months and had failed such conservative therapy. Therefore, patient is indicated to further undergo vein treatment via Venaseal (which is approved by the FDA a standard of care) of the RIGHT greater saphenous vein for treatment of symptomatic venous insufficiency and reflux disease to improve quality of life and symptomatology, avoiding further progression and potential venous ulcer(s) formation. Risks (including bleeding, infection, deep vein thrombosis, pain, rash, itchiness, skin irritation etc) and benefits were explained to patient and patient has accepted risks and agreed to proceed    Overall, patient was also informed about the possibility of needing vein graft for bypass surgery in the future.  In those cases, patient was informed that after vein treatment, patient would not be able to use the treated great saphenous vein.  Patient has agreed to proceed with such knowledge.    Of note, I also informed patient that the procedure is only done for the indication for symptomatic relief.  We are not doing this procedure for cosmetic purposes.  This follows guidelines and standard of care based on Medicare and FDA approval.    We will submit for insurance approval and proceed to scheduling after insurance approval.      Demetrius Cuellar MD.   [facility].

## 2024-01-25 NOTE — TELEPHONE ENCOUNTER
Patient is scheduled for  Venaseal Ablation Right Leg on 05- with Dr. Cuellar. Patient has been instructed to check in at 10:45 am for a 11:00 procedure time. No prep. Instructed patient to wear loose fitting clothing and to bring compression stocking with them to appointment. Message sent to authorizations. [employer] message sent to patient with information. JOVANNY to Dr. Cuellar.

## 2024-02-16 NOTE — TELEPHONE ENCOUNTER
Another vein abaltion day added. Patient is now scheduled for  Venaseal Ablation Right Leg on 03- with Dr. Cuellar. Patient has been instructed to check in at 10:45 am for a 11:00 procedure time. No prep. Instructed patient to wear loose fitting clothing and to bring compression stocking with them to appointment. Message sent to authorizations. [employer] message sent to patient with information. JOVANNY to Dr. Cuellar.

## 2024-03-01 ENCOUNTER — TELEPHONE (OUTPATIENT)
Dept: CARDIOLOGY | Facility: MEDICAL CENTER | Age: 79
End: 2024-03-01
Payer: COMMERCIAL

## 2024-03-01 DIAGNOSIS — I83.811 VARICOSE VEINS OF RIGHT LOWER EXTREMITY WITH PAIN: ICD-10-CM

## 2024-03-01 DIAGNOSIS — M79.89 RIGHT LEG SWELLING: ICD-10-CM

## 2024-03-01 NOTE — TELEPHONE ENCOUNTER
Sending to [employer] to get the RF Ablation submitted to the insurance. I will let you know what the outcome is.

## 2024-03-01 NOTE — TELEPHONE ENCOUNTER
Good morning!    This patient's insurance has denied his Venaseal Ablation. Not a covered benefit and not medically necessary.     Here is the p2p info.    [phone]    Case#[id number]

## 2024-03-02 DIAGNOSIS — I10 ESSENTIAL HYPERTENSION: ICD-10-CM

## 2024-03-04 NOTE — TELEPHONE ENCOUNTER
Received request via: Pharmacy    Was the patient seen in the last year in this department? Yes    Does the patient have an active prescription (recently filled or refills available) for medication(s) requested? No    [facility]     Does the patient have prison Plus and need 100 day supply (blood pressure, diabetes and cholesterol meds only)? Patient does not have SCP

## 2024-03-05 RX ORDER — LISINOPRIL 10 MG/1
10 TABLET ORAL DAILY
Qty: 90 TABLET | Refills: 1 | Status: SHIPPED | OUTPATIENT
Start: 2024-03-05

## 2024-03-13 DIAGNOSIS — E78.2 MIXED HYPERLIPIDEMIA: ICD-10-CM

## 2024-03-13 DIAGNOSIS — I10 ESSENTIAL HYPERTENSION: ICD-10-CM

## 2024-03-14 RX ORDER — ATORVASTATIN CALCIUM 10 MG/1
10 TABLET, FILM COATED ORAL DAILY
Qty: 90 TABLET | Refills: 1 | Status: SHIPPED | OUTPATIENT
Start: 2024-03-14

## 2024-03-14 NOTE — TELEPHONE ENCOUNTER
Received request via: Pharmacy    Was the patient seen in the last year in this department? Yes    Does the patient have an active prescription (recently filled or refills available) for medication(s) requested? No    [facility]: [facility]    Does the patient have residential Plus and need 100 day supply (blood pressure, diabetes and cholesterol meds only)? Patient does not have SCP

## 2024-03-17 NOTE — TELEPHONE ENCOUNTER
Last seen: 5/6/22 by Dr. Mccarthy   Next appt: 11/30/22 with Dr. Mccarthy     Was the patient seen in the last year in this department? Yes   Does patient have an active prescription for medications requested? No   Received Request Via: Pharmacy    PA MINERVA: Patient reassessed, sitting comfortably in chair in NAD, denies any complaints. States feeling better, symptoms completed resolved. labs reviewed, trop 6, lipase wnl, d-dimer 265. CXR clear XR thoracic spine no fx. Discussed with attending. Informed elevated d dimer, will need CTA chest to r/o pulmonary embolism; share decision with patient and  at bedside; pt admits symptoms completely improved, pt opt not to pursue for further CT imaging at this time and will return if symptoms persist or worsen. Discussed with attending, patient can be discharged home to f/u with PCP. The patient was given verbal and written discharge instructions. Specifically, instructions when to return to the ED and when to seek follow-up from their pcp was discussed. Any specialty follow-up was discussed, including how to make an appointment.  Instructions were discussed in simple, plain language and was understood by the patient. The patient understands that should their symptoms worsen or any new symptoms arise, they should return to the ED immediately for further evaluation. All pt's questions were answered. Patient verbalizes understanding.

## 2024-03-18 NOTE — TELEPHONE ENCOUNTER
Dr Cuellar,    We have submitted both the Venaseal and RFA ablation to the insurance. They are denying both of them saying they are not a covered benefit. Do you want to do a p2p?

## 2024-03-18 NOTE — TELEPHONE ENCOUNTER
Called and spoke with patient.  Advised patient insurance denied both procedures, patient verbalized understanding.    Patient states he is interested in appealing with his insurance if he has too.  Patient wants to know what his options are going forward.    TT- please advise

## 2024-03-20 NOTE — TELEPHONE ENCOUNTER
Called and spoke with patient.  Patient is requesting further treatment options and/or would like to appeal to insurance.  Patient would like to know what documentation was provided when submitting for authorization, were office notes provided?

## 2024-03-20 NOTE — TELEPHONE ENCOUNTER
Demetrius Cuellar M.D.  You; Brittany Miless2 days ago       Compression stockings for now.  At this time, optimize use of compression stockings with at least grading of 20 to 30 mmHg, at least knee-high.  Thigh-high length is ideal.      Demetrius Cuellar M.D.

## 2024-03-20 NOTE — TELEPHONE ENCOUNTER
Demetrius Cuellar M.D.  You56 minutes ago (1:27 PM)       We are veins vascular. It is not about the providers. It's the insurance. Patient needs to change insurance and look for insurance that could cover this type of procedure.    Demetrius Cuellar M.D.

## 2024-03-26 ENCOUNTER — APPOINTMENT (OUTPATIENT)
Dept: RADIOLOGY | Facility: MEDICAL CENTER | Age: 79
End: 2024-03-26
Attending: INTERNAL MEDICINE
Payer: COMMERCIAL

## 2024-03-28 ENCOUNTER — APPOINTMENT (OUTPATIENT)
Dept: RADIOLOGY | Facility: MEDICAL CENTER | Age: 79
End: 2024-03-28
Attending: INTERNAL MEDICINE
Payer: COMMERCIAL

## 2024-04-15 ENCOUNTER — HOSPITAL ENCOUNTER (OUTPATIENT)
Dept: LAB | Facility: MEDICAL CENTER | Age: 79
End: 2024-04-15
Attending: STUDENT IN AN ORGANIZED HEALTH CARE EDUCATION/TRAINING PROGRAM
Payer: COMMERCIAL

## 2024-04-15 DIAGNOSIS — D50.9 IRON DEFICIENCY ANEMIA, UNSPECIFIED IRON DEFICIENCY ANEMIA TYPE: ICD-10-CM

## 2024-04-15 LAB
FERRITIN SERPL-MCNC: 45.6 NG/ML (ref 22–322)
IRON SATN MFR SERPL: 17 % (ref 15–55)
IRON SERPL-MCNC: 52 UG/DL (ref 50–180)
TIBC SERPL-MCNC: 315 UG/DL (ref 250–450)
UIBC SERPL-MCNC: 263 UG/DL (ref 110–370)

## 2024-04-15 PROCEDURE — 82728 ASSAY OF FERRITIN: CPT

## 2024-04-15 PROCEDURE — 83550 IRON BINDING TEST: CPT

## 2024-04-15 PROCEDURE — 83540 ASSAY OF IRON: CPT

## 2024-04-15 PROCEDURE — 36415 COLL VENOUS BLD VENIPUNCTURE: CPT

## 2024-04-15 ASSESSMENT — ENCOUNTER SYMPTOMS
COUGH: 0
CONSTIPATION: 0
CHILLS: 0
WEAKNESS: 0
NAUSEA: 0
SHORTNESS OF BREATH: 0
DEPRESSION: 0
DIZZINESS: 0
DIARRHEA: 0
MYALGIAS: 0
WEIGHT LOSS: 0
VOMITING: 0
PALPITATIONS: 0
FEVER: 0

## 2024-04-15 NOTE — PROGRESS NOTES
Chief Complaint:  Follow up, anemia    Last Seen:   1/2024    History of Present Illness:     Patient is a 78-year-old M with PMHx of:     HTN: on HCTZ  Hypothyroidism: on levothyroxine   DLD: lipid panel November 2021 Tchol 180    HDL 43    Pre-DM: a1c 5.5 1/2024  Normocytic anemia: order w/u with next set of labs   Chronic sinusitis: follows with ENT  OA, hip  Bilateral hearing loss: b/l hearing aids  Anterior dislocation, left hip: s/p hip replacement 2022, ongoing PT sessions  Lumbar spinal stenosis c/b neuropathy: followed by Dr Webber at [facility], on gabapentin    Iron deficiency  Lumbar degenerative disc disease pending placement of spinal cord stimulator 6/2024  Lumbar spondylosis  Peyronie disease: s/p surgery via urology [place]  BPH- tadalafil, follows with Dr Abiola Avalos  S/p perineal nerve decompression 2023 at [facility]    presenting to clinic today for follow up for anemia    Iron deficiency  Denies sx/ side efx of iron. Has well balanced diet. Pt to call GI specialists to set up colonoscopy for this year. Started on iron supplement last visit. No s/s of celiac disease. Does note intermittent dizziness with taking medication (Lyrica/ gabapentin). H pylori test not obtained. Repeat iron/ ferritin levels. Saw [facility] -- colonoscopy -- polyp removed. Need to request records. They are aware of anemia and dont feel EGD is needed at this time.     Hypertension  BP low 130s at home. Notes adherence to lisinopril. Denies energy drink consumption. Does drink 2 cups of coffee.     Labs  A1c went from 6 to 5.5. Cholesterol WNL. Otherwise labs WNL     Has apt with Dr Cuellar for venous ablation? Tx in Feb.     ----    Healthcare maintenance:   Mood screening: no anxiety, PHQ-2 score 0 11/30/22   Drug screening: no tobacco, alc, rec drugs   Sexual activity screening: not active since hip problems   Infectious disease screening: HIV, hep C neg Jan 2022 negative  Colon cancer screening: Colonoscopy  "2019 w/[facility]- 1 diminutive polyp in transverse colon, diverticulosis (sigmoid), internal hemorrhoids, q 5yr- next due 2027. Polyp found scope 3/2024.  Vaccines: all up to date   Osteoporosis screening: dexa 1/5/23 WNL, next due 2025    Review of Systems   Review of Systems   Constitutional:  Negative for chills, fever and weight loss.   Respiratory:  Negative for cough and shortness of breath.    Cardiovascular:  Negative for chest pain, palpitations and leg swelling.   Gastrointestinal:  Negative for constipation, diarrhea, nausea and vomiting.   Genitourinary:  Negative for dysuria.   Musculoskeletal:  Negative for myalgias.   Neurological:  Negative for dizziness and weakness.   Psychiatric/Behavioral:  Negative for depression.       Past Medical History:   Past Medical History:   Diagnosis Date    Actinic keratoses     sees olivia Everett    Acute nasopharyngitis     7/3 covid    Bilateral hearing loss 08/06/2018    wears aids    Chronic right-sided low back pain without sciatica 08/06/2018    s/p 5 surgeries; sees Akbar    ED (erectile dysfunction)     High cholesterol     Hyperlipidemia, mixed     Hypertension     pt states well controlled on meds    Hypothyroidism     Infectious disease 07/03/2023    Neuropathy (HCC) 08/06/2018    Pain 12/18/2018    \"Nerve Pain-Low back/legs\".    Peyronie disease     sees [place] [facility]    Prediabetes     Renal disorder     chronic kidney disease stage 2       Patient Active Problem List    Diagnosis Date Noted    Pedal edema 10/20/2023    Rash in adult 10/20/2023    Overweight 01/10/2023    Stage 2 chronic kidney disease 01/10/2023    Pre-diabetes 11/30/2022    Normocytic anemia 11/30/2022    Chronic sinusitis 11/30/2022    Arthritis of hip 08/15/2022    Lumbar degenerative disc disease 12/31/2021    Dyslipidemia 11/09/2021    Osteoarthritis 11/09/2021    Peripheral polyneuropathy 08/06/2018    Bilateral hearing loss 08/06/2018    Spondylolysis, lumbar region 10/05/2017    " Chronic lumbar radiculopathy 08/18/2016    Bilateral stenosis of lateral recess of lumbar spine 03/12/2016    Primary hypertension 01/04/2016    Hypothyroidism 01/04/2016     Past Surgical History:   Past Surgical History:   Procedure Laterality Date    WV TOTAL HIP ARTHROPLASTY Right 7/21/2023    Procedure: RIGHT TOTAL HIP ARTHROPLASTY AND REPAIRS AS INDICATED;  Surgeon: Luis Caceres M.D.;  Location: SURGERY [facility];  Service: Orthopedics    OTHER SURGICAL PROCEDURE Left 02/17/2023    nerve decompression perineal nerve done at [facility]    HIP REVISION TOTAL Left 09/21/2022    Procedure: LEFT REVISION TOTAL HIP ARTHROPLASTY AND REPAIRS AS INDICATED;  Surgeon: Luis Caceres M.D.;  Location: SURGERY [facility];  Service: Orthopedics    WV TOTAL HIP ARTHROPLASTY Left 08/15/2022    Procedure: LEFT TOTAL HIP ARTHROPLASTY;  Surgeon: Luis Caceres M.D.;  Location: [facility];  Service: Orthopedics    WV INS/RPLC SPI NPG/RCVR POCKET  01/03/2022    Procedure: REMOVAL, NEUROSTIMULATOR, PERMANENT, SPINAL CORD;  Surgeon: Be Webber M.D.;  Location: [facility];  Service: Neurosurgery    LUMBAR FUSION O-ARM  01/03/2022    Procedure: FUSION, SPINE, LUMBAR, WITH O-ARM IMAGING GUIDANCE - L1-3 EXTENSION-STAGE 2;  Surgeon: Be Webber M.D.;  Location: [facility];  Service: Neurosurgery    LUMBAR DECOMPRESSION  01/03/2022    Procedure: DECOMPRESSION, SPINE, LUMBAR;  Surgeon: Be Webber M.D.;  Location: [facility];  Service: Neurosurgery    LUMBAR FUSION ANTERIOR N/A 12/31/2021    Procedure: FUSION, SPINE, LUMBAR, ANTERIOR APPROACH - L5-S1;  Surgeon: Be Webber M.D.;  Location: [facility];  Service: Neurosurgery    FUSION, SPINE, LUMBAR, ROBOT-ASSISTED WITH IMAGING GUIDANCE  02/27/2021    Procedure: FUSION, SPINE, LUMBAR, ROBOT-ASSISTED WITH IMAGING GUIDANCE - L5-S1 EXTENSION OF TLIF;  Surgeon: Be Webber M.D.;  Location: [facility];  Service: Neurosurgery     "LUMBAR DECOMPRESSION N/A 02/27/2021    Procedure: DECOMPRESSION, SPINE, LUMBAR;  Surgeon: Be Webber M.D.;  Location: SURGERY [facility];  Service: Neurosurgery    ME INS/RPLC SPI NPG/RCVR POCKET  05/22/2020    Procedure: INSERTION, NEUROSTIMULATOR, PERMANENT, SPINAL CORD;  Surgeon: Eugenio Camara M.D.;  Location: [facility];  Service: Pain Management    PEYRONIES PLAQUE  12/06/2019    Procedure: EXCISION, PEYRONIE'S PLAQUE, PENIS WITH GRAFTING, JEAN CLAUDE PLICATION, ARTIFICIAL ERECTION;  Surgeon: Tejinder Culver M.D.;  Location: SURGERY [facility];  Service: Urology    ME NERVOUS SYSTEM SURGERY UNLISTED Left 06/03/2019    Procedure: EXPLORATION, NERVE- PERONEAL NERVE RELEASE AT THE FIBULAR HEAD  ;  Surgeon: Raz Garcia M.D.;  Location: [facility];  Service: Neurosurgery    FUSION, PIP JOINT, TOE Right 02/22/2019    Procedure: PIP ARTHRODESIS;  Surgeon: Amadou Bowden M.D.;  Location: SURGERY [facility];  Service: Orthopedics    FINGER EXPLORATION Right 02/22/2019    Procedure: FINGER EXPLORATION - RING FINGER DISTAL INTERPHALANGEAL JOINT;  Surgeon: Amadou Bowden M.D.;  Location: SURGERY [facility];  Service: Orthopedics    COLONOSCOPY  2019    OTHER NEUROLOGICAL SURG  12/12/2018    \"Low Back Surgery'sx6 between 2006 & 2017\".    CYST EXCISION Right 10/12/2018    Procedure: CYST EXCISION - RING FINGER MUCOUS CYST, DISTAL INTERPHALANGEAL JOINT;  Surgeon: Amadou Bowden M.D.;  Location: SURGERY SAME [facility];  Service: Orthopedics    LUMBAR LAMINECTOMY DISKECTOMY Left 12/06/2017    Procedure: LUMBAR LAMINECTOMY DISKECTOMY - POSTERIOR REDO LEFT  L4-S1 KAILA;  Surgeon: Be Webber M.D.;  Location: SURGERY [facility];  Service: Neurosurgery    FUSION, SPINE, LUMBAR, PLIF  10/05/2017    Procedure: POSTERIOR TRANSFORAMINAL INTERBODY FUSION AT LUMBAR 4 - 5;  Surgeon: Be Webber M.D.;  Location: SURGERY [facility];  Service: " Neurosurgery    LUMBAR DECOMPRESSION  10/05/2017    Procedure: POSTERIOR LUMBAR 3-4,  4-5 DECOMPRESSION AND FUSION;  Surgeon: Be Webber M.D.;  Location: [facility];  Service: Neurosurgery    VT INJ DX/THER AGNT PARAVERT FACET JOINT, BRITT* Right 10/06/2016    Procedure: INJ PARA FACET L/S 1 LVL W/IG - L3-4, L4-5, L5-S1;  Surgeon: Eugenio Camara M.D.;  Location: [facility];  Service: Pain Management    VT INJ DX/THER AGNT PARAVERT FACET JOINT, BRITT*  10/06/2016    Procedure: INJ PARA FACET L/S 2D LVL W/IG;  Surgeon: Eugenio Camara M.D.;  Location: [facility];  Service: Pain Management    VT INJ DX/THER AGNT PARAVERT FACET JOINT, BRITT*  10/06/2016    Procedure: INJ PARA FACET L/S 3D LVL W/IG;  Surgeon: Eugenio Camara M.D.;  Location: [facility];  Service: Pain Management    VT NJX AA&/STRD TFRML EPI LUMBAR/SACRAL 1 LEVEL Right 08/18/2016    Procedure: INJ-FORAMEN EPI LUM/SAC SNGL - L5-S1;  Surgeon: Eugenio Camara M.D.;  Location: [facility];  Service: Pain Management    LUMBAR LAMINECTOMY DISKECTOMY Right 03/12/2016    Procedure: LUMBAR HemiLAMINECTOMY Micro DISKECTOMY posterior Redo L3-4 ;  Surgeon: Be Webber M.D.;  Location: [facility];  Service:     FORAMINOTOMY Right 03/12/2016    Procedure: FORAMINOTOMY;  Surgeon: Be Webber M.D.;  Location: [facility];  Service:     CERVICAL LAMINECTOMY POSTERIOR  2011    DENTAL SURGERY Bilateral as a teenager    wisdom teeth    OTHER      nasal surgery 2015    OTHER NEUROLOGICAL SURG  2006, 2010, 2012, 2014, 2016, 2017    low back surgery x 5    TONSILLECTOMY      child      Allergies:  Patient has no known allergies.    Medications:     Current Outpatient Medications:     gabapentin, Take 300 mg 6 times a day by oral route as directed for 30 days.    pregabalin, Take 1 capsule 3 times a day by oral route as directed for 30 days.    atorvastatin, 10 mg, Oral, DAILY,  "Taking    lisinopril, 10 mg, Oral, DAILY, Taking    tizanidine, 4 mg, Oral, Q6HRS PRN, PRN    Synthroid, 137 mcg, Oral, AM ES, Taking    traMADol, Take 50 mg by mouth., PRN    B Complex Vitamins (VITAMIN B COMPLEX PO), 1 Tablet, Oral, DAILY, Taking    ibuprofen, 200 mg, Oral, Q6HRS PRN, Taking    tadalafil, 5 mg, Oral, DAILY, Taking    Multi-Vitamin, 1 Tablet, Oral, DAILY, Taking    Vitamin D-1000 Max St, 1,000 Units, Oral, DAILY, Taking    fish oil, 750 mg by oral route.     Social History:   Social History     Tobacco Use    Smoking status: Never    Smokeless tobacco: Never   Vaping Use    Vaping Use: Never used   Substance Use Topics    Alcohol use: No    Drug use: No     Family History:   Family History   Problem Relation Age of Onset    Cancer Father         Leukemia     Heart Disease Father     Diabetes Father     Stroke Father     Cancer Mother         breast met to liver    Heart Disease Mother      Vitals:     BP (!) 143/65 (BP Location: Left arm, Patient Position: Sitting, BP Cuff Size: Adult)   Pulse 61   Temp 36.4 °C (97.5 °F) (Temporal)   Ht 1.803 m (5' 11\")   Wt 93.7 kg (206 lb 9.6 oz)   SpO2 97%  Body mass index is 28.81 kg/m².    Physical Exam:   Physical Exam  Constitutional:       General: He is not in acute distress.     Appearance: Normal appearance. He is not ill-appearing or diaphoretic.   HENT:      Head: Normocephalic and atraumatic.      Mouth/Throat:      Mouth: Mucous membranes are moist.   Eyes:      Extraocular Movements: Extraocular movements intact.      Pupils: Pupils are equal, round, and reactive to light.   Cardiovascular:      Rate and Rhythm: Normal rate and regular rhythm.      Heart sounds: No murmur heard.     No friction rub. No gallop.   Pulmonary:      Effort: No respiratory distress.      Breath sounds: No stridor. No wheezing, rhonchi or rales.   Abdominal:      General: Bowel sounds are normal. There is no distension.      Tenderness: There is no abdominal " tenderness. There is no guarding or rebound.   Musculoskeletal:      Right lower leg: No edema.      Left lower leg: No edema.   Skin:     Coloration: Skin is not jaundiced or pale.   Neurological:      General: No focal deficit present.      Mental Status: He is alert and oriented to person, place, and time.   Psychiatric:         Mood and Affect: Mood normal.         Behavior: Behavior normal.     Assessment and Plan    Patient is a 78-year-old male with pertinent PMHx of HTN, DLD, pre-DM, hypothyroidism, lumbar stenosis c/b peripheral neuropathy, renal insufficiency, normocytic anemia presenting today for follow up of anemia:      #Anemia, microcytic- resolved  #Iron deficiency anemia  -Hgb ~13, appears to be BL for patient since apx jan 2022  -Iron low normal with ferritin 20s   -Colonoscopy 2019 w/[facility]- 1 diminutive polyp in transverse colon, diverticulosis (sigmoid), internal hemorrhoids, q 5yr- next due 2024  -Colonoscopy 3/2024 with polyp removed  Plan  -Iron supplementation every other day -- continue for 3 more months with iron/ ferritin recheck   -H pylori testing  -[facility] -- request records for recent colonoscopy     #Hypertension  High in office BP 140s x 2 4/2024, however at home -130s.  Plan  -Lisinopril 10 mg PO qdaily  -Counseled on lifestyle modifications (diet, exercise)  -Continue to keep home BP log    -- CHRONIC AND RESOLVED MEDICAL PROBLEMS --      #Prediabetes  #Overweight   -A1c 12/20/2021 5.5 --> 5.9 Jan 2023  Plan  -Lisinopril for renal protection     #Dyslipidemia  -Lipid panel Jan 2023 Tchol 166    HDL 44  LDL 97  Plan  -Atorvastatin 10 mg PO qdaily    #Chronic kidney disease, stage 2  -Jan 2023: GFR 50s-60s   **Ddx: in setting of HTN/ pre-DM   Plan  -CTM, ace-I as above    #Hypothyroidism  -Well-controlled on current regimen  -Asymptomatic  -TSH Jan 2024  Plan  -Synthroid 137mcg PO qdaily    #Leg swelling, bilateral, R>L  #Leg rash, right inner leg  Began after R total hip  arthroplasty this past summer, has been intermittent since. TTE and UA both WNL 10/2023. Likely that rash is related to venous insufficiency.   Plan  -Compression stockings  -Decrease lasix/ discontinue if no notable changes with it  -Counseled on reduced Na intake    #Peripheral neuropathy 2/2 spinal stenosis  #Lumbar spinal stenosis  #Lumbar degenerative disc disease  #Foot drop, left- result of above  -A1c 12/20/2021 5.5--> 5.7 4/26/22  -B12, B6, folate WNL December 2019  -Chronic left lower extremity pain without obvious cause  --CT June 2021: posterior hardware extending from L3-S1, interbody hardware at L4-5 and L5-S1, spinal stenosis L1-2, L2-3, multilevel facet arthropathy, degenerative subluxation at L1-2, L2-3   -Trialed Lyrica and felt dizzy in past  **suspected hypersensitivity syndrome but most likely 2/2 patient's ongoing significant back etiologies v some component of pre-DM   Plan  -Gabapentin per neurosurgery  -Follow with neurosurgery as outpatient as appropriate     #Sinus infection, chronic  -s/p sinuplasty   Plan  -Follows with otolaryngology- PRN neti + flonase as needed   -SILVIA filled     #Healthcare maintenance  Mood screening: no anxiety, PHQ-2 score 0 11/30/22   Drug screening: none  Sexual activity screening: not active   Infectious disease screening: HIV, hep C neg 1/ 2022 labs  Colon cancer screening: Colonoscopy 2019 w/[facility]- 1 diminutive polyp in transverse colon, diverticulosis (sigmoid), internal hemorrhoids, q 5yr- next due 2024  Vaccines: all up to date   Osteoporosis screening: dexa 1/5/23 WNL, next due 2025    Please note that this dictation was created using voice recognition software. I have made every reasonable attempt to correct obvious errors, but I expect that there are errors of grammar and possibly content that I did not discover before finalizing the note.    Patient case was seen/ assessed/ discussed with attending physician.    Vincenzo Mccarthy, PGY-3  Internal  Medicine

## 2024-04-16 ENCOUNTER — OFFICE VISIT (OUTPATIENT)
Dept: INTERNAL MEDICINE | Facility: OTHER | Age: 79
End: 2024-04-16
Payer: COMMERCIAL

## 2024-04-16 VITALS
DIASTOLIC BLOOD PRESSURE: 65 MMHG | HEART RATE: 61 BPM | TEMPERATURE: 97.5 F | WEIGHT: 206.6 LBS | HEIGHT: 71 IN | SYSTOLIC BLOOD PRESSURE: 143 MMHG | OXYGEN SATURATION: 97 % | BODY MASS INDEX: 28.92 KG/M2

## 2024-04-16 DIAGNOSIS — I10 PRIMARY HYPERTENSION: ICD-10-CM

## 2024-04-16 DIAGNOSIS — D50.9 IRON DEFICIENCY ANEMIA, UNSPECIFIED IRON DEFICIENCY ANEMIA TYPE: ICD-10-CM

## 2024-04-16 PROCEDURE — 3077F SYST BP >= 140 MM HG: CPT | Performed by: STUDENT IN AN ORGANIZED HEALTH CARE EDUCATION/TRAINING PROGRAM

## 2024-04-16 PROCEDURE — 99213 OFFICE O/P EST LOW 20 MIN: CPT | Mod: GE | Performed by: STUDENT IN AN ORGANIZED HEALTH CARE EDUCATION/TRAINING PROGRAM

## 2024-04-16 PROCEDURE — 3078F DIAST BP <80 MM HG: CPT | Performed by: STUDENT IN AN ORGANIZED HEALTH CARE EDUCATION/TRAINING PROGRAM

## 2024-04-16 RX ORDER — HYDROCHLOROTHIAZIDE 12.5 MG/1
TABLET ORAL
COMMUNITY
End: 2024-04-16

## 2024-04-16 RX ORDER — TRAMADOL HYDROCHLORIDE 100 MG/1
TABLET, COATED ORAL
COMMUNITY
End: 2024-04-16

## 2024-04-16 RX ORDER — GABAPENTIN 300 MG/1
CAPSULE ORAL
COMMUNITY
Start: 2024-02-12

## 2024-04-16 RX ORDER — PREGABALIN 200 MG/1
CAPSULE ORAL
COMMUNITY
Start: 2024-02-06

## 2024-04-16 RX ORDER — CHLORAL HYDRATE 500 MG
CAPSULE ORAL
COMMUNITY

## 2024-04-16 RX ORDER — TADALAFIL 10 MG/1
1 TABLET ORAL
COMMUNITY
End: 2024-04-16

## 2024-04-16 ASSESSMENT — FIBROSIS 4 INDEX: FIB4 SCORE: 1.8

## 2024-05-04 ENCOUNTER — OFFICE VISIT (OUTPATIENT)
Dept: URGENT CARE | Facility: PHYSICIAN GROUP | Age: 79
End: 2024-05-04
Payer: COMMERCIAL

## 2024-05-04 VITALS
SYSTOLIC BLOOD PRESSURE: 124 MMHG | BODY MASS INDEX: 29.06 KG/M2 | HEART RATE: 67 BPM | TEMPERATURE: 97.9 F | DIASTOLIC BLOOD PRESSURE: 96 MMHG | HEIGHT: 70 IN | RESPIRATION RATE: 16 BRPM | WEIGHT: 203 LBS | OXYGEN SATURATION: 96 %

## 2024-05-04 DIAGNOSIS — J06.9 VIRAL URI: ICD-10-CM

## 2024-05-04 LAB
FLUAV RNA SPEC QL NAA+PROBE: NEGATIVE
FLUBV RNA SPEC QL NAA+PROBE: NEGATIVE
RSV RNA SPEC QL NAA+PROBE: NEGATIVE
S PYO DNA SPEC NAA+PROBE: NOT DETECTED
SARS-COV-2 RNA RESP QL NAA+PROBE: NEGATIVE

## 2024-05-04 PROCEDURE — 3074F SYST BP LT 130 MM HG: CPT | Performed by: FAMILY MEDICINE

## 2024-05-04 PROCEDURE — 99213 OFFICE O/P EST LOW 20 MIN: CPT | Performed by: FAMILY MEDICINE

## 2024-05-04 PROCEDURE — 3080F DIAST BP >= 90 MM HG: CPT | Performed by: FAMILY MEDICINE

## 2024-05-04 PROCEDURE — 87651 STREP A DNA AMP PROBE: CPT | Performed by: FAMILY MEDICINE

## 2024-05-04 PROCEDURE — 0241U POCT CEPHEID COV-2, FLU A/B, RSV - PCR: CPT | Performed by: FAMILY MEDICINE

## 2024-05-04 RX ORDER — FLUTICASONE PROPIONATE 50 MCG
1 SPRAY, SUSPENSION (ML) NASAL 2 TIMES DAILY
Qty: 16 G | Refills: 0 | Status: SHIPPED
Start: 2024-05-04 | End: 2024-05-09

## 2024-05-04 ASSESSMENT — FIBROSIS 4 INDEX: FIB4 SCORE: 1.8

## 2024-05-04 NOTE — PROGRESS NOTES
"Chief Complaint   Patient presents with    Congestion     X 2 days         CC:  presents with Pharyngitis            Pharyngitis   This is a new problem. The current episode started in the past 3 days. The problem has been unchanged. There has been subj fever. The pain is mild. Associated symptoms include a dry cough. Pertinent negatives include no abdominal pain,   diarrhea, headaches, shortness of breath or vomiting. no exposure to strep or mono.   has tried acetaminophen for the symptoms. The treatment provided mild relief.     Social History     Tobacco Use    Smoking status: Never    Smokeless tobacco: Never   Vaping Use    Vaping Use: Never used   Substance Use Topics    Alcohol use: No    Drug use: No       Past Medical History:   Diagnosis Date    Actinic keratoses     sees olivia Everett    Acute nasopharyngitis     7/3 covid    Bilateral hearing loss 08/06/2018    wears aids    Chronic right-sided low back pain without sciatica 08/06/2018    s/p 5 surgeries; sees Akbar    ED (erectile dysfunction)     High cholesterol     Hyperlipidemia, mixed     Hypertension     pt states well controlled on meds    Hypothyroidism     Infectious disease 07/03/2023    Neuropathy (HCC) 08/06/2018    Pain 12/18/2018    \"Nerve Pain-Low back/legs\".    Peyronie disease     sees [place] [facility]    Prediabetes     Renal disorder     chronic kidney disease stage 2       Review of Systems    HENT: Positive for sore throat  Respiratory: Negative for  sputum production and shortness of breath.    Cardiovascular: Negative for chest pain.   Gastrointestinal: Negative for nausea, vomiting, abdominal pain and diarrhea.   Genitourinary: Negative.    Neurological: Negative for dizziness and headaches.   All other systems reviewed and are negative.         Objective:   BP (!) 124/96   Pulse 67   Temp 36.6 °C (97.9 °F) (Temporal)   Resp 16   Ht 1.778 m (5' 10\")   Wt 92.1 kg (203 lb)   SpO2 96%         Physical Exam   Constitutional:   " oriented to person, place, and time.  appears well-developed and well-nourished. No distress.   HENT:   Head: Normocephalic and atraumatic.   Right Ear: External ear normal.   Left Ear: External ear normal.   Nose: Mucosal edema present. Right sinus exhibits no maxillary sinus tenderness and no frontal sinus tenderness. Left sinus exhibits no maxillary sinus tenderness and no frontal sinus tenderness.   Mouth/Throat: no posterior oropharyngeal exudate.   There is posterior oropharyngeal erythema present. No posterior oropharyngeal edema.   Tonsils absent     Eyes: Conjunctivae and EOM are normal. Pupils are equal, round, and reactive to light. Right eye exhibits no discharge. Left eye exhibits no discharge. No scleral icterus.   Neck: Normal range of motion. Neck supple. No JVD present. No tracheal deviation present. No thyromegaly present.   Cardiovascular: Normal rate, regular rhythm, normal heart sounds and intact distal pulses.  Exam reveals no friction rub.    No murmur heard.  Pulmonary/Chest: Effort normal and breath sounds normal. No respiratory distress.   no wheezes.   no rales.    Musculoskeletal:  exhibits no edema.   Lymphadenopathy:    no cervical LAD  Neurological:   alert and oriented to person, place, and time.   Skin: Skin is warm and dry. No erythema.   Psychiatric:   normal mood and affect.   Nursing note and vitals reviewed.             Assessment/Plan:     1. Sore throat    PCR negative for COVID, influenza A/B, RSV, strep throat.     - fluticasone (FLONASE) 50 MCG/ACT nasal spray; Administer 1 Spray into affected nostril(S) 2 times a day for 7 days.  Dispense: 16 g; Refill: 0  - POCT CEPHEID GROUP A STREP - PCR      Differential diagnosis, natural history, supportive care, and indications for immediate follow-up discussed. All questions answered. Patient agrees with the plan of care.     Follow-up as needed if symptoms worsen or fail to improve to PCP, Urgent care or Emergency Room.     I have  personally reviewed prior external notes and test results pertinent to today's visit.  I have independently reviewed and interpreted all diagnostics ordered during this urgent care acute visit.

## 2024-05-09 ENCOUNTER — TELEPHONE (OUTPATIENT)
Dept: CARDIOLOGY | Facility: MEDICAL CENTER | Age: 79
End: 2024-05-09

## 2024-05-09 ENCOUNTER — OFFICE VISIT (OUTPATIENT)
Dept: INTERNAL MEDICINE | Facility: OTHER | Age: 79
End: 2024-05-09
Payer: COMMERCIAL

## 2024-05-09 VITALS
SYSTOLIC BLOOD PRESSURE: 137 MMHG | HEIGHT: 71 IN | DIASTOLIC BLOOD PRESSURE: 80 MMHG | OXYGEN SATURATION: 97 % | BODY MASS INDEX: 28.31 KG/M2 | HEART RATE: 77 BPM | WEIGHT: 202.2 LBS | TEMPERATURE: 99 F

## 2024-05-09 DIAGNOSIS — E03.4 HYPOTHYROIDISM DUE TO ACQUIRED ATROPHY OF THYROID: ICD-10-CM

## 2024-05-09 DIAGNOSIS — J06.9 UPPER RESPIRATORY TRACT INFECTION, UNSPECIFIED TYPE: ICD-10-CM

## 2024-05-09 PROCEDURE — 99213 OFFICE O/P EST LOW 20 MIN: CPT | Mod: GE | Performed by: HOSPITALIST

## 2024-05-09 PROCEDURE — 3079F DIAST BP 80-89 MM HG: CPT | Performed by: HOSPITALIST

## 2024-05-09 PROCEDURE — 3075F SYST BP GE 130 - 139MM HG: CPT | Performed by: HOSPITALIST

## 2024-05-09 RX ORDER — AMOXICILLIN AND CLAVULANATE POTASSIUM 875; 125 MG/1; MG/1
1 TABLET, FILM COATED ORAL 2 TIMES DAILY
Qty: 12 TABLET | Refills: 0 | Status: SHIPPED | OUTPATIENT
Start: 2024-05-09 | End: 2024-05-15

## 2024-05-09 RX ORDER — LEVOTHYROXINE SODIUM 137 MCG
137 TABLET ORAL
Qty: 30 TABLET | Refills: 0 | Status: SHIPPED | OUTPATIENT
Start: 2024-05-09

## 2024-05-09 RX ORDER — IPRATROPIUM BROMIDE 42 UG/1
2 SPRAY, METERED NASAL 4 TIMES DAILY
Qty: 15 ML | Refills: 0 | Status: SHIPPED
Start: 2024-05-09 | End: 2024-05-09

## 2024-05-09 RX ORDER — BENZONATATE 100 MG/1
100 CAPSULE ORAL 3 TIMES DAILY PRN
Qty: 60 CAPSULE | Refills: 0 | Status: SHIPPED | OUTPATIENT
Start: 2024-05-09

## 2024-05-09 RX ORDER — TRIAMCINOLONE ACETONIDE 55 UG/1
2 SPRAY, METERED NASAL DAILY
Qty: 16.9 ML | Refills: 0 | Status: SHIPPED | OUTPATIENT
Start: 2024-05-09

## 2024-05-09 RX ORDER — GUAIFENESIN 600 MG/1
600 TABLET, EXTENDED RELEASE ORAL EVERY 12 HOURS
Qty: 28 TABLET | Refills: 0 | Status: SHIPPED | OUTPATIENT
Start: 2024-05-09

## 2024-05-09 ASSESSMENT — FIBROSIS 4 INDEX: FIB4 SCORE: 1.8

## 2024-05-09 NOTE — PROGRESS NOTES
"Subjective     Amadou Parekh is a 78 y.o. male who presents with URI (Pt states he has had a cold for two weeks. Pt has been COVID and Strep tested and both are negative ) and Medication Refill  Past medical history includes stage 2 CKD, prediabetes, normocytic anemia, and chronic sinusitis.          HPI  Reports that 2 weeks ago on a Thursday he started having a sore throat then after 2-3 days he had some congestion that has continued  The sore throat has stopped  Denies fever, chills, facial pain or pressure and muscle aches. Denies sick contacts, recent travel, chest pain and difficulty breathing  Tried vitamin C, zinc, alterest (a decongestant), and rest at home    He was seen at the urgent care, reports that he was prescribed some flonase and he was asked to go home. He reports that his nose is so raw, it prevents nose bleeds but has not had any improvement in the nasal congestion    Reports Nasal congestion or blockage, purulent rhinorrhea, and cough of the same items coming out of his nose. Reports some lightheadedness intermittently       ROS  Review of systems as described in HPI.           Objective     /80 (BP Location: Right arm, Patient Position: Sitting, BP Cuff Size: Adult)   Pulse 77   Temp 37.2 °C (99 °F) (Temporal)   Ht 1.803 m (5' 11\")   Wt 91.7 kg (202 lb 3.2 oz)   SpO2 97%   BMI 28.20 kg/m²      Physical Exam  Constitutional:       General: He is not in acute distress.     Appearance: He is ill-appearing. He is not toxic-appearing.   HENT:      Head: Normocephalic and atraumatic.      Nose: No rhinorrhea.      Mouth/Throat:      Pharynx: Oropharynx is clear. No oropharyngeal exudate or posterior oropharyngeal erythema.   Eyes:      General: No scleral icterus.        Right eye: No discharge.         Left eye: No discharge.   Pulmonary:      Effort: Pulmonary effort is normal. No respiratory distress.      Breath sounds: Normal breath sounds.   Abdominal:      General: Bowel sounds " are normal.   Musculoskeletal:         General: Normal range of motion.      Cervical back: Normal range of motion and neck supple. No rigidity or tenderness.   Lymphadenopathy:      Cervical: No cervical adenopathy.   Skin:     Coloration: Skin is not jaundiced.   Neurological:      Gait: Gait normal.   Psychiatric:         Mood and Affect: Mood normal.      Comments: Solemn                   Assessment & Plan        1. Upper respiratory tract infection, unspecified type  Purulent cough, nasal congestion, rhinorrhea, with purulent nasal discharge of 14 day duration. Because of duration and symptoms, likely has a bacterial infection.    Patient educated on the following: If the following therapies do not provide relief in 3-4 days we can proceed with antibiotics. Considering your history of sinusitis, should sinus pain develop and be non-resolving, further evaluation including imaging may be needed.   -The antibiotics have been ordered and sent to your pharmacy. Please complete a 6 day course    -Patient educated that URI are generally self-limiting and mild, but may last for > 10 days. The cough can last for 2-3 weeks.  -hand washing, oral and nasal hygiene (especially when sneezing, coughing, or blowing the nose), appropriate disposal of tissues, & avoiding close contact with others when patient has active symptoms.   -Supportive care recommended: rest, hydration, and antipyretics   -Fever reduction: acetaminophen (paracetamol) or nonsteroidal anti-inflammatory drugs (NSAIDs) can purchased over the counter, should you develop a fever  -For short term relief of nasal congestion:  (Mucinex D) has been ordered and sent to your pharmacy;  -cough reduction: a short term course of benzonatate has been ordered   -Rhinorrhea: nascort nasal spray   -topical decongestion (such as phenylephrine, oxymetazoline (afrin), and xylometazoline) should not be used for more than 3 consecutive days to avoid rebound  congestion  -Heated, humidified air can also improve breathing  - guaiFENesin ER (MUCINEX) 600 MG TABLET SR 12 HR; Take 1 Tablet by mouth every 12 hours.  Dispense: 28 Tablet; Refill: 0  - benzonatate (TESSALON) 100 MG Cap; Take 1 Capsule by mouth 3 times a day as needed for Cough.  Dispense: 60 Capsule; Refill: 0  - amoxicillin-clavulanate (AUGMENTIN) 875-125 MG Tab; Take 1 Tablet by mouth 2 times a day for 6 days.  Dispense: 12 Tablet; Refill: 0  - triamcinolone (NASACORT) 55 MCG/ACT nasal inhaler; Administer 2 Sprays into affected nostril(S) every day.  Dispense: 16.9 mL; Refill: 0    2. Hypothyroidism due to acquired atrophy of thyroid  Patient reports that he only has 1 synthroid tablet left and he would like a refill of his synthroid. Will provide a short term refill of 30 days until the patient can connect with his provider.   TSH from 1/15/24 was 0.940  - SYNTHROID 137 MCG Tab; Take 1 Tablet by mouth every morning on an empty stomach.  Dispense: 30 Tablet; Refill: 0    Follow up: Thank you for allowing us to participate in your care today. Please follow up in 4-6 weeks or earlier if needed with your PCP.    Aura Nova MD MPH  PGY-3  R Internal Medicine     Please note that this dictation was created using voice recognition software. I have made every reasonable attempt to correct obvious errors, but I expect that there are errors of grammar and possibly content that I did not discover before finalizing the note.

## 2024-05-09 NOTE — TELEPHONE ENCOUNTER
"Returned patient call as requested by Cynthia, supervisor. Patient is still wanting vein ablation procedure with TT which was denied by insurance. He is appealing for the 3rd time and states the insurance company is asking if TT can provide studies, additional statement regarding the procedure and how he would benefit from vein ablation. Patient states they told him this is \"experimental\". Patient also states he has had tons of trouble with [employer] insurance. Patient reports continued flakiness, pain and swelling in right leg. Patient would like a mychart with letter for denial.       TT: Please review and advise. Patient continues to try to appeal for vein ablation and needs additional information stated above for insurance. Thank you.     Lora, RN/Stefani RN: Can you please follow up on this for patient? Thank you.  "

## 2024-05-09 NOTE — TELEPHONE ENCOUNTER
TT    Caller: Amadou Parekh    Topic/issue: See comments     Callback Number: [phone]    Thank you,  Aparna AMBRIZ

## 2024-05-09 NOTE — PATIENT INSTRUCTIONS
Thank you for allowing us to participate in your care today. Please follow up in 4-6 weeks or earlier if needed with your PCP.    Below are the bullet point items we discussed. If the following therapies do not provide relief in 3-4 days we can proceed with antibiotics. The antibiotics have been ordered and sent to your pharmacy.     -Patient educated that URI are generally self-limiting and mild, but may last for > 10 days. The cough can last for 2-3 weeks.  -hand washing, oral and nasal hygiene (especially when sneezing, coughing, or blowing the nose), appropriate disposal of tissues, & avoiding close contact with others when patient has active symptoms.   -Supportive care recommended: rest, hydration, and antipyretics   -Fever reduction: acetaminophen (paracetamol) or nonsteroidal anti-inflammatory drugs (NSAIDs) can purchased over the counter, should you develop a fever  -For short term relief of nasal congestion:  (Mucinex D) has been ordered and sent to your pharmacy;  -cough reduction: a short term course of benzonatate has been ordered   -Rhinorrhea: nascort nasal spray   -topical decongestion (such as phenylephrine, oxymetazoline (afrin), and xylometazoline) should not be used for more than 3 consecutive days to avoid rebound congestion  -Heated, humidified air can also improve breathing

## 2024-05-14 ENCOUNTER — APPOINTMENT (OUTPATIENT)
Dept: ADMISSIONS | Facility: MEDICAL CENTER | Age: 79
End: 2024-05-14
Payer: COMMERCIAL

## 2024-05-16 ENCOUNTER — TELEPHONE (OUTPATIENT)
Dept: INTERNAL MEDICINE | Facility: OTHER | Age: 79
End: 2024-05-16
Payer: COMMERCIAL

## 2024-05-16 NOTE — TELEPHONE ENCOUNTER
Caller Name: Amadou Parekh  Call Back Number: [phone]    How would the patient prefer to be contacted with a response: Phone call OK to leave a detailed message    Pt called in this morning asking for advisement. Pt wanted to know if you had other recommendations for his cold. He was seen by Dr. Nova last week and was given antibiotics, which helped relieve his congestion symptoms but he still has hoarseness and a sore throat. Pt wanted to know if he should do another round of antibiotics or what you both recommended.

## 2024-05-17 ENCOUNTER — TELEPHONE (OUTPATIENT)
Dept: INTERNAL MEDICINE | Facility: OTHER | Age: 79
End: 2024-05-17
Payer: COMMERCIAL

## 2024-05-17 RX ORDER — AMOXICILLIN AND CLAVULANATE POTASSIUM 875; 125 MG/1; MG/1
1 TABLET, FILM COATED ORAL 2 TIMES DAILY
COMMUNITY

## 2024-05-17 NOTE — TELEPHONE ENCOUNTER
Received request via: Patient    Was the patient seen in the last year in this department? Yes    Does the patient have an active prescription (recently filled or refills available) for medication(s) requested? No    Pharmacy Name: [id number] - [place], [place] - [address]        Does the patient have MCC Plus and need 100 day supply (blood pressure, diabetes and cholesterol meds only)? Patient does not have SCP

## 2024-06-11 ENCOUNTER — PRE-ADMISSION TESTING (OUTPATIENT)
Dept: ADMISSIONS | Facility: MEDICAL CENTER | Age: 79
End: 2024-06-11
Attending: NEUROLOGICAL SURGERY
Payer: COMMERCIAL

## 2024-06-11 ENCOUNTER — HOSPITAL ENCOUNTER (OUTPATIENT)
Dept: RADIOLOGY | Facility: MEDICAL CENTER | Age: 79
End: 2024-06-11
Attending: NEUROLOGICAL SURGERY | Admitting: NEUROLOGICAL SURGERY
Payer: COMMERCIAL

## 2024-06-11 DIAGNOSIS — Z01.812 PRE-OPERATIVE LABORATORY EXAMINATION: ICD-10-CM

## 2024-06-11 DIAGNOSIS — E03.4 HYPOTHYROIDISM DUE TO ACQUIRED ATROPHY OF THYROID: ICD-10-CM

## 2024-06-11 DIAGNOSIS — Z01.810 PRE-OPERATIVE CARDIOVASCULAR EXAMINATION: ICD-10-CM

## 2024-06-11 DIAGNOSIS — Z01.811 PRE-OPERATIVE RESPIRATORY EXAMINATION: ICD-10-CM

## 2024-06-11 LAB
ANION GAP SERPL CALC-SCNC: 10 MMOL/L (ref 7–16)
APPEARANCE UR: CLEAR
APTT PPP: 24.6 SEC (ref 24.7–36)
BACTERIA #/AREA URNS HPF: NEGATIVE /HPF
BASOPHILS # BLD AUTO: 0.7 % (ref 0–1.8)
BASOPHILS # BLD: 0.04 K/UL (ref 0–0.12)
BILIRUB UR QL STRIP.AUTO: NEGATIVE
BUN SERPL-MCNC: 19 MG/DL (ref 8–22)
CALCIUM SERPL-MCNC: 8.9 MG/DL (ref 8.5–10.5)
CHLORIDE SERPL-SCNC: 104 MMOL/L (ref 96–112)
CO2 SERPL-SCNC: 23 MMOL/L (ref 20–33)
COLOR UR: ABNORMAL
CREAT SERPL-MCNC: 1.18 MG/DL (ref 0.5–1.4)
EKG IMPRESSION: NORMAL
EOSINOPHIL # BLD AUTO: 0.16 K/UL (ref 0–0.51)
EOSINOPHIL NFR BLD: 3 % (ref 0–6.9)
EPI CELLS #/AREA URNS HPF: NEGATIVE /HPF
ERYTHROCYTE [DISTWIDTH] IN BLOOD BY AUTOMATED COUNT: 42.3 FL (ref 35.9–50)
GFR SERPLBLD CREATININE-BSD FMLA CKD-EPI: 63 ML/MIN/1.73 M 2
GLUCOSE SERPL-MCNC: 83 MG/DL (ref 65–99)
GLUCOSE UR STRIP.AUTO-MCNC: NEGATIVE MG/DL
HCT VFR BLD AUTO: 43.2 % (ref 42–52)
HGB BLD-MCNC: 14.5 G/DL (ref 14–18)
HYALINE CASTS #/AREA URNS LPF: ABNORMAL /LPF
IMM GRANULOCYTES # BLD AUTO: 0.02 K/UL (ref 0–0.11)
IMM GRANULOCYTES NFR BLD AUTO: 0.4 % (ref 0–0.9)
INR PPP: 0.93 (ref 0.87–1.13)
KETONES UR STRIP.AUTO-MCNC: NEGATIVE MG/DL
LEUKOCYTE ESTERASE UR QL STRIP.AUTO: ABNORMAL
LYMPHOCYTES # BLD AUTO: 1.3 K/UL (ref 1–4.8)
LYMPHOCYTES NFR BLD: 24.3 % (ref 22–41)
MCH RBC QN AUTO: 29.1 PG (ref 27–33)
MCHC RBC AUTO-ENTMCNC: 33.6 G/DL (ref 32.3–36.5)
MCV RBC AUTO: 86.7 FL (ref 81.4–97.8)
MICRO URNS: ABNORMAL
MONOCYTES # BLD AUTO: 0.49 K/UL (ref 0–0.85)
MONOCYTES NFR BLD AUTO: 9.2 % (ref 0–13.4)
NEUTROPHILS # BLD AUTO: 3.34 K/UL (ref 1.82–7.42)
NEUTROPHILS NFR BLD: 62.4 % (ref 44–72)
NITRITE UR QL STRIP.AUTO: NEGATIVE
NRBC # BLD AUTO: 0 K/UL
NRBC BLD-RTO: 0 /100 WBC (ref 0–0.2)
PH UR STRIP.AUTO: 5.5 [PH] (ref 5–8)
PLATELET # BLD AUTO: 204 K/UL (ref 164–446)
PMV BLD AUTO: 11 FL (ref 9–12.9)
POTASSIUM SERPL-SCNC: 5 MMOL/L (ref 3.6–5.5)
PROT UR QL STRIP: NEGATIVE MG/DL
PROTHROMBIN TIME: 12.6 SEC (ref 12–14.6)
RBC # BLD AUTO: 4.98 M/UL (ref 4.7–6.1)
RBC # URNS HPF: ABNORMAL /HPF
RBC UR QL AUTO: NEGATIVE
SODIUM SERPL-SCNC: 137 MMOL/L (ref 135–145)
SP GR UR STRIP.AUTO: 1.02
UROBILINOGEN UR STRIP.AUTO-MCNC: 0.2 MG/DL
WBC # BLD AUTO: 5.4 K/UL (ref 4.8–10.8)
WBC #/AREA URNS HPF: ABNORMAL /HPF

## 2024-06-11 PROCEDURE — 85610 PROTHROMBIN TIME: CPT

## 2024-06-11 PROCEDURE — 85025 COMPLETE CBC W/AUTO DIFF WBC: CPT

## 2024-06-11 PROCEDURE — 81001 URINALYSIS AUTO W/SCOPE: CPT

## 2024-06-11 PROCEDURE — 93010 ELECTROCARDIOGRAM REPORT: CPT | Performed by: INTERNAL MEDICINE

## 2024-06-11 PROCEDURE — 36415 COLL VENOUS BLD VENIPUNCTURE: CPT

## 2024-06-11 PROCEDURE — 71046 X-RAY EXAM CHEST 2 VIEWS: CPT

## 2024-06-11 PROCEDURE — 93005 ELECTROCARDIOGRAM TRACING: CPT

## 2024-06-11 PROCEDURE — 85730 THROMBOPLASTIN TIME PARTIAL: CPT

## 2024-06-11 PROCEDURE — 80048 BASIC METABOLIC PNL TOTAL CA: CPT

## 2024-06-11 NOTE — OR NURSING
"Pre-Admit RN in person appointment completed with pt, by this RN, for surgery on 6/27/24. Discussed pre-procedure instructions, unless they have been otherwise instructed by their surgeon. Instructed pt on medication instructions referenced from \"Guideline for Pre-Operative Medication Management\", provided by [facility]hesia [facility] Pharmacy to RN staff, unless otherwise instructed by prescribing MD or Surgeon. Pt verbalized understanding of all instructions. Pt denies any questions or concerns. Copy of Medication Reconciliation with instructions provided to pt.  "

## 2024-06-11 NOTE — TELEPHONE ENCOUNTER
Received request via: Patient / phone call     Was the patient seen in the last year in this department? Yes    Does the patient have an active prescription (recently filled or refills available) for medication(s) requested? No    Pharmacy Name: [id number] - [place], [place] - [address]     Does the patient have prison Plus and need 100 day supply (blood pressure, diabetes and cholesterol meds only)? Patient does not have SCP    Routing to on-call doctors as well, as apparently patient has been trying to get this refilled for a while now (however I do not see anything in the chart). Thank you.

## 2024-06-12 RX ORDER — LEVOTHYROXINE SODIUM 137 MCG
137 TABLET ORAL
Qty: 60 TABLET | Refills: 0 | Status: SHIPPED | OUTPATIENT
Start: 2024-06-12

## 2024-06-27 ENCOUNTER — HOSPITAL ENCOUNTER (OUTPATIENT)
Facility: MEDICAL CENTER | Age: 79
End: 2024-06-27
Attending: NEUROLOGICAL SURGERY | Admitting: NEUROLOGICAL SURGERY
Payer: COMMERCIAL

## 2024-06-27 ENCOUNTER — ANESTHESIA EVENT (OUTPATIENT)
Dept: SURGERY | Facility: MEDICAL CENTER | Age: 79
End: 2024-06-27
Payer: COMMERCIAL

## 2024-06-27 ENCOUNTER — ANESTHESIA (OUTPATIENT)
Dept: SURGERY | Facility: MEDICAL CENTER | Age: 79
End: 2024-06-27
Payer: COMMERCIAL

## 2024-06-27 ENCOUNTER — APPOINTMENT (OUTPATIENT)
Dept: RADIOLOGY | Facility: MEDICAL CENTER | Age: 79
End: 2024-06-27
Attending: NEUROLOGICAL SURGERY
Payer: COMMERCIAL

## 2024-06-27 VITALS
BODY MASS INDEX: 27.87 KG/M2 | OXYGEN SATURATION: 94 % | TEMPERATURE: 96.3 F | RESPIRATION RATE: 16 BRPM | HEIGHT: 71 IN | HEART RATE: 70 BPM | WEIGHT: 199.08 LBS | DIASTOLIC BLOOD PRESSURE: 74 MMHG | SYSTOLIC BLOOD PRESSURE: 155 MMHG

## 2024-06-27 PROCEDURE — 160002 HCHG RECOVERY MINUTES (STAT): Performed by: NEUROLOGICAL SURGERY

## 2024-06-27 PROCEDURE — 160041 HCHG SURGERY MINUTES - EA ADDL 1 MIN LEVEL 4: Performed by: NEUROLOGICAL SURGERY

## 2024-06-27 PROCEDURE — 160036 HCHG PACU - EA ADDL 30 MINS PHASE I: Performed by: NEUROLOGICAL SURGERY

## 2024-06-27 PROCEDURE — 700111 HCHG RX REV CODE 636 W/ 250 OVERRIDE (IP): Performed by: NEUROLOGICAL SURGERY

## 2024-06-27 PROCEDURE — C1820 GENERATOR NEURO RECHG BAT SY: HCPCS | Performed by: NEUROLOGICAL SURGERY

## 2024-06-27 PROCEDURE — 700102 HCHG RX REV CODE 250 W/ 637 OVERRIDE(OP): Performed by: ANESTHESIOLOGY

## 2024-06-27 PROCEDURE — C1778 LEAD, NEUROSTIMULATOR: HCPCS | Performed by: NEUROLOGICAL SURGERY

## 2024-06-27 PROCEDURE — 160009 HCHG ANES TIME/MIN: Performed by: NEUROLOGICAL SURGERY

## 2024-06-27 PROCEDURE — 160047 HCHG PACU  - EA ADDL 30 MINS PHASE II: Performed by: NEUROLOGICAL SURGERY

## 2024-06-27 PROCEDURE — 700101 HCHG RX REV CODE 250: Performed by: ANESTHESIOLOGY

## 2024-06-27 PROCEDURE — 700105 HCHG RX REV CODE 258: Performed by: ANESTHESIOLOGY

## 2024-06-27 PROCEDURE — 700111 HCHG RX REV CODE 636 W/ 250 OVERRIDE (IP): Performed by: ANESTHESIOLOGY

## 2024-06-27 PROCEDURE — A9270 NON-COVERED ITEM OR SERVICE: HCPCS | Performed by: ANESTHESIOLOGY

## 2024-06-27 PROCEDURE — 160029 HCHG SURGERY MINUTES - 1ST 30 MINS LEVEL 4: Performed by: NEUROLOGICAL SURGERY

## 2024-06-27 PROCEDURE — 72020 X-RAY EXAM OF SPINE 1 VIEW: CPT

## 2024-06-27 PROCEDURE — 160025 RECOVERY II MINUTES (STATS): Performed by: NEUROLOGICAL SURGERY

## 2024-06-27 PROCEDURE — 110454 HCHG SHELL REV 250: Performed by: NEUROLOGICAL SURGERY

## 2024-06-27 PROCEDURE — 160035 HCHG PACU - 1ST 60 MINS PHASE I: Performed by: NEUROLOGICAL SURGERY

## 2024-06-27 PROCEDURE — 160048 HCHG OR STATISTICAL LEVEL 1-5: Performed by: NEUROLOGICAL SURGERY

## 2024-06-27 PROCEDURE — C1755 CATHETER, INTRASPINAL: HCPCS | Performed by: NEUROLOGICAL SURGERY

## 2024-06-27 PROCEDURE — 502240 HCHG MISC OR SUPPLY RC 0272: Performed by: NEUROLOGICAL SURGERY

## 2024-06-27 PROCEDURE — 110371 HCHG SHELL REV 272: Performed by: NEUROLOGICAL SURGERY

## 2024-06-27 PROCEDURE — 700105 HCHG RX REV CODE 258: Performed by: NEUROLOGICAL SURGERY

## 2024-06-27 PROCEDURE — 700101 HCHG RX REV CODE 250: Performed by: NEUROLOGICAL SURGERY

## 2024-06-27 PROCEDURE — 160046 HCHG PACU - 1ST 60 MINS PHASE II: Performed by: NEUROLOGICAL SURGERY

## 2024-06-27 PROCEDURE — 502000 HCHG MISC OR IMPLANTS RC 0278: Performed by: NEUROLOGICAL SURGERY

## 2024-06-27 DEVICE — IMPLANTABLE DEVICE: Type: IMPLANTABLE DEVICE | Site: SPINE LUMBAR | Status: FUNCTIONAL

## 2024-06-27 RX ORDER — HALOPERIDOL 5 MG/ML
1 INJECTION INTRAMUSCULAR
Status: DISCONTINUED | OUTPATIENT
Start: 2024-06-27 | End: 2024-06-27 | Stop reason: HOSPADM

## 2024-06-27 RX ORDER — HYDRALAZINE HYDROCHLORIDE 20 MG/ML
5 INJECTION INTRAMUSCULAR; INTRAVENOUS
Status: DISCONTINUED | OUTPATIENT
Start: 2024-06-27 | End: 2024-06-27 | Stop reason: HOSPADM

## 2024-06-27 RX ORDER — AMOXICILLIN 500 MG/1
2000 CAPSULE ORAL ONCE
COMMUNITY
End: 2024-07-12

## 2024-06-27 RX ORDER — LABETALOL HYDROCHLORIDE 5 MG/ML
5 INJECTION, SOLUTION INTRAVENOUS
Status: DISCONTINUED | OUTPATIENT
Start: 2024-06-27 | End: 2024-06-27 | Stop reason: HOSPADM

## 2024-06-27 RX ORDER — OXYCODONE HCL 5 MG/5 ML
10 SOLUTION, ORAL ORAL
Status: COMPLETED | OUTPATIENT
Start: 2024-06-27 | End: 2024-06-27

## 2024-06-27 RX ORDER — MEPERIDINE HYDROCHLORIDE 25 MG/ML
12.5 INJECTION INTRAMUSCULAR; INTRAVENOUS; SUBCUTANEOUS
Status: DISCONTINUED | OUTPATIENT
Start: 2024-06-27 | End: 2024-06-27 | Stop reason: HOSPADM

## 2024-06-27 RX ORDER — BUPIVACAINE HYDROCHLORIDE AND EPINEPHRINE 2.5; 5 MG/ML; UG/ML
INJECTION, SOLUTION EPIDURAL; INFILTRATION; INTRACAUDAL; PERINEURAL
Status: DISCONTINUED | OUTPATIENT
Start: 2024-06-27 | End: 2024-06-27 | Stop reason: HOSPADM

## 2024-06-27 RX ORDER — HYDROMORPHONE HYDROCHLORIDE 1 MG/ML
0.1 INJECTION, SOLUTION INTRAMUSCULAR; INTRAVENOUS; SUBCUTANEOUS
Status: DISCONTINUED | OUTPATIENT
Start: 2024-06-27 | End: 2024-06-27 | Stop reason: HOSPADM

## 2024-06-27 RX ORDER — MIDAZOLAM HYDROCHLORIDE 1 MG/ML
INJECTION INTRAMUSCULAR; INTRAVENOUS PRN
Status: DISCONTINUED | OUTPATIENT
Start: 2024-06-27 | End: 2024-06-27 | Stop reason: SURG

## 2024-06-27 RX ORDER — DIPHENHYDRAMINE HYDROCHLORIDE 50 MG/ML
12.5 INJECTION INTRAMUSCULAR; INTRAVENOUS
Status: DISCONTINUED | OUTPATIENT
Start: 2024-06-27 | End: 2024-06-27 | Stop reason: HOSPADM

## 2024-06-27 RX ORDER — ROCURONIUM BROMIDE 10 MG/ML
INJECTION, SOLUTION INTRAVENOUS PRN
Status: DISCONTINUED | OUTPATIENT
Start: 2024-06-27 | End: 2024-06-27 | Stop reason: SURG

## 2024-06-27 RX ORDER — ONDANSETRON 2 MG/ML
INJECTION INTRAMUSCULAR; INTRAVENOUS PRN
Status: DISCONTINUED | OUTPATIENT
Start: 2024-06-27 | End: 2024-06-27 | Stop reason: SURG

## 2024-06-27 RX ORDER — EPHEDRINE SULFATE 50 MG/ML
INJECTION, SOLUTION INTRAVENOUS PRN
Status: DISCONTINUED | OUTPATIENT
Start: 2024-06-27 | End: 2024-06-27 | Stop reason: SURG

## 2024-06-27 RX ORDER — CEFAZOLIN SODIUM 1 G/3ML
INJECTION, POWDER, FOR SOLUTION INTRAMUSCULAR; INTRAVENOUS PRN
Status: DISCONTINUED | OUTPATIENT
Start: 2024-06-27 | End: 2024-06-27 | Stop reason: SURG

## 2024-06-27 RX ORDER — ONDANSETRON 2 MG/ML
4 INJECTION INTRAMUSCULAR; INTRAVENOUS
Status: DISCONTINUED | OUTPATIENT
Start: 2024-06-27 | End: 2024-06-27 | Stop reason: HOSPADM

## 2024-06-27 RX ORDER — OXYCODONE HCL 5 MG/5 ML
5 SOLUTION, ORAL ORAL
Status: COMPLETED | OUTPATIENT
Start: 2024-06-27 | End: 2024-06-27

## 2024-06-27 RX ORDER — SODIUM CHLORIDE, SODIUM LACTATE, POTASSIUM CHLORIDE, CALCIUM CHLORIDE 600; 310; 30; 20 MG/100ML; MG/100ML; MG/100ML; MG/100ML
INJECTION, SOLUTION INTRAVENOUS CONTINUOUS
Status: ACTIVE | OUTPATIENT
Start: 2024-06-27 | End: 2024-06-27

## 2024-06-27 RX ORDER — TRANEXAMIC ACID 100 MG/ML
INJECTION, SOLUTION INTRAVENOUS PRN
Status: DISCONTINUED | OUTPATIENT
Start: 2024-06-27 | End: 2024-06-27 | Stop reason: SURG

## 2024-06-27 RX ORDER — DEXAMETHASONE SODIUM PHOSPHATE 4 MG/ML
INJECTION, SOLUTION INTRA-ARTICULAR; INTRALESIONAL; INTRAMUSCULAR; INTRAVENOUS; SOFT TISSUE PRN
Status: DISCONTINUED | OUTPATIENT
Start: 2024-06-27 | End: 2024-06-27 | Stop reason: SURG

## 2024-06-27 RX ORDER — CEFAZOLIN SODIUM 1 G/3ML
INJECTION, POWDER, FOR SOLUTION INTRAMUSCULAR; INTRAVENOUS
Status: DISCONTINUED | OUTPATIENT
Start: 2024-06-27 | End: 2024-06-27 | Stop reason: HOSPADM

## 2024-06-27 RX ORDER — HYDROMORPHONE HYDROCHLORIDE 1 MG/ML
0.4 INJECTION, SOLUTION INTRAMUSCULAR; INTRAVENOUS; SUBCUTANEOUS
Status: DISCONTINUED | OUTPATIENT
Start: 2024-06-27 | End: 2024-06-27 | Stop reason: HOSPADM

## 2024-06-27 RX ORDER — EPHEDRINE SULFATE 50 MG/ML
5 INJECTION, SOLUTION INTRAVENOUS
Status: DISCONTINUED | OUTPATIENT
Start: 2024-06-27 | End: 2024-06-27 | Stop reason: HOSPADM

## 2024-06-27 RX ORDER — METOPROLOL TARTRATE 1 MG/ML
1 INJECTION, SOLUTION INTRAVENOUS
Status: DISCONTINUED | OUTPATIENT
Start: 2024-06-27 | End: 2024-06-27 | Stop reason: HOSPADM

## 2024-06-27 RX ORDER — HYDROMORPHONE HYDROCHLORIDE 1 MG/ML
0.2 INJECTION, SOLUTION INTRAMUSCULAR; INTRAVENOUS; SUBCUTANEOUS
Status: DISCONTINUED | OUTPATIENT
Start: 2024-06-27 | End: 2024-06-27 | Stop reason: HOSPADM

## 2024-06-27 RX ORDER — LIDOCAINE HYDROCHLORIDE 20 MG/ML
INJECTION, SOLUTION EPIDURAL; INFILTRATION; INTRACAUDAL; PERINEURAL PRN
Status: DISCONTINUED | OUTPATIENT
Start: 2024-06-27 | End: 2024-06-27 | Stop reason: SURG

## 2024-06-27 RX ORDER — IPRATROPIUM BROMIDE AND ALBUTEROL SULFATE 2.5; .5 MG/3ML; MG/3ML
3 SOLUTION RESPIRATORY (INHALATION)
Status: DISCONTINUED | OUTPATIENT
Start: 2024-06-27 | End: 2024-06-27 | Stop reason: HOSPADM

## 2024-06-27 RX ORDER — ACETAMINOPHEN 500 MG
500 TABLET ORAL EVERY 6 HOURS PRN
COMMUNITY

## 2024-06-27 RX ORDER — KETOROLAC TROMETHAMINE 15 MG/ML
15 INJECTION, SOLUTION INTRAMUSCULAR; INTRAVENOUS ONCE
Status: COMPLETED | OUTPATIENT
Start: 2024-06-27 | End: 2024-06-27

## 2024-06-27 RX ADMIN — FENTANYL CITRATE 100 MCG: 50 INJECTION, SOLUTION INTRAMUSCULAR; INTRAVENOUS at 07:06

## 2024-06-27 RX ADMIN — DEXAMETHASONE SODIUM PHOSPHATE 8 MG: 4 INJECTION INTRA-ARTICULAR; INTRALESIONAL; INTRAMUSCULAR; INTRAVENOUS; SOFT TISSUE at 07:32

## 2024-06-27 RX ADMIN — CEFAZOLIN 2 G: 1 INJECTION, POWDER, FOR SOLUTION INTRAMUSCULAR; INTRAVENOUS at 07:19

## 2024-06-27 RX ADMIN — ROCURONIUM BROMIDE 20 MG: 50 INJECTION, SOLUTION INTRAVENOUS at 08:22

## 2024-06-27 RX ADMIN — LIDOCAINE HYDROCHLORIDE 100 MG: 20 INJECTION, SOLUTION EPIDURAL; INFILTRATION; INTRACAUDAL at 07:09

## 2024-06-27 RX ADMIN — SUGAMMADEX 200 MG: 100 INJECTION, SOLUTION INTRAVENOUS at 09:38

## 2024-06-27 RX ADMIN — TRANEXAMIC ACID 1000 MG: 100 INJECTION, SOLUTION INTRAVENOUS at 07:51

## 2024-06-27 RX ADMIN — PROPOFOL 150 MG: 10 INJECTION, EMULSION INTRAVENOUS at 07:09

## 2024-06-27 RX ADMIN — FENTANYL CITRATE 50 MCG: 50 INJECTION, SOLUTION INTRAMUSCULAR; INTRAVENOUS at 09:55

## 2024-06-27 RX ADMIN — OXYCODONE HYDROCHLORIDE 10 MG: 5 SOLUTION ORAL at 09:55

## 2024-06-27 RX ADMIN — FENTANYL CITRATE 50 MCG: 50 INJECTION, SOLUTION INTRAMUSCULAR; INTRAVENOUS at 07:37

## 2024-06-27 RX ADMIN — MIDAZOLAM HYDROCHLORIDE 1 MG: 2 INJECTION, SOLUTION INTRAMUSCULAR; INTRAVENOUS at 07:05

## 2024-06-27 RX ADMIN — ROCURONIUM BROMIDE 50 MG: 50 INJECTION, SOLUTION INTRAVENOUS at 07:09

## 2024-06-27 RX ADMIN — SODIUM CHLORIDE, POTASSIUM CHLORIDE, SODIUM LACTATE AND CALCIUM CHLORIDE: 600; 310; 30; 20 INJECTION, SOLUTION INTRAVENOUS at 07:02

## 2024-06-27 RX ADMIN — FENTANYL CITRATE 50 MCG: 50 INJECTION, SOLUTION INTRAMUSCULAR; INTRAVENOUS at 10:30

## 2024-06-27 RX ADMIN — METHOCARBAMOL 1000 MG: 100 INJECTION, SOLUTION INTRAMUSCULAR; INTRAVENOUS at 10:30

## 2024-06-27 RX ADMIN — KETOROLAC TROMETHAMINE 15 MG: 15 INJECTION, SOLUTION INTRAMUSCULAR; INTRAVENOUS at 11:43

## 2024-06-27 RX ADMIN — EPHEDRINE SULFATE 20 MG: 50 INJECTION, SOLUTION INTRAVENOUS at 07:28

## 2024-06-27 RX ADMIN — ONDANSETRON 4 MG: 2 INJECTION INTRAMUSCULAR; INTRAVENOUS at 09:26

## 2024-06-27 ASSESSMENT — PAIN DESCRIPTION - PAIN TYPE
TYPE: NEUROPATHIC PAIN
TYPE: SURGICAL PAIN

## 2024-06-27 ASSESSMENT — FIBROSIS 4 INDEX: FIB4 SCORE: 2.28

## 2024-06-27 ASSESSMENT — PAIN SCALES - GENERAL: PAIN_LEVEL: 3

## 2024-06-27 NOTE — ANESTHESIA POSTPROCEDURE EVALUATION
Patient: Amadou Parekh    Procedure Summary       Date: 06/27/24 Room / Location: [facility] 06 / SURGERY [facility]    Anesthesia Start: 0702 Anesthesia Stop: 0954    Procedures:       POSTERIOR LUMBAR LAMINECTOMY FOR PLACEMENT OF SPINAL CORD STIMULATOR (Spine Lumbar)      INSERTION, NEUROSTIMULATOR, PERMANENT, SPINAL CORD (Spine Lumbar) Diagnosis: (post laminectomy syndrome)    Surgeons: Be Webber M.D. Responsible Provider: Isaiah Muñoz M.D.    Anesthesia Type: general ASA Status: 2            Final Anesthesia Type: general  Last vitals  BP   Blood Pressure : (!) 167/76    Temp   36.4 °C (97.5 °F)    Pulse   100   Resp   14    SpO2   96 %      Anesthesia Post Evaluation    Patient location during evaluation: PACU  Patient participation: complete - patient participated  Level of consciousness: awake and alert  Pain score: 3    Airway patency: patent  Anesthetic complications: no  Cardiovascular status: hemodynamically stable  Respiratory status: acceptable  Hydration status: euvolemic    PONV: none          There were no known notable events for this encounter.     Nurse Pain Score: 5 (NPRS)

## 2024-06-27 NOTE — OP REPORT
DATE OF SERVICE:  06/27/2024     PREOPERATIVE DIAGNOSES:  Chronic low back and chronic lower extremity pain.     POSTOPERATIVE DIAGNOSES:  Chronic low back and chronic lower extremity pain.     PROCEDURES:    1.  T9 laminectomy with placement of CoverEdge 50 cm paddle, model [id number].  2.  Placement of a WaveWriter Alpha implantable pulse generator, model   #[id number].  3.  Programming performed.     CO-SURGEONS:  Be Webber MD and Raz Trinh II, MD     ANESTHESIA:  General anesthetic.     ANESTHESIOLOGIST:  Isaiah Muñoz MD     PREPARATION:  Routine.     DESCRIPTION OF PROCEDURE:  The patient was brought to the operating room and   following induction, patient was intubated and placed under general   anesthetic.  Rolled prone onto the operating table and chest, hip, thigh pads.    All pressure points were padded.  Sequential stockings were started.  We   marked out our entry point over T9-T10.     After sterile prep and drape of the back and right flank, an incision was made   at T10 and carried down through subcutaneous tissue.  We did a subperiosteal   dissection after opening the dorsal fascia and injected Marcaine 0.25% with   epinephrine.     We drilled off the superior lamina of T10, curetted the ligamentum flavum   free, and then undercut T9 significantly, in order to pass our paddle.  It   would not go in the midline and had a tendency to go off to the right or left.    We went to T8-9.  Above this, we performed two small hemilaminectomies and   obtained meticulous hemostasis.  As we passed the paddle, we were able to   guide it superiorly and into the midline.  We had to undercut a portion of T8   in order to allow the paddle to set as the angulation of the lamina would   press the paddle to one side or the other.     After getting good position, we were able to secure this with 2 securing   clamps along the fascia and then pass the 4 wires down to a pocket made over   the right iliac crest.   This was superior to the iliac crest.     This was attached to the [employer] Alpha IPG and tested.  It was then placed   in the pocket, and at this time.  We were able to close the fascia with 1-0   Vicryl suture of the thoracic area.  Subcutaneous tissue with 2-0 Vicryl and   subcuticular 3-0 Vicryl.  Skin was closed with Steri-Strips.     The IPG (implantable pulse generator) pocket was closed with 2-0 Vicryl suture   for the subcutaneous tissue and 3-0 Vicryl for subcuticular layer.    Steri-Strips were placed on the skin.  All sponge and needle counts were   correct and estimated blood loss was negligible.        ______________________________  MD GANGA JEREZ/YARITZA    DD:  06/27/2024 12:20  DT:  06/27/2024 13:05    Job#:  [id number]

## 2024-06-27 NOTE — ANESTHESIA PREPROCEDURE EVALUATION
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            Relevant Problems   CARDIAC   (positive) Primary hypertension         (positive) Stage 2 chronic kidney disease      ENDO   (positive) Hypothyroidism      Other   (positive) Arthritis of hip       Physical Exam    Airway   Mallampati: II  TM distance: >3 FB  Neck ROM: full       Cardiovascular - normal exam  Rhythm: regular  Rate: normal  (-) murmur     Dental - normal exam           Pulmonary - normal exam  Breath sounds clear to auscultation     Abdominal    Neurological - normal exam         Other findings: I have the record for 10 anesthetics with no problems noted.              Anesthesia Plan    ASA 2       Plan - general       Airway plan will be ETT          Induction: intravenous    Postoperative Plan: Postoperative administration of opioids is intended.    Pertinent diagnostic labs and testing reviewed    Informed Consent:    Anesthetic plan and risks discussed with patient.    Use of blood products discussed with: patient whom consented to blood products.

## 2024-06-27 NOTE — OR NURSING
Discharge , health instructions given to wife Neftaly via phone , all questions answered satisfactorily.

## 2024-06-27 NOTE — ANESTHESIA PROCEDURE NOTES
Airway    Date/Time: 6/27/2024 7:12 AM    Performed by: Isaiah Muñoz M.D.  Authorized by: Isaiah Muñoz M.D.    Location:  OR  Urgency:  Elective  Difficult Airway: No    Indications for Airway Management:  Anesthesia      Spontaneous Ventilation: absent    Sedation Level:  Deep  Preoxygenated: Yes    Patient Position:  Sniffing  Mask Difficulty Assessment:  1 - vent by mask  Final Airway Type:  Endotracheal airway  Final Endotracheal Airway:  ETT  Cuffed: Yes    Technique Used for Successful ETT Placement:  Direct laryngoscopy    Insertion Site:  Oral  Blade Type:  Moreno  Laryngoscope Blade/Videolaryngoscope Blade Size:  2  ETT Size (mm):  7.5  Measured from:  Teeth  ETT to Teeth (cm):  22  Placement Verified by: auscultation and capnometry    Cormack-Lehane Classification:  Grade IIa - partial view of glottis  Number of Attempts at Approach:  1

## 2024-06-27 NOTE — OR NURSING
1211 Pt arrives to phase II from PACU. VSS. Pt reports no pain. Dressing to back clean, dry, and intact.     Discharge instructions reviewed with pt and Evelina BARRETT reviewed discharge instructions with wife over the phone. Both verbalize understanding. Copy of instructions sent home with pt.    Handoff given to Evelina BARRETT.

## 2024-06-27 NOTE — OR NURSING
Pt ambulated with walker to bathroom and voided at 1352.    IV removed. Dressing remains clean, dry, and intact. VSS. Pt dressed independently    Pt wheeled to car with all belongings.

## 2024-06-27 NOTE — PROGRESS NOTES
Medication history reviewed with PT at bedside    Med rec is complete per PT reporting    Allergies reviewed.     PT was on Amoxicillin 2000mg pre-dental one time dose. Last dose was 06/18/2024.    Patient is not taking anticoagulants.    PT is on an unknown glaucoma eye drop. 1 drop both eyes at bedtime. Last dose was 06/26/2024 in the PM.     Preferred pharmacy for this visit - [facility] in [place] ([phone])

## 2024-06-27 NOTE — OR NURSING
Patient arrived in PACU, VSS. Two surgical incisions on lower back, both covered with silver mepilex, CDI. Medicated for pain per MAR.   Neuro stimulator representative at bedside programming device. Report called to NENA Hoyt. Transported to phase 2 room #32.

## 2024-06-27 NOTE — ANESTHESIA TIME REPORT
Anesthesia Start and Stop Event Times       Date Time Event    6/27/2024 0656 Ready for Procedure     0702 Anesthesia Start     0954 Anesthesia Stop          Responsible Staff  06/27/24      Name Role Begin End    Isaiah Muñoz M.D. Anesth 0702 0954          Overtime Reason:  no overtime (within assigned shift)    Comments:

## 2024-06-27 NOTE — OP REPORT
SCS Paddle Implant  Incision for implant of electrodes - paddle lead.     Incision and placement of neurostimulator generator complex.     Segopotso CoverEdge paddle Fluoroscopic guidance for paddle localization.     ANESTHESIA: General Anesthesia     ASA CLASSIFICATION: II     PROCEDURE IN DETAIL: Prior to the procedure, the risks and benefits of interventional treatments were discussed, which include: serious bleeding, infection, damage to surrounding tissues, vessels, nerves or organs, paralysis, lung puncture, increased pain, or severe allergic reaction. Such events could lead to serious disability or even death. Patient understood these risks and agreed to proceed.     After informed consent was obtained, the patient was taken to the Operating Room and general anesthesia was instituted. The patient was turned prone and his pressure points were padded. After localization of the T8/9 interspace with fluoroscopic guidance, the area was prepped and draped in the usual sterile fashion using Betadine. Ioban was also used as part of the draping procedure.     An incision was performed through the skin and soft tissues with a 10 blade scalpel. Electrocautery was also used. Blunt dissection was performed down to the T8/9 interlaminar space. Laminotomy was performed by Dr. Webber and the area within the epidural space was identified. The epidural space was then prepared using a blunt dissector and the paddle lead was then inserted. Using fluoroscopic guidance to match the paddle lead placement with a percutaneous trial, the lead was placed over the inferior T7 interspace with the inferior aspect of the lead placed approximately at the T8 inferior end plate.     The paddle lead was then anchored with a tension loop and the leads were subcutaneously passed over through a pocket which was performed over the RIGHT flank. This was performed using incision and blunt dissection into the skin and soft tissues. The leads  were tested for impedances and they appeared to be functional. The leads were then inserted into the neurostimulator generator and impedances were checked. Again, everything appeared to be functional. The generator, [employer] IPG unit, was then placed in the pocket with the extra lead coiled behind the generator. The incisions were then closed using 2-0 Vicryl pop-off sutures and the dermis was then closed using 2-0 sutures and Dermabond. A sterile dressing was placed over the area and the patient was then flipped over onto the gurney and allowed to emerge from anesthesia.     TOTAL FLUORO TIME: 287 seconds

## 2024-06-27 NOTE — OP REPORT
DATE OF SERVICE:  06/27/2024     PREOPERATIVE DIAGNOSES:  Chronic low back pain and chronic lower extremity   pain.     POSTOPERATIVE DIAGNOSES:  Chronic low back pain and chronic lower extremity   pain.     PROCEDURES:  T9 laminectomy with placement of spinal cord stimulator --     Dictation Ends Here        ______________________________  MD GANGA JEREZ/ZECHARIAH/STEPHEN    DD:  06/27/2024 12:10  DT:  06/27/2024 12:32    Job#:  [id number]

## 2024-06-27 NOTE — DISCHARGE INSTRUCTIONS
HOME CARE INSTRUCTIONS    ACTIVITY: Rest and take it easy for the first 24 hours.  A responsible adult is recommended to remain with you during that time.  It is normal to feel sleepy.  We encourage you to not do anything that requires balance, judgment or coordination.    FOR 24 HOURS DO NOT:  Drive, operate machinery or run household appliances.  Drink beer or alcoholic beverages.  Make important decisions or sign legal documents.    SPECIAL INSTRUCTIONS:   Ambulate as much as comfortable  Wear abdominal binder for 1 week  Ok to shower 6/30, pat area dry  Remove dressing 5 days after surgery, then leave open to air- no dressing   No Aspirin or NSAIDs for 1 week after surgery  No driving for 2 weeks/ No driving while on narcotic medication  Over the counter stool softeners daily while on narcotics  No lifting greater than 10 pounds, no repetitive bending or twisting  Call with signs of infection (fever, chills, redness, drainage)  Follow up at [facility] 2 weeks after surgery    DIET: To avoid nausea, slowly advance diet as tolerated, avoiding spicy or greasy foods for the first day.  Add more substantial food to your diet according to your physician's instructions.  INCREASE FLUIDS AND FIBER TO AVOID CONSTIPATION.      MEDICATIONS: Resume taking daily medication.  Take prescribed pain medication with food.  If no medication is prescribed, you may take non-aspirin pain medication if needed.  PAIN MEDICATION CAN BE VERY CONSTIPATING.  Take a stool softener or laxative such as senokot, pericolace, or milk of magnesia if needed.     No new Prescription given - continue home medication as instructed by Dr. Webber .  Last pain medication given oxycodone at 09:55a.m .    A follow-up appointment should be arranged with your doctor Be Webber in tel # [phone], call to schedule.           You should CALL YOUR PHYSICIAN if you develop:  Fever greater than 101 degrees F.  Pain not relieved by medication, or  persistent nausea or vomiting.  Excessive bleeding (blood soaking through dressing) or unexpected drainage from the wound.  Extreme redness or swelling around the incision site, drainage of pus or foul smelling drainage.  Inability to urinate or empty your bladder within 8 hours.  Problems with breathing or chest pain.    You should call 911 if you develop problems with breathing or chest pain.  If you are unable to contact your doctor or surgical center, you should go to the nearest emergency room or urgent care center.  Physician's telephone #:    [phone]       MILD FLU-LIKE SYMPTOMS ARE NORMAL.  YOU MAY EXPERIENCE GENERALIZED MUSCLE ACHES, THROAT IRRITATION, HEADACHE AND/OR SOME NAUSEA.    If any questions arise, call your doctor.  If your doctor is not available, please feel free to call the Surgical Center at [phone].  The Center is open Monday through Friday from 7AM to 7PM.      A registered nurse may call you a few days after your surgery to see how you are doing after your procedure.    You may also receive a survey in the mail within the next two weeks and we ask that you take a few moments to complete the survey and return it to us.  Our goal is to provide you with very good care and we value your comments.     Depression / Suicide Risk    As you are discharged from this [facility] facility, it is important to learn how to keep safe from harming yourself.    Recognize the warning signs:  Abrupt changes in personality, positive or negative- including increase in energy   Giving away possessions  Change in eating patterns- significant weight changes-  positive or negative  Change in sleeping patterns- unable to sleep or sleeping all the time   Unwillingness or inability to communicate  Depression  Unusual sadness, discouragement and loneliness  Talk of wanting to die  Neglect of personal appearance   Rebelliousness- reckless behavior  Withdrawal from people/activities they love  Confusion-  inability to concentrate     If you or a loved one observes any of these behaviors or has concerns about self-harm, here's what you can do:  Talk about it- your feelings and reasons for harming yourself  Remove any means that you might use to hurt yourself (examples: pills, rope, extension cords, firearm)  Get professional help from the community (Mental Health, Substance Abuse, psychological counseling)  Do not be alone:Call your Safe Contact- someone whom you trust who will be there for you.  Call your local CRISIS HOTLINE 073-3477 or 646-425-4876  Call your local Children's Mobile Crisis Response Team [place] (926) 981-5359 or www.[place]  Call the toll free National Suicide Prevention Hotlines   National Suicide Prevention Lifeline 492-232-MDVU (8824)  National Hope Line Network 800-SUICIDE (975-6848)    I acknowledge receipt and understanding of these Home Care instructions.

## 2024-07-01 DIAGNOSIS — E78.2 MIXED HYPERLIPIDEMIA: ICD-10-CM

## 2024-07-01 DIAGNOSIS — I10 ESSENTIAL HYPERTENSION: ICD-10-CM

## 2024-07-01 RX ORDER — ATORVASTATIN CALCIUM 10 MG/1
10 TABLET, FILM COATED ORAL DAILY
Qty: 90 TABLET | Refills: 0 | Status: SHIPPED | OUTPATIENT
Start: 2024-07-01 | End: 2024-07-12 | Stop reason: SDUPTHER

## 2024-07-04 ENCOUNTER — OFFICE VISIT (OUTPATIENT)
Dept: URGENT CARE | Facility: PHYSICIAN GROUP | Age: 79
End: 2024-07-04
Payer: COMMERCIAL

## 2024-07-04 VITALS
HEART RATE: 70 BPM | WEIGHT: 200 LBS | RESPIRATION RATE: 16 BRPM | DIASTOLIC BLOOD PRESSURE: 50 MMHG | SYSTOLIC BLOOD PRESSURE: 106 MMHG | OXYGEN SATURATION: 94 % | TEMPERATURE: 97.5 F | HEIGHT: 71 IN | BODY MASS INDEX: 28 KG/M2

## 2024-07-04 DIAGNOSIS — S61.211A LACERATION OF LEFT INDEX FINGER WITHOUT FOREIGN BODY WITHOUT DAMAGE TO NAIL, INITIAL ENCOUNTER: ICD-10-CM

## 2024-07-04 PROCEDURE — 90471 IMMUNIZATION ADMIN: CPT

## 2024-07-04 PROCEDURE — 3074F SYST BP LT 130 MM HG: CPT

## 2024-07-04 PROCEDURE — 90715 TDAP VACCINE 7 YRS/> IM: CPT

## 2024-07-04 PROCEDURE — 12001 RPR S/N/AX/GEN/TRNK 2.5CM/<: CPT

## 2024-07-04 PROCEDURE — 3078F DIAST BP <80 MM HG: CPT

## 2024-07-04 ASSESSMENT — ENCOUNTER SYMPTOMS: ROS SKIN COMMENTS: LACERATION TO LEFT INDEX FINGER

## 2024-07-04 ASSESSMENT — FIBROSIS 4 INDEX: FIB4 SCORE: 2.28

## 2024-07-06 ENCOUNTER — HOSPITAL ENCOUNTER (OUTPATIENT)
Dept: LAB | Facility: MEDICAL CENTER | Age: 79
End: 2024-07-06
Attending: STUDENT IN AN ORGANIZED HEALTH CARE EDUCATION/TRAINING PROGRAM
Payer: COMMERCIAL

## 2024-07-06 DIAGNOSIS — D50.9 IRON DEFICIENCY ANEMIA, UNSPECIFIED IRON DEFICIENCY ANEMIA TYPE: ICD-10-CM

## 2024-07-06 LAB
FERRITIN SERPL-MCNC: 96.7 NG/ML (ref 22–322)
IRON SATN MFR SERPL: 20 % (ref 15–55)
IRON SERPL-MCNC: 56 UG/DL (ref 50–180)
TIBC SERPL-MCNC: 284 UG/DL (ref 250–450)
UIBC SERPL-MCNC: 228 UG/DL (ref 110–370)

## 2024-07-06 PROCEDURE — 83550 IRON BINDING TEST: CPT

## 2024-07-06 PROCEDURE — 82728 ASSAY OF FERRITIN: CPT

## 2024-07-06 PROCEDURE — 83013 H PYLORI (C-13) BREATH: CPT

## 2024-07-06 PROCEDURE — 83540 ASSAY OF IRON: CPT

## 2024-07-06 PROCEDURE — 36415 COLL VENOUS BLD VENIPUNCTURE: CPT

## 2024-07-07 LAB — UREA BREATH TEST QL: NEGATIVE

## 2024-07-11 ENCOUNTER — OFFICE VISIT (OUTPATIENT)
Dept: URGENT CARE | Facility: PHYSICIAN GROUP | Age: 79
End: 2024-07-11
Payer: COMMERCIAL

## 2024-07-11 VITALS
BODY MASS INDEX: 26.6 KG/M2 | TEMPERATURE: 97.8 F | WEIGHT: 190 LBS | OXYGEN SATURATION: 96 % | HEIGHT: 71 IN | HEART RATE: 73 BPM | RESPIRATION RATE: 14 BRPM | DIASTOLIC BLOOD PRESSURE: 78 MMHG | SYSTOLIC BLOOD PRESSURE: 138 MMHG

## 2024-07-11 DIAGNOSIS — Z48.02 VISIT FOR SUTURE REMOVAL: ICD-10-CM

## 2024-07-11 PROCEDURE — 3075F SYST BP GE 130 - 139MM HG: CPT

## 2024-07-11 PROCEDURE — 99212 OFFICE O/P EST SF 10 MIN: CPT

## 2024-07-11 PROCEDURE — 3078F DIAST BP <80 MM HG: CPT

## 2024-07-11 ASSESSMENT — FIBROSIS 4 INDEX: FIB4 SCORE: 2.28

## 2024-07-12 ENCOUNTER — OFFICE VISIT (OUTPATIENT)
Dept: INTERNAL MEDICINE | Facility: OTHER | Age: 79
End: 2024-07-12
Payer: COMMERCIAL

## 2024-07-12 VITALS
BODY MASS INDEX: 28.14 KG/M2 | DIASTOLIC BLOOD PRESSURE: 69 MMHG | HEIGHT: 71 IN | WEIGHT: 201 LBS | TEMPERATURE: 97.1 F | OXYGEN SATURATION: 96 % | HEART RATE: 60 BPM | SYSTOLIC BLOOD PRESSURE: 117 MMHG

## 2024-07-12 DIAGNOSIS — M51.36 LUMBAR DEGENERATIVE DISC DISEASE: ICD-10-CM

## 2024-07-12 DIAGNOSIS — N18.2 STAGE 2 CHRONIC KIDNEY DISEASE: ICD-10-CM

## 2024-07-12 DIAGNOSIS — I10 ESSENTIAL HYPERTENSION: ICD-10-CM

## 2024-07-12 DIAGNOSIS — E78.5 DYSLIPIDEMIA: ICD-10-CM

## 2024-07-12 DIAGNOSIS — E03.4 HYPOTHYROIDISM DUE TO ACQUIRED ATROPHY OF THYROID: ICD-10-CM

## 2024-07-12 DIAGNOSIS — I10 PRIMARY HYPERTENSION: ICD-10-CM

## 2024-07-12 DIAGNOSIS — E78.2 MIXED HYPERLIPIDEMIA: ICD-10-CM

## 2024-07-12 PROBLEM — N53.19 ANEJACULATION: Status: ACTIVE | Noted: 2023-11-13

## 2024-07-12 PROBLEM — N40.1 LOWER URINARY TRACT SYMPTOMS DUE TO BENIGN PROSTATIC HYPERPLASIA: Status: ACTIVE | Noted: 2023-11-13

## 2024-07-12 PROCEDURE — 3078F DIAST BP <80 MM HG: CPT | Mod: GC

## 2024-07-12 PROCEDURE — 99213 OFFICE O/P EST LOW 20 MIN: CPT | Mod: GE

## 2024-07-12 PROCEDURE — 3074F SYST BP LT 130 MM HG: CPT | Mod: GC

## 2024-07-12 RX ORDER — LISINOPRIL 10 MG/1
10 TABLET ORAL DAILY
Qty: 90 TABLET | Refills: 2 | Status: SHIPPED | OUTPATIENT
Start: 2024-07-12

## 2024-07-12 RX ORDER — ATORVASTATIN CALCIUM 10 MG/1
10 TABLET, FILM COATED ORAL DAILY
Qty: 90 TABLET | Refills: 2 | Status: SHIPPED | OUTPATIENT
Start: 2024-07-12

## 2024-07-12 RX ORDER — LEVOTHYROXINE SODIUM 137 MCG
137 TABLET ORAL
Qty: 60 TABLET | Refills: 2 | Status: SHIPPED | OUTPATIENT
Start: 2024-07-12

## 2024-07-12 ASSESSMENT — ENCOUNTER SYMPTOMS
TINGLING: 1
DIARRHEA: 0
SHORTNESS OF BREATH: 0
NAUSEA: 0
WEAKNESS: 0
VOMITING: 0
WEIGHT LOSS: 0
DEPRESSION: 0
MYALGIAS: 0
DIAPHORESIS: 0
PALPITATIONS: 0
COUGH: 0
DIZZINESS: 1
CONSTIPATION: 0
CHILLS: 0
FEVER: 0

## 2024-07-12 ASSESSMENT — FIBROSIS 4 INDEX: FIB4 SCORE: 2.28

## 2024-07-30 ENCOUNTER — OFFICE VISIT (OUTPATIENT)
Dept: URGENT CARE | Facility: PHYSICIAN GROUP | Age: 79
End: 2024-07-30
Payer: COMMERCIAL

## 2024-07-30 VITALS
TEMPERATURE: 97.3 F | DIASTOLIC BLOOD PRESSURE: 68 MMHG | BODY MASS INDEX: 28.28 KG/M2 | RESPIRATION RATE: 18 BRPM | WEIGHT: 202 LBS | SYSTOLIC BLOOD PRESSURE: 128 MMHG | OXYGEN SATURATION: 98 % | HEART RATE: 55 BPM | HEIGHT: 71 IN

## 2024-07-30 DIAGNOSIS — L03.012 PARONYCHIA OF FINGER OF LEFT HAND: ICD-10-CM

## 2024-07-30 DIAGNOSIS — L08.9 WOUND INFECTION: ICD-10-CM

## 2024-07-30 DIAGNOSIS — T14.8XXA WOUND INFECTION: ICD-10-CM

## 2024-07-30 RX ORDER — AMOXICILLIN AND CLAVULANATE POTASSIUM 875; 125 MG/1; MG/1
1 TABLET, FILM COATED ORAL 2 TIMES DAILY
Qty: 10 TABLET | Refills: 0 | Status: SHIPPED | OUTPATIENT
Start: 2024-07-30 | End: 2024-08-04

## 2024-07-30 ASSESSMENT — ENCOUNTER SYMPTOMS
WEIGHT LOSS: 0
CLAUDICATION: 0
SPUTUM PRODUCTION: 0
FEVER: 0
CHILLS: 0
ORTHOPNEA: 0
PALPITATIONS: 0
HEMOPTYSIS: 0
WHEEZING: 0
MYALGIAS: 0
COUGH: 0
SHORTNESS OF BREATH: 0
DIAPHORESIS: 0

## 2024-07-30 ASSESSMENT — FIBROSIS 4 INDEX: FIB4 SCORE: 2.28

## 2024-08-09 ENCOUNTER — OFFICE VISIT (OUTPATIENT)
Dept: INTERNAL MEDICINE | Facility: OTHER | Age: 79
End: 2024-08-09
Payer: COMMERCIAL

## 2024-08-09 VITALS
HEART RATE: 60 BPM | TEMPERATURE: 98.2 F | SYSTOLIC BLOOD PRESSURE: 123 MMHG | HEIGHT: 72 IN | DIASTOLIC BLOOD PRESSURE: 66 MMHG | WEIGHT: 203.4 LBS | BODY MASS INDEX: 27.55 KG/M2 | OXYGEN SATURATION: 95 %

## 2024-08-09 DIAGNOSIS — Z18.9 RETAINED SUTURE, INITIAL ENCOUNTER: ICD-10-CM

## 2024-08-09 DIAGNOSIS — S61.311D LACERATION OF LEFT INDEX FINGER WITH DAMAGE TO NAIL, FOREIGN BODY PRESENCE UNSPECIFIED, SUBSEQUENT ENCOUNTER: ICD-10-CM

## 2024-08-09 DIAGNOSIS — T81.89XA RETAINED SUTURE, INITIAL ENCOUNTER: ICD-10-CM

## 2024-08-09 PROCEDURE — 3078F DIAST BP <80 MM HG: CPT | Performed by: INTERNAL MEDICINE

## 2024-08-09 PROCEDURE — 99213 OFFICE O/P EST LOW 20 MIN: CPT | Performed by: INTERNAL MEDICINE

## 2024-08-09 PROCEDURE — 3074F SYST BP LT 130 MM HG: CPT | Performed by: INTERNAL MEDICINE

## 2024-08-09 ASSESSMENT — FIBROSIS 4 INDEX: FIB4 SCORE: 2.31

## 2024-08-09 NOTE — PROGRESS NOTES
"8/9/2024  Chief Complaint   Patient presents with    Follow-Up     Weeks left index finger---\"fairly painful\"       HISTORY OF PRESENT ILLNESS: Patient is a 79 y.o. male established patient who presents today for follow-up on finger pain.   He cut his finger with cutting sears on 07/04, was seen at Urgent Care and received wound irrigation and 6 sutures placed Presented back on 7/11 for removal of sutures, however, only 4 sutures were found. He again presented back to Urgent Care on 7/30 for redness, swelling and pain of the left index finger. Reports he had a infection with pustule at the nail bed which he drainage himself, he was given a 5-day course of Augmentin which he completed. He reports that redness and swelling has improved, however, he still has pain on the finger pad of the index finger, feels as if something is there. Denies any fevers, chills, nausea or vomiting.      Past Medical History:   Diagnosis Date    Actinic keratoses     sees [facility] - Dr. Everett    Acute nasopharyngitis     7/3 covid    Bilateral hearing loss 08/06/2018    wears aids    Chronic right-sided low back pain without sciatica 08/06/2018    s/p 5 surgeries; sees Akbar    ED (erectile dysfunction)     High cholesterol     Hyperlipidemia, mixed     Hypertension     pt states well controlled on meds    Hypothyroidism     Infectious disease 07/03/2023    Neuropathy (HCC) 08/06/2018    Pain 12/18/2018    \"Nerve Pain-Low back/legs\".    Peyronie disease     sees [facility]    Prediabetes     Renal disorder     chronic kidney disease stage 2       Patient Active Problem List    Diagnosis Date Noted    URI (upper respiratory infection) 05/09/2024    Lower urinary tract symptoms due to benign prostatic hyperplasia 11/13/2023    Anejaculation 11/13/2023    Pedal edema 10/20/2023    Rash in adult 10/20/2023    Overweight 01/10/2023    Stage 2 chronic kidney disease 01/10/2023    Pre-diabetes 11/30/2022    Normocytic anemia 11/30/2022    Chronic " sinusitis 11/30/2022    Arthritis of hip 08/15/2022    Lumbar degenerative disc disease 12/31/2021    Dyslipidemia 11/09/2021    Osteoarthritis 11/09/2021    Peripheral polyneuropathy 08/06/2018    Bilateral hearing loss 08/06/2018    Spondylolysis, lumbar region 10/05/2017    Chronic lumbar radiculopathy 08/18/2016    Bilateral stenosis of lateral recess of lumbar spine 03/12/2016    Primary hypertension 01/04/2016    Hypothyroidism 01/04/2016       Allergies:Patient has no known allergies.    Current Outpatient Medications   Medication Sig Dispense Refill    atorvastatin (LIPITOR) 10 MG Tab Take 1 Tablet by mouth every day. 90 Tablet 2    lisinopril (PRINIVIL) 10 MG Tab Take 1 Tablet by mouth every day. 90 Tablet 2    SYNTHROID 137 MCG Tab Take 1 Tablet by mouth every morning on an empty stomach. 60 Tablet 2    acetaminophen (TYLENOL) 500 MG Tab Take 500 mg by mouth every 6 hours as needed for Moderate Pain.      gabapentin (NEURONTIN) 300 MG Cap Take 600 mg by mouth 5 Times a Day. 2 capsules = 600mg      Omega-3 Fatty Acids (FISH OIL) 1000 MG Cap capsule Take 1,000 mg by mouth 2 times a day.         pregabalin (LYRICA) 200 MG capsule Take 200 mg by mouth every day.         B Complex Vitamins (VITAMIN B COMPLEX PO) Take 1 Tablet by mouth every day.         tadalafil (CIALIS) 5 MG tablet Take 5 mg by mouth every day. Taking for prostate      Multiple Vitamin (MULTI-VITAMIN) Tab Take 1 Tablet by mouth every day.         Cholecalciferol (VITAMIN D-1000 MAX ST) 1000 UNIT Tab Take 1,000 Units by mouth every day.       No current facility-administered medications for this visit.       Social History     Tobacco Use    Smoking status: Never    Smokeless tobacco: Never   Vaping Use    Vaping status: Never Used   Substance Use Topics    Alcohol use: No    Drug use: No       Family History   Problem Relation Age of Onset    Cancer Father         Leukemia     Heart Disease Father     Diabetes Father     Stroke Father      "Cancer Mother         breast met to liver    Heart Disease Mother          Review of Systems:   Review of Systems   Constitutional:  Negative for chills, fever, malaise/fatigue and weight loss.   Respiratory:  Negative for shortness of breath.    Cardiovascular:  Negative for chest pain, palpitations and leg swelling.   Gastrointestinal:  Negative for nausea and vomiting.   Musculoskeletal:  Negative for joint pain.   Neurological:  Negative for dizziness.       Exam:  /66 (BP Location: Left arm, Patient Position: Sitting, BP Cuff Size: Adult)   Pulse 60   Temp 36.8 °C (98.2 °F) (Temporal)   Ht 1.816 m (5' 11.5\")   Wt 92.3 kg (203 lb 6.4 oz)   SpO2 95%  Body mass index is 27.97 kg/m².  Physical Exam  Constitutional:       General: He is not in acute distress.     Appearance: He is not toxic-appearing.   Eyes:      Extraocular Movements: Extraocular movements intact.      Conjunctiva/sclera: Conjunctivae normal.   Cardiovascular:      Rate and Rhythm: Normal rate and regular rhythm.   Pulmonary:      Effort: Pulmonary effort is normal.      Breath sounds: Normal breath sounds.   Musculoskeletal:      Comments: There is no swelling of redness of the left hand/fingers, healing wound of the left index finger nail bed. On the left index finger pad, there is an area hard/thickened tissue. Mildly tender to palpation. No joint pain or tenderness.    Skin:     General: Skin is warm and dry.         Assessment/Plan:     Laceration of left index finger with damage to nail, foreign body presence unspecified, subsequent encounter  Retained suture, initial encounter  Had recent laceration to index with 6 sutures place, only 4 removed.   Reports pain and foreign body sensation of finger pad, Ddx include scar tissue formation vs retained sutures. No evidence infection at this time  Will obtain US to evaluation for retained sutures  - US-EXTREMITY NON VASCULAR UNILATERAL LEFT; Future    All imaging results and lab results " and consult notes are reviewed at this visit.  Followup: Return if symptoms worsen or fail to improve.

## 2024-08-12 ASSESSMENT — ENCOUNTER SYMPTOMS
FEVER: 0
WEIGHT LOSS: 0
PALPITATIONS: 0
VOMITING: 0
DIZZINESS: 0
SHORTNESS OF BREATH: 0
NAUSEA: 0
CHILLS: 0

## 2024-08-13 ENCOUNTER — APPOINTMENT (OUTPATIENT)
Dept: RADIOLOGY | Facility: MEDICAL CENTER | Age: 79
End: 2024-08-13
Attending: INTERNAL MEDICINE
Payer: COMMERCIAL

## 2024-08-13 DIAGNOSIS — S61.311D LACERATION OF LEFT INDEX FINGER WITH DAMAGE TO NAIL, FOREIGN BODY PRESENCE UNSPECIFIED, SUBSEQUENT ENCOUNTER: ICD-10-CM

## 2024-08-13 DIAGNOSIS — T81.89XA RETAINED SUTURE, INITIAL ENCOUNTER: ICD-10-CM

## 2024-08-13 DIAGNOSIS — Z18.9 RETAINED SUTURE, INITIAL ENCOUNTER: ICD-10-CM

## 2024-08-13 PROCEDURE — 76882 US LMTD JT/FCL EVL NVASC XTR: CPT | Mod: LT

## 2024-08-15 ENCOUNTER — APPOINTMENT (OUTPATIENT)
Dept: RADIOLOGY | Facility: MEDICAL CENTER | Age: 79
End: 2024-08-15
Attending: INTERNAL MEDICINE
Payer: COMMERCIAL

## 2024-08-30 ENCOUNTER — TELEPHONE (OUTPATIENT)
Dept: INTERNAL MEDICINE | Facility: OTHER | Age: 79
End: 2024-08-30
Payer: COMMERCIAL

## 2024-08-30 NOTE — TELEPHONE ENCOUNTER
Spoke to patient this morning regarding ultrasound results. Pt reports he has not received results, relayed to him that Dr. Umana messaged him on Mychart and relayed that result. Pt wanted to know what to do from here - consulted with Dr. Umana in office. She stated if pt is still experiencing some pain, it may be due to the regeneration process and pt can take tylenol or ibuprofen for symptoms. Told pt, he said pain is only to the touch.    Patient still wanted to talk to a provider regarding his concerns, routing to Dr. Palm, pt's pcp

## 2024-09-03 NOTE — TELEPHONE ENCOUNTER
Shamir Palm M.D.  You4 days ago     CL  As relayed, ultrasound is without any foreign body. As Dr. Umana mentioned, patient should go to ER if any worsening swelling, redness, exquisite pain. If symptoms are improving, he can make sooner appointment with our office for follow-up.    Thanks

## 2024-09-03 NOTE — TELEPHONE ENCOUNTER
Called and spoke to patient regarding Dr. Palm' message. Patient states that he said that he has already consulted with a hand surgeon so it does not make a difference and hung up.

## 2024-09-03 NOTE — TELEPHONE ENCOUNTER
Reclarifying message with Dr. Palm as this was confusing and patient wanted a call from him.    Did you mean that patient needed an appointment if symptoms worsen? Please advise

## 2024-09-11 ENCOUNTER — TELEPHONE (OUTPATIENT)
Dept: CARDIOLOGY | Facility: MEDICAL CENTER | Age: 79
End: 2024-09-11
Payer: COMMERCIAL

## 2024-09-11 NOTE — TELEPHONE ENCOUNTER
TT        Caller: Amadou Parekh      Topic/issue: Patient was asking for a call back to discuss a procedure that was discuss with him at his last appointment. Please advise        Callback Number: [phone]      Thank you    -Jerry PONCE

## 2024-09-16 NOTE — TELEPHONE ENCOUNTER
Patient is scheduled for  Venaseal Ablation Right Leg on 12- with Dr. Cuellar. Patient has been instructed to check in at 1:45 pm for a 2:00 procedure time. No prep. Instructed patient to wear loose fitting clothing and to bring compression stocking with them to appointment. Message sent to Damari to Dr. Cuellar.

## 2024-10-01 ENCOUNTER — OFFICE VISIT (OUTPATIENT)
Dept: DERMATOLOGY | Facility: IMAGING CENTER | Age: 79
End: 2024-10-01
Payer: COMMERCIAL

## 2024-10-01 DIAGNOSIS — D22.9 MULTIPLE BENIGN NEVI: ICD-10-CM

## 2024-10-01 DIAGNOSIS — D48.5 NEOPLASM OF UNCERTAIN BEHAVIOR OF SKIN: ICD-10-CM

## 2024-10-01 DIAGNOSIS — L82.1 SEBORRHEIC KERATOSES: ICD-10-CM

## 2024-10-01 DIAGNOSIS — Z12.83 SKIN CANCER SCREENING: ICD-10-CM

## 2024-10-01 DIAGNOSIS — D18.01 CHERRY ANGIOMA: ICD-10-CM

## 2024-10-01 PROCEDURE — 11102 TANGNTL BX SKIN SINGLE LES: CPT | Performed by: STUDENT IN AN ORGANIZED HEALTH CARE EDUCATION/TRAINING PROGRAM

## 2024-10-01 PROCEDURE — 99213 OFFICE O/P EST LOW 20 MIN: CPT | Mod: 25 | Performed by: STUDENT IN AN ORGANIZED HEALTH CARE EDUCATION/TRAINING PROGRAM

## 2024-10-02 ENCOUNTER — APPOINTMENT (OUTPATIENT)
Dept: RADIOLOGY | Facility: MEDICAL CENTER | Age: 79
End: 2024-10-02
Attending: NURSE PRACTITIONER
Payer: COMMERCIAL

## 2024-10-02 DIAGNOSIS — M54.12 CERVICAL RADICULOPATHY: ICD-10-CM

## 2024-10-02 PROCEDURE — 72050 X-RAY EXAM NECK SPINE 4/5VWS: CPT

## 2024-10-14 ENCOUNTER — TELEPHONE (OUTPATIENT)
Dept: DERMATOLOGY | Facility: IMAGING CENTER | Age: 79
End: 2024-10-14
Payer: COMMERCIAL

## 2024-10-14 DIAGNOSIS — C44.311 BASAL CELL CARCINOMA (BCC) OF RIGHT ALA NASI: ICD-10-CM

## 2024-11-06 ENCOUNTER — TELEPHONE (OUTPATIENT)
Dept: URGENT CARE | Facility: PHYSICIAN GROUP | Age: 79
End: 2024-11-06

## 2024-11-06 ENCOUNTER — OFFICE VISIT (OUTPATIENT)
Dept: URGENT CARE | Facility: PHYSICIAN GROUP | Age: 79
End: 2024-11-06
Payer: COMMERCIAL

## 2024-11-06 ENCOUNTER — TELEPHONE (OUTPATIENT)
Dept: INTERNAL MEDICINE | Facility: OTHER | Age: 79
End: 2024-11-06

## 2024-11-06 VITALS
HEART RATE: 76 BPM | OXYGEN SATURATION: 97 % | HEIGHT: 72 IN | WEIGHT: 200 LBS | TEMPERATURE: 98 F | DIASTOLIC BLOOD PRESSURE: 60 MMHG | BODY MASS INDEX: 27.09 KG/M2 | SYSTOLIC BLOOD PRESSURE: 128 MMHG | RESPIRATION RATE: 16 BRPM

## 2024-11-06 DIAGNOSIS — J06.9 VIRAL URI: ICD-10-CM

## 2024-11-06 PROCEDURE — 3078F DIAST BP <80 MM HG: CPT

## 2024-11-06 PROCEDURE — 99214 OFFICE O/P EST MOD 30 MIN: CPT

## 2024-11-06 PROCEDURE — 0241U POCT CEPHEID COV-2, FLU A/B, RSV - PCR: CPT

## 2024-11-06 PROCEDURE — 3074F SYST BP LT 130 MM HG: CPT

## 2024-11-06 ASSESSMENT — ENCOUNTER SYMPTOMS
FEVER: 0
CHILLS: 0
SHORTNESS OF BREATH: 0

## 2024-11-06 ASSESSMENT — FIBROSIS 4 INDEX: FIB4 SCORE: 2.31

## 2024-11-06 NOTE — TELEPHONE ENCOUNTER
Patient called to discuss positive COVID-19 results.  Advised to continue with OTC and supportive care.  Red flag signs and symptoms discussed in clinic.  No questions or concerns.

## 2024-11-06 NOTE — PROGRESS NOTES
CHIEF COMPLAINT  Chief Complaint   Patient presents with    Nasal Congestion     X 2 days nasal congestion, runny nose      Subjective:   Amadou Parekh is a 79 y.o. male who presents to urgent care with concerns for nasal congestion and bodyaches x 2 days.  Patient denies any symptoms of shortness of breath or fever.  Denies any symptoms of nausea, vomiting or diarrhea.  He does report use of OTC cold and flu medications as well as supportive measures.  No other pertinent past medical history.        Review of Systems   Constitutional:  Positive for malaise/fatigue. Negative for chills and fever.   HENT:  Positive for congestion.    Respiratory:  Negative for shortness of breath.    Cardiovascular:  Negative for chest pain.       PAST MEDICAL HISTORY  Patient Active Problem List    Diagnosis Date Noted    URI (upper respiratory infection) 05/09/2024    Lower urinary tract symptoms due to benign prostatic hyperplasia 11/13/2023    Anejaculation 11/13/2023    Pedal edema 10/20/2023    Rash in adult 10/20/2023    Overweight 01/10/2023    Stage 2 chronic kidney disease 01/10/2023    Pre-diabetes 11/30/2022    Normocytic anemia 11/30/2022    Chronic sinusitis 11/30/2022    Arthritis of hip 08/15/2022    Lumbar degenerative disc disease 12/31/2021    Dyslipidemia 11/09/2021    Osteoarthritis 11/09/2021    Peripheral polyneuropathy 08/06/2018    Bilateral hearing loss 08/06/2018    Spondylolysis, lumbar region 10/05/2017    Chronic lumbar radiculopathy 08/18/2016    Bilateral stenosis of lateral recess of lumbar spine 03/12/2016    Primary hypertension 01/04/2016    Hypothyroidism 01/04/2016       SURGICAL HISTORY   has a past surgical history that includes cervical laminectomy posterior (2011); lumbar laminectomy diskectomy (Right, 03/12/2016); foraminotomy (Right, 03/12/2016); njx aa&/strd tfrml epi lumbar/sacral 1 level (Right, 08/18/2016); inj dx/ther agnt paravert facet joint, reno* (Right, 10/06/2016); inj dx/ther  agnt paravert facet joint, reno* (10/06/2016); inj dx/ther agnt paravert facet joint, reno* (10/06/2016); fusion, spine, lumbar, plif (10/05/2017); lumbar laminectomy diskectomy (Left, 12/06/2017); tonsillectomy; cyst excision (Right, 10/12/2018); other neurological surg (2006, 2010, 2012, 2014, 2016, 2017); lumbar decompression (10/05/2017); other neurological surg (12/12/2018); fusion, pip joint, toe (Right, 02/22/2019); nervous system surgery unlisted (Left, 06/03/2019); finger exploration (Right, 02/22/2019); other; peyronies plaque (12/06/2019); colonoscopy (2019); dental surgery (Bilateral, as a teenager); ins/rplc spi npg/rcvr pocket (05/22/2020); fusion, spine, lumbar, robot-assisted with imaging guidance (02/27/2021); lumbar decompression (N/A, 02/27/2021); lumbar fusion anterior (N/A, 12/31/2021); ins/rplc spi npg/rcvr pocket (01/03/2022); lumbar fusion o-arm (01/03/2022); lumbar decompression (01/03/2022); total hip arthroplasty (Left, 08/15/2022); hip revision total (Left, 09/21/2022); other surgical procedure (Left, 02/17/2023); total hip arthroplasty (Right, 7/21/2023); ins/rplc spi npg/rcvr pocket (N/A, 6/27/2024); and lumbar laminectomy diskectomy (N/A, 6/27/2024).    ALLERGIES  No Known Allergies    CURRENT MEDICATIONS  Home Medications       Reviewed by Tyrell Galicia Ass't (Medical Assistant) on 11/06/24 at 0906  Med List Status: <None>     Medication Last Dose Status   acetaminophen (TYLENOL) 500 MG Tab Taking Active   atorvastatin (LIPITOR) 10 MG Tab Taking Active   B Complex Vitamins (VITAMIN B COMPLEX PO) Taking Active   Cholecalciferol (VITAMIN D-1000 MAX ST) 1000 UNIT Tab Taking Active   gabapentin (NEURONTIN) 300 MG Cap Taking Active   lisinopril (PRINIVIL) 10 MG Tab Taking Active   Multiple Vitamin (MULTI-VITAMIN) Tab Taking Active   Omega-3 Fatty Acids (FISH OIL) 1000 MG Cap capsule Taking Active   pregabalin (LYRICA) 200 MG capsule Taking Active   SYNTHROID 137 MCG Tab Taking Active    tadalafil (CIALIS) 5 MG tablet Taking Active                    SOCIAL HISTORY  Social History     Tobacco Use    Smoking status: Never    Smokeless tobacco: Never   Vaping Use    Vaping status: Never Used   Substance and Sexual Activity    Alcohol use: No    Drug use: No    Sexual activity: Yes       FAMILY HISTORY  Family History   Problem Relation Age of Onset    Cancer Father         Leukemia     Heart Disease Father     Diabetes Father     Stroke Father     Cancer Mother         breast met to liver    Heart Disease Mother          Medications, Allergies, and current problem list reviewed today in Epic.     Objective:     /60   Pulse 76   Temp 36.7 °C (98 °F)   Resp 16   Ht 1.829 m (6')   Wt 90.7 kg (200 lb)   SpO2 97%     Physical Exam  Vitals reviewed.   Constitutional:       General: He is not in acute distress.     Appearance: Normal appearance. He is normal weight. He is not ill-appearing or toxic-appearing.   HENT:      Head: Normocephalic.      Nose: Congestion present.      Mouth/Throat:      Mouth: Mucous membranes are moist.      Pharynx: Oropharynx is clear.   Cardiovascular:      Rate and Rhythm: Normal rate and regular rhythm.      Pulses: Normal pulses.      Heart sounds: Normal heart sounds.   Pulmonary:      Effort: Pulmonary effort is normal. No respiratory distress.      Breath sounds: Normal breath sounds. No stridor. No wheezing, rhonchi or rales.   Musculoskeletal:      Cervical back: Normal range of motion and neck supple.   Skin:     General: Skin is warm.      Capillary Refill: Capillary refill takes less than 2 seconds.   Neurological:      General: No focal deficit present.      Mental Status: He is alert.   Psychiatric:         Mood and Affect: Mood normal.         Assessment/Plan:     Diagnosis and associated orders:     1. Viral URI  POCT CoV-2, Flu A/B, RSV by PCR         Comments/MDM:     The patient presents with symptoms suspicious for likely viral upper respiratory  infection. Differential includes bacterial pneumonia, sinusitis, allergic rhinitis. Do not suspect underlying cardiopulmonary process. I considered, but think unlikely, dangerous causes of this patient’s symptoms to include CHF or COPD exacerbations, pneumonia, pneumothorax. Patient is nontoxic appearing and not in need of emergent medical intervention. They have a normal pulse oximetry on room air, afebrile, and a normal pulmonary exam. Overall, the patient is very well appearing. I do not feel that this patient would benefit from antibiotics at this time.   Recommended symptomatic and supportive care at this time that includes plenty of fluids, rest, Tylenol/Ibuprofen for pain/fever, warm salt water gargles for sore throat, OTC cough and decongestant medication, Flonase, nasal saline washes.    POC viral swab, will follow up with results   Red flag signs and symptom discussed.  Return to clinic if symptoms worsen or fail to resolve.         Differential diagnosis, natural history, supportive care, and indications for immediate follow-up discussed.    Advised the patient to follow-up with the primary care physician for recheck, reevaluation, and consideration of further management.    Please note that this dictation was created using voice recognition software. I have made a reasonable attempt to correct obvious errors, but I expect that there are errors of grammar and possibly content that I did not discover before finalizing the note.    This note was electronically signed by ALAN Barber

## 2024-11-07 ENCOUNTER — OFFICE VISIT (OUTPATIENT)
Dept: URGENT CARE | Facility: PHYSICIAN GROUP | Age: 79
End: 2024-11-07
Payer: COMMERCIAL

## 2024-11-07 VITALS
WEIGHT: 197.6 LBS | SYSTOLIC BLOOD PRESSURE: 126 MMHG | RESPIRATION RATE: 16 BRPM | HEART RATE: 60 BPM | DIASTOLIC BLOOD PRESSURE: 64 MMHG | HEIGHT: 71 IN | OXYGEN SATURATION: 96 % | BODY MASS INDEX: 27.66 KG/M2 | TEMPERATURE: 97.9 F

## 2024-11-07 DIAGNOSIS — U07.1 COVID-19: ICD-10-CM

## 2024-11-07 PROCEDURE — 99214 OFFICE O/P EST MOD 30 MIN: CPT

## 2024-11-07 PROCEDURE — 3078F DIAST BP <80 MM HG: CPT

## 2024-11-07 PROCEDURE — 3074F SYST BP LT 130 MM HG: CPT

## 2024-11-07 ASSESSMENT — FIBROSIS 4 INDEX: FIB4 SCORE: 2.31

## 2024-11-07 ASSESSMENT — ENCOUNTER SYMPTOMS
CHILLS: 0
COUGH: 1
FEVER: 0
SHORTNESS OF BREATH: 0

## 2024-11-07 NOTE — TELEPHONE ENCOUNTER
Pt notified via phone that he'll need to request the Paxlovid from Urgent Care that saw him today. As I was just barely finishing my sentence, pt hung up phone.

## 2024-11-07 NOTE — PROGRESS NOTES
CHIEF COMPLAINT  Chief Complaint   Patient presents with    URI     Covid positive, wanting something to help with Sx     Subjective:   Amadou Parekh is a 79 y.o. male who presents to urgent care with concerning for worsening symptoms after testing positive for Covid-19 yesterday. He is requesting prescription for Paxlovid.    Review of Systems   Constitutional:  Positive for malaise/fatigue. Negative for chills and fever.   HENT:  Positive for congestion.    Respiratory:  Positive for cough. Negative for shortness of breath.        PAST MEDICAL HISTORY  Patient Active Problem List    Diagnosis Date Noted    URI (upper respiratory infection) 05/09/2024    Lower urinary tract symptoms due to benign prostatic hyperplasia 11/13/2023    Anejaculation 11/13/2023    Pedal edema 10/20/2023    Rash in adult 10/20/2023    Overweight 01/10/2023    Stage 2 chronic kidney disease 01/10/2023    Pre-diabetes 11/30/2022    Normocytic anemia 11/30/2022    Chronic sinusitis 11/30/2022    Arthritis of hip 08/15/2022    Lumbar degenerative disc disease 12/31/2021    Dyslipidemia 11/09/2021    Osteoarthritis 11/09/2021    Peripheral polyneuropathy 08/06/2018    Bilateral hearing loss 08/06/2018    Spondylolysis, lumbar region 10/05/2017    Chronic lumbar radiculopathy 08/18/2016    Bilateral stenosis of lateral recess of lumbar spine 03/12/2016    Primary hypertension 01/04/2016    Hypothyroidism 01/04/2016       SURGICAL HISTORY   has a past surgical history that includes cervical laminectomy posterior (2011); lumbar laminectomy diskectomy (Right, 03/12/2016); foraminotomy (Right, 03/12/2016); njx aa&/strd tfrml epi lumbar/sacral 1 level (Right, 08/18/2016); inj dx/ther agnt paravert facet joint, reno* (Right, 10/06/2016); inj dx/ther agnt paravert facet joint, reno* (10/06/2016); inj dx/ther agnt paravert facet joint, reno* (10/06/2016); fusion, spine, lumbar, plif (10/05/2017); lumbar laminectomy diskectomy (Left, 12/06/2017);  tonsillectomy; cyst excision (Right, 10/12/2018); other neurological surg (2006, 2010, 2012, 2014, 2016, 2017); lumbar decompression (10/05/2017); other neurological surg (12/12/2018); fusion, pip joint, toe (Right, 02/22/2019); nervous system surgery unlisted (Left, 06/03/2019); finger exploration (Right, 02/22/2019); other; peyronies plaque (12/06/2019); colonoscopy (2019); dental surgery (Bilateral, as a teenager); ins/rplc spi npg/rcvr pocket (05/22/2020); fusion, spine, lumbar, robot-assisted with imaging guidance (02/27/2021); lumbar decompression (N/A, 02/27/2021); lumbar fusion anterior (N/A, 12/31/2021); ins/rplc spi npg/rcvr pocket (01/03/2022); lumbar fusion o-arm (01/03/2022); lumbar decompression (01/03/2022); total hip arthroplasty (Left, 08/15/2022); hip revision total (Left, 09/21/2022); other surgical procedure (Left, 02/17/2023); total hip arthroplasty (Right, 7/21/2023); ins/rplc spi npg/rcvr pocket (N/A, 6/27/2024); and lumbar laminectomy diskectomy (N/A, 6/27/2024).    ALLERGIES  No Known Allergies    CURRENT MEDICATIONS  Home Medications       Reviewed by Tyrell Feldman Ass't (Medical Assistant) on 11/07/24 at 1101  Med List Status: <None>     Medication Last Dose Status   acetaminophen (TYLENOL) 500 MG Tab PRN Active   atorvastatin (LIPITOR) 10 MG Tab Taking Active   B Complex Vitamins (VITAMIN B COMPLEX PO) Taking Active   Cholecalciferol (VITAMIN D-1000 MAX ST) 1000 UNIT Tab Taking Active   gabapentin (NEURONTIN) 300 MG Cap Taking Active   lisinopril (PRINIVIL) 10 MG Tab Taking Active   Multiple Vitamin (MULTI-VITAMIN) Tab Taking Active   Omega-3 Fatty Acids (FISH OIL) 1000 MG Cap capsule Taking Active   pregabalin (LYRICA) 200 MG capsule Taking Active   SYNTHROID 137 MCG Tab Taking Active   tadalafil (CIALIS) 5 MG tablet Taking Active                    SOCIAL HISTORY  Social History     Tobacco Use    Smoking status: Never    Smokeless tobacco: Never   Vaping Use     "Vaping status: Never Used   Substance and Sexual Activity    Alcohol use: No    Drug use: No    Sexual activity: Yes       FAMILY HISTORY  Family History   Problem Relation Age of Onset    Cancer Father         Leukemia     Heart Disease Father     Diabetes Father     Stroke Father     Cancer Mother         breast met to liver    Heart Disease Mother          Medications, Allergies, and current problem list reviewed today in Epic.     Objective:     /64 (BP Location: Left arm, Patient Position: Sitting, BP Cuff Size: Adult)   Pulse 60   Temp 36.6 °C (97.9 °F) (Temporal)   Resp 16   Ht 1.813 m (5' 11.38\")   Wt 89.6 kg (197 lb 9.6 oz)   SpO2 96%     Physical Exam  Vitals reviewed.   Constitutional:       General: He is not in acute distress.     Appearance: Normal appearance. He is normal weight. He is not ill-appearing or toxic-appearing.   HENT:      Head: Normocephalic.   Cardiovascular:      Rate and Rhythm: Normal rate and regular rhythm.      Pulses: Normal pulses.      Heart sounds: Normal heart sounds.   Pulmonary:      Effort: Pulmonary effort is normal. No respiratory distress.      Breath sounds: Normal breath sounds. No stridor. No wheezing, rhonchi or rales.   Musculoskeletal:      Cervical back: Normal range of motion.   Skin:     General: Skin is warm.      Capillary Refill: Capillary refill takes less than 2 seconds.   Neurological:      General: No focal deficit present.      Mental Status: He is alert.   Psychiatric:         Mood and Affect: Mood normal.         Assessment/Plan:     Diagnosis and associated orders:     1. COVID-19  Nirmatrelvir&Ritonavir 150/100 10 x 150 MG & 10 x 100MG Tablet Therapy Pack         Comments/MDM:     Reviewed patient's medications, and ensured medication list was up-to-date.  Advised patient to abstain from atorvastatin and Cialis while taking Paxlovid.  Previous GFR 63, approximately 4 months ago.  Will prescribe based on lower GFR function.  Discussed " common side effects of Paxlovid.  Advised to discontinue medication and return for reevaluation with any concerning signs or symptoms.  Return to clinic or ER with any symptoms including persistent fever, shortness of breath or worsening symptoms of viral illness.  Continue with OTC and supportive measures.         Differential diagnosis, natural history, supportive care, and indications for immediate follow-up discussed.    Advised the patient to follow-up with the primary care physician for recheck, reevaluation, and consideration of further management.    Please note that this dictation was created using voice recognition software. I have made a reasonable attempt to correct obvious errors, but I expect that there are errors of grammar and possibly content that I did not discover before finalizing the note.    This note was electronically signed by ROSAS Barber.

## 2024-11-07 NOTE — TELEPHONE ENCOUNTER
"Patient called the clinic today to let us know that he tested positive today for Covid at the urgent care.  He started to explain to me that he was upset and during that time I started to ask his info so I could pull up his chart.  He then started to get very aggressive to me and stated I interrupted him.  I told him sorry and to continue.  He then would not speak any words and just silence on the phone.  He then started to be more aggressive and ask If I was there. He started to yell at me and said \"you bastard listen to me\"!  Then I told him he shouldn't talk to me like that and I was trying to help him.  He then continued to use very derogative words and cussing.  I had to disconnect the call.    "

## 2024-11-08 ENCOUNTER — APPOINTMENT (OUTPATIENT)
Dept: INTERNAL MEDICINE | Facility: OTHER | Age: 79
End: 2024-11-08
Payer: COMMERCIAL

## 2024-11-14 ENCOUNTER — TELEPHONE (OUTPATIENT)
Dept: CARDIOLOGY | Facility: MEDICAL CENTER | Age: 79
End: 2024-11-14
Payer: COMMERCIAL

## 2024-11-14 NOTE — TELEPHONE ENCOUNTER
TT    Caller: Amadou Parekh     Topic/issue: The patient would like a call back to discuss a potential upcoming procedure with TT.     Callback Number: [phone]     Thank you,  Connor SCHNEIDER

## 2024-11-15 ENCOUNTER — TELEPHONE (OUTPATIENT)
Dept: CARDIOLOGY | Facility: MEDICAL CENTER | Age: 79
End: 2024-11-15

## 2024-11-15 ENCOUNTER — OFFICE VISIT (OUTPATIENT)
Dept: INTERNAL MEDICINE | Facility: OTHER | Age: 79
End: 2024-11-15
Payer: COMMERCIAL

## 2024-11-15 VITALS
HEART RATE: 59 BPM | DIASTOLIC BLOOD PRESSURE: 78 MMHG | HEIGHT: 70 IN | OXYGEN SATURATION: 98 % | BODY MASS INDEX: 28.75 KG/M2 | SYSTOLIC BLOOD PRESSURE: 161 MMHG | TEMPERATURE: 96.8 F | WEIGHT: 200.8 LBS

## 2024-11-15 DIAGNOSIS — I83.811 VARICOSE VEINS OF RIGHT LOWER EXTREMITY WITH PAIN: ICD-10-CM

## 2024-11-15 DIAGNOSIS — I10 PRIMARY HYPERTENSION: ICD-10-CM

## 2024-11-15 DIAGNOSIS — I87.2 VENOUS INSUFFICIENCY: ICD-10-CM

## 2024-11-15 PROCEDURE — 99213 OFFICE O/P EST LOW 20 MIN: CPT | Mod: GE

## 2024-11-15 PROCEDURE — 3078F DIAST BP <80 MM HG: CPT | Mod: GC

## 2024-11-15 PROCEDURE — 3077F SYST BP >= 140 MM HG: CPT | Mod: GC

## 2024-11-15 ASSESSMENT — ENCOUNTER SYMPTOMS
FEVER: 0
SORE THROAT: 0
HEADACHES: 0
PALPITATIONS: 0
MYALGIAS: 0
VOMITING: 0
WEAKNESS: 0
DIARRHEA: 0
CHILLS: 0
DIZZINESS: 0
TINGLING: 1
COUGH: 0
SHORTNESS OF BREATH: 0
DIAPHORESIS: 0
SINUS PAIN: 0
NAUSEA: 0
WEIGHT LOSS: 0
CONSTIPATION: 0
DEPRESSION: 0

## 2024-11-15 ASSESSMENT — FIBROSIS 4 INDEX: FIB4 SCORE: 2.31

## 2024-11-15 NOTE — PROGRESS NOTES
Last Seen: 11/6/2024     Patient Care Team:  Shamir Palm M.D. as PCP - General (Internal Medicine)  Eugenio Camara M.D. as Consulting Physician (Anesthesiology)    Chief Complaint   Patient presents with    Hypertension     F/u      History of Present Illness:   Amadou Parekh is a 79 y.o. male with PMH as below who presents for:   1. Primary hypertension      Amadou is here to follow-up regarding his multiple stable chronic medical problems as well as discuss his blood pressure.  He has not been taking consistent readings at home but during doctors visits and previous home readings he has remained in the 110s to 120s.  He does have a Heery history of hypotension and was transition from HCTZ to lisinopril which she is tolerated and been largely compliant with.  However he believes that he may have skipped his morning dose of medication this morning.  In has been hypertensive x 2 in clinic today.  He has remained asymptomatic, no significant dizziness, and does have some mild congestion since recovering from COVID after completion of Paxlovid course.  He continues to follow-up with other members of his medical team regarding his dermatology needs and his low back pain.      Review of Systems:  Review of Systems   Constitutional:  Negative for chills, diaphoresis, fever, malaise/fatigue and weight loss.   HENT:  Positive for congestion. Negative for sinus pain and sore throat.    Respiratory:  Negative for cough and shortness of breath.    Cardiovascular:  Negative for chest pain, palpitations and leg swelling.   Gastrointestinal:  Negative for constipation, diarrhea, nausea and vomiting.   Genitourinary:  Negative for dysuria, frequency and urgency.   Musculoskeletal:  Negative for myalgias.   Skin: Negative.    Neurological:  Positive for tingling (chronic). Negative for dizziness, weakness and headaches.   Psychiatric/Behavioral:  Negative for depression.         Past Medical History:   Past Medical  "History:   Diagnosis Date    Actinic keratoses     sees [facility] - Dr. Everett    Acute nasopharyngitis     7/3 covid    Bilateral hearing loss 08/06/2018    wears aids    Chronic right-sided low back pain without sciatica 08/06/2018    s/p 5 surgeries; sees Akbar    ED (erectile dysfunction)     High cholesterol     Hyperlipidemia, mixed     Hypertension     pt states well controlled on meds    Hypothyroidism     Infectious disease 07/03/2023    Neuropathy (HCC) 08/06/2018    Pain 12/18/2018    \"Nerve Pain-Low back/legs\".    Peyronie disease     sees [place] [facility]    Prediabetes     Renal disorder     chronic kidney disease stage 2     Patient Active Problem List   Diagnosis    Bilateral stenosis of lateral recess of lumbar spine    Hypertensive disorder    Hypothyroidism    Chronic lumbar radiculopathy    Spondylolysis, lumbar region    Peripheral polyneuropathy    Bilateral hearing loss    Dyslipidemia    Osteoarthritis    Lumbar degenerative disc disease    Arthritis of hip    Pre-diabetes    Normocytic anemia    Chronic sinusitis    Overweight    Stage 2 chronic kidney disease    Pedal edema    Rash in adult    URI (upper respiratory infection)    Lower urinary tract symptoms due to benign prostatic hyperplasia    Anejaculation       Medications:     Current Outpatient Medications:     atorvastatin, 10 mg, Oral, DAILY, Taking    lisinopril, 10 mg, Oral, DAILY, Taking    Synthroid, 137 mcg, Oral, AM ES, Taking    acetaminophen, 500 mg, Oral, Q6HRS PRN, PRN    gabapentin, 600 mg, Oral, 5X/DAY, PRN    fish oil, 1,000 mg, Oral, BID, Taking    pregabalin, 200 mg, Oral, DAILY, Taking    B Complex Vitamins (VITAMIN B COMPLEX PO), 1 Tablet, Oral, DAILY, Taking    tadalafil, 5 mg, Oral, DAILY, Taking    Multi-Vitamin, 1 Tablet, Oral, DAILY, Taking    Vitamin D-1000 Max St, 1,000 Units, Oral, DAILY, Taking     Social History:  Social History     Tobacco Use    Smoking status: Never    Smokeless tobacco: Never   Vaping Use    " "Vaping status: Never Used   Substance Use Topics    Alcohol use: No    Drug use: No       Objective:  Vitals:   BP (!) 161/78   Pulse (!) 59   Temp 36 °C (96.8 °F)   Ht 1.778 m (5' 10\")   Wt 91.1 kg (200 lb 12.8 oz)   SpO2 98%  Body mass index is 28.81 kg/m².    Physical Exam  Constitutional:       General: He is not in acute distress.     Appearance: Normal appearance. He is not ill-appearing or diaphoretic.   HENT:      Head: Normocephalic and atraumatic.      Mouth/Throat:      Mouth: Mucous membranes are moist.   Eyes:      General: No scleral icterus.     Extraocular Movements: Extraocular movements intact.      Conjunctiva/sclera: Conjunctivae normal.      Pupils: Pupils are equal, round, and reactive to light.   Cardiovascular:      Rate and Rhythm: Normal rate and regular rhythm.      Heart sounds: No murmur heard.     No friction rub. No gallop.   Pulmonary:      Effort: No respiratory distress.      Breath sounds: No stridor. No wheezing, rhonchi or rales.   Abdominal:      General: There is no distension.      Tenderness: There is no abdominal tenderness. There is no guarding.   Musculoskeletal:         General: No swelling, deformity or signs of injury.      Cervical back: Normal range of motion.   Skin:     Coloration: Skin is not jaundiced or pale.   Neurological:      General: No focal deficit present.      Mental Status: He is alert and oriented to person, place, and time.   Psychiatric:         Mood and Affect: Mood normal.         Behavior: Behavior normal.          Assessment and Plan:    79 y.o. male with:     1. Primary hypertension  Suspected essential hypertension with history of hypotension on HCTZ's, since transition to lisinopril, tolerating without AE, previous readings and home readings in the 120s to 130s systolic.  Patient presents today hypertensive x 2 in the 150s systolic, but in the setting of likely noncompliance this morning after skipping lisinopril dose.  -Advised patient " to take home BP readings at least 3 times weekly, in setting of compliance with his lisinopril and return to clinic if they remain elevated or reach out for adjustment of lisinopril as needed.  -Continue lisinopril as prescribed    Chronic Medical Problems:  HTN: on lisinopril  Hypothyroidism: on levothyroxine   DLD: lipid panel November 2021 Tchol 180    HDL 43    Pre-DM: a1c 5.5 1/2024  Normocytic anemia - resolved on '24 labwork  Chronic sinusitis: follows with ENT  OA, hip  Bilateral hearing loss: b/l hearing aids  Anterior dislocation, left hip: s/p hip replacement 2022, ongoing PT sessions  Lumbar spinal stenosis c/b neuropathy: followed by Dr Webber at [facility], on gabapentin    Iron deficiency - resolved  Lumbar degenerative disc disease pending placement of spinal cord stimulator 6/2024  Lumbar spondylosis  Peyronie disease: s/p surgery via urology [place]  BPH- tadalafil, follows with Dr Abiola Avalos  S/p perineal nerve decompression 2023 at [facility]    Follow Up:  Return in about 4 months (around 3/15/2025) for Dr. Palm.    Shamir Palm MD  Internal Medicine PGY-2  [employer]

## 2024-11-18 NOTE — TELEPHONE ENCOUNTER
Phone Number Called: [phone]    Call outcome: Spoke to patient regarding message below.    Message: Called to discuss process for getting scheduled for Venaseal procedure. Advised that one of our schedulers will contact patient to get patient scheduled for procedure. I advised patient that as soon as the schedulers get his signed order, they will plan to get him scheduled. Pt verbalized understanding and was appreciative of phone call.

## 2024-11-19 NOTE — TELEPHONE ENCOUNTER
Phone Number Called: [phone]    Call outcome: Spoke to patient regarding message below.    Message: Called to discuss patient procedure and advised that he is scheduled for 12/17/24. Pt said he does not know where to check in at nor the prep plan. I advised I would have someone call him to discuss

## 2024-11-19 NOTE — TELEPHONE ENCOUNTER
Spoke with patient regarding appointment. Advised he is scheduled for 12- for Venaseal Ablation. Sent him a MYChart with all the info.

## 2024-11-20 ENCOUNTER — OFFICE VISIT (OUTPATIENT)
Dept: URGENT CARE | Facility: CLINIC | Age: 79
End: 2024-11-20
Payer: COMMERCIAL

## 2024-11-20 VITALS
TEMPERATURE: 97.1 F | SYSTOLIC BLOOD PRESSURE: 132 MMHG | BODY MASS INDEX: 27.09 KG/M2 | WEIGHT: 200 LBS | OXYGEN SATURATION: 96 % | HEIGHT: 72 IN | HEART RATE: 69 BPM | DIASTOLIC BLOOD PRESSURE: 78 MMHG | RESPIRATION RATE: 16 BRPM

## 2024-11-20 DIAGNOSIS — L60.0 INGROWN NAIL: ICD-10-CM

## 2024-11-20 PROCEDURE — 3078F DIAST BP <80 MM HG: CPT | Performed by: PHYSICIAN ASSISTANT

## 2024-11-20 PROCEDURE — 99213 OFFICE O/P EST LOW 20 MIN: CPT | Performed by: PHYSICIAN ASSISTANT

## 2024-11-20 PROCEDURE — 3075F SYST BP GE 130 - 139MM HG: CPT | Performed by: PHYSICIAN ASSISTANT

## 2024-11-20 RX ORDER — DOXYCYCLINE 100 MG/1
100 CAPSULE ORAL 2 TIMES DAILY
Qty: 14 CAPSULE | Refills: 0 | Status: CANCELLED | OUTPATIENT
Start: 2024-11-20 | End: 2024-11-27

## 2024-11-20 ASSESSMENT — FIBROSIS 4 INDEX: FIB4 SCORE: 2.31

## 2024-11-20 ASSESSMENT — ENCOUNTER SYMPTOMS
FEVER: 0
CHILLS: 0

## 2024-11-20 NOTE — PROGRESS NOTES
Subjective:   Amadou Parekh  is a 79 y.o. male who presents for Toe Pain (L great toe, x few weeks )      Other  This is a new problem. The current episode started 1 to 4 weeks ago. Pertinent negatives include no chills or fever.   Patient presents urgent care noting last few weeks of ingrown nail to great toe on left foot.  Patient denies systemic symptoms.  Notes localized redness and swelling.  Denies abnormal drainage or discharge.  Patient tried trimming ingrown nail in this area but appears to still be involved.  Notes past medical history of ingrown nails.  Denies other treatment tried thus far.  Patient denies a history of diabetes.    Review of Systems   Constitutional:  Negative for chills and fever.   Musculoskeletal:         Redness and swelling great toe       No Known Allergies     Objective:   /78   Pulse 69   Temp 36.2 °C (97.1 °F)   Resp 16   Ht 1.829 m (6')   Wt 90.7 kg (200 lb)   SpO2 96%   BMI 27.12 kg/m²     Physical Exam  Vitals and nursing note reviewed.   Constitutional:       General: He is not in acute distress.     Appearance: Normal appearance. He is well-developed. He is not diaphoretic.   HENT:      Head: Normocephalic and atraumatic.      Right Ear: External ear normal.      Left Ear: External ear normal.      Nose: Nose normal.   Eyes:      General: No scleral icterus.        Right eye: No discharge.         Left eye: No discharge.      Conjunctiva/sclera: Conjunctivae normal.   Pulmonary:      Effort: Pulmonary effort is normal. No respiratory distress.   Musculoskeletal:         General: Normal range of motion.      Cervical back: Neck supple.      Comments: Lateral nail fold with erythema and edema, nonfluctuant, not pointing, skin intact, left great toe   Skin:     General: Skin is warm and dry.      Coloration: Skin is not pale.   Neurological:      Mental Status: He is alert and oriented to person, place, and time.      Coordination: Coordination normal.          Assessment/Plan:   1. Ingrown nail  - amoxicillin-clavulanate (AUGMENTIN) 875-125 MG Tab; Take 1 Tablet by mouth 2 times a day for 5 days.  Dispense: 10 Tablet; Refill: 0  Supportive care is reviewed with patient/caregiver - recommend to push PO fluids and electrolytes, we discussed Epsom salt soaks, mechanical barriers and short course of oral antibiotic, contact clinic if desires to see podiatry thereafter  Return to clinic with lack of resolution or progression of symptoms.      I have worn an N95 mask, gloves and eye protection for the entire encounter with this patient.     Differential diagnosis, natural history, supportive care, and indications for immediate follow-up discussed.

## 2024-12-05 ENCOUNTER — APPOINTMENT (OUTPATIENT)
Dept: URBAN - METROPOLITAN AREA CLINIC 22 | Facility: CLINIC | Age: 79
Setting detail: DERMATOLOGY
End: 2024-12-05

## 2024-12-05 PROBLEM — C44.311 BASAL CELL CARCINOMA OF SKIN OF NOSE: Status: ACTIVE | Noted: 2024-12-05

## 2024-12-05 PROCEDURE — ? PRESCRIPTION

## 2024-12-05 PROCEDURE — 17312 MOHS ADDL STAGE: CPT

## 2024-12-05 PROCEDURE — 14060 TIS TRNFR E/N/E/L 10 SQ CM/<: CPT

## 2024-12-05 PROCEDURE — ? MOHS SURGERY

## 2024-12-05 PROCEDURE — 17311 MOHS 1 STAGE H/N/HF/G: CPT

## 2024-12-05 PROCEDURE — ? REFERRAL CORRESPONDENCE

## 2024-12-05 RX ORDER — DOXYCYCLINE 100 MG/1
CAPSULE ORAL BID
Qty: 14 | Refills: 0 | Status: ERX | COMMUNITY
Start: 2024-12-05

## 2024-12-05 RX ADMIN — DOXYCYCLINE: 100 CAPSULE ORAL at 00:00

## 2024-12-05 NOTE — PROCEDURE: MOHS SURGERY
Mohs Case Number: [id number]
Previous Accession (Optional): [id number]
Biopsy Photograph Reviewed: Yes
Referring Physician (Optional): Mayuri Lazar
Consent Type: Consent 1 (Standard)
Eye Shield Used: No
Surgeon Performing Repair (Optional): Flaquito Shultz M.D.
Initial Size Of Lesion: 0.8
X Size Of Lesion In Cm (Optional): 0.7
Number Of Stages: 1
Primary Defect Length In Cm (Final Defect Size - Required For Flaps/Grafts): 1.4
Primary Defect Width In Cm (Final Defect Size - Required For Flaps/Grafts): 1.1
Primary Defect Depth In Cm (Optional But Required For Some Insurers): 0
Repair Type: Flap
Which Instrument Did You Use For Dermabrasion?: Wire Brush
Which Eyelid Repair Cpt Are You Using?: 79848
Oculoplastic Surgeon Procedure Text (A): After obtaining clear surgical margins the patient was sent to oculoplastics for surgical repair.  The patient understands they will receive post-surgical care and follow-up from the referring physician's office.
Otolaryngologist Procedure Text (A): After obtaining clear surgical margins the patient was sent to otolaryngology for surgical repair.  The patient understands they will receive post-surgical care and follow-up from the referring physician's office.
Plastic Surgeon Procedure Text (A): After obtaining clear surgical margins the patient was sent to plastics for surgical repair.  The patient understands they will receive post-surgical care and follow-up from the referring physician's office.
Mid-Level Procedure Text (A): After obtaining clear surgical margins the patient was sent to a mid-level provider for surgical repair.  The patient understands they will receive post-surgical care and follow-up from the mid-level provider.
Provider Procedure Text (A): After obtaining clear surgical margins the defect was repaired by another provider.
Asc Procedure Text (A): After obtaining clear surgical margins the patient was sent to an ASC for surgical repair.  The patient understands they will receive post-surgical care and follow-up from the ASC physician.
Suturegard Retention Suture: 2-0 Nylon
Retention Suture Bite Size: 3 mm
Length To Time In Minutes Device Was In Place: 10
Undermining Type: Entire Wound
Debridement Text: The wound edges were debrided prior to proceeding with the closure to facilitate wound healing.
Helical Rim Text: The closure involved the helical rim.
Vermilion Border Text: The closure involved the vermilion border.
Nostril Rim Text: The closure involved the nostril rim.
Retention Suture Text: Retention sutures were placed to support the closure and prevent dehiscence.
Flap Type: Transposition Flap
Secondary Defect Length In Cm (Required For Flaps): 2.3
Secondary Defect Width In Cm (Required For Flaps): 0.4
Location Indication Override (Is Already Calculated Based On Selected Body Location): Area H
Area H Indication Text: Tumors in this location are included in Area H (eyelids, eyebrows, nose, lips, chin, ear, pre-auricular, post-auricular, temple, genitalia, hands, feet, ankles and areola).  Tissue conservation is critical in these anatomic locations.
Area M Indication Text: Tumors in this location are included in Area M (cheek, forehead, scalp, neck, jawline and pretibial skin).  Mohs surgery is indicated for tumors in these anatomic locations.
Area L Indication Text: Tumors in this location are included in Area L (trunk and extremities).  Mohs surgery is indicated for larger tumors, or tumors with aggressive histologic features, in these anatomic locations.
Tumor Debulked?: curette
Depth Of Tumor Invasion (For Histology): dermis
Perineural Invasion (For Histology - Be Specific If Possible): absent
Presence Of Scar Tissue (For Histology): present
Surgical Defect Length In Cm (Optional): 1.0
Surgical Defect Width In Cm (Optional): 0.9
Special Stains Stage 1 - Results: Base On Clearance Noted Above
Stage 2: Additional Anesthesia Volume In Cc: 3.0
Stage 2: Additional Anesthesia Type: 1% lidocaine with epinephrine and a 1:10 solution of 8.4% sodium bicarbonate
Surgical Defect Length In Cm (Optional): 1.2
Stage 4: Additional Anesthesia Type: 1% lidocaine with epinephrine
Include Tumor Staging In Mohs Note?: Please Select the Appropriate Response
Staging Info: By selecting yes to the question above you will include information on AJCC 8 tumor staging in your Mohs note. Information on tumor staging will be automatically added for SCCs on the head and neck. AJCC 8 includes tumor size, tumor depth, perineural involvement and bone invasion.
Tumor Depth: Less than 6mm from granular layer and no invasion beyond the subcutaneous fat
Medical Necessity Statement: Based on my medical judgement, Mohs surgery is the most appropriate treatment for this cancer compared to other treatments.
Alternatives Discussed Intro (Do Not Add Period): I discussed alternative treatments to Mohs surgery and specifically discussed the risks and benefits of
Consent 1/Introductory Paragraph: The rationale for Mohs was explained to the patient and consent was obtained. The risks, benefits and alternatives to therapy were discussed in detail. Specifically, the risks of infection, scarring, bleeding, prolonged wound healing, incomplete removal, allergy to anesthesia, nerve injury and recurrence were addressed. Prior to the procedure, the treatment site was clearly identified and confirmed by the patient. All components of Universal Protocol/PAUSE Rule completed.
Consent 2/Introductory Paragraph: Mohs surgery was explained to the patient and consent was obtained. The risks, benefits and alternatives to therapy were discussed in detail. Specifically, the risks of infection, scarring, bleeding, prolonged wound healing, incomplete removal, allergy to anesthesia, nerve injury and recurrence were addressed. Prior to the procedure, the treatment site was clearly identified and confirmed by the patient. All components of Universal Protocol/PAUSE Rule completed.
Consent 3/Introductory Paragraph: I gave the patient a chance to ask questions they had about the procedure.  Following this I explained the Mohs procedure and consent was obtained. The risks, benefits and alternatives to therapy were discussed in detail. Specifically, the risks of infection, scarring, bleeding, prolonged wound healing, incomplete removal, allergy to anesthesia, nerve injury and recurrence were addressed. Prior to the procedure, the treatment site was clearly identified and confirmed by the patient. All components of Universal Protocol/PAUSE Rule completed.
Consent (Temporal Branch)/Introductory Paragraph: The rationale for Mohs was explained to the patient and consent was obtained. The risks, benefits and alternatives to therapy were discussed in detail. Specifically, the risks of damage to the temporal branch of the facial nerve, infection, scarring, bleeding, prolonged wound healing, incomplete removal, allergy to anesthesia, and recurrence were addressed. Prior to the procedure, the treatment site was clearly identified and confirmed by the patient. All components of Universal Protocol/PAUSE Rule completed.
Consent (Marginal Mandibular)/Introductory Paragraph: The rationale for Mohs was explained to the patient and consent was obtained. The risks, benefits and alternatives to therapy were discussed in detail. Specifically, the risks of damage to the marginal mandibular branch of the facial nerve, infection, scarring, bleeding, prolonged wound healing, incomplete removal, allergy to anesthesia, and recurrence were addressed. Prior to the procedure, the treatment site was clearly identified and confirmed by the patient. All components of Universal Protocol/PAUSE Rule completed.
Consent (Spinal Accessory)/Introductory Paragraph: The rationale for Mohs was explained to the patient and consent was obtained. The risks, benefits and alternatives to therapy were discussed in detail. Specifically, the risks of damage to the spinal accessory nerve, infection, scarring, bleeding, prolonged wound healing, incomplete removal, allergy to anesthesia, and recurrence were addressed. Prior to the procedure, the treatment site was clearly identified and confirmed by the patient. All components of Universal Protocol/PAUSE Rule completed.
Consent (Near Eyelid Margin)/Introductory Paragraph: The rationale for Mohs was explained to the patient and consent was obtained. The risks, benefits and alternatives to therapy were discussed in detail. Specifically, the risks of ectropion or eyelid deformity, infection, scarring, bleeding, prolonged wound healing, incomplete removal, allergy to anesthesia, nerve injury and recurrence were addressed. Prior to the procedure, the treatment site was clearly identified and confirmed by the patient. All components of Universal Protocol/PAUSE Rule completed.
Consent (Ear)/Introductory Paragraph: The rationale for Mohs was explained to the patient and consent was obtained. The risks, benefits and alternatives to therapy were discussed in detail. Specifically, the risks of ear deformity, infection, scarring, bleeding, prolonged wound healing, incomplete removal, allergy to anesthesia, nerve injury and recurrence were addressed. Prior to the procedure, the treatment site was clearly identified and confirmed by the patient. All components of Universal Protocol/PAUSE Rule completed.
Consent (Nose)/Introductory Paragraph: The rationale for Mohs was explained to the patient and consent was obtained. The risks, benefits and alternatives to therapy were discussed in detail. Specifically, the risks of nasal deformity, changes in the flow of air through the nose, infection, scarring, bleeding, prolonged wound healing, incomplete removal, allergy to anesthesia, nerve injury and recurrence were addressed. Prior to the procedure, the treatment site was clearly identified and confirmed by the patient. All components of Universal Protocol/PAUSE Rule completed.
Consent (Lip)/Introductory Paragraph: The rationale for Mohs was explained to the patient and consent was obtained. The risks, benefits and alternatives to therapy were discussed in detail. Specifically, the risks of lip deformity, changes in the oral aperture, infection, scarring, bleeding, prolonged wound healing, incomplete removal, allergy to anesthesia, nerve injury and recurrence were addressed. Prior to the procedure, the treatment site was clearly identified and confirmed by the patient. All components of Universal Protocol/PAUSE Rule completed.
Consent (Scalp)/Introductory Paragraph: The rationale for Mohs was explained to the patient and consent was obtained. The risks, benefits and alternatives to therapy were discussed in detail. Specifically, the risks of changes in hair growth pattern secondary to repair, infection, scarring, bleeding, prolonged wound healing, incomplete removal, allergy to anesthesia, nerve injury and recurrence were addressed. Prior to the procedure, the treatment site was clearly identified and confirmed by the patient. All components of Universal Protocol/PAUSE Rule completed.
Detail Level: Detailed
Postop Diagnosis: same
Anesthesia Volume In Cc: 8
Additional Anesthesia Volume In Cc: 1.5
Hemostasis: Electrodesiccation
Estimated Blood Loss (Cc): minimal
Repair Anesthesia Method: local infiltration
Undermining Location (Optional): below the muscle
Brow Lift Text: A midfrontal incision was made medially to the defect to allow access to the tissues just superior to the left eyebrow. Following careful dissection inferiorly in a supraperiosteal plane to the level of the left eyebrow, several 3-0 monocryl sutures were used to resuspend the eyebrow orbicularis oculi muscular unit to the superior frontal bone periosteum. This resulted in an appropriate reapproximation of static eyebrow symmetry and correction of the left brow ptosis.
Deep Sutures: 4-0 Maxon
Epidermal Sutures: 5-0 Prolene
Epidermal Closure: running and interrupted
Suturegard Intro: Intraoperative tissue expansion was performed, utilizing the SUTUREGARD device, in order to reduce wound tension.
Suturegard Body: The suture ends were repeatedly re-tightened and re-clamped to achieve the desired tissue expansion.
Hemigard Intro: Due to skin fragility and wound tension, it was decided to use HEMIGARD adhesive retention suture devices to permit a linear closure. The skin was cleaned and dried for a 6cm distance away from the wound. Excessive hair, if present, was removed to allow for adhesion.
Hemigard Postcare Instructions: The HEMIGARD strips are to remain completely dry for at least 5-7 days.
Donor Site Anesthesia Type: same as repair anesthesia
Epidermal Closure Graft Donor Site (Optional): simple interrupted
Graft Donor Site Bandage (Optional-Leave Blank If You Don't Want In Note): Steri-strips and a pressure bandage were applied to the donor site.
Closure 2 Information: This tab is for additional flaps and grafts, including complex repair and grafts and complex repair and flaps. You can also specify a different location for the additional defect, if the location is the same you do not need to select a new one. We will insert the automated text for the repair you select below just as we do for solitary flaps and grafts. Please note that at this time if you select a location with a different insurance zone you will need to override the ICD10 and CPT if appropriate.
Closure 3 Information: This tab is for additional flaps and grafts above and beyond our usual structured repairs.  Please note if you enter information here it will not currently bill and you will need to add the billing information manually.
Wound Care: Petrolatum
Dressing: pressure dressing with telfa
Dressing (No Sutures): dry sterile dressing
Suture Removal: 7 days
Unna Boot Text: An Unna boot was placed to help immobilize the limb and facilitate more rapid healing.
Home Suture Removal Text: Patient was provided instructions on removing sutures and will remove their sutures at home.  If they have any questions or difficulties they will call the office.
Post-Care Instructions: I reviewed with the patient in detail post-care instructions. Patient is not to engage in any heavy lifting, exercise, or swimming for the next 14 days. Should the patient develop any fevers, chills, bleeding, severe pain patient will contact the office immediately.
Pain Refusal Text: I offered to prescribe pain medication but the patient refused to take this medication.
Mauc Instructions: By selecting yes to the question below the MAUC number will be added into the note.  This will be calculated automatically based on the diagnosis chosen, the size entered, the body zone selected (H,M,L) and the specific indications you chose. You will also have the option to override the Mohs AUC if you disagree with the automatically calculated number and this option is found in the Case Summary tab.
Where Do You Want The Question To Include Opioid Counseling Located?: Case Summary Tab
Eye Protection Verbiage: Before proceeding with the stage, a plastic scleral shield was inserted. The globe was anesthetized with a few drops of 1% lidocaine with 1:100,000 epinephrine. Then, an appropriate sized scleral shield was chosen and coated with lacrilube ointment. The shield was gently inserted and left in place for the duration of each stage. After the stage was completed, the shield was gently removed.
Mohs Method Verbiage: An incision at a 45 degree angle following the standard Mohs approach was done and the specimen was harvested as a microscopic controlled layer.
Surgeon/Pathologist Verbiage (Will Incorporate Name Of Surgeon From Intro If Not Blank): operated in two distinct and integrated capacities as the surgeon and pathologist.
Mohs Histo Method Verbiage: Each section was then chromacoded and processed in the Mohs lab using the Mohs protocol and submitted for frozen section.
Subsequent Stages Histo Method Verbiage: Using a similar technique to that described above, a thin layer of tissue was removed from all areas where tumor was visible on the previous stage.  The tissue was again oriented, mapped, dyed, and processed as above.
Mohs Rapid Report Verbiage: The area of clinically evident tumor was marked with skin marking ink and appropriately hatched.  The initial incision was made following the Mohs approach through the skin.  The specimen was taken to the lab, divided into the necessary number of pieces, chromacoded and processed according to the Mohs protocol.  This was repeated in successive stages until a tumor free defect was achieved.
Complex Repair Preamble Text (Leave Blank If You Do Not Want): Extensive wide undermining was performed.
Intermediate Repair Preamble Text (Leave Blank If You Do Not Want): Undermining was performed with blunt dissection.
Graft Cartilage Fenestration Text: The cartilage was fenestrated with a 2mm punch biopsy to help facilitate graft survival and healing.
Non-Graft Cartilage Fenestration Text: The cartilage was fenestrated with a 2mm punch biopsy to help facilitate healing.
Secondary Intention Text (Leave Blank If You Do Not Want): The defect will heal with secondary intention.
No Repair - Repaired With Adjacent Surgical Defect Text (Leave Blank If You Do Not Want): After obtaining clear surgical margins the defect was repaired concurrently with another surgical defect which was in close approximation.
Adjacent Tissue Transfer Text: The defect edges were debeveled with a #15 scalpel blade. Given the location of the defect and the proximity to free margins an adjacent tissue transfer was deemed most appropriate. Using a sterile surgical marker, an appropriate flap was drawn incorporating the defect and placing the expected incisions within the relaxed skin tension lines where possible. The area thus outlined was incised deep to adipose tissue with a #15 scalpel blade. The skin margins were undermined to an appropriate distance in all directions utilizing iris scissors and carried over to close the primary defect.
Advancement Flap (Single) Text: The defect edges were debeveled with a #15 scalpel blade.  Given the location of the defect and the proximity to free margins a single advancement flap was deemed most appropriate.  Using a sterile surgical marker, an appropriate advancement flap was drawn incorporating the defect and placing the expected incisions within the relaxed skin tension lines where possible.    The area thus outlined was incised deep to adipose tissue with a #15 scalpel blade.  The skin margins were undermined to an appropriate distance in all directions utilizing iris scissors.
Advancement Flap (Double) Text: The defect edges were debeveled with a #15 scalpel blade.  Given the location of the defect and the proximity to free margins a double advancement flap was deemed most appropriate.  Using a sterile surgical marker, the appropriate advancement flaps were drawn incorporating the defect and placing the expected incisions within the relaxed skin tension lines where possible.    The area thus outlined was incised deep to adipose tissue with a #15 scalpel blade.  The skin margins were undermined to an appropriate distance in all directions utilizing iris scissors.
Advancement-Rotation Flap Text: The defect edges were debeveled with a #15 scalpel blade.  Given the location of the defect, shape of the defect and the proximity to free margins an advancement-rotation flap was deemed most appropriate.  Using a sterile surgical marker, an appropriate flap was drawn incorporating the defect and placing the expected incisions within the relaxed skin tension lines where possible. The area thus outlined was incised deep to adipose tissue with a #15 scalpel blade.  The skin margins were undermined to an appropriate distance in all directions utilizing iris scissors.
Alar Island Pedicle Flap Text: The defect edges were debeveled with a #15 scalpel blade.  Given the location of the defect, shape of the defect and the proximity to the alar rim an island pedicle advancement flap was deemed most appropriate.  Using a sterile surgical marker, an appropriate advancement flap was drawn incorporating the defect, outlining the appropriate donor tissue and placing the expected incisions within the nasal ala running parallel to the alar rim. The area thus outlined was incised with a #15 scalpel blade.  The skin margins were undermined minimally to an appropriate distance in all directions around the primary defect and laterally outward around the island pedicle utilizing iris scissors.  There was minimal undermining beneath the pedicle flap.
A-T Advancement Flap Text: The defect edges were debeveled with a #15 scalpel blade.  Given the location of the defect, shape of the defect and the proximity to free margins an A-T advancement flap was deemed most appropriate.  Using a sterile surgical marker, an appropriate advancement flap was drawn incorporating the defect and placing the expected incisions within the relaxed skin tension lines where possible.    The area thus outlined was incised deep to adipose tissue with a #15 scalpel blade.  The skin margins were undermined to an appropriate distance in all directions utilizing iris scissors.
Banner Transposition Flap Text: The defect edges were debeveled with a #15 scalpel blade.  Given the location of the defect and the proximity to free margins a Banner transposition flap was deemed most appropriate.  Using a sterile surgical marker, an appropriate flap drawn around the defect. The area thus outlined was incised deep to adipose tissue with a #15 scalpel blade.  The skin margins were undermined to an appropriate distance in all directions utilizing iris scissors.
Bilateral Helical Rim Advancement Flap Text: The defect edges were debeveled with a #15 blade scalpel.  Given the location of the defect and the proximity to free margins (helical rim) a bilateral helical rim advancement flap was deemed most appropriate.  Using a sterile surgical marker, the appropriate advancement flaps were drawn incorporating the defect and placing the expected incisions between the helical rim and antihelix where possible.  The area thus outlined was incised through and through with a #15 scalpel blade.  With a skin hook and iris scissors, the flaps were gently and sharply undermined and freed up.
Bilateral Rotation Flap Text: The defect edges were debeveled with a #15 scalpel blade. Given the location of the defect, shape of the defect and the proximity to free margins a bilateral rotation flap was deemed most appropriate. Using a sterile surgical marker, an appropriate rotation flap was drawn incorporating the defect and placing the expected incisions within the relaxed skin tension lines where possible. The area thus outlined was incised deep to adipose tissue with a #15 scalpel blade. The skin margins were undermined to an appropriate distance in all directions utilizing iris scissors. Following this, the designed flap was carried over into the primary defect and sutured into place.
Bilobed Flap Text: The defect edges were debeveled with a #15 scalpel blade.  Given the location of the defect and the proximity to free margins a bilobe flap was deemed most appropriate.  Using a sterile surgical marker, an appropriate bilobe flap drawn around the defect.    The area thus outlined was incised deep to adipose tissue with a #15 scalpel blade.  The skin margins were undermined to an appropriate distance in all directions utilizing iris scissors.
Bilobed Transposition Flap Text: The defect edges were debeveled with a #15 scalpel blade.  Given the location of the defect and the proximity to free margins a bilobed transposition flap was deemed most appropriate.  Using a sterile surgical marker, an appropriate bilobe flap drawn around the defect.    The area thus outlined was incised deep to adipose tissue with a #15 scalpel blade.  The skin margins were undermined to an appropriate distance in all directions utilizing iris scissors.
Bi-Rhombic Flap Text: The defect edges were debeveled with a #15 scalpel blade.  Given the location of the defect and the proximity to free margins a bi-rhombic flap was deemed most appropriate.  Using a sterile surgical marker, an appropriate rhombic flap was drawn incorporating the defect. The area thus outlined was incised deep to adipose tissue with a #15 scalpel blade.  The skin margins were undermined to an appropriate distance in all directions utilizing iris scissors.
Burow's Advancement Flap Text: The defect edges were debeveled with a #15 scalpel blade.  Given the location of the defect and the proximity to free margins a Burow's advancement flap was deemed most appropriate.  Using a sterile surgical marker, the appropriate advancement flap was drawn incorporating the defect and placing the expected incisions within the relaxed skin tension lines where possible.    The area thus outlined was incised deep to adipose tissue with a #15 scalpel blade.  The skin margins were undermined to an appropriate distance in all directions utilizing iris scissors.
Chonodrocutaneous Helical Advancement Flap Text: The defect edges were debeveled with a #15 scalpel blade. Given the location of the defect and the proximity to free margins a chondrocutaneous helical advancement flap was deemed most appropriate. Using a sterile surgical marker, the appropriate advancement flap was drawn incorporating the defect and placing the expected incisions within the relaxed skin tension lines where possible. The area thus outlined was incised deep to adipose tissue with a #15 scalpel blade. The skin margins were undermined to an appropriate distance in all directions utilizing iris scissors. Following this, the designed flap was advanced and carried over into the primary defect and sutured into place.
Crescentic Advancement Flap Text: The defect edges were debeveled with a #15 scalpel blade.  Given the location of the defect and the proximity to free margins a crescentic advancement flap was deemed most appropriate.  Using a sterile surgical marker, the appropriate advancement flap was drawn incorporating the defect and placing the expected incisions within the relaxed skin tension lines where possible.    The area thus outlined was incised deep to adipose tissue with a #15 scalpel blade.  The skin margins were undermined to an appropriate distance in all directions utilizing iris scissors.
Dorsal Nasal Flap Text: The defect edges were debeveled with a #15 scalpel blade.  Given the location of the defect and the proximity to free margins a dorsal nasal flap was deemed most appropriate.  Using a sterile surgical marker, an appropriate dorsal nasal flap was drawn around the defect.    The area thus outlined was incised deep to adipose tissue with a #15 scalpel blade.  The skin margins were undermined to an appropriate distance in all directions utilizing iris scissors.
Double Island Pedicle Flap Text: The defect edges were debeveled with a #15 scalpel blade.  Given the location of the defect, shape of the defect and the proximity to free margins a double island pedicle advancement flap was deemed most appropriate.  Using a sterile surgical marker, an appropriate advancement flap was drawn incorporating the defect, outlining the appropriate donor tissue and placing the expected incisions within the relaxed skin tension lines where possible.    The area thus outlined was incised deep to adipose tissue with a #15 scalpel blade.  The skin margins were undermined to an appropriate distance in all directions around the primary defect and laterally outward around the island pedicle utilizing iris scissors.  There was minimal undermining beneath the pedicle flap.
Double O-Z Flap Text: The defect edges were debeveled with a #15 scalpel blade. Given the location of the defect, shape of the defect and the proximity to free margins a Double O-Z flap was deemed most appropriate. Using a sterile surgical marker, an appropriate transposition flap was drawn incorporating the defect and placing the expected incisions within the relaxed skin tension lines where possible. The area thus outlined was incised deep to adipose tissue with a #15 scalpel blade. The skin margins were undermined to an appropriate distance in all directions utilizing iris scissors. Following this, the designed flap was carried over into the primary defect and sutured into place.
Double O-Z Plasty Text: The defect edges were debeveled with a #15 scalpel blade. Given the location of the defect, shape of the defect and the proximity to free margins a Double O-Z plasty (double transposition flap) was deemed most appropriate. Using a sterile surgical marker, the appropriate transposition flaps were drawn incorporating the defect and placing the expected incisions within the relaxed skin tension lines where possible. The area thus outlined was incised deep to adipose tissue with a #15 scalpel blade. The skin margins were undermined to an appropriate distance in all directions utilizing iris scissors. Hemostasis was achieved with electrocautery. The flaps were then transposed and carried over into place, one clockwise and the other counterclockwise, and anchored with interrupted buried subcutaneous sutures.
Double Z Plasty Text: The lesion was extirpated to the level of the fat with a #15 scalpel blade. Given the location of the defect, shape of the defect and the proximity to free margins a double Z-plasty was deemed most appropriate for repair. Using a sterile surgical marker, the appropriate transposition arms of the double Z-plasty were drawn incorporating the defect and placing the expected incisions within the relaxed skin tension lines where possible. The area thus outlined was incised deep to adipose tissue with a #15 scalpel blade. The skin margins were undermined to an appropriate distance in all directions utilizing iris scissors. The opposing transposition arms were then transposed and carried over into place in opposite direction and anchored with interrupted buried subcutaneous sutures.
Ear Star Wedge Flap Text: The defect edges were debeveled with a #15 blade scalpel.  Given the location of the defect and the proximity to free margins (helical rim) an ear star wedge flap was deemed most appropriate.  Using a sterile surgical marker, the appropriate flap was drawn incorporating the defect and placing the expected incisions between the helical rim and antihelix where possible.  The area thus outlined was incised through and through with a #15 scalpel blade.
Flip-Flop Flap Text: The defect edges were debeveled with a #15 blade scalpel.  Given the location of the defect and the proximity to free margins a flip-flop flap was deemed most appropriate. Using a sterile surgical marker, the appropriate flap was drawn incorporating the defect and placing the expected incisions between the helical rim and antihelix where possible.  The area thus outlined was incised through and through with a #15 scalpel blade. Following this, the designed flap was carried over into the primary defect and sutured into place.
Hatchet Flap Text: The defect edges were debeveled with a #15 scalpel blade.  Given the location of the defect, shape of the defect and the proximity to free margins a hatchet flap was deemed most appropriate.  Using a sterile surgical marker, an appropriate hatchet flap was drawn incorporating the defect and placing the expected incisions within the relaxed skin tension lines where possible.    The area thus outlined was incised deep to adipose tissue with a #15 scalpel blade.  The skin margins were undermined to an appropriate distance in all directions utilizing iris scissors.
Helical Rim Advancement Flap Text: The defect edges were debeveled with a #15 blade scalpel.  Given the location of the defect and the proximity to free margins (helical rim) a double helical rim advancement flap was deemed most appropriate.  Using a sterile surgical marker, the appropriate advancement flaps were drawn incorporating the defect and placing the expected incisions between the helical rim and antihelix where possible.  The area thus outlined was incised through and through with a #15 scalpel blade.  With a skin hook and iris scissors, the flaps were gently and sharply undermined and freed up.
H Plasty Text: Given the location of the defect, shape of the defect and the proximity to free margins a H-plasty was deemed most appropriate for repair.  Using a sterile surgical marker, the appropriate advancement arms of the H-plasty were drawn incorporating the defect and placing the expected incisions within the relaxed skin tension lines where possible. The area thus outlined was incised deep to adipose tissue with a #15 scalpel blade. The skin margins were undermined to an appropriate distance in all directions utilizing iris scissors.  The opposing advancement arms were then advanced into place in opposite direction and anchored with interrupted buried subcutaneous sutures.
Island Pedicle Flap Text: The defect edges were debeveled with a #15 scalpel blade.  Given the location of the defect, shape of the defect and the proximity to free margins an island pedicle advancement flap was deemed most appropriate.  Using a sterile surgical marker, an appropriate advancement flap was drawn incorporating the defect, outlining the appropriate donor tissue and placing the expected incisions within the relaxed skin tension lines where possible.    The area thus outlined was incised deep to adipose tissue with a #15 scalpel blade.  The skin margins were undermined to an appropriate distance in all directions around the primary defect and laterally outward around the island pedicle utilizing iris scissors.  There was minimal undermining beneath the pedicle flap.
Island Pedicle Flap With Canthal Suspension Text: The defect edges were debeveled with a #15 scalpel blade.  Given the location of the defect, shape of the defect and the proximity to free margins an island pedicle advancement flap was deemed most appropriate.  Using a sterile surgical marker, an appropriate advancement flap was drawn incorporating the defect, outlining the appropriate donor tissue and placing the expected incisions within the relaxed skin tension lines where possible. The area thus outlined was incised deep to adipose tissue with a #15 scalpel blade.  The skin margins were undermined to an appropriate distance in all directions around the primary defect and laterally outward around the island pedicle utilizing iris scissors.  There was minimal undermining beneath the pedicle flap. A suspension suture was placed in the canthal tendon to prevent tension and prevent ectropion.
Island Pedicle Flap-Requiring Vessel Identification Text: The defect edges were debeveled with a #15 scalpel blade.  Given the location of the defect, shape of the defect and the proximity to free margins an island pedicle advancement flap was deemed most appropriate.  Using a sterile surgical marker, an appropriate advancement flap was drawn, based on the axial vessel mentioned above, incorporating the defect, outlining the appropriate donor tissue and placing the expected incisions within the relaxed skin tension lines where possible.    The area thus outlined was incised deep to adipose tissue with a #15 scalpel blade.  The skin margins were undermined to an appropriate distance in all directions around the primary defect and laterally outward around the island pedicle utilizing iris scissors.  There was minimal undermining beneath the pedicle flap.
Keystone Flap Text: The defect edges were debeveled with a #15 scalpel blade.  Given the location of the defect, shape of the defect a keystone flap was deemed most appropriate.  Using a sterile surgical marker, an appropriate keystone flap was drawn incorporating the defect, outlining the appropriate donor tissue and placing the expected incisions within the relaxed skin tension lines where possible. The area thus outlined was incised deep to adipose tissue with a #15 scalpel blade.  The skin margins were undermined to an appropriate distance in all directions around the primary defect and laterally outward around the flap utilizing iris scissors.
Melolabial Transposition Flap Text: The defect edges were debeveled with a #15 scalpel blade.  Given the location of the defect and the proximity to free margins a melolabial flap was deemed most appropriate.  Using a sterile surgical marker, an appropriate melolabial transposition flap was drawn incorporating the defect.    The area thus outlined was incised deep to adipose tissue with a #15 scalpel blade.  The skin margins were undermined to an appropriate distance in all directions utilizing iris scissors.
Mercedes Flap Text: The defect edges were debeveled with a #15 scalpel blade.  Given the location of the defect, shape of the defect and the proximity to free margins a Mercedes flap was deemed most appropriate.  Using a sterile surgical marker, an appropriate advancement flap was drawn incorporating the defect and placing the expected incisions within the relaxed skin tension lines where possible. The area thus outlined was incised deep to adipose tissue with a #15 scalpel blade.  The skin margins were undermined to an appropriate distance in all directions utilizing iris scissors.
Modified Advancement Flap Text: The defect edges were debeveled with a #15 scalpel blade.  Given the location of the defect, shape of the defect and the proximity to free margins a modified advancement flap was deemed most appropriate.  Using a sterile surgical marker, an appropriate advancement flap was drawn incorporating the defect and placing the expected incisions within the relaxed skin tension lines where possible.    The area thus outlined was incised deep to adipose tissue with a #15 scalpel blade.  The skin margins were undermined to an appropriate distance in all directions utilizing iris scissors.
Mucosal Advancement Flap Text: Given the location of the defect, shape of the defect and the proximity to free margins a mucosal advancement flap was deemed most appropriate. Incisions were made with a 15 blade scalpel in the appropriate fashion along the cutaneous vermilion border and the mucosal lip. The remaining actinically damaged mucosal tissue was excised.  The mucosal advancement flap was then elevated to the gingival sulcus with care taken to preserve the neurovascular structures and advanced into the primary defect. Care was taken to ensure that precise realignment of the vermilion border was achieved.
Muscle Hinge Flap Text: The defect edges were debeveled with a #15 scalpel blade.  Given the size, depth and location of the defect and the proximity to free margins a muscle hinge flap was deemed most appropriate.  Using a sterile surgical marker, an appropriate hinge flap was drawn incorporating the defect. The area thus outlined was incised with a #15 scalpel blade.  The skin margins were undermined to an appropriate distance in all directions utilizing iris scissors.
Mustarde Flap Text: The defect edges were debeveled with a #15 scalpel blade.  Given the size, depth and location of the defect and the proximity to free margins a Mustarde flap was deemed most appropriate. Using a sterile surgical marker, an appropriate flap was drawn incorporating the defect. The area thus outlined was incised with a #15 scalpel blade. The skin margins were undermined to an appropriate distance in all directions utilizing iris scissors. Following this, the designed flap was carried into the primary defect and sutured into place.
Nasal Turnover Hinge Flap Text: The defect edges were debeveled with a #15 scalpel blade.  Given the size, depth, location of the defect and the defect being full thickness a nasal turnover hinge flap was deemed most appropriate. Using a sterile surgical marker, an appropriate hinge flap was drawn incorporating the defect. The area thus outlined was incised with a #15 scalpel blade. The flap was designed to recreate the nasal mucosal lining and the alar rim. The skin margins were undermined to an appropriate distance in all directions utilizing iris scissors. Following this, the designed flap was carried over into the primary defect and sutured into place
Nasalis-Muscle-Based Myocutaneous Island Pedicle Flap Text: Using a #15 blade, an incision was made around the donor flap to the level of the nasalis muscle. Wide lateral undermining was then performed in both the subcutaneous plane above the nasalis muscle, and in a submuscular plane just above periosteum. This allowed the formation of a free nasalis muscle axial pedicle (based on the angular artery) which was still attached to the actual cutaneous flap, increasing its mobility and vascular viability. Hemostasis was obtained with pinpoint electrocoagulation. The flap was mobilized into position and the pivotal anchor points positioned and stabilized with buried interrupted sutures. Subcutaneous and dermal tissues were closed in a multilayered fashion with sutures. Tissue redundancies were excised, and the epidermal edges were apposed without significant tension and sutured with sutures.
Nasalis Myocutaneous Flap Text: Using a #15 blade, an incision was made around the donor flap to the level of the nasalis muscle. Wide lateral undermining was then performed in both the subcutaneous plane above the nasalis muscle, and in a submuscular plane just above periosteum. This allowed the formation of a free nasalis muscle axial pedicle which was still attached to the actual cutaneous flap, increasing its mobility and vascular viability. Hemostasis was obtained with pinpoint electrocoagulation. The flap was mobilized into position and the pivotal anchor points positioned and stabilized with buried interrupted sutures. Subcutaneous and dermal tissues were closed in a multilayered fashion with sutures. Tissue redundancies were excised, and the epidermal edges were apposed without significant tension and sutured with sutures.
Nasolabial Transposition Flap Text: The defect edges were debeveled with a #15 scalpel blade.  Given the size, depth and location of the defect and the proximity to free margins a nasolabial transposition flap was deemed most appropriate. Using a sterile surgical marker, an appropriate flap was drawn incorporating the defect. The area thus outlined was incised with a #15 scalpel blade. The skin margins were undermined to an appropriate distance in all directions utilizing iris scissors. Following this, the designed flap was carried into the primary defect and sutured into place.
Orbicularis Oris Muscle Flap Text: The defect edges were debeveled with a #15 scalpel blade.  Given that the defect affected the competency of the oral sphincter an orbicularis oris muscle flap was deemed most appropriate to restore this competency and normal muscle function.  Using a sterile surgical marker, an appropriate flap was drawn incorporating the defect. The area thus outlined was incised with a #15 scalpel blade. Following this, the designed flap was carried over into the primary defect and sutured into place.
O-T Advancement Flap Text: The defect edges were debeveled with a #15 scalpel blade.  Given the location of the defect, shape of the defect and the proximity to free margins an O-T advancement flap was deemed most appropriate.  Using a sterile surgical marker, an appropriate advancement flap was drawn incorporating the defect and placing the expected incisions within the relaxed skin tension lines where possible.    The area thus outlined was incised deep to adipose tissue with a #15 scalpel blade.  The skin margins were undermined to an appropriate distance in all directions utilizing iris scissors.
O-T Plasty Text: The defect edges were debeveled with a #15 scalpel blade.  Given the location of the defect, shape of the defect and the proximity to free margins an O-T plasty was deemed most appropriate.  Using a sterile surgical marker, an appropriate O-T plasty was drawn incorporating the defect and placing the expected incisions within the relaxed skin tension lines where possible.    The area thus outlined was incised deep to adipose tissue with a #15 scalpel blade.  The skin margins were undermined to an appropriate distance in all directions utilizing iris scissors.
O-L Flap Text: The defect edges were debeveled with a #15 scalpel blade.  Given the location of the defect, shape of the defect and the proximity to free margins an O-L flap was deemed most appropriate.  Using a sterile surgical marker, an appropriate advancement flap was drawn incorporating the defect and placing the expected incisions within the relaxed skin tension lines where possible.    The area thus outlined was incised deep to adipose tissue with a #15 scalpel blade.  The skin margins were undermined to an appropriate distance in all directions utilizing iris scissors.
O-Z Flap Text: The defect edges were debeveled with a #15 scalpel blade. Given the location of the defect, shape of the defect and the proximity to free margins an O-Z flap was deemed most appropriate. Using a sterile surgical marker, an appropriate transposition flap was drawn incorporating the defect and placing the expected incisions within the relaxed skin tension lines where possible. The area thus outlined was incised deep to adipose tissue with a #15 scalpel blade. The skin margins were undermined to an appropriate distance in all directions utilizing iris scissors. Following this, the designed flap was carried over into the primary defect and sutured into place.
O-Z Plasty Text: The defect edges were debeveled with a #15 scalpel blade.  Given the location of the defect, shape of the defect and the proximity to free margins an O-Z plasty (double transposition flap) was deemed most appropriate.  Using a sterile surgical marker, the appropriate transposition flaps were drawn incorporating the defect and placing the expected incisions within the relaxed skin tension lines where possible.    The area thus outlined was incised deep to adipose tissue with a #15 scalpel blade.  The skin margins were undermined to an appropriate distance in all directions utilizing iris scissors.  Hemostasis was achieved with electrocautery.  The flaps were then transposed into place, one clockwise and the other counterclockwise, and anchored with interrupted buried subcutaneous sutures.
Peng Advancement Flap Text: The defect edges were debeveled with a #15 scalpel blade. Given the location of the defect, shape of the defect and the proximity to free margins a Peng advancement flap was deemed most appropriate. Using a sterile surgical marker, an appropriate advancement flap was drawn incorporating the defect and placing the expected incisions within the relaxed skin tension lines where possible. The area thus outlined was incised deep to adipose tissue with a #15 scalpel blade. The skin margins were undermined to an appropriate distance in all directions utilizing iris scissors. Following this, the designed flap was advanced and carried over into the primary defect and sutured into place.
Rectangular Flap Text: The defect edges were debeveled with a #15 scalpel blade. Given the location of the defect and the proximity to free margins a rectangular flap was deemed most appropriate. Using a sterile surgical marker, an appropriate rectangular flap was drawn incorporating the defect. The area thus outlined was incised deep to adipose tissue with a #15 scalpel blade. The skin margins were undermined to an appropriate distance in all directions utilizing iris scissors. Following this, the designed flap was carried over into the primary defect and sutured into place.
Rhombic Flap Text: The defect edges were debeveled with a #15 scalpel blade.  Given the location of the defect and the proximity to free margins a rhombic flap was deemed most appropriate.  Using a sterile surgical marker, an appropriate rhombic flap was drawn incorporating the defect.    The area thus outlined was incised deep to adipose tissue with a #15 scalpel blade.  The skin margins were undermined to an appropriate distance in all directions utilizing iris scissors.
Rhomboid Transposition Flap Text: The defect edges were debeveled with a #15 scalpel blade. Given the location of the defect and the proximity to free margins a rhomboid transposition flap was deemed most appropriate. Using a sterile surgical marker, an appropriate rhomboid flap was drawn incorporating the defect. The area thus outlined was incised deep to adipose tissue with a #15 scalpel blade. The skin margins were undermined to an appropriate distance in all directions utilizing iris scissors. Following this, the designed flap was carried over into the primary defect and sutured into place.
Rotation Flap Text: The defect edges were debeveled with a #15 scalpel blade.  Given the location of the defect, shape of the defect and the proximity to free margins a rotation flap was deemed most appropriate.  Using a sterile surgical marker, an appropriate rotation flap was drawn incorporating the defect and placing the expected incisions within the relaxed skin tension lines where possible.    The area thus outlined was incised deep to adipose tissue with a #15 scalpel blade.  The skin margins were undermined to an appropriate distance in all directions utilizing iris scissors.
Spiral Flap Text: The defect edges were debeveled with a #15 scalpel blade.  Given the location of the defect, shape of the defect and the proximity to free margins a spiral flap was deemed most appropriate.  Using a sterile surgical marker, an appropriate rotation flap was drawn incorporating the defect and placing the expected incisions within the relaxed skin tension lines where possible. The area thus outlined was incised deep to adipose tissue with a #15 scalpel blade.  The skin margins were undermined to an appropriate distance in all directions utilizing iris scissors.
Staged Advancement Flap Text: The defect edges were debeveled with a #15 scalpel blade. Given the location of the defect, shape of the defect and the proximity to free margins a staged advancement flap was deemed most appropriate. Using a sterile surgical marker, an appropriate advancement flap was drawn incorporating the defect and placing the expected incisions within the relaxed skin tension lines where possible. The area thus outlined was incised deep to adipose tissue with a #15 scalpel blade. The skin margins were undermined to an appropriate distance in all directions utilizing iris scissors. Following this, the designed flap was carried over into the primary defect and sutured into place.
Star Wedge Flap Text: The defect edges were debeveled with a #15 scalpel blade.  Given the location of the defect, shape of the defect and the proximity to free margins a star wedge flap was deemed most appropriate.  Using a sterile surgical marker, an appropriate rotation flap was drawn incorporating the defect and placing the expected incisions within the relaxed skin tension lines where possible. The area thus outlined was incised deep to adipose tissue with a #15 scalpel blade.  The skin margins were undermined to an appropriate distance in all directions utilizing iris scissors.
Transposition Flap Text: The defect edges were debeveled with a #15 scalpel blade.  Given the location of the defect and the proximity to free margins a transposition flap was deemed most appropriate.  Using a sterile surgical marker, an appropriate transposition flap was drawn incorporating the defect.    The area thus outlined was incised deep to adipose tissue with a #15 scalpel blade.  The skin margins were undermined to an appropriate distance in all directions utilizing iris scissors.
Trilobed Flap Text: The defect edges were debeveled with a #15 scalpel blade.  Given the location of the defect and the proximity to free margins a trilobed flap was deemed most appropriate.  Using a sterile surgical marker, an appropriate trilobed flap drawn around the defect.    The area thus outlined was incised deep to adipose tissue with a #15 scalpel blade.  The skin margins were undermined to an appropriate distance in all directions utilizing iris scissors.
V-Y Flap Text: The defect edges were debeveled with a #15 scalpel blade.  Given the location of the defect, shape of the defect and the proximity to free margins a V-Y flap was deemed most appropriate.  Using a sterile surgical marker, an appropriate advancement flap was drawn incorporating the defect and placing the expected incisions within the relaxed skin tension lines where possible.    The area thus outlined was incised deep to adipose tissue with a #15 scalpel blade.  The skin margins were undermined to an appropriate distance in all directions utilizing iris scissors.
V-Y Plasty Text: The defect edges were debeveled with a #15 scalpel blade.  Given the location of the defect, shape of the defect and the proximity to free margins an V-Y advancement flap was deemed most appropriate.  Using a sterile surgical marker, an appropriate advancement flap was drawn incorporating the defect and placing the expected incisions within the relaxed skin tension lines where possible.    The area thus outlined was incised deep to adipose tissue with a #15 scalpel blade.  The skin margins were undermined to an appropriate distance in all directions utilizing iris scissors.
W Plasty Text: The lesion was extirpated to the level of the fat with a #15 scalpel blade.  Given the location of the defect, shape of the defect and the proximity to free margins a W-plasty was deemed most appropriate for repair.  Using a sterile surgical marker, the appropriate transposition arms of the W-plasty were drawn incorporating the defect and placing the expected incisions within the relaxed skin tension lines where possible.    The area thus outlined was incised deep to adipose tissue with a #15 scalpel blade.  The skin margins were undermined to an appropriate distance in all directions utilizing iris scissors.  The opposing transposition arms were then transposed into place in opposite direction and anchored with interrupted buried subcutaneous sutures.
Z Plasty Text: The lesion was extirpated to the level of the fat with a #15 scalpel blade.  Given the location of the defect, shape of the defect and the proximity to free margins a Z-plasty was deemed most appropriate for repair.  Using a sterile surgical marker, the appropriate transposition arms of the Z-plasty were drawn incorporating the defect and placing the expected incisions within the relaxed skin tension lines where possible.    The area thus outlined was incised deep to adipose tissue with a #15 scalpel blade.  The skin margins were undermined to an appropriate distance in all directions utilizing iris scissors.  The opposing transposition arms were then transposed into place in opposite direction and anchored with interrupted buried subcutaneous sutures.
Zygomaticofacial Flap Text: Given the location of the defect, shape of the defect and the proximity to free margins a zygomaticofacial flap was deemed most appropriate for repair. Using a sterile surgical marker, the appropriate flap was drawn incorporating the defect and placing the expected incisions within the relaxed skin tension lines where possible. The area thus outlined was incised deep to adipose tissue with a #15 scalpel blade with preservation of a vascular pedicle.  The skin margins were undermined to an appropriate distance in all directions utilizing iris scissors. The flap was then carried over into the defect and anchored with interrupted buried subcutaneous sutures.
Abbe Flap (Lower To Upper Lip) Text: The defect of the upper lip was assessed and measured.  Given the location and size of the defect, an Abbe flap was deemed most appropriate. Using a sterile surgical marker, an appropriate Abbe flap was measured and drawn on the lower lip. Local anesthesia was then infiltrated. A scalpel was then used to incise the upper lip through and through the skin, vermilion, muscle and mucosa, leaving the flap pedicled on the opposite side.  The flap was then rotated and transferred to the lower lip defect.  The flap was then sutured into place with a three layer technique, closing the orbicularis oris muscle layer with subcutaneous buried sutures, followed by a mucosal layer and an epidermal layer.
Abbe Flap (Upper To Lower Lip) Text: The defect of the lower lip was assessed and measured.  Given the location and size of the defect, an Abbe flap was deemed most appropriate. Using a sterile surgical marker, an appropriate Abbe flap was measured and drawn on the upper lip. Local anesthesia was then infiltrated.  A scalpel was then used to incise the upper lip through and through the skin, vermilion, muscle and mucosa, leaving the flap pedicled on the opposite side.  The flap was then rotated and transferred to the lower lip defect.  The flap was then sutured into place with a three layer technique, closing the orbicularis oris muscle layer with subcutaneous buried sutures, followed by a mucosal layer and an epidermal layer.
Cheek Interpolation Flap Text: A decision was made to reconstruct the defect utilizing an interpolation axial flap and a staged reconstruction.  A telfa template was made of the defect.  This telfa template was then used to outline the Cheek Interpolation flap.  The donor area for the pedicle flap was then injected with anesthesia.  The flap was excised through the skin and subcutaneous tissue down to the layer of the underlying musculature.  The interpolation flap was carefully excised within this deep plane to maintain its blood supply.  The edges of the donor site were undermined.   The donor site was closed in a primary fashion.  The pedicle was then rotated into position and sutured.  Once the tube was sutured into place, adequate blood supply was confirmed with blanching and refill.  The pedicle was then wrapped with xeroform gauze and dressed appropriately with a telfa and gauze bandage to ensure continued blood supply and protect the attached pedicle.
Cheek-To-Nose Interpolation Flap Text: A decision was made to reconstruct the defect utilizing an interpolation axial flap and a staged reconstruction.  A telfa template was made of the defect.  This telfa template was then used to outline the Cheek-To-Nose Interpolation flap.  The donor area for the pedicle flap was then injected with anesthesia.  The flap was excised through the skin and subcutaneous tissue down to the layer of the underlying musculature.  The interpolation flap was carefully excised within this deep plane to maintain its blood supply.  The edges of the donor site were undermined.   The donor site was closed in a primary fashion.  The pedicle was then rotated into position and sutured.  Once the tube was sutured into place, adequate blood supply was confirmed with blanching and refill.  The pedicle was then wrapped with xeroform gauze and dressed appropriately with a telfa and gauze bandage to ensure continued blood supply and protect the attached pedicle.
Estlander Flap (Lower To Upper Lip) Text: The defect of the lower lip was assessed and measured.  Given the location and size of the defect, an Estlander flap was deemed most appropriate. Using a sterile surgical marker, an appropriate Estlander flap was measured and drawn on the upper lip. Local anesthesia was then infiltrated. A scalpel was then used to incise the lateral aspect of the flap, through skin, muscle and mucosa, leaving the flap pedicled medially.  The flap was then rotated and positioned to fill the lower lip defect.  The flap was then sutured into place with a three layer technique, closing the orbicularis oris muscle layer with subcutaneous buried sutures, followed by a mucosal layer and an epidermal layer.
Interpolation Flap Text: A decision was made to reconstruct the defect utilizing an interpolation axial flap and a staged reconstruction.  A telfa template was made of the defect.  This telfa template was then used to outline the interpolation flap.  The donor area for the pedicle flap was then injected with anesthesia.  The flap was excised through the skin and subcutaneous tissue down to the layer of the underlying musculature.  The interpolation flap was carefully excised within this deep plane to maintain its blood supply.  The edges of the donor site were undermined.   The donor site was closed in a primary fashion.  The pedicle was then rotated into position and sutured.  Once the tube was sutured into place, adequate blood supply was confirmed with blanching and refill.  The pedicle was then wrapped with xeroform gauze and dressed appropriately with a telfa and gauze bandage to ensure continued blood supply and protect the attached pedicle.
Melolabial Interpolation Flap Text: A decision was made to reconstruct the defect utilizing an interpolation axial flap and a staged reconstruction.  A telfa template was made of the defect.  This telfa template was then used to outline the melolabial interpolation flap.  The donor area for the pedicle flap was then injected with anesthesia.  The flap was excised through the skin and subcutaneous tissue down to the layer of the underlying musculature.  The pedicle flap was carefully excised within this deep plane to maintain its blood supply.  The edges of the donor site were undermined.   The donor site was closed in a primary fashion.  The pedicle was then rotated into position and sutured.  Once the tube was sutured into place, adequate blood supply was confirmed with blanching and refill.  The pedicle was then wrapped with xeroform gauze and dressed appropriately with a telfa and gauze bandage to ensure continued blood supply and protect the attached pedicle.
Mastoid Interpolation Flap Text: A decision was made to reconstruct the defect utilizing an interpolation axial flap and a staged reconstruction.  A telfa template was made of the defect.  This telfa template was then used to outline the mastoid interpolation flap.  The donor area for the pedicle flap was then injected with anesthesia.  The flap was excised through the skin and subcutaneous tissue down to the layer of the underlying musculature.  The pedicle flap was carefully excised within this deep plane to maintain its blood supply.  The edges of the donor site were undermined.   The donor site was closed in a primary fashion.  The pedicle was then rotated into position and sutured.  Once the tube was sutured into place, adequate blood supply was confirmed with blanching and refill.  The pedicle was then wrapped with xeroform gauze and dressed appropriately with a telfa and gauze bandage to ensure continued blood supply and protect the attached pedicle.
Paramedian Forehead Flap Text: A decision was made to reconstruct the defect utilizing an interpolation axial flap and a staged reconstruction.  A telfa template was made of the defect.  This telfa template was then used to outline the paramedian forehead pedicle flap.  The donor area for the pedicle flap was then injected with anesthesia.  The flap was excised through the skin and subcutaneous tissue down to the layer of the underlying musculature.  The pedicle flap was carefully excised within this deep plane to maintain its blood supply.  The edges of the donor site were undermined.   The donor site was closed in a primary fashion.  The pedicle was then rotated into position and sutured.  Once the tube was sutured into place, adequate blood supply was confirmed with blanching and refill.  The pedicle was then wrapped with xeroform gauze and dressed appropriately with a telfa and gauze bandage to ensure continued blood supply and protect the attached pedicle.
Posterior Auricular Interpolation Flap Text: A decision was made to reconstruct the defect utilizing an interpolation axial flap and a staged reconstruction.  A telfa template was made of the defect.  This telfa template was then used to outline the posterior auricular interpolation flap.  The donor area for the pedicle flap was then injected with anesthesia.  The flap was excised through the skin and subcutaneous tissue down to the layer of the underlying musculature.  The pedicle flap was carefully excised within this deep plane to maintain its blood supply.  The edges of the donor site were undermined.   The donor site was closed in a primary fashion.  The pedicle was then rotated into position and sutured.  Once the tube was sutured into place, adequate blood supply was confirmed with blanching and refill.  The pedicle was then wrapped with xeroform gauze and dressed appropriately with a telfa and gauze bandage to ensure continued blood supply and protect the attached pedicle.
Cheiloplasty (Complex) Text: A decision was made to reconstruct the defect with a  cheiloplasty.  The defect was undermined extensively.  Additional obicularis oris muscle was excised with a 15 blade scalpel.  The defect was converted into a full thickness wedge to facilite a better cosmetic result.  Small vessels were then tied off with 5-0 monocyrl. The obicularis oris, superficial fascia, adipose and dermis were then reapproximated.  After the deeper layers were approximated the epidermis was reapproximated with particular care given to realign the vermilion border.
Cheiloplasty (Less Than 50%) Text: A decision was made to reconstruct the defect with a  cheiloplasty.  The defect was undermined extensively.  Additional obicularis oris muscle was excised with a 15 blade scalpel.  The defect was converted into a full thickness wedge, of less than 50% of the vertical height of the lip, to facilite a better cosmetic result.  Small vessels were then tied off with 5-0 monocyrl. The obicularis oris, superficial fascia, adipose and dermis were then reapproximated.  After the deeper layers were approximated the epidermis was reapproximated with particular care given to realign the vermilion border.
Ear Wedge Repair Text: A wedge excision was completed by carrying down an excision through the full thickness of the ear and cartilage with an inward facing Burow's triangle. The wound was then closed in a layered fashion.
Full Thickness Lip Wedge Repair (Flap) Text: Given the location of the defect and the proximity to free margins a full thickness wedge repair was deemed most appropriate.  Using a sterile surgical marker, the appropriate repair was drawn incorporating the defect and placing the expected incisions perpendicular to the vermilion border.  The vermilion border was also meticulously outlined to ensure appropriate reapproximation during the repair.  The area thus outlined was incised through and through with a #15 scalpel blade.  The muscularis and dermis were reaproximated with deep sutures following hemostasis. Care was taken to realign the vermilion border before proceeding with the superficial closure.  Once the vermilion was realigned the superfical and mucosal closure was finished.
Burow's Graft Text: The defect edges were debeveled with a #15 scalpel blade. Given the location of the defect, shape of the defect, the proximity to free margins and the presence of a standing cone deformity a Burow's skin graft was deemed most appropriate. The standing cone was removed and this tissue was then trimmed to the shape of the primary defect. The adipose tissue was also removed until only dermis and epidermis were left.  The skin graft was then placed in the primary defect and oriented appropriately.
Cartilage Graft Text: The defect edges were debeveled with a #15 scalpel blade.  Given the location of the defect, shape of the defect, the fact the defect involved a full thickness cartilage defect a cartilage graft was deemed most appropriate.  An appropriate donor site was identified, cleansed, and anesthetized. The cartilage graft was then harvested and transferred to the recipient site, oriented appropriately and then sutured into place.  The secondary defect was then repaired using a primary closure.
Composite Graft Text: The defect edges were debeveled with a #15 scalpel blade.  Given the location of the defect, shape of the defect, the proximity to free margins and the fact the defect was full thickness a composite graft was deemed most appropriate.  The defect was outline and then transferred to the donor site.  A full thickness graft was then excised from the donor site. The graft was then placed in the primary defect, oriented appropriately and then sutured into place.  The secondary defect was then repaired using a primary closure.
Epidermal Autograft Text: The defect edges were debeveled with a #15 scalpel blade.  Given the location of the defect, shape of the defect and the proximity to free margins an epidermal autograft was deemed most appropriate.  Using a sterile surgical marker, the primary defect shape was transferred to the donor site. The epidermal graft was then harvested.  The skin graft was then placed in the primary defect and oriented appropriately.
Dermal Autograft Text: The defect edges were debeveled with a #15 scalpel blade.  Given the location of the defect, shape of the defect and the proximity to free margins a dermal autograft was deemed most appropriate.  Using a sterile surgical marker, the primary defect shape was transferred to the donor site. The area thus outlined was incised deep to adipose tissue with a #15 scalpel blade.  The harvested graft was then trimmed of adipose and epidermal tissue until only dermis was left.  The skin graft was then placed in the primary defect and oriented appropriately.
Ftsg Text: The defect edges were debeveled with a #15 scalpel blade.  Given the location of the defect, shape of the defect and the proximity to free margins a full thickness skin graft was deemed most appropriate.  Using a sterile surgical marker, the primary defect shape was transferred to the donor site. The area thus outlined was incised deep to adipose tissue with a #15 scalpel blade.  The harvested graft was then trimmed of adipose tissue until only dermis and epidermis was left.  The skin margins of the secondary defect were undermined to an appropriate distance in all directions utilizing iris scissors.  The secondary defect was closed with interrupted buried subcutaneous sutures.  The skin edges were then re-apposed with running  sutures.  The skin graft was then placed in the primary defect and oriented appropriately.
Pinch Graft Text: The defect edges were debeveled with a #15 scalpel blade. Given the location of the defect, shape of the defect and the proximity to free margins a pinch graft was deemed most appropriate. Using a sterile surgical marker, the primary defect shape was transferred to the donor site. The area thus outlined was incised deep to adipose tissue with a #15 scalpel blade.  The harvested graft was then trimmed of adipose tissue until only dermis and epidermis was left. The skin graft was then placed in the primary defect and oriented appropriately.
Skin Substitute Text: The defect edges were debeveled with a #15 scalpel blade.  Given the location of the defect, shape of the defect and the proximity to free margins a skin substitute graft was deemed most appropriate.  The graft material was trimmed to fit the size of the defect. The graft was then placed in the primary defect and oriented appropriately.
Split-Thickness Skin Graft Text: The defect edges were debeveled with a #15 scalpel blade.  Given the location of the defect, shape of the defect and the proximity to free margins a split thickness skin graft was deemed most appropriate.  Using a sterile surgical marker, the primary defect shape was transferred to the donor site. The split thickness graft was then harvested.  The skin graft was then placed in the primary defect and oriented appropriately.
Tissue Cultured Epidermal Autograft Text: The defect edges were debeveled with a #15 scalpel blade.  Given the location of the defect, shape of the defect and the proximity to free margins a tissue cultured epidermal autograft was deemed most appropriate.  The graft was then trimmed to fit the size of the defect.  The graft was then placed in the primary defect and oriented appropriately.
Xenograft Text: The defect edges were debeveled with a #15 scalpel blade.  Given the location of the defect, shape of the defect and the proximity to free margins a xenograft was deemed most appropriate.  The graft was then trimmed to fit the size of the defect.  The graft was then placed in the primary defect and oriented appropriately.
Complex Repair And Flap Additional Text (Will Appearing After The Standard Complex Repair Text): The complex repair was not sufficient to completely close the primary defect. The remaining additional defect was repaired with the flap mentioned below.
Complex Repair And Graft Additional Text (Will Appearing After The Standard Complex Repair Text): The complex repair was not sufficient to completely close the primary defect. The remaining additional defect was repaired with the graft mentioned below.
Eyelid Full Thickness Repair - 16218: The eyelid defect was full thickness which required a wedge repair of the eyelid. Special care was taken to ensure that the eyelid margin was realligned when placing sutures.
Eyelid Partial Thickness Repair - 31212: The eyelid defect was partial thickness which required a wedge repair of the eyelid. Special care was taken to ensure that the eyelid margin was realligned when placing sutures.
Intermediate Repair And Flap Additional Text (Will Appearing After The Standard Complex Repair Text): The intermediate repair was not sufficient to completely close the primary defect. The remaining additional defect was repaired with the flap mentioned below.
Intermediate Repair And Graft Additional Text (Will Appearing After The Standard Complex Repair Text): The intermediate repair was not sufficient to completely close the primary defect. The remaining additional defect was repaired with the graft mentioned below.
Localized Dermabrasion With 15 Blade Text: The patient was draped in routine manner.  Localized dermabrasion using a 15 blade was performed in routine manner to papillary dermis. This spot dermabrasion is being performed to complete skin cancer reconstruction. It also will eliminate the other sun damaged precancerous cells that are known to be part of the regional effect of a lifetime's worth of sun exposure. This localized dermabrasion is therapeutic and should not be considered cosmetic in any regard.
Localized Dermabrasion With Sand Papertext: The patient was draped in routine manner.  Localized dermabrasion using sterile sand paper was performed in routine manner to papillary dermis. This spot dermabrasion is being performed to complete skin cancer reconstruction. It also will eliminate the other sun damaged precancerous cells that are known to be part of the regional effect of a lifetime's worth of sun exposure. This localized dermabrasion is therapeutic and should not be considered cosmetic in any regard.
Localized Dermabrasion With Wire Brush Text: The patient was draped in routine manner.  Localized dermabrasion using 3 x 17 mm wire brush was performed in routine manner to papillary dermis. This spot dermabrasion is being performed to complete skin cancer reconstruction. It also will eliminate the other sun damaged precancerous cells that are known to be part of the regional effect of a lifetime's worth of sun exposure. This localized dermabrasion is therapeutic and should not be considered cosmetic in any regard.
Purse String (Simple) Text: Given the location of the defect and the characteristics of the surrounding skin a purse string closure was deemed most appropriate.  Undermining was performed circumfirentially around the surgical defect.  A purse string suture was then placed and tightened.
Purse String (Intermediate) Text: Given the location of the defect and the characteristics of the surrounding skin a purse string intermediate closure was deemed most appropriate.  Undermining was performed circumfirentially around the surgical defect.  A purse string suture was then placed and tightened.
Partial Purse String (Simple) Text: Given the location of the defect and the characteristics of the surrounding skin a simple purse string closure was deemed most appropriate.  Undermining was performed circumfirentially around the surgical defect.  A purse string suture was then placed and tightened. Wound tension only allowed a partial closure of the circular defect.
Partial Purse String (Intermediate) Text: Given the location of the defect and the characteristics of the surrounding skin an intermediate purse string closure was deemed most appropriate.  Undermining was performed circumfirentially around the surgical defect.  A purse string suture was then placed and tightened. Wound tension only allowed a partial closure of the circular defect.
Tarsorrhaphy Text: A tarsorrhaphy was performed using Frost sutures.
Manual Repair Warning Statement: We plan on removing the manually selected variable below in favor of our much easier automatic structured text blocks found in the previous tab. We decided to do this to help make the flow better and give you the full power of structured data. Manual selection is never going to be ideal in our platform and I would encourage you to avoid using manual selection from this point on, especially since I will be sunsetting this feature. It is important that you do one of two things with the customized text below. First, you can save all of the text in a word file so you can have it for future reference. Second, transfer the text to the appropriate area in the Library tab. Lastly, if there is a flap or graft type which we do not have you need to let us know right away so I can add it in before the variable is hidden. No need to panic, we plan to give you roughly 6 months to make the change.
Same Histology In Subsequent Stages Text: The pattern and morphology of the tumor is as described in the first stage.
No Residual Tumor Seen Histology Text: There were no malignant cells seen in the sections examined.
Inflammation Suggestive Of Cancer Camouflage Histology Text: There was a dense lymphocytic infiltrate which prevented adequate histologic evaluation of adjacent structures.
Bcc Histology Text: There were numerous aggregates of basaloid cells.
Bcc Infiltrative Histology Text: There were numerous aggregates of basaloid cells demonstrating an infiltrative pattern.
Mart-1 - Positive Histology Text: MART-1 staining demonstrates areas of higher density and clustering of melanocytes with Pagetoid spread upwards within the epidermis. The surgical margins are positive for tumor cells.
Mart-1 - Negative Histology Text: MART-1 staining demonstrates a normal density and pattern of melanocytes along the dermal-epidermal junction. The surgical margins are negative for tumor cells.
Information: Selecting Yes will display possible errors in your note based on the variables you have selected. This validation is only offered as a suggestion for you. PLEASE NOTE THAT THE VALIDATION TEXT WILL BE REMOVED WHEN YOU FINALIZE YOUR NOTE. IF YOU WANT TO FAX A PRELIMINARY NOTE YOU WILL NEED TO TOGGLE THIS TO 'NO' IF YOU DO NOT WANT IT IN YOUR FAXED NOTE.
Bill 59 Modifier?: No - Continue to Bill 79 Modifier

## 2024-12-13 ENCOUNTER — APPOINTMENT (OUTPATIENT)
Dept: URBAN - METROPOLITAN AREA CLINIC 22 | Facility: CLINIC | Age: 79
Setting detail: DERMATOLOGY
End: 2024-12-13

## 2024-12-13 DIAGNOSIS — Z48.817 ENCOUNTER FOR SURGICAL AFTERCARE FOLLOWING SURGERY ON THE SKIN AND SUBCUTANEOUS TISSUE: ICD-10-CM

## 2024-12-13 PROCEDURE — ? POST-OP WOUND EVALUATION

## 2024-12-13 PROCEDURE — 99024 POSTOP FOLLOW-UP VISIT: CPT

## 2024-12-13 ASSESSMENT — LOCATION SIMPLE DESCRIPTION DERM: LOCATION SIMPLE: RIGHT NOSE

## 2024-12-13 ASSESSMENT — LOCATION ZONE DERM: LOCATION ZONE: NOSE

## 2024-12-13 ASSESSMENT — LOCATION DETAILED DESCRIPTION DERM: LOCATION DETAILED: RIGHT NASAL ALA

## 2024-12-13 NOTE — PROCEDURE: POST-OP WOUND EVALUATION
Detail Level: Detailed
Add 20581 Cpt? (Important Note: In 2017 The Use Of 68871 Is Being Tracked By Cms To Determine Future Global Period Reimbursement For Global Periods): yes
Quality 355: Unplanned Reoperation Within The 30 Day Postoperative Period: No return to the operating room for a surgical procedure, for complications of the principal operative procedure, within 30 days of the principal operative procedure
Quality 357: Surgical Site Infection (Ssi): No surgical site infection
Wound Evaluated By (Optional): Flaquito Shultz MD
Wound Diameter In Cm(Optional): 0
Wound Crusting?: clean
Sutures?: intact
Wound Edema?: mild
Wound Color?: pink
Wound Dehiscence?: dehiscence

## 2024-12-17 ENCOUNTER — HOSPITAL ENCOUNTER (OUTPATIENT)
Dept: RADIOLOGY | Facility: MEDICAL CENTER | Age: 79
End: 2024-12-17
Attending: INTERNAL MEDICINE
Payer: MEDICARE

## 2024-12-17 DIAGNOSIS — I87.2 VENOUS INSUFFICIENCY: ICD-10-CM

## 2024-12-17 DIAGNOSIS — I83.811 VARICOSE VEINS OF RIGHT LOWER EXTREMITY WITH PAIN: ICD-10-CM

## 2024-12-17 PROCEDURE — 36482 ENDOVEN THER CHEM ADHES 1ST: CPT | Performed by: INTERNAL MEDICINE

## 2024-12-17 PROCEDURE — C1894 INTRO/SHEATH, NON-LASER: HCPCS

## 2024-12-17 NOTE — PROGRESS NOTES
Patient brought to Procedure Room for US-VEIN ABLATION PROCEDURE: VENASEAL-RIGHT    Informed consent obtained, signed by patient and RN; Yellow WHO sheet completed. Unable to chart in [facility] IR Narrator.    Pre-Procedure Checklist & Timeout completed at  1130 and procedure team all agreed. All documents scanned into [facility]. All documents secured to charge sheet to be scanned by [facility]ul[facility].   All monitoring in place for procedure. Patient discharged with paper instructions for monitoring by Dr. Richard Cuellar. Patient verbalized understanding of discharge instructions.

## 2024-12-17 NOTE — ADDENDUM NOTE
Encounter addended by: Gina Felix on: 12/17/2024 3:23 PM   Actions taken: Imaging Exam ended, Charge Capture section accepted

## 2024-12-17 NOTE — PROCEDURES
Procedure: Vein treatment with Venaseal (FDA approved) of the right lower extremity    Date of service: 12/17/2024.    Indications:   This is a(n) 79 years old patient with painful varicose veins and related persistent swelling of the right lower extremity, failed conservative therapy of compression stocking, still symptomatic with persistent swelling, edema along with hyperpigmentation of the skin.  Patient is indicated to vein treatment with Venaseal procedure of the right greater saphenous vein.    Procedure:  Patient was prepped via sterile protocol.  Patient was draped via protocol in sterile field.  Markers was identified in terms of the appropriate limb for intervention along with location and tracking of his greater saphenous vein.  Local anesthetic was provided at access site.  Access to the greater saphenous vein at that location was obtained.  Through it, a 7 [place] of [place] sheath was advanced and secured in place.  This was done under ultrasound guidance to minimize risk of bleeding and to ensure proper placement of the sheath.  Through the sheath, we advanced the Venaseal catheter until the tip of the catheter is 5 cm away from the femoral saphenous junction.  Under ultrasound guidance and compression to seal off and protect the proximal junction, we then injected Venaseal solution within the GSV for the entirety of its length (from ankle to groin area) in sequential fashion with mike compression via protocol to ensure adequate closure of the GSV. The GSV was successfully closed without unexpected complications. The Venaseal catheter was removed and the sheath was removed. Mike compression was done at the local injection site to obtain homeostasis.    Complications:  None.    Blood loss: 2 cc.    Recommendations:  1) Thigh high compression stocking for 2 weeks.  2) Tylenol PRN for pain control.  3) Activities as tolerated.  4) No strenuous exercises for 1 week.  5) Return for post ablation US duplex as  scheduled.    Demetrius Cuellar MD.   Cardiovascular Medicine Physician Specialist.  [facility].  [phone].  Cherie Pinzon.

## 2024-12-18 ENCOUNTER — APPOINTMENT (OUTPATIENT)
Dept: RADIOLOGY | Facility: MEDICAL CENTER | Age: 79
End: 2024-12-18
Attending: NURSE PRACTITIONER
Payer: COMMERCIAL

## 2024-12-19 ENCOUNTER — HOSPITAL ENCOUNTER (OUTPATIENT)
Dept: RADIOLOGY | Facility: MEDICAL CENTER | Age: 79
End: 2024-12-19
Attending: INTERNAL MEDICINE
Payer: MEDICARE

## 2024-12-19 DIAGNOSIS — I87.2 VENOUS INSUFFICIENCY: ICD-10-CM

## 2024-12-19 DIAGNOSIS — M79.89 RIGHT LEG SWELLING: ICD-10-CM

## 2024-12-19 PROCEDURE — 93971 EXTREMITY STUDY: CPT | Mod: RT

## 2024-12-19 PROCEDURE — 93971 EXTREMITY STUDY: CPT | Mod: 26,RT | Performed by: INTERNAL MEDICINE

## 2025-01-12 DIAGNOSIS — E03.4 HYPOTHYROIDISM DUE TO ACQUIRED ATROPHY OF THYROID: ICD-10-CM

## 2025-01-13 RX ORDER — LEVOTHYROXINE SODIUM 137 MCG
137 TABLET ORAL
Qty: 60 TABLET | Refills: 0 | Status: SHIPPED
Start: 2025-01-13 | End: 2025-01-30

## 2025-01-13 NOTE — TELEPHONE ENCOUNTER
Received request via: Pharmacy    Was the patient seen in the last year in this department? Yes    Does the patient have an active prescription (recently filled or refills available) for medication(s) requested? No    Pharmacy Name: [id number] - [place], [place] - [address]     Does the patient have [facility] and need 100-day supply? (This applies to ALL medications) Patient does not have SCP

## 2025-01-14 ENCOUNTER — APPOINTMENT (OUTPATIENT)
Dept: URBAN - METROPOLITAN AREA CLINIC 22 | Facility: CLINIC | Age: 80
Setting detail: DERMATOLOGY
End: 2025-01-14

## 2025-01-14 DIAGNOSIS — Z48.817 ENCOUNTER FOR SURGICAL AFTERCARE FOLLOWING SURGERY ON THE SKIN AND SUBCUTANEOUS TISSUE: ICD-10-CM

## 2025-01-14 PROCEDURE — ? POST-OP WOUND EVALUATION

## 2025-01-14 PROCEDURE — 99024 POSTOP FOLLOW-UP VISIT: CPT

## 2025-01-14 ASSESSMENT — LOCATION SIMPLE DESCRIPTION DERM: LOCATION SIMPLE: RIGHT NOSE

## 2025-01-14 ASSESSMENT — LOCATION DETAILED DESCRIPTION DERM: LOCATION DETAILED: RIGHT NASAL ALA

## 2025-01-14 ASSESSMENT — LOCATION ZONE DERM: LOCATION ZONE: NOSE

## 2025-01-14 NOTE — PROCEDURE: POST-OP WOUND EVALUATION
Detail Level: Detailed
Add 86723 Cpt? (Important Note: In 2017 The Use Of 73780 Is Being Tracked By Cms To Determine Future Global Period Reimbursement For Global Periods): yes
Quality 355: Unplanned Reoperation Within The 30 Day Postoperative Period: No return to the operating room for a surgical procedure, for complications of the principal operative procedure, within 30 days of the principal operative procedure
Quality 357: Surgical Site Infection (Ssi): No surgical site infection
Wound Evaluated By (Optional): Flaquito Shultz MD
Wound Diameter In Cm(Optional): 0
Wound Crusting?: clean
Wound Edema?: mild
Wound Color?: pink

## 2025-01-16 ENCOUNTER — TELEPHONE (OUTPATIENT)
Dept: INTERNAL MEDICINE | Facility: OTHER | Age: 80
End: 2025-01-16
Payer: MEDICARE

## 2025-01-16 NOTE — TELEPHONE ENCOUNTER
Patient called backline, He would like the generic name for synthroid, which is Levothyroxine, sent to pharmacy on file. His insurance changed, it only covers the generic brand. Patient is close to being out and would like this completed as quickly as possible.

## 2025-01-20 ENCOUNTER — APPOINTMENT (OUTPATIENT)
Dept: URBAN - METROPOLITAN AREA CLINIC 36 | Facility: CLINIC | Age: 80
Setting detail: DERMATOLOGY
End: 2025-01-20

## 2025-01-20 DIAGNOSIS — E03.4 HYPOTHYROIDISM DUE TO ACQUIRED ATROPHY OF THYROID: ICD-10-CM

## 2025-01-20 DIAGNOSIS — L90.5 SCAR CONDITIONS AND FIBROSIS OF SKIN: ICD-10-CM

## 2025-01-20 PROCEDURE — ? FRAXEL

## 2025-01-20 RX ORDER — LEVOTHYROXINE SODIUM 137 UG/1
137 TABLET ORAL
Qty: 90 TABLET | Refills: 1 | Status: SHIPPED | OUTPATIENT
Start: 2025-01-20

## 2025-01-20 ASSESSMENT — LOCATION ZONE DERM: LOCATION ZONE: FACE

## 2025-01-20 ASSESSMENT — LOCATION SIMPLE DESCRIPTION DERM: LOCATION SIMPLE: RIGHT CHEEK

## 2025-01-20 ASSESSMENT — LOCATION DETAILED DESCRIPTION DERM: LOCATION DETAILED: RIGHT MEDIAL MALAR CHEEK

## 2025-01-20 NOTE — PROCEDURE: FRAXEL
Number Of Passes: 1
Detail Level: Zone
Post-Care Instructions: I reviewed with the patient in detail post-care instructions. Patient should avoid sun until area fully healed.
Large Metal Eye Shield Text: The ocular mucosa was anesthetized with tetracaine. Once adequate anesthesia was optained, large metal eye shields were inserted and remained in place until the procedure was completed.
Wavelength: 1550nm
Indication: dyschromia
Anesthesia Type: 1% lidocaine with epinephrine
Small Plastic Eye Shield Text: The ocular mucosa was anesthetized with tetracaine. Once adequate anesthesia was optained, small plastic eye shields were inserted and remained in place until the procedure was completed.
Was An Eye Shield Used?: No
Energy(Mj/Cm2): 70
Medium Plastic Eye Shield Text: The ocular mucosa was anesthetized with tetracaine. Once adequate anesthesia was optained, medium plastic eye shields were inserted and remained in place until the procedure was completed.
Treatment Level: 5
Small Metal Eye Shield Text: The ocular mucosa was anesthetized with tetracaine. Once adequate anesthesia was optained, small metal eye shields were inserted and remained in place until the procedure was completed.
Large Plastic Eye Shield Text: The ocular mucosa was anesthetized with tetracaine. Once adequate anesthesia was optained, large plastic eye shields were inserted and remained in place until the procedure was completed.
Number Of Passes: 4
Consent: Written consent obtained, risks reviewed including but not limited to pain and incomplete improvement.
Medium Metal Eye Shield Text: The ocular mucosa was anesthetized with tetracaine. Once adequate anesthesia was optained, medium metal eye shields were inserted and remained in place until the procedure was completed.

## 2025-01-20 NOTE — TELEPHONE ENCOUNTER
Shamir Palm M.D.  You2 hours ago (10:26 AM)     CL  Changed prescription to generic at same dose  thanks

## 2025-01-27 ENCOUNTER — APPOINTMENT (OUTPATIENT)
Dept: ADMISSIONS | Facility: MEDICAL CENTER | Age: 80
End: 2025-01-27
Attending: INTERNAL MEDICINE
Payer: MEDICARE

## 2025-01-30 ENCOUNTER — PRE-ADMISSION TESTING (OUTPATIENT)
Dept: ADMISSIONS | Facility: MEDICAL CENTER | Age: 80
End: 2025-01-30
Attending: INTERNAL MEDICINE
Payer: MEDICARE

## 2025-01-30 VITALS — HEIGHT: 71 IN | BODY MASS INDEX: 27.89 KG/M2

## 2025-01-30 DIAGNOSIS — Z01.812 PRE-OPERATIVE LABORATORY EXAMINATION: ICD-10-CM

## 2025-01-30 DIAGNOSIS — Z01.810 PRE-OPERATIVE CARDIOVASCULAR EXAMINATION: ICD-10-CM

## 2025-01-30 NOTE — PREPROCEDURE INSTRUCTIONS
Pre-admit telephone appointment completed. Reviewed the Preparing for your procedure handout with patient over the phone. Patient instructed per anesthesia/pharmacy guidelines regarding taking, holding, or contacting provider for instructions on regularly prescribed medications before surgery.

## 2025-01-31 ENCOUNTER — PRE-ADMISSION TESTING (OUTPATIENT)
Dept: ADMISSIONS | Facility: MEDICAL CENTER | Age: 80
End: 2025-01-31
Attending: INTERNAL MEDICINE
Payer: MEDICARE

## 2025-01-31 DIAGNOSIS — Z01.810 PRE-OPERATIVE CARDIOVASCULAR EXAMINATION: ICD-10-CM

## 2025-01-31 DIAGNOSIS — Z01.812 PRE-OPERATIVE LABORATORY EXAMINATION: ICD-10-CM

## 2025-01-31 LAB
ANION GAP SERPL CALC-SCNC: 11 MMOL/L (ref 7–16)
BUN SERPL-MCNC: 20 MG/DL (ref 8–22)
CALCIUM SERPL-MCNC: 8.9 MG/DL (ref 8.4–10.2)
CHLORIDE SERPL-SCNC: 107 MMOL/L (ref 96–112)
CO2 SERPL-SCNC: 23 MMOL/L (ref 20–33)
CREAT SERPL-MCNC: 1.04 MG/DL (ref 0.5–1.4)
EKG IMPRESSION: NORMAL
ERYTHROCYTE [DISTWIDTH] IN BLOOD BY AUTOMATED COUNT: 42.1 FL (ref 35.9–50)
GFR SERPLBLD CREATININE-BSD FMLA CKD-EPI: 73 ML/MIN/1.73 M 2
GLUCOSE SERPL-MCNC: 76 MG/DL (ref 65–99)
HCT VFR BLD AUTO: 45 % (ref 42–52)
HGB BLD-MCNC: 14.8 G/DL (ref 14–18)
MCH RBC QN AUTO: 29.5 PG (ref 27–33)
MCHC RBC AUTO-ENTMCNC: 32.9 G/DL (ref 32.3–36.5)
MCV RBC AUTO: 89.8 FL (ref 81.4–97.8)
PLATELET # BLD AUTO: 211 K/UL (ref 164–446)
PMV BLD AUTO: 11.1 FL (ref 9–12.9)
POTASSIUM SERPL-SCNC: 5 MMOL/L (ref 3.6–5.5)
RBC # BLD AUTO: 5.01 M/UL (ref 4.7–6.1)
SODIUM SERPL-SCNC: 141 MMOL/L (ref 135–145)
WBC # BLD AUTO: 6.6 K/UL (ref 4.8–10.8)

## 2025-01-31 PROCEDURE — 93005 ELECTROCARDIOGRAM TRACING: CPT | Mod: TC

## 2025-01-31 PROCEDURE — 80048 BASIC METABOLIC PNL TOTAL CA: CPT

## 2025-01-31 PROCEDURE — 93010 ELECTROCARDIOGRAM REPORT: CPT | Performed by: INTERNAL MEDICINE

## 2025-01-31 PROCEDURE — 85027 COMPLETE CBC AUTOMATED: CPT

## 2025-01-31 PROCEDURE — 36415 COLL VENOUS BLD VENIPUNCTURE: CPT

## 2025-02-19 ENCOUNTER — APPOINTMENT (OUTPATIENT)
Dept: URBAN - METROPOLITAN AREA CLINIC 36 | Facility: CLINIC | Age: 80
Setting detail: DERMATOLOGY
End: 2025-02-19

## 2025-02-19 DIAGNOSIS — Z41.9 ENCOUNTER FOR PROCEDURE FOR PURPOSES OTHER THAN REMEDYING HEALTH STATE, UNSPECIFIED: ICD-10-CM

## 2025-02-19 PROCEDURE — ? FRAXEL

## 2025-02-19 ASSESSMENT — LOCATION SIMPLE DESCRIPTION DERM
LOCATION SIMPLE: RIGHT CHEEK
LOCATION SIMPLE: LEFT CHEEK
LOCATION SIMPLE: RIGHT FOREHEAD
LOCATION SIMPLE: CHIN

## 2025-02-19 ASSESSMENT — LOCATION DETAILED DESCRIPTION DERM
LOCATION DETAILED: RIGHT CENTRAL MALAR CHEEK
LOCATION DETAILED: LEFT CENTRAL MALAR CHEEK
LOCATION DETAILED: RIGHT INFERIOR MEDIAL FOREHEAD
LOCATION DETAILED: RIGHT CHIN

## 2025-02-19 ASSESSMENT — LOCATION ZONE DERM: LOCATION ZONE: FACE

## 2025-02-19 NOTE — PROCEDURE: FRAXEL
Large Plastic Eye Shield Text: The ocular mucosa was anesthetized with tetracaine. Once adequate anesthesia was optained, large plastic eye shields were inserted and remained in place until the procedure was completed.
Energy(Mj/Cm2): 1
Small Metal Eye Shield Text: The ocular mucosa was anesthetized with tetracaine. Once adequate anesthesia was optained, small metal eye shields were inserted and remained in place until the procedure was completed.
Wavelength: 1550nm
Medium Metal Eye Shield Text: The ocular mucosa was anesthetized with tetracaine. Once adequate anesthesia was optained, medium metal eye shields were inserted and remained in place until the procedure was completed.
Indication: dyschromia
Anesthesia Type: 1% lidocaine with epinephrine
Energy(Mj/Cm2): 70
Number Of Passes: 4
Treatment Level: 5
Large Metal Eye Shield Text: The ocular mucosa was anesthetized with tetracaine. Once adequate anesthesia was optained, large metal eye shields were inserted and remained in place until the procedure was completed.
Small Plastic Eye Shield Text: The ocular mucosa was anesthetized with tetracaine. Once adequate anesthesia was optained, small plastic eye shields were inserted and remained in place until the procedure was completed.
Consent: Written consent obtained, risks reviewed including but not limited to pain and incomplete improvement.
Add Post-Care Below To The Note: No
Medium Plastic Eye Shield Text: The ocular mucosa was anesthetized with tetracaine. Once adequate anesthesia was optained, medium plastic eye shields were inserted and remained in place until the procedure was completed.
Was An Eye Shield Used?: Yes - Small (Metal)
Post-Care Instructions: I reviewed with the patient in detail post-care instructions. Patient should avoid sun until area fully healed.
Detail Level: Zone

## 2025-02-24 ENCOUNTER — OFFICE VISIT (OUTPATIENT)
Dept: CARDIOLOGY | Facility: MEDICAL CENTER | Age: 80
End: 2025-02-24
Attending: INTERNAL MEDICINE
Payer: MEDICARE

## 2025-02-24 VITALS
BODY MASS INDEX: 27.22 KG/M2 | DIASTOLIC BLOOD PRESSURE: 60 MMHG | SYSTOLIC BLOOD PRESSURE: 108 MMHG | RESPIRATION RATE: 16 BRPM | WEIGHT: 201 LBS | HEIGHT: 72 IN | HEART RATE: 72 BPM | OXYGEN SATURATION: 97 %

## 2025-02-24 DIAGNOSIS — I10 ESSENTIAL HYPERTENSION: ICD-10-CM

## 2025-02-24 DIAGNOSIS — E78.2 MIXED HYPERLIPIDEMIA: ICD-10-CM

## 2025-02-24 DIAGNOSIS — I35.8 AORTIC VALVE SCLEROSIS: ICD-10-CM

## 2025-02-24 DIAGNOSIS — M79.89 RIGHT LEG SWELLING: ICD-10-CM

## 2025-02-24 DIAGNOSIS — I83.811 VARICOSE VEINS OF RIGHT LOWER EXTREMITY WITH PAIN: ICD-10-CM

## 2025-02-24 PROCEDURE — 3078F DIAST BP <80 MM HG: CPT | Performed by: INTERNAL MEDICINE

## 2025-02-24 PROCEDURE — 99214 OFFICE O/P EST MOD 30 MIN: CPT | Performed by: INTERNAL MEDICINE

## 2025-02-24 PROCEDURE — G2211 COMPLEX E/M VISIT ADD ON: HCPCS | Performed by: INTERNAL MEDICINE

## 2025-02-24 PROCEDURE — 99213 OFFICE O/P EST LOW 20 MIN: CPT | Performed by: INTERNAL MEDICINE

## 2025-02-24 PROCEDURE — 3074F SYST BP LT 130 MM HG: CPT | Performed by: INTERNAL MEDICINE

## 2025-02-24 RX ORDER — LISINOPRIL 10 MG/1
10 TABLET ORAL DAILY
Qty: 100 TABLET | Refills: 4 | Status: SHIPPED | OUTPATIENT
Start: 2025-02-24

## 2025-02-24 RX ORDER — ATORVASTATIN CALCIUM 10 MG/1
10 TABLET, FILM COATED ORAL DAILY
Qty: 100 TABLET | Refills: 4 | Status: SHIPPED | OUTPATIENT
Start: 2025-02-24

## 2025-02-24 ASSESSMENT — ENCOUNTER SYMPTOMS
BRUISES/BLEEDS EASILY: 0
SENSORY CHANGE: 0
ORTHOPNEA: 0
CHILLS: 0
EYE PAIN: 0
SHORTNESS OF BREATH: 0
BLOOD IN STOOL: 0
HEADACHES: 0
ABDOMINAL PAIN: 0
PALPITATIONS: 0
EYE DISCHARGE: 0
PND: 0
LOSS OF CONSCIOUSNESS: 0
FEVER: 0
COUGH: 0
MYALGIAS: 0
WEIGHT LOSS: 0
DOUBLE VISION: 0
VOMITING: 0
CLAUDICATION: 0
NAUSEA: 0
HALLUCINATIONS: 0
DIZZINESS: 0
SPEECH CHANGE: 0
BLURRED VISION: 0
FALLS: 0
DEPRESSION: 0

## 2025-02-24 ASSESSMENT — FIBROSIS 4 INDEX: FIB4 SCORE: 2.23

## 2025-02-24 NOTE — PROGRESS NOTES
"Chief Complaint   Patient presents with    Other     F/V Dx: Venous insufficiency    Dyslipidemia       Subjective     Amadou Parekh is a 79 y.o. male who presents today For vascular evaluation of persistent symptoms of RIGHT lower extremities edema, numbness / pain in setting of possible venous insufficiency and reflux disease.  Patient has tried compression stockings without success.  Patient is now seeking for invasive intervention for symptomatic relief and improving quality of life.  No prior vein treatments.  No prior vein stripping surgery.    I personally interpreted the venous duplex ultrasound which showed significant reflux disease venous insufficiency seen in RIGHT great saphenous vein from the proximal calf to distal calf ankle area.  I personally reviewed the images with patient in clinic today as well.    12/2024 S/p right GSV venaseal.     He is doing well post procedure.    I have independently interpreted and reviewed blood tests results with patient in clinic today which showed LDL level of 88, triglycerides level of 94, GFR of 73, K of 5.      Past Medical History:   Diagnosis Date    Actinic keratoses     sees [facility] - Dr. Everett    Acute nasopharyngitis     7/3 covid    Bilateral hearing loss 08/06/2018    wears aids    Chronic right-sided low back pain without sciatica 08/06/2018    s/p 5 surgeries; sees Akbar    ED (erectile dysfunction)     High cholesterol     Hyperlipidemia, mixed     Hypertension     pt states well controlled on meds    Hypothyroidism     Infectious disease 07/03/2023    Neuropathy (HCC) 08/06/2018    Pain 12/18/2018    \"Nerve Pain-Low back/legs\".    Peyronie disease     sees [place] [facility]    Prediabetes     Renal disorder     chronic kidney disease stage 2     Past Surgical History:   Procedure Laterality Date    SC INS/RPLC SPI NPG/RCVR POCKET N/A 6/27/2024    Procedure: INSERTION, NEUROSTIMULATOR, PERMANENT, SPINAL CORD;  Surgeon: Be Webber M.D.;  Location: SURGERY [facility] [place];  Service: Neurosurgery    LUMBAR LAMINECTOMY DISKECTOMY N/A 6/27/2024    Procedure: POSTERIOR LUMBAR LAMINECTOMY FOR PLACEMENT OF SPINAL CORD STIMULATOR;  Surgeon: Be Webber M.D.;  Location: [facility];  Service: Neurosurgery    DC TOTAL HIP ARTHROPLASTY Right 7/21/2023    Procedure: RIGHT TOTAL HIP ARTHROPLASTY AND REPAIRS AS INDICATED;  Surgeon: Lusi Caceres M.D.;  Location: SURGERY [facility];  Service: Orthopedics    OTHER SURGICAL PROCEDURE Left 02/17/2023    nerve decompression perineal nerve done at [facility]    HIP REVISION TOTAL Left 09/21/2022    Procedure: LEFT REVISION TOTAL HIP ARTHROPLASTY AND REPAIRS AS INDICATED;  Surgeon: Luis Caceres M.D.;  Location: SURGERY [facility];  Service: Orthopedics    DC TOTAL HIP ARTHROPLASTY Left 08/15/2022    Procedure: LEFT TOTAL HIP ARTHROPLASTY;  Surgeon: Luis Caceres M.D.;  Location: SURGERY [facility];  Service: Orthopedics    DC INS/RPLC SPI NPG/RCVR POCKET  01/03/2022    Procedure: REMOVAL, NEUROSTIMULATOR, PERMANENT, SPINAL CORD;  Surgeon: Be Webber M.D.;  Location: [facility];  Service: Neurosurgery    LUMBAR FUSION O-ARM  01/03/2022    Procedure: FUSION, SPINE, LUMBAR, WITH O-ARM IMAGING GUIDANCE - L1-3 EXTENSION-STAGE 2;  Surgeon: Be Webber M.D.;  Location: [facility];  Service: Neurosurgery    LUMBAR DECOMPRESSION  01/03/2022    Procedure: DECOMPRESSION, SPINE, LUMBAR;  Surgeon: Be Webber M.D.;  Location: [facility];  Service: Neurosurgery    LUMBAR FUSION ANTERIOR N/A 12/31/2021    Procedure: FUSION, SPINE, LUMBAR, ANTERIOR APPROACH - L5-S1;  Surgeon: Be Webber M.D.;  Location: [facility];  Service: Neurosurgery    FUSION, SPINE, LUMBAR, ROBOT-ASSISTED WITH IMAGING GUIDANCE  02/27/2021    Procedure: FUSION, SPINE, LUMBAR, ROBOT-ASSISTED WITH IMAGING GUIDANCE - L5-S1 EXTENSION OF TLIF;  Surgeon: Be Webber M.D.;  Location: [facility];  Service: Neurosurgery    LUMBAR  "DECOMPRESSION N/A 02/27/2021    Procedure: DECOMPRESSION, SPINE, LUMBAR;  Surgeon: Be Webber M.D.;  Location: SURGERY [facility];  Service: Neurosurgery    AZ INS/RPLC SPI NPG/RCVR POCKET  05/22/2020    Procedure: INSERTION, NEUROSTIMULATOR, PERMANENT, SPINAL CORD;  Surgeon: Eugenio Camara M.D.;  Location: [facility];  Service: Pain Management    PEYRONIES PLAQUE  12/06/2019    Procedure: EXCISION, PEYRONIE'S PLAQUE, PENIS WITH GRAFTING, JEAN CLAUDE PLICATION, ARTIFICIAL ERECTION;  Surgeon: Tejinder Culver M.D.;  Location: SURGERY [facility];  Service: Urology    AZ NERVOUS SYSTEM SURGERY UNLISTED Left 06/03/2019    Procedure: EXPLORATION, NERVE- PERONEAL NERVE RELEASE AT THE FIBULAR HEAD  ;  Surgeon: Raz Garcia M.D.;  Location: [facility];  Service: Neurosurgery    FUSION, PIP JOINT, TOE Right 02/22/2019    Procedure: PIP ARTHRODESIS;  Surgeon: Amadou Bowden M.D.;  Location: SURGERY SAME DAY [facility];  Service: Orthopedics    FINGER EXPLORATION Right 02/22/2019    Procedure: FINGER EXPLORATION - RING FINGER DISTAL INTERPHALANGEAL JOINT;  Surgeon: Amadou Bowden M.D.;  Location: SURGERY SAME DAY [facility];  Service: Orthopedics    COLONOSCOPY  2019    OTHER NEUROLOGICAL SURG  12/12/2018    \"Low Back Surgery'sx6 between 2006 & 2017\".    CYST EXCISION Right 10/12/2018    Procedure: CYST EXCISION - RING FINGER MUCOUS CYST, DISTAL INTERPHALANGEAL JOINT;  Surgeon: Amadou Bowden M.D.;  Location: SURGERY SAME DAY [facility];  Service: Orthopedics    LUMBAR LAMINECTOMY DISKECTOMY Left 12/06/2017    Procedure: LUMBAR LAMINECTOMY DISKECTOMY - POSTERIOR REDO LEFT  L4-S1 KAILA;  Surgeon: Be Webber M.D.;  Location: SURGERY [facility];  Service: Neurosurgery    FUSION, SPINE, LUMBAR, PLIF  10/05/2017    Procedure: POSTERIOR TRANSFORAMINAL INTERBODY FUSION AT LUMBAR 4 - 5;  Surgeon: Be Webber M.D.;  Location: SURGERY [facility];  Service: " Neurosurgery    LUMBAR DECOMPRESSION  10/05/2017    Procedure: POSTERIOR LUMBAR 3-4,  4-5 DECOMPRESSION AND FUSION;  Surgeon: Be Webber M.D.;  Location: [facility];  Service: Neurosurgery    OH INJ DX/THER AGNT PARAVERT FACET JOINT, BRITT* Right 10/06/2016    Procedure: INJ PARA FACET L/S 1 LVL W/IG - L3-4, L4-5, L5-S1;  Surgeon: Eugenio Camara M.D.;  Location: [facility];  Service: Pain Management    OH INJ DX/THER AGNT PARAVERT FACET JOINT, BRITT*  10/06/2016    Procedure: INJ PARA FACET L/S 2D LVL W/IG;  Surgeon: Eugenio Camara M.D.;  Location: [facility];  Service: Pain Management    OH INJ DX/THER AGNT PARAVERT FACET JOINT, BRITT*  10/06/2016    Procedure: INJ PARA FACET L/S 3D LVL W/IG;  Surgeon: Eugenio Camara M.D.;  Location: [facility];  Service: Pain Management    OH NJX AA&/STRD TFRML EPI LUMBAR/SACRAL 1 LEVEL Right 08/18/2016    Procedure: INJ-FORAMEN EPI LUM/SAC SNGL - L5-S1;  Surgeon: Eugenio Camara M.D.;  Location: [facility];  Service: Pain Management    LUMBAR LAMINECTOMY DISKECTOMY Right 03/12/2016    Procedure: LUMBAR HemiLAMINECTOMY Micro DISKECTOMY posterior Redo L3-4 ;  Surgeon: Be Webber M.D.;  Location: [facility];  Service:     FORAMINOTOMY Right 03/12/2016    Procedure: FORAMINOTOMY;  Surgeon: Be Webber M.D.;  Location: [facility];  Service:     CERVICAL LAMINECTOMY POSTERIOR  2011    DENTAL SURGERY Bilateral as a teenager    wisdom teeth    OTHER      nasal surgery 2015    OTHER NEUROLOGICAL SURG  2006, 2010, 2012, 2014, 2016, 2017    low back surgery x 5    TONSILLECTOMY      child     Family History   Problem Relation Age of Onset    Cancer Father         Leukemia     Heart Disease Father     Diabetes Father     Stroke Father     Cancer Mother         breast met to liver    Heart Disease Mother      Social History     Socioeconomic History    Marital status:      Spouse  name: Not on file    Number of children: Not on file    Years of education: Not on file    Highest education level: Not on file   Occupational History    Not on file   Tobacco Use    Smoking status: Never    Smokeless tobacco: Never   Vaping Use    Vaping status: Never Used   Substance and Sexual Activity    Alcohol use: No    Drug use: No    Sexual activity: Yes   Other Topics Concern    Not on file   Social History Narrative    Lives with wife in 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 100 Tablet 4    lisinopril (PRINIVIL) 10 MG Tab Take 1 Tablet by mouth every day. 100 Tablet 4    levothyroxine (SYNTHROID) 137 MCG Tab Take 1 Tablet by mouth every morning on an empty stomach. 90 Tablet 1    acetaminophen (TYLENOL) 500 MG Tab Take 500 mg by mouth every 6 hours as needed for Moderate Pain.      gabapentin (NEURONTIN) 300 MG Cap Take 600 mg by mouth 5 Times a Day. 2 capsules = 600mg      Omega-3 Fatty Acids (FISH OIL) 1000 MG Cap capsule Take 1,000 mg by mouth 2 times a day.         pregabalin (LYRICA) 200 MG capsule Take 200 mg by mouth every day.         B Complex Vitamins (VITAMIN B COMPLEX PO) Take 1 Tablet by mouth every day.         tadalafil (CIALIS) 5 MG tablet Take 5 mg by mouth every day. Taking for prostate      Multiple Vitamin (MULTI-VITAMIN) Tab Take 1 Tablet by mouth every day.         Cholecalciferol (VITAMIN D-1000 MAX ST) 1000 UNIT Tab Take 1,000 Units by mouth every day.       [DISCONTINUED] atorvastatin (LIPITOR) 10 MG Tab Take 1 Tablet by mouth every day. 90 Tablet 2    [DISCONTINUED] lisinopril (PRINIVIL) 10 MG Tab Take 1 Tablet by mouth every day. 90 Tablet 2     No facility-administered encounter medications on file as of 2/24/2025.     Review of Systems   Constitutional:  Negative for chills, fever, malaise/fatigue and weight loss.   HENT:  Negative for ear discharge, ear pain, hearing loss and nosebleeds.    Eyes:  Negative for blurred vision, double vision, pain and discharge.   Respiratory:  Negative for cough and shortness of breath.    Cardiovascular:  Positive for leg swelling. Negative for chest pain, palpitations, orthopnea, claudication and PND.        + right leg swelling and redness.   Gastrointestinal:  Negative for abdominal pain, blood in stool, melena, nausea and vomiting.   Genitourinary:  Negative for dysuria and hematuria.   Musculoskeletal:  Negative for falls, joint pain and myalgias.   Skin:  Negative for itching and rash.   Neurological:  Negative for dizziness, sensory change, speech change, loss of consciousness and headaches.   Endo/Heme/Allergies:  Negative for environmental allergies. Does not bruise/bleed easily.   Psychiatric/Behavioral:  Negative for depression, hallucinations and suicidal ideas.               Objective     /60 (BP Location: Left arm, Patient Position: Sitting, BP Cuff Size: Adult)   Pulse 72   Resp 16   Ht 1.829 m (6')   Wt 91.2 kg (201 lb)   SpO2 97%   BMI 27.26 kg/m²     Physical Exam  Vitals and nursing note reviewed.   Constitutional:       General: He is not in acute distress.     Appearance: He is not diaphoretic.   HENT:      Head: Normocephalic and atraumatic.      Right Ear: External ear normal.      Left Ear: External ear normal.      Nose: No congestion or rhinorrhea.   Eyes:      General:         Right eye: No discharge.         Left eye: No discharge.   Neck:      Thyroid: No thyromegaly.      Vascular: No JVD.    Cardiovascular:      Rate and Rhythm: Normal rate and regular rhythm.      Pulses: Normal pulses.      Heart sounds: Murmur heard.   Pulmonary:      Effort: No respiratory distress.   Abdominal:      General: There is no distension.      Tenderness: There is no abdominal tenderness.   Musculoskeletal:         General: No swelling or tenderness.      Right lower leg: No edema.      Left lower leg: No edema.   Skin:     General: Skin is warm and dry.   Neurological:      Mental Status: He is alert and oriented to person, place, and time.      Cranial Nerves: No cranial nerve deficit.   Psychiatric:         Behavior: Behavior normal.                Assessment & Plan     1. Varicose veins of right lower extremity with pain        2. Right leg swelling        3. Aortic valve sclerosis  EC-ECHOCARDIOGRAM COMPLETE W/O CONT      4. Essential hypertension  lisinopril (PRINIVIL) 10 MG Tab      5. Mixed hyperlipidemia  atorvastatin (LIPITOR) 10 MG Tab            Medical Decision Making: Today's Assessment/Status/Plan:   At this time, optimize use of compression stockings with at least grading of 20 to 30 mmHg, at least knee-high.  Thigh-high length is ideal.    NO more indication for further treatment of invasive strategy.    Blood pressure is well controlled. Continue Lisinopril 10 mg daily.    LDL is well controlled. Continue Atorvastatin 10 mg daily.    Will repeat TTE next year.    This visit encounter signifies the visit complexity inherent to evaluation and management associated with medical care services that serve as the continuing focal point for all needed health care services and/or with medical care services that are part of ongoing care related to this patient's single, serious condition, complex cardiac condition.      Demetrius Cuellar MD.   [facility].

## 2025-02-24 NOTE — OR NURSING
"Called pt for day before procedure call. Told pt he should be doing his bowel prep today. Pt states he did it yesterday. Explain it is usually done the day before and pt replied \"everything is copacetic thank you\".    Called GI office and left message for Beatrice CULVER to inform of above and request she follow up with pt to make sure he is following their bowel prep instructions for tomorrow's colonoscopy.   "

## 2025-02-25 ENCOUNTER — ANESTHESIA EVENT (OUTPATIENT)
Dept: SURGERY | Facility: MEDICAL CENTER | Age: 80
End: 2025-02-25
Payer: MEDICARE

## 2025-02-25 ENCOUNTER — HOSPITAL ENCOUNTER (OUTPATIENT)
Facility: MEDICAL CENTER | Age: 80
End: 2025-02-25
Attending: INTERNAL MEDICINE | Admitting: INTERNAL MEDICINE
Payer: MEDICARE

## 2025-02-25 ENCOUNTER — ANESTHESIA (OUTPATIENT)
Dept: SURGERY | Facility: MEDICAL CENTER | Age: 80
End: 2025-02-25
Payer: MEDICARE

## 2025-02-25 VITALS
BODY MASS INDEX: 27.52 KG/M2 | SYSTOLIC BLOOD PRESSURE: 154 MMHG | HEIGHT: 71 IN | RESPIRATION RATE: 18 BRPM | TEMPERATURE: 96.8 F | OXYGEN SATURATION: 95 % | WEIGHT: 196.54 LBS | DIASTOLIC BLOOD PRESSURE: 92 MMHG | HEART RATE: 71 BPM

## 2025-02-25 PROCEDURE — 160009 HCHG ANES TIME/MIN: Performed by: INTERNAL MEDICINE

## 2025-02-25 PROCEDURE — 160046 HCHG PACU - 1ST 60 MINS PHASE II: Performed by: INTERNAL MEDICINE

## 2025-02-25 PROCEDURE — 160025 RECOVERY II MINUTES (STATS): Performed by: INTERNAL MEDICINE

## 2025-02-25 PROCEDURE — 160035 HCHG PACU - 1ST 60 MINS PHASE I: Performed by: INTERNAL MEDICINE

## 2025-02-25 PROCEDURE — 700101 HCHG RX REV CODE 250: Performed by: STUDENT IN AN ORGANIZED HEALTH CARE EDUCATION/TRAINING PROGRAM

## 2025-02-25 PROCEDURE — 160208 HCHG ENDO MINUTES - EA ADDL 1 MIN LEVEL 4: Performed by: INTERNAL MEDICINE

## 2025-02-25 PROCEDURE — 160002 HCHG RECOVERY MINUTES (STAT): Performed by: INTERNAL MEDICINE

## 2025-02-25 PROCEDURE — 88305 TISSUE EXAM BY PATHOLOGIST: CPT | Performed by: PATHOLOGY

## 2025-02-25 PROCEDURE — 700111 HCHG RX REV CODE 636 W/ 250 OVERRIDE (IP): Performed by: STUDENT IN AN ORGANIZED HEALTH CARE EDUCATION/TRAINING PROGRAM

## 2025-02-25 PROCEDURE — 700105 HCHG RX REV CODE 258: Performed by: INTERNAL MEDICINE

## 2025-02-25 PROCEDURE — 160015 HCHG STAT PREOP MINUTES: Performed by: INTERNAL MEDICINE

## 2025-02-25 PROCEDURE — 160048 HCHG OR STATISTICAL LEVEL 1-5: Performed by: INTERNAL MEDICINE

## 2025-02-25 PROCEDURE — 88305 TISSUE EXAM BY PATHOLOGIST: CPT | Mod: 26 | Performed by: PATHOLOGY

## 2025-02-25 PROCEDURE — 160203 HCHG ENDO MINUTES - 1ST 30 MINS LEVEL 4: Performed by: INTERNAL MEDICINE

## 2025-02-25 RX ORDER — ONDANSETRON 2 MG/ML
4 INJECTION INTRAMUSCULAR; INTRAVENOUS
Status: DISCONTINUED | OUTPATIENT
Start: 2025-02-25 | End: 2025-02-25 | Stop reason: HOSPADM

## 2025-02-25 RX ORDER — IPRATROPIUM BROMIDE AND ALBUTEROL SULFATE 2.5; .5 MG/3ML; MG/3ML
3 SOLUTION RESPIRATORY (INHALATION)
Status: DISCONTINUED | OUTPATIENT
Start: 2025-02-25 | End: 2025-02-25 | Stop reason: HOSPADM

## 2025-02-25 RX ORDER — HALOPERIDOL 5 MG/ML
1 INJECTION INTRAMUSCULAR
Status: DISCONTINUED | OUTPATIENT
Start: 2025-02-25 | End: 2025-02-25 | Stop reason: HOSPADM

## 2025-02-25 RX ORDER — HYDRALAZINE HYDROCHLORIDE 20 MG/ML
5 INJECTION INTRAMUSCULAR; INTRAVENOUS
Status: DISCONTINUED | OUTPATIENT
Start: 2025-02-25 | End: 2025-02-25 | Stop reason: HOSPADM

## 2025-02-25 RX ORDER — MIDAZOLAM HYDROCHLORIDE 1 MG/ML
1 INJECTION INTRAMUSCULAR; INTRAVENOUS
Status: DISCONTINUED | OUTPATIENT
Start: 2025-02-25 | End: 2025-02-25 | Stop reason: HOSPADM

## 2025-02-25 RX ORDER — OXYCODONE HCL 5 MG/5 ML
5 SOLUTION, ORAL ORAL
Status: DISCONTINUED | OUTPATIENT
Start: 2025-02-25 | End: 2025-02-25 | Stop reason: HOSPADM

## 2025-02-25 RX ORDER — EPHEDRINE SULFATE 50 MG/ML
5 INJECTION, SOLUTION INTRAVENOUS
Status: DISCONTINUED | OUTPATIENT
Start: 2025-02-25 | End: 2025-02-25 | Stop reason: HOSPADM

## 2025-02-25 RX ORDER — SODIUM CHLORIDE, SODIUM LACTATE, POTASSIUM CHLORIDE, CALCIUM CHLORIDE 600; 310; 30; 20 MG/100ML; MG/100ML; MG/100ML; MG/100ML
INJECTION, SOLUTION INTRAVENOUS CONTINUOUS
Status: DISCONTINUED | OUTPATIENT
Start: 2025-02-25 | End: 2025-02-25 | Stop reason: HOSPADM

## 2025-02-25 RX ORDER — DIPHENHYDRAMINE HYDROCHLORIDE 50 MG/ML
12.5 INJECTION, SOLUTION INTRAMUSCULAR; INTRAVENOUS
Status: DISCONTINUED | OUTPATIENT
Start: 2025-02-25 | End: 2025-02-25 | Stop reason: HOSPADM

## 2025-02-25 RX ORDER — OXYCODONE HCL 5 MG/5 ML
10 SOLUTION, ORAL ORAL
Status: DISCONTINUED | OUTPATIENT
Start: 2025-02-25 | End: 2025-02-25 | Stop reason: HOSPADM

## 2025-02-25 RX ORDER — LABETALOL HYDROCHLORIDE 5 MG/ML
5 INJECTION, SOLUTION INTRAVENOUS
Status: DISCONTINUED | OUTPATIENT
Start: 2025-02-25 | End: 2025-02-25 | Stop reason: HOSPADM

## 2025-02-25 RX ORDER — LIDOCAINE HYDROCHLORIDE 20 MG/ML
INJECTION, SOLUTION EPIDURAL; INFILTRATION; INTRACAUDAL; PERINEURAL PRN
Status: DISCONTINUED | OUTPATIENT
Start: 2025-02-25 | End: 2025-02-25 | Stop reason: SURG

## 2025-02-25 RX ADMIN — LIDOCAINE HYDROCHLORIDE 100 MG: 20 INJECTION, SOLUTION EPIDURAL; INFILTRATION; INTRACAUDAL; PERINEURAL at 07:40

## 2025-02-25 RX ADMIN — SODIUM CHLORIDE, POTASSIUM CHLORIDE, SODIUM LACTATE AND CALCIUM CHLORIDE: 600; 310; 30; 20 INJECTION, SOLUTION INTRAVENOUS at 07:27

## 2025-02-25 RX ADMIN — PROPOFOL 50 MG: 10 INJECTION, EMULSION INTRAVENOUS at 07:40

## 2025-02-25 RX ADMIN — PROPOFOL 75 MCG/KG/MIN: 10 INJECTION, EMULSION INTRAVENOUS at 07:42

## 2025-02-25 ASSESSMENT — PAIN DESCRIPTION - PAIN TYPE
TYPE: SURGICAL PAIN
TYPE: CHRONIC PAIN

## 2025-02-25 ASSESSMENT — FIBROSIS 4 INDEX: FIB4 SCORE: 2.23

## 2025-02-25 ASSESSMENT — PAIN SCALES - GENERAL: PAIN_LEVEL: 0

## 2025-02-25 NOTE — OR NURSING
0841: Patient arrived to PACU from Endo via gurney. Report received from anesthesia and RN. Cold pack applied.     0905: family updated    0911: pt meets criteria for transfer to stage II. Report called to receiving RN

## 2025-02-25 NOTE — ANESTHESIA POSTPROCEDURE EVALUATION
Patient: Amadou Parekh    Procedure Summary       Date: 02/25/25 Room / Location:  ENDOSCOPIC ULTRASOUND ROOM / SURGERY [facility]    Anesthesia Start: 0738 Anesthesia Stop: 0845    Procedure: COLONOSCOPY WITH  ENDOSCOPIC MUCOSAL RESECTION AND BIOPSIES Diagnosis:       Colon polyp      (Colon polyp [[id number]])    Surgeons: Carrillo Sequeira M.D. Responsible Provider: Jase Garibay M.D.    Anesthesia Type: general, MAC ASA Status: 2            Final Anesthesia Type: general, MAC  Last vitals  BP   Blood Pressure : 104/54    Temp   36 °C (96.8 °F)    Pulse   78   Resp   14    SpO2   98 %      Anesthesia Post Evaluation    Patient location during evaluation: PACU  Patient participation: complete - patient participated  Level of consciousness: awake and alert  Pain score: 0    Airway patency: patent  Anesthetic complications: no  Cardiovascular status: hemodynamically stable  Respiratory status: acceptable  Hydration status: euvolemic    PONV: none          No notable events documented.     Nurse Pain Score: 0 (NPRS)

## 2025-02-25 NOTE — OP REPORT
DATE OF SERVICE:  02/25/2025     PROCEDURE:  Colonoscopy with polypectomy.     INDICATIONS FOR PROCEDURE:  History of known ileocecal valve polyp.     CONSENT:  Informed consent was obtained directly from the patient after   benefits, risks and possible alternatives were discussed.     MEDICATIONS:  The patient received a propofol-based regimen from Dr. Garibay; please see his notes for full details.     DESCRIPTION OF PROCEDURE:  The patient was placed in the left lateral   decubitus position and provided with supplemental oxygen via facemask.  When   ready, a digital rectal examination was performed, which was normal.  The   colonoscope was then inserted into the rectum and advanced carefully in the   usual manner to the ileocecal valve.     FINDINGS:  Prep quality was suboptimal throughout the entire colon, but   particularly so in the ascending and cecal area where solid stool was present, tightly adherent to the mucosa.  Approximately 15-20 minutes   was spent suctioning and irrigating this area.  Eventually, a large carpet-like polyp at the ileocecal valve was finally visualized.  This did not have malignant characteristics, though was   quite large, estimated at approximately 3-4 cm in total size.  Resection was   accomplished using hot snare in piecemeal fashion.  The suboptimal prep   impeded visualization and/or confirmation of complete removal of the polyp.  The   total time for the procedure was approximately 70 minutes.  The scope was then   finally withdrawn.     COMPLICATIONS:  No complications during or in the immediate postoperative   period.     IMPRESSION:    1.  Large carpet-like polyp at the ileocecal valve.  2.  Suboptimal prep, requiring extensive irrigation and suctioning in order to   adequately visualize the polyp and effect removal.     RECOMMENDATIONS:  The patient will strictly avoid any kind of aspirin or   anti-inflammatory medications for the next 2 weeks.  He will follow up  with me   in the office to review histology.  I would plan on repeating a colonoscopy   in 3 months from now to reevaluate this area.  He will require an   extra-augmented prep, it is recommended that he have 1 week of Linzess, and   then a full 2-day prep prior to his next colonoscopy.        ______________________________  MD KIRBY MURILLO/TARUN    DD:  02/25/2025 08:49  DT:  02/25/2025 09:21    Job#:  [id number]

## 2025-02-25 NOTE — OR NURSING
0935: Dressed, in recliner, given po Sprite. Awaiting family.    1005: D/Daniel to care of family post uneventful stay in PACU 2.

## 2025-02-25 NOTE — ANESTHESIA TIME REPORT
Anesthesia Start and Stop Event Times       Date Time Event    2/25/2025 0738 Anesthesia Start     0845 Anesthesia Stop          Responsible Staff  02/25/25      Name Role Begin End    Jase Garibay M.D. Anesth 0738 0845          Overtime Reason:  no overtime (within assigned shift)    Comments:

## 2025-02-25 NOTE — ANESTHESIA PREPROCEDURE EVALUATION
Case: [id number] Date/Time: 02/25/25 0715    Procedure: COLONOSCOPY WITH POSSIBLE ENDOSCOPIC MUCOSAL RESECTION AND BIOPSIES    Pre-op diagnosis: HISTORY OF COLON POLYPS    Location:  ENDOSCOPIC ULTRASOUND ROOM / SURGERY [facility]    Surgeons: Carrillo Sequeira M.D.            Relevant Problems   ANESTHESIA (within normal limits)      PULMONARY (within normal limits)      CARDIAC   (positive) Hypertensive disorder         (positive) Stage 2 chronic kidney disease      ENDO   (positive) Hypothyroidism      Other   (positive) Arthritis of hip       Physical Exam    Airway   Mallampati: II  TM distance: >3 FB  Neck ROM: full       Cardiovascular - normal exam  Rhythm: regular  Rate: normal  (-) murmur     Dental    Pulmonary - normal exam  Breath sounds clear to auscultation     Abdominal    Neurological - normal exam                   Anesthesia Plan    ASA 2       Plan - general and MAC       Airway plan will be natural airway          Induction: intravenous    Postoperative Plan: Postoperative administration of opioids is intended.    Pertinent diagnostic labs and testing reviewed    Informed Consent:    Anesthetic plan and risks discussed with patient.    Use of blood products discussed with: patient whom consented to blood products.

## 2025-02-25 NOTE — OR SURGEON
Immediate Post OP Note    PreOp Diagnosis: hx colon polyp      PostOp Diagnosis: large polyp at IC valve.  Removed in pieces with hot snare, tissue removal approximately 95%.  Poor prep inhibited complete removal.      Procedure(s):  COLONOSCOPY WITH  ENDOSCOPIC MUCOSAL RESECTION AND BIOPSIES - Wound Class: Clean Contaminated    Surgeon(s):  Carrillo Sequeira M.D.    Anesthesiologist/Type of Anesthesia:  Anesthesiologist: Jase Garibay M.D./MAC    Surgical Staff:  Endoscopy Technician: Alix Green; Eden Byers    Specimens removed if any:  ID Type Source Tests Collected by Time Destination   A : illeo-cecal valve polyp bx Tissue Cecum PATHOLOGY SPECIMEN Carrillo Sequeira M.D. 2/25/2025  8:00 AM        Estimated Blood Loss: none    Findings: as above    Complications: none    Plan:  Will f/u at [facility]  Repeat colonoscopy in 3 months for complete removal - will require additional augmented prep.        2/25/2025 8:43 AM Carrillo Sequeira M.D.

## 2025-02-25 NOTE — DISCHARGE INSTRUCTIONS
Dr. Sequeira discharge instructions:    Strictly no ASA or NSAIDs for 2 weeks.  Liquid diet today, then slowly advance diet tomorrow.  May resume all other medications.  Will arrange office follow up    IMPRESSION:    1.  Large carpet-like polyp at the ileocecal valve.  2.  Suboptimal prep, requiring extensive irrigation and suctioning in order to   adequately visualize the polyp and effect removal.     RECOMMENDATIONS:  The patient will strictly avoid any kind of aspirin or   anti-inflammatory medications for the next 2 weeks.  He will follow up with me   in the office to review histology.  I would plan on repeating a colonoscopy   in 3 months from now to reevaluate this area.  He will require an   extra-augmented prep, it is recommended that he have 1 week of Linzess, and   then a full 2-day prep prior to his next colonoscopy.    What to Expect Post Anesthesia    Rest and take it easy for the first 24 hours.  A responsible adult is recommended to remain with you during that time.  It is normal to feel sleepy.  We encourage you to not do anything that requires balance, judgment or coordination.    FOR 24 HOURS DO NOT:  Drive, operate machinery or run household appliances.  Drink beer or alcoholic beverages.  Make important decisions or sign legal documents.    To avoid nausea, slowly advance diet as tolerated, avoiding spicy or greasy foods for the first day.  Add more substantial food to your diet according to your provider's instructions.  Babies can be fed formula or breast milk as soon as they are hungry.  INCREASE FLUIDS AND FIBER TO AVOID CONSTIPATION.    MILD FLU-LIKE SYMPTOMS ARE NORMAL.  YOU MAY EXPERIENCE GENERALIZED MUSCLE ACHES, THROAT IRRITATION, HEADACHE AND/OR SOME NAUSEA.    If any questions arise, call your provider.  If your provider is not available, please feel free to call the Surgical Center at [phone].    MEDICATIONS: Resume taking daily medication.  Take prescribed pain medication with  food.  If no medication is prescribed, you may take non-aspirin pain medication if needed.  PAIN MEDICATION CAN BE VERY CONSTIPATING.  Take a stool softener or laxative such as senokot, pericolace, or milk of magnesia if needed.

## 2025-02-25 NOTE — OR NURSING
Pt allergies and NPO status verified. Home medications reconciled, preferred pharmacy verified. Belongings secured in locker. Pt verbalizes understanding of pain scale, expected course of stay, and plan of care. IV access established. All questions answered. Bed in lowest position, call light within reach.

## 2025-02-27 LAB — PATHOLOGY CONSULT NOTE: NORMAL

## 2025-03-21 ENCOUNTER — APPOINTMENT (OUTPATIENT)
Dept: INTERNAL MEDICINE | Facility: OTHER | Age: 80
End: 2025-03-21
Payer: MEDICARE

## 2025-05-01 ENCOUNTER — APPOINTMENT (OUTPATIENT)
Dept: INTERNAL MEDICINE | Facility: OTHER | Age: 80
End: 2025-05-01
Payer: MEDICARE

## 2025-05-01 VITALS
WEIGHT: 203.2 LBS | OXYGEN SATURATION: 97 % | SYSTOLIC BLOOD PRESSURE: 137 MMHG | TEMPERATURE: 97.6 F | HEART RATE: 66 BPM | DIASTOLIC BLOOD PRESSURE: 67 MMHG | HEIGHT: 71 IN | BODY MASS INDEX: 28.45 KG/M2

## 2025-05-01 DIAGNOSIS — R73.03 PRE-DIABETES: ICD-10-CM

## 2025-05-01 DIAGNOSIS — E03.4 HYPOTHYROIDISM DUE TO ACQUIRED ATROPHY OF THYROID: ICD-10-CM

## 2025-05-01 DIAGNOSIS — I15.9 SECONDARY HYPERTENSION: ICD-10-CM

## 2025-05-01 DIAGNOSIS — N18.2 STAGE 2 CHRONIC KIDNEY DISEASE: ICD-10-CM

## 2025-05-01 PROBLEM — J06.9 URI (UPPER RESPIRATORY INFECTION): Status: RESOLVED | Noted: 2024-05-09 | Resolved: 2025-05-01

## 2025-05-01 PROBLEM — R21 RASH IN ADULT: Status: RESOLVED | Noted: 2023-10-20 | Resolved: 2025-05-01

## 2025-05-01 PROCEDURE — 99214 OFFICE O/P EST MOD 30 MIN: CPT | Mod: GC

## 2025-05-01 PROCEDURE — 3075F SYST BP GE 130 - 139MM HG: CPT | Mod: GC

## 2025-05-01 PROCEDURE — 3078F DIAST BP <80 MM HG: CPT | Mod: GC

## 2025-05-01 ASSESSMENT — FIBROSIS 4 INDEX: FIB4 SCORE: 2.23

## 2025-05-01 ASSESSMENT — PATIENT HEALTH QUESTIONNAIRE - PHQ9: CLINICAL INTERPRETATION OF PHQ2 SCORE: 0

## 2025-05-01 NOTE — PROGRESS NOTES
Last Seen: 1/16/2025    Patient Care Team:  Shamir Palm M.D. as PCP - General (Internal Medicine)  Eugenio Camara M.D. as Consulting Physician (Anesthesiology)    Chief Complaint   Patient presents with    Follow-Up     4m follow up- hypertension- no new concerns      History of Present Illness:   Amadou Parekh is a 79 y.o. male with PMH as below who presents for:   1. Secondary hypertension    2. Pre-diabetes    3. Hypothyroidism due to acquired atrophy of thyroid    4. Stage 2 chronic kidney disease        Amadou reported having a procedure done by Dr. Cuellar to switch the blood flow in his lower leg area due to blockage. He mentioned the procedure was moderately successful. He experiences some swelling in both legs, which he attributes to remaining blockage. The swelling is equal on both sides.    Amadou is scheduled for a surgical colonoscopy on May 23rd at [place] due to a [id number] area. He has no new symptoms or issues with his current medications, including a statin. He engages in exercise, including resistance and cardio, aided by a spinal stimulator for nerve pain.    Amadou has a history of labile blood pressure, ranging from the 100s to 140s and 150s. He owns a blood pressure device but primarily takes readings at the doctor's office. He reports no fluctuations in thyroid symptoms such as hyperactivity, agitation, bowel changes, cold intolerance, or low energy.    Review of Systems:  Constitutional: Negative for chills, fever, and malaise/fatigue.  HEENT: Negative for congestion, nosebleeds, and sore throat.  Respiratory: Negative for cough, hemoptysis, sputum production, and shortness of breath.  Cardiovascular: Negative for chest pain and palpitations.  Gastrointestinal: Negative for abdominal pain, blood in stool, constipation, diarrhea, melena, nausea, and vomiting.  Genitourinary: Negative for dysuria, frequency, hematuria, and urgency.  Musculoskeletal: Positive for swelling in  "legs. Negative for falls and joint pain.  Skin: Negative for rash.  Neurological: Negative for dizziness, headaches, focal weakness, and loss of consciousness.  Psychiatric/Behavioral: Negative for depression and suicidal ideas.  All other systems reviewed and are negative.    Past Medical History:   Past Medical History:   Diagnosis Date    Actinic keratoses     sees olivia Everett    Acute nasopharyngitis     7/3 covid    Bilateral hearing loss 08/06/2018    wears aids    Chronic right-sided low back pain without sciatica 08/06/2018    s/p 5 surgeries; sees Akbar    ED (erectile dysfunction)     High cholesterol     Hyperlipidemia, mixed     Hypertension     pt states well controlled on meds    Hypothyroidism     Infectious disease 07/03/2023    Neuropathy (HCC) 08/06/2018    Pain 12/18/2018    \"Nerve Pain-Low back/legs\".    Peyronie disease     sees [place] [facility]    Prediabetes     Renal disorder     chronic kidney disease stage 2     Patient Active Problem List   Diagnosis    Bilateral stenosis of lateral recess of lumbar spine    Hypertensive disorder    Hypothyroidism    Male erectile dysfunction, unspecified    Chronic lumbar radiculopathy    Spondylolysis, lumbar region    Peripheral polyneuropathy    Bilateral hearing loss    Dyslipidemia    Osteoarthritis    Lumbar degenerative disc disease    Arthritis of hip    Pre-diabetes    Normocytic anemia    Chronic sinusitis    Overweight    Stage 2 chronic kidney disease    Pedal edema    Lower urinary tract symptoms due to benign prostatic hyperplasia    Anejaculation       Medications:     Current Outpatient Medications:     atorvastatin, 10 mg, Oral, DAILY, Taking    lisinopril, 10 mg, Oral, DAILY, Taking    levothyroxine, 137 mcg, Oral, AM ES, Taking    acetaminophen, 500 mg, Oral, Q6HRS PRN, Taking    gabapentin, 600 mg, Oral, 5X/DAY, Taking    fish oil, 1,000 mg, Oral, BID, Taking    pregabalin, 200 mg, Oral, DAILY, Taking    B Complex Vitamins (VITAMIN B " "COMPLEX PO), 1 Tablet, Oral, DAILY, Taking    tadalafil, 5 mg, Oral, DAILY, Taking    Multi-Vitamin, 1 Tablet, Oral, DAILY, Taking    Vitamin D-1000 Max St, 1,000 Units, Oral, DAILY, Taking     Social History:  Social History     Tobacco Use    Smoking status: Never    Smokeless tobacco: Never   Vaping Use    Vaping status: Never Used   Substance Use Topics    Alcohol use: No    Drug use: No       Objective:  Vitals:   /67 (BP Location: Left arm, Patient Position: Sitting, BP Cuff Size: Adult)   Pulse 66   Temp 36.4 °C (97.6 °F) (Temporal)   Ht 1.803 m (5' 11\")   Wt 92.2 kg (203 lb 3.2 oz)   SpO2 97%  Body mass index is 28.34 kg/m².    Physical Exam:  General: Alert and oriented, in no acute distress, generally well-appearing.  HEENT: Normocephalic, atraumatic, extraocular movement intact, external ears normal, no rhinorrhea, moist mucous membranes, no pharyngeal edema or erythema. No thyromegaly  Cardiovascular: Regular rate and rhythm, no murmurs or gallops.  Respiratory: No respiratory distress, no chest wall tenderness, breath sounds clear to auscultation bilaterally, without wheezing, rhonchi, or rales.  Abdominal: Soft, non-tender, non-distended, no organomegaly, normal bowel sounds.  Musculoskeletal: No deformity, notable for equal +1 edema in both legs.  Skin: No lesion or rash.  Neurological: No focal deficits, no weakness, alert and oriented x4.  Psychiatric: Appropriate mood and affect.      Results:  Most recent CMP, CBC, lipid panel, TSH reviewed    Assessment and Plan:    79 y.o. male with:     1. Secondary hypertension  -Advised patient to take home BP readings at least 3 times weekly, in setting of compliance with his lisinopril and return to clinic if they remain elevated or reach out for adjustment of lisinopril as needed.  -Continue lisinopril as prescribed  --Continue to follow with Cardiology    2. Pre-diabetes  Lab Results   Component Value Date/Time    HBA1C 5.5 01/15/2024 06:37 AM "   Asymptomatic  Controlled with diet/exercise  - HEMOGLOBIN A1C; Future    3. Hypothyroidism due to acquired atrophy of thyroid  Recent thyroid panel WNL  Asymptomatic  --Continue levothyroxine at current dose    4. Stage 2 chronic kidney disease  Stable  --CTM yearly CMP as appropriate  --avoid nephrotoxin    Chronic Medical Problems:  HTN: on lisinopril  Hypothyroidism: on levothyroxine   DLD: lipid panel November 2021 Tchol 180    HDL 43    Pre-DM: a1c 5.5 1/2024  Normocytic anemia - resolved on '24 labwork  Chronic sinusitis: follows with ENT  OA, hip  Bilateral hearing loss: b/l hearing aids  Anterior dislocation, left hip: s/p hip replacement 2022, ongoing PT sessions  Lumbar spinal stenosis c/b neuropathy: followed by Dr Webber at [facility], on gabapentin    Iron deficiency - resolved  Lumbar degenerative disc disease pending placement of spinal cord stimulator 6/2024  Lumbar spondylosis  Peyronie disease: s/p surgery via urology [place]  BPH- tadalafil, follows with Dr Abiola Avalos  S/p perineal nerve decompression 2023 at [facility]    Follow Up:  Return in about 3 months (around 8/1/2025) for annual wellness visit.      Shamir Palm MD  Internal Medicine PGY-2  [facility]

## 2025-05-01 NOTE — PATIENT INSTRUCTIONS
Thank you for visiting our office. As discussed, here is the plan to address your health issues.     PLAN:  - Take a blood pressure log  - Continue to take your current medications as prescribed   - Make a follow-up appointment with our office in 3-6 months for annual wellness visit    Please try and eat healthy, get at least 30 minutes of cardiovascular exercise a day to help keep your health as best as it can be. If you feel like you are experiencing a medical emergency please seek immediate medical attention at an urgent care or in the emergency department.    Shamir Palm MD

## 2025-07-08 ENCOUNTER — TELEPHONE (OUTPATIENT)
Dept: INTERNAL MEDICINE | Facility: OTHER | Age: 80
End: 2025-07-08
Payer: MEDICARE

## 2025-07-08 NOTE — TELEPHONE ENCOUNTER
Patient called our office back line requesting a referral to [facility] in [place], [place] for a hearing test. Patient attempted to schedule directly with that office but was informed that he would need a referral to be sent over before he can be scheduled.

## 2025-07-09 DIAGNOSIS — H91.90 HEARING LOSS, UNSPECIFIED HEARING LOSS TYPE, UNSPECIFIED LATERALITY: Primary | ICD-10-CM

## 2025-07-15 NOTE — Clinical Note
REFERRAL APPROVAL NOTICE         Sent on July 15, 2025                   Amadou Parekh  [address] [place] [address]                   Dear Mr. Parekh,    After a careful review of the medical information and benefit coverage, [facility] has processed your referral. See below for additional details.    If applicable, you must be actively enrolled with your insurance for coverage of the authorized service. If you have any questions regarding your coverage, please contact your insurance directly.    REFERRAL INFORMATION   Referral #:  [id number]  Referred-To Provider    Referred-By Provider:  Audiologist    Shamir Palm M.D.   [facility]      [address]   [place] [address]  [phone] [address] [place] [address], [address]  [place] [address]  [phone]    Referral Start Date:  07/09/2025  Referral End Date:   07/09/2026             SCHEDULING  If you do not already have an appointment, please call [phone] to make an appointment.     MORE INFORMATION  If you do not already have a [email or web address] account, sign up at: [email or web address]  You can access your medical information, make appointments, see lab results, billing information, and more.  If you have questions regarding this referral, please contact  the [facility] Referrals department at:             [phone]. Monday - Friday 8:00AM - 5:00PM.     Sincerely,    [facility]

## 2025-07-15 NOTE — TELEPHONE ENCOUNTER
Just received a call from Amadou - he said that he just called [facility], and they have not received the referral yet.   I informed Amadou that it just got approved less than an hour ago, and to call them back tomorrow.   If not, he can call us back again.

## 2025-08-04 ENCOUNTER — APPOINTMENT (OUTPATIENT)
Dept: URGENT CARE | Facility: PHYSICIAN GROUP | Age: 80
End: 2025-08-04
Payer: MEDICARE

## 2025-08-04 ENCOUNTER — HOSPITAL ENCOUNTER (OUTPATIENT)
Dept: LAB | Facility: MEDICAL CENTER | Age: 80
End: 2025-08-04
Payer: MEDICARE

## 2025-08-04 DIAGNOSIS — R73.03 PRE-DIABETES: ICD-10-CM

## 2025-08-04 LAB
EST. AVERAGE GLUCOSE BLD GHB EST-MCNC: 111 MG/DL
HBA1C MFR BLD: 5.5 % (ref 4–5.6)

## 2025-08-04 PROCEDURE — 83036 HEMOGLOBIN GLYCOSYLATED A1C: CPT | Mod: GA

## 2025-08-04 PROCEDURE — 36415 COLL VENOUS BLD VENIPUNCTURE: CPT

## 2025-08-29 ENCOUNTER — OFFICE VISIT (OUTPATIENT)
Dept: INTERNAL MEDICINE | Facility: OTHER | Age: 80
End: 2025-08-29
Payer: MEDICARE

## 2025-08-29 VITALS
HEART RATE: 66 BPM | DIASTOLIC BLOOD PRESSURE: 62 MMHG | WEIGHT: 206 LBS | TEMPERATURE: 97.4 F | OXYGEN SATURATION: 97 % | SYSTOLIC BLOOD PRESSURE: 99 MMHG | BODY MASS INDEX: 28.84 KG/M2 | HEIGHT: 71 IN

## 2025-08-29 DIAGNOSIS — N18.2 STAGE 2 CHRONIC KIDNEY DISEASE: ICD-10-CM

## 2025-08-29 DIAGNOSIS — Z71.89 GOALS OF CARE, COUNSELING/DISCUSSION: ICD-10-CM

## 2025-08-29 DIAGNOSIS — Z00.00 ANNUAL WELLNESS VISIT: Primary | ICD-10-CM

## 2025-08-29 DIAGNOSIS — E03.4 HYPOTHYROIDISM DUE TO ACQUIRED ATROPHY OF THYROID: ICD-10-CM

## 2025-08-29 PROBLEM — R73.03 PRE-DIABETES: Status: RESOLVED | Noted: 2022-11-30 | Resolved: 2025-08-29

## 2025-08-29 PROBLEM — D64.9 NORMOCYTIC ANEMIA: Status: RESOLVED | Noted: 2022-11-30 | Resolved: 2025-08-29

## 2025-08-29 ASSESSMENT — ACTIVITIES OF DAILY LIVING (ADL): BATHING_REQUIRES_ASSISTANCE: 0

## 2025-08-29 ASSESSMENT — FIBROSIS 4 INDEX: FIB4 SCORE: 2.03

## 2025-08-29 ASSESSMENT — ENCOUNTER SYMPTOMS: GENERAL WELL-BEING: GOOD

## 2025-08-29 ASSESSMENT — PATIENT HEALTH QUESTIONNAIRE - PHQ9: CLINICAL INTERPRETATION OF PHQ2 SCORE: 0

## 2025-08-29 ASSESSMENT — PAIN SCALES - GENERAL: PAINLEVEL_OUTOF10: NO PAIN

## 2025-11-21 ENCOUNTER — APPOINTMENT (OUTPATIENT)
Dept: INTERNAL MEDICINE | Facility: OTHER | Age: 80
End: 2025-11-21
Payer: MEDICARE

## (undated) DEVICE — CHLORAPREP 26 ML APPLICATOR - ORANGE TINT(25/CA)

## (undated) DEVICE — SENSOR SPO2 NEO LNCS ADHESIVE (20/BX) SEE USER NOTES

## (undated) DEVICE — SET EXTENSION WITH 2 PORTS (48EA/CA) ***PART #2C8610 IS A SUBSTITUTE*****

## (undated) DEVICE — TUBE CONNECT SUCTION CLEAR 120 X 1/4" (50EA/CA)"

## (undated) DEVICE — DRAPE C ARMOR (12EA/CA)

## (undated) DEVICE — SUTURE 3-0 VICRYL PLUS RB-1 - 8 X 18 INCH (12/BX)

## (undated) DEVICE — KIT ROOM DECONTAMINATION

## (undated) DEVICE — GOWN WARMING STANDARD FLEX - (30/CA)

## (undated) DEVICE — WARMING BLANKET, SPINAL UNDERBODY JACKSON TABLE

## (undated) DEVICE — BANDAGE STERILE 2 IN X 75 IN (12EA/BX 8BX/CA)

## (undated) DEVICE — ELECTRODE 850 FOAM ADHESIVE - HYDROGEL RADIOTRNSPRNT (50/PK)

## (undated) DEVICE — Device

## (undated) DEVICE — DRAPE MAGNETIC (INSTRA-MAG) - (30/CA)

## (undated) DEVICE — LENS/HOOD FOR SPACESUIT - (32/PK) PEEL AWAY FACE

## (undated) DEVICE — SYRINGE NON SAFETY 10 CC 20 GA X 1-1/2 IN (100/BX 4BX/CA)

## (undated) DEVICE — TOWELS CLOTH SURGICAL - (4/PK 20PK/CA)

## (undated) DEVICE — SLEEVE, VASO, THIGH, MED

## (undated) DEVICE — STERI STRIP COMPOUND BENZOIN - TINCTURE 0.6ML WITH APPLICATOR (40EA/BX)

## (undated) DEVICE — NEEDLE SPINAL NON-SAFETY 18 GA X 3 IN (25EA/BX)

## (undated) DEVICE — DRESSING INTEGUSEAL MICROBIAL SEALANT IS100 (20EA/CA)

## (undated) DEVICE — KIT SURGIFLO W/OUT THROMBIN - (6EA/CA)

## (undated) DEVICE — BONE PRESS SPINAL EDITION HENSLER (10EA/CA)

## (undated) DEVICE — GLOVE BIOGEL PI INDICATOR SZ 8.5 SURGICAL PF LF - (50PR/BX 4BX/CA)

## (undated) DEVICE — BAG SPONGE COUNT 10.25 X 32 - BLUE (250/CA)

## (undated) DEVICE — BANDAGE ELASTIC 4 HONEYCOMB - 4"X5YD LF (20/CA)"

## (undated) DEVICE — SOLUTION PREP PVP IODINE 3/4 OZ POUCH PACKET CONTAINER STERILE LATEX FREE

## (undated) DEVICE — CLOSURE SKIN STRIP 1/2 X 4 IN - (STERI STRIP) (50/BX 4BX/CA)

## (undated) DEVICE — NEEDLE NON-SAFETY HYPO 21 GA X 1 1/2 IN HYPO (100/BX)

## (undated) DEVICE — DRAPE C-ARM LARGE 41IN X 74 IN - (10/BX 2BX/CA)

## (undated) DEVICE — DRAPE LAPAROTOMY T SHEET - (12EA/CA)

## (undated) DEVICE — TUBING C&T SET FLYING LEADS DRAIN TUBING (10EA/BX)

## (undated) DEVICE — SUTURE 3-0 ETHILON FS-1 - (36/BX) 30 INCH

## (undated) DEVICE — CANISTER SUCTION 3000ML MECHANICAL FILTER AUTO SHUTOFF MEDI-VAC NONSTERILE LF DISP  (40EA/CA)

## (undated) DEVICE — CAPTIVATOR II-10MM ROUND STIFF (40/BX)

## (undated) DEVICE — CANISTER SUCTION RIGID RED 1500CC (40EA/CA)

## (undated) DEVICE — SYRINGE NON SAFETY 5 CC 20 GA X 1-1/2 IN (100/BX 4BX/CA)

## (undated) DEVICE — ARMREST CRADLE FOAM - (2PR/PK 12PR/CA)

## (undated) DEVICE — DRAPE LARGE 3 QUARTER - (20/CA)

## (undated) DEVICE — GLOVE SZ 7 BIOGEL PI MICRO - PF LF (50PR/BX 4BX/CA)

## (undated) DEVICE — ELECTRODE DUAL RETURN W/ CORD - (50/PK)

## (undated) DEVICE — LACTATED RINGERS INJ 1000 ML - (14EA/CA 60CA/PF)

## (undated) DEVICE — DRAPE IOBAN II INCISE 23X17 - (10EA/BX 4BX/CA)

## (undated) DEVICE — MASK ANESTHESIA ADULT  - (100/CA)

## (undated) DEVICE — TUBING CLEARLINK DUO-VENT - C-FLO (48EA/CA)

## (undated) DEVICE — SUTURE GENERAL

## (undated) DEVICE — SUTURE 0 PDS-2 CTX 36 INCH - (24/BX)

## (undated) DEVICE — BLADE SAGITTAL SAW 9.4MM X 25.5MM X .4MM FINE TOOTH (1/EA)

## (undated) DEVICE — MASK, LARYNGEAL AIRWAY #5

## (undated) DEVICE — GLOVE PROTEXIS LATEX MICRO SIZE 9 (50PR/BX)

## (undated) DEVICE — GLOVE BIOGEL SZ 7 SURGICAL PF LTX - (50PR/BX 4BX/CA)

## (undated) DEVICE — KIT CUSTOM PROCEDURE SINGLE FOR ENDO (15/CA)

## (undated) DEVICE — SODIUM CHL IRRIGATION 0.9% 1000ML (12EA/CA)

## (undated) DEVICE — SHEET TRANSVERSE LAP - (12EA/CA)

## (undated) DEVICE — STOCKINET BIAS 4 IN STERILE - (20/CA)

## (undated) DEVICE — HANDPIECE 10FT INTPLS SCT PLS IRRIGATION HAND CONTROL SET (6/PK)

## (undated) DEVICE — SUTURE 4-0 30CM STRATAFIX SPIRAL PS-2 (12EA/BX)

## (undated) DEVICE — SYRINGE NON SAFETY 3 CC 21 GA X 1 1/2 IN (100/BX 8BX/CA)

## (undated) DEVICE — NEPTUNE 4 PORT MANIFOLD - (20/PK)

## (undated) DEVICE — INTRAOP NEURO IN OR 1:1 PER 15 MIN

## (undated) DEVICE — PACK MINOR BASIN - (2EA/CA)

## (undated) DEVICE — SPONGE GAUZE NON-STERILE 4X4 - (2000/CA 10PK/CA)

## (undated) DEVICE — GLOVE SZ 6.5 BIOGEL PI MICRO - PF LF (50PR/BX)

## (undated) DEVICE — TUBE CONNECTING SUCTION - CLEAR PLASTIC STERILE 72 IN (50EA/CA)

## (undated) DEVICE — PACK LOWER EXTREMITY - (2/CA)

## (undated) DEVICE — WATER IRRIGATION STERILE 1000ML (12EA/CA)

## (undated) DEVICE — PROTECTOR ULNA NERVE - (36PR/CA)

## (undated) DEVICE — CONTAINER SPECIMEN BAG OR - STERILE 4 OZ W/LID (100EA/CA)

## (undated) DEVICE — PAD LAP STERILE 18 X 18 - (5/PK 40PK/CA)

## (undated) DEVICE — TRAY CATHETER FOLEY URINE METER W/STATLOCK 350ML (10EA/CA)

## (undated) DEVICE — SET LEADWIRE 5 LEAD BEDSIDE DISPOSABLE ECG (1SET OF 5/EA)

## (undated) DEVICE — SUTURE 2 TICRON BLUE GS-21 (36EA/CA)

## (undated) DEVICE — ROBOTIC SURGERY SERVICES

## (undated) DEVICE — CLIP MED LG INTNL HRZN TI ESCP - (20/BX)

## (undated) DEVICE — SPHERE NAVIGATION STEALTH (5EA/TY 12TY/PK)

## (undated) DEVICE — SYRINGE SAFETY 5 ML 18 GA X 1-1/2 BLUNT LL (100/BX 4BX/CA)

## (undated) DEVICE — CLOSURE WOUND 1/4 X 4 (STERI - STRIP) (50/BX 4BX/CA)

## (undated) DEVICE — HEADREST PRONEVIEW LARGE - (10/CA)

## (undated) DEVICE — SET EPIDURAL BURRON NON-SAFETY (12EA/CA)

## (undated) DEVICE — SYRINGE 30 ML LL (56/BX)

## (undated) DEVICE — SUTURE 2-0 VICRYL PLUS SH - 8 X 18 INCH (12/BX)

## (undated) DEVICE — KIT EVACUATER 3 SPRING PVC LF 1/8 DRAIN SIZE (10EA/CA)"

## (undated) DEVICE — BLADE SURGICAL CLIPPER - (50EA/CA)

## (undated) DEVICE — SNARECAPTIVATOR 13MM SMALL HEXAGONAL SNARE (10/BX)

## (undated) DEVICE — CELLSAVER STAT

## (undated) DEVICE — SUTURE 2-0 VICRYL PLUS CT-1 - 8 X 18 INCH(12/BX)

## (undated) DEVICE — DRAPE 36X28IN RAD CARM BND BG - (25/CA) O

## (undated) DEVICE — BLADE SAGITTAL SAW DUAL CUT 75.0 X 25.0MM (1/EA)

## (undated) DEVICE — KIT  I.V. START (100EA/CA)

## (undated) DEVICE — SUTURE 2-0 SILK SH 30 IN C/R (12PK/BX)

## (undated) DEVICE — BOVIE BLADE COATED - (50/PK)

## (undated) DEVICE — DRESSING POST OP BORDER 4 X 10 (5EA/BX)

## (undated) DEVICE — HEAD HOLDER JUNIOR/ADULT

## (undated) DEVICE — TOWEL STOP TIMEOUT SAFETY FLAG (40EA/CA)

## (undated) DEVICE — CELLSAVER PACK

## (undated) DEVICE — SUTURE 1 VICRYL PLUS CTX - 8 X 18 INCH (12/BX)

## (undated) DEVICE — GLOVE BIOGEL PI ORTHO SZ 9 PF LF

## (undated) DEVICE — LACTATED RINGERS INJ. 500 ML - (24EA/CA)

## (undated) DEVICE — DRAPE MAYO STAND - (30/CA)

## (undated) DEVICE — SPONGE GAUZESTER 4 X 4 4PLY - (128PK/CA)

## (undated) DEVICE — CATHETER IV 20 GA X 1-1/4 ---SURG.& SDS ONLY--- (50EA/BX)

## (undated) DEVICE — SUTURE 1 PDS-2 PLUS CTX - (24/BX)

## (undated) DEVICE — SUCTION INSTRUMENT YANKAUER BULBOUS TIP W/O VENT (50EA/CA)

## (undated) DEVICE — KIT ANESTHESIA W/CIRCUIT & 3/LT BAG W/FILTER (20EA/CA)

## (undated) DEVICE — GLOVE BIOGEL PI INDICATOR SZ 7.0 SURGICAL PF LF - (50/BX 4BX/CA)

## (undated) DEVICE — SUTURE 1 VICRYL PLUSCT-1 27IN - (36/BX)

## (undated) DEVICE — BINDER ABDOMINAL 62-74 INCH - 12 IN (1/EA)

## (undated) DEVICE — SPLINT X-FAST SETTING 3 X 15 (50EA/BX 12BX/CA) SPECIALIST""

## (undated) DEVICE — GLOVE BIOGEL PI INDICATOR SZ 6.5 SURGICAL PF LF - (50/BX 4BX/CA)

## (undated) DEVICE — GLOVE BIOGEL SZ 8.5 SURGICAL PF LTX - (50PR/BX 4BX/CA)

## (undated) DEVICE — GOWN SURGEONS X-LARGE - DISP. (30/CA)

## (undated) DEVICE — SLING ORTH UNV TIETX VLFM ARM

## (undated) DEVICE — GLOVE, LITE (PAIR)

## (undated) DEVICE — PEDIGUARD CURV SPINEGUARD TRI TIP

## (undated) DEVICE — NEEDLE 20 GA X 1 INCH - NON SAFTEY (100/BX)

## (undated) DEVICE — PACK NEURO - (2EA/CA)

## (undated) DEVICE — NEEDLE FILTER ASPIRATION 18 GA X 1 1/2 IN (100EA/BX)

## (undated) DEVICE — DRAPE IOBAN II 23 IN X 33 IN - (10/BX)

## (undated) DEVICE — SUTURE 3-0 VICRYL PLUS SH - 8X 18 INCH (12/BX)

## (undated) DEVICE — SUTURE 3-0 CHROMIC GUT FS-2 27 (36PK/BX)"

## (undated) DEVICE — GLOVE BIOGEL INDICATOR SZ 7SURGICAL PF LTX - (50/BX 4BX/CA)

## (undated) DEVICE — SUTURE NONABSORBABLE XBRAID #2 26MM (12EA/BX)

## (undated) DEVICE — WATER IRRIG. STER. 1500 ML - (9/CA)

## (undated) DEVICE — NEEDLE NON SAFETY 25 GA X 1 1/2 IN HYPO (100EA/BX)

## (undated) DEVICE — MIDAS LUBRICATOR DIFFUSER PACK (4EA/CA)

## (undated) DEVICE — DRESSING XEROFORM 1X8 - (50/BX 4BX/CA)

## (undated) DEVICE — SUTURE 4-0 ETHILON FS-2 18 (36PK/BX)"

## (undated) DEVICE — SYRINGE 10 ML CONTROL LL (25EA/BX 4BX/CA)

## (undated) DEVICE — APPLICATOR COTTON TIP 6 IN - STERILE (2EA/PK 100PK/BX)

## (undated) DEVICE — PADDING CAST 4 IN STERILE - 4 X 4 YDS (24/CA)

## (undated) DEVICE — GLOVE BIOGEL SZ 7.5 SURGICAL PF LTX - (50PR/BX 4BX/CA)

## (undated) DEVICE — NEEDLE NON SAFETY HYPO 22 GA X 1 1/2 IN (100/BX)

## (undated) DEVICE — ELECTRODE NEEDLE NON-SAFETY 2.8 IN (150EA/CT)

## (undated) DEVICE — TOOL DISSECT MATCH HEAD

## (undated) DEVICE — GLOVE BIOGEL INDICATOR SZ 6.5 SURGICAL PF LTX - (50PR/BX 4BX/CA)

## (undated) DEVICE — GLOVE BIOGEL PI INDICATOR SZ 8.0 SURGICAL PF LF -(50/BX 4BX/CA)

## (undated) DEVICE — SUTURE 0 ETHIBOND MO6 C/R - (12/BX) 8-18 INCH ETHICON

## (undated) DEVICE — SEALER BIPOLAR 2.3 AQUAMANTYS

## (undated) DEVICE — SUTURE 3-0 SILK 12 REEL (12PK/BX)"

## (undated) DEVICE — DRAPE U SPLIT IMP 54 X 76 - (24/CA)

## (undated) DEVICE — PACK JACKSON TABLE KIT W/OUT - HR (6EA/CA)

## (undated) DEVICE — GLOVE BIOGEL SZ 6.5 SURGICAL PF LTX (50PR/BX 4BX/CA)

## (undated) DEVICE — GAUZE TUBE 5/8 IN X 50 YDS. - (TUBE GAUZE)

## (undated) DEVICE — PACK TOTAL HIP - (1/CA)

## (undated) DEVICE — SENSOR OXIMETER ADULT SPO2 RD SET (20EA/BX)

## (undated) DEVICE — BOVIE  BLADE 6 EXTENDED - (50/PK)

## (undated) DEVICE — SODIUM CHL. IRRIGATION 0.9% 3000ML (4EA/CA 65CA/PF)

## (undated) DEVICE — TIP INTPLS HFLO ML ORFC BTRY - (12/CS)  FOR SURGILAV

## (undated) DEVICE — BANDAGE ELASTIC STERILE VELCRO 6 X 5 YDS (25EA/CA)

## (undated) DEVICE — SYRINGE SAFETY 3 ML 18 GA X 1 1/2 BLUNT LL (100/BX 8BX/CA)

## (undated) DEVICE — DRAPE STRLE REG TOWEL 18X24 - (10/BX 4BX/CA)"

## (undated) DEVICE — BOVIE NEEDLE TIP INSULATD NON-SAFETY 2CM (50/PK)

## (undated) DEVICE — GLOVE BIOGEL ECLIPSE PF LATEX SIZE 8.0  (50PR/BX)

## (undated) DEVICE — SYRINGE SAFETY 10 ML 18 GA X 1 1/2 BLUNT LL (100/BX 4BX/CA)

## (undated) DEVICE — ANTI-FOG SOLUTION - 60BTL/CA

## (undated) DEVICE — DRESSING TRANSPARENT FILM TEGADERM 4 X 4.75" (50EA/BX)"

## (undated) DEVICE — HUMID-VENT HEAT AND MOISTURE EXCHANGE- (50/BX)

## (undated) DEVICE — SPEAR EYE SPNG 3ANG MLBL HNDL - (10/ST18ST/PK 180/PK 1PK/SP)

## (undated) DEVICE — SYRINGE DISP. 60 CC LL - (30/BX, 12BX/CA)**WHEN THESE ARE GONE ORDER #500206**

## (undated) DEVICE — CLIP MED INTNL HRZN TI ESCP - (25/BX)

## (undated) DEVICE — GLOVESZ 8.5 BIOGEL PI MICRO - PF LF (50PR/BX)

## (undated) DEVICE — GOWN SURGICAL X-LARGE ULTRA - FILM-REINFORCED (20/CA)

## (undated) DEVICE — CLIP LG INTNL HRZN TI ESCP LGT - (20/BX)

## (undated) DEVICE — VESSELOOP SUPER MAXI BLUE - SURG-I-LOOP (2EA/PK 10PK/BX)

## (undated) DEVICE — DRESSING TEGADERM 8 X 12 - (10/BX 8BX/CA)

## (undated) DEVICE — BLADE SURGICAL #15 - (50/BX 3BX/CA)

## (undated) DEVICE — CORDS BIPOLAR COAGULATION - 12FT STERILE DISP. (10EA/BX)

## (undated) DEVICE — GLOVE BIOGEL PI INDICATOR SZ 7.5 SURGICAL PF LF -(50/BX 4BX/CA)

## (undated) DEVICE — DRAPE SRG LG BCK TBL DISP - 10/CA

## (undated) DEVICE — BANDAGE ELASTIC 6 IN X 5 YDS - LATEX FREE (10/BX)

## (undated) DEVICE — GLOVE BIOGEL SZ 6 PF LATEX - (50EA/BX 4BX/CA)

## (undated) DEVICE — DRAIN PENROSE STERILE 1/4 X - 18 IN  (25EA/BX)

## (undated) DEVICE — GOWN SURGICAL XX-LARGE - (28EA/CA) SIRUS NON REINFORCED

## (undated) DEVICE — GLOVE SURGICAL PROTEXIS 8 1/2 - (50PR/BX)

## (undated) DEVICE — SOD. CHL. INJ. 0.9% 1000 ML - (14EA/CA 60CA/PF)

## (undated) DEVICE — GLOVE BIOGEL ECLIPSE PF LATEX SIZE 8.5 (50PR/BX)

## (undated) DEVICE — LIGHT SOURCE MIS 12FT

## (undated) DEVICE — K-PAD 25X64 THERMIA BLANKET - (10/BX)

## (undated) DEVICE — COVER MAYO STAND X-LG - (22EA/CA)

## (undated) DEVICE — GLOVE SURGICAL PROTEXIS PI 8.0 LF - (50PR/BX)

## (undated) DEVICE — SUTURE GOR-TEX CV-6 TT-9 (36PK/BX)

## (undated) DEVICE — MASK, LARYNGEAL AIRWAY #4

## (undated) DEVICE — REMOTE CONTROL

## (undated) DEVICE — GLOVE BIOGEL PI ORTHO SZ 6 1/2 SURGICAL PF LF (40PR/BX)

## (undated) DEVICE — BLADE BEAVER 6400 MINI EYE ROUND TIP SHARP ON ONE SIDE (20/CA)

## (undated) DEVICE — ATHLETIC SUPPORTER LARGE - (1/EA)

## (undated) DEVICE — GOWN SURGEONS LARGE - (32/CA)

## (undated) DEVICE — SUTURE 3-0 MONOCRYL PLUS PS-1 - 27 INCH (36/BX)

## (undated) DEVICE — SHEET PEDIATRIC LAPAROTOMY - (10/CA)

## (undated) DEVICE — SLEEVE STERILE  A599T - 30/BX 2BX/CS

## (undated) DEVICE — GLOVE PROTEXIS PI MICRO SZ 8.5 (200PR/CA)

## (undated) DEVICE — SUTURE QUILL 0 PDO 24IN - (12/BX)

## (undated) DEVICE — SYRINGE LOSS OF RESIST. 50/BX - (50/CA)

## (undated) DEVICE — GLOVE BIOGEL PI ULTRATOUCH SZ 8.5 SURGICAL PF LF - (200PR/CA)

## (undated) DEVICE — WRAP CO-FLEX 4IN X 5YD STERIL - SELF-ADHERENT (18/CA)

## (undated) DEVICE — SUCTION TIP STRAIGHT ARGYLE - 50EA/CA

## (undated) DEVICE — SYRINGE ASEPTO - (50EA/CA

## (undated) DEVICE — EXTERNAL NEUROSTIMULATOR

## (undated) DEVICE — BLADE SAW SMALL BONE AGGRESSIVE 31.0 X 9 X 0.38MM

## (undated) DEVICE — BAG, SPONGE COUNT 50600

## (undated) DEVICE — DRAPE MICROSCOPE X-LONG (10EA/CA)

## (undated) DEVICE — DERMABOND ADVANCED - (12EA/BX)

## (undated) DEVICE — CONTRAST AGENT 10ML (5EA/BX)

## (undated) DEVICE — PAD PREP 24 X 48 CUFFED - (100/CA)

## (undated) DEVICE — GLOVE BIOGEL INDICATOR SZ 7.5 SURGICAL PF LTX - (50PR/BX 4BX/CA)

## (undated) DEVICE — BANDAGE ROLL STERILE BULKEE 4.5 IN X 4 YD (100EA/CA)

## (undated) DEVICE — FIBERWIRE 2.0 (12EA/BX)

## (undated) DEVICE — CATHETER IV SAFETY 20 GA X 1-1/4 (50/BX)

## (undated) DEVICE — SYRINGE SAFETY TB 1 CC 25 GA X 5/8 - NDL  (50/BX)

## (undated) DEVICE — NEEDLE SPINAL NON-SAFETY 20 GA X 3 IN (25/BX)

## (undated) DEVICE — TUNNELING TOOL

## (undated) DEVICE — SET BUTTERFLY 25GA X 3/4 - (50/PK) VACUTAINER SAFETY

## (undated) DEVICE — CUFF BP ADULT MEDIUM DISPOSABLE (20EA/CA)

## (undated) DEVICE — GLOVE BIOGEL SZ 8 SURGICAL PF LTX - (50PR/BX 4BX/CA)

## (undated) DEVICE — SPONGE RADIOPAQUE CTN X-LG - STERILE (50PK/CA) MADE TO ORDER ITEM AND HAS A 4-6 WEEK LEAD TIME

## (undated) DEVICE — FIBERWIRE 2-0 - 12/BX

## (undated) DEVICE — GLOVE BIOGEL PI INDICATOR SZ 9.0 SURGICAL PF LF -(50PR/BX 4BX/CA)

## (undated) DEVICE — WIRE K- SMOOTH .045 - (3TX6=18): Type: IMPLANTABLE DEVICE | Status: NON-FUNCTIONAL

## (undated) DEVICE — SYRINGE 5 ML LS (125/BX 4BX/CA)

## (undated) DEVICE — BONE MILL BM210

## (undated) DEVICE — TOOL DISSECTING BIT MR8 OD3MM L14CM (1EA)

## (undated) DEVICE — CONTROLLER

## (undated) DEVICE — GLOVE BIOGEL PI ULTRATOUCH SZ 7.5 SURGICAL PF LF -(50/BX 4BX/CA)

## (undated) DEVICE — CLIP SM INTNL HRZN TI ESCP LGT - (24EA/PK 25PK/BX)

## (undated) DEVICE — FORCEP BIPOLAR ISOCOOL 8.5 1.0MM TIP"

## (undated) DEVICE — SPONGE XRAY 8X4 STERL. 12PL - (10EA/TY 80TY/CA)

## (undated) DEVICE — DECANTER FLD BLS - (50/CA)

## (undated) DEVICE — TUBE E-T HI-LO CUFF 8.0MM (10EA/PK)

## (undated) DEVICE — VESSELOOP MAXI BLUE STERILE- SURG-I-LOOP (10EA/BX)

## (undated) DEVICE — PEN SKIN MARKER W/RULER - (50EA/BX)

## (undated) DEVICE — FORCEP RADIAL JAW 4 STANDARD CAPACITY W/NEEDLE 240CM (40EA/BX)

## (undated) DEVICE — TUBE E-T HI-LO CUFF 7.0MM (10EA/PK)

## (undated) DEVICE — SUTURE 4-0 VICRYL PLUS RB-1 - 27 INCH (36/BX)

## (undated) DEVICE — TOOL MR8 14CM MATCH HD SYM-TRI 3MM DIAMETER (1/EA)

## (undated) DEVICE — DRAPE CHEST/BREAST - (12EA/CA)

## (undated) DEVICE — GLOVE BIOGEL PI ORTHO SZ 7 PF LF (40PR/BX)

## (undated) DEVICE — COVER LIGHT HANDLE FLEXIBLE - SOFT (2EA/PK 80PK/CA)

## (undated) DEVICE — TAPE CLOTH MEDIPORE 6 INCH - (12RL/CA)

## (undated) DEVICE — GLOVE SZ 6 BIOGEL PI MICRO - PF LF (50PR/BX 4BX/CA)

## (undated) DEVICE — DEVICE MONOPOLAR RF PEAK PLASMABLADE 3.0S

## (undated) DEVICE — BONE WAX (12PK/BX)

## (undated) DEVICE — STAPLER SKIN DISP - (6/BX 10BX/CA) VISISTAT

## (undated) DEVICE — BINDER ABDOMINAL 30X45X12IN - FITS 30-45IN WAIST 12IN HIGH

## (undated) DEVICE — SUTURE 2-0 ETHIBOND GREEN CT-2 TAPER (36PK/BX)

## (undated) DEVICE — GLOVE BIOGEL ECLIPSE  PF LATEX SIZE 6.5 (50PR/BX)

## (undated) DEVICE — SPLINT X-FAST SETTING 4 X 15 (50EA/BX 12BX/CA) SPECIALIST""

## (undated) DEVICE — COVER LIGHT HANDLE ALC PLUS DISP (18EA/BX)

## (undated) DEVICE — SPONGE PEANUT - (5/PK 50PK/CA)

## (undated) DEVICE — DRAPE PATIENT STERILE FOR USE WITH O OR C ARMS (10EA/BX)

## (undated) DEVICE — CAPTIVATOR II-15MM ROUND STIFF (40/BX)

## (undated) DEVICE — TRAY SRGPRP PVP IOD WT PRP - (20/CA)

## (undated) DEVICE — DETERGENT RENUZYME PLUS 10 OZ PACKET (50/BX)

## (undated) DEVICE — SUTURE 4-0 MONOCRYL PLUS PS-1 - 27 INCH (36/BX)